# Patient Record
Sex: MALE | URBAN - METROPOLITAN AREA
[De-identification: names, ages, dates, MRNs, and addresses within clinical notes are randomized per-mention and may not be internally consistent; named-entity substitution may affect disease eponyms.]

---

## 2022-12-08 ENCOUNTER — OFFICE VISIT (OUTPATIENT)
Dept: PAIN MANAGEMENT | Age: 52
End: 2022-12-08
Payer: COMMERCIAL

## 2022-12-08 VITALS
HEIGHT: 71 IN | TEMPERATURE: 98.2 F | BODY MASS INDEX: 23.66 KG/M2 | SYSTOLIC BLOOD PRESSURE: 128 MMHG | WEIGHT: 169 LBS | DIASTOLIC BLOOD PRESSURE: 76 MMHG

## 2022-12-08 DIAGNOSIS — M50.30 DDD (DEGENERATIVE DISC DISEASE), CERVICAL: Primary | ICD-10-CM

## 2022-12-08 PROCEDURE — G8484 FLU IMMUNIZE NO ADMIN: HCPCS | Performed by: PHYSICAL MEDICINE & REHABILITATION

## 2022-12-08 PROCEDURE — G8420 CALC BMI NORM PARAMETERS: HCPCS | Performed by: PHYSICAL MEDICINE & REHABILITATION

## 2022-12-08 PROCEDURE — G8427 DOCREV CUR MEDS BY ELIG CLIN: HCPCS | Performed by: PHYSICAL MEDICINE & REHABILITATION

## 2022-12-08 PROCEDURE — 3017F COLORECTAL CA SCREEN DOC REV: CPT | Performed by: PHYSICAL MEDICINE & REHABILITATION

## 2022-12-08 PROCEDURE — 99203 OFFICE O/P NEW LOW 30 MIN: CPT | Performed by: PHYSICAL MEDICINE & REHABILITATION

## 2022-12-08 PROCEDURE — 1036F TOBACCO NON-USER: CPT | Performed by: PHYSICAL MEDICINE & REHABILITATION

## 2022-12-08 RX ORDER — DOXYCYCLINE HYCLATE 100 MG
TABLET ORAL
COMMUNITY
Start: 2019-12-18

## 2022-12-08 RX ORDER — MELOXICAM 10 MG/1
CAPSULE ORAL
COMMUNITY

## 2022-12-08 RX ORDER — FLUTICASONE PROPIONATE 50 MCG
SPRAY, SUSPENSION (ML) NASAL
COMMUNITY

## 2022-12-08 RX ORDER — PREGABALIN 300 MG/1
CAPSULE ORAL 2 TIMES DAILY
COMMUNITY

## 2022-12-08 RX ORDER — PSEUDOEPHEDRINE HCL 30 MG
TABLET ORAL
COMMUNITY

## 2022-12-08 RX ORDER — ACETAMINOPHEN 325 MG/1
TABLET ORAL
COMMUNITY

## 2022-12-08 RX ORDER — DOXYCYCLINE 100 MG/1
CAPSULE ORAL
COMMUNITY

## 2022-12-08 RX ORDER — EZETIMIBE 10 MG/1
TABLET ORAL
COMMUNITY

## 2022-12-08 RX ORDER — OMEPRAZOLE 20 MG/1
TABLET, DELAYED RELEASE ORAL
COMMUNITY

## 2022-12-08 RX ORDER — DIPHENHYDRAMINE HCL 25 MG
CAPSULE ORAL
COMMUNITY

## 2022-12-08 RX ORDER — PSEUDOEPHED/ACETAMINOPH/DIPHEN 30MG-500MG
TABLET ORAL
COMMUNITY
Start: 2022-11-13

## 2022-12-08 RX ORDER — MULTIVIT WITH MINERALS/LUTEIN
TABLET ORAL
COMMUNITY

## 2022-12-08 RX ORDER — DIVALPROEX SODIUM 500 MG/1
TABLET, DELAYED RELEASE ORAL
COMMUNITY

## 2022-12-08 RX ORDER — ACETAMINOPHEN 500 MG
TABLET ORAL
COMMUNITY

## 2022-12-08 ASSESSMENT — ENCOUNTER SYMPTOMS
SHORTNESS OF BREATH: 0
NAUSEA: 0
CONSTIPATION: 0
BACK PAIN: 0
DIARRHEA: 0
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
## 2023-05-18 ENCOUNTER — HOSPITAL ENCOUNTER (OUTPATIENT)
Dept: DATA CONVERSION | Facility: HOSPITAL | Age: 53
End: 2023-05-18
Attending: RADIOLOGY
Payer: COMMERCIAL

## 2023-05-18 DIAGNOSIS — M25.559 PAIN IN UNSPECIFIED HIP: ICD-10-CM

## 2023-05-18 DIAGNOSIS — M25.552 PAIN IN LEFT HIP: ICD-10-CM

## 2023-05-24 ENCOUNTER — HOSPITAL ENCOUNTER (OUTPATIENT)
Dept: DATA CONVERSION | Facility: HOSPITAL | Age: 53
End: 2023-05-24
Attending: ORTHOPAEDIC SURGERY | Admitting: ORTHOPAEDIC SURGERY
Payer: COMMERCIAL

## 2023-05-24 DIAGNOSIS — E78.5 HYPERLIPIDEMIA, UNSPECIFIED: ICD-10-CM

## 2023-05-24 DIAGNOSIS — G56.22 LESION OF ULNAR NERVE, LEFT UPPER LIMB: ICD-10-CM

## 2023-05-24 DIAGNOSIS — K21.9 GASTRO-ESOPHAGEAL REFLUX DISEASE WITHOUT ESOPHAGITIS: ICD-10-CM

## 2023-05-24 DIAGNOSIS — I73.9 PERIPHERAL VASCULAR DISEASE, UNSPECIFIED (CMS-HCC): ICD-10-CM

## 2023-05-24 DIAGNOSIS — G90.522 COMPLEX REGIONAL PAIN SYNDROME I OF LEFT LOWER LIMB: ICD-10-CM

## 2023-06-27 ENCOUNTER — HOSPITAL ENCOUNTER (OUTPATIENT)
Dept: DATA CONVERSION | Facility: HOSPITAL | Age: 53
End: 2023-06-27
Attending: PAIN MEDICINE | Admitting: PAIN MEDICINE
Payer: COMMERCIAL

## 2023-06-27 DIAGNOSIS — M47.812 SPONDYLOSIS WITHOUT MYELOPATHY OR RADICULOPATHY, CERVICAL REGION: ICD-10-CM

## 2023-08-01 ENCOUNTER — HOSPITAL ENCOUNTER (OUTPATIENT)
Dept: DATA CONVERSION | Facility: HOSPITAL | Age: 53
End: 2023-08-01
Attending: PAIN MEDICINE | Admitting: PAIN MEDICINE
Payer: COMMERCIAL

## 2023-08-01 DIAGNOSIS — M47.812 SPONDYLOSIS WITHOUT MYELOPATHY OR RADICULOPATHY, CERVICAL REGION: ICD-10-CM

## 2023-09-07 VITALS — HEIGHT: 71 IN | BODY MASS INDEX: 24.89 KG/M2

## 2023-09-19 ENCOUNTER — HOSPITAL ENCOUNTER (OUTPATIENT)
Dept: DATA CONVERSION | Facility: HOSPITAL | Age: 53
End: 2023-09-19
Attending: PAIN MEDICINE | Admitting: PAIN MEDICINE
Payer: COMMERCIAL

## 2023-09-19 DIAGNOSIS — M47.812 SPONDYLOSIS WITHOUT MYELOPATHY OR RADICULOPATHY, CERVICAL REGION: ICD-10-CM

## 2023-09-21 PROBLEM — M62.559 ATROPHY OF QUADRICEPS FEMORIS MUSCLE: Status: ACTIVE | Noted: 2023-09-21

## 2023-09-21 PROBLEM — G90.50 REFLEX SYMPATHETIC DYSTROPHY: Status: ACTIVE | Noted: 2023-09-21

## 2023-09-21 PROBLEM — H52.4 BILATERAL PRESBYOPIA: Status: ACTIVE | Noted: 2023-09-21

## 2023-09-21 PROBLEM — M25.579 FOOT JOINT PAIN: Status: ACTIVE | Noted: 2023-09-21

## 2023-09-21 PROBLEM — M79.18 MYOFASCIAL PAIN: Status: ACTIVE | Noted: 2023-09-21

## 2023-09-21 PROBLEM — G25.89 SCAPULAR DYSKINESIS: Status: ACTIVE | Noted: 2023-09-21

## 2023-09-21 PROBLEM — G56.22 CUBITAL TUNNEL SYNDROME ON LEFT: Status: ACTIVE | Noted: 2023-09-21

## 2023-09-21 PROBLEM — G56.21 CUBITAL TUNNEL SYNDROME ON RIGHT: Status: ACTIVE | Noted: 2023-09-21

## 2023-09-21 PROBLEM — F41.9 ANXIETY: Status: ACTIVE | Noted: 2023-09-21

## 2023-09-21 PROBLEM — M77.42 METATARSALGIA OF LEFT FOOT: Status: ACTIVE | Noted: 2023-09-21

## 2023-09-21 PROBLEM — G56.20 GUYON SYNDROME: Status: ACTIVE | Noted: 2023-09-21

## 2023-09-21 PROBLEM — G89.29 CHRONIC LOW BACK PAIN WITHOUT SCIATICA: Status: ACTIVE | Noted: 2023-09-21

## 2023-09-21 PROBLEM — N52.9 ERECTILE DYSFUNCTION: Status: ACTIVE | Noted: 2023-09-21

## 2023-09-21 PROBLEM — F43.9 TRAUMA AND STRESSOR-RELATED DISORDER: Status: ACTIVE | Noted: 2023-09-21

## 2023-09-21 PROBLEM — M95.8 OSTEOCHONDRAL DEFECT OF PATELLA: Status: ACTIVE | Noted: 2023-09-21

## 2023-09-21 PROBLEM — R29.818 SUSPECTED SLEEP APNEA: Status: ACTIVE | Noted: 2023-09-21

## 2023-09-21 PROBLEM — M16.12 PRIMARY OSTEOARTHRITIS OF LEFT HIP: Status: ACTIVE | Noted: 2023-09-21

## 2023-09-21 PROBLEM — M54.12 CERVICAL RADICULAR PAIN: Status: ACTIVE | Noted: 2023-09-21

## 2023-09-21 PROBLEM — R40.0 DAYTIME SLEEPINESS: Status: ACTIVE | Noted: 2023-09-21

## 2023-09-21 PROBLEM — N28.1 KIDNEY CYSTS: Status: ACTIVE | Noted: 2023-09-21

## 2023-09-21 PROBLEM — F11.90 NARCOTIC DRUG USE: Status: ACTIVE | Noted: 2023-09-21

## 2023-09-21 PROBLEM — M47.816 LUMBAR SPONDYLOSIS: Status: ACTIVE | Noted: 2023-09-21

## 2023-09-21 PROBLEM — M25.819 SHOULDER IMPINGEMENT: Status: ACTIVE | Noted: 2023-09-21

## 2023-09-21 PROBLEM — R91.1 LUNG NODULE: Status: ACTIVE | Noted: 2023-09-21

## 2023-09-21 PROBLEM — M62.838 MUSCLE SPASM: Status: ACTIVE | Noted: 2023-09-21

## 2023-09-21 PROBLEM — M17.10 PATELLOFEMORAL ARTHRITIS: Status: ACTIVE | Noted: 2023-09-21

## 2023-09-21 PROBLEM — M25.562 CHRONIC PATELLOFEMORAL PAIN OF LEFT KNEE: Status: ACTIVE | Noted: 2023-09-21

## 2023-09-21 PROBLEM — H52.203 ASTIGMATISM, BILATERAL: Status: ACTIVE | Noted: 2023-09-21

## 2023-09-21 PROBLEM — M47.892 OTHER SPONDYLOSIS, CERVICAL REGION: Status: ACTIVE | Noted: 2023-09-21

## 2023-09-21 PROBLEM — Z91.49 HISTORY OF PSYCHOLOGICAL TRAUMA: Status: ACTIVE | Noted: 2023-09-21

## 2023-09-21 PROBLEM — M25.50 JOINT PAIN: Status: ACTIVE | Noted: 2023-09-21

## 2023-09-21 PROBLEM — G89.29 CHRONIC PATELLOFEMORAL PAIN OF LEFT KNEE: Status: ACTIVE | Noted: 2023-09-21

## 2023-09-21 PROBLEM — M54.6 THORACIC BACK PAIN: Status: ACTIVE | Noted: 2023-09-21

## 2023-09-21 PROBLEM — H52.13 BILATERAL MYOPIA: Status: ACTIVE | Noted: 2023-09-21

## 2023-09-21 PROBLEM — M19.90 ARTHRITIS: Status: ACTIVE | Noted: 2023-09-21

## 2023-09-21 PROBLEM — G90.522 COMPLEX REGIONAL PAIN SYNDROME TYPE 1 OF LEFT LOWER EXTREMITY: Status: ACTIVE | Noted: 2023-09-21

## 2023-09-21 PROBLEM — M54.50 CHRONIC LOW BACK PAIN WITHOUT SCIATICA: Status: ACTIVE | Noted: 2023-09-21

## 2023-09-21 RX ORDER — IBUPROFEN 800 MG/1
1 TABLET ORAL
COMMUNITY
End: 2023-10-03

## 2023-09-21 RX ORDER — KETOCONAZOLE 20 MG/ML
SHAMPOO, SUSPENSION TOPICAL
COMMUNITY
Start: 2023-04-07 | End: 2023-10-04 | Stop reason: ENTERED-IN-ERROR

## 2023-09-21 RX ORDER — PREDNISONE 20 MG/1
2 TABLET ORAL DAILY
COMMUNITY
Start: 2023-01-21 | End: 2023-10-03

## 2023-09-21 RX ORDER — TIZANIDINE 4 MG/1
1 TABLET ORAL NIGHTLY
COMMUNITY
End: 2023-10-03

## 2023-09-21 RX ORDER — FLUTICASONE PROPIONATE 50 MCG
1 SPRAY, SUSPENSION (ML) NASAL DAILY PRN
COMMUNITY

## 2023-09-21 RX ORDER — HYOSCYAMINE SULFATE 0.12 MG/1
1-2 TABLET, ORALLY DISINTEGRATING ORAL
COMMUNITY
Start: 2023-03-21 | End: 2023-10-03

## 2023-09-21 RX ORDER — DULOXETIN HYDROCHLORIDE 60 MG/1
60 CAPSULE, DELAYED RELEASE ORAL DAILY
COMMUNITY
Start: 2023-08-02

## 2023-09-21 RX ORDER — LIDOCAINE HCL 100 %
POWDER (GRAM) MISCELLANEOUS
COMMUNITY
Start: 2023-09-12 | End: 2023-10-04 | Stop reason: ENTERED-IN-ERROR

## 2023-09-21 RX ORDER — PREGABALIN 300 MG/1
1 CAPSULE ORAL 2 TIMES DAILY
Status: ON HOLD | COMMUNITY
End: 2023-10-06 | Stop reason: SDUPTHER

## 2023-09-21 RX ORDER — NALOXONE HYDROCHLORIDE 4 MG/.1ML
SPRAY NASAL
COMMUNITY
End: 2023-10-04 | Stop reason: ENTERED-IN-ERROR

## 2023-09-21 RX ORDER — DULOXETIN HYDROCHLORIDE 30 MG/1
30 CAPSULE, DELAYED RELEASE ORAL DAILY
COMMUNITY
Start: 2023-04-12 | End: 2023-10-04 | Stop reason: ENTERED-IN-ERROR

## 2023-09-21 RX ORDER — HYDROCODONE BITARTRATE AND ACETAMINOPHEN 7.5; 325 MG/1; MG/1
1 TABLET ORAL EVERY 6 HOURS PRN
COMMUNITY
Start: 2023-03-13 | End: 2023-10-03

## 2023-09-21 RX ORDER — DOXYCYCLINE 100 MG/1
1 CAPSULE ORAL 2 TIMES DAILY
COMMUNITY
Start: 2023-04-04 | End: 2023-10-04 | Stop reason: ENTERED-IN-ERROR

## 2023-09-21 RX ORDER — ONDANSETRON 4 MG/1
1 TABLET, ORALLY DISINTEGRATING ORAL EVERY 8 HOURS PRN
COMMUNITY
Start: 2023-01-17 | End: 2023-10-04 | Stop reason: ENTERED-IN-ERROR

## 2023-09-21 RX ORDER — OXYCODONE HYDROCHLORIDE 5 MG/1
1 TABLET ORAL EVERY 8 HOURS PRN
COMMUNITY
Start: 2023-09-04 | End: 2023-10-03

## 2023-09-21 RX ORDER — ATORVASTATIN CALCIUM 80 MG/1
1 TABLET, FILM COATED ORAL NIGHTLY
COMMUNITY

## 2023-09-21 RX ORDER — HYDROCODONE BITARTRATE AND ACETAMINOPHEN 5; 325 MG/1; MG/1
1 TABLET ORAL EVERY 6 HOURS PRN
COMMUNITY
End: 2023-10-03

## 2023-09-21 RX ORDER — DICLOFENAC POTASSIUM 25 MG/1
1 CAPSULE, LIQUID FILLED ORAL 3 TIMES DAILY
COMMUNITY
End: 2023-10-03

## 2023-09-21 RX ORDER — ORPHENADRINE CITRATE 100 MG/1
1 TABLET, EXTENDED RELEASE ORAL 2 TIMES DAILY
COMMUNITY
End: 2023-10-03

## 2023-09-21 RX ORDER — AZELASTINE 1 MG/ML
2 SPRAY, METERED NASAL 2 TIMES DAILY
COMMUNITY
Start: 2023-08-02 | End: 2023-10-04 | Stop reason: ENTERED-IN-ERROR

## 2023-09-21 RX ORDER — KETAMINE HCL IN 0.9 % NACL 200 MG/200
1 PLASTIC BAG, INJECTION (ML) INTRAVENOUS
COMMUNITY
End: 2023-10-04 | Stop reason: ENTERED-IN-ERROR

## 2023-09-21 RX ORDER — LIDOCAINE 50 MG/G
2 PATCH TOPICAL DAILY PRN
COMMUNITY
Start: 2023-09-10 | End: 2023-10-04 | Stop reason: ENTERED-IN-ERROR

## 2023-09-21 RX ORDER — ACETAMINOPHEN 160 MG/5ML
1 SUSPENSION, ORAL (FINAL DOSE FORM) ORAL 3 TIMES DAILY
COMMUNITY
Start: 2022-06-21 | End: 2023-10-03

## 2023-09-21 RX ORDER — OXYCODONE AND ACETAMINOPHEN 5; 325 MG/1; MG/1
1 TABLET ORAL EVERY 8 HOURS PRN
COMMUNITY
End: 2023-10-03

## 2023-09-21 RX ORDER — TIZANIDINE 2 MG/1
1-2 TABLET ORAL 3 TIMES DAILY PRN
COMMUNITY
Start: 2023-07-11 | End: 2023-10-03

## 2023-09-21 RX ORDER — PERPHENAZINE 16 MG
1 TABLET ORAL
COMMUNITY
Start: 2022-06-21 | End: 2023-10-03

## 2023-09-21 RX ORDER — ALBUTEROL SULFATE 90 UG/1
AEROSOL, METERED RESPIRATORY (INHALATION) EVERY 4 HOURS PRN
COMMUNITY
Start: 2023-05-16 | End: 2023-10-06 | Stop reason: HOSPADM

## 2023-09-21 RX ORDER — B-COMPLEX WITH VITAMIN C
1 CAPSULE ORAL
COMMUNITY
Start: 2022-06-21 | End: 2023-10-03

## 2023-09-21 RX ORDER — METOCLOPRAMIDE HYDROCHLORIDE 5 MG/5ML
10 SOLUTION ORAL 3 TIMES DAILY
COMMUNITY
Start: 2023-09-15 | End: 2024-04-05 | Stop reason: ALTCHOICE

## 2023-09-21 RX ORDER — CLINDAMYCIN AND BENZOYL PEROXIDE 10; 50 MG/G; MG/G
GEL TOPICAL
COMMUNITY
Start: 2023-04-04 | End: 2023-10-03 | Stop reason: HOSPADM

## 2023-09-21 RX ORDER — TADALAFIL 5 MG/1
1 TABLET ORAL DAILY
COMMUNITY
Start: 2023-07-29 | End: 2023-10-04 | Stop reason: ENTERED-IN-ERROR

## 2023-09-21 RX ORDER — FLUTICASONE FUROATE 27.5 MCG
SPRAY, SUSPENSION (ML) NASAL
COMMUNITY
End: 2023-10-04 | Stop reason: ENTERED-IN-ERROR

## 2023-09-21 RX ORDER — RIMEGEPANT SULFATE 75 MG/75MG
1 TABLET, ORALLY DISINTEGRATING ORAL
COMMUNITY
Start: 2023-07-13 | End: 2023-10-04 | Stop reason: ENTERED-IN-ERROR

## 2023-09-21 RX ORDER — PREGABALIN 150 MG/1
2 CAPSULE ORAL 2 TIMES DAILY
COMMUNITY
End: 2023-10-03

## 2023-09-21 RX ORDER — DICYCLOMINE HYDROCHLORIDE 20 MG/1
1 TABLET ORAL
COMMUNITY
Start: 2023-08-02 | End: 2023-10-03

## 2023-09-21 RX ORDER — SUCRALFATE 1 G/1
1 TABLET ORAL
COMMUNITY
Start: 2023-07-15 | End: 2023-10-04 | Stop reason: ENTERED-IN-ERROR

## 2023-09-21 RX ORDER — DIAZEPAM 5 MG/1
1 TABLET ORAL 2 TIMES DAILY
COMMUNITY
End: 2023-10-06 | Stop reason: HOSPADM

## 2023-09-21 RX ORDER — OMEPRAZOLE 40 MG/1
1 CAPSULE, DELAYED RELEASE ORAL 2 TIMES DAILY
COMMUNITY

## 2023-09-21 RX ORDER — DIPHENHYDRAMINE HCL 25 MG
1 TABLET ORAL EVERY 6 HOURS PRN
COMMUNITY
End: 2023-10-04 | Stop reason: ENTERED-IN-ERROR

## 2023-09-21 RX ORDER — SILDENAFIL 100 MG/1
1 TABLET, FILM COATED ORAL DAILY PRN
COMMUNITY
Start: 2023-09-09 | End: 2023-10-04 | Stop reason: ENTERED-IN-ERROR

## 2023-09-21 RX ORDER — ACETAMINOPHEN 500 MG
1 TABLET ORAL 3 TIMES DAILY PRN
COMMUNITY
End: 2023-10-06 | Stop reason: HOSPADM

## 2023-09-21 RX ORDER — DICLOFENAC SODIUM 50 MG/1
1 TABLET, DELAYED RELEASE ORAL
COMMUNITY
Start: 2023-07-11 | End: 2023-10-03

## 2023-09-21 RX ORDER — BUPRENORPHINE AND NALOXONE 4; 1 MG/1; MG/1
1 FILM, SOLUBLE BUCCAL; SUBLINGUAL 2 TIMES DAILY
COMMUNITY
Start: 2022-06-21 | End: 2023-10-03 | Stop reason: HOSPADM

## 2023-09-22 PROBLEM — M70.62 TROCHANTERIC BURSITIS OF BOTH HIPS: Status: ACTIVE | Noted: 2023-09-22

## 2023-09-22 PROBLEM — M70.61 TROCHANTERIC BURSITIS OF BOTH HIPS: Status: ACTIVE | Noted: 2023-09-22

## 2023-09-22 LAB
ACTIVATED PARTIAL THROMBOPLASTIN TIME IN PPP BY COAGULATION ASSAY: 30 SEC (ref 27–38)
INR IN PPP BY COAGULATION ASSAY: 1 (ref 0.9–1.1)
PROTHROMBIN TIME (PT) IN PPP BY COAGULATION ASSAY: 11.2 SEC (ref 9.8–12.8)

## 2023-09-25 RX ORDER — DIPHENHYDRAMINE HYDROCHLORIDE 50 MG/ML
50 INJECTION INTRAMUSCULAR; INTRAVENOUS AS NEEDED
Status: CANCELLED | OUTPATIENT
Start: 2023-10-03

## 2023-09-25 RX ORDER — EPINEPHRINE 0.3 MG/.3ML
0.3 INJECTION SUBCUTANEOUS EVERY 5 MIN PRN
Status: CANCELLED | OUTPATIENT
Start: 2023-10-03

## 2023-09-25 RX ORDER — ONDANSETRON HYDROCHLORIDE 2 MG/ML
4 INJECTION, SOLUTION INTRAVENOUS ONCE
Status: CANCELLED | OUTPATIENT
Start: 2023-10-03 | End: 2023-10-03

## 2023-09-25 RX ORDER — ALBUTEROL SULFATE 0.83 MG/ML
3 SOLUTION RESPIRATORY (INHALATION) AS NEEDED
Status: CANCELLED | OUTPATIENT
Start: 2023-10-03

## 2023-09-25 RX ORDER — NITROGLYCERIN 0.4 MG/1
0.4 TABLET SUBLINGUAL ONCE
Status: CANCELLED | OUTPATIENT
Start: 2023-10-03 | End: 2023-10-03

## 2023-09-25 RX ORDER — FAMOTIDINE 10 MG/ML
20 INJECTION INTRAVENOUS ONCE AS NEEDED
Status: CANCELLED | OUTPATIENT
Start: 2023-10-03

## 2023-09-25 RX ORDER — METOPROLOL TARTRATE 25 MG/1
25 TABLET, FILM COATED ORAL ONCE
Status: CANCELLED | OUTPATIENT
Start: 2023-10-03 | End: 2023-10-03

## 2023-09-29 VITALS — HEIGHT: 71 IN | WEIGHT: 176.37 LBS | BODY MASS INDEX: 24.69 KG/M2

## 2023-09-30 NOTE — H&P
History & Physical Reviewed:   I have reviewed the History and Physical dated:  04-May-2023   History and Physical reviewed and relevant findings noted. Patient examined to review pertinent physical  findings.: No significant changes   Home Medications Reviewed: no changes noted   Allergies Reviewed: no changes noted     Consent:   COVID-19 Consent:  ·  COVID-19 Risk Consent Surgeon has reviewed key risks related to the risk of yamileth COVID-19 and if they contract COVID-19 what the risks are.       Electronic Signatures:  Ronni Luna ()  (Signed 24-May-2023 08:47)   Authored: History & Physical Reviewed, Consent, Note  Completion      Last Updated: 24-May-2023 08:47 by Ronni Luna ()

## 2023-10-01 NOTE — OP NOTE
PROCEDURE DETAILS    Preoperative Diagnosis:  Spondylosis, cervical, M47.812    Postoperative Diagnosis:  Spondylosis, cervical, M47.812    Surgeon: Carlyle Abel  Resident/Fellow/Other Assistant: Carolina Balderrama    Procedure:  Radiofrequency ablation left medial nerve branch   C2-C3,C3-C4    Anesthesia: 4 mg of Versed   Estimated Blood Loss: 0  Findings: None         Operative Report:   Surgical Indications  The patient is here today to receive a radiofrequency ablation of the above-mentioned nerve to assist with the pain condition.    Operative Report  The patient was taken to the procedure room, was placed into a prone positioning. The skin was prepped and draped sterilely in the normal fashion.  Under fluoroscopic guidance the medial nerve root branches were identified and the patient was throughout the procedure monitored by the nursing staff. The skin and subcutaneous tissues were anesthetized with 1% lidocaine. Then 20-gauge RF needles were  introduced into the approximation of the medial nerve branches at the above mentioned level and confirmed in AP and oblique view, with negative aspiration of heme and CSF, with positive sensory stimulation and negative motor stimulation. Then the patient  received 1 mL of 0.5% bupivacaine per site and radiofrequency ablation at temperature of 80°C for 90 seconds was achieved. Patient tolerated the procedure well, was taken to the recovery room, allowed to recover for a sufficient amount of time and  then was discharged home in stable condition. The patient was advised to followup with the [facility] to reassess the improvement on as-needed basis. Take you for allowing me to participate in the care of this patient.                        Attestation:   Note Completion:  Attending Attestation I performed the procedure without a resident         Electronic Signatures:  Carlyle Abel)  (Signed 19-Sep-2023 08:46)   Authored: Post-Operative Note,  Chart Review, Note Completion      Last Updated: 19-Sep-2023 08:46 by Carlyle Abel)

## 2023-10-01 NOTE — OP NOTE
PROCEDURE DETAILS    Preoperative Diagnosis:  Cervical spondylosis, M47.812    Postoperative Diagnosis:  Cervical spondylosis, M47.812    Surgeon: Carlyle Abel  Resident/Fellow/Other Assistant: None of these were associated with this case    Procedure:  1. Left Medial nerve branch block C2-3 C3-4    Anesthesia: No anesthesiologist associated with this case  Estimated Blood Loss: 0  Findings: None         Operative Report:   Clinical Note  The patient is here today to receive diagnostic medial nerve branch block to assist with pain.    Procedure Note  The patient was taken to the procedure room, was placed into a prone position. The area was prepped and draped sterilely in the normal fashion. The patient was throughout the procedure monitored by the nursing staff. The skin and subcutaneous tissue was  anesthetized with 1% lidocaine. Then 22-gauge spinal needles were introduced into the approximation of the medial nerve branches at the above mentioned level, confirmed in AP and oblique view with negative aspiration of heme and CSF. The patient received  0.5 mL of 0.5% Marcaine per site. The patient tolerated the procedure well, was taken to the recovery room, allowed to recover for sufficient amount of time and then was discharged home in stable condition. The patient was advised to followup with the  [facility] to reassess the improvement and determine the need for the radiofrequency ablation.    Thank you for allowing me to participate in the care of this patient.                        Attestation:   Note Completion:  Attending Attestation I performed the procedure without a resident         Electronic Signatures:  Carlyle Abel)  (Signed 01-Aug-2023 09:55)   Authored: Post-Operative Note, Chart Review, Note Completion      Last Updated: 01-Aug-2023 09:55 by Carlyle Abel)

## 2023-10-02 NOTE — OP NOTE
Post Operative Note:     Post-Procedure Diagnosis: Ulnar nerve compression  left wrist   Procedure: 1.   2.   3.   4.   5.   Surgeon: Ronni Luna D.O.   Resident/Fellow/Other Assistant: None   Estimated Blood Loss (mL): Less than 10 cc   Specimen: no   Findings: Ulnar nerve compression left wrist     Operative Report Dictated:  Dictation: not applicable - note contains Operative  Report   Operative Report:    Preoperative diagnosis:Ulnar nerve compression left wrist    Postoperative diagnosis:Same    Procedure planned:Decompression ulnar nerve left wrist including deep motor branch in the palm    Procedure performed:Same    Surgeon:Ronni Luna D.O.    Assistant:None    Anesthesia:General    Estimated blood loss:Less than 10 cc    Drains:None    Tourniquet:Less than 30 minutes at 250 mmHg to the well-padded left upper arm    Specimens:None    Implants:None    Indications for procedure:The patient presented to the office with subjective symptoms and physical examination consistent with ulnar nerve compression to the left wrist.  The patient experienced failure of nonoperative treatment strategies.  After  full discussion regarding risks benefits and alternatives the patient elected to proceed forth with surgery by way of ulnar nerve decompression to left wrist with concomitant decompression of deep motor branch.  Informed consent was signed and placed  in the chart.    Complications: None noted at the time of surgery    Description of procedure:The patient was taken to the operative suite and placed in the supine position on the operating table.  A timeout was performed and the left wrist confirmed to be the operative site.  The patient was carefully positioned on  the table in such a fashion as to pad all bony prominences and peripheral nerves.  The patient was administered appropriate IV antibiotics and general anesthesia.  He was prepped and draped in the normal sterile fashion.  The planned  surgical incision  was marked out starting in the ulnar volar distal third forearm proceeding longitudinally traversing the wrist flexion crease in a zigzag fashion proceeding up the ulnar aspect of the palm overlying hypothenar eminence.  The tourniquet was elevated.   The 15 blade was used to incise skin.  Dissection was carried down to the ulnar neurovascular bundle in the distal third forearm.  The antebrachial fascia was divided and the ulnar nerve and artery exposed.  Dissection was then carried distally taking  care to avoid injury to sensory branches feeding the cutaneous tissues of the palm.  Dissection was carried distally to a point where the sensory portion of the nerve was completely decompressed.  Dissection was then carried deep down to the level of  the deep motor branch.  The fascia was released off of the deep motor branch to the point where the flexor tendons were evident exiting the distal ulnar aspect of the carpal tunnel indicating complete decompression.  The ulnar motor branch showed obvious  evidence for compression in this zone.  Irrigation was performed.  The tourniquet was relieved and hemostasis confirmed.  Interrupted nylon stitches were used to close the skin.  A bulky soft dressing was placed.  The patient was allowed to arise from  anesthesia and taken recovery in stable condition.  Overall the patient tolerated the procedure well.    Disposition: Stable to PACU      Electronic Signatures:  Ronni Luna ()  (Signed 24-May-2023 12:33)   Authored: Post Operative Note, Note Completion      Last Updated: 24-May-2023 12:33 by Ronni Luna ()

## 2023-10-02 NOTE — OP NOTE
PROCEDURE DETAILS    Preoperative Diagnosis:  Cervical spondylosis, M47.812    Postoperative Diagnosis:  Cervical spondylosis, M47.812    Surgeon: Carlyle Abel  Resident/Fellow/Other Assistant: Carolina Balderrama    Procedure:  Medial nerve branch block left C2-C3,  C3-C4    Anesthesia: No anesthesiologist associated with this case  Estimated Blood Loss: 0  Findings: None         Operative Report:   Clinical Note  The patient is here today to receive diagnostic medial nerve branch block to assist with pain.    Procedure Note  The patient was taken to the procedure room, was placed into a prone position. The area was prepped and draped sterilely in the normal fashion. The patient was throughout the procedure monitored by the nursing staff. The skin and subcutaneous tissue was  anesthetized with 1% lidocaine. Then 22-gauge spinal needles were introduced into the approximation of the medial nerve branches at the above mentioned level, confirmed in AP and oblique view with negative aspiration of heme and CSF. The patient received  0.5 mL of 2% lidocaine per site. The patient tolerated the procedure well, was taken to the recovery room, allowed to recover for sufficient amount of time and then was discharged home in stable condition. The patient was advised to followup with the  [facility] to reassess the improvement and determine the need for the radiofrequency ablation.    Thank you for allowing me to participate in the care of this patient.                        Attestation:   Note Completion:  Attending Attestation I performed the procedure without a resident         Electronic Signatures:  Carlyle Abel)  (Signed 27-Jun-2023 09:35)   Authored: Post-Operative Note, Chart Review, Note Completion      Last Updated: 27-Jun-2023 09:35 by Carlyle Abel)

## 2023-10-03 ENCOUNTER — INFUSION (OUTPATIENT)
Dept: INFUSION THERAPY | Facility: CLINIC | Age: 53
End: 2023-10-03
Payer: COMMERCIAL

## 2023-10-03 VITALS
HEART RATE: 75 BPM | DIASTOLIC BLOOD PRESSURE: 81 MMHG | RESPIRATION RATE: 18 BRPM | TEMPERATURE: 99 F | OXYGEN SATURATION: 97 % | SYSTOLIC BLOOD PRESSURE: 128 MMHG

## 2023-10-03 DIAGNOSIS — G90.522 COMPLEX REGIONAL PAIN SYNDROME TYPE 1 OF LEFT LOWER EXTREMITY: ICD-10-CM

## 2023-10-03 PROCEDURE — 96368 THER/DIAG CONCURRENT INF: CPT

## 2023-10-03 PROCEDURE — 2500000005 HC RX 250 GENERAL PHARMACY W/O HCPCS: Performed by: NURSE PRACTITIONER

## 2023-10-03 PROCEDURE — 96365 THER/PROPH/DIAG IV INF INIT: CPT

## 2023-10-03 PROCEDURE — 2500000004 HC RX 250 GENERAL PHARMACY W/ HCPCS (ALT 636 FOR OP/ED): Performed by: NURSE PRACTITIONER

## 2023-10-03 RX ORDER — ALBUTEROL SULFATE 0.83 MG/ML
3 SOLUTION RESPIRATORY (INHALATION) AS NEEDED
Status: CANCELLED | OUTPATIENT
Start: 2023-10-17

## 2023-10-03 RX ORDER — EPINEPHRINE 0.3 MG/.3ML
0.3 INJECTION SUBCUTANEOUS EVERY 5 MIN PRN
Status: CANCELLED | OUTPATIENT
Start: 2023-10-17

## 2023-10-03 RX ORDER — FAMOTIDINE 10 MG/ML
20 INJECTION INTRAVENOUS ONCE AS NEEDED
Status: CANCELLED | OUTPATIENT
Start: 2023-10-17

## 2023-10-03 RX ORDER — DIPHENHYDRAMINE HYDROCHLORIDE 50 MG/ML
50 INJECTION INTRAMUSCULAR; INTRAVENOUS AS NEEDED
Status: CANCELLED | OUTPATIENT
Start: 2023-10-17

## 2023-10-03 RX ORDER — ONDANSETRON HYDROCHLORIDE 2 MG/ML
4 INJECTION, SOLUTION INTRAVENOUS ONCE
Status: CANCELLED | OUTPATIENT
Start: 2023-10-17 | End: 2023-10-17

## 2023-10-03 RX ORDER — NITROGLYCERIN 0.4 MG/1
0.4 TABLET SUBLINGUAL ONCE
Status: CANCELLED | OUTPATIENT
Start: 2023-10-17 | End: 2023-10-17

## 2023-10-03 RX ORDER — METOPROLOL TARTRATE 25 MG/1
25 TABLET, FILM COATED ORAL ONCE
Status: CANCELLED | OUTPATIENT
Start: 2023-10-17 | End: 2023-10-17

## 2023-10-03 RX ADMIN — Medication: at 08:45

## 2023-10-03 RX ADMIN — PROPOFOL 100 MG: 10 INJECTION, EMULSION INTRAVENOUS at 08:15

## 2023-10-03 ASSESSMENT — PAIN SCALES - GENERAL
PAINLEVEL_OUTOF10: 4
PAINLEVEL_OUTOF10: 0 - NO PAIN

## 2023-10-03 NOTE — PROGRESS NOTES
S: Patient here for 13 opioid sparing pain infusion. Patient reports 0% reduction in pain after last infusion that lasted 0.    Purpose of pain infusion meds explained along with potential side effects.  Patient verbalized understanding.    B: Pain Issues    A: Patient currently has pain described on flow sheet documentation. Designated  is family. Patient last ate solid food 10 hours ago, and had liquid 1 hours ago.    R: Plan; Obtain IV access, do patient risk assessment, and start opioid sparing infusion as ordered. Monitoring for S/S of adverse reactions.

## 2023-10-04 ENCOUNTER — TELEPHONE (OUTPATIENT)
Dept: ORTHOPEDIC SURGERY | Facility: CLINIC | Age: 53
End: 2023-10-04
Payer: COMMERCIAL

## 2023-10-04 DIAGNOSIS — G90.50 COMPLEX REGIONAL PAIN SYNDROME TYPE 1, AFFECTING UNSPECIFIED SITE: Primary | ICD-10-CM

## 2023-10-04 DIAGNOSIS — G90.50 RSD (REFLEX SYMPATHETIC DYSTROPHY): Primary | ICD-10-CM

## 2023-10-04 PROBLEM — Z96.642 STATUS POST LEFT HIP REPLACEMENT: Status: ACTIVE | Noted: 2023-10-04

## 2023-10-04 RX ORDER — PREGABALIN 150 MG/1
300 CAPSULE ORAL 2 TIMES DAILY
Status: DISCONTINUED | OUTPATIENT
Start: 2023-10-04 | End: 2023-10-24

## 2023-10-05 ENCOUNTER — APPOINTMENT (OUTPATIENT)
Dept: RADIOLOGY | Facility: HOSPITAL | Age: 53
End: 2023-10-05
Payer: COMMERCIAL

## 2023-10-05 ENCOUNTER — ANESTHESIA (OUTPATIENT)
Dept: OPERATING ROOM | Facility: HOSPITAL | Age: 53
End: 2023-10-05
Payer: COMMERCIAL

## 2023-10-05 ENCOUNTER — ANESTHESIA EVENT (OUTPATIENT)
Dept: OPERATING ROOM | Facility: HOSPITAL | Age: 53
End: 2023-10-05
Payer: COMMERCIAL

## 2023-10-05 ENCOUNTER — HOSPITAL ENCOUNTER (OUTPATIENT)
Facility: HOSPITAL | Age: 53
Discharge: HOME HEALTH | End: 2023-10-06
Attending: ORTHOPAEDIC SURGERY | Admitting: ORTHOPAEDIC SURGERY
Payer: COMMERCIAL

## 2023-10-05 DIAGNOSIS — G90.50 COMPLEX REGIONAL PAIN SYNDROME TYPE 1, AFFECTING UNSPECIFIED SITE: ICD-10-CM

## 2023-10-05 DIAGNOSIS — Z96.642 STATUS POST LEFT HIP REPLACEMENT: Primary | ICD-10-CM

## 2023-10-05 PROCEDURE — 2500000004 HC RX 250 GENERAL PHARMACY W/ HCPCS (ALT 636 FOR OP/ED): Performed by: PHYSICIAN ASSISTANT

## 2023-10-05 PROCEDURE — 3700000001 HC GENERAL ANESTHESIA TIME - INITIAL BASE CHARGE: Performed by: ORTHOPAEDIC SURGERY

## 2023-10-05 PROCEDURE — 2580000001 HC RX 258 IV SOLUTIONS: Performed by: STUDENT IN AN ORGANIZED HEALTH CARE EDUCATION/TRAINING PROGRAM

## 2023-10-05 PROCEDURE — C1776 JOINT DEVICE (IMPLANTABLE): HCPCS | Performed by: ORTHOPAEDIC SURGERY

## 2023-10-05 PROCEDURE — 7100000001 HC RECOVERY ROOM TIME - INITIAL BASE CHARGE: Performed by: ORTHOPAEDIC SURGERY

## 2023-10-05 PROCEDURE — 2780000003 HC OR 278 NO HCPCS: Performed by: ORTHOPAEDIC SURGERY

## 2023-10-05 PROCEDURE — 3600000017 HC OR TIME - EACH INCREMENTAL 1 MINUTE - PROCEDURE LEVEL SIX: Performed by: ORTHOPAEDIC SURGERY

## 2023-10-05 PROCEDURE — 3700000002 HC GENERAL ANESTHESIA TIME - EACH INCREMENTAL 1 MINUTE: Performed by: ORTHOPAEDIC SURGERY

## 2023-10-05 PROCEDURE — 51701 INSERT BLADDER CATHETER: CPT

## 2023-10-05 PROCEDURE — 2500000001 HC RX 250 WO HCPCS SELF ADMINISTERED DRUGS (ALT 637 FOR MEDICARE OP): Performed by: ORTHOPAEDIC SURGERY

## 2023-10-05 PROCEDURE — 2720000007 HC OR 272 NO HCPCS: Performed by: ORTHOPAEDIC SURGERY

## 2023-10-05 PROCEDURE — 2500000004 HC RX 250 GENERAL PHARMACY W/ HCPCS (ALT 636 FOR OP/ED): Performed by: ANESTHESIOLOGIST ASSISTANT

## 2023-10-05 PROCEDURE — 97116 GAIT TRAINING THERAPY: CPT | Mod: GP

## 2023-10-05 PROCEDURE — 2580000001 HC RX 258 IV SOLUTIONS: Performed by: ORTHOPAEDIC SURGERY

## 2023-10-05 PROCEDURE — A27130 PR TOTAL HIP ARTHROPLASTY: Performed by: STUDENT IN AN ORGANIZED HEALTH CARE EDUCATION/TRAINING PROGRAM

## 2023-10-05 PROCEDURE — 2500000004 HC RX 250 GENERAL PHARMACY W/ HCPCS (ALT 636 FOR OP/ED): Performed by: ORTHOPAEDIC SURGERY

## 2023-10-05 PROCEDURE — 97161 PT EVAL LOW COMPLEX 20 MIN: CPT | Mod: GP

## 2023-10-05 PROCEDURE — A27130 PR TOTAL HIP ARTHROPLASTY: Performed by: ANESTHESIOLOGIST ASSISTANT

## 2023-10-05 PROCEDURE — 2500000005 HC RX 250 GENERAL PHARMACY W/O HCPCS: Performed by: ANESTHESIOLOGIST ASSISTANT

## 2023-10-05 PROCEDURE — 73501 X-RAY EXAM HIP UNI 1 VIEW: CPT | Mod: LT,FY

## 2023-10-05 PROCEDURE — 2500000004 HC RX 250 GENERAL PHARMACY W/ HCPCS (ALT 636 FOR OP/ED): Performed by: STUDENT IN AN ORGANIZED HEALTH CARE EDUCATION/TRAINING PROGRAM

## 2023-10-05 PROCEDURE — 3600000018 HC OR TIME - INITIAL BASE CHARGE - PROCEDURE LEVEL SIX: Performed by: ORTHOPAEDIC SURGERY

## 2023-10-05 PROCEDURE — 27130 TOTAL HIP ARTHROPLASTY: CPT | Performed by: ORTHOPAEDIC SURGERY

## 2023-10-05 PROCEDURE — 2580000001 HC RX 258 IV SOLUTIONS: Performed by: ANESTHESIOLOGIST ASSISTANT

## 2023-10-05 PROCEDURE — 73501 X-RAY EXAM HIP UNI 1 VIEW: CPT | Mod: LEFT SIDE | Performed by: RADIOLOGY

## 2023-10-05 PROCEDURE — 7100000011 HC EXTENDED STAY RECOVERY HOURLY - NURSING UNIT

## 2023-10-05 PROCEDURE — 20985 CPTR-ASST DIR MS PX: CPT | Performed by: ORTHOPAEDIC SURGERY

## 2023-10-05 PROCEDURE — 27130 TOTAL HIP ARTHROPLASTY: CPT | Performed by: PHYSICIAN ASSISTANT

## 2023-10-05 PROCEDURE — 7100000002 HC RECOVERY ROOM TIME - EACH INCREMENTAL 1 MINUTE: Performed by: ORTHOPAEDIC SURGERY

## 2023-10-05 PROCEDURE — C1713 ANCHOR/SCREW BN/BN,TIS/BN: HCPCS | Performed by: ORTHOPAEDIC SURGERY

## 2023-10-05 PROCEDURE — 2500000005 HC RX 250 GENERAL PHARMACY W/O HCPCS: Performed by: ORTHOPAEDIC SURGERY

## 2023-10-05 PROCEDURE — 2500000002 HC RX 250 W HCPCS SELF ADMINISTERED DRUGS (ALT 637 FOR MEDICARE OP, ALT 636 FOR OP/ED): Performed by: PHYSICIAN ASSISTANT

## 2023-10-05 PROCEDURE — 2500000001 HC RX 250 WO HCPCS SELF ADMINISTERED DRUGS (ALT 637 FOR MEDICARE OP): Performed by: PHYSICIAN ASSISTANT

## 2023-10-05 DEVICE — IMPLANTABLE DEVICE: Type: IMPLANTABLE DEVICE | Site: HIP | Status: FUNCTIONAL

## 2023-10-05 DEVICE — HEAD, FEMUR V40 36MM -2.5MM BIOLOX DELTA: Type: IMPLANTABLE DEVICE | Site: HIP | Status: FUNCTIONAL

## 2023-10-05 DEVICE — INSERT, TRIDENT X3 POLYETHYLENE, 10 DEG, 36MM E: Type: IMPLANTABLE DEVICE | Site: HIP | Status: FUNCTIONAL

## 2023-10-05 DEVICE — STEM, FEMUR 127D SZ 5: Type: IMPLANTABLE DEVICE | Site: HIP | Status: FUNCTIONAL

## 2023-10-05 RX ORDER — ACETAMINOPHEN 325 MG/1
650 TABLET ORAL EVERY 6 HOURS SCHEDULED
Status: DISCONTINUED | OUTPATIENT
Start: 2023-10-05 | End: 2023-10-06 | Stop reason: HOSPADM

## 2023-10-05 RX ORDER — PREGABALIN 150 MG/1
300 CAPSULE ORAL 2 TIMES DAILY
Status: DISCONTINUED | OUTPATIENT
Start: 2023-10-05 | End: 2023-10-06 | Stop reason: HOSPADM

## 2023-10-05 RX ORDER — FENTANYL CITRATE 50 UG/ML
50 INJECTION, SOLUTION INTRAMUSCULAR; INTRAVENOUS EVERY 5 MIN PRN
Status: DISCONTINUED | OUTPATIENT
Start: 2023-10-05 | End: 2023-10-05 | Stop reason: HOSPADM

## 2023-10-05 RX ORDER — HYDROMORPHONE HYDROCHLORIDE 1 MG/ML
INJECTION, SOLUTION INTRAMUSCULAR; INTRAVENOUS; SUBCUTANEOUS AS NEEDED
Status: DISCONTINUED | OUTPATIENT
Start: 2023-10-05 | End: 2023-10-05

## 2023-10-05 RX ORDER — PROCHLORPERAZINE MALEATE 10 MG
10 TABLET ORAL EVERY 6 HOURS PRN
Status: DISCONTINUED | OUTPATIENT
Start: 2023-10-05 | End: 2023-10-05

## 2023-10-05 RX ORDER — HYDROMORPHONE HYDROCHLORIDE 1 MG/ML
0.5 INJECTION, SOLUTION INTRAMUSCULAR; INTRAVENOUS; SUBCUTANEOUS EVERY 5 MIN PRN
Status: DISCONTINUED | OUTPATIENT
Start: 2023-10-05 | End: 2023-10-05 | Stop reason: HOSPADM

## 2023-10-05 RX ORDER — ONDANSETRON HYDROCHLORIDE 2 MG/ML
INJECTION, SOLUTION INTRAVENOUS AS NEEDED
Status: DISCONTINUED | OUTPATIENT
Start: 2023-10-05 | End: 2023-10-05

## 2023-10-05 RX ORDER — NALOXONE HYDROCHLORIDE 0.4 MG/ML
0.2 INJECTION, SOLUTION INTRAMUSCULAR; INTRAVENOUS; SUBCUTANEOUS EVERY 5 MIN PRN
Status: DISCONTINUED | OUTPATIENT
Start: 2023-10-05 | End: 2023-10-06 | Stop reason: HOSPADM

## 2023-10-05 RX ORDER — PROCHLORPERAZINE EDISYLATE 5 MG/ML
10 INJECTION INTRAMUSCULAR; INTRAVENOUS EVERY 6 HOURS PRN
Status: DISCONTINUED | OUTPATIENT
Start: 2023-10-05 | End: 2023-10-05

## 2023-10-05 RX ORDER — ALBUTEROL SULFATE 0.83 MG/ML
2.5 SOLUTION RESPIRATORY (INHALATION) EVERY 4 HOURS PRN
Status: DISCONTINUED | OUTPATIENT
Start: 2023-10-05 | End: 2023-10-06 | Stop reason: HOSPADM

## 2023-10-05 RX ORDER — CEFAZOLIN SODIUM 2 G/100ML
2 INJECTION, SOLUTION INTRAVENOUS ONCE
Status: COMPLETED | OUTPATIENT
Start: 2023-10-05 | End: 2023-10-05

## 2023-10-05 RX ORDER — SODIUM CHLORIDE, SODIUM LACTATE, POTASSIUM CHLORIDE, CALCIUM CHLORIDE 600; 310; 30; 20 MG/100ML; MG/100ML; MG/100ML; MG/100ML
100 INJECTION, SOLUTION INTRAVENOUS CONTINUOUS
Status: DISCONTINUED | OUTPATIENT
Start: 2023-10-05 | End: 2023-10-05 | Stop reason: HOSPADM

## 2023-10-05 RX ORDER — NEOSTIGMINE METHYLSULFATE 1 MG/ML
INJECTION, SOLUTION INTRAVENOUS AS NEEDED
Status: DISCONTINUED | OUTPATIENT
Start: 2023-10-05 | End: 2023-10-05

## 2023-10-05 RX ORDER — PROPOFOL 10 MG/ML
INJECTION, EMULSION INTRAVENOUS CONTINUOUS PRN
Status: DISCONTINUED | OUTPATIENT
Start: 2023-10-05 | End: 2023-10-05

## 2023-10-05 RX ORDER — ONDANSETRON 4 MG/1
4 TABLET, ORALLY DISINTEGRATING ORAL EVERY 8 HOURS PRN
Status: DISCONTINUED | OUTPATIENT
Start: 2023-10-05 | End: 2023-10-05

## 2023-10-05 RX ORDER — PROCHLORPERAZINE 25 MG/1
25 SUPPOSITORY RECTAL EVERY 12 HOURS PRN
Status: DISCONTINUED | OUTPATIENT
Start: 2023-10-05 | End: 2023-10-05

## 2023-10-05 RX ORDER — CELECOXIB 200 MG/1
200 CAPSULE ORAL ONCE
Status: COMPLETED | OUTPATIENT
Start: 2023-10-05 | End: 2023-10-05

## 2023-10-05 RX ORDER — LIDOCAINE HYDROCHLORIDE 20 MG/ML
INJECTION, SOLUTION INFILTRATION; PERINEURAL AS NEEDED
Status: DISCONTINUED | OUTPATIENT
Start: 2023-10-05 | End: 2023-10-05

## 2023-10-05 RX ORDER — ROPIVACAINE/EPI/CLONIDINE/KET 2.46-0.005
SYRINGE (ML) INJECTION AS NEEDED
Status: DISCONTINUED | OUTPATIENT
Start: 2023-10-05 | End: 2023-10-05 | Stop reason: HOSPADM

## 2023-10-05 RX ORDER — TRANEXAMIC ACID 650 MG/1
1950 TABLET ORAL ONCE
Status: COMPLETED | OUTPATIENT
Start: 2023-10-05 | End: 2023-10-05

## 2023-10-05 RX ORDER — FLUTICASONE PROPIONATE 50 MCG
1 SPRAY, SUSPENSION (ML) NASAL DAILY PRN
Status: DISCONTINUED | OUTPATIENT
Start: 2023-10-05 | End: 2023-10-06 | Stop reason: HOSPADM

## 2023-10-05 RX ORDER — ACETAMINOPHEN 325 MG/1
975 TABLET ORAL ONCE
Status: COMPLETED | OUTPATIENT
Start: 2023-10-05 | End: 2023-10-05

## 2023-10-05 RX ORDER — BISACODYL 5 MG
10 TABLET, DELAYED RELEASE (ENTERIC COATED) ORAL DAILY PRN
Status: DISCONTINUED | OUTPATIENT
Start: 2023-10-05 | End: 2023-10-06 | Stop reason: HOSPADM

## 2023-10-05 RX ORDER — KETAMINE HCL IN NACL, ISO-OSM 100MG/10ML
SYRINGE (ML) INJECTION AS NEEDED
Status: DISCONTINUED | OUTPATIENT
Start: 2023-10-05 | End: 2023-10-05

## 2023-10-05 RX ORDER — DULOXETIN HYDROCHLORIDE 60 MG/1
60 CAPSULE, DELAYED RELEASE ORAL DAILY
Status: DISCONTINUED | OUTPATIENT
Start: 2023-10-05 | End: 2023-10-06 | Stop reason: HOSPADM

## 2023-10-05 RX ORDER — HYDRALAZINE HYDROCHLORIDE 20 MG/ML
5 INJECTION INTRAMUSCULAR; INTRAVENOUS EVERY 30 MIN PRN
Status: DISCONTINUED | OUTPATIENT
Start: 2023-10-05 | End: 2023-10-05 | Stop reason: HOSPADM

## 2023-10-05 RX ORDER — ONDANSETRON HYDROCHLORIDE 2 MG/ML
4 INJECTION, SOLUTION INTRAVENOUS EVERY 8 HOURS PRN
Status: DISCONTINUED | OUTPATIENT
Start: 2023-10-05 | End: 2023-10-05

## 2023-10-05 RX ORDER — ATORVASTATIN CALCIUM 80 MG/1
80 TABLET, FILM COATED ORAL DAILY
Status: DISCONTINUED | OUTPATIENT
Start: 2023-10-05 | End: 2023-10-06 | Stop reason: HOSPADM

## 2023-10-05 RX ORDER — OXYCODONE HYDROCHLORIDE 5 MG/1
5 TABLET ORAL EVERY 4 HOURS PRN
Status: DISCONTINUED | OUTPATIENT
Start: 2023-10-05 | End: 2023-10-05 | Stop reason: HOSPADM

## 2023-10-05 RX ORDER — PANTOPRAZOLE SODIUM 40 MG/1
40 TABLET, DELAYED RELEASE ORAL
Status: DISCONTINUED | OUTPATIENT
Start: 2023-10-06 | End: 2023-10-06 | Stop reason: HOSPADM

## 2023-10-05 RX ORDER — METOCLOPRAMIDE HYDROCHLORIDE 5 MG/5ML
10 SOLUTION ORAL 3 TIMES DAILY
Status: DISCONTINUED | OUTPATIENT
Start: 2023-10-05 | End: 2023-10-06 | Stop reason: HOSPADM

## 2023-10-05 RX ORDER — OXYCODONE HYDROCHLORIDE 5 MG/1
5 TABLET ORAL EVERY 4 HOURS PRN
Status: DISCONTINUED | OUTPATIENT
Start: 2023-10-05 | End: 2023-10-06

## 2023-10-05 RX ORDER — DOCUSATE SODIUM 100 MG/1
100 CAPSULE, LIQUID FILLED ORAL 2 TIMES DAILY
Status: DISCONTINUED | OUTPATIENT
Start: 2023-10-05 | End: 2023-10-06 | Stop reason: HOSPADM

## 2023-10-05 RX ORDER — DIAZEPAM 5 MG/1
5 TABLET ORAL 2 TIMES DAILY PRN
Status: DISCONTINUED | OUTPATIENT
Start: 2023-10-05 | End: 2023-10-06 | Stop reason: HOSPADM

## 2023-10-05 RX ORDER — LIDOCAINE 50 MG/G
1 PATCH TOPICAL DAILY
COMMUNITY

## 2023-10-05 RX ORDER — LIDOCAINE HYDROCHLORIDE 10 MG/ML
0.1 INJECTION, SOLUTION EPIDURAL; INFILTRATION; INTRACAUDAL; PERINEURAL ONCE
Status: DISCONTINUED | OUTPATIENT
Start: 2023-10-05 | End: 2023-10-05 | Stop reason: HOSPADM

## 2023-10-05 RX ORDER — GLYCOPYRROLATE 0.2 MG/ML
INJECTION INTRAMUSCULAR; INTRAVENOUS AS NEEDED
Status: DISCONTINUED | OUTPATIENT
Start: 2023-10-05 | End: 2023-10-05

## 2023-10-05 RX ORDER — CEFAZOLIN SODIUM 2 G/100ML
2 INJECTION, SOLUTION INTRAVENOUS EVERY 8 HOURS
Status: COMPLETED | OUTPATIENT
Start: 2023-10-05 | End: 2023-10-06

## 2023-10-05 RX ORDER — ALBUTEROL SULFATE 0.83 MG/ML
2.5 SOLUTION RESPIRATORY (INHALATION) ONCE AS NEEDED
Status: DISCONTINUED | OUTPATIENT
Start: 2023-10-05 | End: 2023-10-05 | Stop reason: HOSPADM

## 2023-10-05 RX ORDER — DEXAMETHASONE SODIUM PHOSPHATE 10 MG/ML
INJECTION INTRAMUSCULAR; INTRAVENOUS AS NEEDED
Status: DISCONTINUED | OUTPATIENT
Start: 2023-10-05 | End: 2023-10-05

## 2023-10-05 RX ORDER — ASPIRIN 81 MG/1
81 TABLET ORAL 2 TIMES DAILY
Status: DISCONTINUED | OUTPATIENT
Start: 2023-10-05 | End: 2023-10-06 | Stop reason: HOSPADM

## 2023-10-05 RX ORDER — DIPHENHYDRAMINE HCL 25 MG
12.5 TABLET ORAL EVERY 6 HOURS PRN
Status: DISCONTINUED | OUTPATIENT
Start: 2023-10-05 | End: 2023-10-06 | Stop reason: HOSPADM

## 2023-10-05 RX ORDER — ONDANSETRON HYDROCHLORIDE 2 MG/ML
4 INJECTION, SOLUTION INTRAVENOUS ONCE AS NEEDED
Status: DISCONTINUED | OUTPATIENT
Start: 2023-10-05 | End: 2023-10-05 | Stop reason: HOSPADM

## 2023-10-05 RX ORDER — SODIUM CHLORIDE, SODIUM LACTATE, POTASSIUM CHLORIDE, CALCIUM CHLORIDE 600; 310; 30; 20 MG/100ML; MG/100ML; MG/100ML; MG/100ML
50 INJECTION, SOLUTION INTRAVENOUS CONTINUOUS
Status: ACTIVE | OUTPATIENT
Start: 2023-10-05 | End: 2023-10-06

## 2023-10-05 RX ORDER — PROPOFOL 10 MG/ML
INJECTION, EMULSION INTRAVENOUS AS NEEDED
Status: DISCONTINUED | OUTPATIENT
Start: 2023-10-05 | End: 2023-10-05

## 2023-10-05 RX ORDER — MIDAZOLAM HYDROCHLORIDE 1 MG/ML
INJECTION, SOLUTION INTRAMUSCULAR; INTRAVENOUS AS NEEDED
Status: DISCONTINUED | OUTPATIENT
Start: 2023-10-05 | End: 2023-10-05

## 2023-10-05 RX ORDER — OXYCODONE HYDROCHLORIDE 10 MG/1
10 TABLET ORAL EVERY 6 HOURS PRN
Status: DISCONTINUED | OUTPATIENT
Start: 2023-10-05 | End: 2023-10-06

## 2023-10-05 RX ORDER — ROCURONIUM BROMIDE 50 MG/5 ML
SYRINGE (ML) INTRAVENOUS AS NEEDED
Status: DISCONTINUED | OUTPATIENT
Start: 2023-10-05 | End: 2023-10-05

## 2023-10-05 RX ORDER — SODIUM CHLORIDE 9 MG/ML
50 INJECTION, SOLUTION INTRAVENOUS CONTINUOUS
Status: DISCONTINUED | OUTPATIENT
Start: 2023-10-05 | End: 2023-10-05

## 2023-10-05 RX ORDER — ALBUTEROL SULFATE 90 UG/1
2 AEROSOL, METERED RESPIRATORY (INHALATION) EVERY 4 HOURS PRN
Status: DISCONTINUED | OUTPATIENT
Start: 2023-10-05 | End: 2023-10-05 | Stop reason: CLARIF

## 2023-10-05 RX ORDER — CYCLOBENZAPRINE HCL 10 MG
10 TABLET ORAL 3 TIMES DAILY PRN
Status: DISCONTINUED | OUTPATIENT
Start: 2023-10-05 | End: 2023-10-06 | Stop reason: HOSPADM

## 2023-10-05 RX ORDER — LABETALOL HYDROCHLORIDE 5 MG/ML
5 INJECTION, SOLUTION INTRAVENOUS ONCE AS NEEDED
Status: DISCONTINUED | OUTPATIENT
Start: 2023-10-05 | End: 2023-10-05 | Stop reason: HOSPADM

## 2023-10-05 RX ORDER — FENTANYL CITRATE 50 UG/ML
INJECTION, SOLUTION INTRAMUSCULAR; INTRAVENOUS AS NEEDED
Status: DISCONTINUED | OUTPATIENT
Start: 2023-10-05 | End: 2023-10-05

## 2023-10-05 RX ADMIN — SODIUM CHLORIDE, SODIUM LACTATE, POTASSIUM CHLORIDE, AND CALCIUM CHLORIDE: 600; 310; 30; 20 INJECTION, SOLUTION INTRAVENOUS at 08:33

## 2023-10-05 RX ADMIN — OXYCODONE HYDROCHLORIDE 10 MG: 10 TABLET ORAL at 14:33

## 2023-10-05 RX ADMIN — PHENYLEPHRINE HYDROCHLORIDE 30 MCG/MIN: 10 INJECTION INTRAVENOUS at 09:23

## 2023-10-05 RX ADMIN — LIDOCAINE HYDROCHLORIDE 100 MG: 20 INJECTION, SOLUTION INFILTRATION; PERINEURAL at 09:09

## 2023-10-05 RX ADMIN — HYDROMORPHONE HYDROCHLORIDE 0.5 MG: 1 INJECTION, SOLUTION INTRAMUSCULAR; INTRAVENOUS; SUBCUTANEOUS at 10:55

## 2023-10-05 RX ADMIN — OXYCODONE HYDROCHLORIDE 10 MG: 10 TABLET ORAL at 21:40

## 2023-10-05 RX ADMIN — ACETAMINOPHEN 325MG 650 MG: 325 TABLET ORAL at 14:33

## 2023-10-05 RX ADMIN — Medication 50 MG: at 09:09

## 2023-10-05 RX ADMIN — ONDANSETRON 4 MG: 2 INJECTION INTRAMUSCULAR; INTRAVENOUS at 10:21

## 2023-10-05 RX ADMIN — TRANEXAMIC ACID 1950 MG: 650 TABLET ORAL at 07:30

## 2023-10-05 RX ADMIN — GLYCOPYRROLATE 0.6 MG: 0.2 INJECTION, SOLUTION INTRAMUSCULAR; INTRAVENOUS at 10:38

## 2023-10-05 RX ADMIN — SODIUM CHLORIDE, POTASSIUM CHLORIDE, SODIUM LACTATE AND CALCIUM CHLORIDE 100 ML/HR: 600; 310; 30; 20 INJECTION, SOLUTION INTRAVENOUS at 11:01

## 2023-10-05 RX ADMIN — Medication 20 MG: at 09:53

## 2023-10-05 RX ADMIN — HYDROMORPHONE HYDROCHLORIDE 0.5 MG: 1 INJECTION, SOLUTION INTRAMUSCULAR; INTRAVENOUS; SUBCUTANEOUS at 10:03

## 2023-10-05 RX ADMIN — ASPIRIN 81 MG: 81 TABLET, COATED ORAL at 21:40

## 2023-10-05 RX ADMIN — NALOXEGOL OXALATE 25 MG: 25 TABLET, FILM COATED ORAL at 17:20

## 2023-10-05 RX ADMIN — DEXAMETHASONE SODIUM PHOSPHATE 10 MG: 10 INJECTION INTRAMUSCULAR; INTRAVENOUS at 09:27

## 2023-10-05 RX ADMIN — HYDROMORPHONE HYDROCHLORIDE 0.5 MG: 1 INJECTION, SOLUTION INTRAMUSCULAR; INTRAVENOUS; SUBCUTANEOUS at 11:14

## 2023-10-05 RX ADMIN — FENTANYL CITRATE 50 MCG: 50 INJECTION, SOLUTION INTRAMUSCULAR; INTRAVENOUS at 09:09

## 2023-10-05 RX ADMIN — METOCLOPRAMIDE HYDROCHLORIDE 10 MG: 5 SOLUTION ORAL at 21:39

## 2023-10-05 RX ADMIN — Medication 20 MG: at 09:09

## 2023-10-05 RX ADMIN — HYDROMORPHONE HYDROCHLORIDE 0.5 MG: 1 INJECTION, SOLUTION INTRAMUSCULAR; INTRAVENOUS; SUBCUTANEOUS at 11:30

## 2023-10-05 RX ADMIN — CELECOXIB 200 MG: 200 CAPSULE ORAL at 07:45

## 2023-10-05 RX ADMIN — ACETAMINOPHEN 975 MG: 325 TABLET, FILM COATED ORAL at 07:30

## 2023-10-05 RX ADMIN — DOCUSATE SODIUM 100 MG: 100 CAPSULE, LIQUID FILLED ORAL at 14:34

## 2023-10-05 RX ADMIN — METOCLOPRAMIDE HYDROCHLORIDE 10 MG: 5 SOLUTION ORAL at 17:20

## 2023-10-05 RX ADMIN — FENTANYL CITRATE 50 MCG: 50 INJECTION, SOLUTION INTRAMUSCULAR; INTRAVENOUS at 09:51

## 2023-10-05 RX ADMIN — MIDAZOLAM 2 MG: 1 INJECTION INTRAMUSCULAR; INTRAVENOUS at 09:02

## 2023-10-05 RX ADMIN — CYCLOBENZAPRINE 10 MG: 10 TABLET, FILM COATED ORAL at 14:34

## 2023-10-05 RX ADMIN — NEOSTIGMINE METHYLSULFATE 3 MG: 1 INJECTION INTRAVENOUS at 10:38

## 2023-10-05 RX ADMIN — PROPOFOL 30 MCG/KG/MIN: 10 INJECTION, EMULSION INTRAVENOUS at 09:26

## 2023-10-05 RX ADMIN — DIAZEPAM 5 MG: 5 TABLET ORAL at 17:13

## 2023-10-05 RX ADMIN — SODIUM CHLORIDE, POTASSIUM CHLORIDE, SODIUM LACTATE AND CALCIUM CHLORIDE 50 ML/HR: 600; 310; 30; 20 INJECTION, SOLUTION INTRAVENOUS at 12:45

## 2023-10-05 RX ADMIN — PREGABALIN 300 MG: 150 CAPSULE ORAL at 21:40

## 2023-10-05 RX ADMIN — ASPIRIN 81 MG: 81 TABLET, COATED ORAL at 14:33

## 2023-10-05 RX ADMIN — ATORVASTATIN CALCIUM 80 MG: 80 TABLET, FILM COATED ORAL at 17:13

## 2023-10-05 RX ADMIN — CYCLOBENZAPRINE 10 MG: 10 TABLET, FILM COATED ORAL at 21:40

## 2023-10-05 RX ADMIN — PROPOFOL 200 MG: 10 INJECTION, EMULSION INTRAVENOUS at 09:09

## 2023-10-05 RX ADMIN — CEFAZOLIN SODIUM 2 G: 2 INJECTION, SOLUTION INTRAVENOUS at 17:13

## 2023-10-05 RX ADMIN — Medication 30 MG: at 09:10

## 2023-10-05 RX ADMIN — DOCUSATE SODIUM 100 MG: 100 CAPSULE, LIQUID FILLED ORAL at 21:40

## 2023-10-05 RX ADMIN — HYDROMORPHONE HYDROCHLORIDE 0.5 MG: 1 INJECTION, SOLUTION INTRAMUSCULAR; INTRAVENOUS; SUBCUTANEOUS at 11:42

## 2023-10-05 RX ADMIN — CEFAZOLIN SODIUM 2 G: 2 INJECTION, SOLUTION INTRAVENOUS at 09:22

## 2023-10-05 SDOH — SOCIAL STABILITY: SOCIAL INSECURITY: ARE YOU OR HAVE YOU BEEN THREATENED OR ABUSED PHYSICALLY, EMOTIONALLY, OR SEXUALLY BY ANYONE?: NO

## 2023-10-05 SDOH — SOCIAL STABILITY: SOCIAL INSECURITY: ABUSE: ADULT

## 2023-10-05 SDOH — SOCIAL STABILITY: SOCIAL INSECURITY: DO YOU FEEL ANYONE HAS EXPLOITED OR TAKEN ADVANTAGE OF YOU FINANCIALLY OR OF YOUR PERSONAL PROPERTY?: NO

## 2023-10-05 SDOH — SOCIAL STABILITY: SOCIAL INSECURITY: DO YOU FEEL UNSAFE GOING BACK TO THE PLACE WHERE YOU ARE LIVING?: NO

## 2023-10-05 SDOH — ECONOMIC STABILITY: INCOME INSECURITY: HOW HARD IS IT FOR YOU TO PAY FOR THE VERY BASICS LIKE FOOD, HOUSING, MEDICAL CARE, AND HEATING?: NOT VERY HARD

## 2023-10-05 SDOH — ECONOMIC STABILITY: HOUSING INSECURITY
IN THE LAST 12 MONTHS, WAS THERE A TIME WHEN YOU DID NOT HAVE A STEADY PLACE TO SLEEP OR SLEPT IN A SHELTER (INCLUDING NOW)?: PATIENT REFUSED

## 2023-10-05 SDOH — ECONOMIC STABILITY: TRANSPORTATION INSECURITY
IN THE PAST 12 MONTHS, HAS THE LACK OF TRANSPORTATION KEPT YOU FROM MEDICAL APPOINTMENTS OR FROM GETTING MEDICATIONS?: NO

## 2023-10-05 SDOH — SOCIAL STABILITY: SOCIAL INSECURITY: DOES ANYONE TRY TO KEEP YOU FROM HAVING/CONTACTING OTHER FRIENDS OR DOING THINGS OUTSIDE YOUR HOME?: NO

## 2023-10-05 SDOH — SOCIAL STABILITY: SOCIAL INSECURITY: HAVE YOU HAD THOUGHTS OF HARMING ANYONE ELSE?: NO

## 2023-10-05 SDOH — HEALTH STABILITY: MENTAL HEALTH: CURRENT SMOKER: 0

## 2023-10-05 SDOH — ECONOMIC STABILITY: TRANSPORTATION INSECURITY
IN THE PAST 12 MONTHS, HAS LACK OF TRANSPORTATION KEPT YOU FROM MEETINGS, WORK, OR FROM GETTING THINGS NEEDED FOR DAILY LIVING?: NO

## 2023-10-05 SDOH — SOCIAL STABILITY: SOCIAL INSECURITY: HAS ANYONE EVER THREATENED TO HURT YOUR FAMILY OR YOUR PETS?: NO

## 2023-10-05 SDOH — ECONOMIC STABILITY: INCOME INSECURITY: IN THE LAST 12 MONTHS, WAS THERE A TIME WHEN YOU WERE NOT ABLE TO PAY THE MORTGAGE OR RENT ON TIME?: PATIENT REFUSED

## 2023-10-05 SDOH — SOCIAL STABILITY: SOCIAL INSECURITY: ARE THERE ANY APPARENT SIGNS OF INJURIES/BEHAVIORS THAT COULD BE RELATED TO ABUSE/NEGLECT?: NO

## 2023-10-05 ASSESSMENT — COGNITIVE AND FUNCTIONAL STATUS - GENERAL
DRESSING REGULAR LOWER BODY CLOTHING: A LITTLE
DAILY ACTIVITIY SCORE: 20
CLIMB 3 TO 5 STEPS WITH RAILING: A LITTLE
CLIMB 3 TO 5 STEPS WITH RAILING: A LITTLE
WALKING IN HOSPITAL ROOM: A LITTLE
MOVING TO AND FROM BED TO CHAIR: A LITTLE
MOVING FROM LYING ON BACK TO SITTING ON SIDE OF FLAT BED WITH BEDRAILS: A LITTLE
WALKING IN HOSPITAL ROOM: A LITTLE
HELP NEEDED FOR BATHING: A LITTLE
STANDING UP FROM CHAIR USING ARMS: A LITTLE
TOILETING: A LITTLE
MOBILITY SCORE: 21
TURNING FROM BACK TO SIDE WHILE IN FLAT BAD: A LITTLE
MOBILITY SCORE: 18
PATIENT BASELINE BEDBOUND: NO
PERSONAL GROOMING: A LITTLE
MOVING TO AND FROM BED TO CHAIR: A LITTLE

## 2023-10-05 ASSESSMENT — PAIN - FUNCTIONAL ASSESSMENT
PAIN_FUNCTIONAL_ASSESSMENT: 0-10

## 2023-10-05 ASSESSMENT — ACTIVITIES OF DAILY LIVING (ADL)
DRESSING YOURSELF: NEEDS ASSISTANCE
FEEDING YOURSELF: INDEPENDENT
ADEQUATE_TO_COMPLETE_ADL: YES
PATIENT'S MEMORY ADEQUATE TO SAFELY COMPLETE DAILY ACTIVITIES?: YES
TOILETING: NEEDS ASSISTANCE
BATHING: NEEDS ASSISTANCE
HEARING - RIGHT EAR: FUNCTIONAL
ASSISTIVE_DEVICE: WALKER;EYEGLASSES
JUDGMENT_ADEQUATE_SAFELY_COMPLETE_DAILY_ACTIVITIES: YES
WALKS IN HOME: NEEDS ASSISTANCE
HEARING - LEFT EAR: FUNCTIONAL
GROOMING: INDEPENDENT

## 2023-10-05 ASSESSMENT — LIFESTYLE VARIABLES
HOW OFTEN DO YOU HAVE A DRINK CONTAINING ALCOHOL: NEVER
HOW MANY STANDARD DRINKS CONTAINING ALCOHOL DO YOU HAVE ON A TYPICAL DAY: PATIENT DOES NOT DRINK
PRESCIPTION_ABUSE_PAST_12_MONTHS: NO
SKIP TO QUESTIONS 9-10: 1
HOW OFTEN DO YOU HAVE 6 OR MORE DRINKS ON ONE OCCASION: NEVER
AUDIT-C TOTAL SCORE: 0
SUBSTANCE_ABUSE_PAST_12_MONTHS: NO
AUDIT-C TOTAL SCORE: 0

## 2023-10-05 ASSESSMENT — PAIN SCALES - GENERAL
PAINLEVEL_OUTOF10: 9
PAINLEVEL_OUTOF10: 8
PAINLEVEL_OUTOF10: 10 - WORST POSSIBLE PAIN
PAINLEVEL_OUTOF10: 8
PAINLEVEL_OUTOF10: 7
PAINLEVEL_OUTOF10: 3
PAINLEVEL_OUTOF10: 1
PAINLEVEL_OUTOF10: 8
PAINLEVEL_OUTOF10: 9
PAINLEVEL_OUTOF10: 8
PAINLEVEL_OUTOF10: 7
PAINLEVEL_OUTOF10: 8
PAINLEVEL_OUTOF10: 8

## 2023-10-05 ASSESSMENT — COLUMBIA-SUICIDE SEVERITY RATING SCALE - C-SSRS
6. HAVE YOU EVER DONE ANYTHING, STARTED TO DO ANYTHING, OR PREPARED TO DO ANYTHING TO END YOUR LIFE?: NO
2. HAVE YOU ACTUALLY HAD ANY THOUGHTS OF KILLING YOURSELF?: NO
1. IN THE PAST MONTH, HAVE YOU WISHED YOU WERE DEAD OR WISHED YOU COULD GO TO SLEEP AND NOT WAKE UP?: NO

## 2023-10-05 ASSESSMENT — PATIENT HEALTH QUESTIONNAIRE - PHQ9
1. LITTLE INTEREST OR PLEASURE IN DOING THINGS: NOT AT ALL
2. FEELING DOWN, DEPRESSED OR HOPELESS: NOT AT ALL
SUM OF ALL RESPONSES TO PHQ9 QUESTIONS 1 & 2: 0

## 2023-10-05 NOTE — H&P
History and physical is reviewed, patient re evaluated, no changes.  Procedure, risks, benefits, and postoperative course were discussed with the patient and consent is reviewed.    Gagandeep Matamoros MD

## 2023-10-05 NOTE — ANESTHESIA PREPROCEDURE EVALUATION
Patient: Akin Lynn    Procedure Information       Date/Time: 10/05/23 0840    Procedure: LEFT HIP REPLACEMENT W/FAN (Left: Hip)    [facility]  / [facility] OR    Surgeons: Gagandeep Matamoros MD            Relevant Problems   Cardiovascular   (+) Thoracic back pain      /Renal   (+) Kidney cysts      Neuro/Psych   (+) Anxiety   (+) Complex regional pain syndrome type 1 of left lower extremity   (+) Cubital tunnel syndrome on left   (+) Cubital tunnel syndrome on right   (+) Guyon syndrome   (+) Trauma and stressor-related disorder      Musculoskeletal   (+) Chronic low back pain without sciatica   (+) Complex regional pain syndrome type 1 of left lower extremity   (+) Lumbar spondylosis   (+) Other spondylosis, cervical region   (+) Primary osteoarthritis of left hip   (+) Reflex sympathetic dystrophy      Other   (+) Arthritis   (+) Patellofemoral arthritis       Clinical information reviewed:    Allergies  Meds   Med Hx  Surg Hx   Fam Hx          NPO Detail:  No data recorded     Physical Exam    Airway  Mallampati: II  TM distance: >3 FB  Neck ROM: full     Cardiovascular   Rhythm: regular  Rate: normal     Dental    Pulmonary   Breath sounds clear to auscultation     Abdominal   Abdomen: soft             Anesthesia Plan    ASA 2     spinal and MAC     The patient is not a current smoker.    Anesthetic plan and risks discussed with patient.    Plan discussed with CRNA.

## 2023-10-05 NOTE — ANESTHESIA PROCEDURE NOTES
Airway  Date/Time: 10/5/2023 9:12 AM  Urgency: elective      Staffing  Performed: HUGH and CAL   Authorized by: Meggan Merrill MD    Performed by: HUGH Mcguire  Patient location during procedure: OR    Indications and Patient Condition  Indications for airway management: anesthesia and airway protection  Spontaneous Ventilation: absent  Sedation level: deep  Preoxygenated: yes  Patient position: sniffing  MILS not maintained throughout  Mask difficulty assessment: 1 - vent by mask    Final Airway Details  Final airway type: endotracheal airway      Successful airway: ETT  Cuffed: yes   Successful intubation technique: direct laryngoscopy  Facilitating devices/methods: intubating stylet  Endotracheal tube insertion site: oral  Blade: River  Blade size: #4  ETT size (mm): 7.5  Cormack-Lehane Classification: grade IIb - view of arytenoids or posterior of glottis only  Placement verified by: chest auscultation, capnometry and palpation of cuff   Cuff volume (mL): 5  Measured from: lips  Number of attempts at approach: 2  Ventilation between attempts: none    Additional Comments  1st attempt by CAL; unable to visualize VC, cut on upper lip noted  2nd attempt by HUGH, successful

## 2023-10-05 NOTE — CARE PLAN
The patient's goals for the shift include pain  management    The clinical goals for the shift include pain management      Problem: Fall/Injury  Goal: Not fall by end of shift  Outcome: Progressing  Goal: Be free from injury by end of the shift  Outcome: Progressing     Problem: Pain  Goal: Participates in PT with improved pain control throughout the shift  Outcome: Progressing  Goal: Free from opioid side effects throughout the shift  Outcome: Progressing

## 2023-10-05 NOTE — ANESTHESIA POSTPROCEDURE EVALUATION
Patient: Akin Lynn    Procedure Summary       Date: 10/05/23 Room / Location: [facility] OR  / [facility] OR    Anesthesia Start: 0902 Anesthesia Stop:     Procedure: LEFT HIP REPLACEMENT W/FAN (Left: Hip) Diagnosis:       Unilateral primary osteoarthritis, left hip      (Unilateral primary osteoarthritis, left hip [M16.12])    Surgeons: Gagandeep Matamoros MD Responsible Provider: Meggan Merrill MD    Anesthesia Type: general ASA Status: 2            Anesthesia Type: general    Vitals Value Taken Time   /80 10/05/23 1053   Temp 36 °C (96.8 °F) 10/05/23 1053   Pulse 81 10/05/23 1053   Resp 18 10/05/23 1053   SpO2 98 % 10/05/23 1053       Anesthesia Post Evaluation    Patient location during evaluation: PACU  Patient participation: complete - patient participated  Level of consciousness: awake and alert  Pain management: satisfactory to patient  Multimodal analgesia pain management approach  Airway patency: patent  Cardiovascular status: acceptable and blood pressure returned to baseline  Respiratory status: acceptable  Hydration status: acceptable        No notable events documented.

## 2023-10-05 NOTE — OP NOTE
LEFT HIP REPLACEMENT W/FAN (L) Operative Note     Date: 10/5/2023  OR Location: [facility] OR    Name: Akin Lynn, : 1970, Age: 53 y.o., MRN: [id number], Sex: male    Diagnosis  Pre-op Diagnosis     * Unilateral primary osteoarthritis, left hip [M16.12] Post-op Diagnosis     * Unilateral primary osteoarthritis, left hip [M16.12]     Procedures  LEFT HIP REPLACEMENT W/FAN  75828 - OR ARTHRP ACETBLR/PROX FEM PROSTC AGRFT/ALGRFT      Surgeons      * Gagandeep Matamoros - Primary    Resident/Fellow/Other Assistant:  No surgical staff documented.    Procedure Summary  Anesthesia: General  ASA: II  Anesthesia Staff: Anesthesiologist: Meggan Merrill MD  C-AA: HUGH Mcguire  Estimated Blood Loss: 100mL  Intra-op Medications:   Medication Name Total Dose   ceFAZolin in dextrose (iso-os) (Ancef) IVPB 2 g 2 g              Anesthesia Record               Intraprocedure I/O Totals          Intake    Ketamine 0.00 mL    The total shown is the total volume documented since Anesthesia Start was filed.    Propofol Drip 0.00 mL    The total shown is the total volume documented since Anesthesia Start was filed.    Phenylephrine Drip 0.00 mL    The total shown is the total volume documented since Anesthesia Start was filed.    Total Intake 0 mL          Specimen: No specimens collected     Staff:   Circulator: Rosy Lunsford RN  Scrub Person: Leandra Rios RN; Bryan Tovar         Drains and/or Catheters: * None in log *    Tourniquet Times:         Implants:  Implants       Type Name Action Serial No.      Joint SHELL, TRIDENT II, CLUSTERHOLE, HA 52E - [id number] Implanted      Joint INSERT, TRIDENT X3 POLYETHYLENE, 10 DEG, 36MM E - [id number] Implanted      Joint HEAD, FEMUR V40 36MM -2.5MM BIOLOX DELTA - [id number] Implanted      Joint STEM, FEMUR 127D SZ 5 - [id number] Implanted               Findings: see procedure details    Indications: Akin Lynn is an 53 y.o. male who is having surgery for Unilateral primary  osteoarthritis, left hip [M16.12].     The patient was seen in the preoperative area. The risks, benefits, complications, treatment options, non-operative alternatives, expected recovery and outcomes were discussed with the patient. The possibilities of reaction to medication, pulmonary aspiration, injury to surrounding structures, bleeding, recurrent infection, the need for additional procedures, failure to diagnose a condition, and creating a complication requiring transfusion or operation were discussed with the patient. The patient concurred with the proposed plan, giving informed consent.  The site of surgery was properly noted/marked if necessary per policy. The patient has been actively warmed in preoperative area. Preoperative antibiotics have been ordered and given within 1 hours of incision. Venous thrombosis prophylaxis have been ordered.    Procedure Details:   Preoperative diagnosis DJD hip left  Postoperative diagnosis same  Procedure total hip replacement using the Sumpto computer assisted robotic-assisted total hip replacement system    Primary surgeon Gagandeep Gomez. [Vish Barboza] PA certified    Implants [place]  Cup52  mm Trident hemispherical  Liner  10° elevated lip  Stem Accolade 2 uncemented size5, 127 degree neck angle  Head Biolox delta ceramic 36 mm neck length -2.5    Anesthesia [spinal]  EBL [100]      Patient is suffering from chronic hip pain that has been refractory to conservative treatment and now presents for total hip replacement.  The risks benefits outcomes and postoperative course were fully explained to the patient.  We discussed  loosening, infection, blood clots, loss of life, loss of limb, nerve damage, numbness, failure of the procedure, progressive arthritis, and the potential need for revision surgery.  The patient understands these risks and consents to the surgical intervention.    The patient has pain in the hip that is increased with activity and  weightbearing, and walks with an antalgic gait.  The pain is interfering with activities of daily living.  Patient has limited range of motion and pain with passive range of motion.  X-rays demonstrate joint space narrowing, subchondral sclerosis and osteophyte formation.  Symptoms are not improving with medication, physical therapy or supportive device for a period of at least 3 months.    The physician assistant was present through the entire case.  Given the nature of the disease process and the procedure to be performed skilled surgical first assistant was necessary during the case.  The assistant was necessary to hold retractors and manipulate the extremity during the procedure.  A certified scrub tech was at the back table managing the instruments and supplies for the surgical case.      Patient was brought to the operating room and placed on the surgical table in the supine position where adequate anesthesia was then obtained.  A surgical timeout was then performed.  The patient was positioned in the lateral decubitus position with the affected hip up being careful to pad all pressure points.  The patient was then prepped and draped in usual sterile manner.     A pelvic array was placed along the anterior iliac crest through 3 percutaneous puncture wounds being careful to protect the lateral femoral cutaneous nerve         A standard posterolateral approach to the hip was made and electrocauterization was used for hemostasis.  The IT band was split in line with its fibers and anterior and posterior retractors were then inserted.    A checkpoint was placed in the greater trochanter and the leg length was measured between the checkpoint and the greater trochanter and the array as well as the markers were placed along the distal femur and the array    The hip was maximally internally rotated and the short external rotators and piriformis tendon were taken down in 1 layer along with the joint capsule using the  Bovie electrocautery device.  The capsule was teed in line with the piriformis tendon.  The hip was then dislocated and the femoral neck resection was made at a  distance that was pre-determined with the preoperative CT scan and 3-D reconstruction in order to reestablish femoral offset and leg length  The femoral head showed signs of severe arthritic changes with joint calcification and loss of articular cartilage.    Anterior and posterior acetabular retractors were then inserted and the acetabular labrum was sharply excised using a knife.  Next acetabulum was curetted to remove any remaining soft tissues and sequential reaming was performed.  Acetabular checkpoint was placed in the superior dome and land marking was performed and confirmed     We then proceeded to ream with the acetabular reamers and the [employer] robotic arm assisted in the appropriate abduction and anteversion we reamed to the appropriate depth which had been predetermined as well based on the preoperative CT scan. Once final reaming was complete this had excellent circumferential fit and good bleeding bone.  Porous acetabular shell was then inserted in exactly 45 degrees of abduction and 20 degrees of anteversion using the robotic arm.  The acetabular shell was probed and stable and was completely seated.  The acetabular liner was then inserted and completely seated as well.  The liner was probed and stable.    At this point femoral preparation was begun.  A femoral neck retractor was inserted and anterior and posterior femoral retractors were inserted as well.  A box osteotome was used to start the femoral canal and a Charnley awl was inserted down the canal.  Sequential broaching was then performed being careful to keep the broaches in 10-15 degrees of anteversion.  With the final broach in position and completely seated calcar planing was performed.  A trial reduction was then performed and the hip was stable to an anatomic range of motion.  Leg  lengths were confirmed using the FAN system.  Trial components were then removed the final femoral stem was inserted and completely seated.  Femoral head and liner were applied to reduced.  Range of motion was again assessed and felt to be satisfactory leg lengths were assessed using the [employer] system as well.  The joint was then irrigated with a copious amount of pulsatile irrigation.    Closure was begun using #2 Ethibond sutures through the joint capsule short external rotators and piriformis tendon that were tied through drill holes in the piriformis fossa of the femur.  The IT band was closed using a running locked #1 Vicryl suture.  3-0 Monocryl suture was used for the underlying subcutaneous tissues and 4-0 Monocryl suture was used for subcuticular stitch.  Skin glue was used for the skin and a dry sterile Aquacell dressing was used for bandage.   Pelvic array was removed and wounds closed with 4-0 nylon suture.  Aquacell bandage applied.    The patient tolerated the procedure well and was taken to the postanesthesia care unit in satisfactory condition.  Postoperative x-rays were reviewed and surgical findings were discussed with the patient's family at the conclusion of the procedure.    This note was prepared using voice recognition software.  The details of this note are correct and have been reviewed, and corrected to the best of my ability.  Some grammatical areas may persist related to the Dragon software    Gagandeep Matamoros MD    [phone]      Complications:  None; patient tolerated the procedure well.    Disposition: PACU - hemodynamically stable.  Condition: stable         Gagandeep Matamoros  Phone Number: [phone]

## 2023-10-05 NOTE — PROGRESS NOTES
Physical Therapy    Physical Therapy    Physical Therapy Evaluation    Patient Name: Akin Lynn  MRN: [id number]  Today's Date: 10/5/2023   Time Calculation  Start Time: 1436  Stop Time: 1503  Time Calculation (min): 27 min    Assessment/Plan   PT Assessment: Pt demonstrates decreased strength and impaired balance/mobility.  Based on current level of function, pt would benefit from continued skilled therapy while in the hospital to ensure safety, decrease risk of falls, and regains strength/mobility back to baseline.  Once stable enough for discharge, pt would benefit from low intensity therapy.     PT Assessment Results: Decreased strength, Decreased range of motion, Impaired balance, Decreased mobility, Decreased safety awareness  Rehab Prognosis: Good  Evaluation/Treatment Tolerance: Patient tolerated treatment well  Medical Staff Made Aware: Yes  End of Session Communication: Bedside nurse  End of Session Patient Position: Up in chair, Alarm on  IP OR SWING BED PT PLAN  Inpatient or Swing Bed: Inpatient  PT Plan  Treatment/Interventions: Bed mobility, Transfer training, Gait training, Stair training, Balance training, Neuromuscular re-education, Strengthening, Range of motion, Therapeutic exercise, Therapeutic activity  PT Plan: Skilled PT  PT Frequency: BID  PT Discharge Recommendations: Low intensity level of continued care  Equipment Recommended upon Discharge: Wheeled walker  PT Recommended Transfer Status: Contact guard    Subjective     Current Problem:  L GILBERT  1. Complex regional pain syndrome type 1, affecting unspecified site          Past Medical History:   Diagnosis Date    Anesthesia of skin     Numbness and tingling    Disease of intestine, unspecified     Bowel trouble    Low back pain, unspecified 12/07/2022    Chronic low back pain without sciatica, unspecified back pain laterality    Low back pain, unspecified 08/09/2022    Lumbago    Myopia, bilateral 02/19/2019    Myopia of both eyes with  astigmatism and presbyopia    Other forms of angina pectoris     Stable angina pectoris    Personal history of other diseases of the musculoskeletal system and connective tissue     History of arthritis    Personal history of other specified conditions     History of seizure    Unspecified disorder of ear, unspecified ear     Ear, nose and throat disorder     Past Surgical History:   Procedure Laterality Date    CT ABDOMEN PELVIS ANGIOGRAM W AND/OR WO IV CONTRAST  3/18/2020    CT ABDOMEN PELVIS ANGIOGRAM W AND/OR WO IV CONTRAST 3/18/2020 [facility]    CT HEAD ANGIO W AND WO IV CONTRAST  12/16/2022    CT HEAD ANGIO W AND WO IV CONTRAST 12/16/2022 [facility]    CT NECK ANGIO W AND WO IV CONTRAST  12/16/2022    CT NECK ANGIO W AND WO IV CONTRAST 12/16/2022 [facility]    KNEE ARTHROPLASTY  06/13/2017    Knee Arthroplasty    OTHER SURGICAL HISTORY  01/06/2022    Ulnar nerve neuroplasty    OTHER SURGICAL HISTORY  12/27/2019    Knee fracture repair    OTHER SURGICAL HISTORY  07/26/2019    Foot surgery    SHOULDER SURGERY  06/13/2017    Shoulder Surgery       General Visit Information:  General: On arrival, pt supine in bed with RN in room.  Pt in no apparent distress and agreeable to therapy.  Reason for Referral: L GILBERT  Referred By: Gagandeep Matamoros  Family/Caregiver Present: No  Prior to Session Communication: Bedside nurse  Patient Position Received: Bed, 3 rail up, Alarm on    Home Living/PLOF:  Pt lives in [facility] with S.O. and brother.  0 ROBINA.  Pt sleeps on couch on main level secondary to GI issues.  Has half bath on main level.  Has 8 steps to full bathroom with WIS with built in seat.  Pt was indep without assistive device prior to surgery.  Pt owns FWW.  Pt will have assistance at home prior to surgery.      Precautions:  Precautions  LE Weight Bearing Status: Weight Bearing as Tolerated (LLE)  Medical Precautions: Fall precautions  Post-Surgical Precautions:  ([place]; no hip  precautions)    Objective     Pain:  Pain Assessment  Pain Assessment: 0-10  Pain Score: 9  Pain Type: Surgical pain  Pain Location:  (surgicla incision and groin area)    Cognition:  Cognition  Overall Cognitive Status: Within Functional Limits    General Assessments:  Activity Tolerance  Endurance: Endurance does not limit participation in activity  Static Sitting Balance  Static Sitting-Comment/Number of Minutes: Normal  Dynamic Sitting Balance  Dynamic Sitting-Comments: Good  Static Standing Balance  Static Standing-Comment/Number of Minutes: Good  Dynamic Standing Balance  Dynamic Standing-Comments: Good    Functional Assessments:  Bed Mobility   Supine to sit: SBA using RLE to assist LLE  Transfers  Sit to stand: SBA; pt impulsive with movement and stood up on own without FWW in place but without any LOB  Stand to sit: SBA with Vcs for hand placement  Toilet tx: pt able to maintain stand balance with SBA  Ambulation/Gait Training  Pt ambulated 250ft with FWW and CGA using step through gait pattern without any LOB; min VCs to stay inside FWW with good follow through     Extremity/Trunk Assessments:  RLE  ROM: WFL  Strength: WFL  LLE  ROM: not formally assessed secondary to recent surgery  Strength  L DF: 4+/5    Outcome Measures:  [facility] Basic Mobility  Turning from your back to your side while in a flat bed without using bedrails: None  Moving from lying on your back to sitting on the side of a flat bed without using bedrails: None  Moving to and from bed to chair (including a wheelchair): A little  Standing up from a chair using your arms (e.g. wheelchair or bedside chair): None  To walk in hospital room: A little  Climbing 3-5 steps with railing: A little  Basic Mobility - Total Score: 21    Encounter Problems       Encounter Problems (Active)       PT Problem       Pt will be able to perform all bed mobility tasks with Mod I.  (Progressing)       Start:  10/05/23    Expected End:  10/19/23            Pt will  perform all transfers with Mod I and FWW with proper safety mechanics.   (Progressing)       Start:  10/05/23    Expected End:  10/19/23            Pt will ambulate 300ft with Mod I using FWW for improved functional independence.  (Progressing)       Start:  10/05/23    Expected End:  10/19/23            Pt will be able to negotiate 4+4 steps with 1 HR with SBA.  (Progressing)       Start:  10/05/23    Expected End:  10/19/23            Pt will demonstrate at least 4+/5 LLE strength for ease with functional mobility.   (Progressing)       Start:  10/05/23    Expected End:  10/19/23                         Education Documentation  Mobility Training, taught by Alexa Cordero PT at 10/5/2023  4:26 PM.  Learner: Patient  Readiness: Acceptance  Method: Explanation  Response: Verbalizes Understanding  Comment: Pt was edcuated on importance of mobility and safety awareness    Education Comments  No comments found.

## 2023-10-06 VITALS
HEART RATE: 104 BPM | RESPIRATION RATE: 18 BRPM | OXYGEN SATURATION: 94 % | BODY MASS INDEX: 25.06 KG/M2 | SYSTOLIC BLOOD PRESSURE: 133 MMHG | HEIGHT: 71 IN | TEMPERATURE: 96.8 F | WEIGHT: 179.01 LBS | DIASTOLIC BLOOD PRESSURE: 78 MMHG

## 2023-10-06 LAB
ANION GAP SERPL CALC-SCNC: 14 MMOL/L (ref 10–20)
BUN SERPL-MCNC: 26 MG/DL (ref 6–23)
CALCIUM SERPL-MCNC: 9.3 MG/DL (ref 8.6–10.3)
CHLORIDE SERPL-SCNC: 101 MMOL/L (ref 98–107)
CO2 SERPL-SCNC: 23 MMOL/L (ref 21–32)
CREAT SERPL-MCNC: 0.71 MG/DL (ref 0.5–1.3)
ERYTHROCYTE [DISTWIDTH] IN BLOOD BY AUTOMATED COUNT: 14.6 % (ref 11.5–14.5)
GFR SERPL CREATININE-BSD FRML MDRD: >90 ML/MIN/1.73M*2
GLUCOSE SERPL-MCNC: 107 MG/DL (ref 74–99)
HCT VFR BLD AUTO: 42.3 % (ref 41–52)
HGB BLD-MCNC: 13.7 G/DL (ref 13.5–17.5)
MCH RBC QN AUTO: 27.8 PG (ref 26–34)
MCHC RBC AUTO-ENTMCNC: 32.4 G/DL (ref 32–36)
MCV RBC AUTO: 86 FL (ref 80–100)
NRBC BLD-RTO: 0 /100 WBCS (ref 0–0)
PLATELET # BLD AUTO: 258 X10*3/UL (ref 150–450)
PMV BLD AUTO: 9.2 FL (ref 7.5–11.5)
POTASSIUM SERPL-SCNC: 4 MMOL/L (ref 3.5–5.3)
RBC # BLD AUTO: 4.93 X10*6/UL (ref 4.5–5.9)
SODIUM SERPL-SCNC: 134 MMOL/L (ref 136–145)
WBC # BLD AUTO: 11.4 X10*3/UL (ref 4.4–11.3)

## 2023-10-06 PROCEDURE — 99232 SBSQ HOSP IP/OBS MODERATE 35: CPT | Performed by: PHYSICIAN ASSISTANT

## 2023-10-06 PROCEDURE — 97110 THERAPEUTIC EXERCISES: CPT | Mod: GP

## 2023-10-06 PROCEDURE — 97116 GAIT TRAINING THERAPY: CPT | Mod: GP

## 2023-10-06 PROCEDURE — 97165 OT EVAL LOW COMPLEX 30 MIN: CPT | Mod: GO

## 2023-10-06 PROCEDURE — 36415 COLL VENOUS BLD VENIPUNCTURE: CPT | Performed by: ORTHOPAEDIC SURGERY

## 2023-10-06 PROCEDURE — 2500000001 HC RX 250 WO HCPCS SELF ADMINISTERED DRUGS (ALT 637 FOR MEDICARE OP): Performed by: PHYSICIAN ASSISTANT

## 2023-10-06 PROCEDURE — 2500000002 HC RX 250 W HCPCS SELF ADMINISTERED DRUGS (ALT 637 FOR MEDICARE OP, ALT 636 FOR OP/ED): Performed by: PHYSICIAN ASSISTANT

## 2023-10-06 PROCEDURE — 2500000004 HC RX 250 GENERAL PHARMACY W/ HCPCS (ALT 636 FOR OP/ED): Performed by: ORTHOPAEDIC SURGERY

## 2023-10-06 PROCEDURE — 51701 INSERT BLADDER CATHETER: CPT

## 2023-10-06 PROCEDURE — 80048 BASIC METABOLIC PNL TOTAL CA: CPT | Performed by: ORTHOPAEDIC SURGERY

## 2023-10-06 PROCEDURE — 85027 COMPLETE CBC AUTOMATED: CPT | Performed by: ORTHOPAEDIC SURGERY

## 2023-10-06 PROCEDURE — 2500000001 HC RX 250 WO HCPCS SELF ADMINISTERED DRUGS (ALT 637 FOR MEDICARE OP): Performed by: ORTHOPAEDIC SURGERY

## 2023-10-06 PROCEDURE — 7100000011 HC EXTENDED STAY RECOVERY HOURLY - NURSING UNIT

## 2023-10-06 PROCEDURE — 2500000004 HC RX 250 GENERAL PHARMACY W/ HCPCS (ALT 636 FOR OP/ED): Performed by: PHYSICIAN ASSISTANT

## 2023-10-06 PROCEDURE — 2500000004 HC RX 250 GENERAL PHARMACY W/ HCPCS (ALT 636 FOR OP/ED): Performed by: NURSE PRACTITIONER

## 2023-10-06 RX ORDER — ASPIRIN 81 MG/1
81 TABLET ORAL 2 TIMES DAILY
Qty: 60 TABLET | Refills: 0 | Status: SHIPPED | OUTPATIENT
Start: 2023-10-06 | End: 2023-11-05

## 2023-10-06 RX ORDER — TAMSULOSIN HYDROCHLORIDE 0.4 MG/1
0.4 CAPSULE ORAL
Status: DISCONTINUED | OUTPATIENT
Start: 2023-10-06 | End: 2023-10-06 | Stop reason: HOSPADM

## 2023-10-06 RX ORDER — OXYCODONE AND ACETAMINOPHEN 5; 325 MG/1; MG/1
1 TABLET ORAL EVERY 6 HOURS PRN
Qty: 28 TABLET | Refills: 0 | Status: SHIPPED | OUTPATIENT
Start: 2023-10-06 | End: 2023-10-13 | Stop reason: SDUPTHER

## 2023-10-06 RX ORDER — TAMSULOSIN HYDROCHLORIDE 0.4 MG/1
0.4 CAPSULE ORAL
Qty: 14 CAPSULE | Refills: 0 | Status: SHIPPED | OUTPATIENT
Start: 2023-10-07 | End: 2023-10-09 | Stop reason: ALTCHOICE

## 2023-10-06 RX ORDER — OXYCODONE HCL 5 MG/5 ML
5 SOLUTION, ORAL ORAL EVERY 4 HOURS PRN
Status: DISCONTINUED | OUTPATIENT
Start: 2023-10-06 | End: 2023-10-06 | Stop reason: HOSPADM

## 2023-10-06 RX ORDER — BACITRACIN 500 [USP'U]/G
OINTMENT TOPICAL 3 TIMES DAILY
Status: DISCONTINUED | OUTPATIENT
Start: 2023-10-06 | End: 2023-10-06 | Stop reason: HOSPADM

## 2023-10-06 RX ORDER — BACITRACIN 500 [USP'U]/G
OINTMENT TOPICAL 3 TIMES DAILY
Qty: 1 G | Refills: 0 | Status: SHIPPED | OUTPATIENT
Start: 2023-10-06 | End: 2023-10-16

## 2023-10-06 RX ORDER — OXYCODONE HCL 5 MG/5 ML
10 SOLUTION, ORAL ORAL EVERY 4 HOURS PRN
Status: DISCONTINUED | OUTPATIENT
Start: 2023-10-06 | End: 2023-10-06 | Stop reason: HOSPADM

## 2023-10-06 RX ORDER — PREGABALIN 300 MG/1
300 CAPSULE ORAL 2 TIMES DAILY
Qty: 60 CAPSULE | Refills: 0 | Status: SHIPPED | OUTPATIENT
Start: 2023-10-06

## 2023-10-06 RX ADMIN — ASPIRIN 81 MG: 81 TABLET, COATED ORAL at 08:56

## 2023-10-06 RX ADMIN — METOCLOPRAMIDE HYDROCHLORIDE 10 MG: 5 SOLUTION ORAL at 16:01

## 2023-10-06 RX ADMIN — PREGABALIN 300 MG: 150 CAPSULE ORAL at 10:03

## 2023-10-06 RX ADMIN — PANTOPRAZOLE SODIUM 40 MG: 40 TABLET, DELAYED RELEASE ORAL at 09:04

## 2023-10-06 RX ADMIN — DOCUSATE SODIUM 100 MG: 100 CAPSULE, LIQUID FILLED ORAL at 08:56

## 2023-10-06 RX ADMIN — METOCLOPRAMIDE HYDROCHLORIDE 10 MG: 5 SOLUTION ORAL at 08:56

## 2023-10-06 RX ADMIN — DIAZEPAM 5 MG: 5 TABLET ORAL at 16:39

## 2023-10-06 RX ADMIN — ATORVASTATIN CALCIUM 80 MG: 80 TABLET, FILM COATED ORAL at 08:56

## 2023-10-06 RX ADMIN — TAMSULOSIN HYDROCHLORIDE 0.4 MG: 0.4 CAPSULE ORAL at 09:04

## 2023-10-06 RX ADMIN — CEFAZOLIN SODIUM 2 G: 2 INJECTION, SOLUTION INTRAVENOUS at 00:46

## 2023-10-06 RX ADMIN — OXYCODONE HYDROCHLORIDE 10 MG: 5 SOLUTION ORAL at 09:22

## 2023-10-06 RX ADMIN — OXYCODONE HYDROCHLORIDE 10 MG: 10 TABLET ORAL at 04:32

## 2023-10-06 RX ADMIN — DIAZEPAM 5 MG: 5 TABLET ORAL at 05:24

## 2023-10-06 RX ADMIN — HYDROMORPHONE HYDROCHLORIDE 0.5 MG: 1 INJECTION, SOLUTION INTRAMUSCULAR; INTRAVENOUS; SUBCUTANEOUS at 05:23

## 2023-10-06 RX ADMIN — CYCLOBENZAPRINE 10 MG: 10 TABLET, FILM COATED ORAL at 08:56

## 2023-10-06 RX ADMIN — HYDROMORPHONE HYDROCHLORIDE 0.5 MG: 1 INJECTION, SOLUTION INTRAMUSCULAR; INTRAVENOUS; SUBCUTANEOUS at 16:39

## 2023-10-06 ASSESSMENT — PAIN SCALES - GENERAL
PAINLEVEL_OUTOF10: 10 - WORST POSSIBLE PAIN
PAINLEVEL_OUTOF10: 8
PAINLEVEL_OUTOF10: 10 - WORST POSSIBLE PAIN
PAINLEVEL_OUTOF10: 6
PAINLEVEL_OUTOF10: 2
PAINLEVEL_OUTOF10: 2
PAINLEVEL_OUTOF10: 10 - WORST POSSIBLE PAIN
PAINLEVEL_OUTOF10: 3

## 2023-10-06 ASSESSMENT — COGNITIVE AND FUNCTIONAL STATUS - GENERAL
DRESSING REGULAR LOWER BODY CLOTHING: A LITTLE
CLIMB 3 TO 5 STEPS WITH RAILING: A LITTLE
CLIMB 3 TO 5 STEPS WITH RAILING: A LITTLE
MOBILITY SCORE: 23
MOVING FROM LYING ON BACK TO SITTING ON SIDE OF FLAT BED WITH BEDRAILS: A LITTLE
PERSONAL GROOMING: A LITTLE
MOVING TO AND FROM BED TO CHAIR: A LITTLE
TOILETING: A LITTLE
STANDING UP FROM CHAIR USING ARMS: A LITTLE
TURNING FROM BACK TO SIDE WHILE IN FLAT BAD: A LITTLE
DAILY ACTIVITIY SCORE: 21
WALKING IN HOSPITAL ROOM: A LITTLE
MOBILITY SCORE: 18

## 2023-10-06 ASSESSMENT — ACTIVITIES OF DAILY LIVING (ADL)
BATHING_ASSISTANCE: MINIMAL
ADL_ASSISTANCE: INDEPENDENT

## 2023-10-06 ASSESSMENT — PAIN - FUNCTIONAL ASSESSMENT
PAIN_FUNCTIONAL_ASSESSMENT: 0-10

## 2023-10-06 NOTE — CARE PLAN
The patient's goals for the shift include pain  management.  The clinical goals for the shift include ambulation utilizing call light appropriately and verbalizing risk of infection. Strategies to prevent infection such as hand sanitation. Patient also educated on signs and symptoms of infection including redness, pain, and swelling along with fever.

## 2023-10-06 NOTE — PROGRESS NOTES
Physical Therapy    Physical Therapy Treatment    Patient Name: Akin Lynn  MRN: [id number]  Today's Date: 10/6/2023  Time Calculation  Start Time: 0748  Stop Time: 0946  Time Calculation (min): 118 min       Assessment/Plan   PT Assessment  PT Assessment Results: Decreased strength, Decreased range of motion, Impaired balance, Decreased mobility, Decreased safety awareness  Rehab Prognosis: Good  Evaluation/Treatment Tolerance: Patient tolerated treatment well  Medical Staff Made Aware: Yes  End of Session Communication: Bedside nurse  End of Session Patient Position: Up in chair, Alarm on     PT Plan  Treatment/Interventions: Bed mobility, Transfer training, Gait training, Stair training, Balance training, Neuromuscular re-education, Strengthening, Range of motion, Therapeutic exercise, Therapeutic activity  PT Plan: Skilled PT  PT Frequency: BID  PT Discharge Recommendations: Low intensity level of continued care  Equipment Recommended upon Discharge: Wheeled walker  PT Recommended Transfer Status: Contact guard      General Visit Information:   PT  Visit  PT Received On: 10/06/23  General  Reason for Referral: L THR    Subjective   Precautions:  Precautions  LE Weight Bearing Status: Weight Bearing as Tolerated (LLE)  Medical Precautions: Fall precautions  Post-Surgical Precautions:  ([place]; no hip precautions)  Vital Signs:       Objective   Pain:  Pain Assessment  Pain Assessment: 0-10  Pain Score: 10 - Worst possible pain  Pain Type: Surgical pain  Pain Location: Hip  Pain Orientation: Right  Cognition:     Postural Control:     Extremity/Trunk Assessments:    Activity Tolerance:     Treatments:                 Bed Mobility  Bed Mobility: Yes  Bed Mobility 1  Level of Assistance 1: Distant supervision    Ambulation/Gait Training  Ambulation/Gait Training Performed: Yes  Ambulation/Gait Training 1  Surface 1: Level tile  Device 1: Rolling walker  Gait Support Devices: Gait belt  Assistance 1: Distant  supervision  Quality of Gait 1:  (antalgic in mnature  reciprocating steps)  Comments/Distance (ft) 1: 300 x 2  Transfers  Transfer: Yes  Transfer 1  Transfer From 1: Sit to  Transfer to 1: Stand  Transfer Level of Assistance 1: Distant supervision    Stairs  Stairs: Yes  Stairs  Rails 1: Left  Comment/Number of Steps 1: 12    Outcome Measures:  [facility] Basic Mobility  Turning from your back to your side while in a flat bed without using bedrails: None  Moving from lying on your back to sitting on the side of a flat bed without using bedrails: None  Moving to and from bed to chair (including a wheelchair): None  Standing up from a chair using your arms (e.g. wheelchair or bedside chair): None  To walk in hospital room: None  Climbing 3-5 steps with railing: A little  Basic Mobility - Total Score: 23    Education Documentation  Mobility Training, taught by Tiffany Hargrove PTA at 10/6/2023 10:01 AM.  Learner: Patient  Readiness: Eager  Method: Explanation, Demonstration  Response: Verbalizes Understanding, Demonstrated Understanding    Education Comments  No comments found.        OP EDUCATION:       Encounter Problems       Encounter Problems (Active)       PT Problem       Pt will be able to perform all bed mobility tasks with Mod I.  (Progressing)       Start:  10/05/23    Expected End:  10/19/23            Pt will perform all transfers with Mod I and FWW with proper safety mechanics.   (Progressing)       Start:  10/05/23    Expected End:  10/19/23            Pt will ambulate 300ft with Mod I using FWW for improved functional independence.  (Progressing)       Start:  10/05/23    Expected End:  10/19/23            Pt will be able to negotiate 4+4 steps with 1 HR with SBA.  (Progressing)       Start:  10/05/23    Expected End:  10/19/23            Pt will demonstrate at least 4+/5 LLE strength for ease with functional mobility.   (Progressing)       Start:  10/05/23    Expected End:  10/19/23                      Type Of Destruction Used: Silver Nitrate

## 2023-10-06 NOTE — CARE PLAN
The patient's goals for the shift include pain  management    The clinical goals for the shift include pain management      Problem: Fall/Injury  Goal: Not fall by end of shift  Outcome: Progressing  Goal: Be free from injury by end of the shift  Outcome: Progressing  Goal: Verbalize understanding of personal risk factors for fall in the hospital  Outcome: Progressing  Goal: Verbalize understanding of risk factor reduction measures to prevent injury from fall in the home  Outcome: Progressing  Goal: Use assistive devices by end of the shift  Outcome: Progressing  Goal: Pace activities to prevent fatigue by end of the shift  Outcome: Progressing     Problem: Pain  Goal: Takes deep breaths with improved pain control throughout the shift  Outcome: Progressing  Goal: Turns in bed with improved pain control throughout the shift  Outcome: Progressing  Goal: Walks with improved pain control throughout the shift  Outcome: Progressing  Goal: Performs ADL's with improved pain control throughout shift  Outcome: Progressing  Goal: Participates in PT with improved pain control throughout the shift  Outcome: Progressing  Goal: Free from opioid side effects throughout the shift  Outcome: Progressing  Goal: Free from acute confusion related to pain meds throughout the shift  Outcome: Progressing     Problem: Hip Replacement Intial Post Op  Goal: Activity/Mobility Safety  Outcome: Progressing  Goal: Treatment Immediate Post Op  Outcome: Progressing  Goal: Treatment  Outcome: Progressing     Problem: Hip Replacement Post Op Day 1  Goal: Activity/Mobility Safety  Outcome: Progressing  Goal: Treatment  Outcome: Progressing     Problem: Hip Replacement Post Op Day 2  Goal: Activity/Mobility Safety  Outcome: Progressing  Goal: Treatment  Outcome: Progressing     Problem: Hip Replacement Post Op Day 3  Goal: Activity/Mobility Safety  Outcome: Progressing  Goal: Treatment  Outcome: Progressing     Problem: Skin  Goal: Decreased wound  size/increased tissue granulation at next dressing change  Outcome: Progressing  Goal: Participates in plan/prevention/treatment measures  Outcome: Progressing  Goal: Prevent/manage excess moisture  Outcome: Progressing  Goal: Prevent/minimize sheer/friction injuries  Outcome: Progressing  Goal: Promote/optimize nutrition  Outcome: Progressing  Goal: Promote skin healing  Outcome: Progressing     Problem: Pain  Goal: My pain/discomfort is manageable  Outcome: Progressing     Problem: Safety  Goal: Patient will be injury free during hospitalization  Outcome: Progressing  Goal: I will remain free of falls  Outcome: Progressing     Problem: Daily Care  Goal: Daily care needs are met  Outcome: Progressing     Problem: Psychosocial Needs  Goal: Demonstrates ability to cope with hospitalization/illness  Outcome: Progressing  Goal: Collaborate with me, my family, and caregiver to identify my specific goals  Outcome: Progressing     Problem: Discharge Barriers  Goal: My discharge needs are met  Outcome: Progressing

## 2023-10-06 NOTE — PROGRESS NOTES
"Akin Lynn is a 53 y.o. male on day 0 of admission presenting with Status post left hip replacement.    Subjective   Mr. Lynn is sitting up in his chair, describing moderate to severe left hip discomfort.  He has been having trouble swallowing the oxycodone tablets, tells me that they get caught in his throat and cause irritation.  Overnight he was straight cathed for greater than 500 mL of urine.  Again today, he could not void, was straight cathed for 650 mL of urine just now.        Objective     Physical Exam  Constitutional:       Appearance: He is normal weight.   HENT:      Head: Normocephalic and atraumatic.      Nose: Nose normal.      Mouth/Throat:      Mouth: Mucous membranes are moist.      Pharynx: Oropharynx is clear.   Eyes:      Extraocular Movements: Extraocular movements intact.   Pulmonary:      Effort: Pulmonary effort is normal.   Abdominal:      Palpations: Abdomen is soft.   Musculoskeletal:      Comments: Left hip dressing is dry and intact.  light touch sensation is intact, ankle dorsiflexion/plantar flexion is intact. DP 2+/2 palpable     Neurological:      Mental Status: He is alert.         Last Recorded Vitals  Blood pressure 133/78, pulse 104, temperature 36 °C (96.8 °F), temperature source Temporal, resp. rate 18, height 1.803 m (5' 11\"), weight 81.2 kg (179 lb 0.2 oz), SpO2 94 %.  Intake/Output last 3 Shifts:  I/O last 3 completed shifts:  In: 1835 (22.6 mL/kg) [P.O.:250; I.V.:585 (7.2 mL/kg); IV Piggyback:1000]  Out: 825 (10.2 mL/kg) [Urine:725 (0.2 mL/kg/hr); Blood:100]  Weight: 81.2 kg     Relevant Results      Scheduled medications  acetaminophen, 650 mg, oral, q6h GEENA  aspirin, 81 mg, oral, BID  atorvastatin, 80 mg, oral, Daily  bacitracin, , Topical, TID  docusate sodium, 100 mg, oral, BID  DULoxetine, 60 mg, oral, Daily  metoclopramide, 10 mg, oral, TID  pantoprazole, 40 mg, oral, Daily before breakfast  pregabalin, 300 mg, oral, BID  tamsulosin, 0.4 mg, oral, Daily " before breakfast      Continuous medications  oxygen, 2 L/min, Last Rate: Stopped (10/05/23 1245)      PRN medications  PRN medications: albuterol, benzocaine-menthol, bisacodyl, cyclobenzaprine, diazePAM, diphenhydrAMINE, fluticasone, HYDROmorphone, naloxone, oxyCODONE, oxyCODONE, sodium phosphates  Results for orders placed or performed during the hospital encounter of 10/05/23 (from the past 24 hour(s))   CBC   Result Value Ref Range    WBC 11.4 (H) 4.4 - 11.3 x10*3/uL    nRBC 0.0 0.0 - 0.0 /100 WBCs    RBC 4.93 4.50 - 5.90 x10*6/uL    Hemoglobin 13.7 13.5 - 17.5 g/dL    Hematocrit 42.3 41.0 - 52.0 %    MCV 86 80 - 100 fL    MCH 27.8 26.0 - 34.0 pg    MCHC 32.4 32.0 - 36.0 g/dL    RDW 14.6 (H) 11.5 - 14.5 %    Platelets 258 150 - 450 x10*3/uL    MPV 9.2 7.5 - 11.5 fL   Basic metabolic panel   Result Value Ref Range    Glucose 107 (H) 74 - 99 mg/dL    Sodium 134 (L) 136 - 145 mmol/L    Potassium 4.0 3.5 - 5.3 mmol/L    Chloride 101 98 - 107 mmol/L    Bicarbonate 23 21 - 32 mmol/L    Anion Gap 14 10 - 20 mmol/L    Urea Nitrogen 26 (H) 6 - 23 mg/dL    Creatinine 0.71 0.50 - 1.30 mg/dL    eGFR >90 >60 mL/min/1.73m*2    Calcium 9.3 8.6 - 10.3 mg/dL                            Assessment/Plan   Principal Problem:    Status post left hip replacement  1 Left hip osteoarthritis  POD #1 s/p 1 GILBERT  Continue PT/OT, WBAT left LE  Using incentive spirometer   Reviewed am labs  Multimodal pain regimen  Surgical hip dressing to be removed on post op day #7  When appropriate, will discharge home with [facility]  Follow up with Dr. Matamoros in 2 weeks    2.  Urinary retention  Straight catheter overnight for greater than 500 mL, and again this morning for 650 mL  He is on Flomax  Message sent to Dr. Matamoros in regards to urology consult       I spent 30 minutes in the professional and overall care of this patient.      Herminia Gray PA-C

## 2023-10-06 NOTE — PROGRESS NOTES
Occupational Therapy    Occupational Therapy    Evaluation/Treatment    Patient Name: Akin Lynn  MRN: [id number]  : 1970  Today's Date: 10/06/23  Time Calculation  Start Time: 1017  Stop Time: 1028  Time Calculation (min): 11 min       Assessment:  OT Assessment:  (Pt. benefits from low intensity therapy to increase safety and independence in ADLs and mobility to return to PLOF)  Prognosis: Good  Barriers to Discharge: Decreased caregiver support  Evaluation/Treatment Tolerance: Patient limited by pain  OT Assessment Results: Decreased safe judgment during ADL, Decreased functional mobility  Prognosis: Good  Barriers to Discharge: Decreased caregiver support  Evaluation/Treatment Tolerance: Patient limited by pain    Plan:  Treatment Interventions: ADL retraining, Functional transfer training  OT Frequency: 1 time per day until discharge  OT Discharge Recommendations: Low intensity level of continued care  Treatment Interventions: ADL retraining, Functional transfer training  Subjective     Current Problem:  1. Status post left hip replacement  pregabalin (Lyrica) 300 mg capsule      2. Complex regional pain syndrome type 1, affecting unspecified site          Past Medical History:   Diagnosis Date    Anesthesia of skin     Numbness and tingling    Disease of intestine, unspecified     Bowel trouble    Low back pain, unspecified 2022    Chronic low back pain without sciatica, unspecified back pain laterality    Low back pain, unspecified 2022    Lumbago    Myopia, bilateral 2019    Myopia of both eyes with astigmatism and presbyopia    Other forms of angina pectoris     Stable angina pectoris    Personal history of other diseases of the musculoskeletal system and connective tissue     History of arthritis    Personal history of other specified conditions     History of seizure    Unspecified disorder of ear, unspecified ear     Ear, nose and throat disorder     Past Surgical History:    Procedure Laterality Date    CT ABDOMEN PELVIS ANGIOGRAM W AND/OR WO IV CONTRAST  3/18/2020    CT ABDOMEN PELVIS ANGIOGRAM W AND/OR WO IV CONTRAST 3/18/2020 [facility]    CT HEAD ANGIO W AND WO IV CONTRAST  12/16/2022    CT HEAD ANGIO W AND WO IV CONTRAST 12/16/2022 [facility]    CT NECK ANGIO W AND WO IV CONTRAST  12/16/2022    CT NECK ANGIO W AND WO IV CONTRAST 12/16/2022 [facility]    KNEE ARTHROPLASTY  06/13/2017    Knee Arthroplasty    OTHER SURGICAL HISTORY  01/06/2022    Ulnar nerve neuroplasty    OTHER SURGICAL HISTORY  12/27/2019    Knee fracture repair    OTHER SURGICAL HISTORY  07/26/2019    Foot surgery    SHOULDER SURGERY  06/13/2017    Shoulder Surgery       General:   OT Received On: 10/06/23  General  Reason for Referral: ADLs, discharge planning  Referred By: Dr. Matamoros  Prior to Session Communication: Bedside nurse  Patient Position Received: Up in chair, Alarm on    Precautions:       Vital Signs:       Pain:  Pain Assessment  Pain Assessment: 0-10  Pain Score: 8  Pain Type: Surgical pain  Pain Location: Hip  Pain Orientation: Left  Objective     Cognition:  Overall Cognitive Status: Within Functional Limits  Attention: Within Functional Limits  Memory: Within Funtional Limits             Home Living:  Home Living Comments:  (Pt. lives with SO and brother [place], sleeps on couch with half bath on first floor; has walk in shower on second floor with built in seat; PLOF independent; owns ww)    Prior Function:  Level of [place]: Independent with ADLs and functional transfers  ADL Assistance: Independent  Homemaking Assistance: Independent  Ambulatory Assistance: Independent    IADL History:       ADL History:  Grooming Assistance: Modified independent (Device)  Bathing Assistance: Minimal  LE Dressing Assistance: Stand by    Activities of Daily Living:            Activity Tolerance:  Endurance: Endurance does not limit participation in activity    Functional  Standing Tolerance:       Bed Mobility/Transfers:    Transfers  Transfer:  (Sit<-> stand SUP; functional mobility in room SBA with ww)      Sensation:  Sensation Comment:  (numbness to left LE and right heel)    Strength:  Strength Comments: wfl      Hand Function:  Hand Function  Gross Grasp: Functional  Coordination: Functional    Extremities: RUE   RUE : Within Functional Limits and LUE   LUE: Within Functional Limits    Outcome Measures:    Education Documentation  No documentation found.  Education Comments  No comments found.        EDUCATION:  Education  Individual(s) Educated: Patient  Education Provided: Fall precautons, Risk and benefits of OT discussed with patient or other, POC discussed and agreed upon    Goals:  Problem: Dressings Lower Extremities  Goal: STG - Patient to complete lower body dressing SUP  Outcome: Progressing     Problem: Grooming  Goal: STG - Patient completes grooming MOD I  Outcome: Progressing  Goal: STG - Patient will tolerate standing for 4-8 min   Outcome: Progressing     Problem: Transfers  Goal: STG - Patient will perform toilet transfer MOD I  Outcome: Progressing

## 2023-10-06 NOTE — DISCHARGE SUMMARY
Discharge summary  This patient Akin Lynn was admitted to the hospital on 10/5/2023  after undergoing left total hip replacement without complications that morning.    During the postoperative period,while in hospital, patient was medically managed by the hospitalist. Please see medial notes and H&P for patients additional diagnoses.  Ortho agrees with all medical diagnoses and treatments while patient in hospital.  No significant or unexpected findings or abnormal ortho imaging were noted during the hospital stay    Hospital course      Patient tolerated surgical procedure well and there was no complications. Patient progressed adequately through their recovery during hospital stay including PT and rehabilitation.    Patient was then D/C to home with home health care on 10/6/2023   in stable condition.  Patient was instructed on the use of pain medications, the signs and symptoms of infection, and was given our number to call should they have any questions or concerns following discharge.    Based on my clinical judgment, the patient was provided with a 7-day prescription for opioid medication at 30 MED, indicated for treatment of acute pain in the setting of recent left total hip replacement. OARRS report was run and has demonstrated an appropriate time course.  The patient has been provided with counseling pertaining to safe use of opioid medication.      Patient may bear weight as tolerated to operative extremity with use of walker for assistance with ambulation   Aquacel dressing to be removed pod7 and incision left open to air  Aspirin 81 mg twice daily x30 days for DVT prophylaxis started on 10/6/2023   Follow up with Dr. Matamoros in 2 weeks    Radiology images There is good position of the acetabular prosthesis in the native  acetabulum. There is good position of the femoral head prosthesis in  the acetabular prosthesis. There is central position of the femoral  stem in the proximal medullary cavity of  the  left femur.

## 2023-10-06 NOTE — CARE PLAN
Problem: Dressings Lower Extremities  Goal: STG - Patient to complete lower body dressing SUP  Outcome: Progressing     Problem: Grooming  Goal: STG - Patient completes grooming MOD I  Outcome: Progressing  Goal: STG - Patient will tolerate standing for 4-8 min   Outcome: Progressing     Problem: Transfers  Goal: STG - Patient will perform toilet transfer MOD I  Outcome: Progressing

## 2023-10-06 NOTE — CONSULTS
Inpatient consult to Medicine  Consult performed by: CLARIBEL Haque MD  Consult ordered by: Gagandeep Matamoros MD          Reason For Consult  Medical management of the patient    History Of Present Illness  Akin Lynn is a 53 y.o. male presenting with admitted to the hospital for left total hip replacement patient did okay postoperatively except some urine retention and he was straight cathed few times denies fever chills nausea vomiting chest pain abdominal pain.     Past Medical History  He has a past medical history of Anesthesia of skin, Disease of intestine, unspecified, Low back pain, unspecified (12/07/2022), Low back pain, unspecified (08/09/2022), Myopia, bilateral (02/19/2019), Other forms of angina pectoris, Personal history of other diseases of the musculoskeletal system and connective tissue, Personal history of other specified conditions, and Unspecified disorder of ear, unspecified ear.    Surgical History  He has a past surgical history that includes Shoulder surgery (06/13/2017); Knee Arthroplasty (06/13/2017); Other surgical history (01/06/2022); Other surgical history (12/27/2019); Other surgical history (07/26/2019); CT angio abdomen pelvis w and or wo IV IV contrast (3/18/2020); CT angio head w and wo IV contrast (12/16/2022); and CT angio neck w and wo IV contrast (12/16/2022).     Social History  He reports that he has never smoked. He has never used smokeless tobacco. He reports that he does not currently use alcohol. He reports that he does not currently use drugs.    Family History  Family History   Problem Relation Name Age of Onset    Lung cancer Mother      Glaucoma Father      Skin cancer Father      Hypertension Other mul fam mem     Heart disease Other mul fam mem         Allergies  Morphine, Ultram [tramadol], Alprazolam, Fish containing products, Ketorolac, Nsaids (non-steroidal anti-inflammatory drug), and Tetracyclines    Review of Systems  Patient denies any chest pain  shortness of breath still having left hip pain able to stand     Last Recorded Vitals  /85 (Patient Position: Lying)   Pulse 89   Temp 35.3 °C (95.5 °F) (Temporal)   Resp 16   Wt 81.2 kg (179 lb 0.2 oz)   SpO2 95%     Relevant Results  Blood work was reviewed showed elevation of his liver function testing and calcium level nasal culture grew MSSA  General Appearance:   Well Developed. Alert and Oriented X3. Cooperative.  HEENT:   Head Atraumatic/Normocephalic. PERRL. Hearing Grossly Normal. Moist Mucosa. Patent Airway  Neck:   Supple. No JVD or Thyromegaly.  CVS:   Regular Rate and Rhythm, S1 S2, No Murmurs Gallops or Rubs.  Pulmonary:  CTA. Symmetric Lung Expansion.  NEURO:   Oriented X 3. Speech Clear. Moves All Extremities.  Abdomen:   Soft, Non-Distended, Non Tender upon Palpation.  Bowel Sounds Present X 4 Quadrants.  Musculoskeletal:   Limited range of motion over the left hip  Vascular/Lymphatic:   2+ Pulses. No Edema.  Skin:   Warm and Dry. Normal Skin Turgor. No Rash.  Assessment/Plan     Status post left total hip replacement patient is doing okay physical therapy was ordered on the patient  Elevation of liver function testing patient denies drinking alcohol patient mention he has that from before advised to follow-up as an outpatient for work-up for his elevation of liver function testing  MSSA in his nose patient will be started on bacitracin  Hypercalcemia repeat labs as an outpatient  Patient is medically stable to be discharged when cleared by Ortho    CLARIBEL Haque MD

## 2023-10-06 NOTE — CARE PLAN
The patient's goals for the shift include pain  management    The clinical goals for the shift include pain management      Problem: Fall/Injury  Goal: Not fall by end of shift  10/6/2023 1653 by Kristen Barrett RN  Outcome: Adequate for Discharge  10/6/2023 1343 by Kristen Barrett RN  Outcome: Progressing  Goal: Be free from injury by end of the shift  10/6/2023 1653 by Kristen Barrett RN  Outcome: Adequate for Discharge  10/6/2023 1343 by Kristen Barrett RN  Outcome: Progressing  Goal: Verbalize understanding of personal risk factors for fall in the hospital  10/6/2023 1653 by Kristen Barrett RN  Outcome: Adequate for Discharge  10/6/2023 1343 by Kristen Barrett RN  Outcome: Progressing  Goal: Verbalize understanding of risk factor reduction measures to prevent injury from fall in the home  10/6/2023 1653 by Kristen Barrett RN  Outcome: Adequate for Discharge  10/6/2023 1343 by Kristen Barrett RN  Outcome: Progressing  Goal: Use assistive devices by end of the shift  10/6/2023 1653 by Kristen Barrett RN  Outcome: Adequate for Discharge  10/6/2023 1343 by Kristen Barrett RN  Outcome: Progressing  Goal: Pace activities to prevent fatigue by end of the shift  10/6/2023 1653 by Kristen Barrett RN  Outcome: Adequate for Discharge  10/6/2023 1343 by Kristen Barrett RN  Outcome: Progressing     Problem: Pain  Goal: Takes deep breaths with improved pain control throughout the shift  10/6/2023 1653 by Kristen Barrett RN  Outcome: Adequate for Discharge  10/6/2023 1343 by Kristen Barrett RN  Outcome: Progressing  Goal: Turns in bed with improved pain control throughout the shift  10/6/2023 1653 by Kristen Barrett RN  Outcome: Adequate for Discharge  10/6/2023 1343 by Kristen Barrett RN  Outcome: Progressing  Goal: Walks with improved pain control throughout the shift  10/6/2023 1653 by Kristen Barrett RN  Outcome: Adequate for Discharge  10/6/2023 1343 by Kristen Barrett, ANUEL  Outcome:  Progressing  Goal: Performs ADL's with improved pain control throughout shift  10/6/2023 1653 by Kristen Barrett RN  Outcome: Adequate for Discharge  10/6/2023 1343 by Kristen Barrett RN  Outcome: Progressing  Goal: Participates in PT with improved pain control throughout the shift  10/6/2023 1653 by Kristen Barrett RN  Outcome: Adequate for Discharge  10/6/2023 1343 by Kristen Barrett RN  Outcome: Progressing  Goal: Free from opioid side effects throughout the shift  10/6/2023 1653 by Krisetn Barrett RN  Outcome: Adequate for Discharge  10/6/2023 1343 by Kristen Barrett RN  Outcome: Progressing  Goal: Free from acute confusion related to pain meds throughout the shift  10/6/2023 1653 by Kristen Barrett RN  Outcome: Adequate for Discharge  10/6/2023 1343 by Kristen Barrett RN  Outcome: Progressing     Problem: Hip Replacement Intial Post Op  Goal: Activity/Mobility Safety  10/6/2023 1653 by Kristen Barrett RN  Outcome: Adequate for Discharge  10/6/2023 1343 by Kristen Barrett RN  Outcome: Progressing  Goal: Treatment Immediate Post Op  10/6/2023 1653 by Kristen Barrett RN  Outcome: Adequate for Discharge  10/6/2023 1343 by Kristen Barrett RN  Outcome: Progressing  Goal: Treatment  10/6/2023 1653 by Kristen Barrett RN  Outcome: Adequate for Discharge  10/6/2023 1343 by Kristen Barrett RN  Outcome: Progressing     Problem: Hip Replacement Post Op Day 1  Goal: Activity/Mobility Safety  10/6/2023 1653 by Kristen Barrett RN  Outcome: Adequate for Discharge  10/6/2023 1343 by Kristen Barrett RN  Outcome: Progressing  Goal: Treatment  10/6/2023 1653 by Kristen Barrett RN  Outcome: Adequate for Discharge  10/6/2023 1343 by Kristen Barrett RN  Outcome: Progressing     Problem: Hip Replacement Post Op Day 2  Goal: Activity/Mobility Safety  10/6/2023 1653 by Kristen Barrett RN  Outcome: Adequate for Discharge  10/6/2023 1343 by Kristen Barrett, RN  Outcome: Progressing  Goal:  Treatment  10/6/2023 1653 by Kristen Barrett RN  Outcome: Adequate for Discharge  10/6/2023 1343 by Kristen Barrett RN  Outcome: Progressing     Problem: Hip Replacement Post Op Day 3  Goal: Activity/Mobility Safety  10/6/2023 1653 by Kristen Barrett RN  Outcome: Adequate for Discharge  10/6/2023 1343 by Kristen Barrett RN  Outcome: Progressing  Goal: Treatment  10/6/2023 1653 by Kristen Barrett RN  Outcome: Adequate for Discharge  10/6/2023 1343 by Kristen Barrett RN  Outcome: Progressing     Problem: Skin  Goal: Decreased wound size/increased tissue granulation at next dressing change  10/6/2023 1653 by Kristen Barrett RN  Outcome: Adequate for Discharge  10/6/2023 1343 by Kristen Barrett RN  Outcome: Progressing  Goal: Participates in plan/prevention/treatment measures  10/6/2023 1653 by Kristen Barrett RN  Outcome: Adequate for Discharge  10/6/2023 1343 by Kristen Barrett RN  Outcome: Progressing  Goal: Prevent/manage excess moisture  10/6/2023 1653 by Kristen Barrett RN  Outcome: Adequate for Discharge  10/6/2023 1343 by Kristen Barrett RN  Outcome: Progressing  Goal: Prevent/minimize sheer/friction injuries  10/6/2023 1653 by Kristen Barrett RN  Outcome: Adequate for Discharge  10/6/2023 1343 by Kristen Barrett RN  Outcome: Progressing  Goal: Promote/optimize nutrition  10/6/2023 1653 by Kristen Barrett RN  Outcome: Adequate for Discharge  10/6/2023 1343 by Kristen Barrett RN  Outcome: Progressing  Goal: Promote skin healing  10/6/2023 1653 by Kristen Barrett RN  Outcome: Adequate for Discharge  10/6/2023 1343 by Kristen Barrett RN  Outcome: Progressing     Problem: Pain  Goal: My pain/discomfort is manageable  10/6/2023 1653 by Kristen Barrett RN  Outcome: Adequate for Discharge  10/6/2023 1343 by Kristen J Nena, RN  Outcome: Progressing     Problem: Safety  Goal: Patient will be injury free during hospitalization  10/6/2023 1653 by Kristen Barrett RN  Outcome:  Adequate for Discharge  10/6/2023 1343 by Kristen Barrett RN  Outcome: Progressing  Goal: I will remain free of falls  10/6/2023 1653 by Kristen Barrett RN  Outcome: Adequate for Discharge  10/6/2023 1343 by Kristen Barrett RN  Outcome: Progressing     Problem: Daily Care  Goal: Daily care needs are met  10/6/2023 1653 by Kristen Barrett RN  Outcome: Adequate for Discharge  10/6/2023 1343 by Kristen Barrett RN  Outcome: Progressing     Problem: Psychosocial Needs  Goal: Demonstrates ability to cope with hospitalization/illness  10/6/2023 1653 by Kristen Barrett RN  Outcome: Adequate for Discharge  10/6/2023 1343 by Kristen Barrett RN  Outcome: Progressing  Goal: Collaborate with me, my family, and caregiver to identify my specific goals  10/6/2023 1653 by Kristen Barrett RN  Outcome: Adequate for Discharge  10/6/2023 1343 by Kristen Barrett RN  Outcome: Progressing     Problem: Discharge Barriers  Goal: My discharge needs are met  10/6/2023 1653 by Kristen Barrett RN  Outcome: Adequate for Discharge  10/6/2023 1343 by Kristen Barrett RN  Outcome: Progressing     Problem: Dressings Lower Extremities  Goal: STG - Patient to complete lower body dressing SUP  Outcome: Adequate for Discharge     Problem: Grooming  Goal: STG - Patient completes grooming MOD I  Outcome: Adequate for Discharge  Goal: STG - Patient will tolerate standing for 4-8 min   Outcome: Adequate for Discharge

## 2023-10-06 NOTE — PROGRESS NOTES
Physical Therapy    Physical Therapy    Physical Therapy Treatment    Patient Name: Akin Lynn  MRN: [id number]  Today's Date: 10/6/2023  Time Calculation  Start Time: 0748  Stop Time: 0946  Time Calculation (min): 118 min       Assessment/Plan   PT Assessment  PT Assessment Results: Decreased strength, Decreased range of motion, Impaired balance, Decreased mobility, Decreased safety awareness  Rehab Prognosis: Good  Evaluation/Treatment Tolerance: Patient tolerated treatment well  Medical Staff Made Aware: Yes  End of Session Communication: Bedside nurse  End of Session Patient Position: Up in chair, Alarm on     PT Plan  Treatment/Interventions: Bed mobility, Transfer training, Gait training, Stair training, Balance training, Neuromuscular re-education, Strengthening, Range of motion, Therapeutic exercise, Therapeutic activity  PT Plan: Skilled PT  PT Frequency: BID  PT Discharge Recommendations: Low intensity level of continued care  Equipment Recommended upon Discharge: Wheeled walker  PT Recommended Transfer Status: Contact guard    Current Problem:  1. Status post left hip replacement  pregabalin (Lyrica) 300 mg capsule      2. Complex regional pain syndrome type 1, affecting unspecified site            General Visit Information:   PT  Visit  PT Received On: 10/06/23  General  Reason for Referral: L THR  Subjective     Precautions:  Precautions  LE Weight Bearing Status: Weight Bearing as Tolerated (LLE)  Medical Precautions: Fall precautions  Post-Surgical Precautions:  ([place]; no hip precautions)    Vital Signs:     Objective     Pain:  Pain Assessment  Pain Assessment: 0-10  Pain Score: 10 - Worst possible pain  Pain Type: Surgical pain  Pain Location: Hip  Pain Orientation: Right    Cognition:       Postural Control:       Extremity/Trunk Assessments:                Activity Tolerance:       Treatments:           Bed Mobility  Bed Mobility: Yes  Bed Mobility 1  Level of Assistance 1: Distant  supervision  Ambulation/Gait Training  Ambulation/Gait Training Performed: Yes  Ambulation/Gait Training 1  Surface 1: Level tile  Device 1: Rolling walker  Gait Support Devices: Gait belt  Assistance 1: Distant supervision  Quality of Gait 1:  (antalgic in mnature  reciprocating steps)  Comments/Distance (ft) 1: 300 x 2  Transfers  Transfer: Yes  Transfer 1  Transfer From 1: Sit to  Transfer to 1: Stand  Transfer Level of Assistance 1: Distant supervision  Stairs  Stairs: Yes  Stairs  Rails 1: Left  Comment/Number of Steps 1: 12       Outcome Measures:  [facility] Basic Mobility  Turning from your back to your side while in a flat bed without using bedrails: None  Moving from lying on your back to sitting on the side of a flat bed without using bedrails: None  Moving to and from bed to chair (including a wheelchair): None  Standing up from a chair using your arms (e.g. wheelchair or bedside chair): None  To walk in hospital room: None  Climbing 3-5 steps with railing: A little  Basic Mobility - Total Score: 23  Education Documentation  Mobility Training, taught by Tiffany Hargrove PTA at 10/6/2023 10:01 AM.  Learner: Patient  Readiness: Eager  Method: Explanation, Demonstration  Response: Verbalizes Understanding, Demonstrated Understanding    Education Comments  No comments found.        EDUCATION:     Encounter Problems       Encounter Problems (Active)       PT Problem       Pt will be able to perform all bed mobility tasks with Mod I.  (Progressing)       Start:  10/05/23    Expected End:  10/19/23            Pt will perform all transfers with Mod I and FWW with proper safety mechanics.   (Progressing)       Start:  10/05/23    Expected End:  10/19/23            Pt will ambulate 300ft with Mod I using FWW for improved functional independence.  (Progressing)       Start:  10/05/23    Expected End:  10/19/23            Pt will be able to negotiate 4+4 steps with 1 HR with SBA.  (Progressing)       Start:  10/05/23     Expected End:  10/19/23            Pt will demonstrate at least 4+/5 LLE strength for ease with functional mobility.   (Progressing)       Start:  10/05/23    Expected End:  10/19/23

## 2023-10-07 ENCOUNTER — HOME HEALTH ADMISSION (OUTPATIENT)
Dept: HOME HEALTH SERVICES | Facility: HOME HEALTH | Age: 53
End: 2023-10-07
Payer: COMMERCIAL

## 2023-10-08 ENCOUNTER — HOME CARE VISIT (OUTPATIENT)
Dept: HOME HEALTH SERVICES | Facility: HOME HEALTH | Age: 53
End: 2023-10-08
Payer: COMMERCIAL

## 2023-10-08 ENCOUNTER — HOSPITAL ENCOUNTER (EMERGENCY)
Facility: HOSPITAL | Age: 53
Discharge: HOME | End: 2023-10-08
Attending: STUDENT IN AN ORGANIZED HEALTH CARE EDUCATION/TRAINING PROGRAM
Payer: COMMERCIAL

## 2023-10-08 ENCOUNTER — APPOINTMENT (OUTPATIENT)
Dept: CARDIOLOGY | Facility: HOSPITAL | Age: 53
End: 2023-10-08
Payer: COMMERCIAL

## 2023-10-08 VITALS
HEIGHT: 68 IN | RESPIRATION RATE: 18 BRPM | BODY MASS INDEX: 27.28 KG/M2 | OXYGEN SATURATION: 96 % | DIASTOLIC BLOOD PRESSURE: 79 MMHG | SYSTOLIC BLOOD PRESSURE: 114 MMHG | HEART RATE: 94 BPM | TEMPERATURE: 97.7 F | WEIGHT: 180 LBS

## 2023-10-08 VITALS — TEMPERATURE: 98.1 F

## 2023-10-08 DIAGNOSIS — M79.662 PAIN OF LEFT CALF: Primary | ICD-10-CM

## 2023-10-08 DIAGNOSIS — M79.662 PAIN IN LEFT LOWER LEG: ICD-10-CM

## 2023-10-08 LAB
ALBUMIN SERPL BCP-MCNC: 3.7 G/DL (ref 3.4–5)
ALP SERPL-CCNC: 88 U/L (ref 33–120)
ALT SERPL W P-5'-P-CCNC: 132 U/L (ref 10–52)
ANION GAP SERPL CALC-SCNC: 11 MMOL/L (ref 10–20)
AST SERPL W P-5'-P-CCNC: 71 U/L (ref 9–39)
BASOPHILS # BLD AUTO: 0.05 X10*3/UL (ref 0–0.1)
BASOPHILS NFR BLD AUTO: 0.6 %
BILIRUB SERPL-MCNC: 0.6 MG/DL (ref 0–1.2)
BUN SERPL-MCNC: 29 MG/DL (ref 6–23)
CALCIUM SERPL-MCNC: 9.1 MG/DL (ref 8.6–10.3)
CHLORIDE SERPL-SCNC: 102 MMOL/L (ref 98–107)
CO2 SERPL-SCNC: 28 MMOL/L (ref 21–32)
CREAT SERPL-MCNC: 0.64 MG/DL (ref 0.5–1.3)
EOSINOPHIL # BLD AUTO: 0.21 X10*3/UL (ref 0–0.7)
EOSINOPHIL NFR BLD AUTO: 2.5 %
ERYTHROCYTE [DISTWIDTH] IN BLOOD BY AUTOMATED COUNT: 15 % (ref 11.5–14.5)
GFR SERPL CREATININE-BSD FRML MDRD: >90 ML/MIN/1.73M*2
GLUCOSE SERPL-MCNC: 82 MG/DL (ref 74–99)
HCT VFR BLD AUTO: 38.5 % (ref 41–52)
HGB BLD-MCNC: 12.3 G/DL (ref 13.5–17.5)
IMM GRANULOCYTES # BLD AUTO: 0.06 X10*3/UL (ref 0–0.7)
IMM GRANULOCYTES NFR BLD AUTO: 0.7 % (ref 0–0.9)
LACTATE SERPL-SCNC: 0.8 MMOL/L (ref 0.4–2)
LYMPHOCYTES # BLD AUTO: 2.07 X10*3/UL (ref 1.2–4.8)
LYMPHOCYTES NFR BLD AUTO: 24.9 %
MAGNESIUM SERPL-MCNC: 1.83 MG/DL (ref 1.6–2.4)
MCH RBC QN AUTO: 27.6 PG (ref 26–34)
MCHC RBC AUTO-ENTMCNC: 31.9 G/DL (ref 32–36)
MCV RBC AUTO: 87 FL (ref 80–100)
MONOCYTES # BLD AUTO: 0.72 X10*3/UL (ref 0.1–1)
MONOCYTES NFR BLD AUTO: 8.7 %
NEUTROPHILS # BLD AUTO: 5.21 X10*3/UL (ref 1.2–7.7)
NEUTROPHILS NFR BLD AUTO: 62.6 %
NRBC BLD-RTO: 0 /100 WBCS (ref 0–0)
PLATELET # BLD AUTO: 215 X10*3/UL (ref 150–450)
PMV BLD AUTO: 9.1 FL (ref 7.5–11.5)
POTASSIUM SERPL-SCNC: 3.9 MMOL/L (ref 3.5–5.3)
PROT SERPL-MCNC: 6.9 G/DL (ref 6.4–8.2)
RBC # BLD AUTO: 4.45 X10*6/UL (ref 4.5–5.9)
SODIUM SERPL-SCNC: 137 MMOL/L (ref 136–145)
WBC # BLD AUTO: 8.3 X10*3/UL (ref 4.4–11.3)

## 2023-10-08 PROCEDURE — 80053 COMPREHEN METABOLIC PANEL: CPT

## 2023-10-08 PROCEDURE — 2500000004 HC RX 250 GENERAL PHARMACY W/ HCPCS (ALT 636 FOR OP/ED)

## 2023-10-08 PROCEDURE — 96374 THER/PROPH/DIAG INJ IV PUSH: CPT

## 2023-10-08 PROCEDURE — 99284 EMERGENCY DEPT VISIT MOD MDM: CPT | Mod: 25 | Performed by: STUDENT IN AN ORGANIZED HEALTH CARE EDUCATION/TRAINING PROGRAM

## 2023-10-08 PROCEDURE — 93970 EXTREMITY STUDY: CPT | Performed by: INTERNAL MEDICINE

## 2023-10-08 PROCEDURE — 85025 COMPLETE CBC W/AUTO DIFF WBC: CPT

## 2023-10-08 PROCEDURE — 36415 COLL VENOUS BLD VENIPUNCTURE: CPT

## 2023-10-08 PROCEDURE — 84075 ASSAY ALKALINE PHOSPHATASE: CPT

## 2023-10-08 PROCEDURE — 93970 EXTREMITY STUDY: CPT

## 2023-10-08 PROCEDURE — 83735 ASSAY OF MAGNESIUM: CPT

## 2023-10-08 PROCEDURE — 0023 HH SOC

## 2023-10-08 PROCEDURE — G0151 HHCP-SERV OF PT,EA 15 MIN: HCPCS

## 2023-10-08 PROCEDURE — 99285 EMERGENCY DEPT VISIT HI MDM: CPT | Performed by: STUDENT IN AN ORGANIZED HEALTH CARE EDUCATION/TRAINING PROGRAM

## 2023-10-08 PROCEDURE — 83605 ASSAY OF LACTIC ACID: CPT

## 2023-10-08 RX ADMIN — HYDROMORPHONE HYDROCHLORIDE 0.5 MG: 1 INJECTION, SOLUTION INTRAMUSCULAR; INTRAVENOUS; SUBCUTANEOUS at 12:28

## 2023-10-08 RX ADMIN — HYDROMORPHONE HYDROCHLORIDE 0.5 MG: 1 INJECTION, SOLUTION INTRAMUSCULAR; INTRAVENOUS; SUBCUTANEOUS at 14:01

## 2023-10-08 ASSESSMENT — PAIN SCALES - PAIN ASSESSMENT IN ADVANCED DEMENTIA (PAINAD)
NEGVOCALIZATION: 0
BODYLANGUAGE: 1
TOTALSCORE: 2
BODYLANGUAGE: 1 - TENSE. DISTRESSED PACING. FIDGETING.
FACIALEXPRESSION: 1
CONSOLABILITY: 0
BREATHING: 0
FACIALEXPRESSION: 1 - SAD. FRIGHTENED. FROWN.
CONSOLABILITY: 0 - NO NEED TO CONSOLE.
NEGVOCALIZATION: 0 - NONE.

## 2023-10-08 ASSESSMENT — BALANCE ASSESSMENTS
STANDING UNSUPPORTED: 1 - NEEDS SEVERAL TRIES TO STAND 30 SECONDS UNSUPPORTED
STANDING UNSUPPORTED WITH EYES CLOSED: 2
STANDING UNSUPPORTED WITH FEET TOGETHER: 1 - NEEDS HELP TO ATTAIN POSITION BUT ABLE TO STAND 15 SECONDS FEET TOGETHER
TURN 360 DEGREES: 2 - ABLE TO TURN 360 DEGREES SAFELY BUT SLOWLY
PICK UP OBJECT FROM THE FLOOR FROM A STANDING POSITION: 2 - UNABLE TO PICK UP BUT REACHES 2-5 CM (1-2 INCHES) FROM SLIPPER AND KEEPS BALANCE INDEPENDENTLY
STANDING ON ONE LEG: 0
LONG VERSION TOTAL SCORE (MAX 56): 22
TRANSFERS: 2 - ABLE TO TRANSFER WITH VERBAL CUING AND/OR SUPERVISION
REACHING FORWARD WITH OUTSTRETCHED ARM WHILE STANDING: 2 - CAN REACH FORWARD 5 CM (2 INCHES)
STANDING UNSUPPORTED: 1
STANDING UNSUPPORTED WITH FEET TOGETHER: 1
TURN 360 DEGREES: 2
STANDING ON ONE LEG: 0 - UNABLE TO TRY OR NEEDS ASSIST TO PREVENT FALL
SITTING TO STANDING: 2
STANDING TO SITTING: 2
STANDING UNSUPPORTED ONE FOOT IN FRONT: 0
STANDING UNSUPPORTED WITH EYES CLOSED: 2 - ABLE TO STAND 3 SECONDS
TRANSFERS: 2
SITTING TO STANDING: 2 - ABLE TO STAND USING HANDS AFTER SEVERAL TRIES
STANDING TO SITTING: 2 - USES BACK OF LEGS AGAINST CHAIR TO CONTROL DESCENT
STANDING UNSUPPORTED ONE FOOT IN FRONT: 0 - LOSES BALANCE WHILE STEPPING OR STANDING

## 2023-10-08 ASSESSMENT — COLUMBIA-SUICIDE SEVERITY RATING SCALE - C-SSRS
2. HAVE YOU ACTUALLY HAD ANY THOUGHTS OF KILLING YOURSELF?: NO
1. IN THE PAST MONTH, HAVE YOU WISHED YOU WERE DEAD OR WISHED YOU COULD GO TO SLEEP AND NOT WAKE UP?: NO
6. HAVE YOU EVER DONE ANYTHING, STARTED TO DO ANYTHING, OR PREPARED TO DO ANYTHING TO END YOUR LIFE?: NO

## 2023-10-08 ASSESSMENT — ENCOUNTER SYMPTOMS
LOWEST PAIN SEVERITY IN PAST 24 HOURS: 7/10
PAIN SEVERITY GOAL: 4/10
LIMITED RANGE OF MOTION: 1
PAIN: 1
PAIN LOCATION - PAIN FREQUENCY: CONSTANT
MUSCLE WEAKNESS: 1
DEPRESSION: 1
PAIN LOCATION - PAIN QUALITY: ACHING, BURNING
OCCASIONAL FEELINGS OF UNSTEADINESS: 1
LOSS OF SENSATION IN FEET: 0
PERSON REPORTING PAIN: PATIENT
PAIN LOCATION: LEFT HIP
PAIN LOCATION - PAIN SEVERITY: 10/10
SUBJECTIVE PAIN PROGRESSION: UNCHANGED
HIGHEST PAIN SEVERITY IN PAST 24 HOURS: 10/10

## 2023-10-08 ASSESSMENT — PAIN DESCRIPTION - LOCATION: LOCATION: HIP

## 2023-10-08 ASSESSMENT — ACTIVITIES OF DAILY LIVING (ADL)
AMBULATION ASSISTANCE: 1
OASIS_M1830: 05
ENTERING_EXITING_HOME: CONTACT GUARD ASSIST
AMBULATION ASSISTANCE: ONE PERSON
AMBULATION ASSISTANCE ON FLAT SURFACES: 1

## 2023-10-08 ASSESSMENT — PAIN DESCRIPTION - FREQUENCY: FREQUENCY: CONSTANT/CONTINUOUS

## 2023-10-08 ASSESSMENT — PAIN SCALES - GENERAL
PAINLEVEL_OUTOF10: 10 - WORST POSSIBLE PAIN
PAINLEVEL_OUTOF10: 10 - WORST POSSIBLE PAIN
PAINLEVEL_OUTOF10: 4

## 2023-10-08 ASSESSMENT — LIFESTYLE VARIABLES
HAVE PEOPLE ANNOYED YOU BY CRITICIZING YOUR DRINKING: NO
EVER FELT BAD OR GUILTY ABOUT YOUR DRINKING: NO
HAVE YOU EVER FELT YOU SHOULD CUT DOWN ON YOUR DRINKING: NO
EVER HAD A DRINK FIRST THING IN THE MORNING TO STEADY YOUR NERVES TO GET RID OF A HANGOVER: NO

## 2023-10-08 ASSESSMENT — PAIN - FUNCTIONAL ASSESSMENT: PAIN_FUNCTIONAL_ASSESSMENT: 0-10

## 2023-10-08 ASSESSMENT — PAIN DESCRIPTION - PAIN TYPE: TYPE: ACUTE PAIN

## 2023-10-08 ASSESSMENT — PAIN DESCRIPTION - ORIENTATION: ORIENTATION: LEFT

## 2023-10-08 NOTE — ED PROVIDER NOTES
HPI   Chief Complaint   Patient presents with    Hip Pain     Left. Post replacement Thursday with Dr. Castro       Patient is a 53 y.o. male who presents to [facility] ED by EMS for Hip Pain (Left. Post replacement Thursday with Dr. Matamoros).  Patient reports around 6:30 AM this morning, he began having significant calf pain of the left leg.  Home PT recommended going to ED for further treatment due to concerning nature of pain and recent surgery history.  Patient denies shortness of breath, chest pain, acute changes in vision, headache, fever, chills, abdominal pain, vomiting, diarrhea, nausea.                                      Una Coma Scale Score: 15                  Patient History   Past Medical History:   Diagnosis Date    Anesthesia of skin     Numbness and tingling    Disease of intestine, unspecified     Bowel trouble    Low back pain, unspecified 12/07/2022    Chronic low back pain without sciatica, unspecified back pain laterality    Low back pain, unspecified 08/09/2022    Lumbago    Myopia, bilateral 02/19/2019    Myopia of both eyes with astigmatism and presbyopia    Other forms of angina pectoris     Stable angina pectoris    Personal history of other diseases of the musculoskeletal system and connective tissue     History of arthritis    Personal history of other specified conditions     History of seizure    Unspecified disorder of ear, unspecified ear     Ear, nose and throat disorder     Past Surgical History:   Procedure Laterality Date    CT ABDOMEN PELVIS ANGIOGRAM W AND/OR WO IV CONTRAST  3/18/2020    CT ABDOMEN PELVIS ANGIOGRAM W AND/OR WO IV CONTRAST 3/18/2020 [facility] EMERGENCY LEGACY    CT HEAD ANGIO W AND WO IV CONTRAST  12/16/2022    CT HEAD ANGIO W AND WO IV CONTRAST 12/16/2022 DOCTOR OFFICE LEGACY    CT NECK ANGIO W AND WO IV CONTRAST  12/16/2022    CT NECK ANGIO W AND WO IV CONTRAST 12/16/2022 DOCTOR OFFICE LEGACY    KNEE ARTHROPLASTY  06/13/2017    Knee Arthroplasty    OTHER SURGICAL  HISTORY  01/06/2022    Ulnar nerve neuroplasty    OTHER SURGICAL HISTORY  12/27/2019    Knee fracture repair    OTHER SURGICAL HISTORY  07/26/2019    Foot surgery    SHOULDER SURGERY  06/13/2017    Shoulder Surgery     Family History   Problem Relation Name Age of Onset    Lung cancer Mother      Glaucoma Father      Skin cancer Father      Hypertension Other mul fam mem     Heart disease Other [facility] mem      Social History     Tobacco Use    Smoking status: Never    Smokeless tobacco: Never   Vaping Use    Vaping Use: Never used   Substance Use Topics    Alcohol use: Not Currently    Drug use: Not Currently       Physical Exam   ED Triage Vitals [10/08/23 1038]   Temp Heart Rate Resp BP   36.5 °C (97.7 °F) 98 16 153/82      SpO2 Temp Source Heart Rate Source Patient Position   98 % Temporal Monitor Sitting      BP Location FiO2 (%)     Right arm --       Physical Exam  Constitutional:       Appearance: Normal appearance. He is normal weight.   HENT:      Head: Normocephalic and atraumatic.      Nose: Nose normal.      Mouth/Throat:      Mouth: Mucous membranes are moist.      Pharynx: Oropharynx is clear.   Eyes:      Extraocular Movements: Extraocular movements intact.      Conjunctiva/sclera: Conjunctivae normal.      Pupils: Pupils are equal, round, and reactive to light.   Cardiovascular:      Rate and Rhythm: Normal rate and regular rhythm.      Pulses: Normal pulses.      Heart sounds: Normal heart sounds.      Comments: 2+ bilateral lower extremity pulses intact.  Pulmonary:      Effort: Pulmonary effort is normal.      Breath sounds: Normal breath sounds.   Abdominal:      General: Abdomen is flat. Bowel sounds are normal.      Palpations: Abdomen is soft.   Musculoskeletal:         General: Normal range of motion.      Cervical back: Normal range of motion and neck supple.      Comments: Tenderness over left calf. Soft compartments. 2+ pedal pulses b/l. Nml sensation with dull touch. Left hip surgical  site appears clean.  Nonerythematous.  No discharge.  No crepitus.     Skin:     General: Skin is warm and dry.      Capillary Refill: Capillary refill takes less than 2 seconds.      Comments: surgical wounds covered with surgical dressing on L hip, nonerythematous, no discharge, no crepitus, nontender to palpation   Neurological:      General: No focal deficit present.      Mental Status: He is alert and oriented to person, place, and time. Mental status is at baseline.   Psychiatric:         Mood and Affect: Mood normal.         Behavior: Behavior normal.         ED Course & MDM   ED Course as of 10/13/23 0419   Sun Oct 08, 2023   1354 EKG independently interpreted by attending physician  1127 hrs.: Normal sinus rhythm ventricular rate of 93 bpm.  QTc 460.  .  No acute injury pattern seen.    53-year-old male presents with complaint of left calf pain.  Had recent hip surgery of the left hip.  Came into the ER for further evaluation to rule out blood clot.  Compartments are soft.  Nonerythematous.  Patient otherwise appears well.  2+ pedal pulses bilaterally.  Ultrasound venous duplex was done which was negative for DVT.  Patient will be discharged home. [AI]      ED Course User Index  [AI] Jennifer Acevedo, DO         Diagnoses as of 10/13/23 0419   Pain of left calf       Medical Decision Making  53-year-old male who presents with complaint of left calf pain.    Nontoxic-appearing male, no acute distress. Per HPI, concern to rule out DVT/PE, compartment syndrome in the context of recent left hip surgery last Thursday.  We will obtain some lab work.  Additionally, will obtain duplex ultrasound to evaluate for DVT.     Calf is only slightly tender to palpation with no significant tension, does not appear consistent with compartment syndrome.  Pulses intact in bilateral lower extremities.  Administered 0.5 mg of Dilaudid for pain.  All lab work-up unremarkable.  Bilateral lower extremity venous duplex  ultrasound negative for DVT.  Patient given second dose of 0.5 mg Dilaudid for pain.  Patient has pain management follow-up in [facility] at [facility].  Area of patient to follow-up with PCP and pain management as possible.  Anticipatory guidance provided.  Patient otherwise stable for discharge.      Procedure  Procedures     Dimitris Menendez MD  Resident  10/08/23 4509       Dimitris Menendez MD  Resident  10/08/23 9183    The patient was seen by the resident/fellow.  I have personally performed a substantive portion of the encounter.  I have seen and examined the patient; agree with the workup, evaluation, MDM, management and diagnosis.  The care plan has been discussed with the resident; I have reviewed the resident’s note and agree with the documented findings.           Jennifer Acevedo DO  10/13/23 0414

## 2023-10-08 NOTE — DISCHARGE INSTRUCTIONS
Please follow up with your primary care physician within 1-2 days.  If you have worsening pain, difficulty walking, or other concerning symptoms, please seek immediate medical attention and connect to the ER.    If you have a return or worsening of symptoms, please seek immediate medical attention.

## 2023-10-09 ENCOUNTER — HOME CARE VISIT (OUTPATIENT)
Dept: HOME HEALTH SERVICES | Facility: HOME HEALTH | Age: 53
End: 2023-10-09
Payer: COMMERCIAL

## 2023-10-09 ENCOUNTER — TELEMEDICINE (OUTPATIENT)
Dept: BEHAVIORAL HEALTH | Facility: CLINIC | Age: 53
End: 2023-10-09
Payer: COMMERCIAL

## 2023-10-09 VITALS — DIASTOLIC BLOOD PRESSURE: 78 MMHG | HEART RATE: 79 BPM | SYSTOLIC BLOOD PRESSURE: 132 MMHG

## 2023-10-09 DIAGNOSIS — Z91.49 HISTORY OF PSYCHOLOGICAL TRAUMA: ICD-10-CM

## 2023-10-09 DIAGNOSIS — F41.9 ANXIETY: ICD-10-CM

## 2023-10-09 PROCEDURE — G0151 HHCP-SERV OF PT,EA 15 MIN: HCPCS

## 2023-10-09 PROCEDURE — 90832 PSYTX W PT 30 MINUTES: CPT

## 2023-10-09 PROCEDURE — G0152 HHCP-SERV OF OT,EA 15 MIN: HCPCS

## 2023-10-09 ASSESSMENT — ENCOUNTER SYMPTOMS
PAIN LOCATION - PAIN FREQUENCY: INTERMITTENT
PERSON REPORTING PAIN: PATIENT
PAIN SEVERITY GOAL: 0/10
PAIN LOCATION - PAIN SEVERITY: 9/10
PAIN LOCATION - PAIN QUALITY: CRAMPING
LOWEST PAIN SEVERITY IN PAST 24 HOURS: 2/10
PAIN: 1
PAIN LOCATION: LEFT HIP
HIGHEST PAIN SEVERITY IN PAST 24 HOURS: 8/10
SUBJECTIVE PAIN PROGRESSION: UNCHANGED
PAIN LOCATION - PAIN SEVERITY: 7/10
HIGHEST PAIN SEVERITY IN PAST 24 HOURS: 10/10
PAIN LOCATION - RELIEVING FACTORS: MEDS
LOWEST PAIN SEVERITY IN PAST 24 HOURS: 7/10
PAIN LOCATION: LEFT HIP
PERSON REPORTING PAIN: PATIENT
PAIN LOCATION - EXACERBATING FACTORS: MOVEMENT
PAIN LOCATION - PAIN QUALITY: ACHING
SUBJECTIVE PAIN PROGRESSION: GRADUALLY IMPROVING
PAIN: 1
PAIN SEVERITY GOAL: 0/10

## 2023-10-09 ASSESSMENT — ACTIVITIES OF DAILY LIVING (ADL)
PREPARING MEALS: INDEPENDENT
BATHING_CURRENT_FUNCTION: SUPERVISION
DRESSING_LB_CURRENT_FUNCTION: INDEPENDENT
DRESSING_UB_CURRENT_FUNCTION: INDEPENDENT
ENTERING_EXITING_HOME: SUPERVISION
BATHING ASSESSED: 1
TOILETING: 1
TOILETING: INDEPENDENT

## 2023-10-09 NOTE — PROGRESS NOTES
Start time: 4:05  End time: 4:22    An interactive audio and video telecommunication system which permits real time communications between the patient (at the originating site) and provider (at the distant site) was utilized to provide this telehealth service. Verbal consent has been obtained from this patient for a telehealth visit.    The patient was informed of the current need to conduct treatment via virtual platform in light of COVID-19 pandemic. I have confirmed the patient’s identity via the following (minimum of three) acceptable identifiers as per  Policy PH-9: , address, phone number, and email address.    Patient current location: [address] [place] [address]    SUBJECTIVE:  Patient reports a higher pain level day following surgery 10/5; is recovering and completing physical and occupational therapy. He notes that his anxiety is well managed is well due to focus on health at this time. Discussed impact of recovery on patient and current coping. Patient opted to end earlier today to rest and regroup when more alert next week.  More specific psychotherapy content not included to protect confidentiality.    Progress: Good    Prognosis: Good    Duration of problem: years with recent improvement    Severity: moderate    OBJECTIVE:  Orientation & Cognition: Oriented x3. Thought processes normal and appropriate to situation.  Mood, Affect: Anxious  Appearance: Optimal by patient standards.  Harm to self or others: Not reported.   Substance abuse: Not reported  Psychiatric medication use: Not reported    IMPRESSION:    F41.9 Unspecified anxiety disorder  Z.91.49 History of psychological trauma    Goals for Therapy: Sharing difficult feelings in a therapeutic environment, expanding upon current social supports in his life, and addressing anxiety. Patient is also interested in medication management for his feelings of traumatization which he is worried will return after he ends ketamine treatment.      PLAN:    Patient opted to end earlier today to rest and regroup when more alert next week.    --------------------------------------------  Other(s) present in the room: None.  --------------------------------------------  Time spent face-to-face with patient: 17 minutes

## 2023-10-10 VITALS — TEMPERATURE: 97 F

## 2023-10-10 DIAGNOSIS — Z96.642 STATUS POST TOTAL HIP REPLACEMENT, LEFT: Primary | ICD-10-CM

## 2023-10-10 RX ORDER — CYCLOBENZAPRINE HCL 10 MG
10 TABLET ORAL 3 TIMES DAILY PRN
Qty: 21 TABLET | Refills: 0 | Status: SHIPPED | OUTPATIENT
Start: 2023-10-10 | End: 2024-01-30 | Stop reason: WASHOUT

## 2023-10-10 SDOH — HEALTH STABILITY: PHYSICAL HEALTH: EXERCISE TYPE: SUPINE, SEATED

## 2023-10-10 SDOH — HEALTH STABILITY: PHYSICAL HEALTH: EXERCISE COMMENTS: REVIEWED SEATED AND SUPINE HEP. INITIATED AND ISSUED STANDING HEP

## 2023-10-10 SDOH — HEALTH STABILITY: PHYSICAL HEALTH: PHYSICAL EXERCISE: STANDING

## 2023-10-10 SDOH — HEALTH STABILITY: PHYSICAL HEALTH: EXERCISE ACTIVITY: HEELRAISES, MARCH, L HIP ABD, L HIP EXT, L HAMCURL

## 2023-10-10 SDOH — HEALTH STABILITY: PHYSICAL HEALTH: PHYSICAL EXERCISE: 10

## 2023-10-10 SDOH — HEALTH STABILITY: PHYSICAL HEALTH: EXERCISE ACTIVITIES SETS: 1

## 2023-10-10 ASSESSMENT — ACTIVITIES OF DAILY LIVING (ADL)
AMBULATION ASSISTANCE: STAND BY ASSIST
CURRENT_FUNCTION: STAND BY ASSIST
AMBULATION ASSISTANCE ON FLAT SURFACES: 1
ENTERING_EXITING_HOME: STAND BY ASSIST

## 2023-10-10 ASSESSMENT — ENCOUNTER SYMPTOMS
MUSCLE WEAKNESS: 1
LIMITED RANGE OF MOTION: 1

## 2023-10-10 NOTE — TELEPHONE ENCOUNTER
We can send flexeril to pharmacy today for patient.  Pain medication will not be able to be sent until Friday.

## 2023-10-11 ENCOUNTER — HOME CARE VISIT (OUTPATIENT)
Dept: HOME HEALTH SERVICES | Facility: HOME HEALTH | Age: 53
End: 2023-10-11
Payer: COMMERCIAL

## 2023-10-11 ENCOUNTER — HOSPITAL ENCOUNTER (OUTPATIENT)
Dept: CARDIOLOGY | Facility: HOSPITAL | Age: 53
Discharge: HOME | End: 2023-10-11
Payer: COMMERCIAL

## 2023-10-11 LAB
ATRIAL RATE: 93 BPM
P AXIS: 49 DEGREES
P OFFSET: 205 MS
P ONSET: 151 MS
PR INTERVAL: 136 MS
Q ONSET: 219 MS
QRS COUNT: 15 BEATS
QRS DURATION: 102 MS
QT INTERVAL: 370 MS
QTC CALCULATION(BAZETT): 460 MS
QTC FREDERICIA: 428 MS
R AXIS: 22 DEGREES
T AXIS: 23 DEGREES
T OFFSET: 404 MS
VENTRICULAR RATE: 93 BPM

## 2023-10-11 PROCEDURE — 93005 ELECTROCARDIOGRAM TRACING: CPT

## 2023-10-12 ENCOUNTER — HOME CARE VISIT (OUTPATIENT)
Dept: HOME HEALTH SERVICES | Facility: HOME HEALTH | Age: 53
End: 2023-10-12
Payer: COMMERCIAL

## 2023-10-12 VITALS — TEMPERATURE: 98 F

## 2023-10-12 PROCEDURE — G0151 HHCP-SERV OF PT,EA 15 MIN: HCPCS

## 2023-10-12 ASSESSMENT — ENCOUNTER SYMPTOMS
SUBJECTIVE PAIN PROGRESSION: UNCHANGED
PAIN SEVERITY GOAL: 0/10
PAIN LOCATION: LEFT HIP
PAIN: 1
PAIN LOCATION: RIGHT FOOT
HIGHEST PAIN SEVERITY IN PAST 24 HOURS: 10/10
LOWEST PAIN SEVERITY IN PAST 24 HOURS: 7/10
PERSON REPORTING PAIN: PATIENT

## 2023-10-13 DIAGNOSIS — Z96.642 STATUS POST LEFT HIP REPLACEMENT: ICD-10-CM

## 2023-10-14 RX ORDER — OXYCODONE AND ACETAMINOPHEN 5; 325 MG/1; MG/1
1 TABLET ORAL EVERY 6 HOURS PRN
Qty: 28 TABLET | Refills: 0 | Status: SHIPPED | OUTPATIENT
Start: 2023-10-14 | End: 2023-10-24 | Stop reason: SDUPTHER

## 2023-10-16 ENCOUNTER — HOME CARE VISIT (OUTPATIENT)
Dept: HOME HEALTH SERVICES | Facility: HOME HEALTH | Age: 53
End: 2023-10-16
Payer: COMMERCIAL

## 2023-10-16 ENCOUNTER — TELEMEDICINE (OUTPATIENT)
Dept: BEHAVIORAL HEALTH | Facility: CLINIC | Age: 53
End: 2023-10-16
Payer: COMMERCIAL

## 2023-10-16 VITALS — TEMPERATURE: 98 F

## 2023-10-16 DIAGNOSIS — F41.9 ANXIETY: ICD-10-CM

## 2023-10-16 DIAGNOSIS — Z91.49 HISTORY OF PSYCHOLOGICAL TRAUMA: ICD-10-CM

## 2023-10-16 PROCEDURE — G0151 HHCP-SERV OF PT,EA 15 MIN: HCPCS

## 2023-10-16 PROCEDURE — 90837 PSYTX W PT 60 MINUTES: CPT

## 2023-10-16 SDOH — HEALTH STABILITY: PHYSICAL HEALTH
EXERCISE COMMENTS: REVIEWED CURRENT SEATED, STANDING, SUPINE HEP WITH PATIENT AND INSTRUCTED TO CONT WITH BID. PATIENT TOO AGITATED TO PERFORM

## 2023-10-16 SDOH — HEALTH STABILITY: PHYSICAL HEALTH: EXERCISE TYPE: SEATED, SUPINE, STANDING

## 2023-10-16 ASSESSMENT — ACTIVITIES OF DAILY LIVING (ADL): AMBULATION_DISTANCE/DURATION_TOLERATED: 50 FEET

## 2023-10-16 ASSESSMENT — ENCOUNTER SYMPTOMS
PERSON REPORTING PAIN: PATIENT
PAIN: 1

## 2023-10-16 NOTE — PROGRESS NOTES
Start time: 2:05  End time:  2:58     An interactive audio and video telecommunication system which permits real time communications between the patient (at the originating site) and provider (at the distant site) was utilized to provide this telehealth service. Verbal consent has been obtained from this patient for a telehealth visit.     The patient was informed of the current need to conduct treatment via virtual platform in light of COVID-19 pandemic. I have confirmed the patient’s identity via the following (minimum of three) acceptable identifiers as per  Policy PH-9: , address, phone number, and email address.     Patient current location: [address] [place] [address]     SUBJECTIVE:  Patient described frustration with recent medical events. Processed this frustration and explored current coping. Discussed what is currently in his control including his response to various stressors.  In line with CBT for chronic pain we also discussed pacing as a means of control - for homework patient will take note of when his pain starts to flare significantly  (generally after some time sitting/reclining) and then practice getting movement in before this time.     Progress: Good  Prognosis: Good     Duration of problem: years with recent improvement     Severity: moderate     OBJECTIVE:  Orientation & Cognition: Oriented x3. Thought processes normal and appropriate to situation.  Mood, Affect: Anxious  Appearance: Optimal by patient standards.  Harm to self or others: Not reported.   Substance abuse: Not reported  Psychiatric medication use: Not reported     IMPRESSION:     F41.9 Unspecified anxiety disorder  Z.91.49 History of psychological trauma     Goals for Therapy: Sharing difficult feelings in a therapeutic environment, expanding upon current social supports in his life, and addressing anxiety. Patient is also interested in medication management for his feelings of traumatization which he is worried  will return after he ends ketamine treatment.      PLAN:     Patient will practice pacing.     --------------------------------------------  Other(s) present in the room: None.  --------------------------------------------  Time spent face-to-face with patient: 53 minutes

## 2023-10-17 ENCOUNTER — TELEPHONE (OUTPATIENT)
Dept: ORTHOPEDIC SURGERY | Facility: CLINIC | Age: 53
End: 2023-10-17
Payer: COMMERCIAL

## 2023-10-17 NOTE — PSYCHOTHERAPY
Patient reports a higher pain level day in his hip following surgery 10/5; is recovering and completing physical and occupational therapy. When asked about his blunted affect, he explained that he is tired rather than depressed. He notes that his anxiety is well managed is well due to focus on health at this time. Discussed impact of recovery on patient - he has been having to  take on less chores around the house to pace himself and would like to contribute more, but at the same time accepts his need to recover. Took a muscle relaxer earlier and is feeling fatigued - patient opted to end earlier today to rest and regroup when more alert next week.

## 2023-10-18 ENCOUNTER — HOME CARE VISIT (OUTPATIENT)
Dept: HOME HEALTH SERVICES | Facility: HOME HEALTH | Age: 53
End: 2023-10-18
Payer: COMMERCIAL

## 2023-10-18 VITALS
TEMPERATURE: 97.3 F | SYSTOLIC BLOOD PRESSURE: 155 MMHG | HEART RATE: 99 BPM | RESPIRATION RATE: 14 BRPM | DIASTOLIC BLOOD PRESSURE: 99 MMHG

## 2023-10-18 PROCEDURE — G0151 HHCP-SERV OF PT,EA 15 MIN: HCPCS

## 2023-10-18 ASSESSMENT — ACTIVITIES OF DAILY LIVING (ADL)
AMBULATION ASSISTANCE ON FLAT SURFACES: 1
AMBULATION_DISTANCE/DURATION_TOLERATED: 50 FEET
OASIS_M1830: 01
HOME_HEALTH_OASIS: 00

## 2023-10-18 ASSESSMENT — ENCOUNTER SYMPTOMS
PAIN LOCATION - PAIN SEVERITY: 8/10
HIGHEST PAIN SEVERITY IN PAST 24 HOURS: 8/10
PAIN LOCATION: LEFT LEG

## 2023-10-20 ENCOUNTER — APPOINTMENT (OUTPATIENT)
Dept: ORTHOPEDIC SURGERY | Facility: CLINIC | Age: 53
End: 2023-10-20
Payer: COMMERCIAL

## 2023-10-20 NOTE — PATIENT INSTRUCTIONS

## 2023-10-23 ENCOUNTER — APPOINTMENT (OUTPATIENT)
Dept: RADIOLOGY | Facility: HOSPITAL | Age: 53
End: 2023-10-23
Payer: COMMERCIAL

## 2023-10-24 ENCOUNTER — ANCILLARY PROCEDURE (OUTPATIENT)
Dept: RADIOLOGY | Facility: CLINIC | Age: 53
End: 2023-10-24
Payer: COMMERCIAL

## 2023-10-24 ENCOUNTER — OFFICE VISIT (OUTPATIENT)
Dept: ORTHOPEDIC SURGERY | Facility: CLINIC | Age: 53
End: 2023-10-24
Payer: COMMERCIAL

## 2023-10-24 DIAGNOSIS — Z96.642 S/P TOTAL LEFT HIP ARTHROPLASTY: Primary | ICD-10-CM

## 2023-10-24 DIAGNOSIS — Z96.642 S/P TOTAL LEFT HIP ARTHROPLASTY: ICD-10-CM

## 2023-10-24 PROCEDURE — 73502 X-RAY EXAM HIP UNI 2-3 VIEWS: CPT | Mod: LT,FY

## 2023-10-24 PROCEDURE — 1036F TOBACCO NON-USER: CPT | Performed by: ORTHOPAEDIC SURGERY

## 2023-10-24 PROCEDURE — 99024 POSTOP FOLLOW-UP VISIT: CPT | Performed by: ORTHOPAEDIC SURGERY

## 2023-10-24 PROCEDURE — 73502 X-RAY EXAM HIP UNI 2-3 VIEWS: CPT | Mod: LEFT SIDE | Performed by: ORTHOPAEDIC SURGERY

## 2023-10-24 RX ORDER — OXYCODONE AND ACETAMINOPHEN 5; 325 MG/1; MG/1
1 TABLET ORAL EVERY 6 HOURS PRN
Qty: 28 TABLET | Refills: 0 | Status: SHIPPED | OUTPATIENT
Start: 2023-10-24 | End: 2023-10-30 | Stop reason: SDUPTHER

## 2023-10-24 RX ORDER — OXYCODONE AND ACETAMINOPHEN 5; 325 MG/1; MG/1
1 TABLET ORAL EVERY 6 HOURS PRN
Qty: 28 TABLET | Refills: 0 | Status: CANCELLED | OUTPATIENT
Start: 2023-10-24 | End: 2023-10-31

## 2023-10-24 NOTE — PROGRESS NOTES
History of present illness    53-year-old male here for his first postop visit status post left total hip replacement states he had some difficulties after the surgery most of which are not really related to his hip as far as his hip goes its stiff and sore but he is making good progress no fevers or chills no numbness or tingling no redness or drainage.      Past medical , Surgical, Family and social history reviewed.      Physical exam  General: No acute distress and breathing comfortably.  Patient is pleasant and cooperative with the examination.    Extremity  Incisions well approximate without sign infection brisk cap refill compartments soft calf is nontender no pain with internal/external rotation    Diagnostics      XR hip left 2 or 3 views    Result Date: 10/24/2023  Interpreted By:  Gagandeep Matamoros, STUDY: XR HIP LEFT 2 OR 3 VIEWS;  ;  10/24/2023 8:41 am   INDICATION: Signs/Symptoms:pain.   ACCESSION NUMBER(S): [id number]   ORDERING CLINICIAN: GAGANDEEP MATAMOROS   FINDINGS: Two views show patient status post total hip replacement in good alignment.  No signs of fracture dislocation or other bony abnormality       Signed by: Gagandeep Matamoros 10/24/2023 8:43 AM Dictation workstation:   [id number]    ECG 12 lead    Result Date: 10/11/2023  Normal sinus rhythm Normal ECG When compared with ECG of 22-SEP-2023 07:58, No significant change was found Confirmed by Ruddy Henriquez ([id number]) on 10/11/2023 9:20:32 AM    Vascular US lower extremity venous duplex bilateral    Result Date: 10/8/2023            [facility] [address] [place] [address]     Tel [phone] Fax [phone]  Vascular Lab Report  Lower Venous Duplex Ultrasound Patient Name:      JACLYN Newman Physician: [id number] Alma Sanders MD Study Date:        10/8/2023           Ordering Provider: [id number] MANDO BROCK MRN/PID:            [id number]            Fellow: Accession#:        [id number]        Technologist:      Hayley Simental Date of Birth/Age: 1970 / 53      Technologist 2:                    years Gender:            M                   Encounter#:        [id number] Admission Status:  Emergency           Location           [facility]                                        Performed:  Diagnosis/ICD: Pain in left lower leg-M79.662 Indication:    Limb pain.  CONCLUSIONS: Right Lower Venous: No evidence of acute deep vein thrombus visualized in the right lower extremity. Left Lower Venous: No evidence of acute deep vein thrombus visualized in the left lower extremity.  Imaging & Doppler Findings:  Right                 Compressible Thrombus        Flow Distal External Iliac                None CFV                       Yes        None   Spontaneous/Phasic PFV                       Yes        None FV Proximal               Yes        None   Spontaneous/Phasic FV Mid                    Yes        None FV Distal                 Yes        None Popliteal                 Yes        None   Spontaneous/Phasic Peroneal                  Yes        None PTV                       Yes        None  Left                  Compress Thrombus        Flow Distal External Iliac            None CFV                     Yes      None   Spontaneous/Phasic PFV                     Yes      None FV Proximal             Yes      None   Spontaneous/Phasic FV Mid                  Yes      None FV Distal               Yes      None Popliteal               Yes      None   Spontaneous/Phasic Peroneal                Yes      None PTV                     Yes      None  [id number] Alma Sanders MD Electronically signed by [id number] Alma Sanders MD on 10/8/2023 at 1:48:54 PM  ** Final **     XR hip left 1 view    Result Date: 10/5/2023  Interpreted By:  Cee Tim, STUDY: XR HIP LEFT 1 VIEW;  10/5/2023 11:19 am   INDICATION: Signs/Symptoms:Post op hip.   COMPARISON:  09/26/2023   ACCESSION NUMBER(S): [id number]   ORDERING CLINICIAN: BLUE LUCAS   FINDINGS: Single portable view. Status post  left total hip arthroplasty.   There is good position of the acetabular prosthesis in the native acetabulum. There is good position of the femoral head prosthesis in the acetabular prosthesis. There is central position of the femoral stem in the proximal medullary cavity of the  left femur.       Status post left total hip arthroplasty.   MACRO: None   Signed by: Cee Tim 10/5/2023 11:45 AM Dictation workstation:   [id number]    XR hip w pelvis    Addendum Date: 10/3/2023    Interpreted By:  BLUE LUCAS MD Addendum Begins MRN: [id number] Patient Name: JACLYN RAMOS  ADDENDUM: Left hip shows moderate to severe osteoarthritis with osteophyte formation joint space narrowing and subchondral sclerosis Addendum Ends    Result Date: 10/3/2023  MRN: [id number] Patient Name: JACLYN RAMOS  STUDY: HIP, UNILATERAL W/PELVIS WHEN PERFORMED 2-3 VIEWS;  Left;  9/26/2023 9:17 am  INDICATION: pain  M25.559: Pain in hip joint.  ACCESSION NUMBER(S): [id number]  ORDERING CLINICIAN: BLUE LUCAS  FINDINGS: AP pelvis and lateral view of the left hip. Moderate bilateral hip arthritis with joint space narrowing and marginal osteophyte formation no signs of fracture dislocation or other bony abnormality         Procedure  Sutures are removed Steri-Strips applied patient instructed in wound care    Assessment  Status post left total hip replacement    Treatment plan  1.  The natural history of the condition and its associated treatment alternatives including surgical and nonsurgical options were discussed with the patient at length.  2.  Continue with his physical therapy continue to transition from the walker to the cane as tolerated follow-up with me in 3 weeks patient requesting refill on pain medication which is sent to pharmacy.  I have personally reviewed the OARRS report on this  patient.  The report is scanned into the electronic medical record.  I have considered the risks of abuse, dependence, addiction and diversion.  Patient's pain level is 8 out of 10 or greater.  3. [   ]  4.  All of the patient's questions were answered.    This note was prepared using voice recognition software.  The details of this note are correct and have been reviewed, and corrected to the best of my ability.  Some grammatical areas may persist related to the Dragon software    Gagandeep Matamoros MD  Senior Attending Physician  [facility] [place]    [phone]

## 2023-10-26 ENCOUNTER — APPOINTMENT (OUTPATIENT)
Dept: PRIMARY CARE | Facility: CLINIC | Age: 53
End: 2023-10-26
Payer: COMMERCIAL

## 2023-10-27 ENCOUNTER — APPOINTMENT (OUTPATIENT)
Dept: BEHAVIORAL HEALTH | Facility: CLINIC | Age: 53
End: 2023-10-27
Payer: COMMERCIAL

## 2023-10-28 ENCOUNTER — HOSPITAL ENCOUNTER (EMERGENCY)
Facility: HOSPITAL | Age: 53
Discharge: HOME | End: 2023-10-28
Attending: EMERGENCY MEDICINE
Payer: COMMERCIAL

## 2023-10-28 ENCOUNTER — APPOINTMENT (OUTPATIENT)
Dept: RADIOLOGY | Facility: HOSPITAL | Age: 53
End: 2023-10-28
Payer: COMMERCIAL

## 2023-10-28 VITALS
HEIGHT: 71 IN | RESPIRATION RATE: 18 BRPM | HEART RATE: 78 BPM | TEMPERATURE: 96.8 F | DIASTOLIC BLOOD PRESSURE: 78 MMHG | SYSTOLIC BLOOD PRESSURE: 127 MMHG | BODY MASS INDEX: 24.92 KG/M2 | WEIGHT: 178 LBS | OXYGEN SATURATION: 98 %

## 2023-10-28 DIAGNOSIS — M25.552 PAIN OF LEFT HIP: Primary | ICD-10-CM

## 2023-10-28 PROCEDURE — 72170 X-RAY EXAM OF PELVIS: CPT

## 2023-10-28 PROCEDURE — 94760 N-INVAS EAR/PLS OXIMETRY 1: CPT | Mod: 59

## 2023-10-28 PROCEDURE — 96374 THER/PROPH/DIAG INJ IV PUSH: CPT

## 2023-10-28 PROCEDURE — 2500000004 HC RX 250 GENERAL PHARMACY W/ HCPCS (ALT 636 FOR OP/ED)

## 2023-10-28 PROCEDURE — 73564 X-RAY EXAM KNEE 4 OR MORE: CPT | Mod: LT

## 2023-10-28 PROCEDURE — 73564 X-RAY EXAM KNEE 4 OR MORE: CPT | Mod: LEFT SIDE

## 2023-10-28 PROCEDURE — 73552 X-RAY EXAM OF FEMUR 2/>: CPT | Mod: LEFT SIDE

## 2023-10-28 PROCEDURE — 73552 X-RAY EXAM OF FEMUR 2/>: CPT | Mod: LT

## 2023-10-28 PROCEDURE — 99284 EMERGENCY DEPT VISIT MOD MDM: CPT | Performed by: EMERGENCY MEDICINE

## 2023-10-28 PROCEDURE — 72170 X-RAY EXAM OF PELVIS: CPT | Mod: LEFT SIDE

## 2023-10-28 PROCEDURE — 99284 EMERGENCY DEPT VISIT MOD MDM: CPT | Mod: 25 | Performed by: EMERGENCY MEDICINE

## 2023-10-28 RX ORDER — LIDOCAINE 50 MG/G
1 PATCH TOPICAL
Qty: 14 PATCH | Refills: 0 | Status: SHIPPED | OUTPATIENT
Start: 2023-10-28 | End: 2023-11-11

## 2023-10-28 RX ORDER — FENTANYL CITRATE 50 UG/ML
INJECTION, SOLUTION INTRAMUSCULAR; INTRAVENOUS
Status: COMPLETED
Start: 2023-10-28 | End: 2023-10-28

## 2023-10-28 RX ORDER — FENTANYL CITRATE 50 UG/ML
50 INJECTION, SOLUTION INTRAMUSCULAR; INTRAVENOUS ONCE
Status: COMPLETED | OUTPATIENT
Start: 2023-10-28 | End: 2023-10-28

## 2023-10-28 RX ADMIN — FENTANYL CITRATE 50 MCG: 50 INJECTION, SOLUTION INTRAMUSCULAR; INTRAVENOUS at 12:16

## 2023-10-28 ASSESSMENT — COLUMBIA-SUICIDE SEVERITY RATING SCALE - C-SSRS
2. HAVE YOU ACTUALLY HAD ANY THOUGHTS OF KILLING YOURSELF?: NO
6. HAVE YOU EVER DONE ANYTHING, STARTED TO DO ANYTHING, OR PREPARED TO DO ANYTHING TO END YOUR LIFE?: NO
1. IN THE PAST MONTH, HAVE YOU WISHED YOU WERE DEAD OR WISHED YOU COULD GO TO SLEEP AND NOT WAKE UP?: NO

## 2023-10-28 ASSESSMENT — PAIN - FUNCTIONAL ASSESSMENT: PAIN_FUNCTIONAL_ASSESSMENT: 0-10

## 2023-10-28 ASSESSMENT — PAIN SCALES - GENERAL
PAINLEVEL_OUTOF10: 10 - WORST POSSIBLE PAIN
PAINLEVEL_OUTOF10: 8

## 2023-10-28 ASSESSMENT — LIFESTYLE VARIABLES
EVER FELT BAD OR GUILTY ABOUT YOUR DRINKING: NO
EVER HAD A DRINK FIRST THING IN THE MORNING TO STEADY YOUR NERVES TO GET RID OF A HANGOVER: NO
HAVE PEOPLE ANNOYED YOU BY CRITICIZING YOUR DRINKING: NO
HAVE YOU EVER FELT YOU SHOULD CUT DOWN ON YOUR DRINKING: NO
REASON UNABLE TO ASSESS: NO

## 2023-10-28 ASSESSMENT — PAIN DESCRIPTION - LOCATION: LOCATION: HIP

## 2023-10-28 ASSESSMENT — PAIN DESCRIPTION - PROGRESSION: CLINICAL_PROGRESSION: NOT CHANGED

## 2023-10-28 NOTE — ED PROVIDER NOTES
EMERGENCY DEPARTMENT ENCOUNTER      Pt Name: Akin Lynn  MRN: [id number]  Birthdate 1970  Date of evaluation: 10/28/2023  Provider: Hay Sauceda DO    CHIEF COMPLAINT       Chief Complaint   Patient presents with    Fall    Hip Pain         HISTORY OF PRESENT ILLNESS    53-year-old male comes to the emergency room after he rolled out of bed and landed on his replaced left hip.  Dr. Matamoros is an orthopedic doctor.  This just happened.  He can hit his head or lose consciousness.  He was given 50 mics of fentanyl by EMS.  Patient has severe pain in his left hip and knee but no other symptoms.      History provided by:  Patient      Nursing Notes were reviewed.    PAST MEDICAL HISTORY     Past Medical History:   Diagnosis Date    Anesthesia of skin     Numbness and tingling    Disease of intestine, unspecified     Bowel trouble    Low back pain, unspecified 12/07/2022    Chronic low back pain without sciatica, unspecified back pain laterality    Low back pain, unspecified 08/09/2022    Lumbago    Myopia, bilateral 02/19/2019    Myopia of both eyes with astigmatism and presbyopia    Other forms of angina pectoris     Stable angina pectoris    Personal history of other diseases of the musculoskeletal system and connective tissue     History of arthritis    Personal history of other specified conditions     History of seizure    Unspecified disorder of ear, unspecified ear     Ear, nose and throat disorder         SURGICAL HISTORY       Past Surgical History:   Procedure Laterality Date    CT ABDOMEN PELVIS ANGIOGRAM W AND/OR WO IV CONTRAST  3/18/2020    CT ABDOMEN PELVIS ANGIOGRAM W AND/OR WO IV CONTRAST 3/18/2020 [facility] EMERGENCY LEGAC[facility]    CT HEAD ANGIO W AND WO IV CONTRAST  12/16/2022    CT HEAD ANGIO W AND WO IV CONTRAST 12/16/2022 DOCTOR OFFICE LEGACY    CT NECK ANGIO W AND WO IV CONTRAST  12/16/2022    CT NECK ANGIO W AND WO IV CONTRAST 12/16/2022 DOCTOR OFFICE LEGACY    KNEE ARTHROPLASTY  06/13/2017    Knee  Arthroplasty    OTHER SURGICAL HISTORY  01/06/2022    Ulnar nerve neuroplasty    OTHER SURGICAL HISTORY  12/27/2019    Knee fracture repair    OTHER SURGICAL HISTORY  07/26/2019    Foot surgery    SHOULDER SURGERY  06/13/2017    Shoulder Surgery         CURRENT MEDICATIONS       Previous Medications    ASPIRIN 81 MG EC TABLET    Take 1 tablet (81 mg) by mouth 2 times a day. To help reduce your risk for blood clots    ATORVASTATIN (LIPITOR) 80 MG TABLET    Take 1 tablet (80 mg) by mouth once daily.    CYCLOBENZAPRINE (FLEXERIL) 10 MG TABLET    Take 1 tablet (10 mg) by mouth 3 times a day as needed for muscle spasms for up to 7 days.    DULOXETINE (CYMBALTA) 60 MG DR CAPSULE    Take 1 capsule (60 mg) by mouth once daily.    FLUTICASONE (FLONASE) 50 MCG/ACTUATION NASAL SPRAY    Administer 1 spray into each nostril once daily as needed.    LIDOCAINE (LIDODERM) 5 % PATCH    Place 1 patch on the skin once daily. Remove & discard patch within 12 hours or as directed by MD.    METOCLOPRAMIDE (REGLAN) 5 MG/5 ML ORAL SOLUTION    Take 10 mL (10 mg) by mouth 3 times a day.    NALOXEGOL OXALATE (MOVANTIK) 25 MG    Take 1 tablet (25 mg) by mouth 1 time. Taken 1 hour before meals or 2 hrs after a meal once daily    OMEPRAZOLE (PRILOSEC) 40 MG DR CAPSULE    Take 1 capsule (40 mg) by mouth 2 times a day. For 90 days    OXYCODONE-ACETAMINOPHEN (PERCOCET) 5-325 MG TABLET    Take 1 tablet by mouth every 6 hours if needed for severe pain (7 - 10) for up to 7 days.    PREGABALIN (LYRICA) 300 MG CAPSULE    Take 1 capsule (300 mg) by mouth 2 times a day.       ALLERGIES     Morphine, Ultram [tramadol], Alprazolam, Fish containing products, Ketorolac, Nsaids (non-steroidal anti-inflammatory drug), and Tetracyclines    FAMILY HISTORY       Family History   Problem Relation Name Age of Onset    Lung cancer Mother      Glaucoma Father      Skin cancer Father      Hypertension Other mul fam mem     Heart disease Other mul fam mem            SOCIAL HISTORY       Social History     Socioeconomic History    Marital status: Single     Spouse name: Not on file    Number of children: Not on file    Years of education: Not on file    Highest education level: Not on file   Occupational History    Not on file   Tobacco Use    Smoking status: Never    Smokeless tobacco: Never   Vaping Use    Vaping Use: Never used   Substance and Sexual Activity    Alcohol use: Not Currently    Drug use: Not Currently    Sexual activity: Not on file   Other Topics Concern    Not on file   Social History Narrative    Not on file     Social Determinants of Health     Financial Resource Strain: Low Risk  (10/5/2023)    Overall Financial Resource Strain (CARDIA)     Difficulty of Paying Living Expenses: Not very hard   Food Insecurity: Not on file   Transportation Needs: No Transportation Needs (10/18/2023)    OASIS : Transportation     Lack of Transportation (Medical): No     Lack of Transportation (Non-Medical): No     Patient Unable or Declines to Respond: No   Physical Activity: Not on file   Stress: Not on file   Social Connections: Feeling Socially Integrated (10/18/2023)    OASIS : Social Isolation     Frequency of experiencing loneliness or isolation: Never   Intimate Partner Violence: Not on file   Housing Stability: Unknown (10/5/2023)    Housing Stability Vital Sign     Unable to Pay for Housing in the Last Year: Patient refused     Number of Places Lived in the Last Year: Not on file     Unstable Housing in the Last Year: Patient refused       SCREENINGS                        PHYSICAL EXAM    (up to 7 for level 4, 8 or more for level 5)     ED Triage Vitals   Temp Heart Rate Resp BP   10/28/23 1218 10/28/23 1218 10/28/23 1218 10/28/23 1218   36.5 °C (97.7 °F) 86 18 135/86      SpO2 Temp Source Heart Rate Source Patient Position   10/28/23 1221 10/28/23 1218 -- 10/28/23 1218   97 % Temporal  Sitting      BP Location FiO2 (%)     10/28/23 1218 --     Right  arm        Physical Exam  Vitals and nursing note reviewed.   Constitutional:       General: He is not in acute distress.  HENT:      Head: Normocephalic and atraumatic.   Eyes:      General: No scleral icterus.        Right eye: No discharge.         Left eye: No discharge.      Conjunctiva/sclera: Conjunctivae normal.   Cardiovascular:      Rate and Rhythm: Normal rate and regular rhythm.      Pulses: Normal pulses.   Pulmonary:      Effort: Pulmonary effort is normal.   Abdominal:      General: Abdomen is flat.      Palpations: Abdomen is soft.      Tenderness: There is no abdominal tenderness. There is no guarding or rebound.   Musculoskeletal:         General: No deformity.      Right lower leg: No edema.      Left lower leg: No edema.      Comments: Patient has pain on internal and external rotation of his left hip.  Tender over his left greater trochanter.  Well-healing surgical incision over his left greater trochanter.  No obvious deformities.  Neurovascularly intact distally.   Skin:     General: Skin is warm and dry.   Neurological:      Mental Status: He is alert and oriented to person, place, and time. Mental status is at baseline.   Psychiatric:         Mood and Affect: Mood normal.         Behavior: Behavior normal.          DIAGNOSTIC RESULTS     LABS:  Labs Reviewed - No data to display    All other labs were within normal range or not returned as of this dictation.    Imaging  XR femur left 2+ views   Final Result   Endoprosthesis left hip and left knee        No acute osseous abnormality             MACRO:   None        Signed by: Betty Bryan 10/28/2023 1:15 PM   Dictation workstation:   [id number]      XR knee left 4+ views   Final Result   Endoprosthesis left hip and left knee        No acute osseous abnormality             MACRO:   None        Signed by: Betty Bryan 10/28/2023 1:15 PM   Dictation workstation:   [id number]      XR pelvis 1-2 views   Final Result   Endoprosthesis left  hip and left knee        No acute osseous abnormality             MACRO:   None        Signed by: Betty Bryan 10/28/2023 1:15 PM   Dictation workstation:   [id number]           Procedures  Procedures     EMERGENCY DEPARTMENT COURSE/MDM:     Diagnoses as of 10/28/23 1339   Pain of left hip        Medical Decision Making  53-year-old male comes to the emergency room after he rolled out of bed and landed on his left hip.  X-ray was ordered of his left hip, pelvis and left knee.  He was given 50 mg of fentanyl for pain.  Patient is well-known to the emergency department.  I did review his OARRS report, he was not given any narcotics upon discharge.  He does see Dr. Matamoros which she will follow-up with.  X-ray imaging was unremarkable for any fracture or pathology in the left hip, knee or pelvis.  He was discharged home and will return for any new, concerning or worsening symptoms but he will use his walker at home ambulate, wear lidocaine patch, ice 20 minutes on 20 minutes off 3 times a day.    Amount and/or Complexity of Data Reviewed  Radiology: ordered. Decision-making details documented in ED Course.        Patient and or family in agreement and understanding of treatment plan.  All questions answered.      I reviewed the case with the attending ED physician. The attending ED physician agrees with the plan. Patient and/or patient´s representative was counseled regarding labs, imaging, likely diagnosis, and plan. All questions were answered.    ED Medications administered this visit:    Medications   fentaNYL PF (Sublimaze) injection 50 mcg (50 mcg intravenous Given 10/28/23 1216)       New Prescriptions from this visit:    New Prescriptions    LIDOCAINE (LIDODERM) 5 % PATCH    Place 1 patch over 12 hours on the skin once daily (M-F) for 14 days. Please use 1 patch for 12 hours and it must be removed for 12 hours as well.       Follow-up:  Bryan Chen MD  [id number]   [facility]  [place], [address] [place] [id number]  [phone]    In 3 days  Please follow-up with the orthopedic surgeon this week for further evaluation.  There were no signs of acute fracture, or hardware loosening on the x-ray.  Please use the medications provided as directed.        Final Impression:   1. Pain of left hip          (Please note that portions of this note were completed with a voice recognition program.  Efforts were made to edit the dictations but occasionally words are mis-transcribed.)     Hay Sauceda DO  Resident  10/28/23 5369    The patient was seen by the resident/fellow.  I have personally performed a substantive portion of the encounter.  I have seen and examined the patient; agree with the workup, evaluation, MDM, management and diagnosis.  The care plan has been discussed with the resident/fellow; I have reviewed the resident/fellow’s note and agree with the documented findings with the exception/addition of the following:    The x-ray reveals no sign of acute hardware displacement, or dislocation.  No sign of fracture.  Patient be discharged home to utilize his walker, and pain medications that Dr. Chen has already prescribed him from last week.  He is to return the emergency room for any worsening concerning symptoms otherwise follow-up with Dr. Chen in the office in the next week.     Noam Escudero DO  10/28/23 1912

## 2023-10-30 ENCOUNTER — TELEPHONE (OUTPATIENT)
Dept: ORTHOPEDIC SURGERY | Facility: CLINIC | Age: 53
End: 2023-10-30
Payer: COMMERCIAL

## 2023-10-30 DIAGNOSIS — Z96.642 S/P TOTAL LEFT HIP ARTHROPLASTY: Primary | ICD-10-CM

## 2023-10-30 RX ORDER — OXYCODONE AND ACETAMINOPHEN 5; 325 MG/1; MG/1
1 TABLET ORAL EVERY 6 HOURS PRN
Qty: 28 TABLET | Refills: 0 | Status: SHIPPED | OUTPATIENT
Start: 2023-10-30 | End: 2023-11-06 | Stop reason: SDUPTHER

## 2023-11-02 PROCEDURE — G0180 MD CERTIFICATION HHA PATIENT: HCPCS

## 2023-11-03 ENCOUNTER — APPOINTMENT (OUTPATIENT)
Dept: ORTHOPEDIC SURGERY | Facility: CLINIC | Age: 53
End: 2023-11-03
Payer: COMMERCIAL

## 2023-11-06 ENCOUNTER — TELEPHONE (OUTPATIENT)
Dept: ORTHOPEDIC SURGERY | Facility: CLINIC | Age: 53
End: 2023-11-06
Payer: COMMERCIAL

## 2023-11-06 ENCOUNTER — APPOINTMENT (OUTPATIENT)
Dept: ORTHOPEDIC SURGERY | Facility: CLINIC | Age: 53
End: 2023-11-06
Payer: COMMERCIAL

## 2023-11-06 DIAGNOSIS — Z96.642 S/P TOTAL LEFT HIP ARTHROPLASTY: Primary | ICD-10-CM

## 2023-11-06 RX ORDER — OXYCODONE AND ACETAMINOPHEN 5; 325 MG/1; MG/1
1 TABLET ORAL EVERY 6 HOURS PRN
Qty: 28 TABLET | Refills: 0 | Status: SHIPPED | OUTPATIENT
Start: 2023-11-06 | End: 2023-11-13 | Stop reason: SDUPTHER

## 2023-11-06 NOTE — TELEPHONE ENCOUNTER
Patient lvm asking for refill of Percocet to go to [facility] in [place].    Had Lt FAN THR on 10/5

## 2023-11-13 ENCOUNTER — APPOINTMENT (OUTPATIENT)
Dept: ORTHOPEDIC SURGERY | Facility: CLINIC | Age: 53
End: 2023-11-13
Payer: COMMERCIAL

## 2023-11-13 DIAGNOSIS — Z96.642 S/P TOTAL LEFT HIP ARTHROPLASTY: ICD-10-CM

## 2023-11-13 RX ORDER — OXYCODONE AND ACETAMINOPHEN 5; 325 MG/1; MG/1
1 TABLET ORAL EVERY 6 HOURS PRN
Qty: 28 TABLET | Refills: 0 | Status: SHIPPED | OUTPATIENT
Start: 2023-11-13 | End: 2023-11-28 | Stop reason: SDUPTHER

## 2023-11-15 ENCOUNTER — APPOINTMENT (OUTPATIENT)
Dept: ORTHOPEDIC SURGERY | Facility: CLINIC | Age: 53
End: 2023-11-15
Payer: COMMERCIAL

## 2023-11-17 ENCOUNTER — INFUSION (OUTPATIENT)
Dept: INFUSION THERAPY | Facility: CLINIC | Age: 53
End: 2023-11-17
Payer: COMMERCIAL

## 2023-11-17 VITALS
HEART RATE: 74 BPM | DIASTOLIC BLOOD PRESSURE: 87 MMHG | SYSTOLIC BLOOD PRESSURE: 134 MMHG | OXYGEN SATURATION: 97 % | TEMPERATURE: 97.7 F | RESPIRATION RATE: 13 BRPM

## 2023-11-17 DIAGNOSIS — G90.522 COMPLEX REGIONAL PAIN SYNDROME TYPE 1 OF LEFT LOWER EXTREMITY: ICD-10-CM

## 2023-11-17 PROCEDURE — 96365 THER/PROPH/DIAG IV INF INIT: CPT | Mod: INF

## 2023-11-17 PROCEDURE — 2500000005 HC RX 250 GENERAL PHARMACY W/O HCPCS: Performed by: NURSE PRACTITIONER

## 2023-11-17 PROCEDURE — 96368 THER/DIAG CONCURRENT INF: CPT | Mod: INF

## 2023-11-17 PROCEDURE — 2500000004 HC RX 250 GENERAL PHARMACY W/ HCPCS (ALT 636 FOR OP/ED): Performed by: NURSE PRACTITIONER

## 2023-11-17 RX ORDER — PANTOPRAZOLE SODIUM 40 MG/1
40 TABLET, DELAYED RELEASE ORAL 2 TIMES DAILY
COMMUNITY
End: 2024-01-30 | Stop reason: WASHOUT

## 2023-11-17 RX ORDER — DIPHENHYDRAMINE HYDROCHLORIDE 50 MG/ML
50 INJECTION INTRAMUSCULAR; INTRAVENOUS AS NEEDED
Status: CANCELLED | OUTPATIENT
Start: 2023-12-01

## 2023-11-17 RX ORDER — EPINEPHRINE 0.3 MG/.3ML
0.3 INJECTION SUBCUTANEOUS EVERY 5 MIN PRN
Status: DISCONTINUED | OUTPATIENT
Start: 2023-11-17 | End: 2023-11-17 | Stop reason: HOSPADM

## 2023-11-17 RX ORDER — EPINEPHRINE 0.3 MG/.3ML
0.3 INJECTION SUBCUTANEOUS EVERY 5 MIN PRN
Status: CANCELLED | OUTPATIENT
Start: 2023-12-01

## 2023-11-17 RX ORDER — FAMOTIDINE 10 MG/ML
20 INJECTION INTRAVENOUS ONCE AS NEEDED
Status: CANCELLED | OUTPATIENT
Start: 2023-12-01

## 2023-11-17 RX ORDER — ONDANSETRON HYDROCHLORIDE 2 MG/ML
4 INJECTION, SOLUTION INTRAVENOUS ONCE
Status: DISCONTINUED | OUTPATIENT
Start: 2023-11-17 | End: 2023-11-17 | Stop reason: HOSPADM

## 2023-11-17 RX ORDER — ALBUTEROL SULFATE 0.83 MG/ML
3 SOLUTION RESPIRATORY (INHALATION) AS NEEDED
Status: DISCONTINUED | OUTPATIENT
Start: 2023-11-17 | End: 2023-11-17 | Stop reason: HOSPADM

## 2023-11-17 RX ORDER — SUCRALFATE 1 G/1
1 TABLET ORAL 2 TIMES WEEKLY
COMMUNITY
End: 2024-04-05 | Stop reason: ALTCHOICE

## 2023-11-17 RX ORDER — METOPROLOL TARTRATE 25 MG/1
25 TABLET, FILM COATED ORAL ONCE
Status: CANCELLED | OUTPATIENT
Start: 2023-12-01 | End: 2023-12-01

## 2023-11-17 RX ORDER — NITROGLYCERIN 0.4 MG/1
0.4 TABLET SUBLINGUAL ONCE
Status: DISCONTINUED | OUTPATIENT
Start: 2023-11-17 | End: 2023-11-17 | Stop reason: HOSPADM

## 2023-11-17 RX ORDER — ALBUTEROL SULFATE 0.83 MG/ML
3 SOLUTION RESPIRATORY (INHALATION) AS NEEDED
Status: CANCELLED | OUTPATIENT
Start: 2023-12-01

## 2023-11-17 RX ORDER — FAMOTIDINE 10 MG/ML
20 INJECTION INTRAVENOUS ONCE AS NEEDED
Status: DISCONTINUED | OUTPATIENT
Start: 2023-11-17 | End: 2023-11-17 | Stop reason: HOSPADM

## 2023-11-17 RX ORDER — NITROGLYCERIN 0.4 MG/1
0.4 TABLET SUBLINGUAL ONCE
Status: CANCELLED | OUTPATIENT
Start: 2023-12-01 | End: 2023-12-01

## 2023-11-17 RX ORDER — ONDANSETRON HYDROCHLORIDE 2 MG/ML
4 INJECTION, SOLUTION INTRAVENOUS ONCE
Status: CANCELLED | OUTPATIENT
Start: 2023-12-01 | End: 2023-12-01

## 2023-11-17 RX ORDER — DIPHENHYDRAMINE HYDROCHLORIDE 50 MG/ML
50 INJECTION INTRAMUSCULAR; INTRAVENOUS AS NEEDED
Status: DISCONTINUED | OUTPATIENT
Start: 2023-11-17 | End: 2023-11-17 | Stop reason: HOSPADM

## 2023-11-17 RX ORDER — METOPROLOL TARTRATE 25 MG/1
25 TABLET, FILM COATED ORAL ONCE
Status: DISCONTINUED | OUTPATIENT
Start: 2023-11-17 | End: 2023-11-17 | Stop reason: HOSPADM

## 2023-11-17 RX ADMIN — Medication: at 09:30

## 2023-11-17 RX ADMIN — PROPOFOL 100 MG: 10 INJECTION, EMULSION INTRAVENOUS at 09:30

## 2023-11-17 ASSESSMENT — ENCOUNTER SYMPTOMS
OCCASIONAL FEELINGS OF UNSTEADINESS: 1
LOSS OF SENSATION IN FEET: 1
DEPRESSION: 0

## 2023-11-17 ASSESSMENT — PAIN DESCRIPTION - ORIENTATION: ORIENTATION: LEFT

## 2023-11-17 ASSESSMENT — PATIENT HEALTH QUESTIONNAIRE - PHQ9
1. LITTLE INTEREST OR PLEASURE IN DOING THINGS: SEVERAL DAYS
10. IF YOU CHECKED OFF ANY PROBLEMS, HOW DIFFICULT HAVE THESE PROBLEMS MADE IT FOR YOU TO DO YOUR WORK, TAKE CARE OF THINGS AT HOME, OR GET ALONG WITH OTHER PEOPLE: SOMEWHAT DIFFICULT
SUM OF ALL RESPONSES TO PHQ9 QUESTIONS 1 AND 2: 1
2. FEELING DOWN, DEPRESSED OR HOPELESS: NOT AT ALL

## 2023-11-17 ASSESSMENT — COLUMBIA-SUICIDE SEVERITY RATING SCALE - C-SSRS
1. IN THE PAST MONTH, HAVE YOU WISHED YOU WERE DEAD OR WISHED YOU COULD GO TO SLEEP AND NOT WAKE UP?: NO
6. HAVE YOU EVER DONE ANYTHING, STARTED TO DO ANYTHING, OR PREPARED TO DO ANYTHING TO END YOUR LIFE?: NO
2. HAVE YOU ACTUALLY HAD ANY THOUGHTS OF KILLING YOURSELF?: NO
6. HAVE YOU EVER DONE ANYTHING, STARTED TO DO ANYTHING, OR PREPARED TO DO ANYTHING TO END YOUR LIFE?: YES

## 2023-11-17 ASSESSMENT — PAIN SCALES - GENERAL
PAINLEVEL_OUTOF10: 3
PAINLEVEL_OUTOF10: 5 - MODERATE PAIN

## 2023-11-17 ASSESSMENT — PAIN - FUNCTIONAL ASSESSMENT
PAIN_FUNCTIONAL_ASSESSMENT: 0-10
PAIN_FUNCTIONAL_ASSESSMENT: 0-10

## 2023-11-17 ASSESSMENT — PAIN DESCRIPTION - LOCATION: LOCATION: OTHER (COMMENT)

## 2023-11-17 NOTE — PROGRESS NOTES
S: Patient here for 14th opioid sparing pain infusion. Patient reports 60% reduction in pain after last infusion that lasted 2 weeks.    Purpose of pain infusion meds explained along with potential side effects.  Patient verbalized understanding.    B: Pain Issues - neck and left leg    A: Patient currently has pain described on flow sheet documentation. Designated  is his partner.     R: Plan; Obtain IV access, do patient risk assessment, and start opioid sparing infusion as ordered. Monitoring for S/S of adverse reactions.

## 2023-11-27 ENCOUNTER — TELEPHONE (OUTPATIENT)
Dept: ORTHOPEDIC SURGERY | Facility: CLINIC | Age: 53
End: 2023-11-27
Payer: COMMERCIAL

## 2023-11-27 ENCOUNTER — TELEPHONE (OUTPATIENT)
Dept: PRIMARY CARE | Facility: CLINIC | Age: 53
End: 2023-11-27
Payer: COMMERCIAL

## 2023-11-27 DIAGNOSIS — Z96.642 S/P TOTAL LEFT HIP ARTHROPLASTY: ICD-10-CM

## 2023-11-27 NOTE — TELEPHONE ENCOUNTER
Patient called and asked if he could get a one time RX for pain medication.  He had to postpone his rehab for his GILBERT on 10-05-23 due to gastic surgery on 11-15-23 and was on a liquid diet, low glycemic issues.  He had a major set back and is scheduled for PT tomorrow.  He is having a lot of pain in the hip. He was doing his HEP, however he knows he is behind in his rehab.  He was also scheduled to see Dr. Matamoros this Wednesday and they just called and moved the appointment to 12-19-23.  He can be reached at [phone].  Pharm was verified.

## 2023-11-28 ENCOUNTER — TELEPHONE (OUTPATIENT)
Dept: ORTHOPEDIC SURGERY | Facility: CLINIC | Age: 53
End: 2023-11-28
Payer: COMMERCIAL

## 2023-11-28 RX ORDER — OXYCODONE AND ACETAMINOPHEN 5; 325 MG/1; MG/1
1 TABLET ORAL EVERY 12 HOURS PRN
Qty: 14 TABLET | Refills: 0 | Status: SHIPPED | OUTPATIENT
Start: 2023-11-28 | End: 2023-12-05

## 2023-11-29 ENCOUNTER — APPOINTMENT (OUTPATIENT)
Dept: ORTHOPEDIC SURGERY | Facility: CLINIC | Age: 53
End: 2023-11-29
Payer: COMMERCIAL

## 2023-11-30 ENCOUNTER — OFFICE VISIT (OUTPATIENT)
Dept: ORTHOPEDIC SURGERY | Facility: CLINIC | Age: 53
End: 2023-11-30
Payer: COMMERCIAL

## 2023-11-30 ENCOUNTER — ANCILLARY PROCEDURE (OUTPATIENT)
Dept: RADIOLOGY | Facility: CLINIC | Age: 53
End: 2023-11-30
Payer: COMMERCIAL

## 2023-11-30 DIAGNOSIS — M79.641 PAIN OF RIGHT HAND: ICD-10-CM

## 2023-11-30 DIAGNOSIS — G56.21 CUBITAL TUNNEL SYNDROME ON RIGHT: Primary | ICD-10-CM

## 2023-11-30 PROCEDURE — 1036F TOBACCO NON-USER: CPT | Performed by: ORTHOPAEDIC SURGERY

## 2023-11-30 PROCEDURE — 73130 X-RAY EXAM OF HAND: CPT | Mod: RIGHT SIDE | Performed by: ORTHOPAEDIC SURGERY

## 2023-11-30 PROCEDURE — 99214 OFFICE O/P EST MOD 30 MIN: CPT | Performed by: ORTHOPAEDIC SURGERY

## 2023-11-30 PROCEDURE — 73130 X-RAY EXAM OF HAND: CPT | Mod: RT

## 2023-11-30 PROCEDURE — L3908 WHO COCK-UP NONMOLDE PRE OTS: HCPCS | Performed by: ORTHOPAEDIC SURGERY

## 2023-11-30 NOTE — PROGRESS NOTES
History present illness: Patient presents with a new problem.  Describes numbness and tingling to the right hand.  He describes intrinsic muscle spasm.  The patient describes numbness and tingling involving the entirety of the hand about the palmar aspect.      Past medical history: The patient's past medical history, family history, social history, and review of systems were documented on the patient medical intake.  The updated data was reviewed in the electronic medical record.  History is negative except otherwise stated in history of present illness.        Physical examination:  General: Alert and oriented to person, place, and time.  No acute distress and breathing comfortably: Pleasant and cooperative with examination.  HEENT: Head is normocephalic and atraumatic.  Neck: Supple, no visible swelling.  Cardiovascular: No palpable tachycardia  Lungs: No audible wheezing or labored breathing  Abdomen: Nondistended.  Extremities: Evaluation of the right upper extremity finds the patient had palpable radial artery at the wrist with brisk capillary refill to all digits.  Patient has intact sensation to axillary radial median and ulnar nerves.  There are no open wounds.  There are no signs of infection.  There is no evidence of lymphedema or lymphatic streaking.  The patient has supple compartments to right arm forearm and hand.  Positive Tinel's over course of median nerve to right wrist and ulnar nerve to right elbow.  Negative Tinel's over course of ulnar nerve to right wrist.      Radiology:      Assessment: Numbness tingling and intrinsic muscle spasm to right hand concerning for peripheral nerve compression      Plan: Treatment options were discussed.  Recommendations were made for EMG nerve conduction study test to evaluate for Guyon syndrome carpal tunnel syndrome and cubital tunnel syndrome.  Further recommendations made for diagnostic/therapeutic injection to the right carpal tunnel.  Follow-up with me in  the office after EMG nerve conduction study test.  Patient was given a splint as well.  He is agreeable with this strategy.        Procedure:  Right Carpal Tunnel Injection: It was explained to the patient that the risks of a steroid injection include but are not limited to infection, local skin irritation, skin atrophy, calcification, continued pain and discomfort, elevated blood sugar, burning, failure to relieve pain, and possible late infection. The patient verbalized good insight and verbalized consent for the injection. It was further explained that the post-injection discomfort can be alleviated with additional medications, ice, elevation, and rest over the first 24 hours, and that these modalities are recommended.  After informed consent was provided, patient identification was confirmed, and allergies were verified, the patient was appropriately positioned. The site was marked and time-out performed.    Using aseptic technique, a solution containing 1 mL of 10 mg of Kenalog and 1 mL of 1% lidocaine without epinephrine was injected into the patient's right carpal tunnel. A 25-gauge needle was inserted just ulnar to the anatomic course of the palmaris longus tendon at the level of the distal wrist flexion crease. It was slowly advanced deep to the distal antebrachial fascia. The solution was then injected slowly, taking care to ensure that the patient experienced no paresthesias during the injection of the solution. After injection of the solution, the patient was asked to perform active flexion and extension of the digits and wrist to help disperse the solution. A band-aid was then placed at the injection site. The patient tolerated this right carpal tunnel injection well.

## 2023-12-06 ENCOUNTER — TELEMEDICINE (OUTPATIENT)
Dept: BEHAVIORAL HEALTH | Facility: CLINIC | Age: 53
End: 2023-12-06
Payer: COMMERCIAL

## 2023-12-06 DIAGNOSIS — F41.9 ANXIETY: ICD-10-CM

## 2023-12-06 DIAGNOSIS — Z91.49 HISTORY OF PSYCHOLOGICAL TRAUMA: ICD-10-CM

## 2023-12-06 PROCEDURE — 90832 PSYTX W PT 30 MINUTES: CPT

## 2023-12-06 RX ORDER — PREGABALIN 300 MG/1
300 CAPSULE ORAL 2 TIMES DAILY
Qty: 60 CAPSULE | Refills: 0 | OUTPATIENT
Start: 2023-12-06

## 2023-12-06 NOTE — PROGRESS NOTES
Start time: 11:32  End time:  12:07     An interactive audio and video telecommunication system which permits real time communications between the patient (at the originating site) and provider (at the distant site) was utilized to provide this telehealth service. Verbal consent has been obtained from this patient for a telehealth visit.     The patient was informed of the current need to conduct treatment via virtual platform in light of COVID-19 pandemic. I have confirmed the patient’s identity via the following (minimum of three) acceptable identifiers as per  Policy PH-9: , address, phone number, and email address.     Patient current location: [address] [place] [address]. [place] [id number]     SUBJECTIVE:  Patient reported that hip surgery went well. Had an opportunity to get stomach procedure (making wider to address gastroparesis) mid November  and had to go on liquid diet which was significantly difficult for him. He had to stop going to physical therapy for hip due to how bad he was feeling physically (drained, no energy) and mentally (depressed, anxious, no SI) at the time. Feeling better now and going back to PT tomorrow. Doing ketamine in January. Initiated appointment today because partner Sudheer is wanting to leave [place] but he is not comfortable with this. He feels he has some social connections here, likes [place] and, all his providers are here. Wanted to discuss with therapist as he feels conflicted. Sudheer is also stating he does not contribute enough to the relationship - affection, finances etc. and she is taking a vacation in [place] in January; worried about her being alone for this. Today discussed acceptance of his uncertainty/discomfort with move proposed by partner (no firm decisions/convictions at this point) and recognizing of his need to take the time he needs to collect more information - for example he is unsure of how place to which she would like to move ([place]) would affect him  physically in terms of temperature, considering eventually visiting to assess this.     Progress: Good  Prognosis: Good     Duration of problem: years with recent improvement     Severity: moderate     OBJECTIVE:  Orientation & Cognition: Oriented x3. Thought processes normal and appropriate to situation.  Mood, Affect: Anxious  Appearance: Optimal by patient standards.  Harm to self or others: Not reported.   Substance abuse: Not reported  Psychiatric medication use: Not reported     IMPRESSION:     F41.9 Unspecified anxiety disorder  Z.91.49 History of psychological trauma     Goals for Therapy: Sharing difficult feelings in a therapeutic environment, expanding upon current social supports in his life, and addressing anxiety. Patient is also interested in medication management for his feelings of traumatization which he is worried will return after he ends ketamine treatment.      PLAN:     Today discussed acceptance of his uncertainty/discomfort with move proposed by partner (no firm decisions/convictions at this point) and recognizing of his need to take the time he needs to collect more information     --------------------------------------------  Other(s) present in the room: None.  --------------------------------------------  Time spent face-to-face with patient: 35 minutes

## 2023-12-08 ENCOUNTER — APPOINTMENT (OUTPATIENT)
Dept: ORTHOPEDIC SURGERY | Facility: CLINIC | Age: 53
End: 2023-12-08
Payer: COMMERCIAL

## 2023-12-11 ENCOUNTER — APPOINTMENT (OUTPATIENT)
Dept: ORTHOPEDIC SURGERY | Facility: CLINIC | Age: 53
End: 2023-12-11
Payer: COMMERCIAL

## 2023-12-15 ENCOUNTER — APPOINTMENT (OUTPATIENT)
Dept: ORTHOPEDIC SURGERY | Facility: CLINIC | Age: 53
End: 2023-12-15
Payer: COMMERCIAL

## 2023-12-15 DIAGNOSIS — M54.12 CERVICAL RADICULAR PAIN: Primary | ICD-10-CM

## 2023-12-18 ENCOUNTER — APPOINTMENT (OUTPATIENT)
Dept: ORTHOPEDIC SURGERY | Facility: CLINIC | Age: 53
End: 2023-12-18
Payer: COMMERCIAL

## 2023-12-19 ENCOUNTER — APPOINTMENT (OUTPATIENT)
Dept: ORTHOPEDIC SURGERY | Facility: CLINIC | Age: 53
End: 2023-12-19
Payer: COMMERCIAL

## 2023-12-20 ENCOUNTER — TELEMEDICINE (OUTPATIENT)
Dept: BEHAVIORAL HEALTH | Facility: CLINIC | Age: 53
End: 2023-12-20
Payer: COMMERCIAL

## 2023-12-20 ENCOUNTER — APPOINTMENT (OUTPATIENT)
Dept: BEHAVIORAL HEALTH | Facility: CLINIC | Age: 53
End: 2023-12-20
Payer: COMMERCIAL

## 2023-12-20 DIAGNOSIS — Z91.49 HISTORY OF PSYCHOLOGICAL TRAUMA: ICD-10-CM

## 2023-12-20 DIAGNOSIS — F41.9 ANXIETY: ICD-10-CM

## 2023-12-20 PROCEDURE — 90832 PSYTX W PT 30 MINUTES: CPT

## 2023-12-20 NOTE — PROGRESS NOTES
Start time:  3:11  End time:  3:41     An interactive audio and video telecommunication system which permits real time communications between the patient (at the originating site) and provider (at the distant site) was utilized to provide this telehealth service. Verbal consent has been obtained from this patient for a telehealth visit.     The patient was informed of the current need to conduct treatment via virtual platform in light of COVID-19 pandemic. I have confirmed the patient’s identity via the following (minimum of three) acceptable identifiers as per  Policy PH-9: , address, phone number, and email address.     Patient current location: [address] [place] [address]. [place] [id number]     SUBJECTIVE:  Patient is having continued foot/ankle burning pain. Partner wants to move to [place] in  and he is not sure how this would work; processed uncertainty about this. He also notes that he and his partner continue to disagree on financial matters. He notes some anxiety as well about leaving the house and being around other people, which is also difficult due to his pain. Notes he recently got injection for his foot and  it will still take some time to determine the effectiveness of this. We discussed different options in the meantime to help with his high pain level and he is open to doing hypnosis with provider to address pain intensity and difficult emotions associated with pain. We discussed evidence base for hypnosis and realistic expectations of potential benefits.      Progress: Good  Prognosis: Good     Duration of problem: years with recent improvement     Severity: moderate     OBJECTIVE:  Orientation & Cognition: Oriented x3. Thought processes normal and appropriate to situation.  Mood, Affect: Anxious  Appearance: Optimal by patient standards.  Harm to self or others: Not reported.   Substance abuse: Not reported  Psychiatric medication use: Not reported     IMPRESSION:     F41.9 Unspecified anxiety  disorder  Z.91.49 History of psychological trauma     Goals for Therapy: Sharing difficult feelings in a therapeutic environment, expanding upon current social supports in his life, and addressing anxiety. Patient is also interested in medication management for his feelings of traumatization which he is worried will return after he ends ketamine treatment.      PLAN:    We discussed different options in the meantime to help with his high pain level and he is open to doing hypnosis with provider to address pain intensity and difficult emotions associated with pain    Next apt: 1/5    --------------------------------------------  Other(s) present in the room: None.  --------------------------------------------  Time spent face-to-face with patient:  30 minutes

## 2023-12-21 RX ORDER — ACETAMINOPHEN 500 MG
TABLET ORAL
Qty: 180 TABLET | Refills: 11 | Status: SHIPPED | OUTPATIENT
Start: 2023-12-21 | End: 2024-04-23 | Stop reason: HOSPADM

## 2023-12-22 ENCOUNTER — APPOINTMENT (OUTPATIENT)
Dept: ORTHOPEDIC SURGERY | Facility: CLINIC | Age: 53
End: 2023-12-22
Payer: COMMERCIAL

## 2023-12-28 RX ORDER — METOCLOPRAMIDE HYDROCHLORIDE 5 MG/ML
10 INJECTION INTRAMUSCULAR; INTRAVENOUS ONCE
Status: CANCELLED | OUTPATIENT
Start: 2023-12-29 | End: 2023-12-29

## 2023-12-28 RX ORDER — ALBUTEROL SULFATE 0.83 MG/ML
3 SOLUTION RESPIRATORY (INHALATION) AS NEEDED
Status: CANCELLED | OUTPATIENT
Start: 2023-12-29

## 2023-12-28 RX ORDER — NITROGLYCERIN 0.4 MG/1
0.4 TABLET SUBLINGUAL ONCE
Status: CANCELLED | OUTPATIENT
Start: 2023-12-29 | End: 2023-12-29

## 2023-12-28 RX ORDER — DIPHENHYDRAMINE HYDROCHLORIDE 50 MG/ML
25 INJECTION INTRAMUSCULAR; INTRAVENOUS ONCE
Status: CANCELLED | OUTPATIENT
Start: 2023-12-29 | End: 2023-12-29

## 2023-12-28 RX ORDER — METOPROLOL TARTRATE 25 MG/1
25 TABLET, FILM COATED ORAL ONCE
Status: CANCELLED | OUTPATIENT
Start: 2023-12-29 | End: 2023-12-29

## 2023-12-28 RX ORDER — DIPHENHYDRAMINE HYDROCHLORIDE 50 MG/ML
50 INJECTION INTRAMUSCULAR; INTRAVENOUS AS NEEDED
Status: CANCELLED | OUTPATIENT
Start: 2023-12-29

## 2023-12-28 RX ORDER — ONDANSETRON HYDROCHLORIDE 2 MG/ML
4 INJECTION, SOLUTION INTRAVENOUS ONCE
Status: CANCELLED | OUTPATIENT
Start: 2023-12-29 | End: 2023-12-29

## 2023-12-28 RX ORDER — PROMETHAZINE HYDROCHLORIDE 25 MG/ML
12.5 INJECTION, SOLUTION INTRAMUSCULAR; INTRAVENOUS ONCE
Status: CANCELLED | OUTPATIENT
Start: 2023-12-29 | End: 2023-12-29

## 2023-12-28 RX ORDER — FAMOTIDINE 10 MG/ML
20 INJECTION INTRAVENOUS ONCE AS NEEDED
Status: CANCELLED | OUTPATIENT
Start: 2023-12-29

## 2023-12-28 RX ORDER — EPINEPHRINE 0.3 MG/.3ML
0.3 INJECTION SUBCUTANEOUS EVERY 5 MIN PRN
Status: CANCELLED | OUTPATIENT
Start: 2023-12-29

## 2023-12-29 ENCOUNTER — INFUSION (OUTPATIENT)
Dept: INFUSION THERAPY | Facility: CLINIC | Age: 53
End: 2023-12-29
Payer: COMMERCIAL

## 2023-12-29 VITALS
HEART RATE: 67 BPM | DIASTOLIC BLOOD PRESSURE: 92 MMHG | OXYGEN SATURATION: 97 % | TEMPERATURE: 97.2 F | SYSTOLIC BLOOD PRESSURE: 139 MMHG | RESPIRATION RATE: 15 BRPM

## 2023-12-29 DIAGNOSIS — G90.522 COMPLEX REGIONAL PAIN SYNDROME TYPE 1 OF LEFT LOWER EXTREMITY: Primary | ICD-10-CM

## 2023-12-29 PROCEDURE — 2500000005 HC RX 250 GENERAL PHARMACY W/O HCPCS: Performed by: NURSE PRACTITIONER

## 2023-12-29 PROCEDURE — 96368 THER/DIAG CONCURRENT INF: CPT | Mod: INF

## 2023-12-29 PROCEDURE — 96365 THER/PROPH/DIAG IV INF INIT: CPT | Mod: INF

## 2023-12-29 PROCEDURE — 2500000004 HC RX 250 GENERAL PHARMACY W/ HCPCS (ALT 636 FOR OP/ED): Performed by: NURSE PRACTITIONER

## 2023-12-29 RX ORDER — ONDANSETRON HYDROCHLORIDE 2 MG/ML
4 INJECTION, SOLUTION INTRAVENOUS ONCE
Status: CANCELLED | OUTPATIENT
Start: 2024-01-28 | End: 2024-01-28

## 2023-12-29 RX ORDER — PROMETHAZINE HYDROCHLORIDE 25 MG/ML
12.5 INJECTION, SOLUTION INTRAMUSCULAR; INTRAVENOUS ONCE
Status: CANCELLED | OUTPATIENT
Start: 2024-01-28 | End: 2024-01-28

## 2023-12-29 RX ORDER — ALBUTEROL SULFATE 0.83 MG/ML
3 SOLUTION RESPIRATORY (INHALATION) AS NEEDED
Status: CANCELLED | OUTPATIENT
Start: 2024-01-28

## 2023-12-29 RX ORDER — EPINEPHRINE 0.3 MG/.3ML
0.3 INJECTION SUBCUTANEOUS EVERY 5 MIN PRN
Status: CANCELLED | OUTPATIENT
Start: 2024-01-28

## 2023-12-29 RX ORDER — METOPROLOL TARTRATE 25 MG/1
25 TABLET, FILM COATED ORAL ONCE
Status: CANCELLED | OUTPATIENT
Start: 2024-01-28 | End: 2024-01-28

## 2023-12-29 RX ORDER — NITROGLYCERIN 0.4 MG/1
0.4 TABLET SUBLINGUAL ONCE
Status: CANCELLED | OUTPATIENT
Start: 2024-01-28 | End: 2024-01-28

## 2023-12-29 RX ORDER — FAMOTIDINE 10 MG/ML
20 INJECTION INTRAVENOUS ONCE AS NEEDED
Status: CANCELLED | OUTPATIENT
Start: 2024-01-28

## 2023-12-29 RX ORDER — DIPHENHYDRAMINE HYDROCHLORIDE 50 MG/ML
50 INJECTION INTRAMUSCULAR; INTRAVENOUS AS NEEDED
Status: CANCELLED | OUTPATIENT
Start: 2024-01-28

## 2023-12-29 RX ORDER — METOCLOPRAMIDE HYDROCHLORIDE 5 MG/ML
10 INJECTION INTRAMUSCULAR; INTRAVENOUS ONCE
Status: CANCELLED | OUTPATIENT
Start: 2024-01-28 | End: 2024-01-28

## 2023-12-29 RX ORDER — DIPHENHYDRAMINE HYDROCHLORIDE 50 MG/ML
25 INJECTION INTRAMUSCULAR; INTRAVENOUS ONCE
Status: CANCELLED | OUTPATIENT
Start: 2024-01-28 | End: 2024-01-28

## 2023-12-29 RX ADMIN — PROPOFOL 100 MG: 10 INJECTION, EMULSION INTRAVENOUS at 11:10

## 2023-12-29 RX ADMIN — Medication: at 11:10

## 2023-12-29 ASSESSMENT — PAIN SCALES - GENERAL
PAINLEVEL_OUTOF10: 8
PAINLEVEL_OUTOF10: 4
PAINLEVEL: 5
PAINLEVEL_OUTOF10: 5 - MODERATE PAIN

## 2023-12-29 ASSESSMENT — ENCOUNTER SYMPTOMS
LOSS OF SENSATION IN FEET: 1
DEPRESSION: 0
OCCASIONAL FEELINGS OF UNSTEADINESS: 1

## 2023-12-29 ASSESSMENT — PAIN - FUNCTIONAL ASSESSMENT: PAIN_FUNCTIONAL_ASSESSMENT: 0-10

## 2023-12-29 NOTE — PROGRESS NOTES
Pt is here for his 15th pain infusion.  Pt currently has 5/10 pain in his neck, mike ankles.  Pt reports 80% relief after previous infusion that lasted 2 weeks.  Pt's  is his partner Jacquie.  Pt had water this am.

## 2024-01-05 ENCOUNTER — APPOINTMENT (OUTPATIENT)
Dept: BEHAVIORAL HEALTH | Facility: CLINIC | Age: 54
End: 2024-01-05
Payer: COMMERCIAL

## 2024-01-09 ENCOUNTER — APPOINTMENT (OUTPATIENT)
Dept: BEHAVIORAL HEALTH | Facility: HOSPITAL | Age: 54
End: 2024-01-09
Payer: COMMERCIAL

## 2024-01-15 ENCOUNTER — APPOINTMENT (OUTPATIENT)
Dept: ORTHOPEDIC SURGERY | Facility: CLINIC | Age: 54
End: 2024-01-15
Payer: COMMERCIAL

## 2024-01-15 NOTE — TELEPHONE ENCOUNTER
-- DO NOT REPLY / DO NOT REPLY ALL --  -- Message is from [facility] [employer] ([facility]) --    ONLY TO BE USED WITHIN A REFILL MEDICATION ENCOUNTER    Med Refill  Is the patient currently having any symptoms?: No/Non-Emergent symptoms    Name of medication requested: See pended med    Is this the first request for the medication in the last 48 hours?: Yes      Patient is requesting a medication refill - medication is on active list    Full name of the provider who ordered the medication: outside provider     Clinic site name / Account # for provider: n/a     Preferred Pharmacy: Pharmacy  [id number] [address]    Patient confirmed the above pharmacy as correct?  Yes    Caller Information         Type Contact Phone/Fax    01/15/2024 10:14 AM CST Phone (Incoming) Saurabh Salinas (Self) [phone] (M)            Alternative phone number: no     Can a detailed message be left?: Yes         Refill will be sent ot pharmacy

## 2024-01-16 ENCOUNTER — APPOINTMENT (OUTPATIENT)
Dept: ORTHOPEDIC SURGERY | Facility: CLINIC | Age: 54
End: 2024-01-16
Payer: COMMERCIAL

## 2024-01-19 ENCOUNTER — APPOINTMENT (OUTPATIENT)
Dept: ORTHOPEDIC SURGERY | Facility: CLINIC | Age: 54
End: 2024-01-19
Payer: COMMERCIAL

## 2024-01-22 ENCOUNTER — APPOINTMENT (OUTPATIENT)
Dept: NEUROLOGY | Facility: HOSPITAL | Age: 54
End: 2024-01-22
Payer: COMMERCIAL

## 2024-01-23 ENCOUNTER — TELEMEDICINE (OUTPATIENT)
Dept: BEHAVIORAL HEALTH | Facility: HOSPITAL | Age: 54
End: 2024-01-23
Payer: COMMERCIAL

## 2024-01-23 DIAGNOSIS — Z91.49 HISTORY OF PSYCHOLOGICAL TRAUMA: ICD-10-CM

## 2024-01-23 DIAGNOSIS — F41.9 ANXIETY: ICD-10-CM

## 2024-01-23 PROCEDURE — 90834 PSYTX W PT 45 MINUTES: CPT | Mod: GT

## 2024-01-23 NOTE — PROGRESS NOTES
Start time:  10:06  End time:  10:54     An interactive audio and video telecommunication system which permits real time communications between the patient (at the originating site) and provider (at the distant site) was utilized to provide this telehealth service. Verbal consent has been obtained from this patient for a telehealth visit.     The patient was informed of the current need to conduct treatment via virtual platform in light of COVID-19 pandemic. I have confirmed the patient’s identity via the following (minimum of three) acceptable identifiers as per  Policy PH-9: , address, phone number, and email address.     Patient current location: [address] [place] [address]. [place] [address]     SUBJECTIVE:  Patient had muscle spasms in his stomach over the weekend after pushing himself with house tasks. Went to ER over the weekend, CT scan looks normal. Feeling like he wants to move to [place] recently - warming up to the idea initially proposed by partner. Opted to proceed with hypnosis today. Patient noted that though he fell asleep prior to suggestions for pain reduction that he found the induction significantly relaxing for his body and mind - notes effect is similar to ketamine sessions. We will proceed with additional sessions of hypnosis for pain reduction with modification of body position to prevent somnolence.      Progress: Good  Prognosis: Good     Duration of problem: years with recent improvement     Severity: moderate     OBJECTIVE:  Orientation & Cognition: Oriented x3. Thought processes normal and appropriate to situation.  Mood, Affect: Anxious  Appearance: Optimal by patient standards.  Harm to self or others: Not reported.   Substance abuse: Not reported  Psychiatric medication use: Not reported     IMPRESSION:     F41.9 Unspecified anxiety disorder  Z.91.49 History of psychological trauma     Goals for Therapy: Sharing difficult feelings in a therapeutic environment, expanding upon current  social supports in his life, and addressing anxiety. Patient is also interested in medication management for his feelings of traumatization which he is worried will return after he ends ketamine treatment.      PLAN:     We discussed different options in the meantime to help with his high pain level and he is open to doing hypnosis with provider to address pain intensity and difficult emotions associated with pain     Next apt: 2/9     --------------------------------------------  Other(s) present in the room: None.  --------------------------------------------  Time spent face-to-face with patient:  48 minutes

## 2024-01-24 ENCOUNTER — TELEPHONE (OUTPATIENT)
Dept: PRIMARY CARE | Facility: CLINIC | Age: 54
End: 2024-01-24
Payer: COMMERCIAL

## 2024-01-24 NOTE — TELEPHONE ENCOUNTER
After multiple attempts at contact with no returned communication to the lung nodule clinic a discharge is placed and sent via certified letter.  A discharge will discontinue communication efforts, but the patient can continue care when ready.

## 2024-01-29 ENCOUNTER — APPOINTMENT (OUTPATIENT)
Dept: ORTHOPEDIC SURGERY | Facility: CLINIC | Age: 54
End: 2024-01-29
Payer: COMMERCIAL

## 2024-01-30 ENCOUNTER — APPOINTMENT (OUTPATIENT)
Dept: PAIN MEDICINE | Facility: CLINIC | Age: 54
End: 2024-01-30
Payer: COMMERCIAL

## 2024-01-30 NOTE — PROGRESS NOTES
Still have significant abdominal pain after POP Procedure or ED visits in addition he had his left GILBERT on 10/5/2023 and still on small amount of Percocet in addition to Valium from [facility] doctors.  Patient still gets IV infusion therapy last 1 is 1/15/2024    SUBJECTIVE:  This is 53 y.o.  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 Abel with medial branch block and radiofrequency.  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 Abel with medial branch block and radiofrequency Who is here for follow-up to discuss having IV infusion therapy prior to his hip replacement since she had such a good result with the IV infusion therapy and his pain. I gave an order today to give him IV infusion a day or 2 before his hip replacement. Also I ordered some ketamine/lidocaine nasal spray so he can use it in the perioperative.. The patient stated that he is going to change his pain management physician to Dr. Jorgensen since he is happy with the treatment at [facility] but so far the 3 injections that he received from [facility] did not do much for his pain in his CRPS.             Last procedure:   IV infusion therapy patient has had ***% improvement in pain and function  10/17/2022 bilateral  C6-7 interlaminar DEVONTE the patient has had a 0% improvement in pain and function   IV infusion therapy the patient has had a 40-80% improvement in pain for his neck but not for his left leg CRPS symptoms     Portions of record reviewed for pertinent issues: active problem list, medication list, allergies, family history, social history, notes from last encounter, encounters, lab results, imaging and other system records.        I have personally reviewed the OARRS report for this patient. This report is scanned into the electronic medical record. I have considered the risks of abuse, dependence, addiction and diversion.  Pregabalin 300 mg twice daily from Isaiah Macias, diazepam 5 mg twice daily Jewel Mendoza and oxycodone 5 mg Fritz Bacon small amount ketamine/lidocaine nasal spray from    OPIOID RISK ASSESSMENT SCORE 0/26           Employment/pending law suits: [facility] [employer] case after fall at work in 2008, used to be a paramedic in [place]  Social History: Lives with his fiancée for many years with 12 cats denies smoking drinking or use of illicit drugs              Diagnostic studies:  March 7, 2023 Left foot x-ray   Stable surgical changes in the foot as described. Interval surgery  involving the distal end of the 5th metatarsal as well, and interval  surgery of the posterior calcaneus.     No acute fracture or dislocation.     6/18/2022 cervical, thoracic and lumbar spine MRI showed minimal degenerative changes no canal or foraminal stenosis no major findings no bone marrow edema or endplate changes:  TECHNIQUE:  Sagittal T1, T2, axial T1 and axial gradient echo T2 weighted images  were acquired through the cervical spine. Sagittal and transaxial T1  and T2 weighted images of the thoracic spine were acquired along with  sagittal STIR imaging. Sagittal and axial T1 and T2 weighted images  were also acquired through the lumbar spine.     FINDINGS:  Cervical spine:     ALIGNMENT: The vertebral alignment is within  normal limits. There is  mild straightening of cervical lordosis.     VERTEBRAE/INTERVERTEBRAL DISCS: The vertebral bodies demonstrate  expected height.The marrow signal is within normal limits. The  intervertebral discs demonstrate expected signal and morphology.     CORD: Normal in caliber and signal.     C1-2: The cervicomedullary junction appears unremarkable. There is no  central canal stenosis.     C2-3: Moderate right foraminal narrowing. There is no significant  central canal or left neural foraminal stenosis.     C3-4: Moderate right and mild left foraminal narrowing. There is no  significant central canal stenosis.     C4-5: Moderate to severe right and mild left foraminal narrowing.  There is no significant central canal stenosis.     C5-6: Mild left foraminal narrowing. There is no significant central  canal or right neural foraminal stenosis.     C6-7: Mild to moderate right foraminal stenosis. There is no  significant central canal or left neural foraminal stenosis.     C7-T1: There is no posterior disc contour abnormality. There is no  significant central canal or neural foraminal stenosis.     The upper thoracic vertebrae and spinal canal are unremarkable.     The prevertebral and posterior paraspinous soft tissues are within  normal limits.     Thoracic spine:     ALIGNMENT: The vertebral alignment is within normal limits.     VERTEBRAE/INTERVERTEBRAL DISCS: The vertebral bodies demonstrate  expected height.The marrow signal is within normal limits. The  intervertebral discs demonstrate expected signal and morphology.     CORD: Normal in caliber and signal.     CENTRAL CANAL: There is no evidence of significant central canal  stenosis.     The prevertebral and posterior paraspinous soft tissues are within  normal limits.     Lumbar spine:     ALIGNMENT: Mild straightening.     VERTEBRAE/INTERVERTEBRAL DISCS: The vertebral bodies demonstrate  expected height.There are patchy discogenic marrow changes  involving  L5 S1 vertebral bodies with patchy marrow edema. There is diminished  height and T2 signal within L5-S1 intervertebral disc with mild  endplate irregularity to suggest degenerative changes.     CORD: The lower thoracic cord appears unremarkable. The conus  terminates appropriately at L1-L2.     At T12-L1, there is no central canal stenosis or neural foraminal  narrowing.     At L1-L2, there is no central canal stenosis or neural foraminal  narrowing.     At L2-L3, there is no central canal stenosis or neural foraminal  narrowing.     At L3-L4, there is no central canal stenosis or neural foraminal  narrowing.     At L4-L5, there is a small disc bulge. No central canal stenosis or  neural foramina narrowing.     At L5-S1, there is a small disc bulge. No central canal stenosis or  neural foramina narrowing.     The prevertebral and posterior paraspinous soft tissues are within  normal limits.     3.6 cm right exophytic renal cyst.     IMPRESSION:  Multilevel degenerative changes. No significant canal stenosis of the  cervical, thoracic or lumbar spine.     Cervical foraminal narrowing as detailed.     Lower lumbar small disc bulges.     11/17/2022 thoracic spine MRI  Cervical spine:     ALIGNMENT: The vertebral alignment is within normal limits. There is  mild straightening of cervical lordosis.     VERTEBRAE/INTERVERTEBRAL DISCS: The vertebral bodies demonstrate  expected height.The marrow signal is within normal limits. The  intervertebral discs demonstrate expected signal and morphology.     CORD: Normal in caliber and signal.     C1-2: The cervicomedullary junction appears unremarkable. There is no  central canal stenosis.     C2-3: Moderate right foraminal narrowing. There is no significant  central canal or left neural foraminal stenosis.     C3-4: Moderate right and mild left foraminal narrowing. There is no  significant central canal stenosis.     C4-5: Moderate to severe right and mild left  foraminal narrowing.  There is no significant central canal stenosis.     C5-6: Mild left foraminal narrowing. There is no significant central  canal or right neural foraminal stenosis.     C6-7: Mild to moderate right foraminal stenosis. There is no  significant central canal or left neural foraminal stenosis.     C7-T1: There is no posterior disc contour abnormality. There is no  significant central canal or neural foraminal stenosis.     The upper thoracic vertebrae and spinal canal are unremarkable.     The prevertebral and posterior paraspinous soft tissues are within  normal limits.     Thoracic spine:     ALIGNMENT: The vertebral alignment is within normal limits.     VERTEBRAE/INTERVERTEBRAL DISCS: The vertebral bodies demonstrate  expected height.The marrow signal is within normal limits. The  intervertebral discs demonstrate expected signal and morphology.     CORD: Normal in caliber and signal.     CENTRAL CANAL: There is no evidence of significant central canal  stenosis.     The prevertebral and posterior paraspinous soft tissues are within  normal limits.     Lumbar spine:     ALIGNMENT: Mild straightening.     VERTEBRAE/INTERVERTEBRAL DISCS: The vertebral bodies demonstrate  expected height.There are patchy discogenic marrow changes involving  L5 S1 vertebral bodies with patchy marrow edema. There is diminished  height and T2 signal within L5-S1 intervertebral disc with mild  endplate irregularity to suggest degenerative changes.     CORD: The lower thoracic cord appears unremarkable. The conus  terminates appropriately at L1-L2.     At T12-L1, there is no central canal stenosis or neural foraminal  narrowing.     At L1-L2, there is no central canal stenosis or neural foraminal  narrowing.     At L2-L3, there is no central canal stenosis or neural foraminal  narrowing.     At L3-L4, there is no central canal stenosis or neural foraminal  narrowing.     At L4-L5, there is a small disc bulge. No central  canal stenosis or  neural foramina narrowing.     At L5-S1, there is a small disc bulge. No central canal stenosis or  neural foramina narrowing.     The prevertebral and posterior paraspinous soft tissues are within  normal limits.     3.6 cm right exophytic renal cyst.     IMPRESSION:  Multilevel degenerative changes. No significant canal stenosis of the  cervical, thoracic or lumbar spine.     Cervical foraminal narrowing as detailed.     Lower lumbar small disc bulges.        2/26/2020 EMG of the left lower extremity WNL           Procedures:  *** the patient has had a ***% improvement in pain and function      Portions of record reviewed for pertinent issues: active problem list, medication list, allergies, family history, social history, notes from last encounter, encounters, lab results, imaging and other available records.      I have personally reviewed the OARRS report for this patient. This report is scanned into the electronic medical record. I have considered the risks of abuse, dependence, addiction and diversion. It showed: ***  OPIOID RISK ASSESSMENT SCORE ***/26  Opioid agreement: ***  Activities of daily living: ***  Adverse effects: ***  Analgesia: W/O: ***/10, W ***/10    Toxicology screen: ***  Aberrant behavior: ***        Review of Systems     Physical Exam                 Plan  At least 50% of the visit was involved in the discussion of the options for treatment. We discussed exercises, medication, interventional therapies and surgery. Healthy life style is essential with patient hard work to achieve the wellness. In addition; discussion with the patient and/or family about any of the diagnostic results, impressions and/or recommended diagnostic studies, prognosis, risks and benefits of treatment options, instructions for treatment and/or follow-up, importance of compliance with chosen treatment options, risk-factor reduction, and patient/family education.         Pool therapy, walking in the  pool, at least 3x per week for 30 minutes  Continue self-directed physical therapy  *** Smoking cessation  Healthy lifestyle and anti-inflammatory diet in addition to weight control discussed with the patient  Alternative chronic pain therapies was discussed, encouraged and information was handed  Return to Clinic ***     *Please note this report has been produced using speech recognition software and may contain errors related to that system including grammar, punctuation and spelling as well as words and phrases that may be inappropriate. If there are questions or concerns, please feel free to contact me to clarify.    Olivia Alonzo MD

## 2024-02-01 ENCOUNTER — OFFICE VISIT (OUTPATIENT)
Dept: PAIN MEDICINE | Facility: CLINIC | Age: 54
End: 2024-02-01
Payer: COMMERCIAL

## 2024-02-01 VITALS
HEIGHT: 71 IN | HEART RATE: 71 BPM | WEIGHT: 178 LBS | BODY MASS INDEX: 24.92 KG/M2 | RESPIRATION RATE: 16 BRPM | SYSTOLIC BLOOD PRESSURE: 127 MMHG | TEMPERATURE: 97.5 F | DIASTOLIC BLOOD PRESSURE: 81 MMHG

## 2024-02-01 DIAGNOSIS — G90.522 COMPLEX REGIONAL PAIN SYNDROME TYPE 1 OF LEFT LOWER EXTREMITY: Primary | ICD-10-CM

## 2024-02-01 PROCEDURE — 1036F TOBACCO NON-USER: CPT | Performed by: ANESTHESIOLOGY

## 2024-02-01 PROCEDURE — 99214 OFFICE O/P EST MOD 30 MIN: CPT | Performed by: ANESTHESIOLOGY

## 2024-02-01 RX ORDER — ONDANSETRON HYDROCHLORIDE 2 MG/ML
4 INJECTION, SOLUTION INTRAVENOUS ONCE
Status: CANCELLED | OUTPATIENT
Start: 2024-02-01 | End: 2024-02-01

## 2024-02-01 RX ORDER — BACLOFEN 20 MG/1
20 TABLET ORAL 3 TIMES DAILY
Qty: 90 TABLET | Refills: 11 | Status: SHIPPED | OUTPATIENT
Start: 2024-02-01 | End: 2025-01-26

## 2024-02-01 RX ORDER — DIPHENHYDRAMINE HYDROCHLORIDE 50 MG/ML
25 INJECTION INTRAMUSCULAR; INTRAVENOUS ONCE
Status: CANCELLED | OUTPATIENT
Start: 2024-02-01 | End: 2024-02-01

## 2024-02-01 RX ORDER — METOPROLOL TARTRATE 25 MG/1
25 TABLET, FILM COATED ORAL ONCE
Status: CANCELLED | OUTPATIENT
Start: 2024-02-01 | End: 2024-02-01

## 2024-02-01 RX ORDER — EPINEPHRINE 0.3 MG/.3ML
0.3 INJECTION SUBCUTANEOUS EVERY 5 MIN PRN
Status: CANCELLED | OUTPATIENT
Start: 2024-02-01

## 2024-02-01 RX ORDER — KETOROLAC TROMETHAMINE 30 MG/ML
30 INJECTION, SOLUTION INTRAMUSCULAR; INTRAVENOUS ONCE
Status: CANCELLED | OUTPATIENT
Start: 2024-02-01 | End: 2024-02-01

## 2024-02-01 RX ORDER — PROMETHAZINE HYDROCHLORIDE 25 MG/ML
12.5 INJECTION, SOLUTION INTRAMUSCULAR; INTRAVENOUS ONCE
Status: CANCELLED | OUTPATIENT
Start: 2024-02-01 | End: 2024-02-01

## 2024-02-01 RX ORDER — NITROGLYCERIN 0.4 MG/1
0.4 TABLET SUBLINGUAL ONCE
Status: CANCELLED | OUTPATIENT
Start: 2024-02-01 | End: 2024-02-01

## 2024-02-01 RX ORDER — DIPHENHYDRAMINE HYDROCHLORIDE 50 MG/ML
50 INJECTION INTRAMUSCULAR; INTRAVENOUS AS NEEDED
Status: CANCELLED | OUTPATIENT
Start: 2024-02-01

## 2024-02-01 RX ORDER — FAMOTIDINE 10 MG/ML
20 INJECTION INTRAVENOUS ONCE AS NEEDED
Status: CANCELLED | OUTPATIENT
Start: 2024-02-01

## 2024-02-01 RX ORDER — ALBUTEROL SULFATE 0.83 MG/ML
3 SOLUTION RESPIRATORY (INHALATION) AS NEEDED
Status: CANCELLED | OUTPATIENT
Start: 2024-02-01

## 2024-02-01 RX ORDER — METOCLOPRAMIDE HYDROCHLORIDE 5 MG/ML
10 INJECTION INTRAMUSCULAR; INTRAVENOUS ONCE
Status: CANCELLED | OUTPATIENT
Start: 2024-02-01 | End: 2024-02-01

## 2024-02-01 RX ORDER — KETAMINE HCL 100 %
POWDER (GRAM) MISCELLANEOUS
Qty: 1 G | Refills: 5 | Status: SHIPPED | OUTPATIENT
Start: 2024-02-01 | End: 2024-06-07 | Stop reason: SDUPTHER

## 2024-02-01 SDOH — ECONOMIC STABILITY: FOOD INSECURITY: WITHIN THE PAST 12 MONTHS, YOU WORRIED THAT YOUR FOOD WOULD RUN OUT BEFORE YOU GOT MONEY TO BUY MORE.: NEVER TRUE

## 2024-02-01 SDOH — ECONOMIC STABILITY: FOOD INSECURITY: WITHIN THE PAST 12 MONTHS, THE FOOD YOU BOUGHT JUST DIDN'T LAST AND YOU DIDN'T HAVE MONEY TO GET MORE.: NEVER TRUE

## 2024-02-01 ASSESSMENT — LIFESTYLE VARIABLES
SKIP TO QUESTIONS 9-10: 1
AUDIT TOTAL SCORE: 0
HOW MANY STANDARD DRINKS CONTAINING ALCOHOL DO YOU HAVE ON A TYPICAL DAY: PATIENT DOES NOT DRINK
HOW OFTEN DURING THE LAST YEAR HAVE YOU HAD A FEELING OF GUILT OR REMORSE AFTER DRINKING: NEVER
HOW OFTEN DO YOU HAVE A DRINK CONTAINING ALCOHOL: NEVER
HAS A RELATIVE, FRIEND, DOCTOR, OR ANOTHER HEALTH PROFESSIONAL EXPRESSED CONCERN ABOUT YOUR DRINKING OR SUGGESTED YOU CUT DOWN: NO
HOW OFTEN DO YOU HAVE SIX OR MORE DRINKS ON ONE OCCASION: NEVER
TOTAL SCORE: 4
HOW OFTEN DURING THE LAST YEAR HAVE YOU NEEDED AN ALCOHOLIC DRINK FIRST THING IN THE MORNING TO GET YOURSELF GOING AFTER A NIGHT OF HEAVY DRINKING: NEVER
HOW OFTEN DURING THE LAST YEAR HAVE YOU BEEN UNABLE TO REMEMBER WHAT HAPPENED THE NIGHT BEFORE BECAUSE YOU HAD BEEN DRINKING: NEVER
AUDIT-C TOTAL SCORE: 0
HOW OFTEN DURING THE LAST YEAR HAVE YOU FOUND THAT YOU WERE NOT ABLE TO STOP DRINKING ONCE YOU HAD STARTED: NEVER
HOW OFTEN DURING THE LAST YEAR HAVE YOU FAILED TO DO WHAT WAS NORMALLY EXPECTED FROM YOU BECAUSE OF DRINKING: NEVER
HAVE YOU OR SOMEONE ELSE BEEN INJURED AS A RESULT OF YOUR DRINKING: NO

## 2024-02-01 ASSESSMENT — PATIENT HEALTH QUESTIONNAIRE - PHQ9
SUM OF ALL RESPONSES TO PHQ9 QUESTIONS 1 AND 2: 0
1. LITTLE INTEREST OR PLEASURE IN DOING THINGS: NOT AT ALL
2. FEELING DOWN, DEPRESSED OR HOPELESS: NOT AT ALL

## 2024-02-01 ASSESSMENT — ENCOUNTER SYMPTOMS
DEPRESSION: 0
OCCASIONAL FEELINGS OF UNSTEADINESS: 0
LOSS OF SENSATION IN FEET: 0

## 2024-02-01 ASSESSMENT — PAIN DESCRIPTION - DESCRIPTORS: DESCRIPTORS: ACHING;SHARP;STABBING;SHOOTING

## 2024-02-01 ASSESSMENT — PAIN SCALES - GENERAL
PAINLEVEL: 8
PAINLEVEL_OUTOF10: 8

## 2024-02-01 ASSESSMENT — PAIN - FUNCTIONAL ASSESSMENT: PAIN_FUNCTIONAL_ASSESSMENT: 0-10

## 2024-02-01 NOTE — PROGRESS NOTES
This is 53 y.o.  male with who has been treated for Lower back pain and Cervical pain . Pain is unchanged, The pain is described as achiness, sharp, and stabbing and is relieved by Medications nasal ketamine   with who is here for follow-up   Chief Complaint   Patient presents with    Follow-up    Med Management    Med Refill       Pain Therapies:  iv infusions gives him 80 % for two weeks .     Opioid Risk Assessment Score 4/26 anxiety

## 2024-02-01 NOTE — PROGRESS NOTES
SUBJECTIVE:  This is 53 y.o.  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  Who is here for follow-up requesting to continue IV infusion therapy with us or every 2 weeks since he tried the [facility] 5-day ketamine program which helped him for 4 weeks but the pain is back again.  I do not see any problem to restart him on the IV infusion therapy since it has helped him significantly in the past.      Prior office visit:  8/17/2023: This is 53 year year old male recall past medical history of CRPS type one of the left lower extremity, left hip arthritis which is going for surgery soon, neck pain with spondylosis that has been treated with Dr. Abel with medial branch block and radiofrequency Who is here for follow-up to discuss having IV infusion therapy prior to his hip replacement since she had such a good result with the IV infusion therapy and his pain. I gave an order today to give him IV infusion a day or 2 before his hip replacement. Also I ordered some ketamine/lidocaine nasal spray so he can use it in the perioperative.. The patient stated that he is going to change his pain management physician to Dr. Jorgensen since he is happy with the treatment at [facility] but so far the 3 injections that he received from [facility] did not do much for his pain in his CRPS.        Last procedure:   10/17/2022 bilateral C6-7 interlaminar DEVONTE the patient has had a 0% improvement in pain and function   IV infusion therapy the patient has had a 40-80% improvement in pain for his neck but not for his left leg CRPS symptoms     Portions of record reviewed for pertinent issues: active problem list, medication list, allergies, family history, social history, notes from last encounter, encounters, lab results, imaging and other system records.        I have personally  reviewed the [facility] report for this patient. This report is scanned into the electronic medical record. I have considered the risks of abuse, dependence, addiction and diversion. Pregabalin 300 mg twice daily from office, in addition to Valium 5 mg twice daily and few Percocet 5 mg from different providers  OPIOID RISK ASSESSMENT SCORE 0/26           Pending law suits: [facility] case after fall at work in 2008, used to be a paramedic in [place]  Social History: Lives with his Asael for many years with 12 cats denies smoking drinking or use of illicit drugs              Diagnostic studies:  Left foot x-ray March 7, 2023  Stable surgical changes in the foot as described. Interval surgery  involving the distal end of the 5th metatarsal as well, and interval  surgery of the posterior calcaneus.     No acute fracture or dislocation.     Erosive changes involving the distal end of the 5th metatarsal head.  These could be related to the recent surgery, or these could be from  erosive arthritis such as infectious arthritis or rheumatoid variant.  Please correlate with surgical history.     X-ray right foot February 17, 2023  No acute fracture or dislocation. No significant soft tissue  swelling. Screw in the 4th metatarsal head.There are 2 retrograde  screws in the calcaneus with healed osteotomy     IMPRESSION:  No acute osseous abnormality.     Right ankle x-ray February 17, 2023  IMPRESSION:  No acute fracture or dislocation in the right ankle.     2 partially threaded screws seen in the patient's known calcaneal  fracture. Alignment of the fracture fragments unchanged.     A screw noted in the 4th metatarsal head and neck.     Mild diffuse osteopenia.     Calcaneal plantar spur.        11/17/2022 cervical spine MRI  TECHNIQUE:  Sagittal T1, T2, axial T1 and axial gradient echo T2 weighted images  were acquired through the cervical spine. Sagittal and transaxial T1  and T2 weighted images of the thoracic spine were acquired  along with  sagittal STIR imaging. Sagittal and axial T1 and T2 weighted images  were also acquired through the lumbar spine.     FINDINGS:  Cervical spine:     ALIGNMENT: The vertebral alignment is within normal limits. There is  mild straightening of cervical lordosis.     VERTEBRAE/INTERVERTEBRAL DISCS: The vertebral bodies demonstrate  expected height.The marrow signal is within normal limits. The  intervertebral discs demonstrate expected signal and morphology.     CORD: Normal in caliber and signal.     C1-2: The cervicomedullary junction appears unremarkable. There is no  central canal stenosis.     C2-3: Moderate right foraminal narrowing. There is no significant  central canal or left neural foraminal stenosis.     C3-4: Moderate right and mild left foraminal narrowing. There is no  significant central canal stenosis.     C4-5: Moderate to severe right and mild left foraminal narrowing.  There is no significant central canal stenosis.     C5-6: Mild left foraminal narrowing. There is no significant central  canal or right neural foraminal stenosis.     C6-7: Mild to moderate right foraminal stenosis. There is no  significant central canal or left neural foraminal stenosis.     C7-T1: There is no posterior disc contour abnormality. There is no  significant central canal or neural foraminal stenosis.     The upper thoracic vertebrae and spinal canal are unremarkable.     The prevertebral and posterior paraspinous soft tissues are within  normal limits.     Thoracic spine:     ALIGNMENT: The vertebral alignment is within normal limits.     VERTEBRAE/INTERVERTEBRAL DISCS: The vertebral bodies demonstrate  expected height.The marrow signal is within normal limits. The  intervertebral discs demonstrate expected signal and morphology.     CORD: Normal in caliber and signal.     CENTRAL CANAL: There is no evidence of significant central canal  stenosis.     The prevertebral and posterior paraspinous soft tissues  are within  normal limits.     Lumbar spine:     ALIGNMENT: Mild straightening.     VERTEBRAE/INTERVERTEBRAL DISCS: The vertebral bodies demonstrate  expected height.There are patchy discogenic marrow changes involving  L5 S1 vertebral bodies with patchy marrow edema. There is diminished  height and T2 signal within L5-S1 intervertebral disc with mild  endplate irregularity to suggest degenerative changes.     CORD: The lower thoracic cord appears unremarkable. The conus  terminates appropriately at L1-L2.     At T12-L1, there is no central canal stenosis or neural foraminal  narrowing.     At L1-L2, there is no central canal stenosis or neural foraminal  narrowing.     At L2-L3, there is no central canal stenosis or neural foraminal  narrowing.     At L3-L4, there is no central canal stenosis or neural foraminal  narrowing.     At L4-L5, there is a small disc bulge. No central canal stenosis or  neural foramina narrowing.     At L5-S1, there is a small disc bulge. No central canal stenosis or  neural foramina narrowing.     The prevertebral and posterior paraspinous soft tissues are within  normal limits.     3.6 cm right exophytic renal cyst.     IMPRESSION:  Multilevel degenerative changes. No significant canal stenosis of the  cervical, thoracic or lumbar spine.     Cervical foraminal narrowing as detailed.     Lower lumbar small disc bulges.     11/17/2022 thoracic spine MRI  Cervical spine:     ALIGNMENT: The vertebral alignment is within normal limits. There is  mild straightening of cervical lordosis.     VERTEBRAE/INTERVERTEBRAL DISCS: The vertebral bodies demonstrate  expected height.The marrow signal is within normal limits. The  intervertebral discs demonstrate expected signal and morphology.     CORD: Normal in caliber and signal.     C1-2: The cervicomedullary junction appears unremarkable. There is no  central canal stenosis.     C2-3: Moderate right foraminal narrowing. There is no  significant  central canal or left neural foraminal stenosis.     C3-4: Moderate right and mild left foraminal narrowing. There is no  significant central canal stenosis.     C4-5: Moderate to severe right and mild left foraminal narrowing.  There is no significant central canal stenosis.     C5-6: Mild left foraminal narrowing. There is no significant central  canal or right neural foraminal stenosis.     C6-7: Mild to moderate right foraminal stenosis. There is no  significant central canal or left neural foraminal stenosis.     C7-T1: There is no posterior disc contour abnormality. There is no  significant central canal or neural foraminal stenosis.     The upper thoracic vertebrae and spinal canal are unremarkable.     The prevertebral and posterior paraspinous soft tissues are within  normal limits.     Thoracic spine:     ALIGNMENT: The vertebral alignment is within normal limits.     VERTEBRAE/INTERVERTEBRAL DISCS: The vertebral bodies demonstrate  expected height.The marrow signal is within normal limits. The  intervertebral discs demonstrate expected signal and morphology.     CORD: Normal in caliber and signal.     CENTRAL CANAL: There is no evidence of significant central canal  stenosis.     The prevertebral and posterior paraspinous soft tissues are within  normal limits.     Lumbar spine:     ALIGNMENT: Mild straightening.     VERTEBRAE/INTERVERTEBRAL DISCS: The vertebral bodies demonstrate  expected height.There are patchy discogenic marrow changes involving  L5 S1 vertebral bodies with patchy marrow edema. There is diminished  height and T2 signal within L5-S1 intervertebral disc with mild  endplate irregularity to suggest degenerative changes.     CORD: The lower thoracic cord appears unremarkable. The conus  terminates appropriately at L1-L2.     At T12-L1, there is no central canal stenosis or neural foraminal  narrowing.     At L1-L2, there is no central canal stenosis or neural  foraminal  narrowing.     At L2-L3, there is no central canal stenosis or neural foraminal  narrowing.     At L3-L4, there is no central canal stenosis or neural foraminal  narrowing.     At L4-L5, there is a small disc bulge. No central canal stenosis or  neural foramina narrowing.     At L5-S1, there is a small disc bulge. No central canal stenosis or  neural foramina narrowing.     The prevertebral and posterior paraspinous soft tissues are within  normal limits.     3.6 cm right exophytic renal cyst.     IMPRESSION:  Multilevel degenerative changes. No significant canal stenosis of the  cervical, thoracic or lumbar spine.     Cervical foraminal narrowing as detailed.     Lower lumbar small disc bulges.        2/26/2020 EMG of the left lower extremity did not show any polyneuropathy radiculopathy or mononeuropathy        9/10/2021 MRI of the cervical spine showed some degenerative changes and some facet joint hypertrophy:  FINDINGS:  Height alignment and signal of the cervical vertebral bodies are  preserved.     C2-C3: There is no posterior disc contour abnormality. There is no  significant central canal or neural foraminal stenosis.     C3-C4: There is no posterior disc contour abnormality. There is no  significant central canal or neural foraminal stenosis.     C4-C5: Minimal disc bulge without significant central canal or  neuroforaminal stenosis. Minimal to moderate hypertrophic facet  change bilaterally.     C5-C6: Disc bulge without significant central canal neuroforaminal  stenosis.     C6-C7: There is no posterior disc contour abnormality. There is no  significant central canal or neural foraminal stenosis.     C7-T1: There is no posterior disc contour abnormality. There is no  significant central canal or neural foraminal stenosis.     The upper thoracic vertebrae and spinal canal are unremarkable.     IMPRESSION:  No significant central canal or neuroforaminal stenosis and no  significant change was  detected compared to the prior examination of  08/04/2017.        Review of Systems   HENT: Negative.     Eyes: Negative.    Respiratory: Negative.     Cardiovascular: Negative.    Gastrointestinal: Negative.    Endocrine: Negative.    Genitourinary: Negative.    Musculoskeletal:  Positive for back pain, myalgias and neck pain.   Skin: Negative.    Neurological: Negative.    Hematological: Negative.    Psychiatric/Behavioral: Negative.          Physical Exam  Vitals and nursing note reviewed.   Constitutional:       Appearance: Normal appearance.   HENT:      Head: Normocephalic and atraumatic.      Nose: Nose normal.   Eyes:      Extraocular Movements: Extraocular movements intact.      Conjunctiva/sclera: Conjunctivae normal.      Pupils: Pupils are equal, round, and reactive to light.   Cardiovascular:      Rate and Rhythm: Normal rate and regular rhythm.      Pulses: Normal pulses.      Heart sounds: Normal heart sounds.   Pulmonary:      Effort: Pulmonary effort is normal.      Breath sounds: Normal breath sounds.   Abdominal:      General: Abdomen is flat. Bowel sounds are normal.      Palpations: Abdomen is soft.   Musculoskeletal:         General: Tenderness present.   Skin:     General: Skin is warm.   Neurological:      General: No focal deficit present.      Mental Status: He is alert and oriented to person, place, and time.   Psychiatric:         Mood and Affect: Mood normal.         Behavior: Behavior normal.                      Plan  At least 50% of the visit was involved in the discussion of the options for treatment. We discussed exercises, medication, interventional therapies and surgery. Healthy life style is essential with patient hard work to achieve the wellness. In addition; discussion with the patient and/or family about any of the diagnostic results, impressions and/or recommended diagnostic studies, prognosis, risks and benefits of treatment options, instructions for treatment and/or  follow-up, importance of compliance with chosen treatment options, risk-factor reduction, and patient/family education.         Pool therapy, walking in the pool, at least 3x per week for 30 minutes  Continue self-directed physical therapy  Continue IV infusion therapy biweekly once  Healthy lifestyle and anti-inflammatory diet in addition to weight control discussed with the patient  Alternative chronic pain therapies was discussed, encouraged and information was handed  Return to Clinic 3 months     *Please note this report has been produced using speech recognition software and may contain errors related to that system including grammar, punctuation and spelling as well as words and phrases that may be inappropriate. If there are questions or concerns, please feel free to contact me to clarify.    Olivia Alonzo MD

## 2024-02-02 ENCOUNTER — APPOINTMENT (OUTPATIENT)
Dept: ORTHOPEDIC SURGERY | Facility: CLINIC | Age: 54
End: 2024-02-02
Payer: COMMERCIAL

## 2024-02-02 ASSESSMENT — ENCOUNTER SYMPTOMS
NEUROLOGICAL NEGATIVE: 1
CARDIOVASCULAR NEGATIVE: 1
RESPIRATORY NEGATIVE: 1
EYES NEGATIVE: 1
GASTROINTESTINAL NEGATIVE: 1
MYALGIAS: 1
HEMATOLOGIC/LYMPHATIC NEGATIVE: 1
NECK PAIN: 1
PSYCHIATRIC NEGATIVE: 1
ENDOCRINE NEGATIVE: 1
BACK PAIN: 1

## 2024-02-06 ENCOUNTER — APPOINTMENT (OUTPATIENT)
Dept: ORTHOPEDIC SURGERY | Facility: CLINIC | Age: 54
End: 2024-02-06
Payer: COMMERCIAL

## 2024-02-09 ENCOUNTER — TELEMEDICINE (OUTPATIENT)
Dept: BEHAVIORAL HEALTH | Facility: CLINIC | Age: 54
End: 2024-02-09
Payer: COMMERCIAL

## 2024-02-09 ENCOUNTER — OFFICE VISIT (OUTPATIENT)
Dept: ORTHOPEDIC SURGERY | Facility: CLINIC | Age: 54
End: 2024-02-09
Payer: COMMERCIAL

## 2024-02-09 DIAGNOSIS — Z96.652 STATUS POST TOTAL LEFT KNEE REPLACEMENT: Primary | ICD-10-CM

## 2024-02-09 DIAGNOSIS — F41.9 ANXIETY: ICD-10-CM

## 2024-02-09 DIAGNOSIS — Z91.49 HISTORY OF PSYCHOLOGICAL TRAUMA: ICD-10-CM

## 2024-02-09 PROCEDURE — 1036F TOBACCO NON-USER: CPT

## 2024-02-09 PROCEDURE — 90834 PSYTX W PT 45 MINUTES: CPT

## 2024-02-09 PROCEDURE — 99213 OFFICE O/P EST LOW 20 MIN: CPT | Performed by: ORTHOPAEDIC SURGERY

## 2024-02-09 NOTE — PROGRESS NOTES
History of Present Illness   Chief Complaint   Patient presents with    Left Knee - Follow-up     [facility]  1. Left knee patellofemoral arthroplasty.    DOS- 9/15/21 (2 years, 5 months)       History of Present Illness   Patient relates his left knee is doing okay.  He still has some occasional mechanical symptoms and does not catch or rub on occasion.  Does have a mild amount of crepitus.  Also recently underwent hip replacement states this done well.    Imaging  Left patellofemoral arthroplasty     Assessment:   Left patellofemoral arthroplasty doing well    Plan:  We reviewed the role of imaging, physical therapy, injections and the time frame to healing and correlation with outcome.  1.  Will call us if he was to go to formal physical therapy  2.  NSAID: Ibuprofen.  GI side effects and medical risks discussed  3.  Ice: 30 minutes on and off  4.  Exercise home program: Medically directed knee therapy / Handout given  See us back in 6 months     Physical Exam  Well-nourished, well-developed. No acute distress. Alert and oriented x3. Responds appropriately to questioning. Good mood. Normal affect.  Physical Exam  Left knee:  Skin healthy and intact  No gross swelling or ecchymosis  Trace Effusion:   ROM: 110  Crepitance with range of motion  No pain with internal rotation of the hip  Tenderness to palpation over medial and lateral joint line and with patellar compression   Moderate crepitus patellofemoral exam  Mild laxity to valgus stress/varus stress  Negative Lachman´s test  Positive Segundo´s test/Apley Grind  Neurovascular exam normal distally  Palpable pedal pulse and good cap refill     Review of Systems   GENERAL: Negative for malaise, significant weight loss, fever  MUSCULOSKELETAL: See HPI  NEURO:  Negative     Past Medical History:   Diagnosis Date    Anesthesia of skin     Numbness and tingling    Arthritis Unk    Disease of intestine, unspecified     Bowel trouble    Low back pain, unspecified 12/07/2022     Chronic low back pain without sciatica, unspecified back pain laterality    Low back pain, unspecified 08/09/2022    Lumbago    Myopia, bilateral 02/19/2019    Myopia of both eyes with astigmatism and presbyopia    Other forms of angina pectoris     Stable angina pectoris    Personal history of other diseases of the musculoskeletal system and connective tissue     History of arthritis    Personal history of other specified conditions     History of seizure    Unspecified disorder of ear, unspecified ear     Ear, nose and throat disorder       Medication Documentation Review Audit       Reviewed by Maya Villasenor MA (Medical Assistant) on 02/09/24 at 0817      Medication Order Taking? Sig Documenting Provider Last Dose Status   acetaminophen (Tylenol) 500 mg tablet [id number]  TAKE TWO TABLETS BY MOUTH THREE TIMES A DAY AS NEEDED Olivia Alonzo MD  Active   atorvastatin (Lipitor) 80 mg tablet [id number] No Take 1 tablet (80 mg) by mouth once daily. Bre Gray MD 10/4/2023 Active   baclofen (Lioresal) 20 mg tablet [id number]  Take 1 tablet (20 mg) by mouth 3 times a day. Olivia Alonzo MD  Active   DULoxetine (Cymbalta) 60 mg DR capsule [id number] No Take 1 capsule (60 mg) by mouth once daily. Bre Gray MD 10/4/2023 Active   fluticasone (Flonase) 50 mcg/actuation nasal spray [id number] No Administer 1 spray into each nostril once daily as needed. Bre Gray MD Past Week Active   ketamine HCl (ketamine, bulk,) 100 % powder [id number]  Ketamine 100 mg/mL + lidocaine 40 mg/mL 1 puff 4 times daily as needed, DSP#20 mL x 5 refills Olivia Alonzo MD  Active   lidocaine (Lidoderm) 5 % patch [id number] No Place 1 patch on the skin once daily. Remove & discard patch within 12 hours or as directed by MD. Bre Gray MD Past Week Active   metoclopramide (Reglan) 5 mg/5 mL oral solution [id number] No Take 10 mL (10 mg) by mouth 3 times a day. Bre Gray MD 10/5/2023 Active  "  omeprazole (PriLOSEC) 40 mg DR capsule [id number] No Take 1 capsule (40 mg) by mouth 2 times a day. For 90 days Historical Provider, MD Not Taking Active   pregabalin (Lyrica) 300 mg capsule [id number]  Take 1 capsule (300 mg) by mouth 2 times a day. Vish Barboza PA-C  Active   sucralfate (Carafate) 1 gram tablet [id number]  Take 1 tablet (1 g) by mouth 2 times a week. Historical Provider, MD  Active                    Allergies   Allergen Reactions    Morphine Hives, Rash and Shortness of breath     Tolerates hydromorphone    Ultram [Tramadol] Seizure    Alprazolam Psychosis     \"GOES CRAZY\", psychosis    Fish Containing Products Hives and Itching     IT WAS A PROCESSED FISH MEAL, BUT STATES EATS FRESH AND FROZEN FISH ALL THE TIME.    Ketorolac Seizure       Social History     Socioeconomic History    Marital status: Single     Spouse name: Not on file    Number of children: Not on file    Years of education: Not on file    Highest education level: Not on file   Occupational History    Not on file   Tobacco Use    Smoking status: Never    Smokeless tobacco: Never    Tobacco comments:     It killed my mother smoking and she left me with a gift a nodule on my right lung   Vaping Use    Vaping Use: Never used   Substance and Sexual Activity    Alcohol use: Not Currently    Drug use: Not Currently    Sexual activity: Not Currently     Partners: Female     Birth control/protection: None   Other Topics Concern    Not on file   Social History Narrative    Not on file     Social Determinants of Health     Financial Resource Strain: Low Risk  (10/5/2023)    Overall Financial Resource Strain (CARDIA)     Difficulty of Paying Living Expenses: Not very hard   Food Insecurity: No Food Insecurity (2/1/2024)    Hunger Vital Sign     Worried About Running Out of Food in the Last Year: Never true     Ran Out of Food in the Last Year: Never true   Transportation Needs: No Transportation Needs (10/18/2023)    OASIS : " Transportation     Lack of Transportation (Medical): No     Lack of Transportation (Non-Medical): No     Patient Unable or Declines to Respond: No   Physical Activity: Not on file   Stress: Not on file   Social Connections: Feeling Socially Integrated (10/18/2023)    OASIS : Social Isolation     Frequency of experiencing loneliness or isolation: Never   Intimate Partner Violence: Not on file   Housing Stability: Unknown (10/5/2023)    Housing Stability Vital Sign     Unable to Pay for Housing in the Last Year: Patient refused     Number of Places Lived in the Last Year: Not on file     Unstable Housing in the Last Year: Patient refused       Past Surgical History:   Procedure Laterality Date    ACHILLES TENDON SURGERY  1978 2016    CT ABDOMEN PELVIS ANGIOGRAM W AND/OR WO IV CONTRAST  03/18/2020    CT ABDOMEN PELVIS ANGIOGRAM W AND/OR WO IV CONTRAST 3/18/2020 [facility]    CT ANGIO NECK  12/16/2022    CT NECK ANGIO W AND WO IV CONTRAST 12/16/2022 [facility]    CT HEAD ANGIO W AND WO IV CONTRAST  12/16/2022    CT HEAD ANGIO W AND WO IV CONTRAST 12/16/2022 [facility]    KNEE ARTHROPLASTY  06/13/2017    Knee Arthroplasty    OTHER SURGICAL HISTORY  01/06/2022    Ulnar nerve neuroplasty    OTHER SURGICAL HISTORY  12/27/2019    Knee fracture repair    OTHER SURGICAL HISTORY  07/26/2019    Foot surgery    PLANTAR FASCIA RELEASE      SHOULDER SURGERY  06/13/2017    Shoulder Surgery       CT ABDOMEN PELVIS W IV CONTRAST    Result Date: 1/22/2024  * * *Final Report* * * DATE OF EXAM: Jan 22 2024  8:50AM   [facility]   0530  -  CT ABD/PEL W IVCON  / ACCESSION #  [id number] PROCEDURE REASON: Abdominal pain, acute, nonlocalized      * * * * Physician Interpretation * * * *  EXAMINATION:  CT ABDOMEN AND PELVIS WITH IV CONTRAST CLINICAL HISTORY: Acute abdominal pain TECHNIQUE: CT of the abdomen and pelvis was performed using standard technique, scanning from just above the dome of the diaphragm to the  symphysis pubis. MQ:  CTAP_3 Contrast: IV:  100 ml of Omnipaque 350 CT Radiation dose: Integrated Dose-length product (DLP) for this visit =   269 mGy*cm. CT Dose Reduction Employed: Automated exposure control(AEC) and iterative recon COMPARISON: 12/26/2023 RESULT: Liver: No mass. Biliary: No bile duct dilation.  The gallbladder is within normal limits for the modality. Spleen: No mass. No splenomegaly. Pancreas: No mass or duct dilation. Adrenals: No mass. Kidneys: There are symmetric bilateral nephrograms.  In the medial right upper renal pole there is a 4.3 cm exophytic hypodensity consistent with a cyst. GI tract: No dilation or wall thickening. There is a small sliding hiatal hernia.  There is colonic residue with a moderate to large stool burden.   There is no acute appendicitis or diverticulitis. Lymph nodes: No abdominal or pelvic lymphadenopathy. Mesentery/Peritoneum: No ascites or mass. Retroperitoneum: No mass. Vasculature:  - Abdominal aorta and iliac arteries: Atherosclerotic calcifications without aneurysm.  - Celiac and SMA: Questionable mild narrowing at the origin of the celiac axis.  - Portal venous system (SMV, splenic vein, portal vein and branches): Patent.  - Hepatic veins: Patent. Pelvis: No mass, ascites or fluid collection. There is artifact from a left hip arthroplasty. Bones/Soft Tissues: There are no aggressive osseous lesions identified. Lower thorax: Bibasilar scarring.  (topogram) images: No additional findings.    IMPRESSION: Stable appearance.  There is no acute intra-abdominal process identified. : [facility]   Transcribe Date/Time: Jan 22 2024  8:50A Dictated by : INES ROLON MD This examination was interpreted and the report reviewed and electronically signed by: INES ROLON MD on Jan 22 2024  9:05AM  EST    US GALLBLADDER    Result Date: 1/22/2024  * * *Final Report* * * DATE OF EXAM: Jan 22 2024  6:48AM   U   1032  -  US ABD RIGHT UPPER QUADRANT   / ACCESSION #  [id number] PROCEDURE REASON: RUQ pain, no fever, no elev WBC      * * * * Physician Interpretation * * * *  EXAMINATION:   RIGHT UPPER QUADRANT ULTRASOUND CLINICAL HISTORY: RIGHT upper quadrant pain TECHNIQUE:  Sonography of the right upper quadrant  was performed.   Images were obtained and stored in a permanent archive. MQ:  URUQ_2 COMPARISON: CT abdomen pelvis 12/26/2023 RESULT: Pancreas: Not well visualized Liver:      Echotexture:  Homogeneous      Echogenicity:  Increased      Surface contour:  Smooth      Lesions:  None. Biliary: No intrahepatic biliary duct dilation.      CBD: 0.3 cm at the hilum.      Gallbladder: Normal caliber           -Contents:  No cholelithiasis           -Wall:  Normal           -Other:  No pericholecystic fluid.  Negative sonographic Bautista sign. Right Kidney: No hydronephrosis.  Anechoic upper pole cyst with lobular borders measures 3.1 x 2.6 x 3.8 cm.  This appears unchanged from the prior CT. Ascites: None.    IMPRESSION: Hepatic steatosis.  Otherwise, no acute sonographic abnormality in the RIGHT upper quadrant. : [facility]   Transcribe Date/Time: Jan 22 2024  6:55A Dictated by : DEE MCKEON MD This examination was interpreted and the report reviewed and electronically signed by: DEE MCKEON MD on Jan 22 2024  6:59AM  EST    XR CHEST 1 VIEW    Result Date: 1/22/2024  * * *Final Report* * * DATE OF EXAM: Jan 22 2024  6:22AM   VHX   5376  -  XR CHEST 1V FRONTAL PORT  / ACCESSION #  [id number] PROCEDURE REASON: Tachycardia      * * * * Physician Interpretation * * * *  EXAMINATION:  CHEST RADIOGRAPH (SINGLE VIEW AP OR PA) CLINICAL INFORMATION (AS PROVIDED BY ORDERING CLINICIAN) :  Tachycardia Comparison:  9/3/2023 RESULT: Lines, tubes, and devices:  N/A Lungs and pleura:  No confluent infiltrate, large pleural effusion or pneumothorax.   Cardiomediastinal silhouette:  Within normal limits. Other:  No acute bony abnormality identified.    IMPRESSION: No  significant acute radiographic abnormality. : [facility]   Transcribe Date/Time: Jan 22 2024  6:28A Dictated by : MIRNA CHAO MD This examination was interpreted and the report reviewed and electronically signed by: MIRNA CHAO MD on Jan 22 2024  6:30AM  EST
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
## 2024-02-14 ENCOUNTER — APPOINTMENT (OUTPATIENT)
Dept: ORTHOPEDIC SURGERY | Facility: CLINIC | Age: 54
End: 2024-02-14
Payer: COMMERCIAL

## 2024-02-16 ENCOUNTER — APPOINTMENT (OUTPATIENT)
Dept: ORTHOPEDIC SURGERY | Facility: CLINIC | Age: 54
End: 2024-02-16
Payer: COMMERCIAL

## 2024-02-19 ENCOUNTER — INFUSION (OUTPATIENT)
Dept: INFUSION THERAPY | Facility: CLINIC | Age: 54
End: 2024-02-19
Payer: COMMERCIAL

## 2024-02-19 VITALS
OXYGEN SATURATION: 97 % | TEMPERATURE: 97 F | HEART RATE: 73 BPM | DIASTOLIC BLOOD PRESSURE: 73 MMHG | SYSTOLIC BLOOD PRESSURE: 114 MMHG | RESPIRATION RATE: 20 BRPM

## 2024-02-19 DIAGNOSIS — G90.522 COMPLEX REGIONAL PAIN SYNDROME TYPE 1 OF LEFT LOWER EXTREMITY: Primary | ICD-10-CM

## 2024-02-19 PROCEDURE — 2500000004 HC RX 250 GENERAL PHARMACY W/ HCPCS (ALT 636 FOR OP/ED): Performed by: NURSE PRACTITIONER

## 2024-02-19 PROCEDURE — 2500000005 HC RX 250 GENERAL PHARMACY W/O HCPCS: Performed by: NURSE PRACTITIONER

## 2024-02-19 PROCEDURE — 96365 THER/PROPH/DIAG IV INF INIT: CPT | Mod: INF

## 2024-02-19 PROCEDURE — 96368 THER/DIAG CONCURRENT INF: CPT | Mod: INF

## 2024-02-19 RX ORDER — KETOROLAC TROMETHAMINE 30 MG/ML
30 INJECTION, SOLUTION INTRAMUSCULAR; INTRAVENOUS ONCE
Status: CANCELLED | OUTPATIENT
Start: 2024-02-29 | End: 2024-02-29

## 2024-02-19 RX ORDER — PROMETHAZINE HYDROCHLORIDE 25 MG/ML
12.5 INJECTION, SOLUTION INTRAMUSCULAR; INTRAVENOUS ONCE
Status: CANCELLED | OUTPATIENT
Start: 2024-02-29 | End: 2024-02-29

## 2024-02-19 RX ORDER — KETOROLAC TROMETHAMINE 30 MG/ML
30 INJECTION, SOLUTION INTRAMUSCULAR; INTRAVENOUS ONCE
Status: DISCONTINUED | OUTPATIENT
Start: 2024-02-19 | End: 2024-02-19 | Stop reason: HOSPADM

## 2024-02-19 RX ORDER — ALBUTEROL SULFATE 0.83 MG/ML
3 SOLUTION RESPIRATORY (INHALATION) AS NEEDED
Status: CANCELLED | OUTPATIENT
Start: 2024-02-29

## 2024-02-19 RX ORDER — ONDANSETRON HYDROCHLORIDE 2 MG/ML
4 INJECTION, SOLUTION INTRAVENOUS ONCE
Status: CANCELLED | OUTPATIENT
Start: 2024-02-29 | End: 2024-02-29

## 2024-02-19 RX ORDER — DIPHENHYDRAMINE HYDROCHLORIDE 50 MG/ML
50 INJECTION INTRAMUSCULAR; INTRAVENOUS AS NEEDED
Status: CANCELLED | OUTPATIENT
Start: 2024-02-29

## 2024-02-19 RX ORDER — EPINEPHRINE 0.3 MG/.3ML
0.3 INJECTION SUBCUTANEOUS EVERY 5 MIN PRN
Status: CANCELLED | OUTPATIENT
Start: 2024-02-29

## 2024-02-19 RX ORDER — METOCLOPRAMIDE HYDROCHLORIDE 5 MG/ML
10 INJECTION INTRAMUSCULAR; INTRAVENOUS ONCE
Status: CANCELLED | OUTPATIENT
Start: 2024-02-29 | End: 2024-02-29

## 2024-02-19 RX ORDER — METOPROLOL TARTRATE 25 MG/1
25 TABLET, FILM COATED ORAL ONCE
Status: CANCELLED | OUTPATIENT
Start: 2024-02-29 | End: 2024-02-29

## 2024-02-19 RX ORDER — DIPHENHYDRAMINE HYDROCHLORIDE 50 MG/ML
25 INJECTION INTRAMUSCULAR; INTRAVENOUS ONCE
Status: CANCELLED | OUTPATIENT
Start: 2024-02-29 | End: 2024-02-29

## 2024-02-19 RX ORDER — FAMOTIDINE 10 MG/ML
20 INJECTION INTRAVENOUS ONCE AS NEEDED
Status: CANCELLED | OUTPATIENT
Start: 2024-02-29

## 2024-02-19 RX ORDER — NITROGLYCERIN 0.4 MG/1
0.4 TABLET SUBLINGUAL ONCE
Status: CANCELLED | OUTPATIENT
Start: 2024-02-29 | End: 2024-02-29

## 2024-02-19 RX ADMIN — Medication: at 07:54

## 2024-02-19 RX ADMIN — PROPOFOL 100 MG: 10 INJECTION, EMULSION INTRAVENOUS at 07:54

## 2024-02-19 SDOH — ECONOMIC STABILITY: FOOD INSECURITY: WITHIN THE PAST 12 MONTHS, THE FOOD YOU BOUGHT JUST DIDN'T LAST AND YOU DIDN'T HAVE MONEY TO GET MORE.: NEVER TRUE

## 2024-02-19 SDOH — ECONOMIC STABILITY: FOOD INSECURITY: WITHIN THE PAST 12 MONTHS, YOU WORRIED THAT YOUR FOOD WOULD RUN OUT BEFORE YOU GOT MONEY TO BUY MORE.: NEVER TRUE

## 2024-02-19 ASSESSMENT — LIFESTYLE VARIABLES
SKIP TO QUESTIONS 9-10: 1
HOW OFTEN DO YOU HAVE SIX OR MORE DRINKS ON ONE OCCASION: NEVER
HOW OFTEN DO YOU HAVE A DRINK CONTAINING ALCOHOL: NEVER
AUDIT-C TOTAL SCORE: 0
HOW MANY STANDARD DRINKS CONTAINING ALCOHOL DO YOU HAVE ON A TYPICAL DAY: PATIENT DOES NOT DRINK

## 2024-02-19 ASSESSMENT — ENCOUNTER SYMPTOMS
OCCASIONAL FEELINGS OF UNSTEADINESS: 1
DEPRESSION: 0
LOSS OF SENSATION IN FEET: 1

## 2024-02-19 ASSESSMENT — PAIN SCALES - GENERAL
PAINLEVEL_OUTOF10: 4
PAINLEVEL: 4

## 2024-02-19 NOTE — PROGRESS NOTES
S: Patient here for #16 opioid sparing pain infusion. Patient reports 70% reduction in pain after last infusion that lasted 2 weeks.    Purpose of pain infusion meds explained along with potential side effects.  Patient verbalized understanding.    B: Pain Issues 4/10 back, neck    A: Patient currently has pain described on flow sheet documentation. Designated  is [facility]. Patient last ate solid food 12 hours ago, and had liquid 1 hours ago.    R: Plan; Obtain IV access, do patient risk assessment, and start opioid sparing infusion as ordered. Monitoring for S/S of adverse reactions.    Patient resting up in chair, no distress noted.   [facility] called for .     Patient tolerating.  Post infusion teaching provided via handout and verbal instruction. Patient verbalized understanding. VSS, Patient states pain is 0. Will assist patient to waiting car via wheelchair.

## 2024-02-19 NOTE — PATIENT INSTRUCTIONS
Today :We administered (ketamine 30 mg, lidocaine (Xylocaine) 20 mg/mL (2 %) 300 mg in sodium chloride 0.9% 500 mL IV) and propofol (Diprivan) 100 mg in dextrose 5 % in water (D5W) 25 mL.     For:   1. Complex regional pain syndrome type 1 of left lower extremity         Your next appointment is due in:  3/5/24  [facility]      Pain Infusion Aftercare Instructions      1. It is normal to feel sedated, tired and low in energy after a pain infusion. DO NOT DRIVE, OPERATE ANY MACHINERY, OR MAKE ANY IMPORTANT DECISIONS FOR AT LEAST 24 HOURS AFTER THE INFUSION.     2. Call the pain center at [phone] with any problems, questions, or concerns.     3. Eat light after the infusion. If you feel queasy or sick to your stomach, laying down with your eyes closed may help. When you resume eating start with something mild like clear liquids, yogurt, applesauce, crackers, etc… Gradually advance to a regular diet.     4. Do not leave your house alone the evening of your pain infusion.     5. No alcohol or sedative medications, such as sleeping pills, for 24 hours after your pain infusion.     6. Resume all other prescribed medications unless directed otherwise by you physician.     7. If you have any medical emergencies, call 911 or go directly to the closest emergency room.        Please read the  Medication Guide that was given to you and reviewed during todays visit.     (Tell all doctors including dentists that you are taking this medication)     Go to the emergency room or call 911 if:  -You have signs of allergic reaction:   -Rash, hives, itching.   -Swollen, blistered, peeling skin.   -Swelling of face, lips, mouth, tongue or throat.   -Tightness of chest, trouble breathing, swallowing or talking     Call your doctor:  - If IV / injection site gets red, warm, swollen, itchy or leaks fluid or pus.     (Leave dressing on your IV site for at least 2 hours and keep area clean and dry  - If you get  sick or have symptoms of infection or are not feeling well for any reason.    (Wash your hands often, stay away from people who are sick)  - If you have side effects from your medication that do not go away or are bothersome.     (Refer to the teaching your nurse gave you for side effects to call your doctor about)    - Common side effects may include:  stuffy nose, headache, feeling tired, muscle aches, upset stomach  - Before receiving any vaccines     - Call the [facility] at   If:  - You get sick, are on antibiotics, have had a recent vaccine, have surgery or dental work and your doctor wants your visit rescheduled.  - You need to cancel and reschedule your visit for any reason. Call at least 2 days before your visit if you need to cancel.   - Your insurance changes before your next visit.    (We will need to get approval from your new insurance. This can take up to two weeks.)     The [facility] is opened Monday thru Friday. We are closed on weekends and holidays.   Voice mail will take your call if the center is closed. If you leave a message please allow 24 hours for a call back during weekdays. If you leave a message on a weekend/holiday, we will call you back the next business day.

## 2024-02-23 ENCOUNTER — TELEMEDICINE (OUTPATIENT)
Dept: BEHAVIORAL HEALTH | Facility: CLINIC | Age: 54
End: 2024-02-23
Payer: COMMERCIAL

## 2024-02-23 DIAGNOSIS — Z91.49 HISTORY OF PSYCHOLOGICAL TRAUMA: ICD-10-CM

## 2024-02-23 DIAGNOSIS — F41.9 ANXIETY: ICD-10-CM

## 2024-02-23 DIAGNOSIS — F43.21 ADJUSTMENT DISORDER WITH DEPRESSED MOOD: ICD-10-CM

## 2024-02-23 PROCEDURE — 90837 PSYTX W PT 60 MINUTES: CPT

## 2024-02-23 PROCEDURE — 1036F TOBACCO NON-USER: CPT
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
## 2024-03-05 ENCOUNTER — APPOINTMENT (OUTPATIENT)
Dept: INFUSION THERAPY | Facility: CLINIC | Age: 54
End: 2024-03-05
Payer: COMMERCIAL

## 2024-03-08 ENCOUNTER — OFFICE VISIT (OUTPATIENT)
Dept: BEHAVIORAL HEALTH | Facility: CLINIC | Age: 54
End: 2024-03-08
Payer: COMMERCIAL

## 2024-03-08 DIAGNOSIS — F41.8 MIXED ANXIETY AND DEPRESSIVE DISORDER: ICD-10-CM

## 2024-03-08 DIAGNOSIS — F43.23 ADJUSTMENT DISORDER WITH MIXED ANXIETY AND DEPRESSED MOOD: ICD-10-CM

## 2024-03-08 PROCEDURE — 1036F TOBACCO NON-USER: CPT | Performed by: PSYCHIATRY & NEUROLOGY

## 2024-03-08 PROCEDURE — 99205 OFFICE O/P NEW HI 60 MIN: CPT | Performed by: PSYCHIATRY & NEUROLOGY

## 2024-03-08 SDOH — HEALTH STABILITY: PHYSICAL HEALTH: ON AVERAGE, HOW MANY DAYS PER WEEK DO YOU ENGAGE IN MODERATE TO STRENUOUS EXERCISE (LIKE A BRISK WALK)?: 7 DAYS

## 2024-03-08 SDOH — ECONOMIC STABILITY: HOUSING INSECURITY
IN THE LAST 12 MONTHS, WAS THERE A TIME WHEN YOU DID NOT HAVE A STEADY PLACE TO SLEEP OR SLEPT IN A SHELTER (INCLUDING NOW)?: NO

## 2024-03-08 SDOH — ECONOMIC STABILITY: GENERAL
WHICH OF THE FOLLOWING DO YOU KNOW HOW TO USE AND HAVE ACCESS TO EVERY DAY? (CHOOSE ALL THAT APPLY): DESKTOP COMPUTER, LAPTOP COMPUTER, OR TABLET WITH BROADBAND INTERNET CONNECTION;SMARTPHONE WITH CELLULAR DATA PLAN

## 2024-03-08 SDOH — HEALTH STABILITY: PHYSICAL HEALTH: ON AVERAGE, HOW MANY MINUTES DO YOU ENGAGE IN EXERCISE AT THIS LEVEL?: 10 MIN

## 2024-03-08 SDOH — ECONOMIC STABILITY: HOUSING INSECURITY: IN THE LAST 12 MONTHS, HOW MANY PLACES HAVE YOU LIVED?: 1

## 2024-03-08 SDOH — ECONOMIC STABILITY: INCOME INSECURITY: IN THE LAST 12 MONTHS, WAS THERE A TIME WHEN YOU WERE NOT ABLE TO PAY THE MORTGAGE OR RENT ON TIME?: NO

## 2024-03-08 SDOH — ECONOMIC STABILITY: GENERAL
WHICH OF THE FOLLOWING WOULD YOU LIKE TO GET CONNECTED TO IN ORDER TO RECEIVE A DISCOUNT OR FOR FREE? (CHOOSE ALL THAT APPLY): NONE OF THESE

## 2024-03-08 ASSESSMENT — SOCIAL DETERMINANTS OF HEALTH (SDOH)
WITHIN THE LAST YEAR, HAVE TO BEEN RAPED OR FORCED TO HAVE ANY KIND OF SEXUAL ACTIVITY BY YOUR PARTNER OR EX-PARTNER?: NO
WITHIN THE LAST YEAR, HAVE YOU BEEN KICKED, HIT, SLAPPED, OR OTHERWISE PHYSICALLY HURT BY YOUR PARTNER OR EX-PARTNER?: NO
WITHIN THE LAST YEAR, HAVE YOU BEEN AFRAID OF YOUR PARTNER OR EX-PARTNER?: NO
IN THE PAST 12 MONTHS, HAS THE ELECTRIC, GAS, OIL, OR WATER COMPANY THREATENED TO SHUT OFF SERVICE IN YOUR HOME?: NO
WITHIN THE LAST YEAR, HAVE YOU BEEN HUMILIATED OR EMOTIONALLY ABUSED IN OTHER WAYS BY YOUR PARTNER OR EX-PARTNER?: NO

## 2024-03-08 ASSESSMENT — ENCOUNTER SYMPTOMS
NERVOUS/ANXIOUS: 1
DYSPHORIC MOOD: 1
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
## 2024-03-11 ENCOUNTER — APPOINTMENT (OUTPATIENT)
Dept: BEHAVIORAL HEALTH | Facility: CLINIC | Age: 54
End: 2024-03-11
Payer: COMMERCIAL

## 2024-03-19 ENCOUNTER — HOSPITAL ENCOUNTER (OUTPATIENT)
Dept: RADIOLOGY | Facility: CLINIC | Age: 54
Discharge: HOME | End: 2024-03-19
Payer: COMMERCIAL

## 2024-03-19 ENCOUNTER — TELEPHONE (OUTPATIENT)
Dept: PAIN MEDICINE | Facility: CLINIC | Age: 54
End: 2024-03-19

## 2024-03-19 ENCOUNTER — OFFICE VISIT (OUTPATIENT)
Dept: ORTHOPEDIC SURGERY | Facility: CLINIC | Age: 54
End: 2024-03-19
Payer: COMMERCIAL

## 2024-03-19 DIAGNOSIS — M16.12 PRIMARY OSTEOARTHRITIS OF LEFT HIP: ICD-10-CM

## 2024-03-19 DIAGNOSIS — M16.11 PRIMARY OSTEOARTHRITIS OF RIGHT HIP: ICD-10-CM

## 2024-03-19 DIAGNOSIS — M16.11 PRIMARY OSTEOARTHRITIS OF RIGHT HIP: Primary | ICD-10-CM

## 2024-03-19 PROCEDURE — 73522 X-RAY EXAM HIPS BI 3-4 VIEWS: CPT | Mod: BILATERAL PROCEDURE | Performed by: ORTHOPAEDIC SURGERY

## 2024-03-19 PROCEDURE — 73522 X-RAY EXAM HIPS BI 3-4 VIEWS: CPT

## 2024-03-19 PROCEDURE — 99214 OFFICE O/P EST MOD 30 MIN: CPT | Performed by: ORTHOPAEDIC SURGERY

## 2024-03-19 PROCEDURE — 1036F TOBACCO NON-USER: CPT | Performed by: ORTHOPAEDIC SURGERY

## 2024-03-19 NOTE — PROGRESS NOTES
History of present illness    54-year-old gentleman with had a left total hip replacement done back in October states left hip is doing great and very happy states is the best thing every day and left hips not giving any trouble whatsoever but the right hip unfortunately is giving him more more pain he knows he has hip arthritis he has pain in the groin difficulty bearing weight difficulty standing for prolonged period time.  He had a work-related injury to his left knee and he was limping around because of his left knee and putting extra strain on his l right hip and he thinks that may be what aggravated his right hip.      Past medical , Surgical, Family and social history reviewed.      Physical exam  General: No acute distress and breathing comfortably.  Patient is pleasant and cooperative with the examination.    Extremity  The affected hip was examined and inspected and was tender to touch along the groin and lateral bursal area.  The hip joint occasionally displayed catching, locking, and mechanical symptoms.  The skin was intact without breakdown or open wounds.  There was some mild crepitus seen with hip internal and external range of motion without evidence of instability.  Inspection of the low back showed normal curvature and integrity of the skin.  Strength and stability of the low back muscles and ligaments were within normal limits.  There was a negative straight leg raise test with no foot drop, numbness, or tingling in both lower extremities.  Sensation, reflexes, and pulses in the foot and ankle are preserved.  There was no obvious effusion.  Range of motion showed flexion and internal and external rotation were all limited with pain.  The patient had the ability to bear weight, but with discomfort.  The patient's gait was antalgic secondary to the discomfort.    Diagnostics      XR hips bilateral 3 or 4 VW w pelvis when performed    Result Date: 3/19/2024  Interpreted By:   Blue Matamoros, STUDY: XR HIPS BILATERAL 3 OR 4 VW WITH PELVIS WHEN PERFORMED; 3/19/2024 8:26 am   INDICATION: Signs/Symptoms:pain.   ACCESSION NUMBER(S): [id number]   ORDERING CLINICIAN: BLUE MATAMOROS   FINDINGS: AP pelvis and lateral view both hips. Patient is status post left total hip replacement in good alignment without bony abnormality. Significant right hip arthritis with joint space narrowing marginal osteophyte formation no evidence of fracture dislocation or other bony abnormality     Signed by: Blue Matamoros 3/19/2024 8:28 AM Dictation workstation:   [id number]       Procedure  [ none]    Assessment  Status post left total hip replacement, right hip arthritis    Treatment plan  1.  Patient doing very well regarding his left hip he will continue with his exercise program and continue to weight-bear as tolerated.  He is having significant impairment about a function related to the right hip we discussed surgical and nonsurgical options he would like to proceed with right total hip replacement.  2.  Patient has failed all forms of conservative treatment.  The joint pain is significantly affecting quality of life and activities of daily living.    The patient has pain in the joint that is increased with activity and weightbearing, and walks with an antalgic gait.  The pain is interfering with activities of daily living.  Patient has limited range of motion and pain with passive range of motion.  X-rays demonstrate joint space narrowing, subchondral sclerosis and osteophyte formation.  Symptoms are not improving with medication, physical therapy or supportive device for a period of at least 3 months.  Weight loss and smoking cessation have been discussed with the patient.  Patient advised on when to cease smoking 6-8 weeks before the procedure.      Patient would like to go and schedule joint replacement surgery.  The procedure its risks, benefits, and treatment alternatives were discussed at  length and patient would like to proceed.  Patient is going to schedule the procedure at their earliest convenience and see me back for a preop visit just prior.  Preadmission testing, medical checkup, and insurance authorization will be performed in the meantime.  Patient understands a joint replacement surgery is a last resort, although a very successful operation results are not guaranteed.  3.  Patient did have a work-related injury to his left knee and because of this he has been limping around and has been putting extra strain on his right hip it is my medical opinion that his right hip arthritis likely was exacerbated by his work-related injury to his left knee.  4.  All of the patient's questions were answered.    Orders Placed This Encounter    XR hips bilateral 3 or 4 VW w pelvis when performed       This note was prepared using voice recognition software.  The details of this note are correct and have been reviewed, and corrected to the best of my ability.  Some grammatical areas may persist related to the Dragon software    Gagandeep Matamoros MD  Senior Attending Physician  [facility]  [facility] [place]    [phone]

## 2024-03-20 ENCOUNTER — APPOINTMENT (OUTPATIENT)
Dept: RADIOLOGY | Facility: HOSPITAL | Age: 54
End: 2024-03-20
Payer: COMMERCIAL

## 2024-03-20 ENCOUNTER — HOSPITAL ENCOUNTER (EMERGENCY)
Facility: HOSPITAL | Age: 54
Discharge: HOME | End: 2024-03-20
Attending: STUDENT IN AN ORGANIZED HEALTH CARE EDUCATION/TRAINING PROGRAM
Payer: COMMERCIAL

## 2024-03-20 VITALS
HEART RATE: 70 BPM | BODY MASS INDEX: 23.8 KG/M2 | RESPIRATION RATE: 16 BRPM | WEIGHT: 170 LBS | TEMPERATURE: 97.9 F | DIASTOLIC BLOOD PRESSURE: 79 MMHG | OXYGEN SATURATION: 95 % | SYSTOLIC BLOOD PRESSURE: 116 MMHG | HEIGHT: 71 IN

## 2024-03-20 DIAGNOSIS — M79.652 PAIN OF LEFT THIGH: ICD-10-CM
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
## 2024-03-20 PROCEDURE — 73030 X-RAY EXAM OF SHOULDER: CPT | Mod: LEFT SIDE | Performed by: RADIOLOGY

## 2024-03-20 PROCEDURE — 73030 X-RAY EXAM OF SHOULDER: CPT | Mod: LT

## 2024-03-20 PROCEDURE — 99284 EMERGENCY DEPT VISIT MOD MDM: CPT

## 2024-03-20 PROCEDURE — 96372 THER/PROPH/DIAG INJ SC/IM: CPT | Mod: GC | Performed by: STUDENT IN AN ORGANIZED HEALTH CARE EDUCATION/TRAINING PROGRAM

## 2024-03-20 PROCEDURE — 96372 THER/PROPH/DIAG INJ SC/IM: CPT

## 2024-03-20 PROCEDURE — 73502 X-RAY EXAM HIP UNI 2-3 VIEWS: CPT | Mod: LEFT SIDE | Performed by: RADIOLOGY

## 2024-03-20 PROCEDURE — 73502 X-RAY EXAM HIP UNI 2-3 VIEWS: CPT | Mod: LT

## 2024-03-20 PROCEDURE — 2500000004 HC RX 250 GENERAL PHARMACY W/ HCPCS (ALT 636 FOR OP/ED): Performed by: STUDENT IN AN ORGANIZED HEALTH CARE EDUCATION/TRAINING PROGRAM

## 2024-03-20 PROCEDURE — 99284 EMERGENCY DEPT VISIT MOD MDM: CPT | Performed by: STUDENT IN AN ORGANIZED HEALTH CARE EDUCATION/TRAINING PROGRAM

## 2024-03-20 RX ORDER — OXYCODONE AND ACETAMINOPHEN 5; 325 MG/1; MG/1
1 TABLET ORAL ONCE
Status: DISCONTINUED | OUTPATIENT
Start: 2024-03-20 | End: 2024-03-20

## 2024-03-20 RX ORDER — HYDROMORPHONE HYDROCHLORIDE 1 MG/ML
1 INJECTION, SOLUTION INTRAMUSCULAR; INTRAVENOUS; SUBCUTANEOUS ONCE
Status: COMPLETED | OUTPATIENT
Start: 2024-03-20 | End: 2024-03-20

## 2024-03-20 RX ADMIN — HYDROMORPHONE HYDROCHLORIDE 1 MG: 1 INJECTION, SOLUTION INTRAMUSCULAR; INTRAVENOUS; SUBCUTANEOUS at 16:03

## 2024-03-20 ASSESSMENT — LIFESTYLE VARIABLES
EVER FELT BAD OR GUILTY ABOUT YOUR DRINKING: NO
HAVE YOU EVER FELT YOU SHOULD CUT DOWN ON YOUR DRINKING: NO
EVER HAD A DRINK FIRST THING IN THE MORNING TO STEADY YOUR NERVES TO GET RID OF A HANGOVER: NO
HAVE PEOPLE ANNOYED YOU BY CRITICIZING YOUR DRINKING: NO

## 2024-03-20 ASSESSMENT — PAIN DESCRIPTION - LOCATION: LOCATION: HIP

## 2024-03-20 ASSESSMENT — PAIN - FUNCTIONAL ASSESSMENT
PAIN_FUNCTIONAL_ASSESSMENT: 0-10
PAIN_FUNCTIONAL_ASSESSMENT: 0-10

## 2024-03-20 ASSESSMENT — PAIN SCALES - GENERAL
PAINLEVEL_OUTOF10: 10 - WORST POSSIBLE PAIN
PAINLEVEL_OUTOF10: 0 - NO PAIN
PAINLEVEL_OUTOF10: 10 - WORST POSSIBLE PAIN

## 2024-03-20 NOTE — ED PROVIDER NOTES
EMERGENCY DEPARTMENT ENCOUNTER      Pt Name: Akin Lynn  MRN: [id number]  Birthdate 1970  Date of evaluation: 3/20/2024  Provider: Olga Goldstein DO    CHIEF COMPLAINT       Chief Complaint   Patient presents with    Fall     Mechanical fall, tripped over cat, c/o left shoulder pain and left hip pain         HISTORY OF PRESENT ILLNESS    54-year-old male with no significant past medical history who presents to the emergency department after mechanical fall.  Just had a fasciectomy, and tripped over his space boot.  Fall on outstretched hand, complaining of left shoulder pain and left hip pain.  Did not hit his head, did not lose consciousness, no nausea or vomiting, no chest pain or lightheadedness.  Is not on a blood thinner.          Nursing Notes were reviewed.    PAST MEDICAL HISTORY     Past Medical History:   Diagnosis Date    Anesthesia of skin     Numbness and tingling    Arthritis Unk    Disease of intestine, unspecified     Bowel trouble    Low back pain, unspecified 12/07/2022    Chronic low back pain without sciatica, unspecified back pain laterality    Low back pain, unspecified 08/09/2022    Lumbago    Myopia, bilateral 02/19/2019    Myopia of both eyes with astigmatism and presbyopia    Other forms of angina pectoris     Stable angina pectoris    Personal history of other diseases of the musculoskeletal system and connective tissue     History of arthritis    Personal history of other specified conditions     History of seizure    Unspecified disorder of ear, unspecified ear     Ear, nose and throat disorder         SURGICAL HISTORY       Past Surgical History:   Procedure Laterality Date    ACHILLES TENDON SURGERY  1978 2016    CT ABDOMEN PELVIS ANGIOGRAM W AND/OR WO IV CONTRAST  03/18/2020    CT ABDOMEN PELVIS ANGIOGRAM W AND/OR WO IV CONTRAST 3/18/2020 [facility]    CT ANGIO NECK  12/16/2022    CT NECK ANGIO W AND WO IV CONTRAST 12/16/2022 [facility]    CT HEAD ANGIO W  AND WO IV CONTRAST  12/16/2022    CT HEAD ANGIO W AND WO IV CONTRAST 12/16/2022 [facility]    KNEE ARTHROPLASTY  06/13/2017    Knee Arthroplasty    OTHER SURGICAL HISTORY  01/06/2022    Ulnar nerve neuroplasty    OTHER SURGICAL HISTORY  12/27/2019    Knee fracture repair    OTHER SURGICAL HISTORY  07/26/2019    Foot surgery    PLANTAR FASCIA RELEASE      SHOULDER SURGERY  06/13/2017    Shoulder Surgery         CURRENT MEDICATIONS       Discharge Medication List as of 3/20/2024  5:08 PM        CONTINUE these medications which have NOT CHANGED    Details   acetaminophen (Tylenol) 500 mg tablet TAKE TWO TABLETS BY MOUTH THREE TIMES A DAY AS NEEDED, Normal      atorvastatin (Lipitor) 80 mg tablet Take 1 tablet (80 mg) by mouth once daily., Historical Med      baclofen (Lioresal) 20 mg tablet Take 1 tablet (20 mg) by mouth 3 times a day., Starting Thu 2/1/2024, Until Sun 1/26/2025, Normal      DULoxetine (Cymbalta) 60 mg DR capsule Take 1 capsule (60 mg) by mouth once daily., Starting Wed 8/2/2023, Historical Med      fluticasone (Flonase) 50 mcg/actuation nasal spray Administer 1 spray into each nostril once daily as needed., Historical Med      ketamine HCl (ketamine, bulk,) 100 % powder Ketamine 100 mg/mL + lidocaine 40 mg/mL 1 puff 4 times daily as needed, DSP#20 mL x 5 refills, Normal      lidocaine (Lidoderm) 5 % patch Place 1 patch on the skin once daily. Remove & discard patch within 12 hours or as directed by MD., Historical Med      metoclopramide (Reglan) 5 mg/5 mL oral solution Take 10 mL (10 mg) by mouth 3 times a day., Starting Fri 9/15/2023, Historical Med      omeprazole (PriLOSEC) 40 mg DR capsule Take 1 capsule (40 mg) by mouth 2 times a day. For 90 days, Historical Med      pregabalin (Lyrica) 300 mg capsule Take 1 capsule (300 mg) by mouth 2 times a day., Starting Fri 10/6/2023, Normal      sucralfate (Carafate) 1 gram tablet Take 1 tablet (1 g) by mouth 2 times a week., Historical Med              ALLERGIES     Morphine, Ultram [tramadol], Alprazolam, Fish containing products, and Ketorolac    FAMILY HISTORY       Family History   Problem Relation Name Age of Onset    Lung cancer Mother      Glaucoma Father      Skin cancer Father      Hypertension Other mul fam mem     Heart disease Other mul fam mem           SOCIAL HISTORY       Social History     Socioeconomic History    Marital status: Single     Spouse name: Not on file    Number of children: Not on file    Years of education: Not on file    Highest education level: Not on file   Occupational History    Not on file   Tobacco Use    Smoking status: Never    Smokeless tobacco: Never    Tobacco comments:     It killed my mother smoking and she left me with a gift a nodule on my right lung   Vaping Use    Vaping Use: Never used   Substance and Sexual Activity    Alcohol use: Not Currently    Drug use: Not Currently     Types: Marijuana, Ketamine, Cocaine     Comment: Had used cocaine, marijuana, ecstasy among others in 2006. and before.    Sexual activity: Not Currently     Partners: Female     Birth control/protection: None   Other Topics Concern    Not on file   Social History Narrative    Not on file     Social Determinants of Health     Financial Resource Strain: Low Risk  (10/5/2023)    Overall Financial Resource Strain (CARDIA)     Difficulty of Paying Living Expenses: Not very hard   Food Insecurity: No Food Insecurity (2/19/2024)    Hunger Vital Sign     Worried About Running Out of Food in the Last Year: Never true     Ran Out of Food in the Last Year: Never true   Transportation Needs: No Transportation Needs (10/18/2023)    OASIS : Transportation     Lack of Transportation (Medical): No     Lack of Transportation (Non-Medical): No     Patient Unable or Declines to Respond: No   Physical Activity: Insufficiently Active (3/8/2024)    Exercise Vital Sign     Days of Exercise per Week: 7 days     Minutes of Exercise per Session: 10 min    Stress: Stress Concern Present (3/8/2024)    [employer] - Occupational Stress Questionnaire     Feeling of Stress : Rather much   Social Connections: Feeling Socially Integrated (10/18/2023)    OASIS : Social Isolation     Frequency of experiencing loneliness or isolation: Never   Intimate Partner Violence: Not At Risk (3/8/2024)    Humiliation, Afraid, Rape, and Kick questionnaire     Fear of Current or Ex-Partner: No     Emotionally Abused: No     Physically Abused: No     Sexually Abused: No   Housing Stability: Low Risk  (3/8/2024)    Housing Stability Vital Sign     Unable to Pay for Housing in the Last Year: No     Number of Places Lived in the Last Year: 1     Unstable Housing in the Last Year: No       SCREENINGS                        PHYSICAL EXAM    (up to 7 for level 4, 8 or more for level 5)     ED Triage Vitals [03/20/24 1434]   Temperature Heart Rate Respirations BP   36.6 °C (97.9 °F) 80 20 (!) 170/101      Pulse Ox Temp src Heart Rate Source Patient Position   99 % -- -- --      BP Location FiO2 (%)     -- --       Physical Exam  Vitals and nursing note reviewed.   Constitutional:       General: He is not in acute distress.     Appearance: He is well-developed.   HENT:      Head: Normocephalic and atraumatic.   Eyes:      Conjunctiva/sclera: Conjunctivae normal.   Cardiovascular:      Rate and Rhythm: Normal rate and regular rhythm.      Heart sounds: No murmur heard.  Pulmonary:      Effort: Pulmonary effort is normal. No respiratory distress.      Breath sounds: Normal breath sounds.   Abdominal:      Palpations: Abdomen is soft.      Tenderness: There is no abdominal tenderness.   Musculoskeletal:         General: Tenderness (Left shoulder, and left hip) present. No swelling.      Cervical back: Neck supple.   Skin:     General: Skin is warm and dry.      Capillary Refill: Capillary refill takes less than 2 seconds.   Neurological:      Mental Status: He is  alert.   Psychiatric:         Mood and Affect: Mood normal.          DIAGNOSTIC RESULTS     LABS:  Labs Reviewed - No data to display    All other labs were within normal range or not returned as of this dictation.    Imaging  XR shoulder left 2+ views   Final Result   No fracture or dislocation of the left shoulder.             MACRO:   None        Signed by: Bassam Adam 3/20/2024 3:58 PM   Dictation workstation:   [id number]      XR hip left with pelvis when performed 2 or 3 views   Final Result   No acute fracture or dislocation of left hip.             MACRO:   None        Signed by: Bassam Adam 3/20/2024 4:01 PM   Dictation workstation:   [id number]           Procedures  Procedures     EMERGENCY DEPARTMENT COURSE/MDM:   Akin Lynn is a 54 y.o. male presenting to the ED for evaluation of had concerns including Fall (Mechanical fall, tripped over cat, c/o left shoulder pain and left hip pain)..   Medical Decision Making  54-year-old male with chronic renal regional pain syndrome and past medical history significant for prior orthopedic repair of the left hip who presents to the emergency department after mechanical fall where he tripped over his cat.  Given Dilaudid for his pain, radiographs obtained and are negative for fractures or prosthetic disruption.  Discharged with instructions to follow-up outpatient with his orthopedic surgeon.        Diagnoses as of 03/20/24 2002   Fall, initial encounter   Pain of left thigh          External records reviewed: I reviewed external records including outpatient, PCP records, and prior discharge summaries    I have reviewed this case with the ED attending physician, and the attending agrees with the plan. Patient or family was counselled regarding labs, imaging, likely diagnosis, and plan. All questions were answered.     Olga Goldstein DO  PGY-4, emergency medicine    The above documentation was completed with the use of speech recognition software. It may  contain dictation errors secondary to limitations of the software.      ED Medications administered this visit:    Medications   HYDROmorphone (Dilaudid) injection 1 mg (1 mg intramuscular Given 3/20/24 1603)       New Prescriptions from this visit:    Discharge Medication List as of 3/20/2024  5:08 PM          Final Impression:   1. Fall, initial encounter    2. Pain of left thigh          (Please note that portions of this note were completed with a voice recognition program.  Efforts were made to edit the dictations but occasionally words are mis-transcribed.)     Olga Goldstein DO  Resident  03/20/24 2002

## 2024-04-02 ENCOUNTER — TELEMEDICINE (OUTPATIENT)
Dept: BEHAVIORAL HEALTH | Facility: HOSPITAL | Age: 54
End: 2024-04-02
Payer: COMMERCIAL

## 2024-04-02 DIAGNOSIS — F43.21 ADJUSTMENT DISORDER WITH DEPRESSED MOOD: ICD-10-CM

## 2024-04-02 DIAGNOSIS — F41.9 ANXIETY: ICD-10-CM

## 2024-04-02 DIAGNOSIS — G90.522 COMPLEX REGIONAL PAIN SYNDROME TYPE 1 OF LEFT LOWER EXTREMITY: Primary | ICD-10-CM

## 2024-04-02 DIAGNOSIS — Z91.49 HISTORY OF PSYCHOLOGICAL TRAUMA: ICD-10-CM

## 2024-04-02 PROCEDURE — 90837 PSYTX W PT 60 MINUTES: CPT

## 2024-04-02 RX ORDER — EPINEPHRINE 0.3 MG/.3ML
0.3 INJECTION SUBCUTANEOUS EVERY 5 MIN PRN
Status: CANCELLED | OUTPATIENT
Start: 2024-04-05

## 2024-04-02 RX ORDER — FAMOTIDINE 10 MG/ML
20 INJECTION INTRAVENOUS ONCE AS NEEDED
Status: CANCELLED | OUTPATIENT
Start: 2024-04-05

## 2024-04-02 RX ORDER — DIPHENHYDRAMINE HYDROCHLORIDE 50 MG/ML
50 INJECTION INTRAMUSCULAR; INTRAVENOUS AS NEEDED
Status: CANCELLED | OUTPATIENT
Start: 2024-04-05

## 2024-04-02 RX ORDER — METOPROLOL TARTRATE 25 MG/1
25 TABLET, FILM COATED ORAL ONCE
Status: CANCELLED | OUTPATIENT
Start: 2024-04-05 | End: 2024-04-05

## 2024-04-02 RX ORDER — DIPHENHYDRAMINE HYDROCHLORIDE 50 MG/ML
25 INJECTION INTRAMUSCULAR; INTRAVENOUS ONCE
Status: CANCELLED | OUTPATIENT
Start: 2024-04-05 | End: 2024-04-05

## 2024-04-02 RX ORDER — NITROGLYCERIN 0.4 MG/1
0.4 TABLET SUBLINGUAL ONCE
Status: CANCELLED | OUTPATIENT
Start: 2024-04-05 | End: 2024-04-05

## 2024-04-02 RX ORDER — ONDANSETRON HYDROCHLORIDE 2 MG/ML
4 INJECTION, SOLUTION INTRAVENOUS ONCE
Status: CANCELLED | OUTPATIENT
Start: 2024-04-05 | End: 2024-04-05

## 2024-04-02 RX ORDER — ALBUTEROL SULFATE 0.83 MG/ML
3 SOLUTION RESPIRATORY (INHALATION) AS NEEDED
Status: CANCELLED | OUTPATIENT
Start: 2024-04-05

## 2024-04-02 RX ORDER — METOCLOPRAMIDE HYDROCHLORIDE 5 MG/ML
10 INJECTION INTRAMUSCULAR; INTRAVENOUS ONCE
Status: CANCELLED | OUTPATIENT
Start: 2024-04-05 | End: 2024-04-05

## 2024-04-02 NOTE — PROGRESS NOTES
Start time:  11:03  End time:  11:56     An interactive audio and video telecommunication system which permits real time communications between the patient (at the originating site) and provider (at the distant site) was utilized to provide this telehealth service. Verbal consent has been obtained from this patient for a telehealth visit.     The patient was informed of the current need to conduct treatment via virtual platform in light of COVID-19 pandemic. I have confirmed the patient’s identity via the following (minimum of three) acceptable identifiers as per  Policy PH-9: , address, phone number, and email address.     Patient current location: [address] [place] [address]     SUBJECTIVE:  Patient reported that since his surgery he is continuing to have foot pain. He is still waiting to get physical therapy approved. We discussed goals patient has for himself outside of medical matters. For now he wants to reduce negative impact of his foot pain/weakness on ability to contribute to housework. Discussed activity pacing and patient also noted idea of having partner assist/monitor as needed.     Progress: Good  Prognosis: Good     Duration of problem: years with recent improvement     Severity: moderate     OBJECTIVE:  Orientation & Cognition: Oriented x3. Thought processes normal and appropriate to situation.  Mood, Affect: Anxious  Appearance: Optimal by patient standards.  Harm to self or others: Denied SI  Substance abuse: Not reported  Psychiatric medication use: Not reported     IMPRESSION:     F41.9 Unspecified anxiety disorder  F43.21 Adjustment disorder with depressed mood  Z.91.49 History of psychological trauma     Goals for Therapy: Sharing difficult feelings in a therapeutic environment, expanding upon current social supports in his life, and addressing anxiety. Patient is also interested in medication management for his feelings of traumatization which he is worried will return after  he ends ketamine treatment.      PLAN:     Discussed activity pacing and patient also noted idea of having partner assist/monitor tasks as needed.     Next apt: 4/23     --------------------------------------------  Other(s) present in the room: None.  --------------------------------------------  Time spent face-to-face with patient:  53 minutes

## 2024-04-05 ENCOUNTER — INFUSION (OUTPATIENT)
Dept: INFUSION THERAPY | Facility: CLINIC | Age: 54
End: 2024-04-05
Payer: COMMERCIAL

## 2024-04-05 VITALS
TEMPERATURE: 99.1 F | RESPIRATION RATE: 18 BRPM | SYSTOLIC BLOOD PRESSURE: 150 MMHG | HEART RATE: 85 BPM | OXYGEN SATURATION: 97 % | DIASTOLIC BLOOD PRESSURE: 90 MMHG

## 2024-04-05 DIAGNOSIS — G90.522 COMPLEX REGIONAL PAIN SYNDROME TYPE 1 OF LEFT LOWER EXTREMITY: ICD-10-CM

## 2024-04-05 PROCEDURE — 2500000004 HC RX 250 GENERAL PHARMACY W/ HCPCS (ALT 636 FOR OP/ED): Performed by: NURSE PRACTITIONER

## 2024-04-05 PROCEDURE — 96365 THER/PROPH/DIAG IV INF INIT: CPT | Mod: INF

## 2024-04-05 PROCEDURE — 96368 THER/DIAG CONCURRENT INF: CPT | Mod: INF

## 2024-04-05 PROCEDURE — 2500000005 HC RX 250 GENERAL PHARMACY W/O HCPCS: Performed by: NURSE PRACTITIONER

## 2024-04-05 RX ORDER — FAMOTIDINE 10 MG/ML
20 INJECTION INTRAVENOUS ONCE AS NEEDED
OUTPATIENT
Start: 2024-04-19

## 2024-04-05 RX ORDER — DIPHENHYDRAMINE HYDROCHLORIDE 50 MG/ML
25 INJECTION INTRAMUSCULAR; INTRAVENOUS ONCE
OUTPATIENT
Start: 2024-04-19 | End: 2024-04-19

## 2024-04-05 RX ORDER — DIPHENHYDRAMINE HYDROCHLORIDE 50 MG/ML
50 INJECTION INTRAMUSCULAR; INTRAVENOUS AS NEEDED
OUTPATIENT
Start: 2024-04-19

## 2024-04-05 RX ORDER — METOCLOPRAMIDE HYDROCHLORIDE 5 MG/ML
10 INJECTION INTRAMUSCULAR; INTRAVENOUS ONCE
OUTPATIENT
Start: 2024-04-19 | End: 2024-04-19

## 2024-04-05 RX ORDER — ALBUTEROL SULFATE 0.83 MG/ML
3 SOLUTION RESPIRATORY (INHALATION) AS NEEDED
OUTPATIENT
Start: 2024-04-19

## 2024-04-05 RX ORDER — METOPROLOL TARTRATE 25 MG/1
25 TABLET, FILM COATED ORAL ONCE
OUTPATIENT
Start: 2024-04-19 | End: 2024-04-19

## 2024-04-05 RX ORDER — EPINEPHRINE 0.3 MG/.3ML
0.3 INJECTION SUBCUTANEOUS EVERY 5 MIN PRN
OUTPATIENT
Start: 2024-04-19

## 2024-04-05 RX ORDER — ONDANSETRON HYDROCHLORIDE 2 MG/ML
4 INJECTION, SOLUTION INTRAVENOUS ONCE
OUTPATIENT
Start: 2024-04-19 | End: 2024-04-19

## 2024-04-05 RX ORDER — IBUPROFEN 800 MG/1
800 TABLET ORAL 3 TIMES DAILY PRN
COMMUNITY
Start: 2024-03-11 | End: 2024-04-23 | Stop reason: HOSPADM

## 2024-04-05 RX ORDER — NITROGLYCERIN 0.4 MG/1
0.4 TABLET SUBLINGUAL ONCE
OUTPATIENT
Start: 2024-04-19 | End: 2024-04-19

## 2024-04-05 RX ORDER — DIAZEPAM 5 MG/1
5 TABLET ORAL 2 TIMES DAILY
COMMUNITY
Start: 2024-03-14

## 2024-04-05 RX ADMIN — PROPOFOL 100 MG: 10 INJECTION, EMULSION INTRAVENOUS at 12:35

## 2024-04-05 RX ADMIN — Medication: at 12:35

## 2024-04-05 ASSESSMENT — PAIN SCALES - GENERAL
PAINLEVEL_OUTOF10: 7
PAINLEVEL_OUTOF10: 5 - MODERATE PAIN

## 2024-04-05 ASSESSMENT — PAIN - FUNCTIONAL ASSESSMENT
PAIN_FUNCTIONAL_ASSESSMENT: 0-10
PAIN_FUNCTIONAL_ASSESSMENT: 0-10

## 2024-04-05 ASSESSMENT — ENCOUNTER SYMPTOMS
OCCASIONAL FEELINGS OF UNSTEADINESS: 1
LOSS OF SENSATION IN FEET: 1
DEPRESSION: 0

## 2024-04-05 ASSESSMENT — PAIN DESCRIPTION - DESCRIPTORS: DESCRIPTORS: ACHING;BURNING

## 2024-04-05 NOTE — PROGRESS NOTES
Post infusion teaching provided. Patient verbalized understanding. VSS, Patient states pain is at a tolerable level.  Patient tolerated infusions well without difficulty

## 2024-04-05 NOTE — PROGRESS NOTES
S: Patient here for #17 opioid sparing pain infusion. Patient can't remember how it worked    Purpose of pain infusion meds explained along with potential side effects.  Patient verbalized understanding.    B: Pain Issues    A: Patient currently has pain described on flow sheet documentation. Designated  is Charmayne . Patient last ate solid food >12 hours ago, and had liquid >12 hours ago.    R: Plan; Obtain IV access, do patient risk assessment, and start opioid sparing infusion as ordered. Monitoring for S/S of adverse reactions.

## 2024-04-05 NOTE — PATIENT INSTRUCTIONS
[facility]      Pain Infusion Aftercare Instructions      1. It is normal to feel sedated, tired and low in energy after a pain infusion. DO NOT DRIVE, OPERATE ANY MACHINERY, OR MAKE ANY IMPORTANT DECISIONS FOR AT LEAST 24 HOURS AFTER THE INFUSION.     2. Call the pain center at [phone] with any problems, questions, or concerns.     3. Eat light after the infusion. If you feel queasy or sick to your stomach, laying down with your eyes closed may help. When you resume eating start with something mild like clear liquids, yogurt, applesauce, crackers, etc… Gradually advance to a regular diet.     4. Do not leave your house alone the evening of your pain infusion.     5. No alcohol or sedative medications, such as sleeping pills, for 24 hours after your pain infusion.     6. Resume all other prescribed medications unless directed otherwise by you physician.     7. If you have any medical emergencies, call 911 or go directly to the closest emergency room.Today :We administered (ketamine 30 mg, lidocaine (Xylocaine) 20 mg/mL (2 %) 300 mg in sodium chloride 0.9% 500 mL IV) and propofol (Diprivan) 100 mg in dextrose 5 % in water (D5W) 25 mL.     For:   1. Complex regional pain syndrome type 1 of left lower extremity         Your next appointment is due in:  pain infusion 4/19/24        Please read the  Medication Guide that was given to you and reviewed during todays visit.     (Tell all doctors including dentists that you are taking this medication)     Go to the emergency room or call 911 if:  -You have signs of allergic reaction:   -Rash, hives, itching.   -Swollen, blistered, peeling skin.   -Swelling of face, lips, mouth, tongue or throat.   -Tightness of chest, trouble breathing, swallowing or talking     Call your doctor:  - If IV / injection site gets red, warm, swollen, itchy or leaks fluid or pus.     (Leave dressing on your IV site for at least 2 hours and keep area clean and  dry  - If you get sick or have symptoms of infection or are not feeling well for any reason.    (Wash your hands often, stay away from people who are sick)  - If you have side effects from your medication that do not go away or are bothersome.     (Refer to the teaching your nurse gave you for side effects to call your doctor about)    - Common side effects may include:  stuffy nose, headache, feeling tired, muscle aches, upset stomach  - Before receiving any vaccines     - Call the [facility] at   If:  - You get sick, are on antibiotics, have had a recent vaccine, have surgery or dental work and your doctor wants your visit rescheduled.  - You need to cancel and reschedule your visit for any reason. Call at least 2 days before your visit if you need to cancel.   - Your insurance changes before your next visit.    (We will need to get approval from your new insurance. This can take up to two weeks.)     The [facility] is opened Monday thru Friday. We are closed on weekends and holidays.   Voice mail will take your call if the center is closed. If you leave a message please allow 24 hours for a call back during weekdays. If you leave a message on a weekend/holiday, we will call you back the next business day.

## 2024-04-12 ENCOUNTER — TELEPHONE (OUTPATIENT)
Dept: PAIN MEDICINE | Facility: CLINIC | Age: 54
End: 2024-04-12
Payer: COMMERCIAL

## 2024-04-19 ENCOUNTER — APPOINTMENT (OUTPATIENT)
Dept: INFUSION THERAPY | Facility: CLINIC | Age: 54
End: 2024-04-19
Payer: COMMERCIAL

## 2024-04-22 ENCOUNTER — APPOINTMENT (OUTPATIENT)
Dept: CARDIOLOGY | Facility: HOSPITAL | Age: 54
End: 2024-04-22
Payer: COMMERCIAL

## 2024-04-22 ENCOUNTER — APPOINTMENT (OUTPATIENT)
Dept: RADIOLOGY | Facility: HOSPITAL | Age: 54
End: 2024-04-22
Payer: COMMERCIAL

## 2024-04-22 ENCOUNTER — HOSPITAL ENCOUNTER (OUTPATIENT)
Facility: HOSPITAL | Age: 54
Setting detail: OBSERVATION
Discharge: HOME | End: 2024-04-23
Attending: STUDENT IN AN ORGANIZED HEALTH CARE EDUCATION/TRAINING PROGRAM | Admitting: INTERNAL MEDICINE
Payer: COMMERCIAL

## 2024-04-22 DIAGNOSIS — R07.89 OTHER CHEST PAIN: ICD-10-CM

## 2024-04-22 DIAGNOSIS — M54.2 NECK PAIN: ICD-10-CM

## 2024-04-22 DIAGNOSIS — R07.9 CHEST PAIN, UNSPECIFIED TYPE: Primary | ICD-10-CM

## 2024-04-22 DIAGNOSIS — Z96.642 STATUS POST LEFT HIP REPLACEMENT: ICD-10-CM

## 2024-04-22 DIAGNOSIS — R79.89 ELEVATED TROPONIN I LEVEL: ICD-10-CM

## 2024-04-22 LAB
ALBUMIN SERPL BCP-MCNC: 5.1 G/DL (ref 3.4–5)
ALP SERPL-CCNC: 69 U/L (ref 33–120)
ALT SERPL W P-5'-P-CCNC: 34 U/L (ref 10–52)
ANION GAP SERPL CALC-SCNC: 12 MMOL/L (ref 10–20)
AST SERPL W P-5'-P-CCNC: 34 U/L (ref 9–39)
BASOPHILS # BLD AUTO: 0.08 X10*3/UL (ref 0–0.1)
BASOPHILS NFR BLD AUTO: 1 %
BILIRUB SERPL-MCNC: 0.7 MG/DL (ref 0–1.2)
BUN SERPL-MCNC: 22 MG/DL (ref 6–23)
CALCIUM SERPL-MCNC: 9.8 MG/DL (ref 8.6–10.3)
CARDIAC TROPONIN I PNL SERPL HS: 39 NG/L (ref 0–20)
CARDIAC TROPONIN I PNL SERPL HS: 39 NG/L (ref 0–20)
CARDIAC TROPONIN I PNL SERPL HS: 40 NG/L (ref 0–20)
CHLORIDE SERPL-SCNC: 102 MMOL/L (ref 98–107)
CO2 SERPL-SCNC: 28 MMOL/L (ref 21–32)
CREAT SERPL-MCNC: 0.78 MG/DL (ref 0.5–1.3)
EGFRCR SERPLBLD CKD-EPI 2021: >90 ML/MIN/1.73M*2
EOSINOPHIL # BLD AUTO: 0.33 X10*3/UL (ref 0–0.7)
EOSINOPHIL NFR BLD AUTO: 4.1 %
ERYTHROCYTE [DISTWIDTH] IN BLOOD BY AUTOMATED COUNT: 14.7 % (ref 11.5–14.5)
GLUCOSE SERPL-MCNC: 78 MG/DL (ref 74–99)
HCT VFR BLD AUTO: 49 % (ref 41–52)
HGB BLD-MCNC: 16 G/DL (ref 13.5–17.5)
IMM GRANULOCYTES # BLD AUTO: 0.02 X10*3/UL (ref 0–0.7)
IMM GRANULOCYTES NFR BLD AUTO: 0.2 % (ref 0–0.9)
LYMPHOCYTES # BLD AUTO: 2.45 X10*3/UL (ref 1.2–4.8)
LYMPHOCYTES NFR BLD AUTO: 30.1 %
MAGNESIUM SERPL-MCNC: 1.93 MG/DL (ref 1.6–2.4)
MCH RBC QN AUTO: 27.2 PG (ref 26–34)
MCHC RBC AUTO-ENTMCNC: 32.7 G/DL (ref 32–36)
MCV RBC AUTO: 83 FL (ref 80–100)
MONOCYTES # BLD AUTO: 0.57 X10*3/UL (ref 0.1–1)
MONOCYTES NFR BLD AUTO: 7 %
NEUTROPHILS # BLD AUTO: 4.69 X10*3/UL (ref 1.2–7.7)
NEUTROPHILS NFR BLD AUTO: 57.6 %
NRBC BLD-RTO: 0 /100 WBCS (ref 0–0)
PLATELET # BLD AUTO: 275 X10*3/UL (ref 150–450)
POTASSIUM SERPL-SCNC: 3.6 MMOL/L (ref 3.5–5.3)
PROT SERPL-MCNC: 8.1 G/DL (ref 6.4–8.2)
RBC # BLD AUTO: 5.89 X10*6/UL (ref 4.5–5.9)
SODIUM SERPL-SCNC: 138 MMOL/L (ref 136–145)
WBC # BLD AUTO: 8.1 X10*3/UL (ref 4.4–11.3)

## 2024-04-22 PROCEDURE — G0378 HOSPITAL OBSERVATION PER HR: HCPCS

## 2024-04-22 PROCEDURE — 2500000004 HC RX 250 GENERAL PHARMACY W/ HCPCS (ALT 636 FOR OP/ED): Performed by: NURSE PRACTITIONER

## 2024-04-22 PROCEDURE — 96375 TX/PRO/DX INJ NEW DRUG ADDON: CPT

## 2024-04-22 PROCEDURE — 99285 EMERGENCY DEPT VISIT HI MDM: CPT | Mod: 25

## 2024-04-22 PROCEDURE — 85025 COMPLETE CBC W/AUTO DIFF WBC: CPT | Performed by: STUDENT IN AN ORGANIZED HEALTH CARE EDUCATION/TRAINING PROGRAM

## 2024-04-22 PROCEDURE — 96374 THER/PROPH/DIAG INJ IV PUSH: CPT

## 2024-04-22 PROCEDURE — 84484 ASSAY OF TROPONIN QUANT: CPT | Performed by: NURSE PRACTITIONER

## 2024-04-22 PROCEDURE — 36415 COLL VENOUS BLD VENIPUNCTURE: CPT | Performed by: STUDENT IN AN ORGANIZED HEALTH CARE EDUCATION/TRAINING PROGRAM

## 2024-04-22 PROCEDURE — 93005 ELECTROCARDIOGRAM TRACING: CPT

## 2024-04-22 PROCEDURE — 71045 X-RAY EXAM CHEST 1 VIEW: CPT

## 2024-04-22 PROCEDURE — 72125 CT NECK SPINE W/O DYE: CPT

## 2024-04-22 PROCEDURE — 72125 CT NECK SPINE W/O DYE: CPT | Performed by: RADIOLOGY

## 2024-04-22 PROCEDURE — 84484 ASSAY OF TROPONIN QUANT: CPT | Performed by: STUDENT IN AN ORGANIZED HEALTH CARE EDUCATION/TRAINING PROGRAM

## 2024-04-22 PROCEDURE — 2500000004 HC RX 250 GENERAL PHARMACY W/ HCPCS (ALT 636 FOR OP/ED)

## 2024-04-22 PROCEDURE — 70450 CT HEAD/BRAIN W/O DYE: CPT

## 2024-04-22 PROCEDURE — 71045 X-RAY EXAM CHEST 1 VIEW: CPT | Performed by: RADIOLOGY

## 2024-04-22 PROCEDURE — 96376 TX/PRO/DX INJ SAME DRUG ADON: CPT

## 2024-04-22 PROCEDURE — 2500000001 HC RX 250 WO HCPCS SELF ADMINISTERED DRUGS (ALT 637 FOR MEDICARE OP): Performed by: NURSE PRACTITIONER

## 2024-04-22 PROCEDURE — 99222 1ST HOSP IP/OBS MODERATE 55: CPT | Performed by: NURSE PRACTITIONER

## 2024-04-22 PROCEDURE — 83735 ASSAY OF MAGNESIUM: CPT

## 2024-04-22 PROCEDURE — 70450 CT HEAD/BRAIN W/O DYE: CPT | Performed by: RADIOLOGY

## 2024-04-22 PROCEDURE — 2500000001 HC RX 250 WO HCPCS SELF ADMINISTERED DRUGS (ALT 637 FOR MEDICARE OP)

## 2024-04-22 PROCEDURE — C9113 INJ PANTOPRAZOLE SODIUM, VIA: HCPCS

## 2024-04-22 PROCEDURE — 2500000005 HC RX 250 GENERAL PHARMACY W/O HCPCS: Performed by: NURSE PRACTITIONER

## 2024-04-22 PROCEDURE — 2500000006 HC RX 250 W HCPCS SELF ADMINISTERED DRUGS (ALT 637 FOR ALL PAYERS): Performed by: NURSE PRACTITIONER

## 2024-04-22 PROCEDURE — 84075 ASSAY ALKALINE PHOSPHATASE: CPT | Performed by: STUDENT IN AN ORGANIZED HEALTH CARE EDUCATION/TRAINING PROGRAM

## 2024-04-22 RX ORDER — LIDOCAINE 560 MG/1
1 PATCH PERCUTANEOUS; TOPICAL; TRANSDERMAL DAILY
Status: DISCONTINUED | OUTPATIENT
Start: 2024-04-22 | End: 2024-04-24 | Stop reason: HOSPADM

## 2024-04-22 RX ORDER — PANTOPRAZOLE SODIUM 40 MG/10ML
40 INJECTION, POWDER, LYOPHILIZED, FOR SOLUTION INTRAVENOUS ONCE
Status: COMPLETED | OUTPATIENT
Start: 2024-04-22 | End: 2024-04-22

## 2024-04-22 RX ORDER — NAPROXEN SODIUM 220 MG/1
324 TABLET, FILM COATED ORAL ONCE
Status: COMPLETED | OUTPATIENT
Start: 2024-04-22 | End: 2024-04-22

## 2024-04-22 RX ORDER — DIAZEPAM 5 MG/1
5 TABLET ORAL 2 TIMES DAILY
Status: DISCONTINUED | OUTPATIENT
Start: 2024-04-22 | End: 2024-04-24 | Stop reason: HOSPADM

## 2024-04-22 RX ORDER — UREA 40 %
1 CREAM (GRAM) TOPICAL DAILY
COMMUNITY

## 2024-04-22 RX ORDER — ATORVASTATIN CALCIUM 80 MG/1
80 TABLET, FILM COATED ORAL NIGHTLY
Status: DISCONTINUED | OUTPATIENT
Start: 2024-04-22 | End: 2024-04-24 | Stop reason: HOSPADM

## 2024-04-22 RX ORDER — AMMONIUM LACTATE 12 G/100G
CREAM TOPICAL DAILY
Status: DISCONTINUED | OUTPATIENT
Start: 2024-04-22 | End: 2024-04-24 | Stop reason: HOSPADM

## 2024-04-22 RX ORDER — IBUPROFEN 400 MG/1
800 TABLET ORAL EVERY 8 HOURS PRN
Status: DISCONTINUED | OUTPATIENT
Start: 2024-04-22 | End: 2024-04-24 | Stop reason: HOSPADM

## 2024-04-22 RX ORDER — NITROGLYCERIN 0.4 MG/1
0.4 TABLET SUBLINGUAL EVERY 5 MIN PRN
Status: DISCONTINUED | OUTPATIENT
Start: 2024-04-22 | End: 2024-04-24 | Stop reason: HOSPADM

## 2024-04-22 RX ORDER — UREA 40 %
1 CREAM (GRAM) TOPICAL DAILY
Status: DISCONTINUED | OUTPATIENT
Start: 2024-04-22 | End: 2024-04-22 | Stop reason: ALTCHOICE

## 2024-04-22 RX ORDER — FLUTICASONE PROPIONATE 50 MCG
1 SPRAY, SUSPENSION (ML) NASAL DAILY PRN
Status: DISCONTINUED | OUTPATIENT
Start: 2024-04-22 | End: 2024-04-24 | Stop reason: HOSPADM

## 2024-04-22 RX ORDER — ACETAMINOPHEN 160 MG/5ML
650 SOLUTION ORAL EVERY 4 HOURS PRN
Status: DISCONTINUED | OUTPATIENT
Start: 2024-04-22 | End: 2024-04-24 | Stop reason: HOSPADM

## 2024-04-22 RX ORDER — DULOXETIN HYDROCHLORIDE 60 MG/1
60 CAPSULE, DELAYED RELEASE ORAL DAILY
Status: DISCONTINUED | OUTPATIENT
Start: 2024-04-22 | End: 2024-04-24 | Stop reason: HOSPADM

## 2024-04-22 RX ORDER — PREGABALIN 150 MG/1
300 CAPSULE ORAL 2 TIMES DAILY
Status: DISCONTINUED | OUTPATIENT
Start: 2024-04-22 | End: 2024-04-23

## 2024-04-22 RX ORDER — ACETAMINOPHEN 650 MG/1
650 SUPPOSITORY RECTAL EVERY 4 HOURS PRN
Status: DISCONTINUED | OUTPATIENT
Start: 2024-04-22 | End: 2024-04-24 | Stop reason: HOSPADM

## 2024-04-22 RX ORDER — ACETAMINOPHEN 325 MG/1
650 TABLET ORAL EVERY 4 HOURS PRN
Status: DISCONTINUED | OUTPATIENT
Start: 2024-04-22 | End: 2024-04-24 | Stop reason: HOSPADM

## 2024-04-22 RX ORDER — NAPROXEN SODIUM 220 MG/1
81 TABLET, FILM COATED ORAL DAILY
Status: DISCONTINUED | OUTPATIENT
Start: 2024-04-23 | End: 2024-04-24 | Stop reason: HOSPADM

## 2024-04-22 RX ORDER — PANTOPRAZOLE SODIUM 40 MG/1
40 TABLET, DELAYED RELEASE ORAL
Status: DISCONTINUED | OUTPATIENT
Start: 2024-04-23 | End: 2024-04-24 | Stop reason: HOSPADM

## 2024-04-22 RX ORDER — POTASSIUM CHLORIDE 1.5 G/1.58G
40 POWDER, FOR SOLUTION ORAL ONCE
Status: COMPLETED | OUTPATIENT
Start: 2024-04-22 | End: 2024-04-22

## 2024-04-22 RX ORDER — BACLOFEN 10 MG/1
20 TABLET ORAL 3 TIMES DAILY
Status: DISCONTINUED | OUTPATIENT
Start: 2024-04-22 | End: 2024-04-24 | Stop reason: HOSPADM

## 2024-04-22 RX ORDER — LANOLIN ALCOHOL/MO/W.PET/CERES
400 CREAM (GRAM) TOPICAL DAILY
Status: DISCONTINUED | OUTPATIENT
Start: 2024-04-22 | End: 2024-04-24 | Stop reason: HOSPADM

## 2024-04-22 RX ADMIN — PREGABALIN 300 MG: 150 CAPSULE ORAL at 21:29

## 2024-04-22 RX ADMIN — Medication 400 MG: at 13:54

## 2024-04-22 RX ADMIN — LIDOCAINE 4% 1 PATCH: 40 PATCH TOPICAL at 17:51

## 2024-04-22 RX ADMIN — ATORVASTATIN CALCIUM 80 MG: 80 TABLET, FILM COATED ORAL at 20:40

## 2024-04-22 RX ADMIN — HYDROMORPHONE HYDROCHLORIDE 0.5 MG: 1 INJECTION, SOLUTION INTRAMUSCULAR; INTRAVENOUS; SUBCUTANEOUS at 21:35

## 2024-04-22 RX ADMIN — DULOXETINE HYDROCHLORIDE 60 MG: 60 CAPSULE, DELAYED RELEASE ORAL at 17:47

## 2024-04-22 RX ADMIN — PANTOPRAZOLE SODIUM 40 MG: 40 INJECTION, POWDER, FOR SOLUTION INTRAVENOUS at 13:28

## 2024-04-22 RX ADMIN — HYDROMORPHONE HYDROCHLORIDE 0.5 MG: 1 INJECTION, SOLUTION INTRAMUSCULAR; INTRAVENOUS; SUBCUTANEOUS at 16:41

## 2024-04-22 RX ADMIN — HYDROMORPHONE HYDROCHLORIDE 0.5 MG: 1 INJECTION, SOLUTION INTRAMUSCULAR; INTRAVENOUS; SUBCUTANEOUS at 12:30

## 2024-04-22 RX ADMIN — ASPIRIN 81 MG 324 MG: 81 TABLET ORAL at 16:41

## 2024-04-22 RX ADMIN — DIAZEPAM 5 MG: 5 TABLET ORAL at 20:40

## 2024-04-22 RX ADMIN — POTASSIUM CHLORIDE 40 MEQ: 1.5 POWDER, FOR SOLUTION ORAL at 13:54

## 2024-04-22 RX ADMIN — BACLOFEN 20 MG: 10 TABLET ORAL at 17:51

## 2024-04-22 RX ADMIN — HYDROMORPHONE HYDROCHLORIDE 0.5 MG: 1 INJECTION, SOLUTION INTRAMUSCULAR; INTRAVENOUS; SUBCUTANEOUS at 13:54

## 2024-04-22 SDOH — SOCIAL STABILITY: SOCIAL INSECURITY: DOES ANYONE TRY TO KEEP YOU FROM HAVING/CONTACTING OTHER FRIENDS OR DOING THINGS OUTSIDE YOUR HOME?: NO

## 2024-04-22 SDOH — SOCIAL STABILITY: SOCIAL INSECURITY: ARE YOU OR HAVE YOU BEEN THREATENED OR ABUSED PHYSICALLY, EMOTIONALLY, OR SEXUALLY BY ANYONE?: NO

## 2024-04-22 SDOH — SOCIAL STABILITY: SOCIAL INSECURITY: ARE THERE ANY APPARENT SIGNS OF INJURIES/BEHAVIORS THAT COULD BE RELATED TO ABUSE/NEGLECT?: NO

## 2024-04-22 SDOH — SOCIAL STABILITY: SOCIAL INSECURITY: ABUSE: ADULT

## 2024-04-22 SDOH — SOCIAL STABILITY: SOCIAL INSECURITY: DO YOU FEEL ANYONE HAS EXPLOITED OR TAKEN ADVANTAGE OF YOU FINANCIALLY OR OF YOUR PERSONAL PROPERTY?: NO

## 2024-04-22 SDOH — SOCIAL STABILITY: SOCIAL INSECURITY: HAS ANYONE EVER THREATENED TO HURT YOUR FAMILY OR YOUR PETS?: NO

## 2024-04-22 SDOH — SOCIAL STABILITY: SOCIAL INSECURITY: WERE YOU ABLE TO COMPLETE ALL THE BEHAVIORAL HEALTH SCREENINGS?: YES

## 2024-04-22 SDOH — SOCIAL STABILITY: SOCIAL INSECURITY: DO YOU FEEL UNSAFE GOING BACK TO THE PLACE WHERE YOU ARE LIVING?: NO

## 2024-04-22 SDOH — SOCIAL STABILITY: SOCIAL INSECURITY: HAVE YOU HAD THOUGHTS OF HARMING ANYONE ELSE?: NO

## 2024-04-22 SDOH — SOCIAL STABILITY: SOCIAL INSECURITY: HAVE YOU HAD ANY THOUGHTS OF HARMING ANYONE ELSE?: NO

## 2024-04-22 ASSESSMENT — ENCOUNTER SYMPTOMS
DIAPHORESIS: 0
SORE THROAT: 0
ABDOMINAL DISTENTION: 0
CHILLS: 0
NECK PAIN: 1
HALLUCINATIONS: 0
JOINT SWELLING: 0
FACIAL ASYMMETRY: 0
NAUSEA: 0
TREMORS: 0
NUMBNESS: 1
PALPITATIONS: 1
AGITATION: 0
LIGHT-HEADEDNESS: 0
DYSURIA: 0
HEADACHES: 0
SPEECH DIFFICULTY: 0
COUGH: 0
SEIZURES: 0
UNEXPECTED WEIGHT CHANGE: 0
DIZZINESS: 0
APNEA: 0
CONFUSION: 0
EYE ITCHING: 0
EYE PAIN: 0
FLANK PAIN: 0
FEVER: 0
FATIGUE: 0
NECK STIFFNESS: 1
DIARRHEA: 0
TROUBLE SWALLOWING: 0
APPETITE CHANGE: 0
WEAKNESS: 1
FREQUENCY: 0
SHORTNESS OF BREATH: 1
ABDOMINAL PAIN: 0
HEMATURIA: 0
STRIDOR: 0
CONSTIPATION: 0
CHOKING: 0
DIFFICULTY URINATING: 0
BLOOD IN STOOL: 0
VOMITING: 0
WHEEZING: 0

## 2024-04-22 ASSESSMENT — COGNITIVE AND FUNCTIONAL STATUS - GENERAL
PATIENT BASELINE BEDBOUND: NO
MOBILITY SCORE: 24
DAILY ACTIVITIY SCORE: 24

## 2024-04-22 ASSESSMENT — PAIN SCALES - GENERAL
PAINLEVEL_OUTOF10: 10 - WORST POSSIBLE PAIN
PAINLEVEL_OUTOF10: 4
PAINLEVEL_OUTOF10: 10 - WORST POSSIBLE PAIN
PAINLEVEL_OUTOF10: 8
PAINLEVEL_OUTOF10: 6

## 2024-04-22 ASSESSMENT — PAIN - FUNCTIONAL ASSESSMENT
PAIN_FUNCTIONAL_ASSESSMENT: 0-10
PAIN_FUNCTIONAL_ASSESSMENT: 0-10

## 2024-04-22 ASSESSMENT — LIFESTYLE VARIABLES
HAVE YOU EVER FELT YOU SHOULD CUT DOWN ON YOUR DRINKING: NO
AUDIT-C TOTAL SCORE: 0
HOW MANY STANDARD DRINKS CONTAINING ALCOHOL DO YOU HAVE ON A TYPICAL DAY: PATIENT DOES NOT DRINK
SKIP TO QUESTIONS 9-10: 1
HOW OFTEN DO YOU HAVE 6 OR MORE DRINKS ON ONE OCCASION: NEVER
AUDIT-C TOTAL SCORE: 0
EVER HAD A DRINK FIRST THING IN THE MORNING TO STEADY YOUR NERVES TO GET RID OF A HANGOVER: NO
HOW OFTEN DO YOU HAVE A DRINK CONTAINING ALCOHOL: NEVER
TOTAL SCORE: 0
HAVE PEOPLE ANNOYED YOU BY CRITICIZING YOUR DRINKING: NO
EVER FELT BAD OR GUILTY ABOUT YOUR DRINKING: NO

## 2024-04-22 ASSESSMENT — ACTIVITIES OF DAILY LIVING (ADL)
LACK_OF_TRANSPORTATION: NO
JUDGMENT_ADEQUATE_SAFELY_COMPLETE_DAILY_ACTIVITIES: YES
TOILETING: INDEPENDENT
DRESSING YOURSELF: INDEPENDENT
ADEQUATE_TO_COMPLETE_ADL: YES
WALKS IN HOME: INDEPENDENT
FEEDING YOURSELF: INDEPENDENT
HEARING - LEFT EAR: FUNCTIONAL
GROOMING: INDEPENDENT
PATIENT'S MEMORY ADEQUATE TO SAFELY COMPLETE DAILY ACTIVITIES?: YES
BATHING: INDEPENDENT
HEARING - RIGHT EAR: FUNCTIONAL

## 2024-04-22 ASSESSMENT — HEART SCORE
AGE: 45-64
HISTORY: SLIGHTLY SUSPICIOUS
RISK FACTORS: 1-2 RISK FACTORS
TROPONIN: 1-3 TIMES NORMAL LIMIT
ECG: NORMAL
HEART SCORE: 3

## 2024-04-22 ASSESSMENT — COLUMBIA-SUICIDE SEVERITY RATING SCALE - C-SSRS
1. IN THE PAST MONTH, HAVE YOU WISHED YOU WERE DEAD OR WISHED YOU COULD GO TO SLEEP AND NOT WAKE UP?: NO
2. HAVE YOU ACTUALLY HAD ANY THOUGHTS OF KILLING YOURSELF?: NO
6. HAVE YOU EVER DONE ANYTHING, STARTED TO DO ANYTHING, OR PREPARED TO DO ANYTHING TO END YOUR LIFE?: NO

## 2024-04-22 ASSESSMENT — PATIENT HEALTH QUESTIONNAIRE - PHQ9
SUM OF ALL RESPONSES TO PHQ9 QUESTIONS 1 & 2: 0
1. LITTLE INTEREST OR PLEASURE IN DOING THINGS: NOT AT ALL
2. FEELING DOWN, DEPRESSED OR HOPELESS: NOT AT ALL

## 2024-04-22 ASSESSMENT — PAIN DESCRIPTION - ORIENTATION: ORIENTATION: LEFT

## 2024-04-22 ASSESSMENT — PAIN DESCRIPTION - LOCATION
LOCATION: NECK
LOCATION: NECK

## 2024-04-22 ASSESSMENT — PAIN DESCRIPTION - DESCRIPTORS: DESCRIPTORS: OTHER (COMMENT)

## 2024-04-22 ASSESSMENT — PAIN DESCRIPTION - DIRECTION: RADIATING_TOWARDS: L ARM

## 2024-04-22 NOTE — CARE PLAN
The patient's goals for the shift include      The clinical goals for the shift include pain control, monitor vitals      Problem: Pain - Adult  Goal: Verbalizes/displays adequate comfort level or baseline comfort level  Outcome: Progressing     Problem: Safety - Adult  Goal: Free from fall injury  Outcome: Progressing     Problem: Discharge Planning  Goal: Discharge to home or other facility with appropriate resources  Outcome: Progressing     Problem: Chronic Conditions and Co-morbidities  Goal: Patient's chronic conditions and co-morbidity symptoms are monitored and maintained or improved  Outcome: Progressing     Problem: Chronic Conditions and Co-morbidities  Goal: Patient's chronic conditions and co-morbidity symptoms are monitored and maintained or improved  Outcome: Progressing     Problem: Skin  Goal: Decreased wound size/increased tissue granulation at next dressing change  Outcome: Progressing  Goal: Participates in plan/prevention/treatment measures  Outcome: Progressing  Goal: Prevent/manage excess moisture  Outcome: Progressing  Flowsheets (Taken 4/22/2024 1800)  Prevent/manage excess moisture:   Moisturize dry skin   Monitor for/manage infection if present   Cleanse incontinence/protect with barrier cream  Goal: Prevent/minimize sheer/friction injuries  Outcome: Progressing  Goal: Promote/optimize nutrition  Outcome: Progressing  Goal: Promote skin healing  Outcome: Progressing     Problem: Skin  Goal: Participates in plan/prevention/treatment measures  Outcome: Progressing     Problem: Pain  Goal: Takes deep breaths with improved pain control throughout the shift  Outcome: Progressing  Goal: Turns in bed with improved pain control throughout the shift  Outcome: Progressing  Goal: Walks with improved pain control throughout the shift  Outcome: Progressing  Goal: Performs ADL's with improved pain control throughout shift  Outcome: Progressing  Goal: Participates in PT with improved pain control  throughout the shift  Outcome: Progressing  Goal: Free from opioid side effects throughout the shift  Outcome: Progressing  Goal: Free from acute confusion related to pain meds throughout the shift  Outcome: Progressing

## 2024-04-22 NOTE — ED PROVIDER NOTES
EMERGENCY DEPARTMENT ENCOUNTER      Pt Name: Akin Lnyn  MRN: [id number]  Birthdate 1970  Date of evaluation: 4/22/2024  Provider: Constantine Ballesteros MD    CHIEF COMPLAINT     Neck pain and left-sided shoulder and arm pain and numbness    HISTORY OF PRESENT ILLNESS    HPI A 54-year-old individual with a significant past medical history of complex regional pain syndrome of the left lower extremity, cervical spondylolysis, hyperlipidemia , GERD, and gastroparesis presented to the ED for worsening neck pain, left-sided numbness, and chest pain over the last couple of hours.     The patient reported chronic neck pain for which they receive IV infusion therapy every two weeks, but today the pain became unbearable, prompting the visit to the ED. The patient described left-sided shoulder discomfort with numbness in the entire arm, with no specific aggravating or relieving factors noted. He also reported experiencing heartburn but denied shortness of breath, palpitations, or lower limb swelling.     There was no history of muscle weakness, headache, numbness, or tingling sensations. Additionally, the patient denied fever, chills, cough, abdominal pain, nausea/vomiting, numbness, tingling sensations, or changes in bowel or urinary habits.    Nursing Notes were reviewed.    PAST MEDICAL HISTORY     Past Medical History:   Diagnosis Date    Anesthesia of skin     Numbness and tingling    Arthritis Unk    Disease of intestine, unspecified     Bowel trouble    Low back pain, unspecified 12/07/2022    Chronic low back pain without sciatica, unspecified back pain laterality    Low back pain, unspecified 08/09/2022    Lumbago    Myopia, bilateral 02/19/2019    Myopia of both eyes with astigmatism and presbyopia    Other forms of angina pectoris (CMS-[facility])     Stable angina pectoris    Personal history of other diseases of the musculoskeletal system and connective tissue     History of arthritis    Personal history of other  specified conditions     History of seizure    Unspecified disorder of ear, unspecified ear     Ear, nose and throat disorder         SURGICAL HISTORY       Past Surgical History:   Procedure Laterality Date    ACHILLES TENDON SURGERY  1978 2016    CT ABDOMEN PELVIS ANGIOGRAM W AND/OR WO IV CONTRAST  03/18/2020    CT ABDOMEN PELVIS ANGIOGRAM W AND/OR WO IV CONTRAST 3/18/2020 [facility]    CT ANGIO NECK  12/16/2022    CT NECK ANGIO W AND WO IV CONTRAST 12/16/2022 [facility]    CT HEAD ANGIO W AND WO IV CONTRAST  12/16/2022    CT HEAD ANGIO W AND WO IV CONTRAST 12/16/2022 [facility]    KNEE ARTHROPLASTY  06/13/2017    Knee Arthroplasty    OTHER SURGICAL HISTORY  01/06/2022    Ulnar nerve neuroplasty    OTHER SURGICAL HISTORY  12/27/2019    Knee fracture repair    OTHER SURGICAL HISTORY  07/26/2019    Foot surgery    PLANTAR FASCIA RELEASE      SHOULDER SURGERY  06/13/2017    Shoulder Surgery         CURRENT MEDICATIONS       Previous Medications    ACETAMINOPHEN (TYLENOL) 500 MG TABLET    TAKE TWO TABLETS BY MOUTH THREE TIMES A DAY AS NEEDED    ATORVASTATIN (LIPITOR) 80 MG TABLET    Take 1 tablet (80 mg) by mouth once daily.    BACLOFEN (LIORESAL) 20 MG TABLET    Take 1 tablet (20 mg) by mouth 3 times a day.    DIAZEPAM (VALIUM) 5 MG TABLET    Take 1 tablet (5 mg) by mouth 2 times a day.    DULOXETINE (CYMBALTA) 60 MG DR CAPSULE    Take 1 capsule (60 mg) by mouth once daily.    FLUTICASONE (FLONASE) 50 MCG/ACTUATION NASAL SPRAY    Administer 1 spray into each nostril once daily as needed.     MG TABLET    Take 1 tablet (800 mg) by mouth 3 times a day as needed for mild pain (1 - 3) or moderate pain (4 - 6).    KETAMINE HCL (KETAMINE, BULK,) 100 % POWDER    Ketamine 100 mg/mL + lidocaine 40 mg/mL 1 puff 4 times daily as needed, DSP#20 mL x 5 refills    LIDOCAINE (LIDODERM) 5 % PATCH    Place 1 patch on the skin once daily. Remove & discard patch within 12 hours or as directed by MD.     OMEPRAZOLE (PRILOSEC) 40 MG DR CAPSULE    Take 1 capsule (40 mg) by mouth 2 times a day. For 90 days    PREGABALIN (LYRICA) 300 MG CAPSULE    Take 1 capsule (300 mg) by mouth 2 times a day.       ALLERGIES     Morphine, Ultram [tramadol], Alprazolam, Ketorolac, and Fish containing products    FAMILY HISTORY       Family History   Problem Relation Name Age of Onset    Lung cancer Mother      Glaucoma Father      Skin cancer Father      Hypertension Other mul fam mem     Heart disease Other mul fam mem           SOCIAL HISTORY       Social History     Socioeconomic History    Marital status: Single     Spouse name: Not on file    Number of children: Not on file    Years of education: Not on file    Highest education level: Not on file   Occupational History    Not on file   Tobacco Use    Smoking status: Never    Smokeless tobacco: Never    Tobacco comments:     It killed my mother smoking and she left me with a gift a nodule on my right lung   Vaping Use    Vaping status: Never Used   Substance and Sexual Activity    Alcohol use: Not Currently    Drug use: Not Currently     Types: Marijuana, Ketamine, Cocaine     Comment: Had used cocaine, marijuana, ecstasy among others in 2006. and before.    Sexual activity: Not Currently     Partners: Female     Birth control/protection: None   Other Topics Concern    Not on file   Social History Narrative    Not on file     Social Determinants of Health     Financial Resource Strain: Low Risk  (10/5/2023)    Overall Financial Resource Strain (CARDIA)     Difficulty of Paying Living Expenses: Not very hard   Food Insecurity: No Food Insecurity (2/19/2024)    Hunger Vital Sign     Worried About Running Out of Food in the Last Year: Never true     Ran Out of Food in the Last Year: Never true   Transportation Needs: No Transportation Needs (10/18/2023)    OASIS : Transportation     Lack of Transportation (Medical): No     Lack of Transportation (Non-Medical): No     Patient  Unable or Declines to Respond: No   Physical Activity: Insufficiently Active (3/8/2024)    Exercise Vital Sign     Days of Exercise per Week: 7 days     Minutes of Exercise per Session: 10 min   Stress: Stress Concern Present (3/8/2024)    [employer] - Occupational Stress Questionnaire     Feeling of Stress : Rather much   Social Connections: Feeling Socially Integrated (10/18/2023)    OASIS : Social Isolation     Frequency of experiencing loneliness or isolation: Never   Intimate Partner Violence: Not At Risk (3/8/2024)    Humiliation, Afraid, Rape, and Kick questionnaire     Fear of Current or Ex-Partner: No     Emotionally Abused: No     Physically Abused: No     Sexually Abused: No   Housing Stability: Low Risk  (3/8/2024)    Housing Stability Vital Sign     Unable to Pay for Housing in the Last Year: No     Number of Places Lived in the Last Year: 1     Unstable Housing in the Last Year: No         PHYSICAL EXAM    (up to 7 for level 4, 8 or more for level 5)     ED Triage Vitals [04/22/24 1144]   Temperature Heart Rate Respirations BP   36.1 °C (97 °F) 76 18 (!) 145/105      Pulse Ox Temp Source Heart Rate Source Patient Position   99 % Temporal Monitor --      BP Location FiO2 (%)     -- --       Physical Exam  Vitals and nursing note reviewed.   Constitutional:       General: He is not in acute distress.     Appearance: He is well-developed.   HENT:      Head: Normocephalic and atraumatic.   Eyes:      Conjunctiva/sclera: Conjunctivae normal.   Cardiovascular:      Rate and Rhythm: Normal rate and regular rhythm.      Heart sounds: No murmur heard.  Pulmonary:      Effort: Pulmonary effort is normal. No respiratory distress.      Breath sounds: Normal breath sounds.   Abdominal:      Palpations: Abdomen is soft.      Tenderness: There is no abdominal tenderness.   Musculoskeletal:         General: No swelling.      Cervical back: Normal range of motion and neck supple.    Skin:     General: Skin is warm and dry.      Capillary Refill: Capillary refill takes less than 2 seconds.   Neurological:      General: No focal deficit present.      Mental Status: He is alert and oriented to person, place, and time.      Cranial Nerves: No cranial nerve deficit.      Motor: No weakness.   Psychiatric:         Mood and Affect: Mood normal.          DIAGNOSTIC RESULTS     LABS:  Labs Reviewed   COMPREHENSIVE METABOLIC PANEL - Abnormal       Result Value    Glucose 78      Sodium 138      Potassium 3.6      Chloride 102      Bicarbonate 28      Anion Gap 12      Urea Nitrogen 22      Creatinine 0.78      eGFR >90      Calcium 9.8      Albumin 5.1 (*)     Alkaline Phosphatase 69      Total Protein 8.1      AST 34      Bilirubin, Total 0.7      ALT 34     CBC WITH AUTO DIFFERENTIAL - Abnormal    WBC 8.1      nRBC 0.0      RBC 5.89      Hemoglobin 16.0      Hematocrit 49.0      MCV 83      MCH 27.2      MCHC 32.7      RDW 14.7 (*)     Platelets 275      Neutrophils % 57.6      Immature Granulocytes %, Automated 0.2      Lymphocytes % 30.1      Monocytes % 7.0      Eosinophils % 4.1      Basophils % 1.0      Neutrophils Absolute 4.69      Immature Granulocytes Absolute, Automated 0.02      Lymphocytes Absolute 2.45      Monocytes Absolute 0.57      Eosinophils Absolute 0.33      Basophils Absolute 0.08     SERIAL TROPONIN-INITIAL - Abnormal    Troponin I, High Sensitivity 39 (*)     Narrative:     Less than 99th percentile of normal range cutoff-  Female and children under 18 years old <14 ng/L; Male <21 ng/L: Negative  Repeat testing should be performed if clinically indicated.     Female and children under 18 years old 14-50 ng/L; Male 21-50 ng/L:  Consistent with possible cardiac damage and possible increased clinical   risk. Serial measurements may help to assess extent of myocardial damage.     >50 ng/L: Consistent with cardiac damage, increased clinical risk and  myocardial infarction. Serial  measurements may help assess extent of   myocardial damage.      NOTE: Children less than 1 year old may have higher baseline troponin   levels and results should be interpreted in conjunction with the overall   clinical context.     NOTE: Troponin I testing is performed using a different   testing methodology at [facility] than at other   [facility]. Direct result comparisons should only   be made within the same method.   SERIAL TROPONIN, 1 HOUR - Abnormal    Troponin I, High Sensitivity 40 (*)     Narrative:     Less than 99th percentile of normal range cutoff-  Female and children under 18 years old <14 ng/L; Male <21 ng/L: Negative  Repeat testing should be performed if clinically indicated.     Female and children under 18 years old 14-50 ng/L; Male 21-50 ng/L:  Consistent with possible cardiac damage and possible increased clinical   risk. Serial measurements may help to assess extent of myocardial damage.     >50 ng/L: Consistent with cardiac damage, increased clinical risk and  myocardial infarction. Serial measurements may help assess extent of   myocardial damage.      NOTE: Children less than 1 year old may have higher baseline troponin   levels and results should be interpreted in conjunction with the overall   clinical context.     NOTE: Troponin I testing is performed using a different   testing methodology at [facility] than at other   [facility]. Direct result comparisons should only   be made within the same method.   MAGNESIUM - Normal    Magnesium 1.93     TROPONIN SERIES- (INITIAL, 1 HR)    Narrative:     The following orders were created for panel order Troponin Series, (0, 1 HR).  Procedure                               Abnormality         Status                     ---------                               -----------         ------                     Troponin I, High Sensiti...[[id number]]  Abnormal            Final result               Troponin, High  "Sensitivi...[[id number]]  Abnormal            Final result                 Please view results for these tests on the individual orders.       All other labs were within normal range or not returned as of this dictation.    Imaging  XR chest 1 view   Final Result   No active disease in the chest identified.        MACRO:   None        Signed by: Arsenio Navas 4/22/2024 12:42 PM   Dictation workstation:   [id number]           Procedures  Procedures     EMERGENCY DEPARTMENT COURSE/MDM:   Akin Lynn is a 54 y.o. male presenting to the ED for evaluation of had concerns including Chest Pain (Chest \"discomfort\" on and off for \"several weeks\" Pt reports chronic neck pain and chest discomfort recently with neck pain)..   Medical Decision Making  Initial vitals were stable.  EKG showed sinus rhythm with multiple PVCs.  QTc of 450 with no ischemic changes.  Workup revealed CBC and CMP grossly unremarkable troponin initially 39 up trended to 40.  Chest x-ray was unremarkable.  Intervention: Patient received 2 doses of Dilaudid, oral magnesium and potassium.  For the most of troponinemia patient was admitted for observation for complete cardiac workup.    External records reviewed: I reviewed external records including outpatient, PCP records, and prior discharge summaries    I have reviewed this case with the ED attending physician, and the attending agrees with the plan. Patient or family was counselled regarding labs, imaging, likely diagnosis, and plan. All questions were answered.     The above documentation was completed with the use of speech recognition software. It may contain dictation errors secondary to limitations of the software.      ED Medications administered this visit:    Medications   magnesium oxide (Mag-Ox) tablet 400 mg (400 mg oral Given 4/22/24 1354)   HYDROmorphone (Dilaudid) injection 0.5 mg (0.5 mg intravenous Given 4/22/24 1230)   pantoprazole (ProtoNix) injection 40 mg (40 mg intravenous Given " 4/22/24 1328)   potassium chloride (Klor-Con) packet 40 mEq (40 mEq oral Given 4/22/24 1354)   HYDROmorphone (Dilaudid) injection 0.5 mg (0.5 mg intravenous Given 4/22/24 1354)       New Prescriptions from this visit:    New Prescriptions    No medications on file       Final Impression:   1. Chest pain, unspecified type          (Please note that portions of this note were completed with a voice recognition program.  Efforts were made to edit the dictations but occasionally words are mis-transcribed.)     Constantine Ballesteros MD  Resident  04/22/24 0879       Constantine Ballesteros MD  Resident  04/22/24 1600

## 2024-04-22 NOTE — H&P
"History Of Present Illness  Akin Lynn is a 54 y.o. male with medical history of anxiety, arthritis, multiple orthopedic surgeries, gastroparesis, complex regional pain syndrome (on Ketamine infusions) lung nodule, kidney cyst, hyperlipidemia, GERD, lumbar spondylosis, and cervical spondylosis presented to [facility] on 4/22/2024 with complaint of neck pain, chest pain, and left hand numbness.  Patient reports that he has chronic neck pain that has been increasing in intensity.  Patient reports that for the past 2 weeks when the neck pain is very intense he will begin to have shortness of breath and palpitations.  Patient reports that this is not necessarily a chest pain, but the feeling of his heart racing.  Today patient also noticed left hand weakness as well as numbness and tingling to the left hand.     To note patient does follow with pain Management clinic for chronic pain secondary to cervical spondylosis without myelopathy . Patient describes pain as sharp burning , aching throbbing and throbbing in his neck.  He is due to get injections soon.    Patient reports he had a cardiac cath 6 years ago and did not require stent placement at that time.  Patient does not currently have a cardiologist that he follows with.  Patient reports he had an echo \"many years ago\".    During my assessment patient is rating chest pain 0 out of 10.  Patient reports he does not feel that it was chest pain but rather his heart racing.  This has been occurring for the last 2 weeks on and off.  Patient reports he wakes up in the morning with no neck pain feeling okay and as soon as he has done any activity he begins to have shortness of breath as well as palpitations and increased neck pain.  Patient reports his neck pain is currently coming back because he is due for pain meds again.    Patient denies recent fever/chills, cough, cold symptoms, recent illness, weight loss, double vision, blurry vision, sore throat, " lightheadedness/dizziness, shortness of breath, abdominal pain, N/V/D, urinary symptoms or leg swelling.  Patient does report weakness in his left hand as well as numbness and tingling in his left arm.  Patient is also reporting neck pain.    In the ER lab records were remarkable for troponin level of 39, 40.  All other lab work was unremarkable for acute process.  EKG was done and reads sinus rhythm with occasional PVC, incomplete right bundle branch block, heart rate of 91, QTc of 0.45, no ST elevation noted.  Chest x-ray was done showing no active disease in the chest.  In the ER patient was given Dilaudid, magnesium, Protonix, and potassium.  Patient is being admitted to the third floor for further evaluation and treatment.    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    History of psychological trauma 09/21/2023    Joint pain 09/21/2023    Kidney cysts 09/21/2023    Low back pain, unspecified 12/07/2022    Chronic low back pain without sciatica, unspecified back pain laterality    Low back pain, unspecified 08/09/2022    Lumbago    Lumbar spondylosis 09/21/2023    Lung nodule 09/21/2023    Metatarsalgia of left foot 09/21/2023    Myopia, bilateral 02/19/2019    Myopia of both eyes with astigmatism and presbyopia    Osteochondral defect of patella 09/21/2023    Other forms of angina pectoris (CMS-[facility])     Stable angina pectoris    Other spondylosis, cervical region  09/21/2023    Patellofemoral arthritis 09/21/2023    Personal history of other diseases of the musculoskeletal system and connective tissue     History of arthritis    Personal history of other specified conditions     History of seizure    Reflex sympathetic dystrophy 09/21/2023    Scapular dyskinesis 09/21/2023    Shoulder impingement 09/21/2023    Status post left hip replacement 10/04/2023    Suspected sleep apnea 09/21/2023    Trauma and stressor-related disorder 09/21/2023    Trochanteric bursitis of both hips 09/22/2023    Unspecified disorder of ear, unspecified ear     Ear, nose and throat disorder       Surgical History  Past Surgical History:   Procedure Laterality Date    ACHILLES TENDON SURGERY  1978 2016    CT ABDOMEN PELVIS ANGIOGRAM W AND/OR WO IV CONTRAST  03/18/2020    CT ABDOMEN PELVIS ANGIOGRAM W AND/OR WO IV CONTRAST 3/18/2020 [facility]Y [facility]GAC[facility]    CT ANGIO NECK  12/16/2022    CT NECK ANGIO W AND WO IV CONTRAST 12/16/2022 DOCTOR OFFICE LEGACY    CT HEAD ANGIO W AND WO IV CONTRAST  12/16/2022    CT HEAD ANGIO W AND WO IV CONTRAST 12/16/2022 DOCTOR OFFICE LEGACY    HIP SURGERY      KNEE ARTHROPLASTY  06/13/2017    Knee Arthroplasty    OTHER SURGICAL HISTORY  01/06/2022    Ulnar nerve neuroplasty    OTHER SURGICAL HISTORY  12/27/2019    Knee fracture repair    OTHER SURGICAL HISTORY  07/26/2019    Foot surgery    PLANTAR FASCIA RELEASE      SHOULDER SURGERY  06/13/2017    Shoulder Surgery        Social History  Social History     Tobacco Use    Smoking status: Never    Smokeless tobacco: Never    Tobacco comments:     It killed my mother smoking and she left me with a gift a nodule on my right lung   Vaping Use    Vaping status: Never Used   Substance Use Topics    Alcohol use: Not Currently    Drug use: Not Currently     Types: Marijuana, Ketamine, Cocaine     Comment: Had used cocaine, marijuana, ecstasy among others in 2006. and before.        Family History  Family History   Problem Relation  Name Age of Onset    Lung cancer Mother      Glaucoma Father      Skin cancer Father      Hypertension Other mul fam mem     Heart disease Other mul fam mem         Allergies  Morphine, Ultram [tramadol], Alprazolam, Ketorolac, and Fish containing products    Review of Systems   Constitutional:  Negative for appetite change, chills, diaphoresis, fatigue, fever and unexpected weight change.   HENT:  Positive for postnasal drip. Negative for mouth sores, sneezing, sore throat and trouble swallowing.    Eyes:  Negative for pain, itching and visual disturbance.   Respiratory:  Positive for shortness of breath. Negative for apnea, cough, choking, wheezing and stridor.         Shortness of breath with activity    Cardiovascular:  Positive for palpitations. Negative for chest pain and leg swelling.        Intermittent palpitations when his neck pain is intense    Gastrointestinal:  Negative for abdominal distention, abdominal pain, blood in stool, constipation, diarrhea, nausea and vomiting.   Endocrine: Negative for polyuria.   Genitourinary:  Negative for difficulty urinating, dysuria, flank pain, frequency and hematuria.   Musculoskeletal:  Positive for neck pain and neck stiffness. Negative for joint swelling.   Skin:  Negative for pallor and rash.   Allergic/Immunologic: Negative for immunocompromised state.   Neurological:  Positive for weakness and numbness. Negative for dizziness, tremors, seizures, syncope, facial asymmetry, speech difficulty, light-headedness and headaches.        Reports left hand weakness with numbness and tingling to the entire left arm    Psychiatric/Behavioral:  Negative for agitation, confusion and hallucinations.        Physical Exam  Constitutional:       General: He is not in acute distress.     Appearance: Normal appearance. He is normal weight. He is not ill-appearing, toxic-appearing or diaphoretic.   HENT:      Head: Normocephalic and atraumatic.      Nose: Nose normal.       "Mouth/Throat:      Mouth: Mucous membranes are moist.      Pharynx: Oropharynx is clear.   Eyes:      Extraocular Movements: Extraocular movements intact.      Conjunctiva/sclera: Conjunctivae normal.      Pupils: Pupils are equal, round, and reactive to light.   Cardiovascular:      Rate and Rhythm: Normal rate and regular rhythm.      Pulses: Normal pulses.      Heart sounds: Normal heart sounds. No murmur heard.  Pulmonary:      Effort: Pulmonary effort is normal. No respiratory distress.      Breath sounds: Normal breath sounds. No stridor. No wheezing, rhonchi or rales.   Chest:      Chest wall: No tenderness.   Abdominal:      General: Abdomen is flat. Bowel sounds are normal. There is no distension.      Palpations: Abdomen is soft.      Tenderness: There is no abdominal tenderness. There is no guarding or rebound.   Musculoskeletal:         General: No swelling or tenderness.      Cervical back: Neck supple.      Right lower leg: No edema.      Left lower leg: No edema.   Skin:     General: Skin is warm and dry.      Capillary Refill: Capillary refill takes 2 to 3 seconds.   Neurological:      Mental Status: He is alert and oriented to person, place, and time.      Motor: Weakness present.      Comments: Left hand squeeze weaker than right, patient reports numbness and tingling in left hand    Psychiatric:         Mood and Affect: Mood normal.         Behavior: Behavior normal.         Thought Content: Thought content normal.         Judgment: Judgment normal.         Last Recorded Vitals  Visit Vitals  BP (!) 140/91 (BP Location: Right arm, Patient Position: Sitting)   Pulse 88   Temp 36.1 °C (97 °F) (Temporal)   Resp 16   Ht 1.778 m (5' 10\")   Wt 80.7 kg (178 lb)   SpO2 98%   BMI 25.54 kg/m²   Smoking Status Never   BSA 2 m²        Relevant Results  Results for orders placed or performed during the hospital encounter of 04/22/24 (from the past 24 hour(s))   ECG 12 lead   Result Value Ref Range    " Ventricular Rate 91 BPM    Atrial Rate 91 BPM    NY Interval 150 ms    QRS Duration 100 ms    QT Interval 366 ms    QTC Calculation(Bazett) 450 ms    P Axis 59 degrees    R Axis 26 degrees    T Axis 40 degrees    QRS Count 15 beats    Q Onset 219 ms    P Onset 144 ms    P Offset 196 ms    T Offset 402 ms    QTC Fredericia 420 ms   Comprehensive metabolic panel   Result Value Ref Range    Glucose 78 74 - 99 mg/dL    Sodium 138 136 - 145 mmol/L    Potassium 3.6 3.5 - 5.3 mmol/L    Chloride 102 98 - 107 mmol/L    Bicarbonate 28 21 - 32 mmol/L    Anion Gap 12 10 - 20 mmol/L    Urea Nitrogen 22 6 - 23 mg/dL    Creatinine 0.78 0.50 - 1.30 mg/dL    eGFR >90 >60 mL/min/1.73m*2    Calcium 9.8 8.6 - 10.3 mg/dL    Albumin 5.1 (H) 3.4 - 5.0 g/dL    Alkaline Phosphatase 69 33 - 120 U/L    Total Protein 8.1 6.4 - 8.2 g/dL    AST 34 9 - 39 U/L    Bilirubin, Total 0.7 0.0 - 1.2 mg/dL    ALT 34 10 - 52 U/L   CBC and Auto Differential   Result Value Ref Range    WBC 8.1 4.4 - 11.3 x10*3/uL    nRBC 0.0 0.0 - 0.0 /100 WBCs    RBC 5.89 4.50 - 5.90 x10*6/uL    Hemoglobin 16.0 13.5 - 17.5 g/dL    Hematocrit 49.0 41.0 - 52.0 %    MCV 83 80 - 100 fL    MCH 27.2 26.0 - 34.0 pg    MCHC 32.7 32.0 - 36.0 g/dL    RDW 14.7 (H) 11.5 - 14.5 %    Platelets 275 150 - 450 x10*3/uL    Neutrophils % 57.6 40.0 - 80.0 %    Immature Granulocytes %, Automated 0.2 0.0 - 0.9 %    Lymphocytes % 30.1 13.0 - 44.0 %    Monocytes % 7.0 2.0 - 10.0 %    Eosinophils % 4.1 0.0 - 6.0 %    Basophils % 1.0 0.0 - 2.0 %    Neutrophils Absolute 4.69 1.20 - 7.70 x10*3/uL    Immature Granulocytes Absolute, Automated 0.02 0.00 - 0.70 x10*3/uL    Lymphocytes Absolute 2.45 1.20 - 4.80 x10*3/uL    Monocytes Absolute 0.57 0.10 - 1.00 x10*3/uL    Eosinophils Absolute 0.33 0.00 - 0.70 x10*3/uL    Basophils Absolute 0.08 0.00 - 0.10 x10*3/uL   Troponin I, High Sensitivity, Initial   Result Value Ref Range    Troponin I, High Sensitivity 39 (H) 0 - 20 ng/L   Magnesium   Result Value  Ref Range    Magnesium 1.93 1.60 - 2.40 mg/dL   Troponin, High Sensitivity, 1 Hour   Result Value Ref Range    Troponin I, High Sensitivity 40 (H) 0 - 20 ng/L      Imaging:  XR chest 1 view   Final Result   No active disease in the chest identified.        MACRO:   None        Signed by: Arsenio Navas 4/22/2024 12:42 PM   Dictation workstation:   [id number]      CT head wo IV contrast    (Results Pending)   CT cervical spine wo IV contrast    (Results Pending)     EKG:  Encounter Date: 04/22/24   ECG 12 lead   Result Value    Ventricular Rate 91    Atrial Rate 91    TX Interval 150    QRS Duration 100    QT Interval 366    QTC Calculation(Bazett) 450    P Axis 59    R Axis 26    T Axis 40    QRS Count 15    Q Onset 219    P Onset 144    P Offset 196    T Offset 402    QTC Fredericia 420    Narrative    Sinus rhythm with occasional Premature ventricular complexes  Possible Left atrial enlargement  Incomplete right bundle branch block  Borderline ECG  When compared with ECG of 08-OCT-2023 11:27,  Premature ventricular complexes are now Present  Incomplete right bundle branch block is now Present     Echo:  No results found for this or any previous visit.    Home Medications  Prior to Admission medications    Medication Sig Start Date End Date Taking? Authorizing Provider   acetaminophen (Tylenol) 500 mg tablet TAKE TWO TABLETS BY MOUTH THREE TIMES A DAY AS NEEDED  Patient taking differently: Take 2 tablets (1,000 mg) by mouth every 8 hours if needed for mild pain (1 - 3). 12/21/23  Yes Olivia Alonzo MD   atorvastatin (Lipitor) 80 mg tablet Take 1 tablet (80 mg) by mouth once daily at bedtime.   Yes Historical Provider, MD   baclofen (Lioresal) 20 mg tablet Take 1 tablet (20 mg) by mouth 3 times a day. 2/1/24 1/26/25 Yes Olivia Alonzo MD   diazePAM (Valium) 5 mg tablet Take 1 tablet (5 mg) by mouth 2 times a day. 3/14/24  Yes Historical Provider, MD   DULoxetine (Cymbalta) 60 mg DR capsule Take 1 capsule (60 mg) by  mouth once daily. 8/2/23  Yes Historical Provider, MD   fluticasone (Flonase) 50 mcg/actuation nasal spray Administer 1 spray into each nostril once daily as needed.   Yes Historical Provider, MD    mg tablet Take 1 tablet (800 mg) by mouth 3 times a day as needed for mild pain (1 - 3) or moderate pain (4 - 6). 3/11/24  Yes Historical Provider, MD   ketamine HCl (ketamine, bulk,) 100 % powder Ketamine 100 mg/mL + lidocaine 40 mg/mL 1 puff 4 times daily as needed, DSP#20 mL x 5 refills  Patient taking differently: 1 puff by intranasal route 4 times a day. Ketamine 100 mg/mL + lidocaine 40 mg/mL 1 puff 4 times daily as needed, DSP#20 mL x 5 refills 2/1/24  Yes Olviia Alonzo MD   lidocaine (Lidoderm) 5 % patch Place 1 patch on the skin once daily. Remove & discard patch within 12 hours or as directed by MD.   Yes Historical Provider, MD   omeprazole (PriLOSEC) 40 mg DR capsule Take 1 capsule (40 mg) by mouth 2 times a day. For 90 days   Yes Historical Provider, MD   pregabalin (Lyrica) 300 mg capsule Take 1 capsule (300 mg) by mouth 2 times a day. 10/6/23  Yes Vish Barboza PA-C   urea (Carmol) 40 % cream Apply 1 Application topically once daily. feet   Yes Historical Provider, MD       Medications  Scheduled medications  [START ON 4/23/2024] aspirin, 81 mg, oral, Daily  atorvastatin, 80 mg, oral, Nightly  baclofen, 20 mg, oral, TID  diazePAM, 5 mg, oral, BID  DULoxetine, 60 mg, oral, Daily  lidocaine, 1 patch, transdermal, Daily  magnesium oxide, 400 mg, oral, Daily  [START ON 4/23/2024] pantoprazole, 40 mg, oral, Daily before breakfast  pregabalin, 300 mg, oral, BID  urea, 1 Application, Topical, Daily      Continuous medications     PRN medications  acetaminophen, 650 mg, q4h PRN   Or  acetaminophen, 650 mg, q4h PRN   Or  acetaminophen, 650 mg, q4h PRN  fluticasone, 1 spray, Daily PRN  ibuprofen, 800 mg, q8h PRN  nitroglycerin, 0.4 mg, q5 min PRN        Assessment/Plan   Principal Problem:    Chest pain,  unspecified type  Active Problems:    Elevated troponin I level    Neck pain        Plan:  -neck pain is chronic and patient is under the care of 2 pain management doctors. He has 2 appointments next week for pain management  -CT of head and C-spine ordered due to left arm numbness and tingling as well as left hand weakness  -324 mg aspirin x 1 now  -Aspirin 81 mg daily to start tomorrow  -Statin ordered  -Continue home medications as ordered  -One-time dose of Dilaudid ordered for neck pain  -Cardiology on consult  -Nitro as needed for chest pain  -Tylenol as needed for pain  -Cardiac diet with n.p.o. at midnight in case of intervention needed in the a.m. by cardiology  -Trending troponin #1 = 39, #2 = 40, #3 = pending  -BMP, CBC in a.m. tomorrow  -Patient is currently denying chest pain  -Cardiac monitoring  -Vitals every 4 hours    VTE prophylaxis:   DVT prophylaxis: SCDs    Medication reconciliation to be completed when home medications are verified by pharmacy.   See additional orders for further plan of care.   Further evaluation and management per attending and consulting physicians.      Code Status  Full code    I spent 60 minutes in the professional and overall care of this patient.      MIKAELA Haley-[facility]  Pager [phone]

## 2024-04-22 NOTE — CONSULTS
CARDIOLOGY SERVICE - Initial CONSULT    [place]. Nawaf Treadwell  [facility] [place] NAME: Akin Lynn  PATIENT MRN: [id number]  SERVICE DATE:  4/22/2024  SERVICE TIME: 5:01 PM    CONSULTANT: Nawaf Treadwell MD  PRIMARY CARE PHYSICIAN: Otoniel Lane MD  ATTENDING PHYSICIAN: Abdon Iglesias, DO;Ba*      IMPRESSIONS:  admitted with neck pain chest pain and left hand numbness.  Has chronic spine problems including cervical spine  Sense of palpitation, infrequent PVCs  Incomplete RBBB  History of heart cath about 6 years ago no PCI  Hyperlipidemia  Arthritis, complex regional pain syndrome    RECOMMENDATIONS:  LexiscanNM   Echocardiogram  TSH  Telemetry    Thank you for this consultation.      SIGNATURE: Nawaf Treadwell MD   OFFICE: [phone]    ================================================================    REASON FOR CONSULTATION: Chest pain neck pain    HISTORY: Akin Lynn is a 54 y.o. male who presented after a chest pain and sense of heart racing    PAST MEDICAL HISTORY:    Past Medical History:   Diagnosis Date    Anesthesia of skin     Numbness and tingling    Anxiety 09/21/2023    Arthritis Unk    Bilateral myopia 09/21/2023    Cervical radicular pain 09/21/2023    Chronic patellofemoral pain of left knee 09/21/2023    Complex regional pain syndrome type 1 of left lower extremity 09/21/2023    Cubital tunnel syndrome on left 09/21/2023    Cubital tunnel syndrome on right 09/21/2023    Disease of intestine, unspecified     Bowel trouble    Erectile dysfunction 09/21/2023    Foot joint pain 09/21/2023    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    History of psychological trauma 09/21/2023    Joint pain 09/21/2023    Kidney cysts 09/21/2023    Low back pain, unspecified 12/07/2022    Chronic low back pain without sciatica, unspecified back pain laterality    Low back pain, unspecified 08/09/2022    Lumbago    Lumbar spondylosis 09/21/2023    Lung nodule 09/21/2023    Metatarsalgia of left foot 09/21/2023     Myopia, bilateral 02/19/2019    Myopia of both eyes with astigmatism and presbyopia    Osteochondral defect of patella 09/21/2023    Other forms of angina pectoris (CMS-[facility])     Stable angina pectoris    Other spondylosis, cervical region 09/21/2023    Patellofemoral arthritis 09/21/2023    Personal history of other diseases of the musculoskeletal system and connective tissue     History of arthritis    Personal history of other specified conditions     History of seizure    Reflex sympathetic dystrophy 09/21/2023    Scapular dyskinesis 09/21/2023    Shoulder impingement 09/21/2023    Status post left hip replacement 10/04/2023    Suspected sleep apnea 09/21/2023    Trauma and stressor-related disorder 09/21/2023    Trochanteric bursitis of both hips 09/22/2023    Unspecified disorder of ear, unspecified ear     Ear, nose and throat disorder       PAST SURGICAL HISTORY:  Past Surgical History:   Procedure Laterality Date    ACHILLES TENDON SURGERY  1978 2016    CT ABDOMEN PELVIS ANGIOGRAM W AND/OR WO IV CONTRAST  03/18/2020    CT ABDOMEN PELVIS ANGIOGRAM W AND/OR WO IV CONTRAST 3/18/2020 [facility]    CT ANGIO NECK  12/16/2022    CT NECK ANGIO W AND WO IV CONTRAST 12/16/2022 [facility]    CT HEAD ANGIO W AND WO IV CONTRAST  12/16/2022    CT HEAD ANGIO W AND WO IV CONTRAST 12/16/2022 [facility]    HIP SURGERY      KNEE ARTHROPLASTY  06/13/2017    Knee Arthroplasty    OTHER SURGICAL HISTORY  01/06/2022    Ulnar nerve neuroplasty    OTHER SURGICAL HISTORY  12/27/2019    Knee fracture repair    OTHER SURGICAL HISTORY  07/26/2019    Foot surgery    PLANTAR FASCIA RELEASE      SHOULDER SURGERY  06/13/2017    Shoulder Surgery       MEDICATIONS:  No current facility-administered medications on file prior to encounter.     Current Outpatient Medications on File Prior to Encounter   Medication Sig Dispense Refill    acetaminophen (Tylenol) 500 mg tablet TAKE TWO TABLETS BY MOUTH THREE TIMES A DAY  "AS NEEDED (Patient taking differently: Take 2 tablets (1,000 mg) by mouth every 8 hours if needed for mild pain (1 - 3).) 180 tablet 11    atorvastatin (Lipitor) 80 mg tablet Take 1 tablet (80 mg) by mouth once daily at bedtime.      baclofen (Lioresal) 20 mg tablet Take 1 tablet (20 mg) by mouth 3 times a day. 90 tablet 11    diazePAM (Valium) 5 mg tablet Take 1 tablet (5 mg) by mouth 2 times a day.      DULoxetine (Cymbalta) 60 mg DR capsule Take 1 capsule (60 mg) by mouth once daily.      fluticasone (Flonase) 50 mcg/actuation nasal spray Administer 1 spray into each nostril once daily as needed.       mg tablet Take 1 tablet (800 mg) by mouth 3 times a day as needed for mild pain (1 - 3) or moderate pain (4 - 6).      ketamine HCl (ketamine, bulk,) 100 % powder Ketamine 100 mg/mL + lidocaine 40 mg/mL 1 puff 4 times daily as needed, DSP#20 mL x 5 refills (Patient taking differently: 1 puff by intranasal route 4 times a day. Ketamine 100 mg/mL + lidocaine 40 mg/mL 1 puff 4 times daily as needed, DSP#20 mL x 5 refills) 1 g 5    lidocaine (Lidoderm) 5 % patch Place 1 patch on the skin once daily. Remove & discard patch within 12 hours or as directed by MD.      omeprazole (PriLOSEC) 40 mg DR capsule Take 1 capsule (40 mg) by mouth 2 times a day. For 90 days      pregabalin (Lyrica) 300 mg capsule Take 1 capsule (300 mg) by mouth 2 times a day. 60 capsule 0    urea (Carmol) 40 % cream Apply 1 Application topically once daily. feet         ALLERGIES AND DRUG INTOLERANCES:   Allergies   Allergen Reactions    Morphine Hives, Rash and Shortness of breath     Tolerates hydromorphone    Ultram [Tramadol] Seizure    Alprazolam Psychosis     \"GOES CRAZY\", psychosis    Ketorolac Seizure    Fish Containing Products Hives and Itching     IT WAS A PROCESSED FISH MEAL, BUT STATES EATS FRESH AND FROZEN FISH ALL THE TIME.       FAMILY HISTORY:    Family History   Problem Relation Name Age of Onset    Lung cancer Mother      " Glaucoma Father      Skin cancer Father      Hypertension Other formerly [facility] mem     Heart disease Other formerly [facility] mem        SOCIAL HISTORY:    Social History     Socioeconomic History    Marital status: Single     Spouse name: Not on file    Number of children: Not on file    Years of education: Not on file    Highest education level: Not on file   Occupational History    Not on file   Tobacco Use    Smoking status: Never    Smokeless tobacco: Never    Tobacco comments:     It killed my mother smoking and she left me with a gift a nodule on my right lung   Vaping Use    Vaping status: Never Used   Substance and Sexual Activity    Alcohol use: Not Currently    Drug use: Not Currently     Types: Marijuana, Ketamine, Cocaine     Comment: Had used cocaine, marijuana, ecstasy among others in 2006. and before.    Sexual activity: Not Currently     Partners: Female     Birth control/protection: None   Other Topics Concern    Not on file   Social History Narrative    Not on file     Social Determinants of Health     Financial Resource Strain: Low Risk  (10/5/2023)    Overall Financial Resource Strain (CARDIA)     Difficulty of Paying Living Expenses: Not very hard   Food Insecurity: No Food Insecurity (2/19/2024)    Hunger Vital Sign     Worried About Running Out of Food in the Last Year: Never true     Ran Out of Food in the Last Year: Never true   Transportation Needs: No Transportation Needs (10/18/2023)    OASIS : Transportation     Lack of Transportation (Medical): No     Lack of Transportation (Non-Medical): No     Patient Unable or Declines to Respond: No   Physical Activity: Insufficiently Active (3/8/2024)    Exercise Vital Sign     Days of Exercise per Week: 7 days     Minutes of Exercise per Session: 10 min   Stress: Stress Concern Present (3/8/2024)    [employer] - Occupational Stress Questionnaire     Feeling of Stress : Rather much   Social Connections: Feeling Socially Integrated  "(10/18/2023)    OASIS : Social Isolation     Frequency of experiencing loneliness or isolation: Never   Intimate Partner Violence: Not At Risk (3/8/2024)    Humiliation, Afraid, Rape, and Kick questionnaire     Fear of Current or Ex-Partner: No     Emotionally Abused: No     Physically Abused: No     Sexually Abused: No   Housing Stability: Low Risk  (3/8/2024)    Housing Stability Vital Sign     Unable to Pay for Housing in the Last Year: No     Number of Places Lived in the Last Year: 1     Unstable Housing in the Last Year: No       COMPLETE REVIEW OF SYSTEMS:  12 systems were reviewed with the patient heart and a chart       PHYSICAL EXAM:  BP (!) 140/91 (BP Location: Right arm, Patient Position: Sitting)   Pulse 88   Temp 36.1 °C (97 °F) (Temporal)   Resp 16   Ht 1.778 m (5' 10\")   Wt 80.7 kg (178 lb)   SpO2 98%   BMI 25.54 kg/m²   Not in distress  Awake alert  Heart RRR  Lungs decreased BSs  Extremities no edema      LABS:  Lab Results   Component Value Date    GLUCOSE 78 04/22/2024    CALCIUM 9.8 04/22/2024     04/22/2024    K 3.6 04/22/2024    CO2 28 04/22/2024     04/22/2024    BUN 22 04/22/2024    CREATININE 0.78 04/22/2024      Lab Results   Component Value Date    WBC 8.1 04/22/2024    HGB 16.0 04/22/2024    HCT 49.0 04/22/2024    MCV 83 04/22/2024     04/22/2024       "

## 2024-04-23 ENCOUNTER — APPOINTMENT (OUTPATIENT)
Dept: RADIOLOGY | Facility: HOSPITAL | Age: 54
End: 2024-04-23
Payer: COMMERCIAL

## 2024-04-23 ENCOUNTER — APPOINTMENT (OUTPATIENT)
Dept: CARDIOLOGY | Facility: HOSPITAL | Age: 54
End: 2024-04-23
Payer: COMMERCIAL

## 2024-04-23 ENCOUNTER — TELEMEDICINE (OUTPATIENT)
Dept: BEHAVIORAL HEALTH | Facility: HOSPITAL | Age: 54
End: 2024-04-23
Payer: COMMERCIAL

## 2024-04-23 VITALS
HEIGHT: 71 IN | DIASTOLIC BLOOD PRESSURE: 93 MMHG | RESPIRATION RATE: 18 BRPM | HEART RATE: 89 BPM | TEMPERATURE: 97 F | SYSTOLIC BLOOD PRESSURE: 146 MMHG | WEIGHT: 178 LBS | BODY MASS INDEX: 24.92 KG/M2 | OXYGEN SATURATION: 93 %

## 2024-04-23 DIAGNOSIS — F41.9 ANXIETY: ICD-10-CM

## 2024-04-23 DIAGNOSIS — Z91.49 HISTORY OF PSYCHOLOGICAL TRAUMA: ICD-10-CM

## 2024-04-23 LAB
ANION GAP SERPL CALC-SCNC: 11 MMOL/L (ref 10–20)
AORTIC VALVE PEAK VELOCITY: 1.32 M/S
AV PEAK GRADIENT: 7 MMHG
AVA (PEAK VEL): 3.6 CM2
BUN SERPL-MCNC: 18 MG/DL (ref 6–23)
CALCIUM SERPL-MCNC: 9.7 MG/DL (ref 8.6–10.3)
CHLORIDE SERPL-SCNC: 101 MMOL/L (ref 98–107)
CO2 SERPL-SCNC: 27 MMOL/L (ref 21–32)
CREAT SERPL-MCNC: 0.73 MG/DL (ref 0.5–1.3)
EGFRCR SERPLBLD CKD-EPI 2021: >90 ML/MIN/1.73M*2
EJECTION FRACTION APICAL 4 CHAMBER: 65.3
ERYTHROCYTE [DISTWIDTH] IN BLOOD BY AUTOMATED COUNT: 14.8 % (ref 11.5–14.5)
GLUCOSE SERPL-MCNC: 95 MG/DL (ref 74–99)
HCT VFR BLD AUTO: 51.4 % (ref 41–52)
HGB BLD-MCNC: 16.2 G/DL (ref 13.5–17.5)
LEFT VENTRICLE INTERNAL DIMENSION DIASTOLE: 4.6 CM (ref 3.5–6)
LEFT VENTRICULAR OUTFLOW TRACT DIAMETER: 2.2 CM
LV EJECTION FRACTION BIPLANE: 65 %
MCH RBC QN AUTO: 26.9 PG (ref 26–34)
MCHC RBC AUTO-ENTMCNC: 31.5 G/DL (ref 32–36)
MCV RBC AUTO: 85 FL (ref 80–100)
MITRAL VALVE E/A RATIO: 0.99
MITRAL VALVE E/E' RATIO: 10.18
NRBC BLD-RTO: 0 /100 WBCS (ref 0–0)
PLATELET # BLD AUTO: 296 X10*3/UL (ref 150–450)
POTASSIUM SERPL-SCNC: 4.1 MMOL/L (ref 3.5–5.3)
RBC # BLD AUTO: 6.02 X10*6/UL (ref 4.5–5.9)
RIGHT VENTRICLE FREE WALL PEAK S': 13.5 CM/S
SODIUM SERPL-SCNC: 135 MMOL/L (ref 136–145)
TRICUSPID ANNULAR PLANE SYSTOLIC EXCURSION: 1.8 CM
WBC # BLD AUTO: 8.3 X10*3/UL (ref 4.4–11.3)

## 2024-04-23 PROCEDURE — 96376 TX/PRO/DX INJ SAME DRUG ADON: CPT

## 2024-04-23 PROCEDURE — 36415 COLL VENOUS BLD VENIPUNCTURE: CPT | Performed by: NURSE PRACTITIONER

## 2024-04-23 PROCEDURE — 93306 TTE W/DOPPLER COMPLETE: CPT

## 2024-04-23 PROCEDURE — 2500000005 HC RX 250 GENERAL PHARMACY W/O HCPCS: Performed by: NURSE PRACTITIONER

## 2024-04-23 PROCEDURE — 80048 BASIC METABOLIC PNL TOTAL CA: CPT | Performed by: NURSE PRACTITIONER

## 2024-04-23 PROCEDURE — 85027 COMPLETE CBC AUTOMATED: CPT | Performed by: NURSE PRACTITIONER

## 2024-04-23 PROCEDURE — 72141 MRI NECK SPINE W/O DYE: CPT

## 2024-04-23 PROCEDURE — 90834 PSYTX W PT 45 MINUTES: CPT

## 2024-04-23 PROCEDURE — 2500000006 HC RX 250 W HCPCS SELF ADMINISTERED DRUGS (ALT 637 FOR ALL PAYERS): Performed by: NURSE PRACTITIONER

## 2024-04-23 PROCEDURE — 2500000004 HC RX 250 GENERAL PHARMACY W/ HCPCS (ALT 636 FOR OP/ED): Performed by: INTERNAL MEDICINE

## 2024-04-23 PROCEDURE — 72141 MRI NECK SPINE W/O DYE: CPT | Performed by: RADIOLOGY

## 2024-04-23 PROCEDURE — G0378 HOSPITAL OBSERVATION PER HR: HCPCS

## 2024-04-23 PROCEDURE — 2500000001 HC RX 250 WO HCPCS SELF ADMINISTERED DRUGS (ALT 637 FOR MEDICARE OP): Performed by: NURSE PRACTITIONER

## 2024-04-23 PROCEDURE — 2500000004 HC RX 250 GENERAL PHARMACY W/ HCPCS (ALT 636 FOR OP/ED)

## 2024-04-23 RX ORDER — NAPROXEN SODIUM 220 MG/1
81 TABLET, FILM COATED ORAL DAILY
Qty: 30 TABLET | Refills: 0 | Status: SHIPPED | OUTPATIENT
Start: 2024-04-24 | End: 2024-05-24

## 2024-04-23 RX ORDER — IBUPROFEN 800 MG/1
800 TABLET ORAL EVERY 8 HOURS PRN
Start: 2024-04-23

## 2024-04-23 RX ORDER — PREGABALIN 100 MG/1
300 CAPSULE ORAL 2 TIMES DAILY
Status: DISCONTINUED | OUTPATIENT
Start: 2024-04-23 | End: 2024-04-24 | Stop reason: HOSPADM

## 2024-04-23 RX ADMIN — PREGABALIN 300 MG: 100 CAPSULE ORAL at 20:57

## 2024-04-23 RX ADMIN — PERFLUTREN 2 ML OF DILUTION: 6.52 INJECTION, SUSPENSION INTRAVENOUS at 14:17

## 2024-04-23 RX ADMIN — HYDROMORPHONE HYDROCHLORIDE 0.5 MG: 1 INJECTION, SOLUTION INTRAMUSCULAR; INTRAVENOUS; SUBCUTANEOUS at 16:57

## 2024-04-23 RX ADMIN — PANTOPRAZOLE SODIUM 40 MG: 40 TABLET, DELAYED RELEASE ORAL at 06:15

## 2024-04-23 RX ADMIN — HYDROMORPHONE HYDROCHLORIDE 0.5 MG: 1 INJECTION, SOLUTION INTRAMUSCULAR; INTRAVENOUS; SUBCUTANEOUS at 20:58

## 2024-04-23 RX ADMIN — BACLOFEN 20 MG: 10 TABLET ORAL at 14:22

## 2024-04-23 RX ADMIN — ACETAMINOPHEN 650 MG: 325 TABLET ORAL at 14:22

## 2024-04-23 RX ADMIN — DULOXETINE HYDROCHLORIDE 60 MG: 60 CAPSULE, DELAYED RELEASE ORAL at 08:58

## 2024-04-23 RX ADMIN — ACETAMINOPHEN 650 MG: 325 TABLET ORAL at 06:16

## 2024-04-23 RX ADMIN — PREGABALIN 300 MG: 100 CAPSULE ORAL at 08:57

## 2024-04-23 RX ADMIN — ATORVASTATIN CALCIUM 80 MG: 80 TABLET, FILM COATED ORAL at 20:57

## 2024-04-23 RX ADMIN — BACLOFEN 20 MG: 10 TABLET ORAL at 08:57

## 2024-04-23 RX ADMIN — Medication 400 MG: at 08:57

## 2024-04-23 RX ADMIN — LIDOCAINE 4% 1 PATCH: 40 PATCH TOPICAL at 08:58

## 2024-04-23 RX ADMIN — HYDROMORPHONE HYDROCHLORIDE 0.5 MG: 1 INJECTION, SOLUTION INTRAMUSCULAR; INTRAVENOUS; SUBCUTANEOUS at 13:10

## 2024-04-23 RX ADMIN — DIAZEPAM 5 MG: 5 TABLET ORAL at 08:58

## 2024-04-23 RX ADMIN — DIAZEPAM 5 MG: 5 TABLET ORAL at 20:57

## 2024-04-23 RX ADMIN — IBUPROFEN 800 MG: 400 TABLET, FILM COATED ORAL at 14:22

## 2024-04-23 RX ADMIN — ASPIRIN 81 MG 81 MG: 81 TABLET ORAL at 08:58

## 2024-04-23 RX ADMIN — IBUPROFEN 800 MG: 400 TABLET, FILM COATED ORAL at 06:19

## 2024-04-23 RX ADMIN — BACLOFEN 20 MG: 10 TABLET ORAL at 20:58

## 2024-04-23 RX ADMIN — HYDROMORPHONE HYDROCHLORIDE 0.5 MG: 1 INJECTION, SOLUTION INTRAMUSCULAR; INTRAVENOUS; SUBCUTANEOUS at 08:57

## 2024-04-23 ASSESSMENT — PAIN SCALES - GENERAL
PAINLEVEL_OUTOF10: 3
PAINLEVEL_OUTOF10: 5 - MODERATE PAIN
PAINLEVEL_OUTOF10: 8
PAINLEVEL_OUTOF10: 5 - MODERATE PAIN
PAINLEVEL_OUTOF10: 8
PAINLEVEL_OUTOF10: 7
PAINLEVEL_OUTOF10: 10 - WORST POSSIBLE PAIN
PAINLEVEL_OUTOF10: 10 - WORST POSSIBLE PAIN
PAINLEVEL_OUTOF10: 5 - MODERATE PAIN
PAINLEVEL_OUTOF10: 5 - MODERATE PAIN
PAINLEVEL_OUTOF10: 8
PAINLEVEL_OUTOF10: 5 - MODERATE PAIN
PAINLEVEL_OUTOF10: 7

## 2024-04-23 ASSESSMENT — COGNITIVE AND FUNCTIONAL STATUS - GENERAL
DAILY ACTIVITIY SCORE: 24
MOBILITY SCORE: 24

## 2024-04-23 ASSESSMENT — PAIN DESCRIPTION - LOCATION
LOCATION: NECK

## 2024-04-23 ASSESSMENT — PAIN DESCRIPTION - ORIENTATION: ORIENTATION: LEFT

## 2024-04-23 ASSESSMENT — PAIN - FUNCTIONAL ASSESSMENT
PAIN_FUNCTIONAL_ASSESSMENT: 0-10

## 2024-04-23 ASSESSMENT — ACTIVITIES OF DAILY LIVING (ADL): LACK_OF_TRANSPORTATION: NO

## 2024-04-23 NOTE — NURSING NOTE
[address] [facility] called for pain med. Pt refused motrin,tylenol. Wanted something stronger. Initially refused lyrica stated didn't work for him.pt getting ketamine injections at infusion center for cervical spondylosis pain. Ordered and give x1 dose of dilaudid 0.5 mg .

## 2024-04-23 NOTE — PROGRESS NOTES
Start time:  11:05  End time:  11:50     An interactive audio and video telecommunication system which permits real time communications between the patient (at the originating site) and provider (at the distant site) was utilized to provide this telehealth service. Verbal consent has been obtained from this patient for a telehealth visit.     The patient was informed of the current need to conduct treatment via virtual platform in light of COVID-19 pandemic. I have confirmed the patient’s identity via the following (minimum of three) acceptable identifiers as per  Policy PH-9: , address, phone number, and email address.     Patient current location: [facility] [place]     SUBJECTIVE:  Patient reported that over the past 2 weeks his neck pain has been increased and he has been having heart palpitations. Having an upcoming stress test in hospital and imaging and is awaiting this. He is feeling OK currently. Having  ablation done for neck Friday and consulting with orthopedic surgeon about if there are any structural issues to addressed. Processed frustration and stress related to disagreements with partner about finances and moving; patient is seeing logistical issues with moving when partner is focused on moving ASAP with whatever available funds. She is applying for SSI and patient suspects she is hoping for more money than is likely. Discussed current coping; notes being in ambulance and talking with medical staff has been therapeutic as it reminds him of himself outside of a patient; explored other areas where he feels appreciated/well-like (spending time with grocery store friends,etc.). Patient feels that waiting to see what develops in terms of their finances and controlling what he can in the here in now is most feasible; processing his frustrations is also therapeutic as he feels it is not workable to discuss with partner.     Progress: Good  Prognosis: Good     Duration of problem: years with recent  improvement     Severity: moderate     OBJECTIVE:  Orientation & Cognition: Oriented x3. Thought processes normal and appropriate to situation.  Mood, Affect: Euthymic, frustrated at joe  Appearance: Optimal by patient standards.  Harm to self or others: Not reported  Substance abuse: Not reported  Psychiatric medication use: Not reported     IMPRESSION:     F41.9 Unspecified anxiety disorder  [id number] History of psychological trauma     Goals for Therapy: Sharing difficult feelings in a therapeutic environment, expanding upon current social supports in his life, and addressing anxiety. Patient is also interested in medication management for his feelings of traumatization which he is worried will return after he ends ketamine treatment.      PLAN:     Patient feels that waiting to see what develops in terms of their finances and controlling what he can in the here in now is most feasible; processing his frustrations is also therapeutic as he feels it is not workable to discuss with partner.    Next apt: 5/14     --------------------------------------------  Other(s) present in the room: None.  --------------------------------------------  Time spent face-to-face with patient:  45 minutes

## 2024-04-23 NOTE — CARE PLAN
The patient's goals for the shift include    Problem: Pain - Adult  Goal: Verbalizes/displays adequate comfort level or baseline comfort level  Outcome: Progressing     Problem: Safety - Adult  Goal: Free from fall injury  Outcome: Progressing     Problem: Discharge Planning  Goal: Discharge to home or other facility with appropriate resources  Outcome: Progressing     Problem: Chronic Conditions and Co-morbidities  Goal: Patient's chronic conditions and co-morbidity symptoms are monitored and maintained or improved  Outcome: Progressing     Problem: Pain  Goal: Takes deep breaths with improved pain control throughout the shift  Outcome: Progressing  Goal: Turns in bed with improved pain control throughout the shift  Outcome: Progressing  Goal: Walks with improved pain control throughout the shift  Outcome: Progressing  Goal: Performs ADL's with improved pain control throughout shift  Outcome: Progressing  Goal: Participates in PT with improved pain control throughout the shift  Outcome: Progressing  Goal: Free from opioid side effects throughout the shift  Outcome: Progressing  Goal: Free from acute confusion related to pain meds throughout the shift  Outcome: Progressing       The clinical goals for the shift include pt will be free of cp this shift

## 2024-04-23 NOTE — CARE PLAN
The patient's goals for the shift include      The clinical goals for the shift include pain control    Neck pain has been controlled thru the end of shift. Continues with numbness/tingling of left arm and weakness left hand.  Problem: Pain - Adult  Goal: Verbalizes/displays adequate comfort level or baseline comfort level  Outcome: Progressing

## 2024-04-23 NOTE — PROGRESS NOTES
Spiritual Care Visit    Clinical Encounter Type  Visited With: Patient  Routine Visit: Introduction  Continue Visiting: No    Congregational Encounters  Congregational Needs: Prayer                                       Taxonomy  Intended Effects: Establish rapport and connectedness, Build relationship of care and support, Sudha affirmation, Demonstrate caring and concern    The patient told me he is an atheist but that his sister in [place] needs prayers.  Saying he couldn't offer one, he asked me to, and I obliged.

## 2024-04-23 NOTE — PROGRESS NOTES
CARDIOLOGY SERVICE   CONSULT Progress note   CVM Dr. Nawaf Treadwell  [facility] [place]    PATIENT NAME: Akin Lynn  PATIENT MRN: [id number]  SERVICE DATE:  4/23/2024  SERVICE TIME: 11:50 AM    CONSULTANT: Nawaf Treadwell MD  PRIMARY CARE PHYSICIAN: Otoniel Lane MD  ATTENDING PHYSICIAN: Denis Martinez MD      IMPRESSIONS:  admitted with neck pain chest pain and left hand numbness.  Has chronic spine problems including cervical spine  Sense of palpitation, infrequent PVCs  Incomplete RBBB  History of heart cath about 6 years ago no PCI  Hyperlipidemia  Arthritis, complex regional pain syndrome    RECOMMENDATIONS:  NM scan today if unable to do it is okay to leave and get the stress test as outpatient  Echocardiogram aortic sclerosis normal EF  Outpatient cardiology follow-up as needed as stress test was not done inpatient  Discussed with primary team nurse practitioner    SIGNATURE: Nawaf Treadwell MD   OFFICE: [phone]    ================================================================    SUBJECTIVE: Rare discomfort not exertional sometimes positional.    CURRENT CARDIOVASCULAR MEDICATIONS:  No current facility-administered medications on file prior to encounter.     Current Outpatient Medications on File Prior to Encounter   Medication Sig Dispense Refill    acetaminophen (Tylenol) 500 mg tablet TAKE TWO TABLETS BY MOUTH THREE TIMES A DAY AS NEEDED (Patient taking differently: Take 2 tablets (1,000 mg) by mouth every 8 hours if needed for mild pain (1 - 3).) 180 tablet 11    atorvastatin (Lipitor) 80 mg tablet Take 1 tablet (80 mg) by mouth once daily at bedtime.      baclofen (Lioresal) 20 mg tablet Take 1 tablet (20 mg) by mouth 3 times a day. 90 tablet 11    diazePAM (Valium) 5 mg tablet Take 1 tablet (5 mg) by mouth 2 times a day.      DULoxetine (Cymbalta) 60 mg DR capsule Take 1 capsule (60 mg) by mouth once daily.      fluticasone (Flonase) 50 mcg/actuation nasal spray Administer 1 spray into each nostril once  "daily as needed.       mg tablet Take 1 tablet (800 mg) by mouth 3 times a day as needed for mild pain (1 - 3) or moderate pain (4 - 6).      ketamine HCl (ketamine, bulk,) 100 % powder Ketamine 100 mg/mL + lidocaine 40 mg/mL 1 puff 4 times daily as needed, DSP#20 mL x 5 refills (Patient taking differently: 1 puff by intranasal route 4 times a day. Ketamine 100 mg/mL + lidocaine 40 mg/mL 1 puff 4 times daily as needed, DSP#20 mL x 5 refills) 1 g 5    lidocaine (Lidoderm) 5 % patch Place 1 patch on the skin once daily. Remove & discard patch within 12 hours or as directed by MD.      omeprazole (PriLOSEC) 40 mg DR capsule Take 1 capsule (40 mg) by mouth 2 times a day. For 90 days      pregabalin (Lyrica) 300 mg capsule Take 1 capsule (300 mg) by mouth 2 times a day. 60 capsule 0    urea (Carmol) 40 % cream Apply 1 Application topically once daily. feet          PHYSICAL EXAM:  BP (!) 150/98   Pulse 71   Temp 35.8 °C (96.4 °F)   Resp 16   Ht 1.803 m (5' 11\")   Wt 80.7 kg (178 lb)   SpO2 97%   BMI 24.83 kg/m²   Awake Alert   Neck: No JVD  Cardiac:  S1 S2  Resp: Clear  Ext: No peripheral edema    EKG:    MONITOR:     LABS:  Lab Results   Component Value Date    WBC 8.3 04/23/2024    HGB 16.2 04/23/2024    HCT 51.4 04/23/2024    MCV 85 04/23/2024     04/23/2024      Lab Results   Component Value Date    GLUCOSE 95 04/23/2024    CALCIUM 9.7 04/23/2024     (L) 04/23/2024    K 4.1 04/23/2024    CO2 27 04/23/2024     04/23/2024    BUN 18 04/23/2024    CREATININE 0.73 04/23/2024          "

## 2024-04-23 NOTE — H&P
Internal Medicine History and Physical    PATIENT NAME:  Akin Lynn    MRN: [id number]  DATE of SERVICE: April 23, 2024    Primary Care Physician: Otoniel Lane MD          Chief Complaint: Neck pain, chest pain and left hand numbness    HPI:  This is a 54 y.o. male who presents with worsening of her chronic neck pain, chest pain, shortness of breath, patient was complaining of palpitations, and numbness, tingling in left hand.    The emergency room troponin was checked and it was elevated at 39 and 40, EKG did not show ischemic changes, chest x-ray did not show any acute process.    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    History of psychological trauma 09/21/2023    Joint pain 09/21/2023    Kidney cysts 09/21/2023    Low back pain, unspecified 12/07/2022    Chronic low back pain without sciatica, unspecified back pain laterality    Low back pain, unspecified 08/09/2022    Lumbago    Lumbar spondylosis 09/21/2023    Lung nodule 09/21/2023    Metatarsalgia of left foot 09/21/2023    Myopia, bilateral 02/19/2019    Myopia of both eyes with astigmatism and presbyopia    Osteochondral defect of patella 09/21/2023    Other forms of angina pectoris (CMS-[facility])     Stable angina pectoris    Other spondylosis, cervical region 09/21/2023    Patellofemoral arthritis 09/21/2023    Personal history of other diseases of the musculoskeletal system and connective tissue     History  of arthritis    Personal history of other specified conditions     History of seizure    Reflex sympathetic dystrophy 09/21/2023    Scapular dyskinesis 09/21/2023    Shoulder impingement 09/21/2023    Status post left hip replacement 10/04/2023    Suspected sleep apnea 09/21/2023    Trauma and stressor-related disorder 09/21/2023    Trochanteric bursitis of both hips 09/22/2023    Unspecified disorder of ear, unspecified ear     Ear, nose and throat disorder       Past Surgical History:  Past Surgical History:   Procedure Laterality Date    ACHILLES TENDON SURGERY  1978 2016    CT ABDOMEN PELVIS ANGIOGRAM W AND/OR WO IV CONTRAST  03/18/2020    CT ABDOMEN PELVIS ANGIOGRAM W AND/OR WO IV CONTRAST 3/18/2020 [facility]    CT ANGIO NECK  12/16/2022    CT NECK ANGIO W AND WO IV CONTRAST 12/16/2022 [facility]    CT HEAD ANGIO W AND WO IV CONTRAST  12/16/2022    CT HEAD ANGIO W AND WO IV CONTRAST 12/16/2022 [facility]    HIP SURGERY      KNEE ARTHROPLASTY  06/13/2017    Knee Arthroplasty    OTHER SURGICAL HISTORY  01/06/2022    Ulnar nerve neuroplasty    OTHER SURGICAL HISTORY  12/27/2019    Knee fracture repair    OTHER SURGICAL HISTORY  07/26/2019    Foot surgery    PLANTAR FASCIA RELEASE      SHOULDER SURGERY  06/13/2017    Shoulder Surgery       Family History:  Family History   Problem Relation Name Age of Onset    Lung cancer Mother      Glaucoma Father      Skin cancer Father      Hypertension Other mul fam mem     Heart disease Other mul fam mem        Social History:    Social History     Socioeconomic History    Marital status: Single     Spouse name: Not on file    Number of children: Not on file    Years of education: Not on file    Highest education level: Not on file   Occupational History    Not on file   Tobacco Use    Smoking status: Never    Smokeless tobacco: Never    Tobacco comments:     It killed my mother smoking and she left me with a gift a nodule on my right lung    Vaping Use    Vaping status: Never Used   Substance and Sexual Activity    Alcohol use: Not Currently    Drug use: Not Currently     Types: Marijuana, Ketamine, Cocaine     Comment: Had used cocaine, marijuana, ecstasy among others in 2006. and before.    Sexual activity: Not Currently     Partners: Female     Birth control/protection: None   Other Topics Concern    Not on file   Social History Narrative    Not on file     Social Determinants of Health     Financial Resource Strain: Low Risk  (4/22/2024)    Overall Financial Resource Strain (CARDIA)     Difficulty of Paying Living Expenses: Not hard at all   Food Insecurity: No Food Insecurity (2/19/2024)    Hunger Vital Sign     Worried About Running Out of Food in the Last Year: Never true     Ran Out of Food in the Last Year: Never true   Transportation Needs: No Transportation Needs (4/22/2024)    PRAPARE - Transportation     Lack of Transportation (Medical): No     Lack of Transportation (Non-Medical): No   Physical Activity: Insufficiently Active (3/8/2024)    Exercise Vital Sign     Days of Exercise per Week: 7 days     Minutes of Exercise per Session: 10 min   Stress: Stress Concern Present (3/8/2024)    [place] [employer] - Occupational Stress Questionnaire     Feeling of Stress : Rather much   Social Connections: Feeling Socially Integrated (10/18/2023)    OASIS : Social Isolation     Frequency of experiencing loneliness or isolation: Never   Intimate Partner Violence: Not At Risk (3/8/2024)    Humiliation, Afraid, Rape, and Kick questionnaire     Fear of Current or Ex-Partner: No     Emotionally Abused: No     Physically Abused: No     Sexually Abused: No   Housing Stability: Low Risk  (4/22/2024)    Housing Stability Vital Sign     Unable to Pay for Housing in the Last Year: No     Number of Places Lived in the Last Year: 1     Unstable Housing in the Last Year: No         Prior to Admission Medications:  Medications Prior to  Admission   Medication Sig Dispense Refill Last Dose    acetaminophen (Tylenol) 500 mg tablet TAKE TWO TABLETS BY MOUTH THREE TIMES A DAY AS NEEDED (Patient taking differently: Take 2 tablets (1,000 mg) by mouth every 8 hours if needed for mild pain (1 - 3).) 180 tablet 11 4/22/2024 at am    atorvastatin (Lipitor) 80 mg tablet Take 1 tablet (80 mg) by mouth once daily at bedtime.   4/21/2024    baclofen (Lioresal) 20 mg tablet Take 1 tablet (20 mg) by mouth 3 times a day. 90 tablet 11 4/22/2024 at am    diazePAM (Valium) 5 mg tablet Take 1 tablet (5 mg) by mouth 2 times a day.   4/21/2024    DULoxetine (Cymbalta) 60 mg DR capsule Take 1 capsule (60 mg) by mouth once daily.   4/22/2024 at am    fluticasone (Flonase) 50 mcg/actuation nasal spray Administer 1 spray into each nostril once daily as needed.   Past Week     mg tablet Take 1 tablet (800 mg) by mouth 3 times a day as needed for mild pain (1 - 3) or moderate pain (4 - 6).   4/22/2024 at am    ketamine HCl (ketamine, bulk,) 100 % powder Ketamine 100 mg/mL + lidocaine 40 mg/mL 1 puff 4 times daily as needed, DSP#20 mL x 5 refills (Patient taking differently: 1 puff by intranasal route 4 times a day. Ketamine 100 mg/mL + lidocaine 40 mg/mL 1 puff 4 times daily as needed, DSP#20 mL x 5 refills) 1 g 5 4/22/2024 at am    lidocaine (Lidoderm) 5 % patch Place 1 patch on the skin once daily. Remove & discard patch within 12 hours or as directed by MD.   4/22/2024 at am    omeprazole (PriLOSEC) 40 mg DR capsule Take 1 capsule (40 mg) by mouth 2 times a day. For 90 days   4/22/2024 at am    pregabalin (Lyrica) 300 mg capsule Take 1 capsule (300 mg) by mouth 2 times a day. 60 capsule 0 4/22/2024 at am    urea (Carmol) 40 % cream Apply 1 Application topically once daily. feet   4/22/2024 at am       Active orders:  Active Orders   Lab    Basic metabolic panel     Frequency: AM draw     Number of Occurrences: Until Specified    CBC     Frequency: AM draw     Number  of Occurrences: Until Specified   Diet    NPO Diet; Effective midnight     Frequency: Effective midnight     Number of Occurrences: Until Specified   Nursing    Activity (specify) Ambulate     Frequency: Until discontinued     Number of Occurrences: Until Specified    Notify provider     Frequency: Until discontinued     Number of Occurrences: Until Specified    Sequential compression device     Frequency: Until discontinued     Number of Occurrences: Until Specified    Vital Signs     Frequency: q4h     Number of Occurrences: Until Specified   Code Status    Full code     Frequency: Continuous     Number of Occurrences: Until Specified   Consult    Inpatient consult to Cardiology     Frequency: Once     Number of Occurrences: 1 Occurrences   ECG    ECG 12 Lead     Frequency: PRN     Number of Occurrences: Until Specified     Order Comments: Notify provider if performed.     Medications    acetaminophen (Tylenol) oral liquid 650 mg     Linked Order: Or     Frequency: q4h PRN     Dose: 650 mg     Route: nasogastric tube    acetaminophen (Tylenol) suppository 650 mg     Linked Order: Or     Frequency: q4h PRN     Dose: 650 mg     Route: rectal    acetaminophen (Tylenol) tablet 650 mg     Linked Order: Or     Frequency: q4h PRN     Dose: 650 mg     Route: oral    ammonium lactate (Amlactin) 12 % cream     Frequency: Daily     Route: Topical    aspirin chewable tablet 81 mg     Frequency: Daily     Dose: 81 mg     Route: oral    atorvastatin (Lipitor) tablet 80 mg     Frequency: Nightly     Dose: 80 mg     Route: oral    baclofen (Lioresal) tablet 20 mg     Frequency: TID     Dose: 20 mg     Route: oral    diazePAM (Valium) tablet 5 mg     Frequency: BID     Dose: 5 mg     Route: oral    DULoxetine (Cymbalta) DR capsule 60 mg     Frequency: Daily     Dose: 60 mg     Route: oral    fluticasone (Flonase) nasal spray 1 spray     Frequency: Daily PRN     Dose: 1 spray     Route: Each Nostril    ibuprofen tablet 800 mg      "Frequency: q8h PRN     Dose: 800 mg     Route: oral    lidocaine 4 % patch 1 patch     Frequency: Daily     Dose: 1 patch     Route: transdermal    magnesium oxide (Mag-Ox) tablet 400 mg     Frequency: Daily     Dose: 400 mg     Route: oral    nitroglycerin (Nitrostat) SL tablet 0.4 mg     Frequency: q5 min PRN     Dose: 0.4 mg     Route: sublingual    pantoprazole (ProtoNix) EC tablet 40 mg     Frequency: Daily before breakfast     Dose: 40 mg     Route: oral    pregabalin (Lyrica) capsule 300 mg     Frequency: BID     Dose: 300 mg     Route: oral           Allergies:   Allergies   Allergen Reactions    Morphine Hives, Rash and Shortness of breath     Tolerates hydromorphone    Ultram [Tramadol] Seizure    Alprazolam Psychosis     \"GOES CRAZY\", psychosis    Ketorolac Seizure    Fish Containing Products Hives and Itching     IT WAS A PROCESSED FISH MEAL, BUT STATES EATS FRESH AND FROZEN FISH ALL THE TIME.       Review of Systems:  A 10 systems review of systems is negative except for HPI.      Vitals:  Patient Vitals for the past 24 hrs:   BP Temp Temp src Pulse Resp SpO2 Height Weight   04/23/24 0400 130/76 35.6 °C (96.1 °F) Temporal 74 18 96 % -- --   04/22/24 2000 144/70 36 °C (96.8 °F) Temporal 96 18 96 % -- --   04/22/24 1751 138/90 35.9 °C (96.6 °F) -- 72 18 97 % 1.803 m (5' 11\") 80.7 kg (178 lb)   04/22/24 1504 (!) 140/91 -- -- 88 16 98 % -- --   04/22/24 1430 -- -- -- 86 18 99 % -- --   04/22/24 1400 139/88 -- -- 92 16 98 % -- --   04/22/24 1330 138/90 -- -- 86 18 99 % -- --   04/22/24 1227 (!) 151/95 -- -- 93 18 100 % -- --   04/22/24 1157 (!) 149/102 -- -- 97 16 99 % -- --   04/22/24 1144 (!) 145/105 36.1 °C (97 °F) Temporal 76 18 99 % 1.778 m (5' 10\") 80.7 kg (178 lb)     Body mass index is 24.83 kg/m².         Physical Exam:  General appearance: Well-appearing alert, in no acute distress and well-hydrated, well nourished  Skin: Skin color, texture, turgor normal, no suspicious rashes or lesions  Head: " normal  Eyes: Anicteric sclera.  Extraocular movements are intact.   Ears: external ears normal  Nose/Sinuses: Negative  Oropharynx: Lips, mucosa, and tongue normal  Neck: Supple, no adenopathy; thyroid symmetric, normal size, no bruits  Lungs: Clear to auscultation, no wheezing or rhonchi  Heart: RRR without murmur, gallop, or rubs.  No ectopy  Abdomen: Soft, non-tender. Bowel sounds normal. No masses, organomegaly  Extremities: No deformity, no edema  Neuro: Alert oriented x3, no focal deficit.      Labs:  Results for orders placed or performed during the hospital encounter of 04/22/24 (from the past 24 hour(s))   ECG 12 lead   Result Value Ref Range    Ventricular Rate 91 BPM    Atrial Rate 91 BPM    NH Interval 150 ms    QRS Duration 100 ms    QT Interval 366 ms    QTC Calculation(Bazett) 450 ms    P Axis 59 degrees    R Axis 26 degrees    T Axis 40 degrees    QRS Count 15 beats    Q Onset 219 ms    P Onset 144 ms    P Offset 196 ms    T Offset 402 ms    QTC Fredericia 420 ms   Comprehensive metabolic panel   Result Value Ref Range    Glucose 78 74 - 99 mg/dL    Sodium 138 136 - 145 mmol/L    Potassium 3.6 3.5 - 5.3 mmol/L    Chloride 102 98 - 107 mmol/L    Bicarbonate 28 21 - 32 mmol/L    Anion Gap 12 10 - 20 mmol/L    Urea Nitrogen 22 6 - 23 mg/dL    Creatinine 0.78 0.50 - 1.30 mg/dL    eGFR >90 >60 mL/min/1.73m*2    Calcium 9.8 8.6 - 10.3 mg/dL    Albumin 5.1 (H) 3.4 - 5.0 g/dL    Alkaline Phosphatase 69 33 - 120 U/L    Total Protein 8.1 6.4 - 8.2 g/dL    AST 34 9 - 39 U/L    Bilirubin, Total 0.7 0.0 - 1.2 mg/dL    ALT 34 10 - 52 U/L   CBC and Auto Differential   Result Value Ref Range    WBC 8.1 4.4 - 11.3 x10*3/uL    nRBC 0.0 0.0 - 0.0 /100 WBCs    RBC 5.89 4.50 - 5.90 x10*6/uL    Hemoglobin 16.0 13.5 - 17.5 g/dL    Hematocrit 49.0 41.0 - 52.0 %    MCV 83 80 - 100 fL    MCH 27.2 26.0 - 34.0 pg    MCHC 32.7 32.0 - 36.0 g/dL    RDW 14.7 (H) 11.5 - 14.5 %    Platelets 275 150 - 450 x10*3/uL    Neutrophils %  "57.6 40.0 - 80.0 %    Immature Granulocytes %, Automated 0.2 0.0 - 0.9 %    Lymphocytes % 30.1 13.0 - 44.0 %    Monocytes % 7.0 2.0 - 10.0 %    Eosinophils % 4.1 0.0 - 6.0 %    Basophils % 1.0 0.0 - 2.0 %    Neutrophils Absolute 4.69 1.20 - 7.70 x10*3/uL    Immature Granulocytes Absolute, Automated 0.02 0.00 - 0.70 x10*3/uL    Lymphocytes Absolute 2.45 1.20 - 4.80 x10*3/uL    Monocytes Absolute 0.57 0.10 - 1.00 x10*3/uL    Eosinophils Absolute 0.33 0.00 - 0.70 x10*3/uL    Basophils Absolute 0.08 0.00 - 0.10 x10*3/uL   Troponin I, High Sensitivity, Initial   Result Value Ref Range    Troponin I, High Sensitivity 39 (H) 0 - 20 ng/L   Magnesium   Result Value Ref Range    Magnesium 1.93 1.60 - 2.40 mg/dL   Troponin, High Sensitivity, 1 Hour   Result Value Ref Range    Troponin I, High Sensitivity 40 (H) 0 - 20 ng/L   Troponin I, High Sensitivity   Result Value Ref Range    Troponin I, High Sensitivity 39 (H) 0 - 20 ng/L         Imaging:   Reviewed          Assessment/Plan:    Chest pain, unspecified type    Elevated troponin I level  Admit patient to the medical floor  Continue home medications  Consult cardiology      Neck pain  Pain control  Patient follows up with pain management  C-spine MRI      DVT Prophylaxis- Addressed    Discussed with patient, RN    SIGNATURE: Denis Martinez MD  DATE: April 23, 2024  TIME: 6:35 AM      This note was partially created using voice recognition software and is inherently subject to errors including those of syntax and \"sound-alike\" substitutions which may escape proofreading. In such instances, original meaning may be extrapolated by contextual derivation    "

## 2024-04-23 NOTE — PROGRESS NOTES
04/23/24 0955   Discharge Planning   Living Arrangements Spouse/significant other   Support Systems Spouse/significant other   Assistance Needed denies   Type of Residence Private residence   Do you have animals or pets at home? Yes   Type of Animals or Pets cat   Home or Post Acute Services None   Patient expects to be discharged to: home   Does the patient need discharge transport arranged? No   Financial Resource Strain   How hard is it for you to pay for the very basics like food, housing, medical care, and heating? Not hard   Housing Stability   In the last 12 months, was there a time when you were not able to pay the mortgage or rent on time? N   In the last 12 months, how many places have you lived? 1   In the last 12 months, was there a time when you did not have a steady place to sleep or slept in a shelter (including now)? N   Transportation Needs   In the past 12 months, has lack of transportation kept you from medical appointments or from getting medications? no   In the past 12 months, has lack of transportation kept you from meetings, work, or from getting things needed for daily living? No     Introduced myself to the patient, explained my role in the discharge process when he is medically ready. He is to purchase his medications , received education, and takes as ordered. He denies any needs at discharge.

## 2024-04-24 NOTE — NURSING NOTE
2234 Discharge instructions given to patient with verbalized comprehension. Iv heplock removed. Awaiting .

## 2024-04-26 LAB
ATRIAL RATE: 91 BPM
P AXIS: 59 DEGREES
P OFFSET: 196 MS
P ONSET: 144 MS
PR INTERVAL: 150 MS
Q ONSET: 219 MS
QRS COUNT: 15 BEATS
QRS DURATION: 100 MS
QT INTERVAL: 366 MS
QTC CALCULATION(BAZETT): 450 MS
QTC FREDERICIA: 420 MS
R AXIS: 26 DEGREES
T AXIS: 40 DEGREES
T OFFSET: 402 MS
VENTRICULAR RATE: 91 BPM

## 2024-04-27 NOTE — DISCHARGE SUMMARY
Discharge Diagnosis  Chest pain, unspecified type  Neck pain  Left hand numbness      Discharge Meds     Your medication list        START taking these medications        Instructions Last Dose Given Next Dose Due   aspirin 81 mg chewable tablet  Start taking on: April 24, 2024      Chew 1 tablet (81 mg) once daily.              CHANGE how you take these medications        Instructions Last Dose Given Next Dose Due   ibuprofen 800 mg tablet  What changed:   when to take this  reasons to take this      Take 1 tablet (800 mg) by mouth every 8 hours if needed for moderate pain (4 - 6).       ketamine (bulk) 100 % powder  What changed:   how much to take  how to take this  when to take this      Ketamine 100 mg/mL + lidocaine 40 mg/mL 1 puff 4 times daily as needed, DSP#20 mL x 5 refills              CONTINUE taking these medications        Instructions Last Dose Given Next Dose Due   atorvastatin 80 mg tablet  Commonly known as: Lipitor           baclofen 20 mg tablet  Commonly known as: Lioresal      Take 1 tablet (20 mg) by mouth 3 times a day.       diazePAM 5 mg tablet  Commonly known as: Valium           DULoxetine 60 mg DR capsule  Commonly known as: Cymbalta           fluticasone 50 mcg/actuation nasal spray  Commonly known as: Flonase           lidocaine 5 % patch  Commonly known as: Lidoderm           omeprazole 40 mg DR capsule  Commonly known as: PriLOSEC           pregabalin 300 mg capsule  Commonly known as: Lyrica      Take 1 capsule (300 mg) by mouth 2 times a day.       urea 40 % cream  Commonly known as: Carmol                  STOP taking these medications      acetaminophen 500 mg tablet  Commonly known as: Tylenol                  Where to Get Your Medications        These medications were sent to [id number] [place], [place] - [address] [address] [address]  [address] [facility] [address]      Phone: [phone]   aspirin 81 mg chewable tablet       Information about where to get these medications  is not yet available    Ask your nurse or doctor about these medications  ibuprofen 800 mg tablet         Test Results Pending At Discharge  Pending Labs       No current pending labs.            Hospital Course  Patient is a 54-year-old male with history of neck pain, admitted to the hospital with chest pain, neck pain, and left neurovascular patient had cardiac workup was evaluated by cardiology, patient had MRI of the C-spine, patient was cleared to be discharged and follow-up as an outpatient.      Outpatient Follow-Up  Future Appointments   Date Time Provider Department Center   5/14/2024  9:00 AM Carolina Haque PsyD [id number] [facility]   6/11/2024  9:15 AM Yobani Torres OD [id number] [facility]   7/12/2024 12:30 PM INF 02 PARM OPCTR PAROPCINF [facility]   8/15/2024  1:00 PM Royce Louis MD [id number] [facility]         Denis Martinez MD

## 2024-05-13 ENCOUNTER — APPOINTMENT (OUTPATIENT)
Dept: CARDIOLOGY | Facility: HOSPITAL | Age: 54
End: 2024-05-13
Payer: COMMERCIAL

## 2024-05-13 ENCOUNTER — HOSPITAL ENCOUNTER (EMERGENCY)
Facility: HOSPITAL | Age: 54
Discharge: HOME | End: 2024-05-13
Attending: STUDENT IN AN ORGANIZED HEALTH CARE EDUCATION/TRAINING PROGRAM
Payer: COMMERCIAL

## 2024-05-13 ENCOUNTER — APPOINTMENT (OUTPATIENT)
Dept: RADIOLOGY | Facility: HOSPITAL | Age: 54
End: 2024-05-13
Payer: COMMERCIAL

## 2024-05-13 VITALS
WEIGHT: 180 LBS | DIASTOLIC BLOOD PRESSURE: 94 MMHG | SYSTOLIC BLOOD PRESSURE: 135 MMHG | OXYGEN SATURATION: 98 % | HEIGHT: 71 IN | TEMPERATURE: 97.9 F | RESPIRATION RATE: 13 BRPM | HEART RATE: 96 BPM | BODY MASS INDEX: 25.2 KG/M2

## 2024-05-13 DIAGNOSIS — R00.2 HEART PALPITATIONS: ICD-10-CM

## 2024-05-13 DIAGNOSIS — M54.2 NECK PAIN, CHRONIC: Primary | ICD-10-CM

## 2024-05-13 DIAGNOSIS — G89.29 NECK PAIN, CHRONIC: Primary | ICD-10-CM

## 2024-05-13 LAB
ALBUMIN SERPL BCP-MCNC: 4.5 G/DL (ref 3.4–5)
ALP SERPL-CCNC: 55 U/L (ref 33–120)
ALT SERPL W P-5'-P-CCNC: 33 U/L (ref 10–52)
ANION GAP SERPL CALC-SCNC: 13 MMOL/L (ref 10–20)
AST SERPL W P-5'-P-CCNC: 24 U/L (ref 9–39)
BASOPHILS # BLD AUTO: 0.11 X10*3/UL (ref 0–0.1)
BASOPHILS NFR BLD AUTO: 1 %
BILIRUB SERPL-MCNC: 0.4 MG/DL (ref 0–1.2)
BUN SERPL-MCNC: 19 MG/DL (ref 6–23)
CALCIUM SERPL-MCNC: 9.2 MG/DL (ref 8.6–10.3)
CARDIAC TROPONIN I PNL SERPL HS: 38 NG/L (ref 0–20)
CARDIAC TROPONIN I PNL SERPL HS: 41 NG/L (ref 0–20)
CHLORIDE SERPL-SCNC: 106 MMOL/L (ref 98–107)
CO2 SERPL-SCNC: 23 MMOL/L (ref 21–32)
CREAT SERPL-MCNC: 0.67 MG/DL (ref 0.5–1.3)
EGFRCR SERPLBLD CKD-EPI 2021: >90 ML/MIN/1.73M*2
EOSINOPHIL # BLD AUTO: 0.42 X10*3/UL (ref 0–0.7)
EOSINOPHIL NFR BLD AUTO: 4 %
ERYTHROCYTE [DISTWIDTH] IN BLOOD BY AUTOMATED COUNT: 14.9 % (ref 11.5–14.5)
GLUCOSE SERPL-MCNC: 101 MG/DL (ref 74–99)
HCT VFR BLD AUTO: 46 % (ref 41–52)
HGB BLD-MCNC: 14.8 G/DL (ref 13.5–17.5)
IMM GRANULOCYTES # BLD AUTO: 0.05 X10*3/UL (ref 0–0.7)
IMM GRANULOCYTES NFR BLD AUTO: 0.5 % (ref 0–0.9)
LYMPHOCYTES # BLD AUTO: 1.91 X10*3/UL (ref 1.2–4.8)
LYMPHOCYTES NFR BLD AUTO: 18.2 %
MAGNESIUM SERPL-MCNC: 1.99 MG/DL (ref 1.6–2.4)
MCH RBC QN AUTO: 27.4 PG (ref 26–34)
MCHC RBC AUTO-ENTMCNC: 32.2 G/DL (ref 32–36)
MCV RBC AUTO: 85 FL (ref 80–100)
MONOCYTES # BLD AUTO: 0.82 X10*3/UL (ref 0.1–1)
MONOCYTES NFR BLD AUTO: 7.8 %
NEUTROPHILS # BLD AUTO: 7.18 X10*3/UL (ref 1.2–7.7)
NEUTROPHILS NFR BLD AUTO: 68.5 %
NRBC BLD-RTO: 0 /100 WBCS (ref 0–0)
PLATELET # BLD AUTO: 271 X10*3/UL (ref 150–450)
POTASSIUM SERPL-SCNC: 4.1 MMOL/L (ref 3.5–5.3)
PROT SERPL-MCNC: 7.6 G/DL (ref 6.4–8.2)
RBC # BLD AUTO: 5.41 X10*6/UL (ref 4.5–5.9)
SODIUM SERPL-SCNC: 138 MMOL/L (ref 136–145)
TSH SERPL-ACNC: 0.86 MIU/L (ref 0.44–3.98)
WBC # BLD AUTO: 10.5 X10*3/UL (ref 4.4–11.3)

## 2024-05-13 PROCEDURE — 93005 ELECTROCARDIOGRAM TRACING: CPT | Mod: 59

## 2024-05-13 PROCEDURE — 71046 X-RAY EXAM CHEST 2 VIEWS: CPT | Performed by: RADIOLOGY

## 2024-05-13 PROCEDURE — 84443 ASSAY THYROID STIM HORMONE: CPT | Performed by: PHYSICIAN ASSISTANT

## 2024-05-13 PROCEDURE — 99284 EMERGENCY DEPT VISIT MOD MDM: CPT | Mod: 25

## 2024-05-13 PROCEDURE — 36415 COLL VENOUS BLD VENIPUNCTURE: CPT | Performed by: PHYSICIAN ASSISTANT

## 2024-05-13 PROCEDURE — 96375 TX/PRO/DX INJ NEW DRUG ADDON: CPT

## 2024-05-13 PROCEDURE — 96374 THER/PROPH/DIAG INJ IV PUSH: CPT

## 2024-05-13 PROCEDURE — 84484 ASSAY OF TROPONIN QUANT: CPT | Performed by: PHYSICIAN ASSISTANT

## 2024-05-13 PROCEDURE — 96376 TX/PRO/DX INJ SAME DRUG ADON: CPT

## 2024-05-13 PROCEDURE — 93005 ELECTROCARDIOGRAM TRACING: CPT

## 2024-05-13 PROCEDURE — 2500000004 HC RX 250 GENERAL PHARMACY W/ HCPCS (ALT 636 FOR OP/ED): Performed by: PHYSICIAN ASSISTANT

## 2024-05-13 PROCEDURE — 99285 EMERGENCY DEPT VISIT HI MDM: CPT | Performed by: PHYSICIAN ASSISTANT

## 2024-05-13 PROCEDURE — 71046 X-RAY EXAM CHEST 2 VIEWS: CPT

## 2024-05-13 PROCEDURE — 85025 COMPLETE CBC W/AUTO DIFF WBC: CPT | Performed by: PHYSICIAN ASSISTANT

## 2024-05-13 PROCEDURE — 2500000005 HC RX 250 GENERAL PHARMACY W/O HCPCS: Performed by: PHYSICIAN ASSISTANT

## 2024-05-13 PROCEDURE — 84075 ASSAY ALKALINE PHOSPHATASE: CPT | Performed by: PHYSICIAN ASSISTANT

## 2024-05-13 PROCEDURE — 83735 ASSAY OF MAGNESIUM: CPT | Performed by: PHYSICIAN ASSISTANT

## 2024-05-13 RX ORDER — LIDOCAINE 560 MG/1
1 PATCH PERCUTANEOUS; TOPICAL; TRANSDERMAL ONCE
Status: DISCONTINUED | OUTPATIENT
Start: 2024-05-13 | End: 2024-05-13 | Stop reason: HOSPADM

## 2024-05-13 RX ORDER — METHOCARBAMOL 100 MG/ML
1000 INJECTION, SOLUTION INTRAMUSCULAR; INTRAVENOUS ONCE
Status: COMPLETED | OUTPATIENT
Start: 2024-05-13 | End: 2024-05-13

## 2024-05-13 RX ORDER — ACETAMINOPHEN 325 MG/1
975 TABLET ORAL ONCE
Status: COMPLETED | OUTPATIENT
Start: 2024-05-13 | End: 2024-05-13

## 2024-05-13 RX ADMIN — DEXAMETHASONE SODIUM PHOSPHATE 10 MG: 4 INJECTION, SOLUTION INTRAMUSCULAR; INTRAVENOUS at 11:14

## 2024-05-13 RX ADMIN — HYDROMORPHONE HYDROCHLORIDE 0.5 MG: 1 INJECTION, SOLUTION INTRAMUSCULAR; INTRAVENOUS; SUBCUTANEOUS at 11:13

## 2024-05-13 RX ADMIN — HYDROMORPHONE HYDROCHLORIDE 0.5 MG: 1 INJECTION, SOLUTION INTRAMUSCULAR; INTRAVENOUS; SUBCUTANEOUS at 13:02

## 2024-05-13 RX ADMIN — ACETAMINOPHEN 975 MG: 325 TABLET ORAL at 13:02

## 2024-05-13 RX ADMIN — LIDOCAINE 4% 1 PATCH: 40 PATCH TOPICAL at 13:03

## 2024-05-13 RX ADMIN — METHOCARBAMOL 1000 MG: 1000 INJECTION, SOLUTION INTRAMUSCULAR; INTRAVENOUS at 11:09

## 2024-05-13 ASSESSMENT — LIFESTYLE VARIABLES
EVER FELT BAD OR GUILTY ABOUT YOUR DRINKING: NO
HAVE YOU EVER FELT YOU SHOULD CUT DOWN ON YOUR DRINKING: NO
TOTAL SCORE: 0
HAVE PEOPLE ANNOYED YOU BY CRITICIZING YOUR DRINKING: NO
EVER HAD A DRINK FIRST THING IN THE MORNING TO STEADY YOUR NERVES TO GET RID OF A HANGOVER: NO

## 2024-05-13 ASSESSMENT — PAIN SCALES - GENERAL
PAINLEVEL_OUTOF10: 10 - WORST POSSIBLE PAIN
PAINLEVEL_OUTOF10: 5 - MODERATE PAIN
PAINLEVEL_OUTOF10: 8
PAINLEVEL_OUTOF10: 6
PAINLEVEL_OUTOF10: 4
PAINLEVEL_OUTOF10: 10 - WORST POSSIBLE PAIN
PAINLEVEL_OUTOF10: 8
PAINLEVEL_OUTOF10: 7

## 2024-05-13 ASSESSMENT — PAIN DESCRIPTION - LOCATION: LOCATION: NECK

## 2024-05-13 ASSESSMENT — PAIN - FUNCTIONAL ASSESSMENT
PAIN_FUNCTIONAL_ASSESSMENT: 0-10
PAIN_FUNCTIONAL_ASSESSMENT: 0-10

## 2024-05-13 NOTE — ED PROVIDER NOTES
HPI   Chief Complaint   Patient presents with    Neck Pain       Patient is a 54-year-old male with history of  chronic neck pain with known disc herniations at C4-C5 and C5-C6 there was seen on recent MRI, myofascial pain, narcotic drug use, complex regional pain syndrome, trochanteric bursitis of both hips, elevated troponin levels, heart palpitations who presents today for evaluation of neck pain.  Patient was seen back on 422, at that time was complaining of heart palpitations as well as neck pain with left-sided numbness and tingling of his left arm.  Patient states has had neck pain for a while but over the past 3 and half weeks has been worse.  Patient follows with Dr. Zeng with orthopedics, they looked at his MRI that was done during his most recent hospitalization and stated that it is nonsurgical, patient received a injection in the neck which helped for about 24 hours and then the pain came back.  Patient states he needs 1 more injection and then his neck step will be a radiofrequency ablation of the neck.  He states that he needs to wait several weeks in between injections.  He states that his neck pain waxes and wanes in severity, denies any new falls injuries or traumas but states that yesterday he slept on his left side on the couch when he woke up his head was hanging off the side of the couch.  Patient states today his neck pain is worse, at the same neck pain he is always had, it is located mostly on the left side, he has the same numbness and tingling that he has to the left arm that he always has.  Patient does have a history of dropping things but that is not anything new.  No history of IVDU, no unexplained fevers night sweats or weight loss, no incontinence or saddle paresthesias.  Denies any pain elsewhere.  Patient states when his neck pain gets severe he starts having heart palpitations, he was recently admitted here and had a cardiac workup including an echocardiogram.  He is with an  outpatient stress test.  He states he started having heart palpitations here and he was told by medics that he had PVCs on his EKG.  He states he called Dr. Treadwell, his cardiologist who stated that he would see him in the emergency department.                          [place] Coma Scale Score: 15                     Patient History   Past Medical History:   Diagnosis Date    Anesthesia of skin     Numbness and tingling    Anxiety 09/21/2023    Arthritis Unk    Bilateral myopia 09/21/2023    Cervical radicular pain 09/21/2023    Chronic patellofemoral pain of left knee 09/21/2023    Complex regional pain syndrome type 1 of left lower extremity 09/21/2023    Cubital tunnel syndrome on left 09/21/2023    Cubital tunnel syndrome on right 09/21/2023    Disease of intestine, unspecified     Bowel trouble    Erectile dysfunction 09/21/2023    Foot joint pain 09/21/2023    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    History of psychological trauma 09/21/2023    Joint pain 09/21/2023    Kidney cysts 09/21/2023    Low back pain, unspecified 12/07/2022    Chronic low back pain without sciatica, unspecified back pain laterality    Low back pain, unspecified 08/09/2022    Lumbago    Lumbar spondylosis 09/21/2023    Lung nodule 09/21/2023    Metatarsalgia of left foot 09/21/2023    Myopia, bilateral 02/19/2019    Myopia of both eyes with astigmatism and presbyopia    Osteochondral defect of patella 09/21/2023    Other forms of angina pectoris ([facility])     Stable angina pectoris    Other spondylosis, cervical region 09/21/2023    Patellofemoral arthritis 09/21/2023    Personal history of other diseases of the musculoskeletal system and connective tissue     History of arthritis    Personal history of other specified conditions     History of seizure    Reflex sympathetic dystrophy 09/21/2023    Scapular dyskinesis 09/21/2023    Shoulder impingement 09/21/2023    Status post left hip replacement 10/04/2023    Suspected sleep apnea 09/21/2023     Trauma and stressor-related disorder 09/21/2023    Trochanteric bursitis of both hips 09/22/2023    Unspecified disorder of ear, unspecified ear     Ear, nose and throat disorder     Past Surgical History:   Procedure Laterality Date    ACHILLES TENDON SURGERY  1978 2016    CT ABDOMEN PELVIS ANGIOGRAM W AND/OR WO IV CONTRAST  03/18/2020    CT ABDOMEN PELVIS ANGIOGRAM W AND/OR WO IV CONTRAST 3/18/2020 [facility]    CT ANGIO NECK  12/16/2022    CT NECK ANGIO W AND WO IV CONTRAST 12/16/2022 [facility]    CT HEAD ANGIO W AND WO IV CONTRAST  12/16/2022    CT HEAD ANGIO W AND WO IV CONTRAST 12/16/2022 [facility]    HIP SURGERY      KNEE ARTHROPLASTY  06/13/2017    Knee Arthroplasty    OTHER SURGICAL HISTORY  01/06/2022    Ulnar nerve neuroplasty    OTHER SURGICAL HISTORY  12/27/2019    Knee fracture repair    OTHER SURGICAL HISTORY  07/26/2019    Foot surgery    PLANTAR FASCIA RELEASE      SHOULDER SURGERY  06/13/2017    Shoulder Surgery     Family History   Problem Relation Name Age of Onset    Lung cancer Mother      Glaucoma Father      Skin cancer Father      Hypertension Other mul fam mem     Heart disease Other mul fam mem      Social History     Tobacco Use    Smoking status: Never    Smokeless tobacco: Never    Tobacco comments:     It killed my mother smoking and she left me with a gift a nodule on my right lung   Vaping Use    Vaping status: Never Used   Substance Use Topics    Alcohol use: Not Currently    Drug use: Not Currently     Types: Marijuana, Ketamine, Cocaine     Comment: Had used cocaine, marijuana, ecstasy among others in 2006. and before.       Physical Exam   ED Triage Vitals [05/13/24 1030]   Temperature Heart Rate Respirations BP   36.6 °C (97.9 °F) 74 18 137/79      Pulse Ox Temp Source Heart Rate Source Patient Position   99 % Temporal Monitor Sitting      BP Location FiO2 (%)     Right arm --       Physical Exam  Vitals and nursing note reviewed.    Constitutional:       General: He is not in acute distress.     Appearance: Normal appearance. He is not toxic-appearing.   HENT:      Head: Normocephalic and atraumatic.      Nose: Nose normal.   Eyes:      Extraocular Movements: Extraocular movements intact.   Cardiovascular:      Rate and Rhythm: Normal rate and regular rhythm.   Pulmonary:      Effort: Pulmonary effort is normal.      Breath sounds: Normal breath sounds. No wheezing, rhonchi or rales.   Abdominal:      Palpations: Abdomen is soft.   Musculoskeletal:         General: Normal range of motion.      Cervical back: Normal range of motion and neck supple.      Comments:     No reproducible midline cervical tenderness, no bruising swelling step-offs or deformities.  Patient with diminished strength with shoulder flexion extension, push pull and  strength to the left upper extremity, has been present in the since his most recent MRI.  States that his orthopedic doctor knows about this weakness.     Skin:     General: Skin is warm and dry.   Neurological:      General: No focal deficit present.      Mental Status: He is alert.      Sensory: Sensory deficit (LUE (pt has had an ulnar surgery so this is chronic)) present.      Motor: Weakness (LUE (ortho aware and present since last hospitalization per patient)) present.   Psychiatric:         Mood and Affect: Mood normal.         Thought Content: Thought content normal.       XR chest 2 views   Final Result   No definite acute cardiopulmonary process radiographically.        MACRO:   None.        Signed by: Keli Garay 5/13/2024 11:06 AM   Dictation workstation:   [id number]        Labs Reviewed   CBC WITH AUTO DIFFERENTIAL - Abnormal       Result Value    WBC 10.5      nRBC 0.0      RBC 5.41      Hemoglobin 14.8      Hematocrit 46.0      MCV 85      MCH 27.4      MCHC 32.2      RDW 14.9 (*)     Platelets 271      Neutrophils % 68.5      Immature Granulocytes %, Automated 0.5      Lymphocytes %  18.2      Monocytes % 7.8      Eosinophils % 4.0      Basophils % 1.0      Neutrophils Absolute 7.18      Immature Granulocytes Absolute, Automated 0.05      Lymphocytes Absolute 1.91      Monocytes Absolute 0.82      Eosinophils Absolute 0.42      Basophils Absolute 0.11 (*)    COMPREHENSIVE METABOLIC PANEL - Abnormal    Glucose 101 (*)     Sodium 138      Potassium 4.1      Chloride 106      Bicarbonate 23      Anion Gap 13      Urea Nitrogen 19      Creatinine 0.67      eGFR >90      Calcium 9.2      Albumin 4.5      Alkaline Phosphatase 55      Total Protein 7.6      AST 24      Bilirubin, Total 0.4      ALT 33     SERIAL TROPONIN-INITIAL - Abnormal    Troponin I, High Sensitivity 41 (*)     Narrative:     Less than 99th percentile of normal range cutoff-  Female and children under 18 years old <14 ng/L; Male <21 ng/L: Negative  Repeat testing should be performed if clinically indicated.     Female and children under 18 years old 14-50 ng/L; Male 21-50 ng/L:  Consistent with possible cardiac damage and possible increased clinical   risk. Serial measurements may help to assess extent of myocardial damage.     >50 ng/L: Consistent with cardiac damage, increased clinical risk and  myocardial infarction. Serial measurements may help assess extent of   myocardial damage.      NOTE: Children less than 1 year old may have higher baseline troponin   levels and results should be interpreted in conjunction with the overall   clinical context.     NOTE: Troponin I testing is performed using a different   testing methodology at [facility] than at other   [facility] hospitals. Direct result comparisons should only   be made within the same method.   SERIAL TROPONIN, 1 HOUR - Abnormal    Troponin I, High Sensitivity 38 (*)     Narrative:     Less than 99th percentile of normal range cutoff-  Female and children under 18 years old <14 ng/L; Male <21 ng/L: Negative  Repeat testing should be performed if clinically  indicated.     Female and children under 18 years old 14-50 ng/L; Male 21-50 ng/L:  Consistent with possible cardiac damage and possible increased clinical   risk. Serial measurements may help to assess extent of myocardial damage.     >50 ng/L: Consistent with cardiac damage, increased clinical risk and  myocardial infarction. Serial measurements may help assess extent of   myocardial damage.      NOTE: Children less than 1 year old may have higher baseline troponin   levels and results should be interpreted in conjunction with the overall   clinical context.     NOTE: Troponin I testing is performed using a different   testing methodology at [facility] than at other   [facility]. Direct result comparisons should only   be made within the same method.   MAGNESIUM - Normal    Magnesium 1.99     TSH WITH REFLEX TO FREE T4 IF ABNORMAL - Normal    Thyroid Stimulating Hormone 0.86      Narrative:     TSH testing is performed using different testing methodology at [facility] than at other [facility]. Direct result comparisons should only be made within the same method.     TROPONIN SERIES- (INITIAL, 1 HR)    Narrative:     The following orders were created for panel order Troponin Series, (0, 1 HR).  Procedure                               Abnormality         Status                     ---------                               -----------         ------                     Troponin I, High Sensiti...[[id number]]  Abnormal            Final result               Troponin, High Sensitivi...[[id number]]  Abnormal            Final result                 Please view results for these tests on the individual orders.         ED Course & MDM   ED Course as of 05/13/24 1954   Mon May 13, 2024   1202 Troponin I, High Sensitivity(!): 41  Baseline, will repeat delta [MK]   1203 ECG 12 lead  NSR with PVCs, rate 87, , , QTc 433, normal axis, no ischemic changes. [MK]      ED Course User  Index  [MK] Lise Aguilera PA-C         Diagnoses as of 05/13/24 1954   Neck pain, chronic   Heart palpitations       Medical Decision Making    MDM: Patient is a 54-year-old male who presents today for evaluation of neck pain, he states that so severe that it causes heart palpitations which is what he had last time he was here.  On examination he has no reproducible pain but he does have weakness in his left upper extremity which she tells me has been going on since the MRI and his orthopedic doctor knows about it.  Heart regular rate rhythm, lungs sound clear, he denies any dizziness lightheadedness diaphoresis.  States he gets a little short of breath with the neck pain.  Patient was given Dilaudid, Robaxin, dexamethasone in the ED for pain control, I also obtained a cardiac workup including EKG, chest x-ray, CBC CMP troponin TSH and magnesium. Patient's troponin was initially mildly elevated at 41, down trended to 38, this is right around his baseline, EKG nonischemic, TSH magnesium within normal limits, CBC without leukocytosis or anemia, CMP unremarkable, chest x-ray without any acute cardiopulmonary abnormality.  I did reach out to Dr. Treadwell, his cardiologist who stated that he would be unable to see patient in the ED since he does not get here until 4:00.  There is no emergent indication for cardiology evaluation given stable cardiac workup.  Patient was initially given Dilaudid, Robaxin and dexamethasone through the IV, additionally while awaiting remainder of workup was given Dilaudid Tylenol and lidocaine patch.  Patient reassessed, still having neck pain but did endorse some improvement.  I offered him a steroid burst for home which patient declined stating he does not want to be on any additional steroids.  He already has muscle relaxers, pain medication at home, declined need for any additional meds.  I recommended he follow-up with his orthopedic specialist.        Procedure  Procedures     Lise  LISA Aguilera  05/13/24 1954

## 2024-05-14 ENCOUNTER — TELEMEDICINE (OUTPATIENT)
Dept: BEHAVIORAL HEALTH | Facility: CLINIC | Age: 54
End: 2024-05-14
Payer: COMMERCIAL

## 2024-05-14 DIAGNOSIS — F41.9 ANXIETY: ICD-10-CM

## 2024-05-14 DIAGNOSIS — Z91.49 HISTORY OF PSYCHOLOGICAL TRAUMA: ICD-10-CM

## 2024-05-14 PROCEDURE — 90834 PSYTX W PT 45 MINUTES: CPT

## 2024-05-14 NOTE — PROGRESS NOTES
Start time:  9:10  End time:  9:59     An interactive audio and video telecommunication system which permits real time communications between the patient (at the originating site) and provider (at the distant site) was utilized to provide this telehealth service. Verbal consent has been obtained from this patient for a telehealth visit.     The patient was informed of the current need to conduct treatment via virtual platform in light of COVID-19 pandemic. I have confirmed the patient’s identity via the following (minimum of three) acceptable identifiers as per  Policy PH-9: , address, phone number, and email address.        SUBJECTIVE:  Patient reported having a stressful morning due to cats turning off alarm and missing his injection appointment. Went to ER last night for neck pain and has some medications available. Processed stress with this change in appointments and upcoming hip surgery in July. Will be in touch with someone about exciting opportunity today. Processed upcoming changes in living situation a year out and pros/cons. Discussed how in this period of transition patient can continue to assess for himself what is best for him. Notes that he and his partner are doing well as of late.     Progress: Good  Prognosis: Good     Duration of problem: years with recent improvement     Severity: moderate     OBJECTIVE:  Orientation & Cognition: Oriented x3. Thought processes normal and appropriate to situation.  Mood, Affect: Euthymic, frustrated at joe  Appearance: Optimal by patient standards.  Harm to self or others: Not reported  Substance abuse: Not reported  Psychiatric medication use: Not reported     IMPRESSION:     F41.9 Unspecified anxiety disorder  [id number].91.49 History of psychological trauma     Goals for Therapy: Sharing difficult feelings in a therapeutic environment, expanding upon current social supports in his life, and addressing anxiety. Patient is also interested in medication management  for his feelings of traumatization which he is worried will return after he ends ketamine treatment.      PLAN:     Discussed how in this period of transition patient can continue to assess for himself what is best for him. N     Next apt: 6/17     --------------------------------------------  Other(s) present in the room: None.  --------------------------------------------  Time spent face-to-face with patient:  51 minutes

## 2024-05-17 LAB
ATRIAL RATE: 87 BPM
ATRIAL RATE: 95 BPM
P AXIS: 59 DEGREES
P AXIS: 62 DEGREES
P OFFSET: 199 MS
P OFFSET: 200 MS
P ONSET: 145 MS
P ONSET: 146 MS
PR INTERVAL: 144 MS
PR INTERVAL: 162 MS
Q ONSET: 218 MS
Q ONSET: 226 MS
QRS COUNT: 14 BEATS
QRS COUNT: 15 BEATS
QRS DURATION: 104 MS
QRS DURATION: 84 MS
QT INTERVAL: 354 MS
QT INTERVAL: 360 MS
QTC CALCULATION(BAZETT): 433 MS
QTC CALCULATION(BAZETT): 444 MS
QTC FREDERICIA: 407 MS
QTC FREDERICIA: 412 MS
R AXIS: 43 DEGREES
R AXIS: 54 DEGREES
T AXIS: 30 DEGREES
T AXIS: 38 DEGREES
T OFFSET: 398 MS
T OFFSET: 403 MS
VENTRICULAR RATE: 87 BPM
VENTRICULAR RATE: 95 BPM

## 2024-05-22 ENCOUNTER — APPOINTMENT (OUTPATIENT)
Dept: CARDIOLOGY | Facility: HOSPITAL | Age: 54
End: 2024-05-22
Payer: COMMERCIAL

## 2024-05-22 ENCOUNTER — APPOINTMENT (OUTPATIENT)
Dept: RADIOLOGY | Facility: HOSPITAL | Age: 54
End: 2024-05-22
Payer: COMMERCIAL

## 2024-05-22 ENCOUNTER — HOSPITAL ENCOUNTER (EMERGENCY)
Facility: HOSPITAL | Age: 54
Discharge: HOME | End: 2024-05-22
Attending: EMERGENCY MEDICINE
Payer: COMMERCIAL

## 2024-05-22 VITALS
BODY MASS INDEX: 24.92 KG/M2 | TEMPERATURE: 97.7 F | RESPIRATION RATE: 16 BRPM | HEART RATE: 92 BPM | DIASTOLIC BLOOD PRESSURE: 86 MMHG | WEIGHT: 178 LBS | OXYGEN SATURATION: 96 % | SYSTOLIC BLOOD PRESSURE: 144 MMHG | HEIGHT: 71 IN

## 2024-05-22 DIAGNOSIS — R07.9 CHEST PAIN, UNSPECIFIED TYPE: Primary | ICD-10-CM

## 2024-05-22 DIAGNOSIS — M54.2 NECK PAIN: ICD-10-CM

## 2024-05-22 LAB
ALBUMIN SERPL BCP-MCNC: 4.5 G/DL (ref 3.4–5)
ALP SERPL-CCNC: 56 U/L (ref 33–120)
ALT SERPL W P-5'-P-CCNC: 33 U/L (ref 10–52)
ANION GAP SERPL CALC-SCNC: 15 MMOL/L (ref 10–20)
AST SERPL W P-5'-P-CCNC: 20 U/L (ref 9–39)
BASOPHILS # BLD AUTO: 0.09 X10*3/UL (ref 0–0.1)
BASOPHILS NFR BLD AUTO: 0.9 %
BILIRUB SERPL-MCNC: 0.4 MG/DL (ref 0–1.2)
BUN SERPL-MCNC: 23 MG/DL (ref 6–23)
CALCIUM SERPL-MCNC: 9 MG/DL (ref 8.6–10.3)
CARDIAC TROPONIN I PNL SERPL HS: 36 NG/L (ref 0–20)
CARDIAC TROPONIN I PNL SERPL HS: 37 NG/L (ref 0–20)
CHLORIDE SERPL-SCNC: 107 MMOL/L (ref 98–107)
CO2 SERPL-SCNC: 21 MMOL/L (ref 21–32)
CREAT SERPL-MCNC: 0.76 MG/DL (ref 0.5–1.3)
EGFRCR SERPLBLD CKD-EPI 2021: >90 ML/MIN/1.73M*2
EOSINOPHIL # BLD AUTO: 0.21 X10*3/UL (ref 0–0.7)
EOSINOPHIL NFR BLD AUTO: 2.1 %
ERYTHROCYTE [DISTWIDTH] IN BLOOD BY AUTOMATED COUNT: 15.1 % (ref 11.5–14.5)
GLUCOSE SERPL-MCNC: 148 MG/DL (ref 74–99)
HCT VFR BLD AUTO: 44.5 % (ref 41–52)
HGB BLD-MCNC: 14.3 G/DL (ref 13.5–17.5)
IMM GRANULOCYTES # BLD AUTO: 0.05 X10*3/UL (ref 0–0.7)
IMM GRANULOCYTES NFR BLD AUTO: 0.5 % (ref 0–0.9)
INR PPP: 1 (ref 0.9–1.1)
LIPASE SERPL-CCNC: 63 U/L (ref 9–82)
LYMPHOCYTES # BLD AUTO: 3.46 X10*3/UL (ref 1.2–4.8)
LYMPHOCYTES NFR BLD AUTO: 35.3 %
MAGNESIUM SERPL-MCNC: 2.02 MG/DL (ref 1.6–2.4)
MCH RBC QN AUTO: 27 PG (ref 26–34)
MCHC RBC AUTO-ENTMCNC: 32.1 G/DL (ref 32–36)
MCV RBC AUTO: 84 FL (ref 80–100)
MONOCYTES # BLD AUTO: 0.81 X10*3/UL (ref 0.1–1)
MONOCYTES NFR BLD AUTO: 8.3 %
NEUTROPHILS # BLD AUTO: 5.19 X10*3/UL (ref 1.2–7.7)
NEUTROPHILS NFR BLD AUTO: 52.9 %
NRBC BLD-RTO: 0 /100 WBCS (ref 0–0)
PLATELET # BLD AUTO: 283 X10*3/UL (ref 150–450)
POTASSIUM SERPL-SCNC: 3.4 MMOL/L (ref 3.5–5.3)
PROT SERPL-MCNC: 7.1 G/DL (ref 6.4–8.2)
PROTHROMBIN TIME: 11.4 SECONDS (ref 9.8–12.8)
RBC # BLD AUTO: 5.29 X10*6/UL (ref 4.5–5.9)
SODIUM SERPL-SCNC: 140 MMOL/L (ref 136–145)
WBC # BLD AUTO: 9.8 X10*3/UL (ref 4.4–11.3)

## 2024-05-22 PROCEDURE — 99284 EMERGENCY DEPT VISIT MOD MDM: CPT | Mod: 25

## 2024-05-22 PROCEDURE — 84484 ASSAY OF TROPONIN QUANT: CPT | Performed by: STUDENT IN AN ORGANIZED HEALTH CARE EDUCATION/TRAINING PROGRAM

## 2024-05-22 PROCEDURE — 93005 ELECTROCARDIOGRAM TRACING: CPT | Mod: 59

## 2024-05-22 PROCEDURE — 36415 COLL VENOUS BLD VENIPUNCTURE: CPT | Performed by: STUDENT IN AN ORGANIZED HEALTH CARE EDUCATION/TRAINING PROGRAM

## 2024-05-22 PROCEDURE — 83735 ASSAY OF MAGNESIUM: CPT | Performed by: STUDENT IN AN ORGANIZED HEALTH CARE EDUCATION/TRAINING PROGRAM

## 2024-05-22 PROCEDURE — 93005 ELECTROCARDIOGRAM TRACING: CPT

## 2024-05-22 PROCEDURE — 85025 COMPLETE CBC W/AUTO DIFF WBC: CPT | Performed by: STUDENT IN AN ORGANIZED HEALTH CARE EDUCATION/TRAINING PROGRAM

## 2024-05-22 PROCEDURE — 71045 X-RAY EXAM CHEST 1 VIEW: CPT | Mod: FOREIGN READ | Performed by: RADIOLOGY

## 2024-05-22 PROCEDURE — 71045 X-RAY EXAM CHEST 1 VIEW: CPT

## 2024-05-22 PROCEDURE — 83690 ASSAY OF LIPASE: CPT | Performed by: STUDENT IN AN ORGANIZED HEALTH CARE EDUCATION/TRAINING PROGRAM

## 2024-05-22 PROCEDURE — 85610 PROTHROMBIN TIME: CPT | Performed by: STUDENT IN AN ORGANIZED HEALTH CARE EDUCATION/TRAINING PROGRAM

## 2024-05-22 PROCEDURE — 99285 EMERGENCY DEPT VISIT HI MDM: CPT | Performed by: EMERGENCY MEDICINE

## 2024-05-22 PROCEDURE — 96375 TX/PRO/DX INJ NEW DRUG ADDON: CPT

## 2024-05-22 PROCEDURE — 2500000004 HC RX 250 GENERAL PHARMACY W/ HCPCS (ALT 636 FOR OP/ED): Performed by: STUDENT IN AN ORGANIZED HEALTH CARE EDUCATION/TRAINING PROGRAM

## 2024-05-22 PROCEDURE — 96374 THER/PROPH/DIAG INJ IV PUSH: CPT

## 2024-05-22 PROCEDURE — 80053 COMPREHEN METABOLIC PANEL: CPT | Performed by: STUDENT IN AN ORGANIZED HEALTH CARE EDUCATION/TRAINING PROGRAM

## 2024-05-22 PROCEDURE — 2500000005 HC RX 250 GENERAL PHARMACY W/O HCPCS: Performed by: STUDENT IN AN ORGANIZED HEALTH CARE EDUCATION/TRAINING PROGRAM

## 2024-05-22 PROCEDURE — 2500000001 HC RX 250 WO HCPCS SELF ADMINISTERED DRUGS (ALT 637 FOR MEDICARE OP): Performed by: STUDENT IN AN ORGANIZED HEALTH CARE EDUCATION/TRAINING PROGRAM

## 2024-05-22 RX ORDER — POTASSIUM CHLORIDE 1.5 G/1.58G
40 POWDER, FOR SOLUTION ORAL ONCE
Status: COMPLETED | OUTPATIENT
Start: 2024-05-22 | End: 2024-05-22

## 2024-05-22 RX ORDER — OXYCODONE AND ACETAMINOPHEN 5; 325 MG/1; MG/1
1 TABLET ORAL EVERY 6 HOURS PRN
Qty: 5 TABLET | Refills: 0 | Status: SHIPPED | OUTPATIENT
Start: 2024-05-22 | End: 2024-05-25

## 2024-05-22 RX ORDER — PREDNISONE 50 MG/1
50 TABLET ORAL DAILY
Qty: 5 TABLET | Refills: 0 | Status: SHIPPED | OUTPATIENT
Start: 2024-05-22 | End: 2024-05-27

## 2024-05-22 RX ORDER — METHOCARBAMOL 100 MG/ML
1000 INJECTION, SOLUTION INTRAMUSCULAR; INTRAVENOUS ONCE
Status: COMPLETED | OUTPATIENT
Start: 2024-05-22 | End: 2024-05-22

## 2024-05-22 RX ORDER — LIDOCAINE 560 MG/1
1 PATCH PERCUTANEOUS; TOPICAL; TRANSDERMAL DAILY
Status: DISCONTINUED | OUTPATIENT
Start: 2024-05-22 | End: 2024-05-22 | Stop reason: HOSPADM

## 2024-05-22 RX ADMIN — LIDOCAINE 4% 1 PATCH: 40 PATCH TOPICAL at 09:40

## 2024-05-22 RX ADMIN — HYDROMORPHONE HYDROCHLORIDE 0.5 MG: 1 INJECTION, SOLUTION INTRAMUSCULAR; INTRAVENOUS; SUBCUTANEOUS at 07:46

## 2024-05-22 RX ADMIN — DEXAMETHASONE SODIUM PHOSPHATE 10 MG: 4 INJECTION, SOLUTION INTRAMUSCULAR; INTRAVENOUS at 07:46

## 2024-05-22 RX ADMIN — METHOCARBAMOL 1000 MG: 1000 INJECTION, SOLUTION INTRAMUSCULAR; INTRAVENOUS at 09:42

## 2024-05-22 RX ADMIN — POTASSIUM CHLORIDE 40 MEQ: 1.5 POWDER, FOR SOLUTION ORAL at 09:40

## 2024-05-22 ASSESSMENT — LIFESTYLE VARIABLES
EVER HAD A DRINK FIRST THING IN THE MORNING TO STEADY YOUR NERVES TO GET RID OF A HANGOVER: NO
HAVE YOU EVER FELT YOU SHOULD CUT DOWN ON YOUR DRINKING: NO
HAVE PEOPLE ANNOYED YOU BY CRITICIZING YOUR DRINKING: NO
TOTAL SCORE: 0
EVER FELT BAD OR GUILTY ABOUT YOUR DRINKING: NO

## 2024-05-22 ASSESSMENT — PAIN DESCRIPTION - LOCATION
LOCATION: NECK
LOCATION: NECK

## 2024-05-22 ASSESSMENT — PAIN DESCRIPTION - FREQUENCY: FREQUENCY: CONSTANT/CONTINUOUS

## 2024-05-22 ASSESSMENT — PAIN SCALES - GENERAL
PAINLEVEL_OUTOF10: 7
PAINLEVEL_OUTOF10: 9

## 2024-05-22 ASSESSMENT — COLUMBIA-SUICIDE SEVERITY RATING SCALE - C-SSRS
1. IN THE PAST MONTH, HAVE YOU WISHED YOU WERE DEAD OR WISHED YOU COULD GO TO SLEEP AND NOT WAKE UP?: NO
6. HAVE YOU EVER DONE ANYTHING, STARTED TO DO ANYTHING, OR PREPARED TO DO ANYTHING TO END YOUR LIFE?: NO
2. HAVE YOU ACTUALLY HAD ANY THOUGHTS OF KILLING YOURSELF?: NO

## 2024-05-22 ASSESSMENT — PAIN DESCRIPTION - ONSET: ONSET: GRADUAL

## 2024-05-22 ASSESSMENT — PAIN DESCRIPTION - PAIN TYPE: TYPE: CHRONIC PAIN

## 2024-05-22 ASSESSMENT — PAIN DESCRIPTION - ORIENTATION: ORIENTATION: LEFT

## 2024-05-22 ASSESSMENT — PAIN DESCRIPTION - PROGRESSION: CLINICAL_PROGRESSION: GRADUALLY WORSENING

## 2024-05-22 ASSESSMENT — PAIN - FUNCTIONAL ASSESSMENT: PAIN_FUNCTIONAL_ASSESSMENT: 0-10

## 2024-05-22 NOTE — ED PROVIDER NOTES
HPI   Chief Complaint   Patient presents with    Chest Pain     Left neck pain and chest pains       This is a 54-year-old male presenting to the ED for worsening left-sided neck pain.  Has been ongoing on a chronic basis, but reports over the last 3 days, he has worsening left-sided neck and arm weakness and numbness.  He denies any bowel/bladder incontinence or saddle anesthesia, he denies any history of IV drug use or diabetes.  He was due to have a cardiac stress test today be because when he has severe neck pain, he reports that he begins to have palpitations and chest pain.  He reports that since the pain has gotten better over the last day, he has had and experience palpitations.    Of note, was recently here less than 2 weeks ago for similar complaint, who reports during that time he was given steroid shot and Robaxin shot and Dilaudid which helped his pain.                          No data recorded                     Patient History   Past Medical History:   Diagnosis Date    Anesthesia of skin     Numbness and tingling    Anxiety 09/21/2023    Arthritis Unk    Bilateral myopia 09/21/2023    Cervical radicular pain 09/21/2023    Chronic patellofemoral pain of left knee 09/21/2023    Complex regional pain syndrome type 1 of left lower extremity 09/21/2023    Cubital tunnel syndrome on left 09/21/2023    Cubital tunnel syndrome on right 09/21/2023    Disease of intestine, unspecified     Bowel trouble    Erectile dysfunction 09/21/2023    Foot joint pain 09/21/2023    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 CHF is currently controlled. Latest ECHO performed and demonstrates- Results for orders placed during the hospital encounter of 07/19/22    Echo    Interpretation Summary  · Concentric hypertrophy and severely decreased systolic function.  · Mild left atrial enlargement.  · The estimated ejection fraction is 20%.  · Left ventricular diastolic dysfunction.  · There is left ventricular global hypokinesis.  · Moderate right ventricular enlargement with mildly to moderately reduced right ventricular systolic function.  · Moderate right atrial enlargement.  · There is mild aortic valve stenosis.  · Aortic valve area is 1.90 cm2; peak velocity is 1.05 m/s; mean gradient is 2 mmHg.  · There is severe pulmonary hypertension.  · Moderate tricuspid regurgitation.  · Elevated central venous pressure (15 mmHg).  · The estimated PA systolic pressure is 103 mmHg.  . Continue ACE/ARB, Furosemide and Aldactone and monitor clinical status closely. Monitor on telemetry. Patient is on CHF pathway.  Monitor strict Is&Os and daily weights.  Place on fluid restriction of 1.5 L. Continue to stress to patient importance of self efficacy and  on diet for CHF. Last BNP reviewed- and noted below   Recent Labs   Lab 09/13/22  1449   BNP >4,900*   .  EF 20%  Monitor I/o  Daily weights  Spironolactone and Entresto, Lasix, on hold 2/2 normotensive and MELISSA    9/17 stable  9/20 Resume Home lasix at reduced dose   with astigmatism and presbyopia    Osteochondral defect of patella 09/21/2023    Other forms of angina pectoris (CMS-HCC)     Stable angina pectoris    Other spondylosis, cervical region 09/21/2023    Patellofemoral arthritis 09/21/2023    Personal history of other diseases of the musculoskeletal system and connective tissue     History of arthritis    Personal history of other specified conditions     History of seizure    Reflex sympathetic dystrophy 09/21/2023    Scapular dyskinesis 09/21/2023    Shoulder impingement 09/21/2023    Status post left hip replacement 10/04/2023    Suspected sleep apnea 09/21/2023    Trauma and stressor-related disorder 09/21/2023    Trochanteric bursitis of both hips 09/22/2023    Unspecified disorder of ear, unspecified ear     Ear, nose and throat disorder     Past Surgical History:   Procedure Laterality Date    ACHILLES TENDON SURGERY  1978 2016    CT ABDOMEN PELVIS ANGIOGRAM W AND/OR WO IV CONTRAST  03/18/2020    CT ABDOMEN PELVIS ANGIOGRAM W AND/OR WO IV CONTRAST 3/18/2020 [facility]    CT ANGIO NECK  12/16/2022    CT NECK ANGIO W AND WO IV CONTRAST 12/16/2022 [facility]    CT HEAD ANGIO W AND WO IV CONTRAST  12/16/2022    CT HEAD ANGIO W AND WO IV CONTRAST 12/16/2022 [facility]    HIP SURGERY      KNEE ARTHROPLASTY  06/13/2017    Knee Arthroplasty    OTHER SURGICAL HISTORY  01/06/2022    Ulnar nerve neuroplasty    OTHER SURGICAL HISTORY  12/27/2019    Knee fracture repair    OTHER SURGICAL HISTORY  07/26/2019    Foot surgery    PLANTAR FASCIA RELEASE      SHOULDER SURGERY  06/13/2017    Shoulder Surgery     Family History   Problem Relation Name Age of Onset    Lung cancer Mother      Glaucoma Father      Skin cancer Father      Hypertension Other mul fam mem     Heart disease Other [facility] mem      Social History     Tobacco Use    Smoking status: Never    Smokeless tobacco: Never    Tobacco comments:     It killed my mother smoking and she left  me with a gift a nodule on my right lung   Vaping Use    Vaping status: Never Used   Substance Use Topics    Alcohol use: Not Currently    Drug use: Not Currently     Types: Marijuana, Ketamine, Cocaine     Comment: Had used cocaine, marijuana, ecstasy among others in 2006. and before.       Physical Exam   ED Triage Vitals [05/22/24 0704]   Temperature Heart Rate Respirations BP   36.5 °C (97.7 °F) 98 18 137/87      Pulse Ox Temp Source Heart Rate Source Patient Position   96 % Temporal Monitor Lying      BP Location FiO2 (%)     Right arm --       Physical Exam  Constitutional:       Appearance: Normal appearance.   HENT:      Head: Normocephalic and atraumatic.      Mouth/Throat:      Mouth: Mucous membranes are moist.   Eyes:      Extraocular Movements: Extraocular movements intact.   Cardiovascular:      Rate and Rhythm: Normal rate and regular rhythm.      Heart sounds: Normal heart sounds. No murmur heard.  Pulmonary:      Effort: Pulmonary effort is normal. No respiratory distress.      Breath sounds: Normal breath sounds. No wheezing.   Abdominal:      General: There is no distension.      Palpations: Abdomen is soft.      Tenderness: There is no abdominal tenderness. There is no guarding.   Musculoskeletal:      Right lower leg: No edema.      Left lower leg: No edema.   Skin:     General: Skin is warm and dry.   Neurological:      General: No focal deficit present.      Mental Status: He is alert and oriented to person, place, and time.   Psychiatric:         Mood and Affect: Mood normal.         Behavior: Behavior normal.         ED Course & MDM       Medical Decision Making  EKG interpreted myself: Normal sinus rhythm, ventricular rate 96, normal axis, QTc measured 464 ms, no acute injury pattern noted, multiple monomorphic PVCs noted    Patient presents to the ED for palpitations and chest discomfort that he attributes to worsening neck pain.  The chronic neck pain.  There is no red flag symptoms that  would be suspicious for cauda equina such as bowel/bladder incontinence, urinary retention, stone retention or saddle anesthesia.  He does report some worsening of his chronic left-sided hand numbness and weakness.  He has not had MRIs from a month ago.  He denies any recent trauma.    Based on my review of his chart he is already established with spine surgery as an outpatient    Will obtain cardiac workup.  Per chart review it appears that he at baseline has some degree of elevated troponins.  His EKG is nonischemic appearing, CBC CMP unremarkable, initial troponin is 36 delta is 37.  Based on his previous chart review this is about normal for him.  During his stay in the ED, pain is controlled with the Dilaudid, Decadron, Robaxin.  Potassium was repleted with the p.o. repletion which she tolerated.  He had improvement of his symptoms understanding ED and has otherwise remained hemodynamically stable.  His chest pain and palpitations have improved after his pain is controlled, making his pain likely secondary to his neck pain.  He will be discharged home, at this time, the low suspicion for cardiac etiology of his chest pain is likely secondary to his chronic neck pain    Discussed with the attending  Jenaro Howard DO PGY-4  Emergency Medicine        Procedure  Procedures     Jenaro Howard DO  Resident  05/25/24 1406       Jenaro Howard DO  Resident  05/27/24 0026  The patient was seen by the resident/fellow.  I have personally performed a substantive portion of the encounter.  I have seen and examined the patient; agree with the workup, evaluation, MDM, management and diagnosis.  The care plan has been discussed with the resident; I have reviewed the resident’s note and agree with the documented findings.  The patient is bigeminy improved after pain control.  Repeat EKG reveals no sign of bigeminy.  Patient states that he feels much better his pain is under control.  He will be discharged home to follow-up  with his cardiologist and pain management doctor as directed and is to return the emergency room for any worsening concerning symptoms otherwise.     Noam Escudero, DO  06/02/24 2541

## 2024-05-22 NOTE — ED PROVIDER NOTES
HPI   Chief Complaint   Patient presents with    Neck Pain       This is a 54-year-old male patient with past medical history of chronic neck pain who presents to the ED for palpitations.  He reports he has had chronic neck pain which has recently been worsening.  He is already set up with outpatient spine surgery.  He also reports that he is supposed to get a cardiac stress test today due to his ongoing complaint of chest pain and palpitations when he has significant pain.  He currently is reporting that his pain is very mild but he has significant palpitations.  He denies any bowel/bladder incontinence or saddle anesthesia    He also reports some mildly subjective worsening muscle weakness.  Of note, he was recently seen in the ED for similar complaint about 10 days ago, when steroid therapy did improve his symptoms.  Of note he also had an unremarkable cervical spine MRI performed about a month ago after he was admitted inpatient.                              Una Coma Scale Score: 15                     Patient History   Past Medical History:   Diagnosis Date    Anesthesia of skin     Numbness and tingling    Anxiety 09/21/2023    Arthritis Unk    Bilateral myopia 09/21/2023    Cervical radicular pain 09/21/2023    Chronic patellofemoral pain of left knee 09/21/2023    Complex regional pain syndrome type 1 of left lower extremity 09/21/2023    Cubital tunnel syndrome on left 09/21/2023    Cubital tunnel syndrome on right 09/21/2023    Disease of intestine, unspecified     Bowel trouble    Erectile dysfunction 09/21/2023    Foot joint pain 09/21/2023    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    History of psychological trauma 09/21/2023    Joint pain 09/21/2023    Kidney cysts 09/21/2023    Low back pain, unspecified 12/07/2022    Chronic low back pain without sciatica, unspecified back pain laterality    Low back pain, unspecified 08/09/2022    Lumbago    Lumbar spondylosis 09/21/2023    Lung nodule 09/21/2023     Metatarsalgia of left foot 09/21/2023    Myopia, bilateral 02/19/2019    Myopia of both eyes with astigmatism and presbyopia    Osteochondral defect of patella 09/21/2023    Other forms of angina pectoris (CMS-[facility])     Stable angina pectoris    Other spondylosis, cervical region 09/21/2023    Patellofemoral arthritis 09/21/2023    Personal history of other diseases of the musculoskeletal system and connective tissue     History of arthritis    Personal history of other specified conditions     History of seizure    Reflex sympathetic dystrophy 09/21/2023    Scapular dyskinesis 09/21/2023    Shoulder impingement 09/21/2023    Status post left hip replacement 10/04/2023    Suspected sleep apnea 09/21/2023    Trauma and stressor-related disorder 09/21/2023    Trochanteric bursitis of both hips 09/22/2023    Unspecified disorder of ear, unspecified ear     Ear, nose and throat disorder     Past Surgical History:   Procedure Laterality Date    ACHILLES TENDON SURGERY  1978 2016    CT ABDOMEN PELVIS ANGIOGRAM W AND/OR WO IV CONTRAST  03/18/2020    CT ABDOMEN PELVIS ANGIOGRAM W AND/OR WO IV CONTRAST 3/18/2020 [facility]    CT ANGIO NECK  12/16/2022    CT NECK ANGIO W AND WO IV CONTRAST 12/16/2022 [facility]    CT HEAD ANGIO W AND WO IV CONTRAST  12/16/2022    CT HEAD ANGIO W AND WO IV CONTRAST 12/16/2022 [facility]    HIP SURGERY      KNEE ARTHROPLASTY  06/13/2017    Knee Arthroplasty    OTHER SURGICAL HISTORY  01/06/2022    Ulnar nerve neuroplasty    OTHER SURGICAL HISTORY  12/27/2019    Knee fracture repair    OTHER SURGICAL HISTORY  07/26/2019    Foot surgery    PLANTAR FASCIA RELEASE      SHOULDER SURGERY  06/13/2017    Shoulder Surgery     Family History   Problem Relation Name Age of Onset    Lung cancer Mother      Glaucoma Father      Skin cancer Father      Hypertension Other mul fam mem     Heart disease Other mul fam mem      Social History     Tobacco Use    Smoking status: Never     Smokeless tobacco: Never    Tobacco comments:     It killed my mother smoking and she left me with a gift a nodule on my right lung   Vaping Use    Vaping status: Never Used   Substance Use Topics    Alcohol use: Not Currently    Drug use: Not Currently     Types: Marijuana, Ketamine, Cocaine     Comment: Had used cocaine, marijuana, ecstasy among others in 2006. and before.       Physical Exam   ED Triage Vitals [05/13/24 1030]   Temperature Heart Rate Respirations BP   36.6 °C (97.9 °F) 74 18 137/79      Pulse Ox Temp Source Heart Rate Source Patient Position   99 % Temporal Monitor Sitting      BP Location FiO2 (%)     Right arm --       Physical Exam  Constitutional:       Appearance: Normal appearance.   HENT:      Head: Normocephalic and atraumatic.      Mouth/Throat:      Mouth: Mucous membranes are moist.   Eyes:      Extraocular Movements: Extraocular movements intact.   Cardiovascular:      Rate and Rhythm: Normal rate and regular rhythm.      Heart sounds: Normal heart sounds. No murmur heard.  Pulmonary:      Effort: Pulmonary effort is normal. No respiratory distress.      Breath sounds: Normal breath sounds. No wheezing.   Abdominal:      General: There is no distension.      Palpations: Abdomen is soft.      Tenderness: There is no abdominal tenderness. There is no guarding.   Musculoskeletal:      Right lower leg: No edema.      Left lower leg: No edema.      Comments: Patient is able to flex and extend his fingers bilaterally there is subjectively decreased  strength 4/5 strength noted in the left hand.   Skin:     General: Skin is warm and dry.   Neurological:      General: No focal deficit present.      Mental Status: He is alert and oriented to person, place, and time.   Psychiatric:         Mood and Affect: Mood normal.         Behavior: Behavior normal.         ED Course & MDM   ED Course as of 05/22/24 1701   Mon May 13, 2024   1202 Troponin I, High Sensitivity(!): 41  Baseline, will  repeat delta [MK]   1203 ECG 12 lead  NSR with PVCs, rate 87, , , QTc 433, normal axis, no ischemic changes. [MK]      ED Course User Index  [MK] Lise LISA Aguilera         Diagnoses as of 05/22/24 1701   Neck pain, chronic   Heart palpitations       Medical Decision Making  Hemodynamically stable on arrival to the ED, who presents to the ED for neck pain and palpitations.  His EKG does demonstrate some bigeminy.      EKG interpreted myself sinus rhythm with ventricular bigeminy, ventricular rate 95, normal axis, QTc 467 ms, no acute injury pattern    Will obtain cardiac workup to include troponins, CBC, CMP.  Pain to be controlled with Dilaudid, Robaxin, lidocaine patch, Decadron.    Reports a mild improvement with the symptoms of pain.  Repeat EKG does not demonstrate as much ventricular ectopy.    He has an elevated troponin, however it appears that on his last several visits, he has not had normal troponins, the troponin did not uptrend significantly.  He is otherwise appropriate for discharge with outpatient cardiology follow-up    Discussed with the attending  Jenaro Howard DO PGY-4  Emergency Medicine        Procedure  Procedures     Jenaro Howard DO  Resident  05/22/24 1701  The patient was seen by the resident/fellow.  I have personally performed a substantive portion of the encounter.  I have seen and examined the patient; agree with the workup, evaluation, MDM, management and diagnosis.  The care plan has been discussed with the resident; I have reviewed the resident’s note and agree with the documented findings.       Noam Escudero DO  05/25/24 [id number]

## 2024-05-26 LAB
ATRIAL RATE: 95 BPM
ATRIAL RATE: 96 BPM
P AXIS: 54 DEGREES
P AXIS: 57 DEGREES
P OFFSET: 203 MS
P OFFSET: 205 MS
P ONSET: 148 MS
P ONSET: 150 MS
PR INTERVAL: 140 MS
PR INTERVAL: 140 MS
Q ONSET: 218 MS
Q ONSET: 220 MS
QRS COUNT: 16 BEATS
QRS COUNT: 16 BEATS
QRS DURATION: 102 MS
QRS DURATION: 108 MS
QT INTERVAL: 368 MS
QT INTERVAL: 372 MS
QTC CALCULATION(BAZETT): 464 MS
QTC CALCULATION(BAZETT): 467 MS
QTC FREDERICIA: 430 MS
QTC FREDERICIA: 433 MS
R AXIS: 30 DEGREES
R AXIS: 32 DEGREES
T AXIS: 35 DEGREES
T AXIS: 42 DEGREES
T OFFSET: 404 MS
T OFFSET: 404 MS
VENTRICULAR RATE: 95 BPM
VENTRICULAR RATE: 96 BPM

## 2024-05-30 ENCOUNTER — OFFICE VISIT (OUTPATIENT)
Dept: ORTHOPEDIC SURGERY | Facility: CLINIC | Age: 54
End: 2024-05-30
Payer: COMMERCIAL

## 2024-05-30 ENCOUNTER — HOSPITAL ENCOUNTER (OUTPATIENT)
Dept: RADIOLOGY | Facility: CLINIC | Age: 54
Discharge: HOME | End: 2024-05-30
Payer: COMMERCIAL

## 2024-05-30 VITALS — HEIGHT: 71 IN | BODY MASS INDEX: 24.64 KG/M2 | WEIGHT: 176 LBS

## 2024-05-30 DIAGNOSIS — M54.16 LUMBAR RADICULOPATHY: Primary | ICD-10-CM

## 2024-05-30 DIAGNOSIS — M54.50 LUMBAR PAIN: ICD-10-CM

## 2024-05-30 PROCEDURE — 72110 X-RAY EXAM L-2 SPINE 4/>VWS: CPT | Performed by: RADIOLOGY

## 2024-05-30 PROCEDURE — 72110 X-RAY EXAM L-2 SPINE 4/>VWS: CPT

## 2024-05-30 PROCEDURE — 1036F TOBACCO NON-USER: CPT | Performed by: ORTHOPAEDIC SURGERY

## 2024-05-30 PROCEDURE — 99214 OFFICE O/P EST MOD 30 MIN: CPT | Performed by: ORTHOPAEDIC SURGERY

## 2024-05-30 NOTE — PROGRESS NOTES
Akin Lynn is a 54 y.o. male who presents for Pain and Establish Care of the Lower Back (Pt known to RB and ED for Cervical / Lumbar Pain - Pt c/o Lumbar Pain today w/ B/L Leg Pain - X-Rays Today, MRI lumbar 2022 @  - Pt is also established w/ Dr. Abel for Pain Mgmt).    HPI:  54-year-old gentleman known to Dr. Chen is here for follow-up of low back pain.  Last seen in September 2023.  He denies any fever chills nausea vomiting night sweats.  He has no bowel or bladder complaints.    Physical exam:  Well-nourished, well kept.No lymphangitis or lymphadenopathy in the examined extremities.  Gait normal.  Can stand on heels and toes with great difficulty.  He has a history of surgeries on his feet and toes that make it difficult for him to stand on his heels and toes.   Examination of the back shows tenderness in the paraspinous musculature.  There is decreased range of motion in all directions due to guarding/muscle spasms and pain at extremes.  There is good strength and no instability.  Examination of the lower extremities reveals no point tenderness, swelling, or deformity.  Range of motion of the hips, knees, and ankles are full without crepitance, instability, or exacerbation of pain, manipulation of right leg internally externally can give the patient some hip pain.  Strength is 5/5 throughout except left leg knee flexion extension is about 4+/5 compared to the right.  No redness, abrasions, or lesions on extremities  Gross sensation intact in the extremities.  Deep tendon reflexes 1+ + bilateral. Clonus negative.  Affect normal.  Alert and oriented ×3.  Coordination normal.    Imaging studies:  AP lateral flexion-extension plain films were ordered and reviewed today.    Assessment:  54-year-old gentleman known to Dr. Chen here for follow-up and evaluation of low back pain.  Last seen in September 2023.  He is describing similar symptoms today as he was about 8 months ago.  He is having  mostly low back pain and he is describing heaviness in his legs.  He says when he goes to stand up sometimes he feels like his legs weigh 100 pounds.  We had ordered an MRI at his last visit, he did not get that.  His most recent MRI is from 2022.  He has had a lot of medical issues, some GI issues and some neck issues.  He is about to have a right hip replacement with Dr. Matamoros at the end of June 2024.  His back pain is causing him significant problems.    We have ordered and reviewed test today x-rays.  We did review an MRI from 2022.  It is from Dr. Matamoros from March 19, 2024 were reviewed.  This is an acute exacerbation of a chronic problem with progression of symptoms that is affecting his bodily function.    For complete plan and/or surgical details, please refer to Dr. Chen's portion of this split dictation.    -Akin Tanner PA-C    In a face-to-face encounter, I performed a history and physical examination, discussed pertinent diagnostic studies if indicated, and discussed diagnosis and management strategies with both the patient and the midlevel provider.  I reviewed the midlevel's note and agree with the documented findings and plan of care.    Patient have seen before in the past.  He had an MRI done in 2022.  However I have not seen him anytime recently for his back.  He had a severe flareup of his chronic back pain recently where he got up and had severe pain in his back with heaviness in his legs.  He is still having those symptoms with heaviness in his legs and back pain.  He has had his left hip replaced by Dr. Matamoros and he is going to have his right hip replaced in June.  He has chronic foot issues and he has CRPS in his left leg.  However, I think we need to workup his back further with an MRI to see if any of his leg symptoms are coming from his spine.  We are going to get him into some physical therapy and get an MRI of his lumbar spine.  Will see him back after the MRI.  We  have ordered x-rays, we reviewed x-rays and we have ordered an MRI.  This is a severe exacerbation of a chronic problem.    Bryan Chen MD  Orthopedic surgery

## 2024-05-30 NOTE — PROGRESS NOTES
The patient goes to 3 pain centers he gets cervical radiofrequency ablation from Sam at [facility], lumbar RFA from Dr. Abel at [place].  In addition he gets opioid sparing infusion at [facility] in addition to ketamine lidocaine nasal spray and ketamine infusion at the [facility]  The patient on diazepam 5 mg twice daily and oxycodone 5 mg 2 times daily from PCP Dr. Sanjay Mendoza  The patient was referred to Dr. Solis by Dr. Vargas for the suitability of using opioid in his therapy!!  Which he recommended against it since he is already on diazepam as well        6/10/2024 addendum:  6/10/2024 bone density study showed lowest T-score -2.2 for the right femoral neck patient will need referral to rheumatology which was done today    SUBJECTIVE:  This is 54 y.o.  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  The patient had in 2022 MRI of his cervical to lumbar spine showed principally normal MRI minimal degenerative changes.  The patient has been to the ED multiple visit and have seen Dr. Chen as well for pain since his neck back etc. who is here for follow-up stating that he is hurting everywhere in his cervical thoracic lumbar spine.  The patient has had medial branch block by Dr. Vargas which helped him significantly but he cannot have his next block until next months.  The patient failed spinal cord stimulator and Prialt therapy trial as well.  I explained to him I really do not know what to do with him besides continuing with his IV infusion therapy.  All his prednisone prescriptions where for Medrol Dosepak which is very short lived treatment.  I ordered a DEXA scan on him in addition to my prednisone taper dose.  The patient was told in his last emergency room visit that he was having PVCs which improved after they give him hydromorphone.  The  patient does not want to go to cardiology because he thinks that his arrhythmia is related to his pain.  I explained to him that morphine and opioid is treatment for patient with heart problem and heart attack and he need to follow-up with cardiology to evaluate what is wrong with him.  I told the patient that he has been in the emergency department more than he has been at his home!!      Prior office visit:  2/1/2024: This is 53 y.o.  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  Who is here for follow-up requesting to continue IV infusion therapy with us or every 2 weeks since he tried the [facility] 5-day ketamine program which helped him for 4 weeks but the pain is back again.  I do not see any problem to restart him on the IV infusion therapy since it has helped him significantly in the past.        Prior office visit:  8/17/2023: This is 53 year year old male recall past medical history of CRPS type one of the left lower extremity, left hip arthritis which is going for surgery soon, neck pain with spondylosis that has been treated with Dr. Abel with medial branch block and radiofrequency Who is here for follow-up to discuss having IV infusion therapy prior to his hip replacement since she had such a good result with the IV infusion therapy and his pain. I gave an order today to give him IV infusion a day or 2 before his hip replacement. Also I ordered some ketamine/lidocaine nasal spray so he can use it in the perioperative.. The patient stated that he is going to change his pain management physician to Dr. Jorgensen since he is happy with the treatment at [facility] but so far the 3 injections that he received from Prialt did not do much for his pain in his CRPS.        Last procedure:   10/17/2022 bilateral C6-7 interlaminar DEVONTE the patient has had a  0% improvement in pain and function   IV infusion therapy the patient has had a 40-80% improvement in pain for his neck but not for his left leg CRPS symptoms     Portions of record reviewed for pertinent issues: active problem list, medication list, allergies, family history, social history, notes from last encounter, encounters, lab results, imaging and other system records.        I have personally reviewed the OARRS report for this patient. This report is scanned into the electronic medical record. I have considered the risks of abuse, dependence, addiction and diversion. Pregabalin 300 mg twice daily from office, in addition to Valium 5 mg twice daily and few Percocet 5 mg from different providers  OPIOID RISK ASSESSMENT SCORE 0/26           Pending law suits: [facility] case after fall at work in 2008, used to be a paramedic in [place]  Social History: Lives with his fiance for many years with 12 cats denies smoking drinking or use of illicit drugs              Diagnostic studies:  3/7/2023 left foot x-ray Stable surgical changes in the foot as described. Interval surgery  involving the distal end of the 5th metatarsal as well, and interval surgery of the posterior calcaneus.       11/17/2022 MRI of the cervical thoracic and lumbar spine showed some degenerative changes but no significant canal or foraminal stenosis, no bone marrow edema or endplate changes:  Cervical spine MRI  TECHNIQUE:  Sagittal T1, T2, axial T1 and axial gradient echo T2 weighted images  were acquired through the cervical spine. Sagittal and transaxial T1  and T2 weighted images of the thoracic spine were acquired along with  sagittal STIR imaging. Sagittal and axial T1 and T2 weighted images  were also acquired through the lumbar spine.     FINDINGS:  Cervical spine:     ALIGNMENT: The vertebral alignment is within normal limits. There is  mild straightening of cervical lordosis.     VERTEBRAE/INTERVERTEBRAL DISCS: The vertebral bodies  demonstrate  expected height.The marrow signal is within normal limits. The  intervertebral discs demonstrate expected signal and morphology.     CORD: Normal in caliber and signal.     C1-2: The cervicomedullary junction appears unremarkable. There is no  central canal stenosis.     C2-3: Moderate right foraminal narrowing. There is no significant  central canal or left neural foraminal stenosis.     C3-4: Moderate right and mild left foraminal narrowing. There is no  significant central canal stenosis.     C4-5: Moderate to severe right and mild left foraminal narrowing.  There is no significant central canal stenosis.     C5-6: Mild left foraminal narrowing. There is no significant central  canal or right neural foraminal stenosis.     C6-7: Mild to moderate right foraminal stenosis. There is no  significant central canal or left neural foraminal stenosis.     C7-T1: There is no posterior disc contour abnormality. There is no  significant central canal or neural foraminal stenosis.     The upper thoracic vertebrae and spinal canal are unremarkable.     The prevertebral and posterior paraspinous soft tissues are within  normal limits.     Thoracic spine:     ALIGNMENT: The vertebral alignment is within normal limits.     VERTEBRAE/INTERVERTEBRAL DISCS: The vertebral bodies demonstrate  expected height.The marrow signal is within normal limits. The  intervertebral discs demonstrate expected signal and morphology.     CORD: Normal in caliber and signal.     CENTRAL CANAL: There is no evidence of significant central canal  stenosis.     The prevertebral and posterior paraspinous soft tissues are within  normal limits.     Lumbar spine:     ALIGNMENT: Mild straightening.     VERTEBRAE/INTERVERTEBRAL DISCS: The vertebral bodies demonstrate  expected height.There are patchy discogenic marrow changes involving  L5 S1 vertebral bodies with patchy marrow edema. There is diminished  height and T2 signal within L5-S1  intervertebral disc with mild  endplate irregularity to suggest degenerative changes.     CORD: The lower thoracic cord appears unremarkable. The conus  terminates appropriately at L1-L2.     At T12-L1, there is no central canal stenosis or neural foraminal  narrowing.     At L1-L2, there is no central canal stenosis or neural foraminal  narrowing.     At L2-L3, there is no central canal stenosis or neural foraminal  narrowing.     At L3-L4, there is no central canal stenosis or neural foraminal  narrowing.     At L4-L5, there is a small disc bulge. No central canal stenosis or  neural foramina narrowing.     At L5-S1, there is a small disc bulge. No central canal stenosis or  neural foramina narrowing.     The prevertebral and posterior paraspinous soft tissues are within  normal limits.     3.6 cm right exophytic renal cyst.     IMPRESSION:  Multilevel degenerative changes. No significant canal stenosis of the  cervical, thoracic or lumbar spine.     Cervical foraminal narrowing as detailed.     Lower lumbar small disc bulges.     11/17/2022 thoracic spine MRI  Cervical spine:     ALIGNMENT: The vertebral alignment is within normal limits. There is  mild straightening of cervical lordosis.     VERTEBRAE/INTERVERTEBRAL DISCS: The vertebral bodies demonstrate  expected height.The marrow signal is within normal limits. The  intervertebral discs demonstrate expected signal and morphology.     CORD: Normal in caliber and signal.     C1-2: The cervicomedullary junction appears unremarkable. There is no  central canal stenosis.     C2-3: Moderate right foraminal narrowing. There is no significant  central canal or left neural foraminal stenosis.     C3-4: Moderate right and mild left foraminal narrowing. There is no  significant central canal stenosis.     C4-5: Moderate to severe right and mild left foraminal narrowing.  There is no significant central canal stenosis.     C5-6: Mild left foraminal narrowing. There is no  significant central  canal or right neural foraminal stenosis.     C6-7: Mild to moderate right foraminal stenosis. There is no  significant central canal or left neural foraminal stenosis.     C7-T1: There is no posterior disc contour abnormality. There is no  significant central canal or neural foraminal stenosis.     The upper thoracic vertebrae and spinal canal are unremarkable.     The prevertebral and posterior paraspinous soft tissues are within  normal limits.     Thoracic spine:     ALIGNMENT: The vertebral alignment is within normal limits.     VERTEBRAE/INTERVERTEBRAL DISCS: The vertebral bodies demonstrate  expected height.The marrow signal is within normal limits. The  intervertebral discs demonstrate expected signal and morphology.     CORD: Normal in caliber and signal.     CENTRAL CANAL: There is no evidence of significant central canal  stenosis.     The prevertebral and posterior paraspinous soft tissues are within  normal limits.     Lumbar spine:     ALIGNMENT: Mild straightening.     VERTEBRAE/INTERVERTEBRAL DISCS: The vertebral bodies demonstrate  expected height.There are patchy discogenic marrow changes involving  L5 S1 vertebral bodies with patchy marrow edema. There is diminished  height and T2 signal within L5-S1 intervertebral disc with mild  endplate irregularity to suggest degenerative changes.     CORD: The lower thoracic cord appears unremarkable. The conus  terminates appropriately at L1-L2.     At T12-L1, there is no central canal stenosis or neural foraminal  narrowing.     At L1-L2, there is no central canal stenosis or neural foraminal  narrowing.     At L2-L3, there is no central canal stenosis or neural foraminal  narrowing.     At L3-L4, there is no central canal stenosis or neural foraminal  narrowing.     At L4-L5, there is a small disc bulge. No central canal stenosis or  neural foramina narrowing.     At L5-S1, there is a small disc bulge. No central canal stenosis  or  neural foramina narrowing.     The prevertebral and posterior paraspinous soft tissues are within  normal limits.     3.6 cm right exophytic renal cyst.     IMPRESSION:  Multilevel degenerative changes. No significant canal stenosis of the  cervical, thoracic or lumbar spine.     Cervical foraminal narrowing as detailed.     Lower lumbar small disc bulges.        2/26/2020 EMG of the left lower extremity did not show any polyneuropathy radiculopathy or mononeuropathy        9/10/2021 MRI of the cervical spine showed some degenerative changes and some facet joint hypertrophy:  FINDINGS:  Height alignment and signal of the cervical vertebral bodies are  preserved.     C2-C3: There is no posterior disc contour abnormality. There is no  significant central canal or neural foraminal stenosis.     C3-C4: There is no posterior disc contour abnormality. There is no  significant central canal or neural foraminal stenosis.     C4-C5: Minimal disc bulge without significant central canal or  neuroforaminal stenosis. Minimal to moderate hypertrophic facet  change bilaterally.     C5-C6: Disc bulge without significant central canal neuroforaminal  stenosis.     C6-C7: There is no posterior disc contour abnormality. There is no  significant central canal or neural foraminal stenosis.     C7-T1: There is no posterior disc contour abnormality. There is no  significant central canal or neural foraminal stenosis.     The upper thoracic vertebrae and spinal canal are unremarkable.     IMPRESSION:  No significant central canal or neuroforaminal stenosis and no  significant change was detected compared to the prior examination of  08/04/2017.         Review of Systems   HENT: Negative.     Eyes: Negative.    Respiratory: Negative.     Cardiovascular: Negative.    Gastrointestinal: Negative.    Endocrine: Negative.    Genitourinary: Negative.    Musculoskeletal:  Positive for arthralgias, back pain, myalgias and neck pain.   Skin:  Negative.    Neurological: Negative.    Hematological: Negative.    Psychiatric/Behavioral: Negative.          Physical Exam  Vitals and nursing note reviewed.   Constitutional:       Appearance: Normal appearance.   HENT:      Head: Normocephalic and atraumatic.      Nose: Nose normal.   Eyes:      Extraocular Movements: Extraocular movements intact.      Conjunctiva/sclera: Conjunctivae normal.      Pupils: Pupils are equal, round, and reactive to light.   Cardiovascular:      Rate and Rhythm: Normal rate and regular rhythm.      Pulses: Normal pulses.      Heart sounds: Normal heart sounds.   Pulmonary:      Effort: Pulmonary effort is normal.      Breath sounds: Normal breath sounds.   Abdominal:      General: Abdomen is flat. Bowel sounds are normal.      Palpations: Abdomen is soft.   Musculoskeletal:         General: Tenderness present.   Skin:     General: Skin is warm.   Neurological:      General: No focal deficit present.      Mental Status: He is alert and oriented to person, place, and time.   Psychiatric:         Mood and Affect: Mood normal.         Behavior: Behavior normal.                      Plan  At least 50% of the visit was involved in the discussion of the options for treatment. We discussed exercises, medication, interventional therapies and surgery. Healthy life style is essential with patient hard work to achieve the wellness. In addition; discussion with the patient and/or family about any of the diagnostic results, impressions and/or recommended diagnostic studies, prognosis, risks and benefits of treatment options, instructions for treatment and/or follow-up, importance of compliance with chosen treatment options, risk-factor reduction, and patient/family education.         Pool therapy, walking in the pool, at least 3x per week for 30 minutes  Continue self-directed physical therapy  Prednisone 10 mg taper dose  Bone density ordered on the patient  Healthy lifestyle and  anti-inflammatory diet in addition to weight control discussed with the patient  Alternative chronic pain therapies was discussed, encouraged and information was handed  Return to Clinic 3 months     *Please note this report has been produced using speech recognition software and may contain errors related to that system including grammar, punctuation and spelling as well as words and phrases that may be inappropriate. If there are questions or concerns, please feel free to contact me to clarify.    Olivia Alonzo MD

## 2024-05-31 ENCOUNTER — OFFICE VISIT (OUTPATIENT)
Dept: PAIN MEDICINE | Facility: CLINIC | Age: 54
End: 2024-05-31
Payer: COMMERCIAL

## 2024-05-31 VITALS
WEIGHT: 176 LBS | TEMPERATURE: 97.5 F | SYSTOLIC BLOOD PRESSURE: 145 MMHG | BODY MASS INDEX: 24.64 KG/M2 | HEART RATE: 85 BPM | RESPIRATION RATE: 16 BRPM | OXYGEN SATURATION: 97 % | HEIGHT: 71 IN | DIASTOLIC BLOOD PRESSURE: 104 MMHG

## 2024-05-31 DIAGNOSIS — M54.2 NECK PAIN: Primary | ICD-10-CM

## 2024-05-31 DIAGNOSIS — M79.18 MYOFASCIAL PAIN: ICD-10-CM

## 2024-05-31 DIAGNOSIS — M54.6 THORACIC BACK PAIN, UNSPECIFIED BACK PAIN LATERALITY, UNSPECIFIED CHRONICITY: ICD-10-CM

## 2024-05-31 PROCEDURE — 1036F TOBACCO NON-USER: CPT | Performed by: ANESTHESIOLOGY

## 2024-05-31 PROCEDURE — 99214 OFFICE O/P EST MOD 30 MIN: CPT | Performed by: ANESTHESIOLOGY

## 2024-05-31 RX ORDER — DICLOFENAC EPOLAMINE 0.01 G/1
1 SYSTEM TOPICAL 2 TIMES DAILY
Qty: 30 PATCH | Refills: 11 | Status: SHIPPED | OUTPATIENT
Start: 2024-05-31

## 2024-05-31 RX ORDER — PREDNISONE 10 MG/1
TABLET ORAL
Qty: 64 TABLET | Refills: 0 | Status: SHIPPED | OUTPATIENT
Start: 2024-05-31

## 2024-05-31 ASSESSMENT — PAIN SCALES - GENERAL
PAINLEVEL: 9
PAINLEVEL_OUTOF10: 9

## 2024-05-31 ASSESSMENT — COLUMBIA-SUICIDE SEVERITY RATING SCALE - C-SSRS
6. HAVE YOU EVER DONE ANYTHING, STARTED TO DO ANYTHING, OR PREPARED TO DO ANYTHING TO END YOUR LIFE?: NO
1. IN THE PAST MONTH, HAVE YOU WISHED YOU WERE DEAD OR WISHED YOU COULD GO TO SLEEP AND NOT WAKE UP?: NO
2. HAVE YOU ACTUALLY HAD ANY THOUGHTS OF KILLING YOURSELF?: NO

## 2024-05-31 ASSESSMENT — ENCOUNTER SYMPTOMS
NECK PAIN: 1
MYALGIAS: 1
LOSS OF SENSATION IN FEET: 0
OCCASIONAL FEELINGS OF UNSTEADINESS: 0
RESPIRATORY NEGATIVE: 1
ARTHRALGIAS: 1
HEMATOLOGIC/LYMPHATIC NEGATIVE: 1
ENDOCRINE NEGATIVE: 1
PSYCHIATRIC NEGATIVE: 1
DEPRESSION: 0
NEUROLOGICAL NEGATIVE: 1
BACK PAIN: 1
GASTROINTESTINAL NEGATIVE: 1
CARDIOVASCULAR NEGATIVE: 1
EYES NEGATIVE: 1

## 2024-05-31 ASSESSMENT — PAIN DESCRIPTION - DESCRIPTORS: DESCRIPTORS: ACHING

## 2024-05-31 ASSESSMENT — PAIN - FUNCTIONAL ASSESSMENT: PAIN_FUNCTIONAL_ASSESSMENT: 0-10

## 2024-05-31 NOTE — PROGRESS NOTES
This is 54 y.o.  male with who has been treated for  Neck and back patient was seen in the emergency room on 3 occasions pain is really bad patient says that he developed PVCs because of his pain . Pain is worse, The pain is described as stabbing back pain , neck pain feels throbbing , and feels like someone is punching him  and is relieved by nothing  with who is here for follow-up   Chief Complaint   Patient presents with    Follow-up     Patient's states that he twisted his right ankle on a pothole in the parking lot at pain management    Pain Therapies: Injections IV infusion therapy      Ashok Each Box that Applies Female Male   FAMILY HISTORY OF SUBSTANCE ABUSE  Ashok the boxes that applies   Alcohol ?  1    ? 3   Illegal drugs ?  2 ? 3   Rx drugs ?  4 ? 4   PERSONAL HISTORY OF SUBSTNACE ABUSE   Alcohol ?  3 ?  3   Illegal drugs ?  4 ?  4    Rx drugs ?  5 ?  5   Age Between 16-45 years ?  1 ?  1   History of Preadolescent Sexual Abuse ?  3 ?  0   PSYCHOLOGIC DISEASE   ADD, OCD, bipolar, schizophrenia ?  2 ?  2   Depression ?  1 ?  1   Scoring Totals       Scoring (Risk)  0-3 - Low  4-7 - Moderate  8 - High

## 2024-06-03 ENCOUNTER — TRANSCRIBE ORDERS (OUTPATIENT)
Dept: ORTHOPEDIC SURGERY | Facility: CLINIC | Age: 54
End: 2024-06-03
Payer: COMMERCIAL

## 2024-06-03 DIAGNOSIS — M16.11 UNILATERAL PRIMARY OSTEOARTHRITIS, RIGHT HIP: Primary | ICD-10-CM

## 2024-06-04 PROBLEM — M16.11 UNILATERAL PRIMARY OSTEOARTHRITIS, RIGHT HIP: Status: ACTIVE | Noted: 2024-06-03

## 2024-06-06 NOTE — H&P (VIEW-ONLY)
PAIN MANAGEMENT FOLLOW-UP  Documentation:   Mode: Video  Consent: This visit was initiated by the patient. Audio and visual communication was utilized in real-time. I confirmed understanding of risks and benefits of telehealth visits and obtained consent to proceed with the telemedicine visit.  Location of Patient: Home of patient  Time-Based Billing Justifications:   Charting in Epic  Patient visit (including performing a medically appropriate exam)  Obtaining history (or reviewing separately obtained history)  Reviewing (chart, labs, and other clinical notes)  Counseling/educating the patient/family/caregiver      Today's office visit was in regards to question about treatment of low back. Patient scheduled for hip replacement June 28th. Informed patient after he receives okay from surgeon we can begin treating low back pain with injections if appropriate. Patient will call to schedule.       HPI:  We are seeing this patient in our Pain Management clinic for chronic pain secondary to Cervical spondylosis without myelopathy.    History was provided by the patient. Since the last visit for cervical pain, patient has had improved course.  Patient had Left C5, C6, and C7 Cervical Medial Branch Block #1 done on 4/26/24. He is reporting a 90 % improvement in pain, for 1 day, then pain returned.     REVIEW OF SYSTEMS:  Constitutional: Denies unexplained wt loss/gain, change in appetite, fever, and chills.  Respiratory: negative symptoms (no cough, hemoptysis, SOB, ARGUELLO, PND, wheezing)  Cardiovascular: negative symptoms (No CP/Pressure/Tightness, palpitations, orthopnea, PND, SOB, ARGUELLO, edema, HA or vision change)  Gastrointestinal: negative symptoms (no abdominal pain, anorexia, n/v, indigestion, constipation, or diarrhea)  Musculoskeletal: left sided neck pain  : Denies bowel or bladder incontinence or saddle anesthesia.   Neuro: Denies numbness or tingling in right hand, left hand, right foot, and left foot.     No  physical exam due to video visit  Patient is A&O x3, fully participating in the video visit , and appears well.    PRIOR MEDICAL HISTORY:  Past Medical History:   Diagnosis Date   • Bronchitis    • Calculus of kidney    • Closed fracture of one or more phalanges of foot    • Hypoglycemia    • Plantar fascial fibromatosis     left   • Plica syndrome    • Sprain and strain of unspecified site of knee and leg     left   • Traumatic arthropathy        PRIOR SURGICAL HISTORY:  Past Surgical History:   Procedure Laterality Date   • KNEE ARTHROSCOPY  [id number]    right  3 surgeries   • KNEE ARTHROSCOPY  [id number]    left  4 surgeries   • OPEN TREATMENT, FRACTURE GREAT TOE, PHALANX/PHALANGES W/WO INT/EXT FIXATION  2010    left   • RECONSTRUCTION, COMPLETE SHOULDER (ROTATOR) CUFF AVULSION, CHRONIC (INCLUDES ACROMIOPLASTY)  1995    left   • REPAIR, ROTATOR CUFF  1989    right       MEDICATIONS:  Current Outpatient Medications on File Prior to Visit   Medication Sig Dispense Refill   • azelastine (ASTELIN) 0.1 % nasal spray Use 2 Sprays in each nostril 2 times daily. 30 mL 3   • KETAMINE HCL INJ Inject 1 Dose intravenously. (Patient not taking: Reported on 7/11/2023)     • pregabalin (Lyrica) 300 MG capsule Take 300 mg by mouth 2 times daily.     • omeprazole (PRILOSEC) 40 MG capsule Take 40 mg by mouth 2 times daily.     • lidocaine (LIDODERM) 5 % patch Place 1 Patch on the skin every 12 hours.     • Fluticasone Propionate (FLONASE NASAL) Use in each nostril.     • DiphenhydrAMINE HCl (BENADRYL ALLERGY ORAL) Take by mouth        Current Facility-Administered Medications on File Prior to Visit   Medication Dose Route Frequency Provider Last Rate Last Admin   • sodium chloride 0.9 % (PF) 0.9 % injection  3 mL Intravenous Push PRN Tan Vargas MD       • naloxone (NARCAN) 0.4 MG/ML injection  0.4 mg Intravenous Push PRN Tan Vargas MD       • sodium chloride 0.9 % (PF) 0.9 % injection  3 mL Intravenous Push PRN Sam  MD Tan       • naloxone (NARCAN) 0.4 MG/ML injection  0.4 mg Intravenous Push PRN Tan Vargas MD           SOCIAL HISTORY:  Social History     Tobacco Use   • Smoking status: Never   • Smokeless tobacco: Never   Substance Use Topics   • Alcohol use: No     Alcohol/week: 0.0 standard drinks of alcohol   • Drug use: No       FAMILY HISTORY  No family history on file.           Portions of record reviewed for pertinent issues: active problem list, medication list, allergies, family history, social history, and notes from last encounter.         ASSESSMENT:  Patient with Cervical spondylosis without myelopath  in for follow up post procedure    PLAN:  1) OARRS was reviewed today   2) Proceed with Left C5, C6, and C7 Cervical Medial Branch Block #2 to be followed by RFA. Patient reports 90 % improvement from #1   3) Right C5-6 and C7 Lesion Mode- Cervical Medial Branch Radiofrequency Rhizotomy C5-T2  done on 3/15/24., will give until left side procedure is completed to accurately document pain relief , injections previously provided 100% relief .   4) TPI in mike trap muscles, for muscular pain  5) C-SPINE MRI completed at  4/22/24, disc bulges AT C4-5 AND C5-6  6) RTC PRN    Shasha Grewal, MIKAELA-CNP

## 2024-06-07 ENCOUNTER — APPOINTMENT (OUTPATIENT)
Dept: RADIOLOGY | Facility: HOSPITAL | Age: 54
End: 2024-06-07
Payer: COMMERCIAL

## 2024-06-07 ENCOUNTER — TELEPHONE (OUTPATIENT)
Dept: PAIN MEDICINE | Facility: CLINIC | Age: 54
End: 2024-06-07
Payer: COMMERCIAL

## 2024-06-07 DIAGNOSIS — G90.522 COMPLEX REGIONAL PAIN SYNDROME TYPE 1 OF LEFT LOWER EXTREMITY: ICD-10-CM

## 2024-06-07 RX ORDER — KETAMINE HCL 100 %
POWDER (GRAM) MISCELLANEOUS
Qty: 1 G | Refills: 5 | Status: SHIPPED | OUTPATIENT
Start: 2024-06-07

## 2024-06-10 ENCOUNTER — APPOINTMENT (OUTPATIENT)
Dept: CARDIOLOGY | Facility: HOSPITAL | Age: 54
End: 2024-06-10
Payer: COMMERCIAL

## 2024-06-10 ENCOUNTER — HOSPITAL ENCOUNTER (OUTPATIENT)
Dept: RADIOLOGY | Facility: HOSPITAL | Age: 54
Discharge: HOME | End: 2024-06-10
Payer: COMMERCIAL

## 2024-06-10 ENCOUNTER — APPOINTMENT (OUTPATIENT)
Dept: RADIOLOGY | Facility: HOSPITAL | Age: 54
End: 2024-06-10
Payer: COMMERCIAL

## 2024-06-10 DIAGNOSIS — Z01.818 PRE-OPERATIVE CLEARANCE: Primary | ICD-10-CM

## 2024-06-10 DIAGNOSIS — M54.6 THORACIC BACK PAIN, UNSPECIFIED BACK PAIN LATERALITY, UNSPECIFIED CHRONICITY: ICD-10-CM

## 2024-06-10 DIAGNOSIS — M79.18 MYOFASCIAL PAIN: ICD-10-CM

## 2024-06-10 DIAGNOSIS — M54.2 NECK PAIN: ICD-10-CM

## 2024-06-10 PROCEDURE — 77080 DXA BONE DENSITY AXIAL: CPT

## 2024-06-10 PROCEDURE — 77080 DXA BONE DENSITY AXIAL: CPT | Performed by: STUDENT IN AN ORGANIZED HEALTH CARE EDUCATION/TRAINING PROGRAM

## 2024-06-10 RX ORDER — CHLORHEXIDINE GLUCONATE ORAL RINSE 1.2 MG/ML
15 SOLUTION DENTAL AS NEEDED
Qty: 30 ML | Refills: 0 | Status: SHIPPED | OUTPATIENT
Start: 2024-06-10

## 2024-06-11 ENCOUNTER — APPOINTMENT (OUTPATIENT)
Dept: OPHTHALMOLOGY | Facility: CLINIC | Age: 54
End: 2024-06-11
Payer: COMMERCIAL

## 2024-06-11 DIAGNOSIS — H52.4 MYOPIA OF BOTH EYES WITH ASTIGMATISM AND PRESBYOPIA: Primary | ICD-10-CM

## 2024-06-11 DIAGNOSIS — H52.203 MYOPIA OF BOTH EYES WITH ASTIGMATISM AND PRESBYOPIA: Primary | ICD-10-CM

## 2024-06-11 DIAGNOSIS — H52.13 MYOPIA OF BOTH EYES WITH ASTIGMATISM AND PRESBYOPIA: Primary | ICD-10-CM

## 2024-06-11 PROCEDURE — 92014 COMPRE OPH EXAM EST PT 1/>: CPT | Performed by: OPTOMETRIST

## 2024-06-11 PROCEDURE — 92310 CONTACT LENS FITTING OU: CPT | Performed by: OPTOMETRIST

## 2024-06-11 PROCEDURE — 92015 DETERMINE REFRACTIVE STATE: CPT | Performed by: OPTOMETRIST

## 2024-06-11 ASSESSMENT — REFRACTION_CURRENTRX
OS_DIAMETER: 14.2
OS_BASECURVE: 8.5
OD_BASECURVE: 8.5
OD_DIAMETER: 14.2
OD_BRAND: ULTRA FOR PRESBYOPIA
OD_ADD: HI
OS_BRAND: ULTRA FOR PRESBYOPIA
OD_SPHERE: -2.75
OD_BRAND: ULTRA FOR PRESBYOPIA
OS_SPHERE: -2.25
OS_BASECURVE: 8.5
OS_ADD: HI
OS_SPHERE: -2.50
OD_DIAMETER: 14.2
OD_SPHERE: -3.00
OD_ADD: HI
OS_DIAMETER: 14.2
OS_ADD: HI
OS_BRAND: ULTRA FOR PRESBYOPIA
OD_BASECURVE: 8.5

## 2024-06-11 ASSESSMENT — VISUAL ACUITY
CORRECTION_TYPE: GLASSES
OD_CC: 20/20-1
OS_CC: 20/20
METHOD: SNELLEN - LINEAR

## 2024-06-11 ASSESSMENT — CONF VISUAL FIELD
OD_SUPERIOR_NASAL_RESTRICTION: 0
OS_NORMAL: 1
OD_INFERIOR_NASAL_RESTRICTION: 0
OD_NORMAL: 1
OS_SUPERIOR_NASAL_RESTRICTION: 0
OS_SUPERIOR_TEMPORAL_RESTRICTION: 0
OD_INFERIOR_TEMPORAL_RESTRICTION: 0
OS_INFERIOR_TEMPORAL_RESTRICTION: 0
OD_SUPERIOR_TEMPORAL_RESTRICTION: 0
OS_INFERIOR_NASAL_RESTRICTION: 0

## 2024-06-11 ASSESSMENT — ENCOUNTER SYMPTOMS
EYES NEGATIVE: 1
GASTROINTESTINAL NEGATIVE: 0
CARDIOVASCULAR NEGATIVE: 0
HEMATOLOGIC/LYMPHATIC NEGATIVE: 0
MUSCULOSKELETAL NEGATIVE: 0
NEUROLOGICAL NEGATIVE: 0
PSYCHIATRIC NEGATIVE: 0
ENDOCRINE NEGATIVE: 0
CONSTITUTIONAL NEGATIVE: 0
RESPIRATORY NEGATIVE: 0
ALLERGIC/IMMUNOLOGIC NEGATIVE: 0

## 2024-06-11 ASSESSMENT — EXTERNAL EXAM - LEFT EYE: OS_EXAM: NORMAL

## 2024-06-11 ASSESSMENT — EXTERNAL EXAM - RIGHT EYE: OD_EXAM: NORMAL

## 2024-06-11 ASSESSMENT — REFRACTION_MANIFEST
OS_ADD: +2.50
OS_AXIS: 180
OD_SPHERE: -3.00
OS_SPHERE: -2.00
OD_ADD: +2.50
OS_CYLINDER: -1.00

## 2024-06-11 ASSESSMENT — TONOMETRY
OD_IOP_MMHG: 17
IOP_METHOD: GOLDMANN APPLANATION
OS_IOP_MMHG: 17

## 2024-06-11 ASSESSMENT — REFRACTION_WEARINGRX
OS_CYLINDER: -1.00
OS_AXIS: 005
OS_SPHERE: -2.00
OD_SPHERE: -3.00

## 2024-06-11 ASSESSMENT — SLIT LAMP EXAM - LIDS
COMMENTS: GOOD POSITION
COMMENTS: GOOD POSITION

## 2024-06-11 ASSESSMENT — CUP TO DISC RATIO
OS_RATIO: 0.25
OD_RATIO: 0.25

## 2024-06-11 NOTE — PROGRESS NOTES
A spectacle prescription was dispensed to be used as needed. A contact lens prescription was dispensed at the patient's request. New Ultra multifocal lenses Rx and trials dispensed.     The patient was asked to return to our clinic in one year or sooner if ocular or vision changes occur

## 2024-06-12 ENCOUNTER — PRE-ADMISSION TESTING (OUTPATIENT)
Dept: PREADMISSION TESTING | Facility: HOSPITAL | Age: 54
End: 2024-06-12
Payer: COMMERCIAL

## 2024-06-12 ENCOUNTER — HOSPITAL ENCOUNTER (OUTPATIENT)
Dept: RADIOLOGY | Facility: HOSPITAL | Age: 54
Discharge: HOME | End: 2024-06-12
Payer: COMMERCIAL

## 2024-06-12 ENCOUNTER — TELEPHONE (OUTPATIENT)
Dept: ORTHOPEDIC SURGERY | Facility: CLINIC | Age: 54
End: 2024-06-12
Payer: COMMERCIAL

## 2024-06-12 VITALS
WEIGHT: 187.61 LBS | OXYGEN SATURATION: 97 % | DIASTOLIC BLOOD PRESSURE: 88 MMHG | TEMPERATURE: 97.5 F | BODY MASS INDEX: 26.17 KG/M2 | HEART RATE: 79 BPM | SYSTOLIC BLOOD PRESSURE: 131 MMHG | RESPIRATION RATE: 18 BRPM

## 2024-06-12 DIAGNOSIS — M16.11 UNILATERAL PRIMARY OSTEOARTHRITIS, RIGHT HIP: ICD-10-CM

## 2024-06-12 LAB
ALBUMIN SERPL BCP-MCNC: 4.5 G/DL (ref 3.4–5)
ALP SERPL-CCNC: 60 U/L (ref 33–120)
ALT SERPL W P-5'-P-CCNC: 51 U/L (ref 10–52)
ANION GAP SERPL CALC-SCNC: 15 MMOL/L (ref 10–20)
AST SERPL W P-5'-P-CCNC: 23 U/L (ref 9–39)
BASOPHILS # BLD AUTO: 0.07 X10*3/UL (ref 0–0.1)
BASOPHILS NFR BLD AUTO: 0.6 %
BILIRUB SERPL-MCNC: 0.5 MG/DL (ref 0–1.2)
BUN SERPL-MCNC: 21 MG/DL (ref 6–23)
CALCIUM SERPL-MCNC: 9.6 MG/DL (ref 8.6–10.3)
CHLORIDE SERPL-SCNC: 104 MMOL/L (ref 98–107)
CO2 SERPL-SCNC: 22 MMOL/L (ref 21–32)
CREAT SERPL-MCNC: 0.68 MG/DL (ref 0.5–1.3)
EGFRCR SERPLBLD CKD-EPI 2021: >90 ML/MIN/1.73M*2
EOSINOPHIL # BLD AUTO: 0.04 X10*3/UL (ref 0–0.7)
EOSINOPHIL NFR BLD AUTO: 0.4 %
ERYTHROCYTE [DISTWIDTH] IN BLOOD BY AUTOMATED COUNT: 15.1 % (ref 11.5–14.5)
EST. AVERAGE GLUCOSE BLD GHB EST-MCNC: 105 MG/DL
GLUCOSE SERPL-MCNC: 89 MG/DL (ref 74–99)
HBA1C MFR BLD: 5.3 %
HCT VFR BLD AUTO: 46 % (ref 41–52)
HGB BLD-MCNC: 14.9 G/DL (ref 13.5–17.5)
IMM GRANULOCYTES # BLD AUTO: 0.25 X10*3/UL (ref 0–0.7)
IMM GRANULOCYTES NFR BLD AUTO: 2.3 % (ref 0–0.9)
INR PPP: 1 (ref 0.9–1.1)
LYMPHOCYTES # BLD AUTO: 1.74 X10*3/UL (ref 1.2–4.8)
LYMPHOCYTES NFR BLD AUTO: 15.7 %
MCH RBC QN AUTO: 27.2 PG (ref 26–34)
MCHC RBC AUTO-ENTMCNC: 32.4 G/DL (ref 32–36)
MCV RBC AUTO: 84 FL (ref 80–100)
MONOCYTES # BLD AUTO: 0.81 X10*3/UL (ref 0.1–1)
MONOCYTES NFR BLD AUTO: 7.3 %
NEUTROPHILS # BLD AUTO: 8.18 X10*3/UL (ref 1.2–7.7)
NEUTROPHILS NFR BLD AUTO: 73.7 %
NRBC BLD-RTO: 0 /100 WBCS (ref 0–0)
PLATELET # BLD AUTO: 273 X10*3/UL (ref 150–450)
POTASSIUM SERPL-SCNC: 4.5 MMOL/L (ref 3.5–5.3)
PROT SERPL-MCNC: 7.1 G/DL (ref 6.4–8.2)
PROTHROMBIN TIME: 10.8 SECONDS (ref 9.8–12.8)
RBC # BLD AUTO: 5.47 X10*6/UL (ref 4.5–5.9)
SODIUM SERPL-SCNC: 136 MMOL/L (ref 136–145)
WBC # BLD AUTO: 11.1 X10*3/UL (ref 4.4–11.3)

## 2024-06-12 PROCEDURE — 85025 COMPLETE CBC W/AUTO DIFF WBC: CPT

## 2024-06-12 PROCEDURE — 36415 COLL VENOUS BLD VENIPUNCTURE: CPT

## 2024-06-12 PROCEDURE — 87081 CULTURE SCREEN ONLY: CPT | Mod: ELYLAB

## 2024-06-12 PROCEDURE — 83036 HEMOGLOBIN GLYCOSYLATED A1C: CPT | Mod: ELYLAB

## 2024-06-12 PROCEDURE — 80053 COMPREHEN METABOLIC PANEL: CPT

## 2024-06-12 PROCEDURE — 73700 CT LOWER EXTREMITY W/O DYE: CPT | Mod: RT

## 2024-06-12 PROCEDURE — 85610 PROTHROMBIN TIME: CPT

## 2024-06-12 RX ORDER — ALBUTEROL SULFATE 90 UG/1
2 AEROSOL, METERED RESPIRATORY (INHALATION) EVERY 4 HOURS PRN
COMMUNITY

## 2024-06-12 RX ORDER — DIPHENHYDRAMINE HCL 25 MG
25 TABLET ORAL AS NEEDED
COMMUNITY

## 2024-06-12 ASSESSMENT — PAIN SCALES - GENERAL: PAINLEVEL_OUTOF10: 8

## 2024-06-12 ASSESSMENT — PAIN - FUNCTIONAL ASSESSMENT: PAIN_FUNCTIONAL_ASSESSMENT: 0-10

## 2024-06-12 NOTE — TELEPHONE ENCOUNTER
Pt called and has a upcoming hip replacement with Dr lagunas , his pain mgmt doctor put him on a high month long dose of prednisone, he wanted to know if he needed to stop it before surgery , would like a return call to discuss

## 2024-06-12 NOTE — PREPROCEDURE INSTRUCTIONS
PAT TESTING COMPLETE - ERAS KIT/JOINT BOOK GIVEN, INSTRUCTIONS GIVEN - AWARE TO BRING WALKER ON ADMIT - AWARE TO HOLD ALL NSAID PRODUCTS                       NPO Instructions:  Do not eat any food after midnight the night before your surgery/procedure.    Additional Instructions:

## 2024-06-13 LAB — STAPHYLOCOCCUS SPEC CULT: ABNORMAL

## 2024-06-13 NOTE — TELEPHONE ENCOUNTER
Called and spoke with patient after speaking with Dr. Matamoros.  Dr. Matamoros would like patient to stop prednisone 7 days prior to his surgery.

## 2024-06-15 ENCOUNTER — APPOINTMENT (OUTPATIENT)
Dept: RADIOLOGY | Facility: HOSPITAL | Age: 54
End: 2024-06-15
Payer: COMMERCIAL

## 2024-06-17 ENCOUNTER — APPOINTMENT (OUTPATIENT)
Dept: BEHAVIORAL HEALTH | Facility: CLINIC | Age: 54
End: 2024-06-17
Payer: COMMERCIAL

## 2024-06-17 DIAGNOSIS — F41.9 ANXIETY: ICD-10-CM

## 2024-06-17 DIAGNOSIS — Z91.49 HISTORY OF PSYCHOLOGICAL TRAUMA: ICD-10-CM

## 2024-06-17 PROCEDURE — 90834 PSYTX W PT 45 MINUTES: CPT

## 2024-06-17 NOTE — PROGRESS NOTES
"Start time:  2:07  End time:  2:54     An interactive audio and video telecommunication system which permits real time communications between the patient (at the originating site) and provider (at the distant site) was utilized to provide this telehealth service. Verbal consent has been obtained from this patient for a telehealth visit.     The patient was informed of the current need to conduct treatment via virtual platform in light of COVID-19 pandemic. I have confirmed the patient’s identity via the following (minimum of three) acceptable identifiers as per  Policy PH-9: , address, phone number, and email address.        SUBJECTIVE:  Started taking prednisone for inflammation which has helped; has to go off of everything for hip surgery and apprehensive about this. Rather than learning additional behavioral means of pain management patient is planning to use time this week to break chores down into small parts. Assisted patient with challenging thoughts related to \"hampering\" partner with health and related challenges. Discussed this week planning one pleasant activity specifically for himself outside the house in which he feels stuck at times.     Progress: Good  Prognosis: Good     Duration of problem: years with recent improvement     Severity: moderate     OBJECTIVE:  Orientation & Cognition: Oriented x3. Thought processes normal and appropriate to situation.  Mood, Affect: Euthymic, frustrated at joe  Appearance: Optimal by patient standards.  Harm to self or others: Not reported  Substance abuse: Not reported  Psychiatric medication use: Not reported     IMPRESSION:     F41.9 Unspecified anxiety disorder  Z.91.49 History of psychological trauma     Goals for Therapy: Sharing difficult feelings in a therapeutic environment, expanding upon current social supports in his life, and addressing anxiety. Patient is also interested in medication management for his feelings of traumatization which he is " worried will return after he ends ketamine treatment.      PLAN:     Discussed this week planning one pleasant activity specifically for himself outside the house in which he feels stuck at times.     Next apt: 7/15     --------------------------------------------  Other(s) present in the room: None.  --------------------------------------------  Time spent face-to-face with patient: 47 minutes

## 2024-06-18 ENCOUNTER — APPOINTMENT (OUTPATIENT)
Dept: ORTHOPEDIC SURGERY | Facility: CLINIC | Age: 54
End: 2024-06-18
Payer: COMMERCIAL

## 2024-06-19 ENCOUNTER — OFFICE VISIT (OUTPATIENT)
Dept: ORTHOPEDIC SURGERY | Facility: CLINIC | Age: 54
End: 2024-06-19
Payer: COMMERCIAL

## 2024-06-19 DIAGNOSIS — M16.12 PRIMARY OSTEOARTHRITIS OF LEFT HIP: Primary | ICD-10-CM

## 2024-06-19 NOTE — PROGRESS NOTES
Subjective    Patient ID: Akin    Chief Complaint:   Chief Complaint   Patient presents with    Right Hip - Pre-op Visit     Pre op RT GILBERT Stephen 06/28/2024     This patient has pain in the hip that is increased with activity and weightbearing, the patient walks with an antalgic gait at this time.  The pain is interfering with activities of daily living.  The patient has limited range of motion of the hip and pain with passive range of motion.  This x-ray demonstrates joint space narrowing, subchondral sclerosis, and osteophyte formation.  The patient has failed to respond to conservative treatment with medication therapy and supportive devices for period of at least 3 months.     This is the preop visit to discuss the risks and benefits of the total hip replacement surgery.  These risks were fully explained to the patient.  With this, and as any surgery, infection is a risk.  The risk for infection is usually between 1 and 2%.  This risk is even higher in diabetics, persons with rheumatoid arthritis, patients with previous surgery, patients on oral steroid medication, and obesity.  All of these issues were properly discussed.  We always assure all sterile techniques will be followed during the procedure.  Patient is also need to be on antibiotics for the next 2 years for any minor surgical procedure, even dental work.  For high risk patients this will be for lifetime.  Severe infections may require removal of the prosthesis.     It was also explained to the patient that there will be some blood loss during the procedure.  Transfusions although rare will only be given when absolutely medically necessary.     Pulmonary embolism and blood clots were also discussed with the patient.  We discussed that these can be fatal complications of the procedure.  Is explained to the patient that the use of thromboembolic stockings, foot pumps, incentive spirometer, early mobility, and the use of blood thinners for a period of  time is the standard of care.     Dislocations are discussed with the patient.  It is explained that the highest risk for dislocating the hip happens during the first 3 months after surgery.  These risks tend to decrease with time.  This risk is higher and revisions.  It is explained to the patient that hip precautions are very important and that these will be carried out throughout the lifetime with her prosthesis.  Intraoperatively, we will adjust the length of the leg to tighten the joint to help prevent dislocation.  This leg lengthening to stabilize the joint is usually no more than 1/4 inch but may be more or less to improve stability.     Loosening and wear of the prosthesis is also discussed.  The prosthesis normally lasts between 12 and 15 years on the average.  Revisions may be more complicated.     Fractures, though rare, they also occur intraoperatively.  These fractures may occur in the pelvis or the femur.  There may be nerve or arterial injuries as well and these are discussed in detail.  Lastly the benefits of spinal anesthesia were explained to the patient.     All of the patient's questions and concerns were answered in detail.     This note was prepared using voice recognition software.  The details of this note are correct and have been reviewed, and corrected to the best of my ability.  Some grammatical areas may persist related to the Dragon software     Vish Barboza PA-C     [phone]

## 2024-06-20 ENCOUNTER — APPOINTMENT (OUTPATIENT)
Dept: CARDIOLOGY | Facility: HOSPITAL | Age: 54
End: 2024-06-20
Payer: COMMERCIAL

## 2024-06-20 ENCOUNTER — HOSPITAL ENCOUNTER (EMERGENCY)
Facility: HOSPITAL | Age: 54
Discharge: HOME | End: 2024-06-20
Attending: EMERGENCY MEDICINE
Payer: COMMERCIAL

## 2024-06-20 ENCOUNTER — APPOINTMENT (OUTPATIENT)
Dept: RADIOLOGY | Facility: HOSPITAL | Age: 54
End: 2024-06-20
Payer: COMMERCIAL

## 2024-06-20 ENCOUNTER — HOSPITAL ENCOUNTER (OUTPATIENT)
Dept: CARDIOLOGY | Facility: HOSPITAL | Age: 54
Discharge: HOME | End: 2024-06-20
Payer: COMMERCIAL

## 2024-06-20 VITALS
RESPIRATION RATE: 20 BRPM | SYSTOLIC BLOOD PRESSURE: 136 MMHG | HEART RATE: 84 BPM | TEMPERATURE: 97.9 F | WEIGHT: 185 LBS | BODY MASS INDEX: 25.9 KG/M2 | DIASTOLIC BLOOD PRESSURE: 84 MMHG | HEIGHT: 71 IN | OXYGEN SATURATION: 96 %

## 2024-06-20 DIAGNOSIS — R00.2 PALPITATIONS: ICD-10-CM

## 2024-06-20 DIAGNOSIS — M54.2 NECK PAIN: Primary | ICD-10-CM

## 2024-06-20 LAB
ANION GAP SERPL CALC-SCNC: 13 MMOL/L (ref 10–20)
ATRIAL RATE: 87 BPM
BUN SERPL-MCNC: 17 MG/DL (ref 6–23)
CALCIUM SERPL-MCNC: 9.9 MG/DL (ref 8.6–10.3)
CARDIAC TROPONIN I PNL SERPL HS: 38 NG/L (ref 0–20)
CHLORIDE SERPL-SCNC: 101 MMOL/L (ref 98–107)
CO2 SERPL-SCNC: 25 MMOL/L (ref 21–32)
CREAT SERPL-MCNC: 0.74 MG/DL (ref 0.5–1.3)
EGFRCR SERPLBLD CKD-EPI 2021: >90 ML/MIN/1.73M*2
GLUCOSE SERPL-MCNC: 89 MG/DL (ref 74–99)
HOLD SPECIMEN: NORMAL
P AXIS: 32 DEGREES
P OFFSET: 206 MS
P ONSET: 153 MS
POTASSIUM SERPL-SCNC: 4.3 MMOL/L (ref 3.5–5.3)
PR INTERVAL: 138 MS
Q ONSET: 222 MS
QRS COUNT: 14 BEATS
QRS DURATION: 98 MS
QT INTERVAL: 364 MS
QTC CALCULATION(BAZETT): 438 MS
QTC FREDERICIA: 411 MS
R AXIS: 22 DEGREES
SODIUM SERPL-SCNC: 135 MMOL/L (ref 136–145)
T AXIS: 43 DEGREES
T OFFSET: 404 MS
VENTRICULAR RATE: 87 BPM

## 2024-06-20 PROCEDURE — 99285 EMERGENCY DEPT VISIT HI MDM: CPT | Performed by: EMERGENCY MEDICINE

## 2024-06-20 PROCEDURE — 36415 COLL VENOUS BLD VENIPUNCTURE: CPT | Performed by: EMERGENCY MEDICINE

## 2024-06-20 PROCEDURE — 71045 X-RAY EXAM CHEST 1 VIEW: CPT

## 2024-06-20 PROCEDURE — 82374 ASSAY BLOOD CARBON DIOXIDE: CPT | Performed by: EMERGENCY MEDICINE

## 2024-06-20 PROCEDURE — 2500000004 HC RX 250 GENERAL PHARMACY W/ HCPCS (ALT 636 FOR OP/ED): Performed by: EMERGENCY MEDICINE

## 2024-06-20 PROCEDURE — 80048 BASIC METABOLIC PNL TOTAL CA: CPT | Performed by: EMERGENCY MEDICINE

## 2024-06-20 PROCEDURE — 93005 ELECTROCARDIOGRAM TRACING: CPT

## 2024-06-20 PROCEDURE — 99284 EMERGENCY DEPT VISIT MOD MDM: CPT | Mod: 25

## 2024-06-20 PROCEDURE — 2500000005 HC RX 250 GENERAL PHARMACY W/O HCPCS: Performed by: EMERGENCY MEDICINE

## 2024-06-20 PROCEDURE — 71045 X-RAY EXAM CHEST 1 VIEW: CPT | Mod: FOREIGN READ | Performed by: RADIOLOGY

## 2024-06-20 PROCEDURE — 96374 THER/PROPH/DIAG INJ IV PUSH: CPT

## 2024-06-20 PROCEDURE — 84484 ASSAY OF TROPONIN QUANT: CPT | Performed by: EMERGENCY MEDICINE

## 2024-06-20 PROCEDURE — 96375 TX/PRO/DX INJ NEW DRUG ADDON: CPT

## 2024-06-20 RX ORDER — METHOCARBAMOL 100 MG/ML
1000 INJECTION, SOLUTION INTRAMUSCULAR; INTRAVENOUS ONCE
Status: COMPLETED | OUTPATIENT
Start: 2024-06-20 | End: 2024-06-20

## 2024-06-20 RX ADMIN — HYDROMORPHONE HYDROCHLORIDE 0.5 MG: 1 INJECTION, SOLUTION INTRAMUSCULAR; INTRAVENOUS; SUBCUTANEOUS at 13:21

## 2024-06-20 RX ADMIN — METHOCARBAMOL 1000 MG: 1000 INJECTION, SOLUTION INTRAMUSCULAR; INTRAVENOUS at 11:45

## 2024-06-20 RX ADMIN — Medication 25 MG: at 11:13

## 2024-06-20 ASSESSMENT — PAIN SCALES - GENERAL
PAINLEVEL_OUTOF10: 9
PAINLEVEL_OUTOF10: 10 - WORST POSSIBLE PAIN

## 2024-06-20 ASSESSMENT — LIFESTYLE VARIABLES
TOTAL SCORE: 0
EVER FELT BAD OR GUILTY ABOUT YOUR DRINKING: NO
HAVE PEOPLE ANNOYED YOU BY CRITICIZING YOUR DRINKING: NO
HAVE YOU EVER FELT YOU SHOULD CUT DOWN ON YOUR DRINKING: NO
EVER HAD A DRINK FIRST THING IN THE MORNING TO STEADY YOUR NERVES TO GET RID OF A HANGOVER: NO

## 2024-06-20 ASSESSMENT — PAIN DESCRIPTION - PAIN TYPE: TYPE: ACUTE PAIN

## 2024-06-20 ASSESSMENT — PAIN - FUNCTIONAL ASSESSMENT: PAIN_FUNCTIONAL_ASSESSMENT: 0-10

## 2024-06-20 ASSESSMENT — PAIN DESCRIPTION - LOCATION
LOCATION: NECK
LOCATION: NECK

## 2024-06-20 NOTE — ED PROVIDER NOTES
HPI   Chief Complaint   Patient presents with    Neck Pain    Palpitations       54-year-old male with history of chronic neck pain who is presenting for worsening of his chronic neck pain and palpitations that he feels like are exacerbated by the pain.  States that sometimes he feels chest pressure which is caused by the neck pain.  There are no new features to the neck pain.  He states he has had to hold his steroids of his medications because he may be getting hip surgery in the next 2 weeks.  No new neurological symptoms.  On chart review, has had a negative stress and a nonobstructive heart catheterization in the last decade.  Troponins are generally in the 30-40 range.                          Canvas Coma Scale Score: 15                     Patient History   Past Medical History:   Diagnosis Date    Anesthesia of skin     Numbness and tingling    Anxiety 09/21/2023    Arthritis Unk    Bilateral myopia 09/21/2023    Cervical radicular pain 09/21/2023    Chronic patellofemoral pain of left knee 09/21/2023    Complex regional pain syndrome type 1 of left lower extremity 09/21/2023    COVID-19     VACCINATED    Cubital tunnel syndrome on left 09/21/2023    Cubital tunnel syndrome on right 09/21/2023    Disease of intestine, unspecified     Bowel trouble    Erectile dysfunction 09/21/2023    Foot joint pain 09/21/2023    Fractures     Gastroparesis     GERD (gastroesophageal reflux disease)     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)     Stable angina pectoris    Other spondylosis, cervical region 09/21/2023    Patellofemoral arthritis 09/21/2023    Personal history of other diseases of the musculoskeletal system and connective tissue     History of arthritis    Personal history of other specified conditions     History of seizure    Reflex sympathetic dystrophy 09/21/2023    Scapular dyskinesis 09/21/2023    Seizure disorder (Multi)     D/T TRAMADOL REACTION - 25 YEARS AGO    Shoulder impingement 09/21/2023    Suspected sleep apnea 09/21/2023    Trauma and stressor-related disorder 09/21/2023    Trochanteric bursitis of both hips 09/22/2023    Unspecified disorder of ear, unspecified ear     Ear, nose and throat disorder    Wears glasses      Past Surgical History:   Procedure Laterality Date    ACHILLES TENDON SURGERY  1978 2016    CT ABDOMEN PELVIS ANGIOGRAM W AND/OR WO IV CONTRAST  03/18/2020    CT ABDOMEN PELVIS ANGIOGRAM W AND/OR WO IV CONTRAST 3/18/2020 [facility]    CT ANGIO NECK  12/16/2022    CT NECK ANGIO W AND WO IV CONTRAST 12/16/2022 [facility]    CT HEAD ANGIO W AND WO IV CONTRAST  12/16/2022    CT HEAD ANGIO W AND WO IV CONTRAST 12/16/2022 [facility]    HIP SURGERY Left     REPLACEMENT    JOINT REPLACEMENT      LEFT KNEE    KNEE ARTHROPLASTY Bilateral 06/13/2017    Knee Arthroplasty    OTHER SURGICAL HISTORY  01/06/2022    Ulnar nerve neuroplasty    OTHER SURGICAL HISTORY Bilateral 07/26/2019    Foot surgery  -- HARDWARE    OTHER SURGICAL HISTORY Bilateral     BILATERAL ANKLE SCOPE    OTHER SURGICAL HISTORY      POP ABDOMINAL PROCEDURE    PLANTAR FASCIA RELEASE      SHOULDER SURGERY Bilateral 06/13/2017    Shoulder Surgery     Family History   Problem Relation Name Age of Onset    Lung cancer Mother      Glaucoma Father      Skin cancer Father      Hypertension Other mul fam mem     Heart disease Other mul fam  mem      Social History     Tobacco Use    Smoking status: Never    Smokeless tobacco: Never    Tobacco comments:     It killed my mother smoking and she left me with a gift a nodule on my right lung   Vaping Use    Vaping status: Never Used   Substance Use Topics    Alcohol use: Not Currently    Drug use: Not Currently     Types: Marijuana, Ketamine, Cocaine     Comment: Had used cocaine, marijuana, ecstasy among others in 2006. and before.       Physical Exam   ED Triage Vitals [06/20/24 0901]   Temperature Heart Rate Respirations BP   36.6 °C (97.9 °F) 83 18 (!) 168/96      Pulse Ox Temp Source Heart Rate Source Patient Position   99 % Temporal Monitor Sitting      BP Location FiO2 (%)     Right arm --       Physical Exam  Vitals and nursing note reviewed.   Constitutional:       Appearance: Normal appearance.   HENT:      Head: Normocephalic.   Cardiovascular:      Rate and Rhythm: Normal rate and regular rhythm.   Pulmonary:      Effort: Pulmonary effort is normal.      Breath sounds: Normal breath sounds.   Abdominal:      General: Abdomen is flat.   Musculoskeletal:         General: No swelling. Normal range of motion.      Cervical back: Normal range of motion. No rigidity.   Skin:     General: Skin is warm.      Capillary Refill: Capillary refill takes less than 2 seconds.   Neurological:      General: No focal deficit present.      Mental Status: He is alert. Mental status is at baseline.   Psychiatric:         Mood and Affect: Mood normal.         ED Course & MDM   ED Course as of 06/21/24 1303   Thu Jun 20, 2024   1410 Pain improved, discharged. [AM]      ED Course User Index  [AM] Mustapha Mueller MD         Diagnoses as of 06/21/24 1303   Neck pain   Palpitations       Medical Decision Making  54-year-old male presenting with chronic neck pain and palpitations.  He remains in sinus rhythm on the monitor.  His EKG is normal sinus rhythm with incomplete right bundle branch block without ST segment  elevations or depressions, no T wave inversions, consistent with prior from May 2024.  His troponin is consistent with prior, I have low suspicion for acute coronary syndrome or pulmonary embolism.  The neck pain is chronic, I have low suspicion for a new injury or compression causing neurological compromise.  I did give him ketamine IV and Robaxin, which there is some improvement but is requesting more pain medication, specifically Dilaudid.  I gave him 1 dose of Dilaudid and then told him any further opiates he would need to discuss with his pain doctor.  His symptoms resolved quickly after the Dilaudid and stated he was ready for discharge.  Given good return precautions.        Procedure  Procedures     Mustapha Mueller MD  06/21/24 6012

## 2024-06-20 NOTE — DISCHARGE INSTRUCTIONS
Please follow-up with your pain doctors and return to the ER if you have any new or concerning symptoms.

## 2024-06-21 LAB
ATRIAL RATE: 87 BPM
P AXIS: 32 DEGREES
P OFFSET: 206 MS
P ONSET: 153 MS
PR INTERVAL: 138 MS
Q ONSET: 222 MS
QRS COUNT: 14 BEATS
QRS DURATION: 98 MS
QT INTERVAL: 364 MS
QTC CALCULATION(BAZETT): 438 MS
QTC FREDERICIA: 411 MS
R AXIS: 22 DEGREES
T AXIS: 43 DEGREES
T OFFSET: 404 MS
VENTRICULAR RATE: 87 BPM

## 2024-06-25 ENCOUNTER — TELEPHONE (OUTPATIENT)
Dept: ORTHOPEDIC SURGERY | Facility: CLINIC | Age: 54
End: 2024-06-25
Payer: COMMERCIAL

## 2024-06-25 PROCEDURE — RXMED WILLOW AMBULATORY MEDICATION CHARGE

## 2024-06-26 ENCOUNTER — TELEPHONE (OUTPATIENT)
Dept: ORTHOPEDIC SURGERY | Facility: CLINIC | Age: 54
End: 2024-06-26
Payer: COMMERCIAL

## 2024-06-27 ENCOUNTER — ANESTHESIA EVENT (OUTPATIENT)
Dept: OPERATING ROOM | Facility: HOSPITAL | Age: 54
End: 2024-06-27
Payer: COMMERCIAL

## 2024-06-28 ENCOUNTER — HOSPITAL ENCOUNTER (OUTPATIENT)
Facility: HOSPITAL | Age: 54
Discharge: HOME HEALTH | End: 2024-06-29
Attending: ORTHOPAEDIC SURGERY | Admitting: ORTHOPAEDIC SURGERY
Payer: COMMERCIAL

## 2024-06-28 ENCOUNTER — HOME HEALTH ADMISSION (OUTPATIENT)
Dept: HOME HEALTH SERVICES | Facility: HOME HEALTH | Age: 54
End: 2024-06-28
Payer: COMMERCIAL

## 2024-06-28 ENCOUNTER — ANESTHESIA (OUTPATIENT)
Dept: OPERATING ROOM | Facility: HOSPITAL | Age: 54
End: 2024-06-28
Payer: COMMERCIAL

## 2024-06-28 ENCOUNTER — APPOINTMENT (OUTPATIENT)
Dept: RADIOLOGY | Facility: HOSPITAL | Age: 54
End: 2024-06-28
Payer: COMMERCIAL

## 2024-06-28 DIAGNOSIS — M16.11 UNILATERAL PRIMARY OSTEOARTHRITIS, RIGHT HIP: Primary | ICD-10-CM

## 2024-06-28 DIAGNOSIS — Z96.641 S/P TOTAL RIGHT HIP ARTHROPLASTY: ICD-10-CM

## 2024-06-28 PROCEDURE — 2780000003 HC OR 278 NO HCPCS: Performed by: ORTHOPAEDIC SURGERY

## 2024-06-28 PROCEDURE — 2500000005 HC RX 250 GENERAL PHARMACY W/O HCPCS

## 2024-06-28 PROCEDURE — C1776 JOINT DEVICE (IMPLANTABLE): HCPCS | Performed by: ORTHOPAEDIC SURGERY

## 2024-06-28 PROCEDURE — 2500000004 HC RX 250 GENERAL PHARMACY W/ HCPCS (ALT 636 FOR OP/ED): Performed by: ORTHOPAEDIC SURGERY

## 2024-06-28 PROCEDURE — 7100000011 HC EXTENDED STAY RECOVERY HOURLY - NURSING UNIT

## 2024-06-28 PROCEDURE — 97110 THERAPEUTIC EXERCISES: CPT | Mod: GP | Performed by: PHYSICAL THERAPIST

## 2024-06-28 PROCEDURE — A6213 FOAM DRG >16<=48 SQ IN W/BDR: HCPCS | Performed by: ORTHOPAEDIC SURGERY

## 2024-06-28 PROCEDURE — 2500000004 HC RX 250 GENERAL PHARMACY W/ HCPCS (ALT 636 FOR OP/ED): Performed by: ANESTHESIOLOGY

## 2024-06-28 PROCEDURE — 2500000004 HC RX 250 GENERAL PHARMACY W/ HCPCS (ALT 636 FOR OP/ED): Mod: JZ | Performed by: ORTHOPAEDIC SURGERY

## 2024-06-28 PROCEDURE — 2720000007 HC OR 272 NO HCPCS: Performed by: ORTHOPAEDIC SURGERY

## 2024-06-28 PROCEDURE — 2500000002 HC RX 250 W HCPCS SELF ADMINISTERED DRUGS (ALT 637 FOR MEDICARE OP, ALT 636 FOR OP/ED)

## 2024-06-28 PROCEDURE — 97161 PT EVAL LOW COMPLEX 20 MIN: CPT | Mod: GP | Performed by: PHYSICAL THERAPIST

## 2024-06-28 PROCEDURE — 3600000018 HC OR TIME - INITIAL BASE CHARGE - PROCEDURE LEVEL SIX: Performed by: ORTHOPAEDIC SURGERY

## 2024-06-28 PROCEDURE — 2500000005 HC RX 250 GENERAL PHARMACY W/O HCPCS: Performed by: ANESTHESIOLOGY

## 2024-06-28 PROCEDURE — 73501 X-RAY EXAM HIP UNI 1 VIEW: CPT | Mod: RT

## 2024-06-28 PROCEDURE — 27130 TOTAL HIP ARTHROPLASTY: CPT | Performed by: PHYSICIAN ASSISTANT

## 2024-06-28 PROCEDURE — C1713 ANCHOR/SCREW BN/BN,TIS/BN: HCPCS | Performed by: ORTHOPAEDIC SURGERY

## 2024-06-28 PROCEDURE — 2500000001 HC RX 250 WO HCPCS SELF ADMINISTERED DRUGS (ALT 637 FOR MEDICARE OP): Performed by: ORTHOPAEDIC SURGERY

## 2024-06-28 PROCEDURE — 3700000002 HC GENERAL ANESTHESIA TIME - EACH INCREMENTAL 1 MINUTE: Performed by: ORTHOPAEDIC SURGERY

## 2024-06-28 PROCEDURE — 3700000001 HC GENERAL ANESTHESIA TIME - INITIAL BASE CHARGE: Performed by: ORTHOPAEDIC SURGERY

## 2024-06-28 PROCEDURE — 73501 X-RAY EXAM HIP UNI 1 VIEW: CPT | Mod: RIGHT SIDE | Performed by: RADIOLOGY

## 2024-06-28 PROCEDURE — 2500000005 HC RX 250 GENERAL PHARMACY W/O HCPCS: Performed by: INTERNAL MEDICINE

## 2024-06-28 PROCEDURE — 27130 TOTAL HIP ARTHROPLASTY: CPT | Performed by: ORTHOPAEDIC SURGERY

## 2024-06-28 PROCEDURE — 7100000001 HC RECOVERY ROOM TIME - INITIAL BASE CHARGE: Performed by: ORTHOPAEDIC SURGERY

## 2024-06-28 PROCEDURE — 7100000002 HC RECOVERY ROOM TIME - EACH INCREMENTAL 1 MINUTE: Performed by: ORTHOPAEDIC SURGERY

## 2024-06-28 PROCEDURE — RXMED WILLOW AMBULATORY MEDICATION CHARGE

## 2024-06-28 PROCEDURE — 3600000017 HC OR TIME - EACH INCREMENTAL 1 MINUTE - PROCEDURE LEVEL SIX: Performed by: ORTHOPAEDIC SURGERY

## 2024-06-28 PROCEDURE — 2500000001 HC RX 250 WO HCPCS SELF ADMINISTERED DRUGS (ALT 637 FOR MEDICARE OP)

## 2024-06-28 PROCEDURE — 2500000002 HC RX 250 W HCPCS SELF ADMINISTERED DRUGS (ALT 637 FOR MEDICARE OP, ALT 636 FOR OP/ED): Performed by: ORTHOPAEDIC SURGERY

## 2024-06-28 PROCEDURE — 2500000005 HC RX 250 GENERAL PHARMACY W/O HCPCS: Performed by: ORTHOPAEDIC SURGERY

## 2024-06-28 DEVICE — 6.5MM LOW PROFILE HEX SCREW 25MM
Type: IMPLANTABLE DEVICE | Site: HIP | Status: FUNCTIONAL
Brand: TRIDENT II

## 2024-06-28 DEVICE — INSERT, TRIDENT X3 POLYETHYLENE, 10 DEG, 36MM E: Type: IMPLANTABLE DEVICE | Site: HIP | Status: FUNCTIONAL

## 2024-06-28 DEVICE — 132 DEGREE NECK ANGLE HIP STEM
Type: IMPLANTABLE DEVICE | Site: HIP | Status: FUNCTIONAL
Brand: ACCOLADE

## 2024-06-28 DEVICE — CERAMIC V40 FEMORAL HEAD
Type: IMPLANTABLE DEVICE | Site: HIP | Status: FUNCTIONAL
Brand: BIOLOX

## 2024-06-28 DEVICE — TRIDENT II CLUSTERHOLE HA ACETABULAR SHELL 52E
Type: IMPLANTABLE DEVICE | Site: HIP | Status: FUNCTIONAL
Brand: TRIDENT II

## 2024-06-28 RX ORDER — ROPIVACAINE/EPI/CLONIDINE/KET 2.46-0.005
SYRINGE (ML) INJECTION AS NEEDED
Status: DISCONTINUED | OUTPATIENT
Start: 2024-06-28 | End: 2024-06-28 | Stop reason: HOSPADM

## 2024-06-28 RX ORDER — LIDOCAINE HYDROCHLORIDE 20 MG/ML
INJECTION, SOLUTION INFILTRATION; PERINEURAL AS NEEDED
Status: DISCONTINUED | OUTPATIENT
Start: 2024-06-28 | End: 2024-06-28

## 2024-06-28 RX ORDER — HYDROMORPHONE HYDROCHLORIDE 1 MG/ML
1 INJECTION, SOLUTION INTRAMUSCULAR; INTRAVENOUS; SUBCUTANEOUS EVERY 5 MIN PRN
Status: DISCONTINUED | OUTPATIENT
Start: 2024-06-28 | End: 2024-06-28 | Stop reason: HOSPADM

## 2024-06-28 RX ORDER — TRANEXAMIC ACID 650 MG/1
1950 TABLET ORAL ONCE
Status: COMPLETED | OUTPATIENT
Start: 2024-06-28 | End: 2024-06-28

## 2024-06-28 RX ORDER — ATORVASTATIN CALCIUM 80 MG/1
80 TABLET, FILM COATED ORAL NIGHTLY
Status: DISCONTINUED | OUTPATIENT
Start: 2024-06-28 | End: 2024-06-29 | Stop reason: HOSPADM

## 2024-06-28 RX ORDER — PROCHLORPERAZINE EDISYLATE 5 MG/ML
10 INJECTION INTRAMUSCULAR; INTRAVENOUS EVERY 6 HOURS PRN
Status: DISCONTINUED | OUTPATIENT
Start: 2024-06-28 | End: 2024-06-29 | Stop reason: HOSPADM

## 2024-06-28 RX ORDER — CEFAZOLIN SODIUM 2 G/100ML
2 INJECTION, SOLUTION INTRAVENOUS EVERY 8 HOURS
Status: COMPLETED | OUTPATIENT
Start: 2024-06-28 | End: 2024-06-29

## 2024-06-28 RX ORDER — PANTOPRAZOLE SODIUM 20 MG/1
20 TABLET, DELAYED RELEASE ORAL
Status: DISCONTINUED | OUTPATIENT
Start: 2024-06-29 | End: 2024-06-28

## 2024-06-28 RX ORDER — ASPIRIN 81 MG/1
81 TABLET ORAL 2 TIMES DAILY
Status: DISCONTINUED | OUTPATIENT
Start: 2024-06-28 | End: 2024-06-29 | Stop reason: HOSPADM

## 2024-06-28 RX ORDER — SODIUM CHLORIDE 0.9 G/100ML
IRRIGANT IRRIGATION AS NEEDED
Status: DISCONTINUED | OUTPATIENT
Start: 2024-06-28 | End: 2024-06-28 | Stop reason: HOSPADM

## 2024-06-28 RX ORDER — KETOROLAC TROMETHAMINE 30 MG/ML
15 INJECTION, SOLUTION INTRAMUSCULAR; INTRAVENOUS EVERY 6 HOURS
Status: DISCONTINUED | OUTPATIENT
Start: 2024-06-28 | End: 2024-06-29 | Stop reason: HOSPADM

## 2024-06-28 RX ORDER — PROCHLORPERAZINE 25 MG/1
25 SUPPOSITORY RECTAL EVERY 12 HOURS PRN
Status: DISCONTINUED | OUTPATIENT
Start: 2024-06-28 | End: 2024-06-29 | Stop reason: HOSPADM

## 2024-06-28 RX ORDER — OXYCODONE HYDROCHLORIDE 5 MG/1
5 TABLET ORAL EVERY 6 HOURS PRN
Qty: 28 TABLET | Refills: 0 | Status: SHIPPED | OUTPATIENT
Start: 2024-06-28 | End: 2024-07-06

## 2024-06-28 RX ORDER — TRANEXAMIC ACID 650 MG/1
1950 TABLET ORAL ONCE
Status: COMPLETED | OUTPATIENT
Start: 2024-06-29 | End: 2024-06-29

## 2024-06-28 RX ORDER — PREGABALIN 50 MG/1
300 CAPSULE ORAL 2 TIMES DAILY
Status: DISCONTINUED | OUTPATIENT
Start: 2024-06-28 | End: 2024-06-29 | Stop reason: HOSPADM

## 2024-06-28 RX ORDER — ONDANSETRON 4 MG/1
4 TABLET, ORALLY DISINTEGRATING ORAL EVERY 8 HOURS PRN
Status: DISCONTINUED | OUTPATIENT
Start: 2024-06-28 | End: 2024-06-29 | Stop reason: HOSPADM

## 2024-06-28 RX ORDER — ACETAMINOPHEN 325 MG/1
975 TABLET ORAL ONCE
Status: DISCONTINUED | OUTPATIENT
Start: 2024-06-28 | End: 2024-06-28 | Stop reason: HOSPADM

## 2024-06-28 RX ORDER — ONDANSETRON HYDROCHLORIDE 2 MG/ML
INJECTION, SOLUTION INTRAVENOUS AS NEEDED
Status: DISCONTINUED | OUTPATIENT
Start: 2024-06-28 | End: 2024-06-28

## 2024-06-28 RX ORDER — HYDROMORPHONE HYDROCHLORIDE 1 MG/ML
INJECTION, SOLUTION INTRAMUSCULAR; INTRAVENOUS; SUBCUTANEOUS AS NEEDED
Status: DISCONTINUED | OUTPATIENT
Start: 2024-06-28 | End: 2024-06-28

## 2024-06-28 RX ORDER — LIDOCAINE 560 MG/1
1 PATCH PERCUTANEOUS; TOPICAL; TRANSDERMAL DAILY
Status: DISCONTINUED | OUTPATIENT
Start: 2024-06-28 | End: 2024-06-29 | Stop reason: HOSPADM

## 2024-06-28 RX ORDER — MIDAZOLAM HYDROCHLORIDE 1 MG/ML
INJECTION, SOLUTION INTRAMUSCULAR; INTRAVENOUS AS NEEDED
Status: DISCONTINUED | OUTPATIENT
Start: 2024-06-28 | End: 2024-06-28

## 2024-06-28 RX ORDER — SODIUM CHLORIDE, SODIUM LACTATE, POTASSIUM CHLORIDE, CALCIUM CHLORIDE 600; 310; 30; 20 MG/100ML; MG/100ML; MG/100ML; MG/100ML
50 INJECTION, SOLUTION INTRAVENOUS CONTINUOUS
Status: DISCONTINUED | OUTPATIENT
Start: 2024-06-28 | End: 2024-06-29 | Stop reason: HOSPADM

## 2024-06-28 RX ORDER — CEFAZOLIN SODIUM 2 G/100ML
2 INJECTION, SOLUTION INTRAVENOUS ONCE
Status: COMPLETED | OUTPATIENT
Start: 2024-06-28 | End: 2024-06-28

## 2024-06-28 RX ORDER — PROPOFOL 10 MG/ML
INJECTION, EMULSION INTRAVENOUS AS NEEDED
Status: DISCONTINUED | OUTPATIENT
Start: 2024-06-28 | End: 2024-06-28

## 2024-06-28 RX ORDER — ONDANSETRON HYDROCHLORIDE 2 MG/ML
4 INJECTION, SOLUTION INTRAVENOUS EVERY 8 HOURS PRN
Status: DISCONTINUED | OUTPATIENT
Start: 2024-06-28 | End: 2024-06-29 | Stop reason: HOSPADM

## 2024-06-28 RX ORDER — ALBUTEROL SULFATE 90 UG/1
2 AEROSOL, METERED RESPIRATORY (INHALATION) EVERY 4 HOURS PRN
Status: DISCONTINUED | OUTPATIENT
Start: 2024-06-28 | End: 2024-06-29 | Stop reason: HOSPADM

## 2024-06-28 RX ORDER — DIPHENHYDRAMINE HYDROCHLORIDE 50 MG/ML
INJECTION INTRAMUSCULAR; INTRAVENOUS AS NEEDED
Status: DISCONTINUED | OUTPATIENT
Start: 2024-06-28 | End: 2024-06-28

## 2024-06-28 RX ORDER — LABETALOL HYDROCHLORIDE 5 MG/ML
INJECTION, SOLUTION INTRAVENOUS AS NEEDED
Status: DISCONTINUED | OUTPATIENT
Start: 2024-06-28 | End: 2024-06-28

## 2024-06-28 RX ORDER — DIPHENHYDRAMINE HCL 25 MG
25 TABLET ORAL EVERY 6 HOURS PRN
Status: DISCONTINUED | OUTPATIENT
Start: 2024-06-28 | End: 2024-06-29 | Stop reason: HOSPADM

## 2024-06-28 RX ORDER — ROCURONIUM BROMIDE 10 MG/ML
INJECTION, SOLUTION INTRAVENOUS AS NEEDED
Status: DISCONTINUED | OUTPATIENT
Start: 2024-06-28 | End: 2024-06-28

## 2024-06-28 RX ORDER — ACETAMINOPHEN 500 MG
1000 TABLET ORAL EVERY 8 HOURS PRN
COMMUNITY

## 2024-06-28 RX ORDER — PROCHLORPERAZINE MALEATE 5 MG
10 TABLET ORAL EVERY 6 HOURS PRN
Status: DISCONTINUED | OUTPATIENT
Start: 2024-06-28 | End: 2024-06-29 | Stop reason: HOSPADM

## 2024-06-28 RX ORDER — FENTANYL CITRATE 50 UG/ML
INJECTION, SOLUTION INTRAMUSCULAR; INTRAVENOUS AS NEEDED
Status: DISCONTINUED | OUTPATIENT
Start: 2024-06-28 | End: 2024-06-28

## 2024-06-28 RX ORDER — ACETAMINOPHEN 325 MG/1
650 TABLET ORAL EVERY 6 HOURS SCHEDULED
Status: DISCONTINUED | OUTPATIENT
Start: 2024-06-28 | End: 2024-06-29 | Stop reason: HOSPADM

## 2024-06-28 RX ORDER — DULOXETIN HYDROCHLORIDE 30 MG/1
60 CAPSULE, DELAYED RELEASE ORAL DAILY
Status: DISCONTINUED | OUTPATIENT
Start: 2024-06-28 | End: 2024-06-29 | Stop reason: HOSPADM

## 2024-06-28 RX ORDER — BACLOFEN 10 MG/1
20 TABLET ORAL 3 TIMES DAILY
Status: DISCONTINUED | OUTPATIENT
Start: 2024-06-28 | End: 2024-06-29

## 2024-06-28 RX ORDER — CYCLOBENZAPRINE HCL 10 MG
10 TABLET ORAL 3 TIMES DAILY PRN
Status: DISCONTINUED | OUTPATIENT
Start: 2024-06-28 | End: 2024-06-29 | Stop reason: SDUPTHER

## 2024-06-28 RX ORDER — MEPERIDINE HYDROCHLORIDE 25 MG/ML
12.5 INJECTION INTRAMUSCULAR; INTRAVENOUS; SUBCUTANEOUS EVERY 10 MIN PRN
Status: DISCONTINUED | OUTPATIENT
Start: 2024-06-28 | End: 2024-06-28 | Stop reason: HOSPADM

## 2024-06-28 RX ORDER — OXYCODONE HYDROCHLORIDE 5 MG/1
10 TABLET ORAL EVERY 4 HOURS PRN
Status: DISCONTINUED | OUTPATIENT
Start: 2024-06-28 | End: 2024-06-29 | Stop reason: HOSPADM

## 2024-06-28 RX ORDER — DOCUSATE SODIUM 100 MG/1
100 CAPSULE, LIQUID FILLED ORAL 2 TIMES DAILY
Qty: 20 CAPSULE | Refills: 0 | Status: SHIPPED | OUTPATIENT
Start: 2024-06-28 | End: 2024-07-09

## 2024-06-28 RX ORDER — DOCUSATE SODIUM 100 MG/1
100 CAPSULE, LIQUID FILLED ORAL 2 TIMES DAILY
Status: DISCONTINUED | OUTPATIENT
Start: 2024-06-28 | End: 2024-06-29 | Stop reason: HOSPADM

## 2024-06-28 RX ORDER — GABAPENTIN 300 MG/1
600 CAPSULE ORAL ONCE
Status: DISCONTINUED | OUTPATIENT
Start: 2024-06-28 | End: 2024-06-28 | Stop reason: HOSPADM

## 2024-06-28 RX ORDER — OXYCODONE HYDROCHLORIDE 5 MG/1
5 TABLET ORAL EVERY 4 HOURS PRN
Status: DISCONTINUED | OUTPATIENT
Start: 2024-06-28 | End: 2024-06-29 | Stop reason: HOSPADM

## 2024-06-28 RX ORDER — ASPIRIN 81 MG/1
81 TABLET ORAL 2 TIMES DAILY
Qty: 60 TABLET | Refills: 0 | Status: SHIPPED | OUTPATIENT
Start: 2024-06-28 | End: 2024-07-29

## 2024-06-28 RX ORDER — CELECOXIB 200 MG/1
200 CAPSULE ORAL ONCE
Status: COMPLETED | OUTPATIENT
Start: 2024-06-28 | End: 2024-06-28

## 2024-06-28 RX ORDER — PANTOPRAZOLE SODIUM 20 MG/1
20 TABLET, DELAYED RELEASE ORAL
Status: DISCONTINUED | OUTPATIENT
Start: 2024-06-28 | End: 2024-06-29

## 2024-06-28 RX ORDER — BISACODYL 5 MG
10 TABLET, DELAYED RELEASE (ENTERIC COATED) ORAL DAILY PRN
Status: DISCONTINUED | OUTPATIENT
Start: 2024-06-28 | End: 2024-06-29 | Stop reason: HOSPADM

## 2024-06-28 RX ORDER — SODIUM CHLORIDE, SODIUM LACTATE, POTASSIUM CHLORIDE, CALCIUM CHLORIDE 600; 310; 30; 20 MG/100ML; MG/100ML; MG/100ML; MG/100ML
100 INJECTION, SOLUTION INTRAVENOUS CONTINUOUS
Status: DISCONTINUED | OUTPATIENT
Start: 2024-06-28 | End: 2024-06-28 | Stop reason: HOSPADM

## 2024-06-28 RX ADMIN — CEFAZOLIN SODIUM 2 G: 2 INJECTION, SOLUTION INTRAVENOUS at 09:39

## 2024-06-28 RX ADMIN — LABETALOL HYDROCHLORIDE 5 MG: 5 INJECTION, SOLUTION INTRAVENOUS at 10:22

## 2024-06-28 RX ADMIN — PROPOFOL 200 MG: 10 INJECTION, EMULSION INTRAVENOUS at 09:31

## 2024-06-28 RX ADMIN — LIDOCAINE HYDROCHLORIDE 60 MG: 20 INJECTION, SOLUTION INFILTRATION; PERINEURAL at 09:31

## 2024-06-28 RX ADMIN — FENTANYL CITRATE 100 MCG: 50 INJECTION, SOLUTION INTRAMUSCULAR; INTRAVENOUS at 09:31

## 2024-06-28 RX ADMIN — DEXAMETHASONE SODIUM PHOSPHATE 4 MG: 4 INJECTION, SOLUTION INTRAMUSCULAR; INTRAVENOUS at 09:43

## 2024-06-28 RX ADMIN — SUGAMMADEX 200 MG: 100 INJECTION, SOLUTION INTRAVENOUS at 10:53

## 2024-06-28 RX ADMIN — ROCURONIUM BROMIDE 50 MG: 10 SOLUTION INTRAVENOUS at 09:32

## 2024-06-28 RX ADMIN — DIPHENHYDRAMINE HYDROCHLORIDE 12.5 MG: 50 INJECTION INTRAMUSCULAR; INTRAVENOUS at 09:43

## 2024-06-28 RX ADMIN — ROCURONIUM BROMIDE 20 MG: 10 SOLUTION INTRAVENOUS at 10:12

## 2024-06-28 RX ADMIN — ONDANSETRON 4 MG: 2 INJECTION, SOLUTION INTRAMUSCULAR; INTRAVENOUS at 10:33

## 2024-06-28 RX ADMIN — HYDROMORPHONE HYDROCHLORIDE 1 MG: 1 INJECTION, SOLUTION INTRAMUSCULAR; INTRAVENOUS; SUBCUTANEOUS at 10:33

## 2024-06-28 RX ADMIN — Medication 20 MG: at 09:45

## 2024-06-28 RX ADMIN — MIDAZOLAM HYDROCHLORIDE 5 MG: 1 INJECTION, SOLUTION INTRAMUSCULAR; INTRAVENOUS at 09:23

## 2024-06-28 RX ADMIN — KETAMINE HYDROCHLORIDE 16.6 MCG/KG/MIN: 100 INJECTION, SOLUTION, CONCENTRATE INTRAMUSCULAR; INTRAVENOUS at 09:47

## 2024-06-28 SDOH — SOCIAL STABILITY: SOCIAL NETWORK
DO YOU BELONG TO ANY CLUBS OR ORGANIZATIONS SUCH AS CHURCH GROUPS UNIONS, FRATERNAL OR ATHLETIC GROUPS, OR SCHOOL GROUPS?: PATIENT DECLINED

## 2024-06-28 SDOH — HEALTH STABILITY: PHYSICAL HEALTH: ON AVERAGE, HOW MANY MINUTES DO YOU ENGAGE IN EXERCISE AT THIS LEVEL?: 10 MIN

## 2024-06-28 SDOH — ECONOMIC STABILITY: FOOD INSECURITY: WITHIN THE PAST 12 MONTHS, YOU WORRIED THAT YOUR FOOD WOULD RUN OUT BEFORE YOU GOT MONEY TO BUY MORE.: NEVER TRUE

## 2024-06-28 SDOH — SOCIAL STABILITY: SOCIAL INSECURITY: WITHIN THE LAST YEAR, HAVE YOU BEEN HUMILIATED OR EMOTIONALLY ABUSED IN OTHER WAYS BY YOUR PARTNER OR EX-PARTNER?: NO

## 2024-06-28 SDOH — SOCIAL STABILITY: SOCIAL NETWORK: HOW OFTEN DO YOU GET TOGETHER WITH FRIENDS OR RELATIVES?: MORE THAN THREE TIMES A WEEK

## 2024-06-28 SDOH — HEALTH STABILITY: MENTAL HEALTH
HOW OFTEN DO YOU NEED TO HAVE SOMEONE HELP YOU WHEN YOU READ INSTRUCTIONS, PAMPHLETS, OR OTHER WRITTEN MATERIAL FROM YOUR DOCTOR OR PHARMACY?: NEVER

## 2024-06-28 SDOH — SOCIAL STABILITY: SOCIAL INSECURITY: ABUSE: ADULT

## 2024-06-28 SDOH — SOCIAL STABILITY: SOCIAL INSECURITY: WERE YOU ABLE TO COMPLETE ALL THE BEHAVIORAL HEALTH SCREENINGS?: YES

## 2024-06-28 SDOH — ECONOMIC STABILITY: FOOD INSECURITY: WITHIN THE PAST 12 MONTHS, THE FOOD YOU BOUGHT JUST DIDN'T LAST AND YOU DIDN'T HAVE MONEY TO GET MORE.: NEVER TRUE

## 2024-06-28 SDOH — SOCIAL STABILITY: SOCIAL INSECURITY
WITHIN THE LAST YEAR, HAVE TO BEEN RAPED OR FORCED TO HAVE ANY KIND OF SEXUAL ACTIVITY BY YOUR PARTNER OR EX-PARTNER?: NO

## 2024-06-28 SDOH — SOCIAL STABILITY: SOCIAL INSECURITY: WITHIN THE LAST YEAR, HAVE YOU BEEN AFRAID OF YOUR PARTNER OR EX-PARTNER?: NO

## 2024-06-28 SDOH — SOCIAL STABILITY: SOCIAL INSECURITY: DO YOU FEEL UNSAFE GOING BACK TO THE PLACE WHERE YOU ARE LIVING?: NO

## 2024-06-28 SDOH — ECONOMIC STABILITY: INCOME INSECURITY: HOW HARD IS IT FOR YOU TO PAY FOR THE VERY BASICS LIKE FOOD, HOUSING, MEDICAL CARE, AND HEATING?: NOT HARD AT ALL

## 2024-06-28 SDOH — SOCIAL STABILITY: SOCIAL INSECURITY: ARE YOU OR HAVE YOU BEEN THREATENED OR ABUSED PHYSICALLY, EMOTIONALLY, OR SEXUALLY BY ANYONE?: NO

## 2024-06-28 SDOH — SOCIAL STABILITY: SOCIAL NETWORK: ARE YOU MARRIED, WIDOWED, DIVORCED, SEPARATED, NEVER MARRIED, OR LIVING WITH A PARTNER?: LIVING WITH PARTNER

## 2024-06-28 SDOH — HEALTH STABILITY: MENTAL HEALTH
STRESS IS WHEN SOMEONE FEELS TENSE, NERVOUS, ANXIOUS, OR CAN'T SLEEP AT NIGHT BECAUSE THEIR MIND IS TROUBLED. HOW STRESSED ARE YOU?: RATHER MUCH

## 2024-06-28 SDOH — SOCIAL STABILITY: SOCIAL INSECURITY: HAS ANYONE EVER THREATENED TO HURT YOUR FAMILY OR YOUR PETS?: NO

## 2024-06-28 SDOH — ECONOMIC STABILITY: HOUSING INSECURITY: IN THE LAST 12 MONTHS, HOW MANY PLACES HAVE YOU LIVED?: 1

## 2024-06-28 SDOH — ECONOMIC STABILITY: INCOME INSECURITY: IN THE LAST 12 MONTHS, WAS THERE A TIME WHEN YOU WERE NOT ABLE TO PAY THE MORTGAGE OR RENT ON TIME?: NO

## 2024-06-28 SDOH — SOCIAL STABILITY: SOCIAL INSECURITY: DOES ANYONE TRY TO KEEP YOU FROM HAVING/CONTACTING OTHER FRIENDS OR DOING THINGS OUTSIDE YOUR HOME?: NO

## 2024-06-28 SDOH — SOCIAL STABILITY: SOCIAL INSECURITY: DO YOU FEEL ANYONE HAS EXPLOITED OR TAKEN ADVANTAGE OF YOU FINANCIALLY OR OF YOUR PERSONAL PROPERTY?: NO

## 2024-06-28 SDOH — ECONOMIC STABILITY: INCOME INSECURITY: IN THE PAST 12 MONTHS, HAS THE ELECTRIC, GAS, OIL, OR WATER COMPANY THREATENED TO SHUT OFF SERVICE IN YOUR HOME?: NO

## 2024-06-28 SDOH — SOCIAL STABILITY: SOCIAL INSECURITY: HAVE YOU HAD THOUGHTS OF HARMING ANYONE ELSE?: NO

## 2024-06-28 SDOH — HEALTH STABILITY: PHYSICAL HEALTH: ON AVERAGE, HOW MANY DAYS PER WEEK DO YOU ENGAGE IN MODERATE TO STRENUOUS EXERCISE (LIKE A BRISK WALK)?: 7 DAYS

## 2024-06-28 SDOH — SOCIAL STABILITY: SOCIAL INSECURITY
WITHIN THE LAST YEAR, HAVE YOU BEEN KICKED, HIT, SLAPPED, OR OTHERWISE PHYSICALLY HURT BY YOUR PARTNER OR EX-PARTNER?: NO

## 2024-06-28 SDOH — SOCIAL STABILITY: SOCIAL NETWORK
IN A TYPICAL WEEK, HOW MANY TIMES DO YOU TALK ON THE PHONE WITH FAMILY, FRIENDS, OR NEIGHBORS?: MORE THAN THREE TIMES A WEEK

## 2024-06-28 SDOH — SOCIAL STABILITY: SOCIAL NETWORK: HOW OFTEN DO YOU ATTEND CHURCH OR RELIGIOUS SERVICES?: PATIENT DECLINED

## 2024-06-28 SDOH — SOCIAL STABILITY: SOCIAL NETWORK: HOW OFTEN DO YOU ATTENT MEETINGS OF THE CLUB OR ORGANIZATION YOU BELONG TO?: PATIENT DECLINED

## 2024-06-28 SDOH — SOCIAL STABILITY: SOCIAL INSECURITY: ARE THERE ANY APPARENT SIGNS OF INJURIES/BEHAVIORS THAT COULD BE RELATED TO ABUSE/NEGLECT?: NO

## 2024-06-28 SDOH — SOCIAL STABILITY: SOCIAL INSECURITY: HAVE YOU HAD ANY THOUGHTS OF HARMING ANYONE ELSE?: NO

## 2024-06-28 ASSESSMENT — PAIN - FUNCTIONAL ASSESSMENT
PAIN_FUNCTIONAL_ASSESSMENT: 0-10
PAIN_FUNCTIONAL_ASSESSMENT: VAS (VISUAL ANALOG SCALE)
PAIN_FUNCTIONAL_ASSESSMENT: 0-10
PAIN_FUNCTIONAL_ASSESSMENT: 0-10
PAIN_FUNCTIONAL_ASSESSMENT: VAS (VISUAL ANALOG SCALE)
PAIN_FUNCTIONAL_ASSESSMENT: 0-10

## 2024-06-28 ASSESSMENT — PAIN DESCRIPTION - LOCATION
LOCATION: HIP

## 2024-06-28 ASSESSMENT — PAIN DESCRIPTION - ORIENTATION
ORIENTATION: RIGHT

## 2024-06-28 ASSESSMENT — PAIN SCALES - GENERAL
PAINLEVEL_OUTOF10: 9
PAINLEVEL_OUTOF10: 5 - MODERATE PAIN
PAINLEVEL_OUTOF10: 8
PAINLEVEL_OUTOF10: 0 - NO PAIN
PAINLEVEL_OUTOF10: 5 - MODERATE PAIN
PAINLEVEL_OUTOF10: 8
PAINLEVEL_OUTOF10: 7
PAINLEVEL_OUTOF10: 9
PAINLEVEL_OUTOF10: 7
PAINLEVEL_OUTOF10: 9
PAINLEVEL_OUTOF10: 7
PAINLEVEL_OUTOF10: 0 - NO PAIN
PAINLEVEL_OUTOF10: 5 - MODERATE PAIN
PAINLEVEL_OUTOF10: 0 - NO PAIN

## 2024-06-28 ASSESSMENT — ACTIVITIES OF DAILY LIVING (ADL)
HEARING - LEFT EAR: FUNCTIONAL
WALKS IN HOME: INDEPENDENT
PATIENT'S MEMORY ADEQUATE TO SAFELY COMPLETE DAILY ACTIVITIES?: YES
FEEDING YOURSELF: INDEPENDENT
GROOMING: INDEPENDENT
TOILETING: INDEPENDENT
HEARING - RIGHT EAR: FUNCTIONAL
BATHING: INDEPENDENT
DRESSING YOURSELF: INDEPENDENT
ADEQUATE_TO_COMPLETE_ADL: YES
LACK_OF_TRANSPORTATION: NO
ASSISTIVE_DEVICE: WALKER;EYEGLASSES
JUDGMENT_ADEQUATE_SAFELY_COMPLETE_DAILY_ACTIVITIES: YES

## 2024-06-28 ASSESSMENT — LIFESTYLE VARIABLES
AUDIT-C TOTAL SCORE: 0
SUBSTANCE_ABUSE_PAST_12_MONTHS: NO
AUDIT-C TOTAL SCORE: 0
HOW OFTEN DO YOU HAVE 6 OR MORE DRINKS ON ONE OCCASION: NEVER
HOW OFTEN DO YOU HAVE A DRINK CONTAINING ALCOHOL: NEVER
HOW MANY STANDARD DRINKS CONTAINING ALCOHOL DO YOU HAVE ON A TYPICAL DAY: PATIENT DOES NOT DRINK
PRESCIPTION_ABUSE_PAST_12_MONTHS: NO
SKIP TO QUESTIONS 9-10: 1

## 2024-06-28 ASSESSMENT — COGNITIVE AND FUNCTIONAL STATUS - GENERAL
CLIMB 3 TO 5 STEPS WITH RAILING: TOTAL
MOVING TO AND FROM BED TO CHAIR: A LOT
MOBILITY SCORE: 20
TOILETING: A LITTLE
DRESSING REGULAR LOWER BODY CLOTHING: A LOT
MOBILITY SCORE: 20
WALKING IN HOSPITAL ROOM: A LOT
MOVING FROM LYING ON BACK TO SITTING ON SIDE OF FLAT BED WITH BEDRAILS: A LITTLE
STANDING UP FROM CHAIR USING ARMS: A LITTLE
WALKING IN HOSPITAL ROOM: A LITTLE
DAILY ACTIVITIY SCORE: 19
MOVING TO AND FROM BED TO CHAIR: A LITTLE
STANDING UP FROM CHAIR USING ARMS: A LOT
DRESSING REGULAR UPPER BODY CLOTHING: A LITTLE
DRESSING REGULAR LOWER BODY CLOTHING: A LITTLE
STANDING UP FROM CHAIR USING ARMS: A LITTLE
WALKING IN HOSPITAL ROOM: A LITTLE
PATIENT BASELINE BEDBOUND: NO
HELP NEEDED FOR BATHING: A LITTLE
MOVING TO AND FROM BED TO CHAIR: A LITTLE
DAILY ACTIVITIY SCORE: 20
TURNING FROM BACK TO SIDE WHILE IN FLAT BAD: A LITTLE
CLIMB 3 TO 5 STEPS WITH RAILING: A LITTLE
MOBILITY SCORE: 13
HELP NEEDED FOR BATHING: A LITTLE
TOILETING: A LITTLE
CLIMB 3 TO 5 STEPS WITH RAILING: A LITTLE
DRESSING REGULAR UPPER BODY CLOTHING: A LITTLE

## 2024-06-28 ASSESSMENT — PAIN SCALES - WONG BAKER: WONGBAKER_NUMERICALRESPONSE: HURTS WHOLE LOT

## 2024-06-28 NOTE — OP NOTE
Arthroplasty Total Hip Robot-Assisted (R) Operative Note     Date: 2024  OR Location: [facility]    Name: Akin Lynn, : 1970, Age: 54 y.o., MRN: [id number], Sex: male    Diagnosis  Pre-op Diagnosis     * Unilateral primary osteoarthritis, right hip [M16.11] Post-op Diagnosis     * Unilateral primary osteoarthritis, right hip [M16.11]     Procedures  Arthroplasty Total Hip Robot-Assisted  01138 - [place] ARTHRP ACETBLR/PROX FEM PROSTC AGRFT/ALGRFT      Surgeons      * Gagandeep Matamoros - Primary    Resident/Fellow/Other Assistant:  Surgeons and Role:     * Vish Barboza PA-C - Assisting    Procedure Summary  Anesthesia: Spinal  ASA: III  Anesthesia Staff: Anesthesiologist: Isidro Begum MD  Estimated Blood Loss: 100mL  Intra-op Medications:   Administrations occurring from 0930 to 1030 on 24:   Medication Name Total Dose   sodium chloride 0.9 % irrigation solution 1,000 mL   ropivacaine-epinephrine-clonidine-ketorolac 2.46-0.005- 0.0008-0.3mg/mL periarticular syringe 100 mL   ceFAZolin in dextrose (iso-os) (Ancef) IVPB 2 g 2 g              Anesthesia Record               Intraprocedure I/O Totals          Intake    Ketamine 0.00 mL    The total shown is the total volume documented since Anesthesia Start was filed.    ceFAZolin in dextrose (iso-os) (Ancef) IVPB 2 g 100.00 mL    Total Intake 100 mL          Specimen: No specimens collected     Staff:   Circulator: Marian  Scrub Person: Maru         Drains and/or Catheters: * None in log *    Tourniquet Times:         Implants:  Implants       Type Name Action Serial No.      Joint SHELL, TRIDENT II, CLUSTERHOLE, HA 52E - [id number] Implanted      Joint INSERT, TRIDENT X3 POLYETHYLENE, 10 DEG, 36MM E - [id number] Implanted      Joint HEAD, FEMUR V40 36MM -5MM BIOLOX DELTA - [id number] Implanted      Joint STEM, FEMUR 132D SZ 5 ACCOLADE II - [id number] Implanted      Screw SCREW, LOW PROFILE HEX, 6.5 X 25 MM - [id number] Implanted                Findings: see procedure details    Indications: Akin Lynn is an 54 y.o. male who is having surgery for Unilateral primary osteoarthritis, right hip [M16.11].     The patient was seen in the preoperative area. The risks, benefits, complications, treatment options, non-operative alternatives, expected recovery and outcomes were discussed with the patient. The possibilities of reaction to medication, pulmonary aspiration, injury to surrounding structures, bleeding, recurrent infection, the need for additional procedures, failure to diagnose a condition, and creating a complication requiring transfusion or operation were discussed with the patient. The patient concurred with the proposed plan, giving informed consent.  The site of surgery was properly noted/marked if necessary per policy. The patient has been actively warmed in preoperative area. Preoperative antibiotics have been ordered and given within 1 hours of incision. Venous thrombosis prophylaxis have been ordered.    Procedure Details:   Preoperative diagnosis DJD hip right  Postoperative diagnosis same  Procedure total hip replacement using the [employer] computer assisted robotic-assisted total hip replacement system    Primary surgeon Gagandeep Matamoros M.D.  Asst. [Vish Barboza] PA certified    Implants Minerva  Cup52  mm Trident hemispherical  Liner  10° elevated lip  Stem Accolade 2 uncemented size5, 132 degree neck angle  Head Biolox delta ceramic 36 mm neck length -5    Anesthesia gen  EBL [100]      Patient is suffering from chronic hip pain that has been refractory to conservative treatment and now presents for total hip replacement.  The risks benefits outcomes and postoperative course were fully explained to the patient.  We discussed  loosening, infection, blood clots, loss of life, loss of limb, nerve damage, numbness, failure of the procedure, progressive arthritis, and the potential need for revision surgery.  The patient understands these  risks and consents to the surgical intervention.    The patient has pain in the hip that is increased with activity and weightbearing, and walks with an antalgic gait.  The pain is interfering with activities of daily living.  Patient has limited range of motion and pain with passive range of motion.  X-rays demonstrate joint space narrowing, subchondral sclerosis and osteophyte formation.  Symptoms are not improving with medication, physical therapy or supportive device for a period of at least 3 months.    The physician assistant was present through the entire case.  Given the nature of the disease process and the procedure to be performed skilled surgical first assistant was necessary during the case.  The assistant was necessary to hold retractors and manipulate the extremity during the procedure.  A certified scrub tech was at the back table managing the instruments and supplies for the surgical case.      Patient was brought to the operating room and placed on the surgical table in the supine position where adequate anesthesia was then obtained.  A surgical timeout was then performed.  The patient was positioned in the lateral decubitus position with the affected hip up being careful to pad all pressure points.  The patient was then prepped and draped in usual sterile manner.     A pelvic array was placed along the anterior iliac crest through 3 percutaneous puncture wounds being careful to protect the lateral femoral cutaneous nerve         A standard posterolateral approach to the hip was made and electrocauterization was used for hemostasis.  The IT band was split in line with its fibers and anterior and posterior retractors were then inserted.    A checkpoint was placed in the greater trochanter and the leg length was measured between the checkpoint and the greater trochanter and the array as well as the markers were placed along the distal femur and the array    The hip was maximally internally rotated  and the short external rotators and piriformis tendon were taken down in 1 layer along with the joint capsule using the Bovie electrocautery device.  The capsule was teed in line with the piriformis tendon.  The hip was then dislocated and the femoral neck resection was made at a  distance that was pre-determined with the preoperative CT scan and 3-D reconstruction in order to reestablish femoral offset and leg length  The femoral head showed signs of severe arthritic changes with joint calcification and loss of articular cartilage.    Anterior and posterior acetabular retractors were then inserted and the acetabular labrum was sharply excised using a knife.  Next acetabulum was curetted to remove any remaining soft tissues and sequential reaming was performed.      We then proceeded to ream with the acetabular reamers and the [employer] robotic arm assisted in the appropriate abduction and anteversion we reamed to the appropriate depth which had been predetermined as well based on the preoperative CT scan. Once final reaming was complete this had excellent circumferential fit and good bleeding bone.  Porous acetabular shell was then inserted in exactly 45 degrees of abduction and 20 degrees of anteversion using the robotic arm.  The acetabular shell was probed and stable and was completely seated.  The acetabular liner was then inserted and completely seated as well.  The liner was probed and stable.    At this point femoral preparation was begun.  A femoral neck retractor was inserted and anterior and posterior femoral retractors were inserted as well.  A box osteotome was used to start the femoral canal and a Charnley awl was inserted down the canal.  Sequential broaching was then performed being careful to keep the broaches in 10-15 degrees of anteversion.  With the final broach in position and completely seated calcar planing was performed.  A trial reduction was then performed and the hip was stable to an anatomic  range of motion.  Leg lengths were confirmed using the FAN system.  Trial components were then removed the final femoral stem was inserted and completely seated.  Femoral head and liner were applied to reduced.  Range of motion was again assessed and felt to be satisfactory leg lengths were assessed using the FAN system as well.  The joint was then irrigated with a copious amount of pulsatile irrigation.    Closure was begun using #2 Ethibond sutures through the joint capsule short external rotators and piriformis tendon that were tied through drill holes in the piriformis fossa of the femur.  The IT band was closed using a running locked #1 Vicryl suture.  3-0 Monocryl suture was used for the underlying subcutaneous tissues and 4-0 Monocryl suture was used for subcuticular stitch.  Skin glue was used for the skin and a dry sterile Mepilex dressing was used for bandage.   Pelvic array was removed and wounds closed with 4-0 nylon suture.  Mepilex bandage applied.    The patient tolerated the procedure well and was taken to the postanesthesia care unit in satisfactory condition.  Postoperative x-rays were reviewed and surgical findings were discussed with the patient's family at the conclusion of the procedure.    This note was prepared using voice recognition software.  The details of this note are correct and have been reviewed, and corrected to the best of my ability.  Some grammatical areas may persist related to the Dragon software    Gagandeep Matamoros MD    [phone]      Complications:  None; patient tolerated the procedure well.    Disposition: PACU - hemodynamically stable.  Condition: stable         Gagandeep Matamoros  Phone Number: [phone]

## 2024-06-28 NOTE — ANESTHESIA PREPROCEDURE EVALUATION
"Akin Lynn is a 54 y.o. male here for:      Arthroplasty Total Hip Robot-Assisted  With Gagandeep Matamoros MD  Pre-Op Diagnosis Codes:     * Unilateral primary osteoarthritis, right hip [M16.11]    Lab Results   Component Value Date    HGB 14.9 06/12/2024    HCT 46.0 06/12/2024    WBC 11.1 06/12/2024     06/12/2024     (L) 06/20/2024    K 4.3 06/20/2024     06/20/2024    CREATININE 0.74 06/20/2024    BUN 17 06/20/2024       Social History     Substance and Sexual Activity   Drug Use Not Currently    Types: Marijuana, Ketamine, Cocaine    Comment: Had used cocaine, marijuana, ecstasy among others in 2006. and before.      Tobacco Use: Low Risk  (6/28/2024)    Patient History     Smoking Tobacco Use: Never     Smokeless Tobacco Use: Never     Passive Exposure: Not on file      Social History     Substance and Sexual Activity   Alcohol Use Not Currently        Allergies   Allergen Reactions    Morphine Hives, Rash and Shortness of breath     Tolerates hydromorphone    Ultram [Tramadol] Seizure    Alprazolam Psychosis     \"GOES CRAZY\", psychosis    Ketorolac Seizure    Fish Containing Products Hives and Itching     IT WAS A PROCESSED FISH MEAL, BUT STATES EATS FRESH AND FROZEN FISH ALL THE TIME.       Current Outpatient Medications   Medication Instructions    acetaminophen (TYLENOL) 1,000 mg, oral, Every 8 hours PRN    albuterol (Ventolin HFA) 90 mcg/actuation inhaler 2 puffs, inhalation, Every 4 hours PRN    atorvastatin (Lipitor) 80 mg tablet 1 tablet, oral, Nightly    baclofen (LIORESAL) 20 mg, oral, 3 times daily    chlorhexidine (Peridex) 0.12 % solution 15 mL, Mouth/Throat, As needed, 15 mls swish and spit the night before surgery and 15mls swish and spit the morning of surgery do not swallow    diazePAM (VALIUM) 5 mg, oral, 2 times daily    diclofenac epolamine 1.3 % patch 1 patch, transdermal, 2 times daily    diphenhydrAMINE (SOMINEX) 25 mg, oral, As needed    DULoxetine (CYMBALTA) 60 " mg, oral, Daily    fluticasone (Flonase) 50 mcg/actuation nasal spray 1 spray, Each Nostril, Daily PRN    ibuprofen 800 mg, oral, Every 8 hours PRN    ketamine HCl (ketamine, bulk,) 100 % powder Ketamine 100 mg/mL + lidocaine 40 mg/mL 1 puff 4 times daily as needed, DSP#20 mL x 5 refills    lidocaine (Lidoderm) 5 % patch 1 patch, transdermal, Daily, Remove & discard patch within 12 hours or as directed by MD.    naloxone (Narcan) 4 mg/0.1 mL nasal spray Instil 1 spray intranasally for opioid overdose; repeat in 5 inutes if no response    omeprazole (PriLOSEC) 40 mg DR capsule 1 capsule, oral, 2 times daily, For 90 days    predniSONE (Deltasone) 10 mg tablet Please take after breakfast: 6 TABS X 2 DAYS, 5 TABS X 2 DAYS, 4 TABS X 2 DAYS, 3 TABS X  2 DAYS, 2 TABS X 7 DAYS, 1 TAB  X  2 WEEKS    pregabalin (LYRICA) 300 mg, oral, 2 times daily    urea (Carmol) 40 % cream 1 Application, Topical, Daily, feet       Past Medical History:   Diagnosis Date    Anesthesia of skin     Numbness and tingling    Anxiety 09/21/2023    Arthritis Unk    Bilateral myopia 09/21/2023    Cervical radicular pain 09/21/2023    Chronic patellofemoral pain of left knee 09/21/2023    Complex regional pain syndrome type 1 of left lower extremity 09/21/2023    COVID-19     VACCINATED    Cubital tunnel syndrome on left 09/21/2023    Cubital tunnel syndrome on right 09/21/2023    Disease of intestine, unspecified     Bowel trouble    Erectile dysfunction 09/21/2023    Foot joint pain 09/21/2023    Fractures     Gastroparesis     GERD (gastroesophageal reflux disease)     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    History of psychological trauma 09/21/2023    Hyperlipidemia     Joint pain 09/21/2023    Kidney cysts 09/21/2023    Low back pain, unspecified 12/07/2022    Chronic low back pain without sciatica, unspecified back pain laterality    Low back pain, unspecified 08/09/2022    Lumbago    Lumbar spondylosis 09/21/2023    Lung nodule 09/21/2023    RIGHT     Metatarsalgia of left foot 09/21/2023    Myopia, bilateral 02/19/2019    Myopia of both eyes with astigmatism and presbyopia    Osteochondral defect of patella 09/21/2023    Osteoporosis     Other forms of angina pectoris ([facility])     Stable angina pectoris    Other spondylosis, cervical region 09/21/2023    Patellofemoral arthritis 09/21/2023    Personal history of other diseases of the musculoskeletal system and connective tissue     History of arthritis    Personal history of other specified conditions     History of seizure    Reflex sympathetic dystrophy 09/21/2023    Scapular dyskinesis 09/21/2023    Seizure disorder (Multi)     D/T TRAMADOL REACTION - 25 YEARS AGO    Shoulder impingement 09/21/2023    Suspected sleep apnea 09/21/2023    Trauma and stressor-related disorder 09/21/2023    Trochanteric bursitis of both hips 09/22/2023    Unspecified disorder of ear, unspecified ear     Ear, nose and throat disorder    Wears glasses        Past Surgical History:   Procedure Laterality Date    ACHILLES TENDON SURGERY  1978 2016    CT ABDOMEN PELVIS ANGIOGRAM W AND/OR WO IV CONTRAST  03/18/2020    CT ABDOMEN PELVIS ANGIOGRAM W AND/OR WO IV CONTRAST 3/18/2020 [facility]GENCY [facility]GACY    CT ANGIO NECK  12/16/2022    CT NECK ANGIO W AND WO IV CONTRAST 12/16/2022 DOCTOR OFFICE LEGACY    CT HEAD ANGIO W AND WO IV CONTRAST  12/16/2022    CT HEAD ANGIO W AND WO IV CONTRAST 12/16/2022 DOCTOR OFFICE LEGACY    HIP SURGERY Left     REPLACEMENT    JOINT REPLACEMENT      LEFT KNEE    KNEE ARTHROPLASTY Bilateral 06/13/2017    Knee Arthroplasty    OTHER SURGICAL HISTORY  01/06/2022    Ulnar nerve neuroplasty    OTHER SURGICAL HISTORY Bilateral 07/26/2019    Foot surgery  -- HARDWARE    OTHER SURGICAL HISTORY Bilateral     BILATERAL ANKLE SCOPE    OTHER SURGICAL HISTORY      POP ABDOMINAL PROCEDURE    PLANTAR FASCIA RELEASE      SHOULDER SURGERY Bilateral 06/13/2017    Shoulder Surgery       Family History   Problem Relation Name Age  "of Onset    Lung cancer Mother      Glaucoma Father      Skin cancer Father      Hypertension Other mul fam mem     Heart disease Other mul fam mem        Relevant Problems   Cardiac   (+) Chest pain, unspecified type   (+) Thoracic back pain      Neuro   (+) Anxiety   (+) Complex regional pain syndrome type 1 of left lower extremity   (+) Cubital tunnel syndrome on left   (+) Cubital tunnel syndrome on right   (+) Guyon syndrome   (+) Trauma and stressor-related disorder      Musculoskeletal   (+) Chronic low back pain without sciatica   (+) Complex regional pain syndrome type 1 of left lower extremity   (+) Lumbar spondylosis   (+) Other spondylosis, cervical region   (+) Primary osteoarthritis of left hip   (+) Reflex sympathetic dystrophy   (+) Unilateral primary osteoarthritis, right hip       Visit Vitals  /82   Pulse 84   Temp 36 °C (96.8 °F) (Temporal)   Resp 16   Ht 1.727 m (5' 8\")   Wt 83 kg (182 lb 15.7 oz)   SpO2 96%   BMI 27.82 kg/m²   Smoking Status Never   BSA 2 m²       NPO Details:  NPO/Void Status  Date of Last Liquid: 06/28/24  Time of Last Liquid: 0600  Date of Last Solid: 06/27/24  Time of Last Solid: 2030  Last Intake Type: Clear fluids  Time of Last Void: 0600        Physical Exam    Airway  Mallampati: III     Cardiovascular - normal exam     Dental - normal exam     Pulmonary - normal exam     Abdominal - normal exam  Abdomen: soft             Anesthesia Plan    History of general anesthesia?: yes  History of complications of general anesthesia?: no    ASA 3     general     intravenous induction   Anesthetic plan and risks discussed with patient.      " car seat

## 2024-06-28 NOTE — PROGRESS NOTES
Physical Therapy    Physical Therapy Evaluation & Treatment    Patient Name: Akin Lynn  MRN: [id number]  Today's Date: 6/28/2024   Time Calculation  Start Time: 1512  Stop Time: 1539  Time Calculation (min): 27 min  607/607-A    Assessment/Plan   PT Assessment  PT Assessment Results: Decreased strength, Impaired balance, Decreased mobility, Impaired sensation, Pain  Rehab Prognosis: Good  Barriers to Discharge: bed and full bath on 2nd floor  End of Session Communication: Bedside nurse  Assessment Comment: Pt. is s/p R GILBERT POD#0 and is progressing as expected. Pt. would benefit from continued PT to increase mobility and indep.  End of Session Patient Position: Up in chair  IP OR SWING BED PT PLAN  Inpatient or Swing Bed: Inpatient  PT Plan  Treatment/Interventions: Bed mobility, Transfer training, Gait training, Stair training, Balance training, Strengthening, Therapeutic exercise, Therapeutic activity, Home exercise program  PT Plan: Ongoing PT  PT Frequency: BID  PT Discharge Recommendations: Low intensity level of continued care  Equipment Recommended upon Discharge: Wheeled walker, Straight cane  PT Recommended Transfer Status: Assist x1, Assistive device  PT - OK to Discharge: Yes (pending progress towards PT goals POD#1 and medical clearance)      General Visit Information:  General  Reason for Referral: URIEL CHAVIRA  Referred By: Saturnino  Past Medical History Relevant to Rehab: L THR 10/2023, R foot sx 2/2024, chronic back and cervical pain with radiculopathy, chronic regional pain syndrome, anxiety, OA, trochanteric bursitis, lumbar spondylosis, L knee patellofemoral pain  Family/Caregiver Present: No  Patient Position Received: Bed, 3 rail up  General Comment: Admit for elective R GILBERT Matamoros 6/28/24    Home Living:  Home Living  Home Living Comments: Lives with GF (works 4 days/wk) in 2 story home with 1 ROBINA. Bed/full bath on 2nd floor with HR available. 1/2 bath on 1st floor. Owns 2ww and  cane.    Prior Level of Function:  Prior Function Per Pt/Caregiver Report  Prior Function Comments: Indep. amb. without AD; denies any recent falls. +drives; does not work- on disability/worker's comp    Precautions:  Precautions  LE Weight Bearing Status: Weight Bearing as Tolerated  Medical Precautions: Fall precautions  Precautions Comment: Pt. prefers to have shoes on when ambulating in hallway due to recent R foot Sx (2/2024)        Pain:  Pain Assessment  Pain Assessment: 0-10  0-10 (Numeric) Pain Score: 9  Pain Type: Surgical pain  Pain Location: Hip  Pain Orientation: Right  Pain Interventions: Repositioned, Cold pack    Cognition:  Cognition  Overall Cognitive Status: Within Functional Limits  Orientation Level: Oriented X4    General Assessments:     Sensation  Sensation Comment: continued R thigh numbness from local block most likely  Strength  Strength Comments: LLE WNL 5/5, R hip 2 to 3-/5, R quad 3/5, R ankle DF/PF 3+/5                   Functional Assessments:     Bed Mobility  Bed Mobility:  (supine to sit with minAx1 for RLE assistance)  Transfers  Transfer:  (sit to/from stand with ww and modAx1; cues for safe hand placement, blocking of R knee due to buckling with wt bearing)  Ambulation/Gait Training  Ambulation/Gait Training Performed:  (Bed to chair ~ 3 ft. with ww and modAx1; slow, step-to gait, blocking of R knee due to quad instability from continued numbness and weakness)          Extremity/Trunk Assessments:  RUE   RUE : Within Functional Limits  LUE   LUE: Within Functional Limits  RLE   RLE :  (R knee and ankle ROM WFL)  LLE   LLE : Within Functional Limits    Treatments:  HEP handout issued and ther-ex initiated: Rebecca R QS, GS, R heel slides with VICK JIMENEZ; pt. able to demonstrate correct technique and is familiar with HEP due to recent h/o L THR      Outcome Measures:  [facility] Basic Mobility  Turning from your back to your side while in a flat bed without using bedrails: A  little  Moving from lying on your back to sitting on the side of a flat bed without using bedrails: A little  Moving to and from bed to chair (including a wheelchair): A lot  Standing up from a chair using your arms (e.g. wheelchair or bedside chair): A lot  To walk in hospital room: A lot  Climbing 3-5 steps with railing: Total  Basic Mobility - Total Score: 13                            Goals:  Encounter Problems       Encounter Problems (Active)       Impaired mobility        Perform all bed mobility with indep.       Start:  06/28/24    Expected End:  07/12/24            Perform all transfers with ww and mod. indep.       Start:  06/28/24    Expected End:  07/12/24            Patient will ambulate >/= 100 ft. with ww and mod. indep.       Start:  06/28/24    Expected End:  07/12/24            Patient will perform LE HEP with indep.       Start:  06/28/24    Expected End:  07/12/24            Patient will ascend/descend at least 4 steps with HR and SPC, if needed, and mod. indep.       Start:  06/28/24    Expected End:  07/12/24            Patient will ascend/descend curb step with ww and mod. indep.        Start:  06/28/24    Expected End:  07/12/24                     Education Documentation  Home Exercise Program, taught by Tiki Uriarte PT at 6/28/2024  4:32 PM.  Learner: Patient  Readiness: Acceptance  Method: Explanation, Demonstration, Handout  Response: Verbalizes Understanding, Demonstrated Understanding, Needs Reinforcement  Comment: see note    Education Comments  No comments found.

## 2024-06-28 NOTE — PROGRESS NOTES
Patient seen and evaluated upon arrival to floor s/p right total hip arthroplasty. Doing well post operatively. Reports he is starting to have some pain. Strong equal dorsi/plantar flexion bilaterally. Sensation intact. Dressing remains clean, dry, and intact.     PT/OT: weight-bearing as tolerated with no hip precautions to operative extremity    Aspirin 81 mg BID for 30 days for DVT prophylaxis    Patient plans to return home with  home care upon discharge. TCC and SW following for discharge planning.

## 2024-06-28 NOTE — DISCHARGE INSTRUCTIONS
Total Hip Replacement   Discharge Instructions    To prevent Clot formation, you have been placed on the following medication:  ASA 81 mg twice a day for 30 days started on 6/29/24    Surgical Site Care:  Change dressing once a day and PRN (as needed). Apply 4 x 4 sponge and light tape. If glue present, leave open to air.  You may leave wound open to air after initial dressing removal, if wound is clean, dry and intact  If Aquacel Ag dressing is present, do not remove dressing for 7 days, unless heavily saturated. If heavily saturated, remove dressing and start using instructions above  Staples will be removed on post-operative day 14 and steri-strips applied  Showering is permitted starting POD1 if waterproof Aquacel dressing is present or when the incision is covered with 4 x 4 and Tegaderm waterproof dressing  Until all areas of incision are healed.    Physical Therapy:  Weight Bearing Status:  Weight-bearing as tolerated  No Hip precautions, per therapy handout     Pain Medications  You were given  oxycodone  Wean off pain medications as you deem appropriate as long as pain is under control  Do not exceed 4,000 mg of acetaminophen from all sources in a 24 hour period    Cold packs/Ice packs/Machine  May be used 5 times daily for 15-30 minutes as necessary  Be sure to have a barrier (cloth, clothing, towel) between the site and the ice pack to prevent frostbite    Contact [facility] office if  Increased redness, swelling, drainage of any kind, and/or pain to surgery site.  As well as new onset fevers and or chills.  These could signify an infection.  Calf or thigh tenderness to touch as well as increased swelling or redness.  This could signify a clot formation.  Numbness or tingling to an area around the incision site or below the incision site (toes).  Any rash appears, increased  or new onset nausea/vomiting occur.  This may indicate a reaction to a medication.  Phone # [phone].  Follow up  with Surgeon in 2 weeks  I acknowledge that I have received carlos hose and understand the instructions on how and when to wear them (on during the day, off at night)   Discharging RN who has gone over instructions and acknowledges carlos hose have been received     Ice 5 times a day for 20 minutes each session to operative extremity for two weeks.   CARLOS hose to be worn for three weeks. Can be removed for skin care and hygiene.   Incentive spirometer 10 times every hour while awake for two weeks.

## 2024-06-28 NOTE — PROGRESS NOTES
06/28/24 1547   Discharge Planning   Living Arrangements Spouse/significant other  (lives with s/o)   Support Systems Spouse/significant other   Assistance Needed none, pt states PTA ambulatory no AD, Ind ADLS and IADLS, unable to do heavy lifiting, denies falls,on disability, drives. Pt owns walker, cane, standard height toilet, walkin shower with seat   Type of Residence Private residence  (2 level home, Bedroom/full bath with walk-in shower with seat upstairs, 1/2 bath on 1st level, pt states usually sleeps on couch on 1st level)   Number of Stairs to Enter Residence 0   Number of Stairs Within Residence 20  (10 + landing + 10)   Do you have animals or pets at home? Yes   Type of Animals or Pets 11 Cats   Home or Post Acute Services In home services   Type of Home Care Services Home PT;Home OT   Patient expects to be discharged to: Home with [facility]   Does the patient need discharge transport arranged? No   Financial Resource Strain   How hard is it for you to pay for the very basics like food, housing, medical care, and heating? Not hard   Housing Stability   In the last 12 months, was there a time when you were not able to pay the mortgage or rent on time? N   In the last 12 months, how many places have you lived? 1   In the last 12 months, was there a time when you did not have a steady place to sleep or slept in a shelter (including now)? N   Transportation Needs   In the past 12 months, has lack of transportation kept you from medical appointments or from getting medications? no   In the past 12 months, has lack of transportation kept you from meetings, work, or from getting things needed for daily living? No   Patient Choice   Provider Choice list and CMS website (https://medicare.gov/care-compare#search) for post-acute Quality and Resource Measure Data were provided and reviewed with: Patient   Patient / Family choosing to utilize agency / facility established prior to hospitalization No     Pt is s/p  Elective Right total hip arthroplasty 6/27/24 with Dr. Gagandeep Matamoros. [facility]. Pt discharge preference is home with [facility] and agency of choice is [facility]. Demographics verified. [facility]  notified of C referral/HCO in Epic, awaiting confirmation of SOC.

## 2024-06-28 NOTE — CARE PLAN
Problem: Skin  Goal: Promote skin healing  Outcome: Progressing     Problem: Pain - Adult  Goal: Verbalizes/displays adequate comfort level or baseline comfort level  Outcome: Progressing     Problem: Safety - Adult  Goal: Free from fall injury  Outcome: Progressing     Problem: Pain  Goal: Takes deep breaths with improved pain control throughout the shift  Outcome: Progressing  Goal: Turns in bed with improved pain control throughout the shift  Outcome: Progressing  Goal: Walks with improved pain control throughout the shift  Outcome: Progressing  Goal: Performs ADL's with improved pain control throughout shift  Outcome: Progressing  Goal: Participates in PT with improved pain control throughout the shift  Outcome: Progressing  Goal: Free from opioid side effects throughout the shift  Outcome: Progressing  Goal: Free from acute confusion related to pain meds throughout the shift  Outcome: Progressing

## 2024-06-28 NOTE — ANESTHESIA PROCEDURE NOTES
Airway  Date/Time: 6/28/2024 9:33 AM  Urgency: elective    Airway not difficult    Staffing  Performed: attending   Authorized by: Isidro Begum MD    Performed by: Isidro Begum MD  Patient location during procedure: OR    Indications and Patient Condition  Indications for airway management: anesthesia  Spontaneous ventilation: present  Sedation level: moderate (conscious sedation)  Preoxygenated: yes  Patient position: sniffing  MILS not maintained throughout  Mask difficulty assessment: 2 - vent by mask + OA or adjuvant +/- NMBA  Planned trial extubation    Final Airway Details  Final airway type: endotracheal airway      Successful airway: ETT  Cuffed: yes   Successful intubation technique: video laryngoscopy  Facilitating devices/methods: intubating stylet  Endotracheal tube insertion site: oral  Blade type: cortez 4.  Blade size: #4  ETT size (mm): 7.5  Cormack-Lehane Classification: grade I - full view of glottis  Placement verified by: capnometry   Measured from: lips  ETT to lips (cm): 23  Number of attempts at approach: 1  Ventilation between attempts: none  Number of other approaches attempted: 0

## 2024-06-29 ENCOUNTER — APPOINTMENT (OUTPATIENT)
Dept: RADIOLOGY | Facility: HOSPITAL | Age: 54
End: 2024-06-29
Payer: COMMERCIAL

## 2024-06-29 ENCOUNTER — DOCUMENTATION (OUTPATIENT)
Dept: HOME HEALTH SERVICES | Facility: HOME HEALTH | Age: 54
End: 2024-06-29
Payer: COMMERCIAL

## 2024-06-29 ENCOUNTER — PHARMACY VISIT (OUTPATIENT)
Dept: PHARMACY | Facility: CLINIC | Age: 54
End: 2024-06-29
Payer: MEDICAID

## 2024-06-29 VITALS
WEIGHT: 182.98 LBS | SYSTOLIC BLOOD PRESSURE: 127 MMHG | HEART RATE: 84 BPM | BODY MASS INDEX: 27.73 KG/M2 | TEMPERATURE: 98.1 F | HEIGHT: 68 IN | RESPIRATION RATE: 18 BRPM | OXYGEN SATURATION: 97 % | DIASTOLIC BLOOD PRESSURE: 76 MMHG

## 2024-06-29 LAB
ANION GAP SERPL CALC-SCNC: 10 MMOL/L (ref 10–20)
BUN SERPL-MCNC: 23 MG/DL (ref 6–23)
CALCIUM SERPL-MCNC: 9.4 MG/DL (ref 8.6–10.3)
CHLORIDE SERPL-SCNC: 103 MMOL/L (ref 98–107)
CO2 SERPL-SCNC: 28 MMOL/L (ref 21–32)
CREAT SERPL-MCNC: 0.8 MG/DL (ref 0.5–1.3)
EGFRCR SERPLBLD CKD-EPI 2021: >90 ML/MIN/1.73M*2
ERYTHROCYTE [DISTWIDTH] IN BLOOD BY AUTOMATED COUNT: 15 % (ref 11.5–14.5)
GLUCOSE SERPL-MCNC: 122 MG/DL (ref 74–99)
HCT VFR BLD AUTO: 39.7 % (ref 41–52)
HGB BLD-MCNC: 12.6 G/DL (ref 13.5–17.5)
MCH RBC QN AUTO: 27.5 PG (ref 26–34)
MCHC RBC AUTO-ENTMCNC: 31.7 G/DL (ref 32–36)
MCV RBC AUTO: 87 FL (ref 80–100)
NRBC BLD-RTO: 0 /100 WBCS (ref 0–0)
PLATELET # BLD AUTO: 219 X10*3/UL (ref 150–450)
POTASSIUM SERPL-SCNC: 4.4 MMOL/L (ref 3.5–5.3)
RBC # BLD AUTO: 4.59 X10*6/UL (ref 4.5–5.9)
SODIUM SERPL-SCNC: 137 MMOL/L (ref 136–145)
WBC # BLD AUTO: 8.8 X10*3/UL (ref 4.4–11.3)

## 2024-06-29 PROCEDURE — 2500000002 HC RX 250 W HCPCS SELF ADMINISTERED DRUGS (ALT 637 FOR MEDICARE OP, ALT 636 FOR OP/ED): Performed by: INTERNAL MEDICINE

## 2024-06-29 PROCEDURE — 97530 THERAPEUTIC ACTIVITIES: CPT | Mod: GP,CQ

## 2024-06-29 PROCEDURE — 7100000011 HC EXTENDED STAY RECOVERY HOURLY - NURSING UNIT

## 2024-06-29 PROCEDURE — 2500000002 HC RX 250 W HCPCS SELF ADMINISTERED DRUGS (ALT 637 FOR MEDICARE OP, ALT 636 FOR OP/ED)

## 2024-06-29 PROCEDURE — 85027 COMPLETE CBC AUTOMATED: CPT | Performed by: ORTHOPAEDIC SURGERY

## 2024-06-29 PROCEDURE — 73502 X-RAY EXAM HIP UNI 2-3 VIEWS: CPT | Mod: RT

## 2024-06-29 PROCEDURE — 2500000005 HC RX 250 GENERAL PHARMACY W/O HCPCS

## 2024-06-29 PROCEDURE — 2500000001 HC RX 250 WO HCPCS SELF ADMINISTERED DRUGS (ALT 637 FOR MEDICARE OP)

## 2024-06-29 PROCEDURE — 97165 OT EVAL LOW COMPLEX 30 MIN: CPT | Mod: GO

## 2024-06-29 PROCEDURE — 73502 X-RAY EXAM HIP UNI 2-3 VIEWS: CPT | Mod: RIGHT SIDE | Performed by: STUDENT IN AN ORGANIZED HEALTH CARE EDUCATION/TRAINING PROGRAM

## 2024-06-29 PROCEDURE — 80048 BASIC METABOLIC PNL TOTAL CA: CPT | Performed by: ORTHOPAEDIC SURGERY

## 2024-06-29 PROCEDURE — 97116 GAIT TRAINING THERAPY: CPT | Mod: GP,CQ

## 2024-06-29 PROCEDURE — 97110 THERAPEUTIC EXERCISES: CPT | Mod: GP,CQ

## 2024-06-29 PROCEDURE — 2500000005 HC RX 250 GENERAL PHARMACY W/O HCPCS: Performed by: INTERNAL MEDICINE

## 2024-06-29 PROCEDURE — 2500000004 HC RX 250 GENERAL PHARMACY W/ HCPCS (ALT 636 FOR OP/ED): Performed by: ORTHOPAEDIC SURGERY

## 2024-06-29 PROCEDURE — 36415 COLL VENOUS BLD VENIPUNCTURE: CPT | Performed by: ORTHOPAEDIC SURGERY

## 2024-06-29 PROCEDURE — 2500000001 HC RX 250 WO HCPCS SELF ADMINISTERED DRUGS (ALT 637 FOR MEDICARE OP): Performed by: INTERNAL MEDICINE

## 2024-06-29 PROCEDURE — 2500000002 HC RX 250 W HCPCS SELF ADMINISTERED DRUGS (ALT 637 FOR MEDICARE OP, ALT 636 FOR OP/ED): Performed by: ORTHOPAEDIC SURGERY

## 2024-06-29 PROCEDURE — 2500000001 HC RX 250 WO HCPCS SELF ADMINISTERED DRUGS (ALT 637 FOR MEDICARE OP): Performed by: ORTHOPAEDIC SURGERY

## 2024-06-29 RX ORDER — TIZANIDINE 4 MG/1
6 TABLET ORAL EVERY 6 HOURS PRN
Status: DISCONTINUED | OUTPATIENT
Start: 2024-06-29 | End: 2024-06-29 | Stop reason: HOSPADM

## 2024-06-29 RX ORDER — CALCIUM CARBONATE 200(500)MG
500 TABLET,CHEWABLE ORAL 4 TIMES DAILY PRN
Status: DISCONTINUED | OUTPATIENT
Start: 2024-06-29 | End: 2024-06-29 | Stop reason: HOSPADM

## 2024-06-29 RX ORDER — PANTOPRAZOLE SODIUM 20 MG/1
20 TABLET, DELAYED RELEASE ORAL
Status: DISCONTINUED | OUTPATIENT
Start: 2024-06-29 | End: 2024-06-29 | Stop reason: HOSPADM

## 2024-06-29 ASSESSMENT — COGNITIVE AND FUNCTIONAL STATUS - GENERAL
MOBILITY SCORE: 18
CLIMB 3 TO 5 STEPS WITH RAILING: A LITTLE
DRESSING REGULAR LOWER BODY CLOTHING: A LITTLE
TURNING FROM BACK TO SIDE WHILE IN FLAT BAD: A LITTLE
MOVING TO AND FROM BED TO CHAIR: A LITTLE
STANDING UP FROM CHAIR USING ARMS: A LITTLE
DAILY ACTIVITIY SCORE: 21
MOVING FROM LYING ON BACK TO SITTING ON SIDE OF FLAT BED WITH BEDRAILS: A LITTLE
TOILETING: A LITTLE
HELP NEEDED FOR BATHING: A LITTLE
WALKING IN HOSPITAL ROOM: A LITTLE

## 2024-06-29 ASSESSMENT — PAIN SCALES - GENERAL
PAINLEVEL_OUTOF10: 6
PAINLEVEL_OUTOF10: 8
PAINLEVEL_OUTOF10: 6
PAINLEVEL_OUTOF10: 8
PAINLEVEL_OUTOF10: 5 - MODERATE PAIN
PAINLEVEL_OUTOF10: 9
PAINLEVEL_OUTOF10: 5 - MODERATE PAIN

## 2024-06-29 ASSESSMENT — PAIN DESCRIPTION - LOCATION: LOCATION: HIP

## 2024-06-29 ASSESSMENT — ACTIVITIES OF DAILY LIVING (ADL): BATHING_ASSISTANCE: MODERATE

## 2024-06-29 ASSESSMENT — PAIN - FUNCTIONAL ASSESSMENT
PAIN_FUNCTIONAL_ASSESSMENT: 0-10

## 2024-06-29 ASSESSMENT — PAIN DESCRIPTION - ORIENTATION
ORIENTATION: RIGHT
ORIENTATION: RIGHT

## 2024-06-29 NOTE — DISCHARGE SUMMARY
Discharge Diagnosis  Unilateral primary osteoarthritis, right hip    Issues Requiring Follow-Up  Follow-up with Dr. Matamoros in 2 weeks    Test Results Pending At Discharge  Pending Labs       No current pending labs.            Hospital Course   54-year-old male presents to the clinic with right hip pain.  After risks versus benefits were discussed it was decided to proceed with a right total hip replacement.  Patient's procedure and hospitalization were without complication.  His pain was well-controlled.  He did well with therapy.  He will go home in stable condition with home health care.    Pertinent Physical Exam At Time of Discharge  Physical Exam  Constitutional:       General: He is not in acute distress.  Cardiovascular:      Rate and Rhythm: Normal rate.      Pulses: Normal pulses.   Pulmonary:      Effort: Pulmonary effort is normal.   Abdominal:      General: Abdomen is flat.   Musculoskeletal:         General: Normal range of motion.      Right hip: Tenderness present.      Comments: Sensations intact and pedal pulses palpable  Dressing clean, dry, and intact.   Skin:     General: Skin is warm and dry.      Capillary Refill: Capillary refill takes less than 2 seconds.   Neurological:      Mental Status: He is alert and oriented to person, place, and time.         Home Medications     Medication List      START taking these medications     aspirin 81 mg EC tablet; Take 1 tablet (81 mg) by mouth 2 times a day.   docusate sodium 100 mg capsule; Commonly known as: Colace; Take 1   capsule (100 mg) by mouth 2 times a day for 10 days.   oxyCODONE 5 mg immediate release tablet; Commonly known as: Roxicodone;   Take 1 tablet (5 mg) by mouth every 6 hours if needed for moderate pain (4   - 6) or severe pain (7 - 10) for up to 7 days.     CONTINUE taking these medications     acetaminophen 500 mg tablet; Commonly known as: Tylenol   atorvastatin 80 mg tablet; Commonly known as: Lipitor   baclofen 20 mg tablet;  Commonly known as: Lioresal; Take 1 tablet (20   mg) by mouth 3 times a day.   chlorhexidine 0.12 % solution; Commonly known as: Peridex; Use 15 mL in   the mouth or throat if needed for wound care for up to 2 doses. 15 mls   swish and spit the night before surgery and 15mls swish and spit the   morning of surgery do not swallow   diazePAM 5 mg tablet; Commonly known as: Valium   diphenhydrAMINE 25 mg tablet; Commonly known as: Sominex   DULoxetine 60 mg DR capsule; Commonly known as: Cymbalta   fluticasone 50 mcg/actuation nasal spray; Commonly known as: Flonase   ibuprofen 800 mg tablet; Take 1 tablet (800 mg) by mouth every 8 hours   if needed for moderate pain (4 - 6).   ketamine (bulk) 100 % powder; Ketamine 100 mg/mL + lidocaine 40 mg/mL 1   puff 4 times daily as needed, DSP#20 mL x 5 refills   lidocaine 5 % patch; Commonly known as: Lidoderm   naloxone 4 mg/0.1 mL nasal spray; Commonly known as: Narcan; Instil 1   spray intranasally for opioid overdose; repeat in 5 inutes if no response   omeprazole 40 mg DR capsule; Commonly known as: PriLOSEC   predniSONE 10 mg tablet; Commonly known as: Deltasone; Please take after   breakfast: 6 TABS X 2 DAYS, 5 TABS X 2 DAYS, 4 TABS X 2 DAYS, 3 TABS X  2   DAYS, 2 TABS X 7 DAYS, 1 TAB  X  2 WEEKS   pregabalin 300 mg capsule; Commonly known as: Lyrica; Take 1 capsule   (300 mg) by mouth 2 times a day.   urea 40 % cream; Commonly known as: Carmol   Ventolin HFA 90 mcg/actuation inhaler; Generic drug: albuterol     ASK your doctor about these medications     diclofenac epolamine 1.3 % patch; Place 1 patch over 12 hours on the   skin 2 times a day.       Outpatient Follow-Up  Future Appointments   Date Time Provider Department [place]   7/12/2024 12:30 PM INF 02 PARM OPCTR PAROPCINF [place]   7/15/2024  3:00 PM Carolina Haque PsyD [id number] [facility]   7/16/2024  8:00 AM Gagandeep Matamoros MD [id number] [place]   7/18/2024  1:00 PM Chelle Bonilla, APRN-CNP [id number] [place]    8/15/2024  1:00 PM Royce Louis MD [id number] [place]   8/27/2024  1:00 PM Miladys Mcallister DO [id number] [place]   6/13/2025  1:00 PM Yobani Torres OD [id number] [place]       Pauline Sanchez, APRN-CNP

## 2024-06-29 NOTE — HH CARE COORDINATION
Home Care received a Referral for Physical Therapy and Occupational Therapy. We have processed the referral for a Start of Care on 6/30 - 7/1/24 PER TEAM.     If you have any questions or concerns, please feel free to contact us at [phone]. Follow the prompts, enter your five digit zip code, and you will be directed to your care team on WEST 2.

## 2024-06-29 NOTE — NURSING NOTE
Pt tried getting off the toilet with using his call light, he bumped his surgical hip on the toilet tissue rack. Pt didn't fall. The pt crabbed on the toilet rail. This nurse and the pca helped the pt back to bed. Pt was able to use his walker and walk back to the bed. Pt did c/o of pain on that hip and groin pain. Pt states that he feels like he may have damage something and also he might have pulled something from his groin area. Message sent to the MD on call at this time. Order put in for a stat x-ray of hip and pelvis. Pt is able to move his toes, no normness or tingling reported at this time. Dilaudid given for pain. Zanaflex ordered and given to help with muscle pain. X-ray done, results pending. Pt is resting at this time with call light within reach.   Pt verbalized understanding of discharge instructions and followup care. Pt educated on medications and all questions answered. Pt ambulated out of ED with a steady gait.

## 2024-06-29 NOTE — PROGRESS NOTES
Met w/ pt. Notified [facility] of pt discharge. Orders in . Notified ZORAIDA burciaga [facility] of discharge. Soc confirmed Monday.

## 2024-06-29 NOTE — PROGRESS NOTES
Physical Therapy    Physical Therapy Treatment    Patient Name: Akin Lynn  MRN: [id number]  Today's Date: 6/29/2024  Time Calculation  Start Time: 0919  Stop Time: 1013  Time Calculation (min): 54 min     607/607-A    Assessment/Plan   End of Session Communication: Bedside nurse  Assessment Comment: Patient demonstrates good effort throughout todays session. Receptive to cues, increasede gait distances and standing tolerance this date. Call light and all needs in reach.  End of Session Patient Position: Up in chair, Alarm on        General Visit Information:   PT  Visit  PT Received On: 06/29/24  General  Prior to Session Communication: Bedside nurse  Patient Position Received: Up in chair, Alarm on  General Comment: Pt agreeable to therapy this date. States he wants to leave after AM session. Ride available only until 230P.  Subjective     Precautions:  Precautions  LE Weight Bearing Status: Weight Bearing as Tolerated  Medical Precautions: Fall precautions  Post-Surgical Precautions:  (no hip precautions)  Precautions Comment: Pt. prefers to have shoes on when ambulating in hallway due to recent R foot Sx (2/2024)       Objective     Pain:  Pain Assessment  Pain Assessment: 0-10  0-10 (Numeric) Pain Score: 6  Pain Type: Surgical pain  Pain Location: Hip  Pain Orientation: Right  Pain Interventions: Cold applied    Cognition:  Cognition  Overall Cognitive Status: Within Functional Limits  Orientation Level: Oriented X4      Treatments:  Therapeutic Exercise  Therapeutic Exercise Performed: Yes  Therapeutic Exercise Activity 1: seated, BLE ankle pumps, LAQ, marches, hip ABD/ADD, GS, QS 15x AROM RLE only due to increasing cramps in L calf limiting participaton on L side. Limited ROM for hip flexion and straightening of LE during LAQ on RLE due to increasing groin pain        Bed Mobility  Bed Mobility: Yes  Bed Mobility 1  Bed Mobility 1: Supine to sitting  Level of Assistance 1: Minimum assistance  Bed Mobility  Comments 1: Pt performed longsitting in bed<>sitting EOB Zaid for bringing RLE out of bed due to foot catching on sheet. Pt refused to perform full supine position due to not simulating home environment. Pt states he sleeps in an upright position on the couch due to difficulty with supine position secondary to back issues.  Bed Mobility 2  Bed Mobility  2: Sitting to supine  Level of Assistance 2: Contact guard  Bed Mobility Comments 2: Pt performed EOB<>long sitting CGA, able to lift LEs onto bed and UEs to center self.  Bed Mobility 3  Bed Mobility 3: Scooting  Level of Assistance 3: Close supervision  Bed Mobility Comments 3: Pt performed lateral scooting SUP with proper sequencing. No cues required.  Ambulation/Gait Training  Ambulation/Gait Training Performed: Yes  Ambulation/Gait Training 1  Surface 1: Level tile  Device 1: Rolling walker (FWW)  Gait Support Devices: Gait belt  Assistance 1: Contact guard  Quality of Gait 1: Antalgic  Comments/Distance (ft) 1: Pt ambulated 75' x2 with FWW, CGA for safety purposes. Pt demonstrates slow, step through gait pattern, antalgic with short stride length. Significant UE support through walker due to increased discomfort with ambulation. Difficulty offloading LLE initially which resolved with repetition. VC to correct.  Good AD management with directional changes. Cues for improved postural corrections and relaxing shoulders.  Transfers  Transfer: Yes  Transfer 1  Transfer From 1: Bed to  Transfer to 1: Chair with arms  Technique 1: Stand pivot  Transfer Device 1: Walker (FWW)  Transfer Level of Assistance 1: Contact guard  Trials/Comments 1: Pt performed stand pivot from EOB<>chair CGA. Pt requires no VC for sequencing, good eccentric control to sitting position. Good AD management throughout.  Transfers 2  Transfer From 2: Bed to, Chair with arms to  Transfer to 2: Stand  Technique 2: Stand to sit, Sit to stand  Transfer Device 2: Walker (FWW)  Transfer Level of  Assistance 2: Contact guard  Trials/Comments 2: Pt performed STS from EOB/recliner<>FWW CGA. No VC required for sequencing.  Stairs  Stairs: Yes  Stairs  Rails 1: Bilateral  Curb Step 1: No  Device 1: Railing  Support Devices 1: Gait belt  Assistance 1: Contact guard  Comment/Number of Steps 1: Pt ascended/descended 3 stairs 4'' and 2 stairs 6'' with bilat HR for UE support, CGA for safety purposes. Pt performed in a step through pattern with frequent VC for sequencing as pt at times would attempt step through pattern. Education on safety awareness and step to for improved steadiness temporarily. Pt demonstrates heavy UE support due to discomfort in RLE throughout. No LOB/instability occured.  Stairs 2  Rails 2: Left, Right  Curb Step 2: Yes  Device 2: Wheeled walker (FWW)  Support Devices 2: Gait belt  Assistance 2: Contact guard  Comment/Number of Steps 2: Pt ascended/descended one curb step with FWW, CGA for safety purposes. VC/demo for sequencing. Pt demonstrates good carry over of instruction and performed without incident.       Outcome Measures:  [facility] Basic Mobility  Turning from your back to your side while in a flat bed without using bedrails: A little  Moving from lying on your back to sitting on the side of a flat bed without using bedrails: A little  Moving to and from bed to chair (including a wheelchair): A little  Standing up from a chair using your arms (e.g. wheelchair or bedside chair): A little  To walk in hospital room: A little  Climbing 3-5 steps with railing: A little  Basic Mobility - Total Score: 18  Education Documentation  No documentation found.  Education Comments  No comments found.        EDUCATION:  Outpatient Education  Education Comment: Education provided on safety awareness with stair negotiatio.  Encounter Problems       Encounter Problems (Resolved)       Impaired mobility        Perform all bed mobility with indep. (Adequate for Discharge)       Start:  06/28/24    Expected  End:  07/12/24    Resolved:  06/29/24         Perform all transfers with ww and mod. indep. (Adequate for Discharge)       Start:  06/28/24    Expected End:  07/12/24    Resolved:  06/29/24         Patient will ambulate >/= 100 ft. with ww and mod. indep. (Adequate for Discharge)       Start:  06/28/24    Expected End:  07/12/24    Resolved:  06/29/24         Patient will perform LE HEP with indep. (Adequate for Discharge)       Start:  06/28/24    Expected End:  07/12/24    Resolved:  06/29/24         Patient will ascend/descend at least 4 steps with HR and SPC, if needed, and mod. indep. (Adequate for Discharge)       Start:  06/28/24    Expected End:  07/12/24    Resolved:  06/29/24         Patient will ascend/descend curb step with ww and mod. indep.  (Adequate for Discharge)       Start:  06/28/24    Expected End:  07/12/24    Resolved:  06/29/24

## 2024-06-29 NOTE — PROGRESS NOTES
"Occupational Therapy    Evaluation/Treatment    Patient Name: Akin Lynn  MRN: [id number]  : 1970  Today's Date: 24  Time Calculation  Start Time: 823  Stop Time: 840  Time Calculation (min): 17 min       Assessment:  End of Session Patient Position: Up in chair, Alarm on       Plan:  No Skilled OT: No acute OT goals identified  OT Frequency: OT eval only  OT Discharge Recommendations: No further acute OT, No OT needed after discharge  Equipment Recommended upon Discharge:  (recommended 26\" reacher to pt to increase indep with ADLS. Pt states he would use it to play with his 11 cats.)  OT Recommended Transfer Status:  (CGA)  OT - OK to Discharge: Yes     Subjective         General:   OT Received On: 24  General  Reason for Referral: R GILEBRT CHAVIRA  Referred By: Saturnino  Past Medical History Relevant to Rehab: L THR 10/2023, R foot sx 2024, chronic back and cervical pain with radiculopathy, chronic regional pain syndrome, anxiety, OA, trochanteric bursitis, lumbar spondylosis, L knee patellofemoral pain  Family/Caregiver Present: No  Patient Position Received: Up in chair, Alarm on  General Comment: Admit for elective R GILBERT FAN Matamoros 24.    Precautions:  LE Weight Bearing Status:  (R LE WBAT)  Medical Precautions: Fall precautions  Post-Surgical Precautions:  (NO hip precautions)    Vital Signs:  SpO2:  (room air)    Pain:  Pain Assessment  Pain Assessment: 0-10  0-10 (Numeric) Pain Score: 8  Pain Type: Acute pain, Surgical pain  Pain Location: Groin  Pain Orientation: Right  Objective     Cognition:  Overall Cognitive Status: Within Functional Limits (anxious)  Orientation Level: Oriented X4             Home Living:  Home Living Comments: Lives with GF (works 4 days/wk) in 2 story home with 1 ROBINA. Bed/full bath on 2nd floor with HR available.Walk in shower with seat on 2nd floor. 1/2 bath on 1st floor. Owns 2ww and cane. Has medical alert button    Prior Function:  Prior " Function Comments: Indep. amb. without AD; denies any recent falls. +drives; does not work- on disability/worker's comp             Activities of Daily Living:   Eating Assistance: Independent  Eating Deficit: Setup  Grooming Assistance: Stand by  Bathing Assistance: Moderate  UE Dressing Assistance: Independent  LE Dressing Assistance: Minimal  Toileting Assistance with Device: Stand by  Functional Assistance:  (pt ambulated with CGA with ww)                         Activity Tolerance:  Endurance: Decreased tolerance for upright activites (limited by pain)    Functional Standing Tolerance:       Bed Mobility/Transfers: Bed Mobility  Bed Mobility: No  Transfers  Transfer:  (sit to stand from recliner with CGA, toilet transfer CGA.)                Balance:  Static Sitting: good  Dynamic Sitting: good  Static Standing: fair +  Dynamic Standing: fair         Vision:Vision - Basic Assessment  Current Vision: No visual deficits        Sensation:  Light Touch: No apparent deficits    Strength:  Strength Comments: R UE 4/5, L UE 4-/5, pt reports cervical disc herniation which he goes to pain mgmt for            Extremities: RUE   RUE : Within Functional Limits and LUE   LUE: Within Functional Limits    Outcome Measures: [facility] Daily Activity  Putting on and taking off regular lower body clothing: A little  Bathing (including washing, rinsing, drying): A little  Putting on and taking off regular upper body clothing: None  Toileting, which includes using toilet, bedpan or urinal: A little  Taking care of personal grooming such as brushing teeth: None  Eating Meals: None  Daily Activity - Total Score: 21    Education Documentation  ADL Training, taught by Matilde Gallo OT at 6/29/2024  9:03 AM.  Learner: Patient  Readiness: Acceptance  Method: Explanation  Response: Verbalizes Understanding, Demonstrated Understanding        EDUCATION:  Education  Education Comment: OT educated pt on LB dressing strategies and safe toilet  transfers. Pt receptive to education, demo good safety and understanding. Pt con't to verbalize anxiety regarding R groin pain after LOB getting up from toilet earlier this date. Ortho NP and MD aware, xrays of R hip taken after LOB were negative for fx or dislocation

## 2024-06-29 NOTE — CARE PLAN
Problem: Skin  Goal: Promote skin healing  Outcome: Progressing     Problem: Pain - Adult  Goal: Verbalizes/displays adequate comfort level or baseline comfort level  Outcome: Progressing     Problem: Safety - Adult  Goal: Free from fall injury  Outcome: Progressing     Problem: Pain  Goal: Takes deep breaths with improved pain control throughout the shift  Outcome: Progressing  Goal: Turns in bed with improved pain control throughout the shift  Outcome: Progressing  Goal: Walks with improved pain control throughout the shift  Outcome: Progressing  Goal: Performs ADL's with improved pain control throughout shift  Outcome: Progressing  Goal: Participates in PT with improved pain control throughout the shift  Outcome: Progressing  Goal: Free from opioid side effects throughout the shift  Outcome: Progressing  Goal: Free from acute confusion related to pain meds throughout the shift  Outcome: Progressing   The patient's goals for the shift include      The clinical goals for the shift include Pain management

## 2024-06-29 NOTE — CARE PLAN
Problem: Skin  Goal: Promote skin healing  Outcome: Met     Problem: Pain - Adult  Goal: Verbalizes/displays adequate comfort level or baseline comfort level  Outcome: Met     Problem: Safety - Adult  Goal: Free from fall injury  Outcome: Met     Problem: Pain  Goal: Takes deep breaths with improved pain control throughout the shift  Outcome: Met  Goal: Turns in bed with improved pain control throughout the shift  Outcome: Met  Goal: Walks with improved pain control throughout the shift  Outcome: Met  Goal: Performs ADL's with improved pain control throughout shift  Outcome: Met  Goal: Participates in PT with improved pain control throughout the shift  Outcome: Met  Goal: Free from opioid side effects throughout the shift  Outcome: Met  Goal: Free from acute confusion related to pain meds throughout the shift  Outcome: Met

## 2024-07-01 ENCOUNTER — HOME CARE VISIT (OUTPATIENT)
Dept: HOME HEALTH SERVICES | Facility: HOME HEALTH | Age: 54
End: 2024-07-01
Payer: COMMERCIAL

## 2024-07-01 ENCOUNTER — TELEPHONE (OUTPATIENT)
Dept: PHYSICAL THERAPY | Facility: CLINIC | Age: 54
End: 2024-07-01
Payer: COMMERCIAL

## 2024-07-01 VITALS
HEART RATE: 85 BPM | OXYGEN SATURATION: 97 % | DIASTOLIC BLOOD PRESSURE: 79 MMHG | TEMPERATURE: 97.5 F | SYSTOLIC BLOOD PRESSURE: 118 MMHG

## 2024-07-01 PROCEDURE — G0151 HHCP-SERV OF PT,EA 15 MIN: HCPCS

## 2024-07-01 PROCEDURE — G0152 HHCP-SERV OF OT,EA 15 MIN: HCPCS | Performed by: OCCUPATIONAL THERAPIST

## 2024-07-01 ASSESSMENT — ACTIVITIES OF DAILY LIVING (ADL)
BATHING_CURRENT_FUNCTION: SUPERVISION
BATHING ASSESSED: 1
PHYSICAL TRANSFERS ASSESSED: 1
AMBULATION ASSISTANCE: SUPERVISION
AMBULATION ASSISTANCE: 1
OASIS_M1830: 03
ENTERING_EXITING_HOME: ONE PERSON
DRESSING_LB_CURRENT_FUNCTION: CONTACT GUARD ASSIST
AMBULATION ASSISTANCE ON FLAT SURFACES: 1
DRESSING_UB_CURRENT_FUNCTION: SUPERVISION
TOILETING: 1
TOILETING EQUIPMENT USED: RAISED TOILET SEAT
TOILETING: MAXIMUM ASSIST
CURRENT_FUNCTION: SUPERVISION

## 2024-07-01 ASSESSMENT — ENCOUNTER SYMPTOMS
PAIN: 1
SUBJECTIVE PAIN PROGRESSION: RESOLVED
LOWEST PAIN SEVERITY IN PAST 24 HOURS: 3/10
PAIN LOCATION: RIGHT HIP
PAIN: 1
PAIN LOCATION - PAIN SEVERITY: 5/10
PAIN LOCATION: RIGHT LEG
HIGHEST PAIN SEVERITY IN PAST 24 HOURS: 6/10
PAIN SEVERITY GOAL: 1/10
PERSON REPORTING PAIN: PATIENT
PERSON REPORTING PAIN: PATIENT

## 2024-07-01 ASSESSMENT — PAIN SCALES - GENERAL: PAIN_LEVEL: 3

## 2024-07-01 NOTE — PROGRESS NOTES
Called patient to discuss outpatient PT. Pt states that he will be going to [facility] in [place] upon completion of [facility] services

## 2024-07-01 NOTE — ANESTHESIA POSTPROCEDURE EVALUATION
Patient: Akin Lynn    Procedure Summary       Date: 06/28/24 Room / Location: [facility] 11 / Virtual [facility]    Anesthesia Start: 0926 Anesthesia Stop: 1108    Procedure: Arthroplasty Total Hip Robot-Assisted (Right: Hip) Diagnosis:       Unilateral primary osteoarthritis, right hip      (Unilateral primary osteoarthritis, right hip [M16.11])    Surgeons: Gagandeep Matamoros MD Responsible Provider: Isidro Begum MD    Anesthesia Type: spinal ASA Status: 3            Anesthesia Type: spinal    Vitals Value Taken Time   /98 06/28/24 1220   Temp 36.4 °C (97.5 °F) 06/28/24 1150   Pulse 76 06/28/24 1225   Resp 22 06/28/24 1225   SpO2 99 % 06/28/24 1226   Vitals shown include unfiled device data.    Anesthesia Post Evaluation    Patient location during evaluation: PACU  Patient participation: complete - patient cannot participate  Level of consciousness: awake  Pain score: 3  Pain management: adequate  Multimodal analgesia pain management approach  Airway patency: patent  Cardiovascular status: acceptable and hemodynamically stable  Respiratory status: acceptable, nonlabored ventilation and room air  Hydration status: acceptable  Postoperative Nausea and Vomiting: none        No notable events documented.

## 2024-07-02 DIAGNOSIS — G90.522 COMPLEX REGIONAL PAIN SYNDROME TYPE 1 OF LEFT LOWER EXTREMITY: Primary | ICD-10-CM

## 2024-07-03 ENCOUNTER — TELEPHONE (OUTPATIENT)
Dept: ORTHOPEDIC SURGERY | Facility: CLINIC | Age: 54
End: 2024-07-03
Payer: COMMERCIAL

## 2024-07-03 ENCOUNTER — HOME CARE VISIT (OUTPATIENT)
Dept: HOME HEALTH SERVICES | Facility: HOME HEALTH | Age: 54
End: 2024-07-03
Payer: COMMERCIAL

## 2024-07-03 VITALS — OXYGEN SATURATION: 98 %

## 2024-07-03 DIAGNOSIS — Z96.641 S/P TOTAL RIGHT HIP ARTHROPLASTY: ICD-10-CM

## 2024-07-03 PROCEDURE — G0157 HHC PT ASSISTANT EA 15: HCPCS

## 2024-07-03 RX ORDER — OXYCODONE HYDROCHLORIDE 5 MG/1
5 TABLET ORAL EVERY 6 HOURS PRN
Qty: 28 TABLET | Refills: 0 | Status: SHIPPED | OUTPATIENT
Start: 2024-07-03 | End: 2024-07-10

## 2024-07-03 ASSESSMENT — ENCOUNTER SYMPTOMS
PAIN: 1
PAIN LOCATION: RIGHT HIP
HIGHEST PAIN SEVERITY IN PAST 24 HOURS: 6/10
PERSON REPORTING PAIN: PATIENT

## 2024-07-08 ENCOUNTER — HOME CARE VISIT (OUTPATIENT)
Dept: HOME HEALTH SERVICES | Facility: HOME HEALTH | Age: 54
End: 2024-07-08
Payer: COMMERCIAL

## 2024-07-08 ENCOUNTER — APPOINTMENT (OUTPATIENT)
Dept: RADIOLOGY | Facility: HOSPITAL | Age: 54
End: 2024-07-08
Payer: COMMERCIAL

## 2024-07-08 ENCOUNTER — HOSPITAL ENCOUNTER (EMERGENCY)
Facility: HOSPITAL | Age: 54
Discharge: HOME | End: 2024-07-09
Attending: EMERGENCY MEDICINE
Payer: COMMERCIAL

## 2024-07-08 VITALS — OXYGEN SATURATION: 98 %
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
## 2024-07-08 PROCEDURE — 73030 X-RAY EXAM OF SHOULDER: CPT | Mod: RIGHT SIDE | Performed by: RADIOLOGY

## 2024-07-08 PROCEDURE — 72170 X-RAY EXAM OF PELVIS: CPT | Performed by: RADIOLOGY

## 2024-07-08 PROCEDURE — 76870 US EXAM SCROTUM: CPT | Performed by: RADIOLOGY

## 2024-07-08 PROCEDURE — 72125 CT NECK SPINE W/O DYE: CPT | Performed by: RADIOLOGY

## 2024-07-08 PROCEDURE — 96376 TX/PRO/DX INJ SAME DRUG ADON: CPT

## 2024-07-08 PROCEDURE — 73552 X-RAY EXAM OF FEMUR 2/>: CPT | Mod: RT

## 2024-07-08 PROCEDURE — G0157 HHC PT ASSISTANT EA 15: HCPCS

## 2024-07-08 PROCEDURE — 70450 CT HEAD/BRAIN W/O DYE: CPT | Performed by: RADIOLOGY

## 2024-07-08 PROCEDURE — 96374 THER/PROPH/DIAG INJ IV PUSH: CPT

## 2024-07-08 PROCEDURE — 72125 CT NECK SPINE W/O DYE: CPT

## 2024-07-08 PROCEDURE — 73552 X-RAY EXAM OF FEMUR 2/>: CPT | Performed by: RADIOLOGY

## 2024-07-08 PROCEDURE — 70450 CT HEAD/BRAIN W/O DYE: CPT

## 2024-07-08 PROCEDURE — 72170 X-RAY EXAM OF PELVIS: CPT

## 2024-07-08 PROCEDURE — 76870 US EXAM SCROTUM: CPT

## 2024-07-08 PROCEDURE — 99285 EMERGENCY DEPT VISIT HI MDM: CPT | Mod: 25

## 2024-07-08 PROCEDURE — 73030 X-RAY EXAM OF SHOULDER: CPT | Mod: RT

## 2024-07-08 PROCEDURE — 2500000004 HC RX 250 GENERAL PHARMACY W/ HCPCS (ALT 636 FOR OP/ED)

## 2024-07-08 PROCEDURE — 73700 CT LOWER EXTREMITY W/O DYE: CPT | Mod: RT

## 2024-07-08 PROCEDURE — 93975 VASCULAR STUDY: CPT

## 2024-07-08 RX ORDER — ONDANSETRON HYDROCHLORIDE 2 MG/ML
4 INJECTION, SOLUTION INTRAVENOUS ONCE
Status: COMPLETED | OUTPATIENT
Start: 2024-07-08 | End: 2024-07-09

## 2024-07-08 ASSESSMENT — PAIN DESCRIPTION - LOCATION
LOCATION: HIP

## 2024-07-08 ASSESSMENT — PAIN DESCRIPTION - ORIENTATION
ORIENTATION: RIGHT

## 2024-07-08 ASSESSMENT — PAIN SCALES - GENERAL
PAINLEVEL_OUTOF10: 10 - WORST POSSIBLE PAIN
PAINLEVEL_OUTOF10: 6

## 2024-07-08 ASSESSMENT — PAIN - FUNCTIONAL ASSESSMENT: PAIN_FUNCTIONAL_ASSESSMENT: 0-10

## 2024-07-08 ASSESSMENT — ENCOUNTER SYMPTOMS
PAIN LOCATION: RIGHT HIP
PERSON REPORTING PAIN: PATIENT
PAIN: 1
HIGHEST PAIN SEVERITY IN PAST 24 HOURS: 5/10

## 2024-07-08 ASSESSMENT — PAIN DESCRIPTION - PAIN TYPE: TYPE: ACUTE PAIN

## 2024-07-08 NOTE — ED PROVIDER NOTES
EMERGENCY DEPARTMENT ENCOUNTER      Pt Name: Akin Lynn  MRN: [id number]  Birthdate 1970  Date of evaluation: 7/8/2024  Provider: Doug Barbour MD    CHIEF COMPLAINT       Chief Complaint   Patient presents with   • Fall     HISTORY OF PRESENT ILLNESS    HPI  54-year-old male presents emergency department via paramedics after he sustained a fall at home.  Patient recently had a hip replacement by Dr. Orta in [place] last Friday.  Patient had a total hip replacement today he indicates he was walking with his walker and tripped over her shoelaces landing on his right side.  He endorses pain in his hip and testicular pain.  He denies hitting his head or being on blood thinners.  Nursing Notes were reviewed.    PAST MEDICAL HISTORY     Past Medical History:   Diagnosis Date   • Anesthesia of skin     Numbness and tingling   • Anxiety 09/21/2023   • Arthritis Unk   • Bilateral myopia 09/21/2023   • Cervical radicular pain 09/21/2023   • Chronic patellofemoral pain of left knee 09/21/2023   • Complex regional pain syndrome type 1 of left lower extremity 09/21/2023   • COVID-19     VACCINATED   • Cubital tunnel syndrome on left 09/21/2023   • Cubital tunnel syndrome on right 09/21/2023   • Disease of intestine, unspecified     Bowel trouble   • Erectile dysfunction 09/21/2023   • Foot joint pain 09/21/2023   • Fractures    • Gastroparesis    • GERD (gastroesophageal reflux disease)    • 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   • History of psychological trauma 09/21/2023   • Hyperlipidemia    • Joint pain 09/21/2023   • Kidney cysts 09/21/2023   • Low back pain, unspecified 12/07/2022    Chronic low back pain without sciatica, unspecified back pain laterality   • Low back pain, unspecified 08/09/2022    Lumbago   • Lumbar spondylosis 09/21/2023   • Lung nodule 09/21/2023    RIGHT   • Metatarsalgia of left foot 09/21/2023   • Myopia, bilateral 02/19/2019    Myopia of both eyes with astigmatism and presbyopia   •  Osteochondral defect of patella 09/21/2023   • Osteoporosis    • Other forms of angina pectoris ([facility])     Stable angina pectoris   • Other spondylosis, cervical region 09/21/2023   • Patellofemoral arthritis 09/21/2023   • Personal history of other diseases of the musculoskeletal system and connective tissue     History of arthritis   • Personal history of other specified conditions     History of seizure   • Reflex sympathetic dystrophy 09/21/2023   • Scapular dyskinesis 09/21/2023   • Seizure disorder (Multi)     D/T TRAMADOL REACTION - 25 YEARS AGO   • Shoulder impingement 09/21/2023   • Suspected sleep apnea 09/21/2023   • Trauma and stressor-related disorder 09/21/2023   • Trochanteric bursitis of both hips 09/22/2023   • Unspecified disorder of ear, unspecified ear     Ear, nose and throat disorder   • Wears glasses          SURGICAL HISTORY       Past Surgical History:   Procedure Laterality Date   • ACHILLES TENDON SURGERY  1978 2016   • CT ABDOMEN PELVIS ANGIOGRAM W AND/OR WO IV CONTRAST  03/18/2020    CT ABDOMEN PELVIS ANGIOGRAM W AND/OR WO IV CONTRAST 3/18/2020 [facility]ERGENCY [facility]GAC[facility]   • CT ANGIO NECK  12/16/2022    CT NECK ANGIO W AND WO IV CONTRAST 12/16/2022 DOCTOR OFFICE LEGACY   • CT HEAD ANGIO W AND WO IV CONTRAST  12/16/2022    CT HEAD ANGIO W AND WO IV CONTRAST 12/16/2022 DOCTOR OFFICE LEGACY   • HIP SURGERY Left     REPLACEMENT   • JOINT REPLACEMENT      LEFT KNEE   • KNEE ARTHROPLASTY Bilateral 06/13/2017    Knee Arthroplasty   • OTHER SURGICAL HISTORY  01/06/2022    Ulnar nerve neuroplasty   • OTHER SURGICAL HISTORY Bilateral 07/26/2019    Foot surgery  -- HARDWARE   • OTHER SURGICAL HISTORY Bilateral     BILATERAL ANKLE SCOPE   • OTHER SURGICAL HISTORY      POP ABDOMINAL PROCEDURE   • PLANTAR FASCIA RELEASE     • SHOULDER SURGERY Bilateral 06/13/2017    Shoulder Surgery         CURRENT MEDICATIONS       Previous Medications    ACETAMINOPHEN (TYLENOL) 500 MG TABLET    Take 2 tablets (1,000  mg) by mouth every 8 hours if needed for mild pain (1 - 3).    ALBUTEROL (VENTOLIN HFA) 90 MCG/ACTUATION INHALER    Inhale 2 puffs every 4 hours if needed for wheezing.    ASPIRIN 81 MG EC TABLET    Take 1 tablet (81 mg) by mouth 2 times a day.    ATORVASTATIN (LIPITOR) 80 MG TABLET    Take 1 tablet (80 mg) by mouth once daily at bedtime.    BACLOFEN (LIORESAL) 20 MG TABLET    Take 1 tablet (20 mg) by mouth 3 times a day.    CHLORHEXIDINE (PERIDEX) 0.12 % SOLUTION    Use 15 mL in the mouth or throat if needed for wound care for up to 2 doses. 15 mls swish and spit the night before surgery and 15mls swish and spit the morning of surgery do not swallow    DIAZEPAM (VALIUM) 5 MG TABLET    Take 1 tablet by mouth every 12 hours if needed for anxiety.    DICLOFENAC EPOLAMINE 1.3 % PATCH    Place 1 patch over 12 hours on the skin 2 times a day.    DIPHENHYDRAMINE (SOMINEX) 25 MG TABLET    Take 1 tablet (25 mg) by mouth if needed for sleep.    DOCUSATE SODIUM (COLACE) 100 MG CAPSULE    Take 1 capsule (100 mg) by mouth 2 times a day for 10 days.    DULOXETINE (CYMBALTA) 60 MG DR CAPSULE    Take 1 capsule (60 mg) by mouth once daily.    FLUTICASONE (FLONASE) 50 MCG/ACTUATION NASAL SPRAY    Administer 1 spray into each nostril once daily as needed.    IBUPROFEN 800 MG TABLET    Take 1 tablet (800 mg) by mouth every 8 hours if needed for moderate pain (4 - 6).    KETAMINE HCL (KETAMINE, BULK,) 100 % POWDER    Ketamine 100 mg/mL + lidocaine 40 mg/mL 1 puff 4 times daily as needed, DSP#20 mL x 5 refills    LIDOCAINE (LIDODERM) 5 % PATCH    Place 1 patch on the skin once daily. Remove & discard patch within 12 hours or as directed by MD.    NALOXONE (NARCAN) 4 MG/0.1 ML NASAL SPRAY    Instil 1 spray intranasally for opioid overdose; repeat in 5 inutes if no response    OMEPRAZOLE (PRILOSEC) 40 MG DR CAPSULE    Take 1 capsule (40 mg) by mouth 2 times a day. For 90 days    OXYCODONE (ROXICODONE) 5 MG IMMEDIATE RELEASE TABLET    Take  1 tablet (5 mg) by mouth every 6 hours if needed for moderate pain (4 - 6) or severe pain (7 - 10) for up to 7 days.    PREDNISONE (DELTASONE) 10 MG TABLET    Please take after breakfast: 6 TABS X 2 DAYS, 5 TABS X 2 DAYS, 4 TABS X 2 DAYS, 3 TABS X  2 DAYS, 2 TABS X 7 DAYS, 1 TAB  X  2 WEEKS    PREGABALIN (LYRICA) 300 MG CAPSULE    Take 1 capsule (300 mg) by mouth 2 times a day.    UREA (CARMOL) 40 % CREAM    Apply 1 Application topically once daily. feet       ALLERGIES     Morphine, Ultram [tramadol], Alprazolam, Ketorolac, and Fish containing products    FAMILY HISTORY       Family History   Problem Relation Name Age of Onset   • Lung cancer Mother     • Glaucoma Father     • Skin cancer Father     • Hypertension Other mul fam mem    • Heart disease Other mul fam mem           SOCIAL HISTORY       Social History     Socioeconomic History   • Marital status: Single     Spouse name: Not on file   • Number of children: Not on file   • Years of education: Not on file   • Highest education level: Not on file   Occupational History   • Not on file   Tobacco Use   • Smoking status: Never   • Smokeless tobacco: Never   • Tobacco comments:     It killed my mother smoking and she left me with a gift a nodule on my right lung   Vaping Use   • Vaping status: Never Used   Substance and Sexual Activity   • Alcohol use: Not Currently   • Drug use: Not Currently     Types: Marijuana, Ketamine, Cocaine     Comment: Had used cocaine, marijuana, ecstasy among others in 2006. and before.   • Sexual activity: Not Currently     Partners: Female     Birth control/protection: None   Other Topics Concern   • Not on file   Social History Narrative   • Not on file     Social Determinants of Health     Financial Resource Strain: Low Risk  (6/28/2024)    Overall Financial Resource Strain (CARDIA)    • Difficulty of Paying Living Expenses: Not hard at all   Food Insecurity: No Food Insecurity (6/28/2024)    Hunger Vital Sign    • Worried About  Running Out of Food in the Last Year: Never true    • Ran Out of Food in the Last Year: Never true   Transportation Needs: No Transportation Needs (7/1/2024)    OASIS : Transportation    • Lack of Transportation (Medical): No    • Lack of Transportation (Non-Medical): No    • Patient Unable or Declines to Respond: No   Physical Activity: Insufficiently Active (6/28/2024)    Exercise Vital Sign    • Days of Exercise per Week: 7 days    • Minutes of Exercise per Session: 10 min   Stress: Stress Concern Present (6/28/2024)    Citizen of [place] [employer] - Occupational Stress Questionnaire    • Feeling of Stress : Rather much   Social Connections: Feeling Socially Integrated (7/1/2024)    OASIS : Social Isolation    • Frequency of experiencing loneliness or isolation: Never   Recent Concern: Social Connections - Socially Isolated (5/16/2024)    Received from [employer]    Social Connection and Isolation Panel [NHANES]    • Frequency of Communication with Friends and Family: Once a week    • Frequency of Social Gatherings with Friends and Family: Never    • Attends Gnosticism Services: Never    • Active Member of Clubs or Organizations: No    • Attends Club or Organization Meetings: Never    • Marital Status: Living with partner   Intimate Partner Violence: Not At Risk (6/28/2024)    Humiliation, Afraid, Rape, and Kick questionnaire    • Fear of Current or Ex-Partner: No    • Emotionally Abused: No    • Physically Abused: No    • Sexually Abused: No   Housing Stability: Low Risk  (6/28/2024)    Housing Stability Vital Sign    • Unable to Pay for Housing in the Last Year: No    • Number of Places Lived in the Last Year: 1    • Unstable Housing in the Last Year: No   Recent Concern: Housing Stability - High Risk (5/16/2024)    Received from [employer]    Housing Stability Vital Sign    • Unable to Pay for Housing in the Last Year: Yes    • Number of Places Lived in the Last Year: 1    • Unstable  Housing in the Last Year: No       SCREENINGS                        PHYSICAL EXAM    (up to 7 for level 4, 8 or more for level 5)     ED Triage Vitals [07/08/24 1604]   Temperature Heart Rate Respirations BP   36.2 °C (97.2 °F) 99 18 134/83      Pulse Ox Temp Source Heart Rate Source Patient Position   94 % Oral Monitor Sitting      BP Location FiO2 (%)     Left arm --       Physical Exam  Vitals and nursing note reviewed.   Constitutional:       General: He is not in acute distress.     Appearance: He is well-developed.   HENT:      Head: Normocephalic and atraumatic.   Eyes:      Conjunctiva/sclera: Conjunctivae normal.   Cardiovascular:      Rate and Rhythm: Normal rate and regular rhythm.      Heart sounds: No murmur heard.  Pulmonary:      Effort: Pulmonary effort is normal. No respiratory distress.      Breath sounds: Normal breath sounds.   Abdominal:      Palpations: Abdomen is soft.      Tenderness: There is no abdominal tenderness.   Musculoskeletal:      Cervical back: Neck supple.      Right hip: Tenderness present. Decreased range of motion. Decreased strength.      Left hip: Normal.   Skin:     General: Skin is warm and dry.      Capillary Refill: Capillary refill takes less than 2 seconds.   Neurological:      Mental Status: He is alert.   Psychiatric:         Mood and Affect: Mood normal.          DIAGNOSTIC RESULTS     LABS:  Labs Reviewed - No data to display    All other labs were within normal range or not returned as of this dictation.    Imaging  CT head wo IV contrast    (Results Pending)   CT cervical spine wo IV contrast    (Results Pending)   CT cervical spine wo IV contrast    (Results Pending)   XR hip right with pelvis when performed 2 or 3 views    (Results Pending)   XR femur right 2+ views    (Results Pending)        Procedures  Procedures     EMERGENCY DEPARTMENT COURSE/MDM:   Medical Decision Making  54-year-old male presents emergency department with chief complaint of fall.   Medical management treatment emergency department will consist of pain management with Dilaudid.  He also be conducting imaging of the cervical spine as well as the head.  Will also conduct imaging of the right hip and femur as well as an x-ray of the right shoulder.    Patient indicated that he had landed on his right testicle.  Ultrasound of the scrotum to rule out torsion was negative.  With no concerning findings.  CT of the head and C-spine showed no evidence of acute intracranial pathology.  X-rays show postoperative changes status post bilateral total hip arthroplasty no evidence of acute fracture dislocation degenerative changes as described in the radiology report.  Patient was informed indicates he cannot ambulate we attempted to road test the patient and have him walk however he was not able to ambulate.  Will send the patient for CT of the right hip without IV contrast to further evaluate his lower extremity.  The patient is also receiving 0.5 Dilaudid and Zofran for nausea.  The patient will be signed out to the night resident.    Diagnoses as of 07/09/24 1403   Fall, initial encounter        Patient and or family in agreement and understanding of treatment plan.  All questions answered.      I reviewed the case with the attending ED physician. The attending ED physician agrees with the plan. Patient and/or patient´s representative was counseled regarding labs, imaging, likely diagnosis, and plan. All questions were answered.    ED Medications administered this visit:    Medications   HYDROmorphone (Dilaudid) injection 0.5 mg (has no administration in time range)       New Prescriptions from this visit:    New Prescriptions    No medications on file       Follow-up:  No follow-up provider specified.      Final Impression: No diagnosis found.      (Please note that portions of this note were completed with a voice recognition program.  Efforts were made to edit the dictations but occasionally words are  mis-transcribed.)     Dogu Barbour MD  Resident  07/09/24 [id number]

## 2024-07-08 NOTE — ED TRIAGE NOTES
Pt arrives via medics from home. Pt had a mechanical fall today. No LOC. No thinners. Recent R hip surgery. Pt endorses R hip pain and neck pain.

## 2024-07-09 VITALS
TEMPERATURE: 97.2 F | BODY MASS INDEX: 27.28 KG/M2 | OXYGEN SATURATION: 95 % | SYSTOLIC BLOOD PRESSURE: 122 MMHG | WEIGHT: 180 LBS | DIASTOLIC BLOOD PRESSURE: 63 MMHG | RESPIRATION RATE: 16 BRPM | HEIGHT: 68 IN | HEART RATE: 83 BPM

## 2024-07-09 PROCEDURE — 96375 TX/PRO/DX INJ NEW DRUG ADDON: CPT

## 2024-07-09 PROCEDURE — 2500000004 HC RX 250 GENERAL PHARMACY W/ HCPCS (ALT 636 FOR OP/ED)

## 2024-07-09 PROCEDURE — 73700 CT LOWER EXTREMITY W/O DYE: CPT | Mod: RIGHT SIDE | Performed by: RADIOLOGY

## 2024-07-09 PROCEDURE — 96376 TX/PRO/DX INJ SAME DRUG ADON: CPT

## 2024-07-09 ASSESSMENT — PAIN SCALES - GENERAL: PAINLEVEL_OUTOF10: 9

## 2024-07-09 NOTE — DISCHARGE INSTRUCTIONS
Please return to closest ED if you develop any worsening symptoms including inability to walk, new numbness/tingling, or any new weakness.

## 2024-07-11 ENCOUNTER — HOME CARE VISIT (OUTPATIENT)
Dept: HOME HEALTH SERVICES | Facility: HOME HEALTH | Age: 54
End: 2024-07-11
Payer: COMMERCIAL

## 2024-07-11 VITALS — OXYGEN SATURATION: 98 %

## 2024-07-11 DIAGNOSIS — Z96.641 S/P TOTAL RIGHT HIP ARTHROPLASTY: ICD-10-CM

## 2024-07-11 PROCEDURE — G0157 HHC PT ASSISTANT EA 15: HCPCS

## 2024-07-11 RX ORDER — OXYCODONE HYDROCHLORIDE 5 MG/1
5 TABLET ORAL EVERY 6 HOURS PRN
Qty: 28 TABLET | Refills: 0 | Status: SHIPPED | OUTPATIENT
Start: 2024-07-11 | End: 2024-07-18 | Stop reason: SDUPTHER

## 2024-07-11 ASSESSMENT — ENCOUNTER SYMPTOMS
PERSON REPORTING PAIN: PATIENT
DENIES PAIN: 1

## 2024-07-12 ENCOUNTER — INFUSION (OUTPATIENT)
Dept: INFUSION THERAPY | Facility: CLINIC | Age: 54
End: 2024-07-12
Payer: COMMERCIAL

## 2024-07-12 VITALS
SYSTOLIC BLOOD PRESSURE: 124 MMHG | RESPIRATION RATE: 16 BRPM | OXYGEN SATURATION: 97 % | TEMPERATURE: 97.9 F | HEART RATE: 74 BPM | DIASTOLIC BLOOD PRESSURE: 82 MMHG

## 2024-07-12 DIAGNOSIS — G90.522 COMPLEX REGIONAL PAIN SYNDROME TYPE 1 OF LEFT LOWER EXTREMITY: ICD-10-CM

## 2024-07-12 PROCEDURE — 2500000004 HC RX 250 GENERAL PHARMACY W/ HCPCS (ALT 636 FOR OP/ED): Performed by: NURSE PRACTITIONER

## 2024-07-12 PROCEDURE — 2500000005 HC RX 250 GENERAL PHARMACY W/O HCPCS: Performed by: NURSE PRACTITIONER

## 2024-07-12 PROCEDURE — 96365 THER/PROPH/DIAG IV INF INIT: CPT | Mod: INF

## 2024-07-12 PROCEDURE — 96368 THER/DIAG CONCURRENT INF: CPT | Mod: INF

## 2024-07-12 RX ORDER — NITROGLYCERIN 0.4 MG/1
0.4 TABLET SUBLINGUAL ONCE
OUTPATIENT
Start: 2024-07-26 | End: 2024-07-26

## 2024-07-12 RX ORDER — METOCLOPRAMIDE HYDROCHLORIDE 5 MG/ML
10 INJECTION INTRAMUSCULAR; INTRAVENOUS ONCE
OUTPATIENT
Start: 2024-07-26 | End: 2024-07-26

## 2024-07-12 RX ORDER — DIPHENHYDRAMINE HYDROCHLORIDE 50 MG/ML
25 INJECTION INTRAMUSCULAR; INTRAVENOUS ONCE
OUTPATIENT
Start: 2024-07-26 | End: 2024-07-26

## 2024-07-12 RX ORDER — FAMOTIDINE 10 MG/ML
20 INJECTION INTRAVENOUS ONCE AS NEEDED
OUTPATIENT
Start: 2024-07-26

## 2024-07-12 RX ORDER — DIPHENHYDRAMINE HYDROCHLORIDE 50 MG/ML
50 INJECTION INTRAMUSCULAR; INTRAVENOUS AS NEEDED
OUTPATIENT
Start: 2024-07-26

## 2024-07-12 RX ORDER — ONDANSETRON HYDROCHLORIDE 2 MG/ML
4 INJECTION, SOLUTION INTRAVENOUS ONCE
OUTPATIENT
Start: 2024-07-26 | End: 2024-07-26

## 2024-07-12 RX ORDER — EPINEPHRINE 0.3 MG/.3ML
0.3 INJECTION SUBCUTANEOUS EVERY 5 MIN PRN
OUTPATIENT
Start: 2024-07-26

## 2024-07-12 RX ORDER — ALBUTEROL SULFATE 0.83 MG/ML
3 SOLUTION RESPIRATORY (INHALATION) AS NEEDED
OUTPATIENT
Start: 2024-07-26

## 2024-07-12 RX ORDER — KETAMINE HCL IN NACL, ISO-OSM 100MG/10ML
SYRINGE (ML) INJECTION ONCE
Status: COMPLETED | OUTPATIENT
Start: 2024-07-12 | End: 2024-07-12

## 2024-07-12 RX ORDER — METOPROLOL TARTRATE 25 MG/1
25 TABLET, FILM COATED ORAL ONCE
OUTPATIENT
Start: 2024-07-26 | End: 2024-07-26

## 2024-07-12 ASSESSMENT — PAIN SCALES - GENERAL: PAINLEVEL: 6

## 2024-07-12 ASSESSMENT — ENCOUNTER SYMPTOMS
OCCASIONAL FEELINGS OF UNSTEADINESS: 1
DEPRESSION: 0
LOSS OF SENSATION IN FEET: 1

## 2024-07-12 NOTE — PATIENT INSTRUCTIONS
Today :Akin PAUL Lynn had no medications administered during this visit.     For:   1. Complex regional pain syndrome type 1 of left lower extremity         (Tell all doctors including dentists that you are taking this medication)     Go to the emergency room or call 911 if:  -You have signs of allergic reaction:   -Rash, hives, itching.   -Swollen, blistered, peeling skin.   -Swelling of face, lips, mouth, tongue or throat.   -Tightness of chest, trouble breathing, swallowing or talking     Call your doctor:  - If IV / injection site gets red, warm, swollen, itchy or leaks fluid or pus.     (Leave dressing on your IV site for at least 2 hours and keep area clean and dry  - If you get sick or have symptoms of infection or are not feeling well for any reason.    (Wash your hands often, stay away from people who are sick)  - If you have side effects from your medication that do not go away or are bothersome.     (Refer to the teaching your nurse gave you for side effects to call your doctor about)    - Common side effects may include:  stuffy nose, headache, feeling tired, muscle aches, upset stomach  - Before receiving any vaccines     - Call [facility] at   If:  - You get sick, are on antibiotics, have had a recent vaccine, have surgery or dental work and your doctor wants your visit rescheduled.  - You need to cancel and reschedule your visit for any reason. Call at least 2 days before your visit if you need to cancel.   - Your insurance changes before your next visit.    (We will need to get approval from your new insurance. This can take up to two weeks.)     The [facility] is opened Monday thru Friday. We are closed on weekends and holidays.   Voice mail will take your call if the center is closed. If you leave a message please allow 24 hours for a call back during weekdays. If you leave a message on a weekend/holiday, we will call you back the next business day.              [facility]      Pain Infusion Aftercare Instructions      1. It is normal to feel sedated, tired and low in energy after a pain infusion. DO NOT DRIVE, OPERATE ANY MACHINERY, OR MAKE ANY IMPORTANT DECISIONS FOR AT LEAST 24 HOURS AFTER THE INFUSION.     2. Call the pain center at [phone] with any problems, questions, or concerns.     3. Eat light after the infusion. If you feel queasy or sick to your stomach, laying down with your eyes closed may help. When you resume eating start with something mild like clear liquids, yogurt, applesauce, crackers, etc… Gradually advance to a regular diet.     4. Do not leave your house alone the evening of your pain infusion.     5. No alcohol or sedative medications, such as sleeping pills, for 24 hours after your pain infusion.     6. Resume all other prescribed medications unless directed otherwise by you physician.     7. If you have any medical emergencies, call 911 or go directly to the closest emergency room.

## 2024-07-12 NOTE — PROGRESS NOTES
S: Patient here for 18th opioid sparing pain infusion. Patient reports 70% reduction in pain after last infusion that lasted few days.    Purpose of pain infusion meds explained along with potential side effects.  Patient verbalized understanding.    B: Pain Issues right hip, lower  back    A: Patient currently has pain described on flow sheet documentation. Designated  is [place]. Patient last ate solid food >12 hours ago, and had liquid 1 hours ago.    R: Plan; Obtain IV access, do patient risk assessment, and start opioid sparing infusion as ordered. Monitoring for S/S of adverse reactions.    Post infusion teaching provided. Patient verbalized understanding. VSS, Patient states pain is 8/10. Will assist patient to waiting car via wheelchair.

## 2024-07-15 ENCOUNTER — HOME CARE VISIT (OUTPATIENT)
Dept: HOME HEALTH SERVICES | Facility: HOME HEALTH | Age: 54
End: 2024-07-15
Payer: COMMERCIAL

## 2024-07-15 ENCOUNTER — APPOINTMENT (OUTPATIENT)
Dept: BEHAVIORAL HEALTH | Facility: CLINIC | Age: 54
End: 2024-07-15
Payer: COMMERCIAL

## 2024-07-15 DIAGNOSIS — F33.1 MAJOR DEPRESSIVE DISORDER, RECURRENT EPISODE, MODERATE (MULTI): ICD-10-CM

## 2024-07-15 DIAGNOSIS — Z91.49 HISTORY OF PSYCHOLOGICAL TRAUMA: ICD-10-CM

## 2024-07-15 DIAGNOSIS — F41.9 ANXIETY: ICD-10-CM

## 2024-07-15 PROCEDURE — 90837 PSYTX W PT 60 MINUTES: CPT

## 2024-07-15 NOTE — PROGRESS NOTES
Start time:  3:05  End time:  4:02     An interactive audio and video telecommunication system which permits real time communications between the patient (at the originating site) and provider (at the distant site) was utilized to provide this telehealth service. Verbal consent has been obtained from this patient for a telehealth visit.     The patient was informed of the current need to conduct treatment via virtual platform in light of COVID-19 pandemic. I have confirmed the patient’s identity via the following (minimum of three) acceptable identifiers as per  Policy PH-9: , address, phone number, and email address.        SUBJECTIVE:  Patient reported that hip surgery went well, however he has since developed feelings of depression secondary to increased pain and contempt from partner since being able to do less things in the house. Attempting to do previously enjoyed activities (e.g., going to movies) but experiencing anhedonia. Feels very low in self-worth and though not experiencing active suicidal ideation, plans, or intent endorses passive SI due to his current pain level. Discussed calling 911 or [phone] if he ever develops active SI due to his limited support system at this time. Listened supportively and assisted patient with challenging negative thoughts about himself. Patient notes that he will often be getting outside the house for ketamine next week and is looking forward to this. Discussing moving sessions to a more frequent basis and reassessing interest in hypnosis next session.  Progress: Good  Prognosis: Good     Duration of problem: years with recent improvement     Severity: moderate     OBJECTIVE:  Orientation & Cognition: Oriented x3. Thought processes normal and appropriate to situation.  Mood, Affect: Euthymic, frustrated at joe  Appearance: Optimal by patient standards.  Harm to self or others: Not reported  Substance abuse: Not reported  Psychiatric medication use: Not reported      IMPRESSION:     Major depressive disorder, recurrent episode, moderate    F41.9 Unspecified anxiety disorder  [id number].91.49 History of psychological trauma     Goals for Therapy: Sharing difficult feelings in a therapeutic environment, expanding upon current social supports in his life, and addressing anxiety. Patient is also interested in medication management for his feelings of traumatization which he is worried will return after he ends ketamine treatment.      PLAN:     Discussing moving sessions to a more frequent basis and reassessing interest in hypnosis next session.     Next apt: 7/26     --------------------------------------------  Other(s) present in the room: None.  --------------------------------------------  Time spent face-to-face with patient: 57 minutes

## 2024-07-16 ENCOUNTER — OFFICE VISIT (OUTPATIENT)
Dept: ORTHOPEDIC SURGERY | Facility: CLINIC | Age: 54
End: 2024-07-16
Payer: COMMERCIAL

## 2024-07-16 ENCOUNTER — TELEPHONE (OUTPATIENT)
Dept: ORTHOPEDIC SURGERY | Facility: CLINIC | Age: 54
End: 2024-07-16

## 2024-07-16 DIAGNOSIS — Z96.641 S/P TOTAL RIGHT HIP ARTHROPLASTY: ICD-10-CM

## 2024-07-16 DIAGNOSIS — M17.11 PRIMARY OSTEOARTHRITIS OF RIGHT KNEE: Primary | ICD-10-CM

## 2024-07-16 DIAGNOSIS — Z47.1 AFTERCARE FOLLOWING RIGHT HIP JOINT REPLACEMENT SURGERY: ICD-10-CM

## 2024-07-16 DIAGNOSIS — M54.50 LUMBAR PAIN: ICD-10-CM

## 2024-07-16 DIAGNOSIS — Z96.641 AFTERCARE FOLLOWING RIGHT HIP JOINT REPLACEMENT SURGERY: ICD-10-CM

## 2024-07-16 PROCEDURE — 99024 POSTOP FOLLOW-UP VISIT: CPT | Performed by: ORTHOPAEDIC SURGERY

## 2024-07-16 PROCEDURE — 99214 OFFICE O/P EST MOD 30 MIN: CPT | Performed by: ORTHOPAEDIC SURGERY

## 2024-07-16 PROCEDURE — 20610 DRAIN/INJ JOINT/BURSA W/O US: CPT | Mod: RT | Performed by: ORTHOPAEDIC SURGERY

## 2024-07-16 PROCEDURE — 1036F TOBACCO NON-USER: CPT | Performed by: ORTHOPAEDIC SURGERY

## 2024-07-16 PROCEDURE — 99213 OFFICE O/P EST LOW 20 MIN: CPT | Mod: 24 | Performed by: ORTHOPAEDIC SURGERY

## 2024-07-16 NOTE — PROGRESS NOTES
Subjective    Patient ID: Akin    Chief Complaint:   Chief Complaint   Patient presents with    Right Hip - Post-op     Rt STEPHEN THR 6/28/24     History of present illness    54-year-old gentleman presented to clinic today for his first postop follow-up visit status post right total hip Stephen performed 6/28/2024.  Overall right hip is doing okay.  He was walking with his walker and fell approximately a week and a half ago.  He had gone to emergency department CT scans and x-rays were negative for fracture.  Hardware was in good position.  He is doing okay now.  At the time of the fall he did land onto the right knee and ended up flaring up his arthritis of the right knee.  He is having increased right knee pain.  Difficulty going up and down stairs.  Ambulating with the assistance of a walker.  He starts his ketamine on Monday and outpatient physical therapy on Monday at [facility], [place], operated by [facility].  Mild radiating pain down the right thigh.      Past medical , Surgical, Family and social history reviewed.      Physical exam  General: No acute distress and breathing comfortably.  Patient is pleasant and cooperative with the examination.    Extremity  Right hip is neurovascular intact.  Incision is well-healed at this time.  No sign of infection.  Pelvic array site within normal limits.  Wound is clean dry and intact.  Compartments soft.  No calf swelling or tenderness.  Negative Homans' sign.  No DVT.  No hematoma formation.    Right knee is neurovascular intact.  The affected knee was examined and inspected and was tender to touch along the medial aspect.  The patient has catching/locking and occasional mechanical symptoms.  The skin was intact without breakdown or open wounds.  There was a mild Segundo exam seen without evidence of instability in the collateral ligaments or in the anterior posterior plane.  There was a negative Lachman's test, pivot shift test, and posterior drawer sign.  There was no foot drop, numbness or  tingling.  Sensation, reflexes, and pulses in the foot and ankle were present.  There was an effusion but range of motion was good and straight leg raise testing was normal.  Knee range of motion was 0 degrees of extension to 115 degrees of flexion.  The patient had the ability to bear weight but with discomfort.  The patient's gait was antalgic secondary to the discomfort.    Diagnostics  X-rays reviewed from 7/8/2024 when he was in the hospital.  CT femur right wo IV contrast    Result Date: 7/9/2024  Interpreted By:  Gustavo Casiano, STUDY: CT FEMUR RIGHT WO IV CONTRAST;  7/9/2024 12:31 am   INDICATION: Signs/Symptoms:fall with recent hip replacement.   COMPARISON: 07/08/2024 radiographs   ACCESSION NUMBER(S): [id number]   ORDERING CLINICIAN: ARDEN GUZMAN   TECHNIQUE: Axial CT of the right hip, femur, and knee was performed with sagittal and coronal reformats.  No intravenous contrast.   FINDINGS: Osteoarticular: Status post right total hip arthroplasty with intact appearing hardware. Metallic streak artifact limits evaluation of adjacent structures. No acute fracture or dislocation. No knee joint effusion.   Soft tissues: There is surgical scarring along the lateral right hip. No fluid collection. The periarticular myotendinous structures appear within normal limits.       1. No acute osseous abnormality detected. Postsurgical change related to right total hip arthroplasty with intact hardware.   Signed by: Gustavo Casiano 7/9/2024 1:40 AM Dictation workstation:   [id number]    XR shoulder right 2+ views    Result Date: 7/8/2024  Interpreted By:  Marquis Mccormack, STUDY: XR SHOULDER RIGHT 2+ VIEWS 7/8/2024 6:55 pm   INDICATION: Signs/Symptoms:fall   COMPARISON: 02/03/2023   ACCESSION NUMBER(S): [id number]   ORDERING CLINICIAN: ARDEN GUZMAN   TECHNIQUE: 3 views of the right shoulder including AP , Grashey and scapular Y-views were obtained.   FINDINGS: There is no radiographic evidence of  acute fracture or dislocation identified.  The joint spaces are well preserved without significant degenerative changes.       1. No evidence of acute fracture or dislocation.   MACRO: None.   Signed by: Marquis Mccormack 7/8/2024 7:24 PM Dictation workstation:   [id number]    XR femur right 2+ views    Result Date: 7/8/2024  Interpreted By:  Marquis Mccormack, STUDY: XR PELVIS 1-2 VIEWS; XR FEMUR RIGHT 2+ VIEWS 7/8/2024 6:55 pm   INDICATION: Signs/Symptoms:fall recent hip replacement   COMPARISON: 06/29/2024   ACCESSION NUMBER(S): [id number]; [id number]   ORDERING CLINICIAN: ARDEN GUZMAN   TECHNIQUE: A single AP view of the pelvis as well as AP and lateral views of the right femur were obtained.   FINDINGS: The patient is status post bilateral total hip arthroplasty.  There is no acute fracture or dislocation identified. Mild hypertrophic degenerative changes are seen in the sacroiliac joints bilaterally.       1. Postoperative changes status post bilateral total hip arthroplasty. 2. No evidence of acute fracture or dislocation. 3. Degenerative changes, as described above.   MACRO: None.   Signed by: Marquis Mccormack 7/8/2024 7:23 PM Dictation workstation:   [id number]    XR pelvis 1-2 views    Result Date: 7/8/2024  Interpreted By:  Marquis Mccormack, STUDY: XR PELVIS 1-2 VIEWS; XR FEMUR RIGHT 2+ VIEWS 7/8/2024 6:55 pm   INDICATION: Signs/Symptoms:fall recent hip replacement   COMPARISON: 06/29/2024   ACCESSION NUMBER(S): [id number]; [id number]   ORDERING CLINICIAN: ARDEN GUZMAN   TECHNIQUE: A single AP view of the pelvis as well as AP and lateral views of the right femur were obtained.   FINDINGS: The patient is status post bilateral total hip arthroplasty.  There is no acute fracture or dislocation identified. Mild hypertrophic degenerative changes are seen in the sacroiliac joints bilaterally.       1. Postoperative changes status post bilateral total hip arthroplasty. 2. No evidence of acute fracture or  dislocation. 3. Degenerative changes, as described above.   MACRO: None.   Signed by: Marquis Mccormack 7/8/2024 7:23 PM Dictation workstation:   [id number]    CT cervical spine wo IV contrast    Result Date: 7/8/2024  Interpreted By:  David Durant, STUDY: CT CERVICAL SPINE WO IV CONTRAST; 7/8/2024 6:16 pm   INDICATION: Signs/Symptoms:fall;   COMPARISON: 04/22/2024   ACCESSION NUMBER(S): [id number]   ORDERING CLINICIAN: ARDEN GUZMAN   TECHNIQUE: Contiguous axial images were acquired from the skull base to the lung apices. Coronal and sagittal reformatted images were obtained. All CT examinations are performed with 1 or more of the following dose reduction techniques: Automated exposure control, adjustment of mA and/or kv according to patient's size, or use of iterative reconstruction techniques.   FINDINGS: There is a normal cervical lordosis.  No acute fracture or spondylolisthesis is identified. Facet degenerative changes are again seen.     The occipital condyles, arch of C1, and the odontoid processes are intact. The atlantoaxial relationship is well maintained.   Intervertebral disc spaces are stable. The bony spinal canal appears patent.   At C2-3 there is mild-moderate right neural foramina stenosis and mild left neural foramina stenosis. At C3-4 there is moderate right neural foramina stenosis. At C4-5 there is right-sided hypertrophic facet degenerative change and mild-moderate right neural foramina stenosis.         1. No acute fracture or spondylolisthesis. 2. Stable appearing degenerative changes.     Signed by: David Durant 7/8/2024 6:57 PM Dictation workstation:   [id number]    CT head wo IV contrast    Result Date: 7/8/2024  Interpreted By:  David Durant, STUDY: ARDEN GUZMAN; 7/8/2024 6:16 pm   INDICATION: Signs/Symptoms:fall;   COMPARISON: 04/22/2024   ACCESSION NUMBER(S): [id number]   ORDERING CLINICIAN: ARDEN GUZMAN   TECHNIQUE: Contiguous axial images were acquired  from the vertex through the posterior fossa without IV contrast. All CT examinations are performed with 1 or more of the following dose reduction techniques: Automated exposure control, adjustment of mA and/or kv according to patient's size, or use of iterative reconstruction techniques.   FINDINGS: No focal mass effect or midline shift is identified. The ventricles and sulci are symmetric and appropriate for the patient's age.   The gray white matter differentiation is preserved.   No acute intracranial hemorrhage is seen. No intra-axial or extra-axial fluid collection is seen.   The visualized paranasal sinuses and mastoid air cells are clear.       No CT evidence for acute intracranial pathology.   Signed by: David Durant 7/8/2024 6:45 PM Dictation workstation:   [id number]    US scrotum    Result Date: 7/8/2024  Interpreted By:  David Durant, STUDY: US SCROTUM; 7/8/2024 5:55 pm   INDICATION: Signs/Symptoms:rule out torsion high riding right testicle.   COMPARISON: None.   ACCESSION NUMBER(S): [id number]   ORDERING CLINICIAN: ARDEN GUZMAN   TECHNIQUE: Gray scale and color doppler imaging of the scrotum was performed with spectral waveform analysis. Arterial inflow and venous outflow documented.   FINDINGS: Right testis: 4.2 x 2.4 x 2.6 cm.   Left testis: 4.1 x 2.4 x 2.8 cm.   Epididymides: The epididymides are normal in size and echogenicity. The right epididymal head measures 1.1 x 0.9 x 1.1 cm. The left epididymal head measures 1.0 x 1.1 x 1.1 cm.   The testes appear homogeneous with no intratesticular lesions. There is symmetric color flow bilaterally. There is no sign of testicular torsion.   Minimal-small right hydrocele. Minimal left hydrocele.   There is no sign of varicocele.       Normal appearance of the testicles and epididymi bilaterally. No sign of testicular torsion.   Minimal-small right hydrocele. Minimal left hydrocele.   MACRO: None.   Signed by: David Durant 7/8/2024 6:33  PM Dictation workstation:   [id number]    XR hip right with pelvis when performed 2 or 3 views    Result Date: 6/29/2024  Interpreted By:  Uche Munson, STUDY: XR HIP RIGHT WITH PELVIS WHEN PERFORMED 2 OR 3 VIEWS; ;  6/29/2024 2:28 am   INDICATION: Signs/Symptoms:Pain, hit on toilet.   COMPARISON: 06/28/2024   ACCESSION NUMBER(S): [id number]   ORDERING CLINICIAN: SONIA HILLIARD   FINDINGS: There is redemonstration of a right total hip arthroplasty. Hardware is intact without periprosthetic lucency or fracture.   There is a left total hip arthroplasty with uncomplicated appearance to the extent included in the study.   There is no acute fracture of the pelvis. The sacroiliac joints and pubic symphysis are intact.       No acute osseous injury to the pelvis or proximal right or left femur.   Intact right total hip arthroplasty.   MACRO: None.   Signed by: Uche Munson 6/29/2024 3:32 AM Dictation workstation:   [id number]    XR hip right with pelvis when performed 1 view    Result Date: 6/28/2024  Interpreted By:  Amilcar Ann, STUDY: XR HIP RIGHT WITH PELVIS WHEN PERFORMED 1 VIEW; ;  6/28/2024 11:25 am   INDICATION: Signs/Symptoms:Post op hip. Right   COMPARISON: 03/19/2024   ACCESSION NUMBER(S): [id number]   ORDERING CLINICIAN: BLUE LUCAS   FINDINGS: Single portable AP radiograph of the right hip is obtained.   Total hip replacement has been performed. The femoral and acetabular components appear in good position and alignment.   No focal bony abnormality is seen. The surrounding soft tissues are generally unremarkable.       Satisfactory postoperative appearance of the right hip on single view study following total joint replacement.     MACRO: None   Signed by: Amilcar Ann 6/28/2024 12:21 PM Dictation workstation:   [id number]    ECG 12 lead    Result Date: 6/21/2024  Normal sinus rhythm Incomplete right bundle branch block Borderline ECG When compared with ECG of 22-MAY-2024 09:19,  Aberrant conduction is no longer Present    ECG 12 lead    Result Date: 6/20/2024  Normal sinus rhythm Incomplete right bundle branch block Borderline ECG When compared with ECG of 22-MAY-2024 09:19, Aberrant conduction is no longer Present    XR chest 1 view    Result Date: 6/20/2024  STUDY: Chest Radiograph;  6/20/2024 9:25 AM. INDICATION: Shortness of breath. COMPARISON: Chest radiograph 5/22/2024. ACCESSION NUMBER(S): [id number] ORDERING CLINICIAN: MARY ELLEN BURCH TECHNIQUE:  Frontal chest was obtained at 09:24 hours. FINDINGS: CARDIOMEDIASTINAL SILHOUETTE: Cardiomediastinal silhouette is normal in size and configuration.  LUNGS: Minimal bibasilar linear atelectasis.  Lungs are otherwise clear.  ABDOMEN: No remarkable upper abdominal findings.  BONES: No acute osseous changes.    Minimal bibasilar linear atelectasis. Signed by Esa Ha MD       Procedure  Pelvic array sutures removed Steri-Strips placed over the right hip without abnormality.  Inj/Asp: Right knee on 7/17/2024 8:35 AM  Indications: pain and diagnostic evaluation  Details: 22 G needle, anteromedial approach  Medications: 4 mL lidocaine 10 mg/mL (1 %); 2 mL betamethasone acet,sod phos 6 mg/mL  Outcome: tolerated well, no immediate complications  Procedure, treatment alternatives, risks and benefits explained, specific risks discussed. Consent was given by the patient. Immediately prior to procedure a time out was called to verify the correct patient, procedure, equipment, support staff and site/side marked as required. Patient was prepped and draped in the usual sterile fashion.       Assessment  Status post right total hip Stephen  Right knee osteoarthritis    Treatment plan  1.  Wound instructions were discussed with patient today.  He will continue with weightbearing activity as tolerated right lower extremity.  2.  Right knee cortisone injection performed today without complication.  Prescription for outpatient therapy was given.  3.  He  will follow-up with us in 3 weeks for reevaluation without x-ray.  For complete plan and/or surgical details, please refer to Dr. Matamoros's portion of the split/shared dictation.  4.  All of the patient's questions were answered.    Orders Placed This Encounter    L Inj/Asp    Referral to Physical Therapy       This note was prepared using voice recognition software.  The details of this note are correct and have been reviewed, and corrected to the best of my ability.  Some grammatical areas may persist related to the Dragon software    Vish Barboza PA-C, [facility]  [facility] [place]    [phone]     In a face-to-face encounter performed today, I Gagandeep Matamoros MD performed a history and physical examination, discussed pertinent diagnostic studies if indicated, and discussed diagnosis and management strategies with both the patient and the midlevel provider.  I reviewed the midlevel's note and agree with the documented findings and plan of care.  Greater than 50% of the evaluation and treatment decision was performed by the physician myself during today's visit.    54-year-old male here for his follow-up of his right total hip replacement he had a fall and had some increased right-sided hip pain was seen in the [facility] had x-rays and a CT scan all of which were negative he states both knees are continue to give him trouble he is an ongoing diagnosis of knee arthritis the right knee gives him more trouble than the left examination of the right knee shows crepitus with range of motion tenderness medial joint space without instability brisk capillary refill.  Impression is right knee arthritis.  Plan is right knee cortisone injection which is performed today with his consent without complication.  Patient understands the cortisone may provide temporary relief how much it helps her how long it lasts is unpredictable.  Cortisone can be repeated after 3 months if symptoms return.  He  is can follow-up as scheduled.      Patient has severe impairment related to her presenting condition.  It is significantly impairing bodily function.  We did discuss surgical and nonsurgical options.    This note was prepared using voice recognition software.  The details of this note are correct and have been reviewed, and corrected to the best of my ability.  Some grammatical areas may persist related to the Dragon software    Gagandeep Matamoros MD  Senior Attending Physician  [facility]    [phone]

## 2024-07-16 NOTE — LETTER
[facility]  [address] [place]  [facility] [address]  PHONE: [phone]  FAX: [phone]      July 16, 2024    Patient: Akin Lynn   YOB: 1970   Date of Visit: 7/16/2024       To Whom It May Concern,    Akin Lynn was seen in my clinic on 7/16/2024, he has been advised he is cleared to drive on 7/26/2024.    If you have any questions or concerns, please contact the office by phone or fax.  Phone: [phone] or Fax: [phone]        Sincerely,      Gagandeep Matamoros MD

## 2024-07-16 NOTE — TELEPHONE ENCOUNTER
Pt called and today he said he went shopping, and now he is having hip and spine pain, denies any injury, would like a call back to discuss

## 2024-07-17 ENCOUNTER — HOME CARE VISIT (OUTPATIENT)
Dept: HOME HEALTH SERVICES | Facility: HOME HEALTH | Age: 54
End: 2024-07-17
Payer: COMMERCIAL

## 2024-07-17 PROCEDURE — 20610 DRAIN/INJ JOINT/BURSA W/O US: CPT | Performed by: ORTHOPAEDIC SURGERY

## 2024-07-17 PROCEDURE — G0151 HHCP-SERV OF PT,EA 15 MIN: HCPCS

## 2024-07-17 RX ORDER — BETAMETHASONE SODIUM PHOSPHATE AND BETAMETHASONE ACETATE 3; 3 MG/ML; MG/ML
2 INJECTION, SUSPENSION INTRA-ARTICULAR; INTRALESIONAL; INTRAMUSCULAR; SOFT TISSUE
Status: COMPLETED | OUTPATIENT
Start: 2024-07-17 | End: 2024-07-17

## 2024-07-17 RX ORDER — LIDOCAINE HYDROCHLORIDE 10 MG/ML
4 INJECTION INFILTRATION; PERINEURAL
Status: COMPLETED | OUTPATIENT
Start: 2024-07-17 | End: 2024-07-17

## 2024-07-17 ASSESSMENT — ACTIVITIES OF DAILY LIVING (ADL)
HOME_HEALTH_OASIS: 00
OASIS_M1830: 01

## 2024-07-17 ASSESSMENT — ENCOUNTER SYMPTOMS
PAIN LOCATION: BACK
PAIN LOCATION - PAIN SEVERITY: 5/10
PERSON REPORTING PAIN: PATIENT
PAIN LOCATION - PAIN SEVERITY: 5/10
PAIN LOCATION: RIGHT HIP
PAIN: 1

## 2024-07-18 ENCOUNTER — TELEPHONE (OUTPATIENT)
Dept: ORTHOPEDIC SURGERY | Facility: CLINIC | Age: 54
End: 2024-07-18
Payer: COMMERCIAL

## 2024-07-18 ENCOUNTER — APPOINTMENT (OUTPATIENT)
Dept: PAIN MEDICINE | Facility: CLINIC | Age: 54
End: 2024-07-18
Payer: COMMERCIAL

## 2024-07-18 DIAGNOSIS — Z96.641 S/P TOTAL RIGHT HIP ARTHROPLASTY: ICD-10-CM

## 2024-07-18 RX ORDER — OXYCODONE HYDROCHLORIDE 5 MG/1
5 TABLET ORAL EVERY 6 HOURS PRN
Qty: 28 TABLET | Refills: 0 | Status: SHIPPED | OUTPATIENT
Start: 2024-07-18 | End: 2024-07-25

## 2024-07-18 NOTE — TELEPHONE ENCOUNTER
Patient called asking for a refill on his Oxycodone. Patient had a status post right total hip Stephen on 6/28/24. Pharmacy is correct.

## 2024-07-24 NOTE — PROGRESS NOTES
Start time:  12:03  End time:  1:01     An interactive audio and video telecommunication system which permits real time communications between the patient (at the originating site) and provider (at the distant site) was utilized to provide this telehealth service. Verbal consent has been obtained from this patient for a telehealth visit.     The patient was informed of the current need to conduct treatment via virtual platform in light of COVID-19 pandemic. I have confirmed the patient’s identity via the following (minimum of three) acceptable identifiers as per  Policy PH-9: , address, phone number, and email address.        SUBJECTIVE:  Patient reported some relief from ketamine lately but not in neck and hip. Discussed how his depression has been better this week due to positive interactions with ambulance staff. Process bewilderment from different turns his life has taken and his plans for the future. Discussed invalidating interpersonal experiences and we discussed the various efforts patients has made towards balancing his health and other matters important to him. Discussed interplay between improvements in mood/social support and pain.    Progress: Good  Prognosis: Good     Duration of problem: years with recent improvement     Severity: moderate     OBJECTIVE:  Orientation & Cognition: Oriented x3. Thought processes normal and appropriate to situation.  Mood, Affect: Euthymic, frustrated at joe  Appearance: Optimal by patient standards.  Harm to self or others: Not reported  Substance abuse: Not reported  Psychiatric medication use: Not reported     IMPRESSION:    F41.9 Unspecified anxiety disorder  Z.91.49 History of psychological trauma     Goals for Therapy: Sharing difficult feelings in a therapeutic environment, expanding upon current social supports in his life, and addressing anxiety. Patient is also interested in medication management for his feelings of traumatization which he is worried will  return after he ends ketamine treatment.      PLAN:     Patient will continue controlling what he can control and behaving flexibly in terms of his experience of pain     Next apt: 8/30     --------------------------------------------  Other(s) present in the room: None.  --------------------------------------------  Time spent face-to-face with patient: 58 minutes

## 2024-07-25 ENCOUNTER — TELEPHONE (OUTPATIENT)
Dept: ORTHOPEDIC SURGERY | Facility: CLINIC | Age: 54
End: 2024-07-25
Payer: COMMERCIAL

## 2024-07-25 DIAGNOSIS — M79.18 MYOFASCIAL PAIN: Primary | ICD-10-CM

## 2024-07-25 DIAGNOSIS — Z96.641 S/P TOTAL RIGHT HIP ARTHROPLASTY: ICD-10-CM

## 2024-07-25 RX ORDER — LIDOCAINE 50 MG/G
1 PATCH TOPICAL DAILY
Qty: 30 PATCH | Refills: 11 | Status: SHIPPED | OUTPATIENT
Start: 2024-07-25

## 2024-07-25 NOTE — TELEPHONE ENCOUNTER
Patient LVM requesting a refill on his pain medication. He is S/p Rt. FAN HUNT 06/28/24. Pharmacy on file is correct.

## 2024-07-26 ENCOUNTER — APPOINTMENT (OUTPATIENT)
Dept: BEHAVIORAL HEALTH | Facility: CLINIC | Age: 54
End: 2024-07-26
Payer: COMMERCIAL

## 2024-07-26 DIAGNOSIS — F41.9 ANXIETY: ICD-10-CM

## 2024-07-26 DIAGNOSIS — Z91.49 HISTORY OF PSYCHOLOGICAL TRAUMA: ICD-10-CM

## 2024-07-26 PROCEDURE — 90791 PSYCH DIAGNOSTIC EVALUATION: CPT

## 2024-07-26 RX ORDER — OXYCODONE HYDROCHLORIDE 5 MG/1
5 TABLET ORAL EVERY 6 HOURS PRN
Qty: 28 TABLET | Refills: 0 | Status: SHIPPED | OUTPATIENT
Start: 2024-07-26 | End: 2024-08-02

## 2024-07-30 ENCOUNTER — OFFICE VISIT (OUTPATIENT)
Dept: PAIN MEDICINE | Facility: CLINIC | Age: 54
End: 2024-07-30
Payer: COMMERCIAL

## 2024-07-30 VITALS
SYSTOLIC BLOOD PRESSURE: 152 MMHG | DIASTOLIC BLOOD PRESSURE: 88 MMHG | OXYGEN SATURATION: 96 % | RESPIRATION RATE: 18 BRPM | HEART RATE: 80 BPM | TEMPERATURE: 97.5 F

## 2024-07-30 DIAGNOSIS — M47.816 LUMBAR SPONDYLOSIS: Primary | ICD-10-CM

## 2024-07-30 LAB
ATRIAL RATE: 87 BPM
ATRIAL RATE: 87 BPM
P AXIS: 32 DEGREES
P AXIS: 32 DEGREES
P OFFSET: 206 MS
P OFFSET: 206 MS
P ONSET: 153 MS
P ONSET: 153 MS
PR INTERVAL: 138 MS
PR INTERVAL: 138 MS
Q ONSET: 222 MS
Q ONSET: 222 MS
QRS COUNT: 14 BEATS
QRS COUNT: 14 BEATS
QRS DURATION: 98 MS
QRS DURATION: 98 MS
QT INTERVAL: 364 MS
QT INTERVAL: 364 MS
QTC CALCULATION(BAZETT): 438 MS
QTC CALCULATION(BAZETT): 438 MS
QTC FREDERICIA: 411 MS
QTC FREDERICIA: 411 MS
R AXIS: 22 DEGREES
R AXIS: 22 DEGREES
T AXIS: 43 DEGREES
T AXIS: 43 DEGREES
T OFFSET: 404 MS
T OFFSET: 404 MS
VENTRICULAR RATE: 87 BPM
VENTRICULAR RATE: 87 BPM

## 2024-07-30 PROCEDURE — 99213 OFFICE O/P EST LOW 20 MIN: CPT | Performed by: NURSE PRACTITIONER

## 2024-07-30 PROCEDURE — 99203 OFFICE O/P NEW LOW 30 MIN: CPT | Performed by: NURSE PRACTITIONER

## 2024-07-30 ASSESSMENT — PAIN SCALES - GENERAL
PAINLEVEL: 4
PAINLEVEL_OUTOF10: 4

## 2024-07-30 ASSESSMENT — PAIN - FUNCTIONAL ASSESSMENT: PAIN_FUNCTIONAL_ASSESSMENT: 0-10

## 2024-07-30 NOTE — PROGRESS NOTES
Here to talk options for injections for low back and bilateral hip pain. Post right hip replacement in june

## 2024-07-30 NOTE — H&P
History Of Present Illness  Akin Lynn is a 54 y.o. male presenting for follow up regarding his back pain.  He is known in this clinic because of chronic neck pain and chronic back pain. He recently had right hip replacement on June 28, 2024. Today his level of pain is about 4/10 mostly in his back and hips. He is inquiring about doing a procedure for his back pain. He reports that he had an RFA bilateral L3/L4, L4/L5, and L5/S1 done on 5/19/2020, he reports that he had about over 80% reduction in pain that lasted for over a year. OARRS obtained and reviewed, no abuse or misuse with prescribed medication noted.  He is able to perform ADL independently.     Past Medical History  Past Medical History:   Diagnosis Date    Anesthesia of skin     Numbness and tingling    Anxiety 09/21/2023    Arthritis Unk    Bilateral myopia 09/21/2023    Cervical radicular pain 09/21/2023    Chronic patellofemoral pain of left knee 09/21/2023    Complex regional pain syndrome type 1 of left lower extremity 09/21/2023    COVID-19     VACCINATED    Cubital tunnel syndrome on left 09/21/2023    Cubital tunnel syndrome on right 09/21/2023    Disease of intestine, unspecified     Bowel trouble    Erectile dysfunction 09/21/2023    Foot joint pain 09/21/2023    Fractures     Gastroparesis     GERD (gastroesophageal reflux disease)     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    History of psychological trauma 09/21/2023    Hyperlipidemia     Joint pain 09/21/2023    Kidney cysts 09/21/2023    Low back pain, unspecified 12/07/2022    Chronic low back pain without sciatica, unspecified back pain laterality    Low back pain, unspecified 08/09/2022    Lumbago    Lumbar spondylosis 09/21/2023    Lung nodule 09/21/2023    RIGHT    Metatarsalgia of left foot 09/21/2023    Myopia, bilateral 02/19/2019    Myopia of both eyes with astigmatism and presbyopia    Osteochondral defect of patella 09/21/2023    Osteoporosis     Other forms of angina pectoris  (CMS-[facility])     Stable angina pectoris    Other spondylosis, cervical region 09/21/2023    Patellofemoral arthritis 09/21/2023    Personal history of other diseases of the musculoskeletal system and connective tissue     History of arthritis    Personal history of other specified conditions     History of seizure    Reflex sympathetic dystrophy 09/21/2023    Scapular dyskinesis 09/21/2023    Seizure disorder (Multi)     D/T TRAMADOL REACTION - 25 YEARS AGO    Shoulder impingement 09/21/2023    Suspected sleep apnea 09/21/2023    Trauma and stressor-related disorder 09/21/2023    Trochanteric bursitis of both hips 09/22/2023    Unspecified disorder of ear, unspecified ear     Ear, nose and throat disorder    Wears glasses        Surgical History  Past Surgical History:   Procedure Laterality Date    ACHILLES TENDON SURGERY  1978 2016    CT ABDOMEN PELVIS ANGIOGRAM W AND/OR WO IV CONTRAST  03/18/2020    CT ABDOMEN PELVIS ANGIOGRAM W AND/OR WO IV CONTRAST 3/18/2020 [facility]    CT ANGIO NECK  12/16/2022    CT NECK ANGIO W AND WO IV CONTRAST 12/16/2022 [facility]    CT HEAD ANGIO W AND WO IV CONTRAST  12/16/2022    CT HEAD ANGIO W AND WO IV CONTRAST 12/16/2022 [facility]    HIP SURGERY Left     REPLACEMENT    JOINT REPLACEMENT      LEFT KNEE    KNEE ARTHROPLASTY Bilateral 06/13/2017    Knee Arthroplasty    OTHER SURGICAL HISTORY  01/06/2022    Ulnar nerve neuroplasty    OTHER SURGICAL HISTORY Bilateral 07/26/2019    Foot surgery  -- HARDWARE    OTHER SURGICAL HISTORY Bilateral     BILATERAL ANKLE SCOPE    OTHER SURGICAL HISTORY      POP ABDOMINAL PROCEDURE    PLANTAR FASCIA RELEASE      SHOULDER SURGERY Bilateral 06/13/2017    Shoulder Surgery        Social History  He reports that he has never smoked. He has never used smokeless tobacco. He reports that he does not currently use alcohol. He reports that he does not currently use drugs after having used the following drugs: Marijuana,  Ketamine, and Cocaine.    Family History  Family History   Problem Relation Name Age of Onset    Lung cancer Mother      Glaucoma Father      Skin cancer Father      Hypertension Other mul fam mem     Heart disease Other mul fam mem         Allergies  Morphine, Ultram [tramadol], Alprazolam, Ketorolac, and Fish containing products    Review of Systems    Review of systems x 10 is negative.   No recent injury or falls reported.   No recent change in medical condition reported.   No recent weakness reported.   Still able to control bowel and bladder function.  Denies any problem with constipation.   Denies fever, cough, shortness of breath recently.   No interval change with medication/health issues reported.  Denies opioids diversion and abuse. Denies overuse of pain medications.  Recent right hip replacement on June 28, 2024.  Physical Exam   Awake,alert, no acute distress, appropriate.  Spine is of normal curvature.  Full ROM on all 4 extremities, sensation and motor intact, no vascular compromise.  No pedal edema, normal gait.  Skin warm, dry, intact, turgor is normal.  Denies any numbness, tingling.    Last Recorded Vitals  Blood pressure 152/88, pulse 80, temperature 36.4 °C (97.5 °F), resp. rate 18, SpO2 96%.    Relevant Results  Xray of the lumbar spine done on 5/30/2024 showed  Osteopenia  No abnormal motion with flexion and extension.  Mild dextro convexity of the upper lumbar spine.  Osteoarthritic changes are partially visualized in the right hip  With superior joint space narrowing.     Assessment/Plan     Discussed about doing a Rfa lumbar area since he had a positive response at that time.  At present he is undergoing the PT for his hip, and back .  His Oswestry score is 25, severe disability level. Discussed case with Dr. Abel, he recommended that the procedure can be done.  He will be scheduled for a median nerve branch block bilateral L4-L5, L5-S1 under fluoroscopic guidance for control of back  pain.  Follow up in 3 months time or as needed basis  Explained plan to this patient, and patient verbalized understanding and agreement with the plan. If there is questions or concerns, please feel free to contact me to clarify at [phone], M-F 8-4 PM.    Chronic back pain associated with lumbago, lumbar spondylosis.       I spent 40 minutes in the professional and overall care of this patient.      Chelle Bonilla, APRN-CNP

## 2024-08-01 ENCOUNTER — TELEPHONE (OUTPATIENT)
Dept: ORTHOPEDIC SURGERY | Facility: CLINIC | Age: 54
End: 2024-08-01
Payer: COMMERCIAL

## 2024-08-01 DIAGNOSIS — Z96.641 S/P TOTAL RIGHT HIP ARTHROPLASTY: ICD-10-CM

## 2024-08-02 RX ORDER — OXYCODONE HYDROCHLORIDE 5 MG/1
5 TABLET ORAL EVERY 6 HOURS PRN
Qty: 28 TABLET | Refills: 0 | Status: SHIPPED | OUTPATIENT
Start: 2024-08-02 | End: 2024-08-09

## 2024-08-06 ENCOUNTER — OFFICE VISIT (OUTPATIENT)
Dept: ORTHOPEDIC SURGERY | Facility: CLINIC | Age: 54
End: 2024-08-06
Payer: COMMERCIAL

## 2024-08-06 DIAGNOSIS — Z96.641 AFTERCARE FOLLOWING RIGHT HIP JOINT REPLACEMENT SURGERY: Primary | ICD-10-CM

## 2024-08-06 DIAGNOSIS — Z47.1 AFTERCARE FOLLOWING RIGHT HIP JOINT REPLACEMENT SURGERY: Primary | ICD-10-CM

## 2024-08-06 DIAGNOSIS — M16.11 PRIMARY OSTEOARTHRITIS OF RIGHT HIP: ICD-10-CM

## 2024-08-06 DIAGNOSIS — M47.812 CERVICAL ARTHRITIS: ICD-10-CM

## 2024-08-06 PROCEDURE — 99024 POSTOP FOLLOW-UP VISIT: CPT | Performed by: ORTHOPAEDIC SURGERY

## 2024-08-06 PROCEDURE — 99211 OFF/OP EST MAY X REQ PHY/QHP: CPT | Performed by: ORTHOPAEDIC SURGERY

## 2024-08-06 PROCEDURE — 1036F TOBACCO NON-USER: CPT | Performed by: ORTHOPAEDIC SURGERY

## 2024-08-06 NOTE — PROGRESS NOTES
History of present illness    54-year-old gentleman here for postop visit little over 5 weeks postop ambulated without assistive aid states hip is doing very well pain is continue to improve most of his soreness along his incision some days is a 3 sometimes goes up to a 7 he is continue with his physical therapy no numbness or tingling no fevers or chills      Past medical , Surgical, Family and social history reviewed.      Physical exam  General: No acute distress and breathing comfortably.  Patient is pleasant and cooperative with the examination.    Extremity  Incisions well-approximated without sign infection good range of motion neurologic intact distally brisk cap refill compartments soft calf is nontender    Diagnostics      CT femur right wo IV contrast    Result Date: 7/9/2024  Interpreted By:  Gustavo Casiano, STUDY: CT FEMUR RIGHT WO IV CONTRAST;  7/9/2024 12:31 am   INDICATION: Signs/Symptoms:fall with recent hip replacement.   COMPARISON: 07/08/2024 radiographs   ACCESSION NUMBER(S): [id number]   ORDERING CLINICIAN: ARDEN GUZMAN   TECHNIQUE: Axial CT of the right hip, femur, and knee was performed with sagittal and coronal reformats.  No intravenous contrast.   FINDINGS: Osteoarticular: Status post right total hip arthroplasty with intact appearing hardware. Metallic streak artifact limits evaluation of adjacent structures. No acute fracture or dislocation. No knee joint effusion.   Soft tissues: There is surgical scarring along the lateral right hip. No fluid collection. The periarticular myotendinous structures appear within normal limits.       1. No acute osseous abnormality detected. Postsurgical change related to right total hip arthroplasty with intact hardware.   Signed by: Gustavo Casiano 7/9/2024 1:40 AM Dictation workstation:   [id number]    XR shoulder right 2+ views    Result Date: 7/8/2024  Interpreted By:  Marquis Mccormack, STUDY: XR SHOULDER RIGHT 2+  VIEWS 7/8/2024 6:55 pm   INDICATION: Signs/Symptoms:fall   COMPARISON: 02/03/2023   ACCESSION NUMBER(S): [id number]   ORDERING CLINICIAN: ARDEN GUZMAN   TECHNIQUE: 3 views of the right shoulder including AP , Grashey and scapular Y-views were obtained.   FINDINGS: There is no radiographic evidence of acute fracture or dislocation identified.  The joint spaces are well preserved without significant degenerative changes.       1. No evidence of acute fracture or dislocation.   MACRO: None.   Signed by: Marquis Mccormack 7/8/2024 7:24 PM Dictation workstation:   [id number]    XR femur right 2+ views    Result Date: 7/8/2024  Interpreted By:  Marquis Mccormack, STUDY: XR PELVIS 1-2 VIEWS; XR FEMUR RIGHT 2+ VIEWS 7/8/2024 6:55 pm   INDICATION: Signs/Symptoms:fall recent hip replacement   COMPARISON: 06/29/2024   ACCESSION NUMBER(S): [id number]; [id number]   ORDERING CLINICIAN: ARDEN GUZMAN   TECHNIQUE: A single AP view of the pelvis as well as AP and lateral views of the right femur were obtained.   FINDINGS: The patient is status post bilateral total hip arthroplasty.  There is no acute fracture or dislocation identified. Mild hypertrophic degenerative changes are seen in the sacroiliac joints bilaterally.       1. Postoperative changes status post bilateral total hip arthroplasty. 2. No evidence of acute fracture or dislocation. 3. Degenerative changes, as described above.   MACRO: None.   Signed by: Marquis Mccormack 7/8/2024 7:23 PM Dictation workstation:   [id number]    XR pelvis 1-2 views    Result Date: 7/8/2024  Interpreted By:  Marquis Mccormack, STUDY: XR PELVIS 1-2 VIEWS; XR FEMUR RIGHT 2+ VIEWS 7/8/2024 6:55 pm   INDICATION: Signs/Symptoms:fall recent hip replacement   COMPARISON: 06/29/2024   ACCESSION NUMBER(S): [id number]; [id number]   ORDERING CLINICIAN: ARDEN GUZMAN   TECHNIQUE: A single AP view of the pelvis as well as AP and lateral views of the right femur were obtained.   FINDINGS: The  patient is status post bilateral total hip arthroplasty.  There is no acute fracture or dislocation identified. Mild hypertrophic degenerative changes are seen in the sacroiliac joints bilaterally.       1. Postoperative changes status post bilateral total hip arthroplasty. 2. No evidence of acute fracture or dislocation. 3. Degenerative changes, as described above.   MACRO: None.   Signed by: Marquis Mccormack 7/8/2024 7:23 PM Dictation workstation:   [id number]    CT cervical spine wo IV contrast    Result Date: 7/8/2024  Interpreted By:  David Durant, STUDY: CT CERVICAL SPINE WO IV CONTRAST; 7/8/2024 6:16 pm   INDICATION: Signs/Symptoms:fall;   COMPARISON: 04/22/2024   ACCESSION NUMBER(S): [id number]   ORDERING CLINICIAN: ARDEN GUZMAN   TECHNIQUE: Contiguous axial images were acquired from the skull base to the lung apices. Coronal and sagittal reformatted images were obtained. All CT examinations are performed with 1 or more of the following dose reduction techniques: Automated exposure control, adjustment of mA and/or kv according to patient's size, or use of iterative reconstruction techniques.   FINDINGS: There is a normal cervical lordosis.  No acute fracture or spondylolisthesis is identified. Facet degenerative changes are again seen.     The occipital condyles, arch of C1, and the odontoid processes are intact. The atlantoaxial relationship is well maintained.   Intervertebral disc spaces are stable. The bony spinal canal appears patent.   At C2-3 there is mild-moderate right neural foramina stenosis and mild left neural foramina stenosis. At C3-4 there is moderate right neural foramina stenosis. At C4-5 there is right-sided hypertrophic facet degenerative change and mild-moderate right neural foramina stenosis.         1. No acute fracture or spondylolisthesis. 2. Stable appearing degenerative changes.     Signed by: David Durant 7/8/2024 6:57 PM Dictation workstation:   [id number]    CT  head wo IV contrast    Result Date: 7/8/2024  Interpreted By:  David Durant, STUDY: ARDEN GUZMAN; 7/8/2024 6:16 pm   INDICATION: Signs/Symptoms:fall;   COMPARISON: 04/22/2024   ACCESSION NUMBER(S): [id number]   ORDERING CLINICIAN: ARDEN GUZMAN   TECHNIQUE: Contiguous axial images were acquired from the vertex through the posterior fossa without IV contrast. All CT examinations are performed with 1 or more of the following dose reduction techniques: Automated exposure control, adjustment of mA and/or kv according to patient's size, or use of iterative reconstruction techniques.   FINDINGS: No focal mass effect or midline shift is identified. The ventricles and sulci are symmetric and appropriate for the patient's age.   The gray white matter differentiation is preserved.   No acute intracranial hemorrhage is seen. No intra-axial or extra-axial fluid collection is seen.   The visualized paranasal sinuses and mastoid air cells are clear.       No CT evidence for acute intracranial pathology.   Signed by: David Durant 7/8/2024 6:45 PM Dictation workstation:   [id number]    US scrotum    Result Date: 7/8/2024  Interpreted By:  David Durant, STUDY: US SCROTUM; 7/8/2024 5:55 pm   INDICATION: Signs/Symptoms:rule out torsion high riding right testicle.   COMPARISON: None.   ACCESSION NUMBER(S): [id number]   ORDERING CLINICIAN: ARDEN GUZMAN   TECHNIQUE: Gray scale and color doppler imaging of the scrotum was performed with spectral waveform analysis. Arterial inflow and venous outflow documented.   FINDINGS: Right testis: 4.2 x 2.4 x 2.6 cm.   Left testis: 4.1 x 2.4 x 2.8 cm.   Epididymides: The epididymides are normal in size and echogenicity. The right epididymal head measures 1.1 x 0.9 x 1.1 cm. The left epididymal head measures 1.0 x 1.1 x 1.1 cm.   The testes appear homogeneous with no intratesticular lesions. There is symmetric color flow bilaterally. There is no sign of testicular  torsion.   Minimal-small right hydrocele. Minimal left hydrocele.   There is no sign of varicocele.       Normal appearance of the testicles and epididymi bilaterally. No sign of testicular torsion.   Minimal-small right hydrocele. Minimal left hydrocele.   MACRO: None.   Signed by: David Durant 7/8/2024 6:33 PM Dictation workstation:   [id number]       Procedure  [ none]    Assessment  Status post total hip replacement right    Treatment plan  1.  New referral for physical therapy is provided to include his neck and his hip he is can a follow-up with me in 6 weeks with x-rays sooner if he has increased pain or discomfort.  2. [   ]  3. [   ]  4.  All of the patient's questions were answered.    Orders Placed This Encounter    Referral to Physical Therapy       This note was prepared using voice recognition software.  The details of this note are correct and have been reviewed, and corrected to the best of my ability.  Some grammatical areas may persist related to the Dragon software    Gagandeep Matamoros MD  Senior Attending Physician  [facility]  [facility]    [phone]

## 2024-08-08 ENCOUNTER — TELEPHONE (OUTPATIENT)
Dept: ORTHOPEDIC SURGERY | Facility: CLINIC | Age: 54
End: 2024-08-08
Payer: COMMERCIAL

## 2024-08-08 DIAGNOSIS — Z96.641 S/P TOTAL RIGHT HIP ARTHROPLASTY: ICD-10-CM

## 2024-08-08 NOTE — TELEPHONE ENCOUNTER
Patient LVM requesting a refill on his pain medication. He is S/p Rt. GILBERT 06/28/24. He is one day shy of being 6 weeks post op/ 8/09/24 will be 6 weeks.

## 2024-08-09 RX ORDER — OXYCODONE HYDROCHLORIDE 5 MG/1
5 TABLET ORAL EVERY 6 HOURS PRN
Qty: 28 TABLET | Refills: 0 | Status: SHIPPED | OUTPATIENT
Start: 2024-08-09 | End: 2024-08-16

## 2024-08-10 ENCOUNTER — APPOINTMENT (OUTPATIENT)
Dept: CARDIOLOGY | Facility: HOSPITAL | Age: 54
End: 2024-08-10
Payer: COMMERCIAL

## 2024-08-10 ENCOUNTER — HOSPITAL ENCOUNTER (EMERGENCY)
Facility: HOSPITAL | Age: 54
Discharge: HOME | End: 2024-08-10
Attending: STUDENT IN AN ORGANIZED HEALTH CARE EDUCATION/TRAINING PROGRAM
Payer: COMMERCIAL

## 2024-08-10 VITALS
DIASTOLIC BLOOD PRESSURE: 73 MMHG | HEART RATE: 87 BPM | WEIGHT: 186 LBS | TEMPERATURE: 97.5 F | OXYGEN SATURATION: 96 % | SYSTOLIC BLOOD PRESSURE: 135 MMHG | RESPIRATION RATE: 18 BRPM | BODY MASS INDEX: 28.19 KG/M2 | HEIGHT: 68 IN

## 2024-08-10 DIAGNOSIS — M54.41 ACUTE MIDLINE LOW BACK PAIN WITH BILATERAL SCIATICA: Primary | ICD-10-CM

## 2024-08-10 DIAGNOSIS — M54.42 ACUTE MIDLINE LOW BACK PAIN WITH BILATERAL SCIATICA: Primary | ICD-10-CM

## 2024-08-10 LAB
ALBUMIN SERPL BCP-MCNC: 4.4 G/DL (ref 3.4–5)
ALP SERPL-CCNC: 77 U/L (ref 33–120)
ALT SERPL W P-5'-P-CCNC: 21 U/L (ref 10–52)
ANION GAP SERPL CALC-SCNC: 13 MMOL/L (ref 10–20)
AST SERPL W P-5'-P-CCNC: 15 U/L (ref 9–39)
BASOPHILS # BLD AUTO: 0.08 X10*3/UL (ref 0–0.1)
BASOPHILS NFR BLD AUTO: 0.8 %
BILIRUB SERPL-MCNC: 0.6 MG/DL (ref 0–1.2)
BUN SERPL-MCNC: 17 MG/DL (ref 6–23)
CALCIUM SERPL-MCNC: 9.3 MG/DL (ref 8.6–10.3)
CHLORIDE SERPL-SCNC: 101 MMOL/L (ref 98–107)
CO2 SERPL-SCNC: 25 MMOL/L (ref 21–32)
CREAT SERPL-MCNC: 0.7 MG/DL (ref 0.5–1.3)
EGFRCR SERPLBLD CKD-EPI 2021: >90 ML/MIN/1.73M*2
EOSINOPHIL # BLD AUTO: 0.46 X10*3/UL (ref 0–0.7)
EOSINOPHIL NFR BLD AUTO: 4.4 %
ERYTHROCYTE [DISTWIDTH] IN BLOOD BY AUTOMATED COUNT: 14.1 % (ref 11.5–14.5)
GLUCOSE SERPL-MCNC: 92 MG/DL (ref 74–99)
HCT VFR BLD AUTO: 41.2 % (ref 41–52)
HGB BLD-MCNC: 13.4 G/DL (ref 13.5–17.5)
IMM GRANULOCYTES # BLD AUTO: 0.02 X10*3/UL (ref 0–0.7)
IMM GRANULOCYTES NFR BLD AUTO: 0.2 % (ref 0–0.9)
LYMPHOCYTES # BLD AUTO: 1.82 X10*3/UL (ref 1.2–4.8)
LYMPHOCYTES NFR BLD AUTO: 17.6 %
MCH RBC QN AUTO: 27.1 PG (ref 26–34)
MCHC RBC AUTO-ENTMCNC: 32.5 G/DL (ref 32–36)
MCV RBC AUTO: 83 FL (ref 80–100)
MONOCYTES # BLD AUTO: 0.88 X10*3/UL (ref 0.1–1)
MONOCYTES NFR BLD AUTO: 8.5 %
NEUTROPHILS # BLD AUTO: 7.11 X10*3/UL (ref 1.2–7.7)
NEUTROPHILS NFR BLD AUTO: 68.5 %
NRBC BLD-RTO: 0 /100 WBCS (ref 0–0)
PLATELET # BLD AUTO: 253 X10*3/UL (ref 150–450)
POTASSIUM SERPL-SCNC: 3.7 MMOL/L (ref 3.5–5.3)
PROT SERPL-MCNC: 7.2 G/DL (ref 6.4–8.2)
RBC # BLD AUTO: 4.94 X10*6/UL (ref 4.5–5.9)
SODIUM SERPL-SCNC: 135 MMOL/L (ref 136–145)
WBC # BLD AUTO: 10.4 X10*3/UL (ref 4.4–11.3)

## 2024-08-10 PROCEDURE — 93005 ELECTROCARDIOGRAM TRACING: CPT

## 2024-08-10 PROCEDURE — 99284 EMERGENCY DEPT VISIT MOD MDM: CPT | Performed by: STUDENT IN AN ORGANIZED HEALTH CARE EDUCATION/TRAINING PROGRAM

## 2024-08-10 PROCEDURE — 2500000004 HC RX 250 GENERAL PHARMACY W/ HCPCS (ALT 636 FOR OP/ED)

## 2024-08-10 PROCEDURE — 85025 COMPLETE CBC W/AUTO DIFF WBC: CPT

## 2024-08-10 PROCEDURE — 93010 ELECTROCARDIOGRAM REPORT: CPT | Performed by: STUDENT IN AN ORGANIZED HEALTH CARE EDUCATION/TRAINING PROGRAM

## 2024-08-10 PROCEDURE — 96374 THER/PROPH/DIAG INJ IV PUSH: CPT

## 2024-08-10 PROCEDURE — 96375 TX/PRO/DX INJ NEW DRUG ADDON: CPT

## 2024-08-10 PROCEDURE — 36415 COLL VENOUS BLD VENIPUNCTURE: CPT

## 2024-08-10 PROCEDURE — 80053 COMPREHEN METABOLIC PANEL: CPT

## 2024-08-10 PROCEDURE — 2500000005 HC RX 250 GENERAL PHARMACY W/O HCPCS

## 2024-08-10 PROCEDURE — 99285 EMERGENCY DEPT VISIT HI MDM: CPT

## 2024-08-10 PROCEDURE — 2500000001 HC RX 250 WO HCPCS SELF ADMINISTERED DRUGS (ALT 637 FOR MEDICARE OP): Performed by: STUDENT IN AN ORGANIZED HEALTH CARE EDUCATION/TRAINING PROGRAM

## 2024-08-10 RX ORDER — OXYCODONE HYDROCHLORIDE 5 MG/1
5 TABLET ORAL ONCE
Status: COMPLETED | OUTPATIENT
Start: 2024-08-10 | End: 2024-08-10

## 2024-08-10 RX ORDER — PREDNISONE 20 MG/1
50 TABLET ORAL DAILY
Qty: 10 TABLET | Refills: 0 | Status: SHIPPED | OUTPATIENT
Start: 2024-08-10 | End: 2024-08-14

## 2024-08-10 RX ORDER — LIDOCAINE 560 MG/1
1 PATCH PERCUTANEOUS; TOPICAL; TRANSDERMAL DAILY
Status: DISCONTINUED | OUTPATIENT
Start: 2024-08-10 | End: 2024-08-10

## 2024-08-10 RX ORDER — ONDANSETRON HYDROCHLORIDE 2 MG/ML
4 INJECTION, SOLUTION INTRAVENOUS ONCE
Status: COMPLETED | OUTPATIENT
Start: 2024-08-10 | End: 2024-08-10

## 2024-08-10 RX ORDER — PREDNISONE 20 MG/1
50 TABLET ORAL DAILY
Qty: 13 TABLET | Refills: 0 | Status: SHIPPED | OUTPATIENT
Start: 2024-08-10 | End: 2024-08-10

## 2024-08-10 RX ADMIN — METHYLPREDNISOLONE SODIUM SUCCINATE 40 MG: 40 INJECTION, POWDER, FOR SOLUTION INTRAMUSCULAR; INTRAVENOUS at 17:13

## 2024-08-10 RX ADMIN — OXYCODONE HYDROCHLORIDE 5 MG: 5 TABLET ORAL at 15:00

## 2024-08-10 RX ADMIN — ONDANSETRON 4 MG: 2 INJECTION INTRAMUSCULAR; INTRAVENOUS at 15:00

## 2024-08-10 ASSESSMENT — PAIN DESCRIPTION - DESCRIPTORS: DESCRIPTORS: THROBBING

## 2024-08-10 ASSESSMENT — PAIN DESCRIPTION - LOCATION
LOCATION: BACK
LOCATION: BACK

## 2024-08-10 ASSESSMENT — PAIN - FUNCTIONAL ASSESSMENT
PAIN_FUNCTIONAL_ASSESSMENT: 0-10
PAIN_FUNCTIONAL_ASSESSMENT: 0-10

## 2024-08-10 ASSESSMENT — PAIN DESCRIPTION - PAIN TYPE: TYPE: CHRONIC PAIN

## 2024-08-10 ASSESSMENT — PAIN SCALES - GENERAL
PAINLEVEL_OUTOF10: 10 - WORST POSSIBLE PAIN
PAINLEVEL_OUTOF10: 0 - NO PAIN
PAINLEVEL_OUTOF10: 8

## 2024-08-10 ASSESSMENT — PAIN DESCRIPTION - FREQUENCY: FREQUENCY: CONSTANT/CONTINUOUS

## 2024-08-10 NOTE — DISCHARGE INSTRUCTIONS
Please utilize anti-inflammatories acetaminophen (Tylenol) and ibuprofen (Advil) or naproxen (aleve) for your pain as recommended. You can take both acetaminophen and ibuprofen together.     You have been prescribed prednisone 50 mg for 4 days starting tomorrow.  Please take 1 pill/day, if you if you miss a dose please do not double dose the next day, you may extend your course to 5-days to ensure all pills are taken.    Follow-up with the pain physician at the first available appointment.    Seek immediate medical attention should you have:   Numbness, tingling, weakness, fevers, chills, nausea, palpitations, confusion, vomiting, dizziness, painful urination, inability to control your urine or bowel movements, or any other concerning symptoms.

## 2024-08-10 NOTE — ED PROVIDER NOTES
Emergency Department Provider Note        History of Present Illness     History provided by: Patient  Limitations to History: None  External Records Reviewed with Brief Summary: Outpatient progress note from 5/31/2024 which showed patient frequently presents to the ED for pain and regularly sees pain management..    HPI:  Akin Lynn is a 54 y.o. male with a history of gastroparesis, osteoporosis, HLD, right GILBERT 6/28/2024 and chronic regional pain syndrome following with Dr. Hull on oxycodone and ketamine infusions presenting to the ED with complaint of atraumatic low back pain.  Patient reports that he took his pain medication regimen this morning and was feeling well in which he was doing his exercises and daily activities then began having gradually worsening low back pain without trauma or obvious trigger.  States the pain was so severe he had to slowly lie down on the couch.  States that he awoke with nausea and a small episode of vomitus he swallowed secondary to the pain but denies any nausea or abdominal pain .  States that he typically has severe pain to his upper back and neck and has known herniations to his C-spine and L-spine.  No previous history of spinal surgeries that he endorses.  No history of IV drug use or active cancer.  No fevers, reported IV drug use, weakness of the legs, urinary or bowel incontinence, rash, trauma, or diarrhea.    Physical Exam   Triage vitals:  T 36.4 °C (97.5 °F)  HR 92  /73  RR 18  O2 97 % None (Room air)    Physical Exam  Vitals and nursing note reviewed.   Constitutional:       General: He is not in acute distress.     Appearance: He is well-developed.   HENT:      Head: Normocephalic and atraumatic.      Right Ear: External ear normal.      Left Ear: External ear normal.      Nose: Nose normal. No congestion or rhinorrhea.      Mouth/Throat:      Mouth: Mucous membranes are moist.      Pharynx: No oropharyngeal exudate or posterior oropharyngeal  erythema.   Eyes:      General:         Right eye: No discharge.         Left eye: No discharge.      Extraocular Movements: Extraocular movements intact.      Conjunctiva/sclera: Conjunctivae normal.   Cardiovascular:      Rate and Rhythm: Normal rate and regular rhythm.      Pulses: Normal pulses.      Heart sounds: No murmur (grade 3 or above) heard.     No friction rub.   Pulmonary:      Effort: Pulmonary effort is normal. No respiratory distress.      Breath sounds: Normal breath sounds. No stridor. No wheezing or rales.   Abdominal:      General: There is no distension.      Palpations: Abdomen is soft.      Tenderness: There is no abdominal tenderness. There is no guarding.   Musculoskeletal:         General: Tenderness (BL hips and L spine) present. No swelling, deformity or signs of injury. Normal range of motion.      Cervical back: Neck supple.      Right lower leg: No edema.      Left lower leg: No edema.   Skin:     General: Skin is warm and dry.      Capillary Refill: Capillary refill takes less than 2 seconds.      Coloration: Skin is not jaundiced or pale.      Findings: No lesion or rash.   Neurological:      General: No focal deficit present.      Mental Status: He is alert. Mental status is at baseline.      Cranial Nerves: No cranial nerve deficit.      Sensory: No sensory deficit.      Motor: No weakness.      Comments: Cranial nerves II through XII intact.  Strength 5 out of 5 in all 4 extremities.  Sensation intact to light touch in all 4 extremities.  No dysdiadochokinesia, no dysmetria.  Gait is narrow and stable.   Psychiatric:         Mood and Affect: Mood normal.         Behavior: Behavior normal.         Thought Content: Thought content normal.         Judgment: Judgment normal.          Medical Decision Making & ED Course   Medical Decision Makin y.o. male with a history of osteoporosis, C-spine and L-spine herniation, chronic regional pain syndrome, recent R GILBERT, and chronic pain  following with a pain physician presenting to the ED with complaint of atraumatic low back pain. Patient is afebrile, sinus rhythm on the monitor, normotensive, saturating well on room air, and in no acute distress.      I estimate there is LOW risk for ABDOMINAL AORTIC ANEURYSM, CAUDA EQUINA SYNDROME, EPIDURAL MASS LESION, SPINAL STENOSIS, OR HERNIATED DISK CAUSING SEVERE STENOSIS, thus I consider the discharge disposition reasonable. Denies any neurological symptoms including sensory loss, weakness that is not secondary to pain restrictions, bowel or bladder incontinence/retention or saddle anesthesia. Denies any history of cancer, IV drug use, hemodialysis, spinal surgery, HIV, insulin use, or fevers. No recent trauma in general or to the low back area.    We have discussed the diagnosis and risks, and we agree with discharging home to follow-up with their primary doctor. We also discussed returning to the Emergency Department immediately if new or worsening symptoms occur. We have discussed the symptoms which are most concerning (e.g., saddle anesthesia, urinary or bowel incontinence or retention, changing or worsening pain) that necessitate immediate return.    MRI considered however not indicated due to lack of history of IV drug use, spinal surgery, malignancy.  ----    Differential diagnoses considered include but are not limited to: Urolithiasis, epidural abscess, MSK strain, acute on chronic LBP pain, cauda equina     Social Determinants of Health which Significantly Impact Care: None identified     EKG Independent Interpretation: EKG interpreted by myself. Please see ED Course for full interpretation.    Independent Result Review and Interpretation: Relevant laboratory and radiographic results were reviewed and independently interpreted by myself.  As necessary, they are commented on in the ED Course.    Chronic conditions affecting the patient's care: As documented above in MDM    The patient was  discussed with the following consultants/services: None    Care Considerations: As documented above in [facility]    ED Course:  ED Course as of 08/10/24 1802   Sat Aug 10, 2024   1446 My interpretation of EKG at 1428. Ventricular Rate: 88. Rhythm: Sinus with occasional PVC. NAD. No acute ischemic changes. QTc: not prolonged at 411 ms.  [ES]   1554 On my interpretation of labs, results are unimpressive for systemic inflammation or infection, acute anemia or blood loss, KANDY, hepatitis, or electrolyte abnormalities. [ES]   1555 Patient reports pain is a 9/10 from a 10/10 after oxycodone.  Already has lidocaine patch in place.  Patient reports he received Decadron steroid in the past that helped with his pain.  Did not find any prior Decadron administration in EMR from the ED.  Patient has been on a prednisone burst in the past.  Will give 40 mg Solu-Medrol. [ES]   1724 Patient informs nurse that his pain has resolved and feels comfortable returning home.  Patient is ambulating in the and in no distress. Additional 4 days of prednisone prescribed.  Strict return precautions provided. [ES]      ED Course User Index  [ES] Luis Shaffer MD         Diagnoses as of 08/10/24 1802   Acute midline low back pain with bilateral sciatica - Acute on chronic     Disposition   As a result of the work-up, the patient was discharged home.  he was informed of his diagnosis and instructed to come back with any concerns or worsening of condition.  he and was agreeable to the plan as discussed above.  he was given the opportunity to ask questions.  All of the patient's questions were answered.    Procedures   Procedures    Patient seen and discussed with ED attending physician.    Luis Shaffer MD  Emergency Medicine     Luis Shaffer MD  Resident  08/10/24 1802      I performed a history and physical examination of Akin Lynn and discussed his management with Dr. Shaffer.  I agree with the history, physical, assessment, and plan of care, with  the following exceptions: None    I was present for the following procedures: None  Time Spent in Critical Care of the patient: None  Time spent in discussions with the patient and family: 35    DO Reji Ratliff DO  08/11/24 0750

## 2024-08-12 NOTE — PROGRESS NOTES
No chief complaint on file.    History of Present Illness:  DOS: 09/15/21 left total knee arthroplasty. Patient relates his left knee is doing okay.  He still has some occasional mechanical symptoms and does not catch or rub on occasion.  Does have a mild amount of crepitus.  Also recently underwent hip replacement states this done well.    Imaging:  X-ray left knee: Shows Left patellofemoral arthroplasty     Assessment:   Left patellofemoral arthroplasty doing well    Plan:  We reviewed the role of imaging, physical therapy, injections and the time frame to healing and correlation with outcome.  1.  Will call us if he was to go to formal physical therapy  2.  NSAID: Ibuprofen.  GI side effects and medical risks discussed  3.  Ice: 30 minutes on and off  4.  Exercise home program: Medically directed knee therapy / Handout given  See us back in 6 months     Physical Exam:  Well-nourished, well-developed. No acute distress. Alert and oriented x3. Responds appropriately to questioning. Good mood. Normal affect.  Physical Exam  Left knee:  Skin healthy and intact  No gross swelling or ecchymosis  Trace Effusion:   ROM: 110  Crepitance with range of motion  No pain with internal rotation of the hip  Tenderness to palpation over medial and lateral joint line and with patellar compression   Moderate crepitus patellofemoral exam  Mild laxity to valgus stress/varus stress  Negative Lachman´s test  Positive Segundo´s test/Apley Grind  Neurovascular exam normal distally  Palpable pedal pulse and good cap refill     Review of Systems:  GENERAL: Negative for malaise, significant weight loss, fever  MUSCULOSKELETAL: See HPI  NEURO:  Negative     Past Medical History:   Diagnosis Date    Anesthesia of skin     Numbness and tingling    Anxiety 09/21/2023    Arthritis Unk    Bilateral myopia 09/21/2023    Cervical radicular pain 09/21/2023    Chronic patellofemoral pain of left knee 09/21/2023    Complex regional pain syndrome  type 1 of left lower extremity 09/21/2023    COVID-19     VACCINATED    Cubital tunnel syndrome on left 09/21/2023    Cubital tunnel syndrome on right 09/21/2023    Disease of intestine, unspecified     Bowel trouble    Erectile dysfunction 09/21/2023    Foot joint pain 09/21/2023    Fractures     Gastroparesis     GERD (gastroesophageal reflux disease)     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    History of psychological trauma 09/21/2023    Hyperlipidemia     Joint pain 09/21/2023    Kidney cysts 09/21/2023    Low back pain, unspecified 12/07/2022    Chronic low back pain without sciatica, unspecified back pain laterality    Low back pain, unspecified 08/09/2022    Lumbago    Lumbar spondylosis 09/21/2023    Lung nodule 09/21/2023    RIGHT    Metatarsalgia of left foot 09/21/2023    Myopia, bilateral 02/19/2019    Myopia of both eyes with astigmatism and presbyopia    Osteochondral defect of patella 09/21/2023    Osteoporosis     Other forms of angina pectoris (CMS-[facility])     Stable angina pectoris    Other spondylosis, cervical region 09/21/2023    Patellofemoral arthritis 09/21/2023    Personal history of other diseases of the musculoskeletal system and connective tissue     History of arthritis    Personal history of other specified conditions     History of seizure    Reflex sympathetic dystrophy 09/21/2023    Scapular dyskinesis 09/21/2023    Seizure disorder (Multi)     D/T TRAMADOL REACTION - 25 YEARS AGO    Shoulder impingement 09/21/2023    Suspected sleep apnea 09/21/2023    Trauma and stressor-related disorder 09/21/2023    Trochanteric bursitis of both hips 09/22/2023    Unspecified disorder of ear, unspecified ear     Ear, nose and throat disorder    Wears glasses        Medication Documentation Review Audit       Reviewed by Mavis Niño MA (Medical Assistant) on 08/06/24 at 0840      Medication Order Taking? Sig Documenting Provider Last Dose Status   acetaminophen (Tylenol) 500 mg tablet [id number] No Take 2  tablets (1,000 mg) by mouth every 8 hours if needed for mild pain (1 - 3). Historical Provider, MD Taking Active   albuterol (Ventolin HFA) 90 mcg/actuation inhaler [id number] No Inhale 2 puffs every 4 hours if needed for wheezing. Historical Provider, MD Taking Active   atorvastatin (Lipitor) 80 mg tablet [id number] No Take 1 tablet (80 mg) by mouth once daily at bedtime. Historical Provider, MD Taking Active   baclofen (Lioresal) 20 mg tablet [id number] No Take 1 tablet (20 mg) by mouth 3 times a day. Olivia Alonzo MD Taking Active   chlorhexidine (Peridex) 0.12 % solution [id number] No Use 15 mL in the mouth or throat if needed for wound care for up to 2 doses. 15 mls swish and spit the night before surgery and 15mls swish and spit the morning of surgery do not swallow Joslyn Berumen APRN-CNP Taking Active   diazePAM (Valium) 5 mg tablet [id number] No Take 1 tablet (5 mg) by mouth every 12 hours if needed for anxiety. Historical MD Isaac Taking Active   diclofenac epolamine 1.3 % patch [id number] No Place 1 patch over 12 hours on the skin 2 times a day. Olivia Alonzo MD Taking Active   diphenhydrAMINE (Sominex) 25 mg tablet [id number] No Take 1 tablet (25 mg) by mouth if needed for sleep. Historical Provider, MD Taking Active   DULoxetine (Cymbalta) 60 mg DR capsule [id number] No Take 1 capsule (60 mg) by mouth once daily. Historical Provider, MD Taking Active   fluticasone (Flonase) 50 mcg/actuation nasal spray [id number] No Administer 1 spray into each nostril once daily as needed. Historical Provider, MD Taking Active   ibuprofen 800 mg tablet [id number] No Take 1 tablet (800 mg) by mouth every 8 hours if needed for moderate pain (4 - 6).   Patient taking differently: Take 1 tablet (800 mg) by mouth 3 times a day.    Jeanette Crowell, APRN-CNP Taking Active   ketamine HCl (ketamine, bulk,) 100 % powder [id number] No Ketamine 100 mg/mL + lidocaine 40 mg/mL 1 puff 4 times daily as needed, DSP#20 mL x 5 refills  "Olivia Alonzo MD Taking Active   lidocaine (Lidoderm) 5 % patch [id number]  Place 1 patch over 12 hours on the skin once daily. Olivia Alonzo MD  Active   naloxone (Narcan) 4 mg/0.1 mL nasal spray [id number] No Instil 1 spray intranasally for opioid overdose; repeat in 5 inutes if no response Damian Spicer MD [facility] Taking Active   omeprazole (PriLOSEC) 40 mg DR capsule [id number] No Take 1 capsule (40 mg) by mouth 2 times a day. For 90 days Historical Provider, MD Taking Active     Discontinued 08/02/24 0944   oxyCODONE (Roxicodone) 5 mg immediate release tablet [id number]  Take 1 tablet (5 mg) by mouth every 6 hours if needed for moderate pain (4 - 6) or severe pain (7 - 10) for up to 7 days. Gagandeep Matamoros MD  Active   pregabalin (Lyrica) 300 mg capsule [id number] No Take 1 capsule (300 mg) by mouth 2 times a day. Vish Barboza PA-C Taking Active   urea (Carmol) 40 % cream [id number] No Apply 1 Application topically once daily. feet Historical Provider, MD Taking Active                    Allergies   Allergen Reactions    Morphine Hives, Rash and Shortness of breath     Tolerates hydromorphone    Ultram [Tramadol] Seizure    Alprazolam Psychosis     \"GOES CRAZY\", psychosis    Ketorolac Seizure    Fish Containing Products Hives and Itching     IT WAS A PROCESSED FISH MEAL, BUT STATES EATS FRESH AND FROZEN FISH ALL THE TIME.       Social History     Socioeconomic History    Marital status: Single     Spouse name: Not on file    Number of children: Not on file    Years of education: Not on file    Highest education level: Not on file   Occupational History    Not on file   Tobacco Use    Smoking status: Never    Smokeless tobacco: Never    Tobacco comments:     It killed my mother smoking and she left me with a gift a nodule on my right lung   Vaping Use    Vaping status: Never Used   Substance and Sexual Activity    Alcohol use: Not Currently    Drug use: Not Currently     Types: Other     Comment: Ketamine " treatments for pain    Sexual activity: Not Currently     Partners: Female     Birth control/protection: None   Other Topics Concern    Not on file   Social History Narrative    Not on file     Social Determinants of Health     Financial Resource Strain: Low Risk  (6/28/2024)    Overall Financial Resource Strain (CARDIA)     Difficulty of Paying Living Expenses: Not hard at all   Food Insecurity: No Food Insecurity (6/28/2024)    Hunger Vital Sign     Worried About Running Out of Food in the Last Year: Never true     Ran Out of Food in the Last Year: Never true   Transportation Needs: No Transportation Needs (7/17/2024)    OASIS : Transportation     Lack of Transportation (Medical): No     Lack of Transportation (Non-Medical): No     Patient Unable or Declines to Respond: No   Physical Activity: Insufficiently Active (6/28/2024)    Exercise Vital Sign     Days of Exercise per Week: 7 days     Minutes of Exercise per Session: 10 min   Stress: Stress Concern Present (6/28/2024)    Citizen of the [place] [employer] - Occupational Stress Questionnaire     Feeling of Stress : Rather much   Social Connections: Feeling Socially Integrated (7/17/2024)    OASIS : Social Isolation     Frequency of experiencing loneliness or isolation: Never   Recent Concern: Social Connections - Socially Isolated (5/16/2024)    Received from [employer], [employer]    Social Connection and Isolation Panel [NHANES]     Frequency of Communication with Friends and Family: Once a week     Frequency of Social Gatherings with Friends and Family: Never     Attends Yarsani Services: Never     Active Member of Clubs or Organizations: No     Attends Club or Organization Meetings: Never     Marital Status: Living with partner   Intimate Partner Violence: Not At Risk (6/28/2024)    Humiliation, Afraid, Rape, and Kick questionnaire     Fear of Current or Ex-Partner: No     Emotionally Abused: No     Physically Abused: No     Sexually  Abused: No   Housing Stability: Low Risk  (6/28/2024)    Housing Stability Vital Sign     Unable to Pay for Housing in the Last Year: No     Number of Places Lived in the Last Year: 1     Unstable Housing in the Last Year: No   Recent Concern: Housing Stability - High Risk (5/16/2024)    [facility]    Housing Stability Vital Sign     Unable to Pay for Housing in the Last Year: Yes     Number of Places Lived in the Last Year: 1     Unstable Housing in the Last Year: No       Past Surgical History:   Procedure Laterality Date    ACHILLES TENDON SURGERY  1978 2016    CT ABDOMEN PELVIS ANGIOGRAM W AND/OR WO IV CONTRAST  03/18/2020    CT ABDOMEN PELVIS ANGIOGRAM W AND/OR WO IV CONTRAST 3/18/2020 [facility]    CT ANGIO NECK  12/16/2022    CT NECK ANGIO W AND WO IV CONTRAST 12/16/2022 [facility]    CT HEAD ANGIO W AND WO IV CONTRAST  12/16/2022    CT HEAD ANGIO W AND WO IV CONTRAST 12/16/2022 [facility]    HIP SURGERY Left     REPLACEMENT    JOINT REPLACEMENT      LEFT KNEE    KNEE ARTHROPLASTY Bilateral 06/13/2017    Knee Arthroplasty    OTHER SURGICAL HISTORY  01/06/2022    Ulnar nerve neuroplasty    OTHER SURGICAL HISTORY Bilateral 07/26/2019    Foot surgery  -- HARDWARE    OTHER SURGICAL HISTORY Bilateral     BILATERAL ANKLE SCOPE    OTHER SURGICAL HISTORY      POP ABDOMINAL PROCEDURE    PLANTAR FASCIA RELEASE      SHOULDER SURGERY Bilateral 06/13/2017    Shoulder Surgery       CT ABDOMEN PELVIS W IV CONTRAST    Result Date: 1/22/2024  * * *Final Report* * * DATE OF EXAM: Jan 22 2024  8:50AM   [facility]   0530  -  CT ABD/PEL W IVCON  / ACCESSION #  [id number] PROCEDURE REASON: Abdominal pain, acute, nonlocalized      * * * * Physician Interpretation * * * *  EXAMINATION:  CT ABDOMEN AND PELVIS WITH IV CONTRAST CLINICAL HISTORY: Acute abdominal pain TECHNIQUE: CT of the abdomen and pelvis was performed using standard technique, scanning from just above the dome of the  diaphragm to the symphysis pubis. MQ:  CTAP_3 Contrast: IV:  100 ml of Omnipaque 350 CT Radiation dose: Integrated Dose-length product (DLP) for this visit =   269 mGy*cm. CT Dose Reduction Employed: Automated exposure control(AEC) and iterative recon COMPARISON: 12/26/2023 RESULT: Liver: No mass. Biliary: No bile duct dilation.  The gallbladder is within normal limits for the modality. Spleen: No mass. No splenomegaly. Pancreas: No mass or duct dilation. Adrenals: No mass. Kidneys: There are symmetric bilateral nephrograms.  In the medial right upper renal pole there is a 4.3 cm exophytic hypodensity consistent with a cyst. GI tract: No dilation or wall thickening. There is a small sliding hiatal hernia.  There is colonic residue with a moderate to large stool burden.   There is no acute appendicitis or diverticulitis. Lymph nodes: No abdominal or pelvic lymphadenopathy. Mesentery/Peritoneum: No ascites or mass. Retroperitoneum: No mass. Vasculature:  - Abdominal aorta and iliac arteries: Atherosclerotic calcifications without aneurysm.  - Celiac and SMA: Questionable mild narrowing at the origin of the celiac axis.  - Portal venous system (SMV, splenic vein, portal vein and branches): Patent.  - Hepatic veins: Patent. Pelvis: No mass, ascites or fluid collection. There is artifact from a left hip arthroplasty. Bones/Soft Tissues: There are no aggressive osseous lesions identified. Lower thorax: Bibasilar scarring.  (topogram) images: No additional findings.    IMPRESSION: Stable appearance.  There is no acute intra-abdominal process identified. : [facility]   Transcribe Date/Time: Jan 22 2024  8:50A Dictated by : INES ROLON MD This examination was interpreted and the report reviewed and electronically signed by: INES ROLON MD on Jan 22 2024  9:05AM  EST    US GALLBLADDER    Result Date: 1/22/2024  * * *Final Report* * * DATE OF EXAM: Jan 22 2024  6:48AM   U   1032  -  US ABD  RIGHT UPPER QUADRANT  / ACCESSION #  [id number] PROCEDURE REASON: RUQ pain, no fever, no elev WBC      * * * * Physician Interpretation * * * *  EXAMINATION:   RIGHT UPPER QUADRANT ULTRASOUND CLINICAL HISTORY: RIGHT upper quadrant pain TECHNIQUE:  Sonography of the right upper quadrant  was performed.   Images were obtained and stored in a permanent archive. MQ:  URUQ_2 COMPARISON: CT abdomen pelvis 12/26/2023 RESULT: Pancreas: Not well visualized Liver:      Echotexture:  Homogeneous      Echogenicity:  Increased      Surface contour:  Smooth      Lesions:  None. Biliary: No intrahepatic biliary duct dilation.      CBD: 0.3 cm at the hilum.      Gallbladder: Normal caliber           -Contents:  No cholelithiasis           -Wall:  Normal           -Other:  No pericholecystic fluid.  Negative sonographic Bautista sign. Right Kidney: No hydronephrosis.  Anechoic upper pole cyst with lobular borders measures 3.1 x 2.6 x 3.8 cm.  This appears unchanged from the prior CT. Ascites: None.    IMPRESSION: Hepatic steatosis.  Otherwise, no acute sonographic abnormality in the RIGHT upper quadrant. : KERLINE   Transcribe Date/Time: Jan 22 2024  6:55A Dictated by : DEE MCKEON MD This examination was interpreted and the report reviewed and electronically signed by: DEE MCKEON MD on Jan 22 2024  6:59AM  EST    XR CHEST 1 VIEW    Result Date: 1/22/2024  * * *Final Report* * * DATE OF EXAM: Jan 22 2024  6:22AM   VHX   5376  -  XR CHEST 1V FRONTAL PORT  / ACCESSION #  [id number] PROCEDURE REASON: Tachycardia      * * * * Physician Interpretation * * * *  EXAMINATION:  CHEST RADIOGRAPH (SINGLE VIEW AP OR PA) CLINICAL INFORMATION (AS PROVIDED BY ORDERING CLINICIAN) :  Tachycardia Comparison:  9/3/2023 RESULT: Lines, tubes, and devices:  N/A Lungs and pleura:  No confluent infiltrate, large pleural effusion or pneumothorax.   Cardiomediastinal silhouette:  Within normal limits. Other:  No acute bony abnormality  identified.    IMPRESSION: No significant acute radiographic abnormality. : KERLINE   Transcribe Date/Time: Jan 22 2024  6:28A Dictated by : MIRNA CHAO MD This examination was interpreted and the report reviewed and electronically signed by: MIRNA CHAO MD on Jan 22 2024  6:30AM  EST                             Scribe Attestation  By signing my name below, I Argelia Donaldson, Scribe   attest that this documentation has been prepared under the direction and in the presence of Royce Louis MD.

## 2024-08-14 LAB
ATRIAL RATE: 88 BPM
P AXIS: 57 DEGREES
P OFFSET: 200 MS
P ONSET: 144 MS
PR INTERVAL: 158 MS
Q ONSET: 223 MS
QRS COUNT: 14 BEATS
QRS DURATION: 98 MS
QT INTERVAL: 340 MS
QTC CALCULATION(BAZETT): 411 MS
QTC FREDERICIA: 386 MS
R AXIS: 37 DEGREES
T AXIS: 39 DEGREES
T OFFSET: 393 MS
VENTRICULAR RATE: 88 BPM

## 2024-08-15 ENCOUNTER — APPOINTMENT (OUTPATIENT)
Dept: ORTHOPEDIC SURGERY | Facility: CLINIC | Age: 54
End: 2024-08-15
Payer: COMMERCIAL

## 2024-08-23 ENCOUNTER — APPOINTMENT (OUTPATIENT)
Dept: RADIOLOGY | Facility: HOSPITAL | Age: 54
End: 2024-08-23
Payer: COMMERCIAL

## 2024-08-23 ENCOUNTER — HOSPITAL ENCOUNTER (EMERGENCY)
Facility: HOSPITAL | Age: 54
Discharge: HOME | End: 2024-08-23
Attending: EMERGENCY MEDICINE
Payer: COMMERCIAL

## 2024-08-23 VITALS
HEART RATE: 75 BPM | RESPIRATION RATE: 18 BRPM | DIASTOLIC BLOOD PRESSURE: 85 MMHG | WEIGHT: 180 LBS | BODY MASS INDEX: 27.28 KG/M2 | HEIGHT: 68 IN | TEMPERATURE: 97.2 F | SYSTOLIC BLOOD PRESSURE: 147 MMHG | OXYGEN SATURATION: 97 %

## 2024-08-23 DIAGNOSIS — M25.551 PAIN OF RIGHT HIP: Primary | ICD-10-CM

## 2024-08-23 PROCEDURE — 73502 X-RAY EXAM HIP UNI 2-3 VIEWS: CPT | Mod: RT

## 2024-08-23 PROCEDURE — 96372 THER/PROPH/DIAG INJ SC/IM: CPT

## 2024-08-23 PROCEDURE — 99283 EMERGENCY DEPT VISIT LOW MDM: CPT

## 2024-08-23 PROCEDURE — 73502 X-RAY EXAM HIP UNI 2-3 VIEWS: CPT | Mod: RIGHT SIDE | Performed by: RADIOLOGY

## 2024-08-23 PROCEDURE — 99284 EMERGENCY DEPT VISIT MOD MDM: CPT | Performed by: EMERGENCY MEDICINE

## 2024-08-23 PROCEDURE — 2500000004 HC RX 250 GENERAL PHARMACY W/ HCPCS (ALT 636 FOR OP/ED)

## 2024-08-23 PROCEDURE — 2500000005 HC RX 250 GENERAL PHARMACY W/O HCPCS

## 2024-08-23 PROCEDURE — 2500000001 HC RX 250 WO HCPCS SELF ADMINISTERED DRUGS (ALT 637 FOR MEDICARE OP)

## 2024-08-23 RX ORDER — OXYCODONE HYDROCHLORIDE 5 MG/1
5 TABLET ORAL ONCE
Status: COMPLETED | OUTPATIENT
Start: 2024-08-23 | End: 2024-08-23

## 2024-08-23 RX ORDER — LIDOCAINE 560 MG/1
1 PATCH PERCUTANEOUS; TOPICAL; TRANSDERMAL ONCE
Status: DISCONTINUED | OUTPATIENT
Start: 2024-08-23 | End: 2024-08-23 | Stop reason: HOSPADM

## 2024-08-23 RX ORDER — GABAPENTIN 300 MG/1
300 CAPSULE ORAL EVERY 8 HOURS SCHEDULED
Status: DISCONTINUED | OUTPATIENT
Start: 2024-08-23 | End: 2024-08-23 | Stop reason: HOSPADM

## 2024-08-23 RX ORDER — BACLOFEN 20 MG/1
20 TABLET ORAL ONCE
Status: COMPLETED | OUTPATIENT
Start: 2024-08-23 | End: 2024-08-23

## 2024-08-23 RX ORDER — IBUPROFEN 600 MG/1
600 TABLET ORAL ONCE
Status: COMPLETED | OUTPATIENT
Start: 2024-08-23 | End: 2024-08-23

## 2024-08-23 ASSESSMENT — PAIN DESCRIPTION - ORIENTATION: ORIENTATION: RIGHT

## 2024-08-23 ASSESSMENT — PAIN DESCRIPTION - LOCATION: LOCATION: HIP

## 2024-08-23 ASSESSMENT — PAIN DESCRIPTION - PAIN TYPE: TYPE: ACUTE PAIN

## 2024-08-23 ASSESSMENT — PAIN SCALES - GENERAL: PAINLEVEL_OUTOF10: 10 - WORST POSSIBLE PAIN

## 2024-08-23 ASSESSMENT — PAIN - FUNCTIONAL ASSESSMENT: PAIN_FUNCTIONAL_ASSESSMENT: 0-10

## 2024-08-23 NOTE — ED PROVIDER NOTES
EMERGENCY DEPARTMENT ENCOUNTER      Pt Name: Akin Lynn  MRN: [id number]  Birthdate 1970  Date of evaluation: 8/23/2024  Provider: Uche Avila DO    CHIEF COMPLAINT       Chief Complaint   Patient presents with    Hip Pain         HISTORY OF PRESENT ILLNESS    Akin Lynn is a 54 y.o. male pmhx gastroparesis, osteoporosis, HLD, right GILBERT 6/28/2024 and chronic regional pain syndrome, and right hip replacement in 6/28/2024 who presents to the ED by EMS for right hip pain. Pt states around 10 Am today he felt like his hip gave out, heard a pop and tripped. He hit his low back and back of head on the wall. He denies loc. He was able to pull himself up after and went to PT able to ambulate. About an hour ago his hip pain started to worsen when laying down. He has not been able to ambulate since. He denies headache, fevers, chills, sob, n/v/d, saddle paraesthesia, incontinence, dysuria. This morning he took baclofen, ibuprofen, and tylenol as his usual pain regimen. He is also prescribed ketamine intra nasal 4x a day prn and did not take any today.           History provided by:  Patient   used: No        Nursing Notes were reviewed.    PAST MEDICAL HISTORY     Past Medical History:   Diagnosis Date    Anesthesia of skin     Numbness and tingling    Anxiety 09/21/2023    Arthritis Unk    Bilateral myopia 09/21/2023    Cervical radicular pain 09/21/2023    Chronic patellofemoral pain of left knee 09/21/2023    Complex regional pain syndrome type 1 of left lower extremity 09/21/2023    COVID-19     VACCINATED    Cubital tunnel syndrome on left 09/21/2023    Cubital tunnel syndrome on right 09/21/2023    Disease of intestine, unspecified     Bowel trouble    Erectile dysfunction 09/21/2023    Foot joint pain 09/21/2023    Fractures     Gastroparesis     GERD (gastroesophageal reflux disease)     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    History of psychological trauma 09/21/2023    Hyperlipidemia     Joint  pain 09/21/2023    Kidney cysts 09/21/2023    Low back pain, unspecified 12/07/2022    Chronic low back pain without sciatica, unspecified back pain laterality    Low back pain, unspecified 08/09/2022    Lumbago    Lumbar spondylosis 09/21/2023    Lung nodule 09/21/2023    RIGHT    Metatarsalgia of left foot 09/21/2023    Myopia, bilateral 02/19/2019    Myopia of both eyes with astigmatism and presbyopia    Osteochondral defect of patella 09/21/2023    Osteoporosis     Other forms of angina pectoris ([facility])     Stable angina pectoris    Other spondylosis, cervical region 09/21/2023    Patellofemoral arthritis 09/21/2023    Personal history of other diseases of the musculoskeletal system and connective tissue     History of arthritis    Personal history of other specified conditions     History of seizure    Reflex sympathetic dystrophy 09/21/2023    Scapular dyskinesis 09/21/2023    Seizure disorder (Multi)     D/T TRAMADOL REACTION - 25 YEARS AGO    Shoulder impingement 09/21/2023    Suspected sleep apnea 09/21/2023    Trauma and stressor-related disorder 09/21/2023    Trochanteric bursitis of both hips 09/22/2023    Unspecified disorder of ear, unspecified ear     Ear, nose and throat disorder    Wears glasses          SURGICAL HISTORY       Past Surgical History:   Procedure Laterality Date    ACHILLES TENDON SURGERY  1978 2016    CT ABDOMEN PELVIS ANGIOGRAM W AND/OR WO IV CONTRAST  03/18/2020    CT ABDOMEN PELVIS ANGIOGRAM W AND/OR WO IV CONTRAST 3/18/2020 [facility]GENCY [facility]GAC[facility]    CT ANGIO NECK  12/16/2022    CT NECK ANGIO W AND WO IV CONTRAST 12/16/2022 DOCTOR OFFICE LEGACY    CT HEAD ANGIO W AND WO IV CONTRAST  12/16/2022    CT HEAD ANGIO W AND WO IV CONTRAST 12/16/2022 DOCTOR OFFICE LEGACY    HIP SURGERY Left     REPLACEMENT    JOINT REPLACEMENT      LEFT KNEE    KNEE ARTHROPLASTY Bilateral 06/13/2017    Knee Arthroplasty    OTHER SURGICAL HISTORY  01/06/2022    Ulnar nerve neuroplasty    OTHER SURGICAL  HISTORY Bilateral 07/26/2019    Foot surgery  -- HARDWARE    OTHER SURGICAL HISTORY Bilateral     BILATERAL ANKLE SCOPE    OTHER SURGICAL HISTORY      POP ABDOMINAL PROCEDURE    PLANTAR FASCIA RELEASE      SHOULDER SURGERY Bilateral 06/13/2017    Shoulder Surgery         CURRENT MEDICATIONS       Discharge Medication List as of 8/23/2024  4:34 PM        CONTINUE these medications which have NOT CHANGED    Details   acetaminophen (Tylenol) 500 mg tablet Take 2 tablets (1,000 mg) by mouth every 8 hours if needed for mild pain (1 - 3)., Historical Med      albuterol (Ventolin HFA) 90 mcg/actuation inhaler Inhale 2 puffs every 4 hours if needed for wheezing., Historical Med      atorvastatin (Lipitor) 80 mg tablet Take 1 tablet (80 mg) by mouth once daily at bedtime., Historical Med      baclofen (Lioresal) 20 mg tablet Take 1 tablet (20 mg) by mouth 3 times a day., Starting Thu 2/1/2024, Until Sun 1/26/2025, Normal      chlorhexidine (Peridex) 0.12 % solution Use 15 mL in the mouth or throat if needed for wound care for up to 2 doses. 15 mls swish and spit the night before surgery and 15mls swish and spit the morning of surgery do not swallow, Starting Mon 6/10/2024, Normal      diazePAM (Valium) 5 mg tablet Take 1 tablet (5 mg) by mouth every 12 hours if needed for anxiety., Starting Thu 3/14/2024, Historical Med      diclofenac epolamine 1.3 % patch Place 1 patch over 12 hours on the skin 2 times a day., Starting Fri 5/31/2024, Normal      diphenhydrAMINE (Sominex) 25 mg tablet Take 1 tablet (25 mg) by mouth if needed for sleep., Historical Med      DULoxetine (Cymbalta) 60 mg DR capsule Take 1 capsule (60 mg) by mouth once daily., Starting Wed 8/2/2023, Historical Med      fluticasone (Flonase) 50 mcg/actuation nasal spray Administer 1 spray into each nostril once daily as needed., Historical Med      ibuprofen 800 mg tablet Take 1 tablet (800 mg) by mouth every 8 hours if needed for moderate pain (4 - 6)., Starting  Tue 4/23/2024, No Print      ketamine HCl (ketamine, bulk,) 100 % powder Ketamine 100 mg/mL + lidocaine 40 mg/mL 1 puff 4 times daily as needed, DSP#20 mL x 5 refills, Normal      lidocaine (Lidoderm) 5 % patch Place 1 patch over 12 hours on the skin once daily., Starting Thu 7/25/2024, Normal      naloxone (Narcan) 4 mg/0.1 mL nasal spray Instil 1 spray intranasally for opioid overdose; repeat in 5 inutes if no response, Starting Tue 6/25/2024, Normal      omeprazole (PriLOSEC) 40 mg DR capsule Take 1 capsule (40 mg) by mouth 2 times a day. For 90 days, Historical Med      pregabalin (Lyrica) 300 mg capsule Take 1 capsule (300 mg) by mouth 2 times a day., Starting Fri 10/6/2023, Normal      urea (Carmol) 40 % cream Apply 1 Application topically once daily. feet, Historical Med             ALLERGIES     Morphine, Ultram [tramadol], Alprazolam, Ketorolac, and Fish containing products    FAMILY HISTORY       Family History   Problem Relation Name Age of Onset    Lung cancer Mother      Glaucoma Father      Skin cancer Father      Hypertension Other mul fam mem     Heart disease Other mul fam mem           SOCIAL HISTORY       Social History     Socioeconomic History    Marital status: Single   Tobacco Use    Smoking status: Never    Smokeless tobacco: Never    Tobacco comments:     It killed my mother smoking and she left me with a gift a nodule on my right lung   Vaping Use    Vaping status: Never Used   Substance and Sexual Activity    Alcohol use: Not Currently    Drug use: Not Currently     Types: Other     Comment: Ketamine treatments for pain    Sexual activity: Not Currently     Partners: Female     Birth control/protection: None     Social Determinants of Health     Financial Resource Strain: Low Risk  (6/28/2024)    Overall Financial Resource Strain (CARDIA)     Difficulty of Paying Living Expenses: Not hard at all   Food Insecurity: No Food Insecurity (6/28/2024)    Hunger Vital Sign     Worried About  Running Out of Food in the Last Year: Never true     Ran Out of Food in the Last Year: Never true   Transportation Needs: No Transportation Needs (7/17/2024)    OASIS : Transportation     Lack of Transportation (Medical): No     Lack of Transportation (Non-Medical): No     Patient Unable or Declines to Respond: No   Physical Activity: Insufficiently Active (6/28/2024)    Exercise Vital Sign     Days of Exercise per Week: 7 days     Minutes of Exercise per Session: 10 min   Stress: Stress Concern Present (6/28/2024)    [employer] - Occupational Stress Questionnaire     Feeling of Stress : Rather much   Social Connections: Feeling Socially Integrated (7/17/2024)    OASIS : Social Isolation     Frequency of experiencing loneliness or isolation: Never   Recent Concern: Social Connections - Socially Isolated (5/16/2024)    Received from [facility]    Social Connection and Isolation Panel [NHANES]     Frequency of Communication with Friends and Family: Once a week     Frequency of Social Gatherings with Friends and Family: Never     Attends Anabaptism Services: Never     Active Member of Clubs or Organizations: No     Attends Club or Organization Meetings: Never     Marital Status: Living with partner   Intimate Partner Violence: Not At Risk (6/28/2024)    Humiliation, Afraid, Rape, and Kick questionnaire     Fear of Current or Ex-Partner: No     Emotionally Abused: No     Physically Abused: No     Sexually Abused: No   Housing Stability: Low Risk  (6/28/2024)    Housing Stability Vital Sign     Unable to Pay for Housing in the Last Year: No     Number of Places Lived in the Last Year: 1     Unstable Housing in the Last Year: No   Recent Concern: Housing Stability - High Risk (5/16/2024)    Received from [facility]    Housing Stability Vital Sign     Unable to Pay for Housing in the Last Year: Yes     Number of Places Lived in the Last Year: 1     Unstable  Housing in the Last Year: No       SCREENINGS                        PHYSICAL EXAM    (up to 7 for level 4, 8 or more for level 5)     ED Triage Vitals [08/23/24 1358]   Temperature Heart Rate Respirations BP   36.2 °C (97.2 °F) 73 18 155/90      Pulse Ox Temp Source Heart Rate Source Patient Position   99 % Temporal Monitor Lying      BP Location FiO2 (%)     Right arm --       Physical Exam  Constitutional:       Appearance: Normal appearance.   HENT:      Head: Normocephalic and atraumatic.      Right Ear: External ear normal.      Left Ear: External ear normal.      Nose: Nose normal.   Eyes:      Extraocular Movements: Extraocular movements intact.      Conjunctiva/sclera: Conjunctivae normal.   Cardiovascular:      Rate and Rhythm: Normal rate and regular rhythm.      Pulses: Normal pulses.           Dorsalis pedis pulses are 2+ on the right side and 2+ on the left side.      Heart sounds: Normal heart sounds.   Pulmonary:      Effort: Pulmonary effort is normal.      Breath sounds: Normal breath sounds.   Abdominal:      Tenderness: There is no abdominal tenderness.   Musculoskeletal:         General: Tenderness (Right hip and right low back) present.      Cervical back: Normal range of motion and neck supple.      Comments: Plantarflexion and dorsiflexion +5/5 bilaterally. Flexion of right hip limited by pain.    Skin:     General: Skin is warm.   Neurological:      General: No focal deficit present.      Mental Status: He is alert and oriented to person, place, and time.      Cranial Nerves: Cranial nerves 2-12 are intact.      Sensory: Sensation is intact.      Motor: Motor function is intact.      Coordination: Coordination is intact.   Psychiatric:         Mood and Affect: Mood normal.          DIAGNOSTIC RESULTS     LABS:  Labs Reviewed - No data to display    All other labs were within normal range or not returned as of this dictation.    Imaging  XR hip right with pelvis when performed 2 or 3 views    Final Result   Right total hip arthroplasty without periprosthetic fracture or   malalignment.  Soft tissue swelling.   Signed by Royce Salcedo DO           Procedures  Procedures     EMERGENCY DEPARTMENT COURSE/MDM:     Diagnoses as of 08/24/24 0802   Pain of right hip        Medical Decision Making  Akin Lynn is a 54 y.o. male pmhx gastroparesis, osteoporosis, HLD, right GILBERT 6/28/2024 and chronic regional pain syndrome, and right hip replacement in 6/28/2024 who presents to the ED by EMS for right hip pain. Pt tripped at 10am this morning and heard a pop in his hip. Able to pull himself up after and ambulate. Pt went to pt after and pain gradually worsened. He has not ambulated since returning home 1-2 hrs ago. Pain is in right groin, right hip, and right low back.  His main concern is right hip pain.  No saddle paraesthesia, urinary dysfunction, midline low back tenderness noted. Pt has limited hip flexion 2/2 pain. Xray pelvis  showed no periprosthetic fracture or malalignment of right hip. Pt having pain in neck which is chronic.  He is given his home pain medications, along with a dose of Dilaudid IM.  Findings were discussed with the patient.  He is instructed follow-up with his orthopedic doctor.  Home care and return instructions discussed. Patient expressed understanding and agreement. Patient discharged in stable condition.    Uche Avila DO, PGY-4  Emergency Medicine Resident      Amount and/or Complexity of Data Reviewed  Radiology: ordered. Decision-making details documented in ED Course.        Patient and or family in agreement and understanding of treatment plan.  All questions answered.      I reviewed the case with the attending ED physician. The attending ED physician agrees with the plan. Patient and/or patient´s representative was counseled regarding labs, imaging, likely diagnosis, and plan. All questions were answered.    ED Medications administered this visit:    Medications    baclofen (Lioresal) tablet 20 mg (20 mg oral Given 8/23/24 1606)   oxyCODONE (Roxicodone) immediate release tablet 5 mg (5 mg oral Given 8/23/24 1439)   ibuprofen tablet 600 mg (600 mg oral Given 8/23/24 1606)   HYDROmorphone (Dilaudid) injection 0.5 mg (0.5 mg intramuscular Given 8/23/24 1612)       New Prescriptions from this visit:    Discharge Medication List as of 8/23/2024  4:34 PM          Follow-up:  Gagandeep Matamoros MD  [id number]   [facility], [address] [place], [place] [id number]  [phone]    On 8/26/2024          Final Impression:   1. Pain of right hip          (Please note that portions of this note were completed with a voice recognition program.  Efforts were made to edit the dictations but occasionally words are mis-transcribed.)     Uche Avila DO  Resident  08/24/24 0898

## 2024-08-23 NOTE — DISCHARGE INSTRUCTIONS
Continue to take your medications as prescribed.  Follow-up with your orthopedic doctor soon as possible.  Return to the emerged part with any worsening pain or inability to walk or falls.

## 2024-08-26 ENCOUNTER — TELEPHONE (OUTPATIENT)
Dept: ORTHOPEDIC SURGERY | Facility: CLINIC | Age: 54
End: 2024-08-26
Payer: COMMERCIAL

## 2024-08-26 NOTE — TELEPHONE ENCOUNTER
Patient called and left a  - he states he was seen in the ED recently from a fall injury.  He is stating he is in a lot of pain with his hip.  He stated he called and could not get an appointment to see. Dr. Matamoros for a couple of weeks.  He asked for a return call to discuss.  He can be reached at [phone].

## 2024-08-27 ENCOUNTER — APPOINTMENT (OUTPATIENT)
Dept: RHEUMATOLOGY | Facility: CLINIC | Age: 54
End: 2024-08-27
Payer: COMMERCIAL

## 2024-08-28 ENCOUNTER — APPOINTMENT (OUTPATIENT)
Dept: BEHAVIORAL HEALTH | Facility: CLINIC | Age: 54
End: 2024-08-28
Payer: COMMERCIAL

## 2024-08-28 DIAGNOSIS — F41.9 ANXIETY: ICD-10-CM

## 2024-08-28 DIAGNOSIS — Z91.49 HISTORY OF PSYCHOLOGICAL TRAUMA: ICD-10-CM

## 2024-08-28 PROCEDURE — 90837 PSYTX W PT 60 MINUTES: CPT

## 2024-08-28 NOTE — PROGRESS NOTES
No chief complaint on file.    History of Present Illness:  DOS: 09/15/21 left total knee arthroplasty. Patient relates his left knee is doing okay.  He still has some occasional mechanical symptoms and does not catch or rub on occasion.  Does have a mild amount of crepitus.  Also recently underwent hip replacement states this done well.    Imaging:  X-ray left knee: Shows Left patellofemoral arthroplasty     Assessment:   Left patellofemoral arthroplasty doing well    Plan:  We reviewed the role of imaging, physical therapy, injections and the time frame to healing and correlation with outcome.  1.  Will call us if he was to go to formal physical therapy  2.  NSAID: Ibuprofen.  GI side effects and medical risks discussed  3.  Ice: 30 minutes on and off  4.  Exercise home program: Medically directed knee therapy / Handout given  See us back in 6 months     Physical Exam:  Well-nourished, well-developed. No acute distress. Alert and oriented x3. Responds appropriately to questioning. Good mood. Normal affect.  Physical Exam  Left knee:  Skin healthy and intact  No gross swelling or ecchymosis  Trace Effusion:   ROM: 110  Crepitance with range of motion  No pain with internal rotation of the hip  Tenderness to palpation over medial and lateral joint line and with patellar compression   Moderate crepitus patellofemoral exam  Mild laxity to valgus stress/varus stress  Negative Lachman´s test  Positive Segundo´s test/Apley Grind  Neurovascular exam normal distally  Palpable pedal pulse and good cap refill     Review of Systems:  GENERAL: Negative for malaise, significant weight loss, fever  MUSCULOSKELETAL: See HPI  NEURO:  Negative     Past Medical History:   Diagnosis Date    Anesthesia of skin     Numbness and tingling    Anxiety 09/21/2023    Arthritis Unk    Bilateral myopia 09/21/2023    Cervical radicular pain 09/21/2023    Chronic patellofemoral pain of left knee 09/21/2023    Complex regional pain syndrome  type 1 of left lower extremity 09/21/2023    COVID-19     VACCINATED    Cubital tunnel syndrome on left 09/21/2023    Cubital tunnel syndrome on right 09/21/2023    Disease of intestine, unspecified     Bowel trouble    Erectile dysfunction 09/21/2023    Foot joint pain 09/21/2023    Fractures     Gastroparesis     GERD (gastroesophageal reflux disease)     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    History of psychological trauma 09/21/2023    Hyperlipidemia     Joint pain 09/21/2023    Kidney cysts 09/21/2023    Low back pain, unspecified 12/07/2022    Chronic low back pain without sciatica, unspecified back pain laterality    Low back pain, unspecified 08/09/2022    Lumbago    Lumbar spondylosis 09/21/2023    Lung nodule 09/21/2023    RIGHT    Metatarsalgia of left foot 09/21/2023    Myopia, bilateral 02/19/2019    Myopia of both eyes with astigmatism and presbyopia    Osteochondral defect of patella 09/21/2023    Osteoporosis     Other forms of angina pectoris (CMS-[facility])     Stable angina pectoris    Other spondylosis, cervical region 09/21/2023    Patellofemoral arthritis 09/21/2023    Personal history of other diseases of the musculoskeletal system and connective tissue     History of arthritis    Personal history of other specified conditions     History of seizure    Reflex sympathetic dystrophy 09/21/2023    Scapular dyskinesis 09/21/2023    Seizure disorder (Multi)     D/T TRAMADOL REACTION - 25 YEARS AGO    Shoulder impingement 09/21/2023    Suspected sleep apnea 09/21/2023    Trauma and stressor-related disorder 09/21/2023    Trochanteric bursitis of both hips 09/22/2023    Unspecified disorder of ear, unspecified ear     Ear, nose and throat disorder    Wears glasses        Medication Documentation Review Audit       Reviewed by Mavis Niño MA (Medical Assistant) on 08/06/24 at 0840      Medication Order Taking? Sig Documenting Provider Last Dose Status   acetaminophen (Tylenol) 500 mg tablet [id number] No Take 2  tablets (1,000 mg) by mouth every 8 hours if needed for mild pain (1 - 3). Historical Provider, MD Taking Active   albuterol (Ventolin HFA) 90 mcg/actuation inhaler [id number] No Inhale 2 puffs every 4 hours if needed for wheezing. Historical Provider, MD Taking Active   atorvastatin (Lipitor) 80 mg tablet [id number] No Take 1 tablet (80 mg) by mouth once daily at bedtime. Historical Provider, MD Taking Active   baclofen (Lioresal) 20 mg tablet [id number] No Take 1 tablet (20 mg) by mouth 3 times a day. Olivia Alonzo MD Taking Active   chlorhexidine (Peridex) 0.12 % solution [id number] No Use 15 mL in the mouth or throat if needed for wound care for up to 2 doses. 15 mls swish and spit the night before surgery and 15mls swish and spit the morning of surgery do not swallow Joslyn Berumen APRN-CNP Taking Active   diazePAM (Valium) 5 mg tablet [id number] No Take 1 tablet (5 mg) by mouth every 12 hours if needed for anxiety. Historical MD Isaac Taking Active   diclofenac epolamine 1.3 % patch [id number] No Place 1 patch over 12 hours on the skin 2 times a day. Olivia Alonzo MD Taking Active   diphenhydrAMINE (Sominex) 25 mg tablet [id number] No Take 1 tablet (25 mg) by mouth if needed for sleep. Historical Provider, MD Taking Active   DULoxetine (Cymbalta) 60 mg DR capsule [id number] No Take 1 capsule (60 mg) by mouth once daily. Historical Provider, MD Taking Active   fluticasone (Flonase) 50 mcg/actuation nasal spray [id number] No Administer 1 spray into each nostril once daily as needed. Historical Provider, MD Taking Active   ibuprofen 800 mg tablet [id number] No Take 1 tablet (800 mg) by mouth every 8 hours if needed for moderate pain (4 - 6).   Patient taking differently: Take 1 tablet (800 mg) by mouth 3 times a day.    Jeanette Crowell, APRN-CNP Taking Active   ketamine HCl (ketamine, bulk,) 100 % powder [id number] No Ketamine 100 mg/mL + lidocaine 40 mg/mL 1 puff 4 times daily as needed, DSP#20 mL x 5 refills  "Olivia Alonzo MD Taking Active   lidocaine (Lidoderm) 5 % patch [id number]  Place 1 patch over 12 hours on the skin once daily. Olivai Alonzo MD  Active   naloxone (Narcan) 4 mg/0.1 mL nasal spray [id number] No Instil 1 spray intranasally for opioid overdose; repeat in 5 inutes if no response Damian Spicer MD [facility] Taking Active   omeprazole (PriLOSEC) 40 mg DR capsule [id number] No Take 1 capsule (40 mg) by mouth 2 times a day. For 90 days Historical Provider, MD Taking Active     Discontinued 08/02/24 0944   oxyCODONE (Roxicodone) 5 mg immediate release tablet [id number]  Take 1 tablet (5 mg) by mouth every 6 hours if needed for moderate pain (4 - 6) or severe pain (7 - 10) for up to 7 days. Gagandeep Matamoros MD  Active   pregabalin (Lyrica) 300 mg capsule [id number] No Take 1 capsule (300 mg) by mouth 2 times a day. Vish Barboza PA-C Taking Active   urea (Carmol) 40 % cream [id number] No Apply 1 Application topically once daily. feet Historical Provider, MD Taking Active                    Allergies   Allergen Reactions    Morphine Hives, Rash and Shortness of breath     Tolerates hydromorphone    Ultram [Tramadol] Seizure    Alprazolam Psychosis     \"GOES CRAZY\", psychosis    Ketorolac Seizure    Fish Containing Products Hives and Itching     IT WAS A PROCESSED FISH MEAL, BUT STATES EATS FRESH AND FROZEN FISH ALL THE TIME.       Social History     Socioeconomic History    Marital status: Single     Spouse name: Not on file    Number of children: Not on file    Years of education: Not on file    Highest education level: Not on file   Occupational History    Not on file   Tobacco Use    Smoking status: Never    Smokeless tobacco: Never    Tobacco comments:     It killed my mother smoking and she left me with a gift a nodule on my right lung   Vaping Use    Vaping status: Never Used   Substance and Sexual Activity    Alcohol use: Not Currently    Drug use: Not Currently     Types: Other     Comment: Ketamine " treatments for pain    Sexual activity: Not Currently     Partners: Female     Birth control/protection: None   Other Topics Concern    Not on file   Social History Narrative    Not on file     Social Determinants of Health     Financial Resource Strain: Low Risk  (6/28/2024)    Overall Financial Resource Strain (CARDIA)     Difficulty of Paying Living Expenses: Not hard at all   Food Insecurity: No Food Insecurity (6/28/2024)    Hunger Vital Sign     Worried About Running Out of Food in the Last Year: Never true     Ran Out of Food in the Last Year: Never true   Transportation Needs: No Transportation Needs (7/17/2024)    OASIS : Transportation     Lack of Transportation (Medical): No     Lack of Transportation (Non-Medical): No     Patient Unable or Declines to Respond: No   Physical Activity: Insufficiently Active (6/28/2024)    Exercise Vital Sign     Days of Exercise per Week: 7 days     Minutes of Exercise per Session: 10 min   Stress: Stress Concern Present (6/28/2024)    [employer] - Occupational Stress Questionnaire     Feeling of Stress : Rather much   Social Connections: Feeling Socially Integrated (7/17/2024)    OASIS : Social Isolation     Frequency of experiencing loneliness or isolation: Never   Recent Concern: Social Connections - Socially Isolated (5/16/2024)    Received from [email or web address], [email or web address]    Social Connection and Isolation Panel [NHANES]     Frequency of Communication with Friends and Family: Once a week     Frequency of Social Gatherings with Friends and Family: Never     Attends Restorationist Services: Never     Active Member of Clubs or Organizations: No     Attends Club or Organization Meetings: Never     Marital Status: Living with partner   Intimate Partner Violence: Not At Risk (6/28/2024)    Humiliation, Afraid, Rape, and Kick questionnaire     Fear of Current or Ex-Partner: No     Emotionally Abused: No     Physically Abused: No     Sexually  Abused: No   Housing Stability: Low Risk  (6/28/2024)    Housing Stability Vital Sign     Unable to Pay for Housing in the Last Year: No     Number of Places Lived in the Last Year: 1     Unstable Housing in the Last Year: No   Recent Concern: Housing Stability - High Risk (5/16/2024)    [facility], [facility]    Housing Stability Vital Sign     Unable to Pay for Housing in the Last Year: Yes     Number of Places Lived in the Last Year: 1     Unstable Housing in the Last Year: No       Past Surgical History:   Procedure Laterality Date    ACHILLES TENDON SURGERY  1978 2016    CT ABDOMEN PELVIS ANGIOGRAM W AND/OR WO IV CONTRAST  03/18/2020    CT ABDOMEN PELVIS ANGIOGRAM W AND/OR WO IV CONTRAST 3/18/2020 [facility]    CT ANGIO NECK  12/16/2022    CT NECK ANGIO W AND WO IV CONTRAST 12/16/2022 [facility]    CT HEAD ANGIO W AND WO IV CONTRAST  12/16/2022    CT HEAD ANGIO W AND WO IV CONTRAST 12/16/2022 [facility]    HIP SURGERY Left     REPLACEMENT    JOINT REPLACEMENT      LEFT KNEE    KNEE ARTHROPLASTY Bilateral 06/13/2017    Knee Arthroplasty    OTHER SURGICAL HISTORY  01/06/2022    Ulnar nerve neuroplasty    OTHER SURGICAL HISTORY Bilateral 07/26/2019    Foot surgery  -- HARDWARE    OTHER SURGICAL HISTORY Bilateral     BILATERAL ANKLE SCOPE    OTHER SURGICAL HISTORY      POP ABDOMINAL PROCEDURE    PLANTAR FASCIA RELEASE      SHOULDER SURGERY Bilateral 06/13/2017    Shoulder Surgery       CT ABDOMEN PELVIS W IV CONTRAST    Result Date: 1/22/2024  * * *Final Report* * * DATE OF EXAM: Jan 22 2024  8:50AM   [facility]   0530  -  CT ABD/PEL W IVCON  / ACCESSION #  [id number] PROCEDURE REASON: Abdominal pain, acute, nonlocalized      * * * * Physician Interpretation * * * *  EXAMINATION:  CT ABDOMEN AND PELVIS WITH IV CONTRAST CLINICAL HISTORY: Acute abdominal pain TECHNIQUE: CT of the abdomen and pelvis was performed using standard technique, scanning from just above the dome of the  diaphragm to the symphysis pubis. MQ:  CTAP_3 Contrast: IV:  100 ml of Omnipaque 350 CT Radiation dose: Integrated Dose-length product (DLP) for this visit =   269 mGy*cm. CT Dose Reduction Employed: Automated exposure control(AEC) and iterative recon COMPARISON: 12/26/2023 RESULT: Liver: No mass. Biliary: No bile duct dilation.  The gallbladder is within normal limits for the modality. Spleen: No mass. No splenomegaly. Pancreas: No mass or duct dilation. Adrenals: No mass. Kidneys: There are symmetric bilateral nephrograms.  In the medial right upper renal pole there is a 4.3 cm exophytic hypodensity consistent with a cyst. GI tract: No dilation or wall thickening. There is a small sliding hiatal hernia.  There is colonic residue with a moderate to large stool burden.   There is no acute appendicitis or diverticulitis. Lymph nodes: No abdominal or pelvic lymphadenopathy. Mesentery/Peritoneum: No ascites or mass. Retroperitoneum: No mass. Vasculature:  - Abdominal aorta and iliac arteries: Atherosclerotic calcifications without aneurysm.  - Celiac and SMA: Questionable mild narrowing at the origin of the celiac axis.  - Portal venous system (SMV, splenic vein, portal vein and branches): Patent.  - Hepatic veins: Patent. Pelvis: No mass, ascites or fluid collection. There is artifact from a left hip arthroplasty. Bones/Soft Tissues: There are no aggressive osseous lesions identified. Lower thorax: Bibasilar scarring.  (topogram) images: No additional findings.    IMPRESSION: Stable appearance.  There is no acute intra-abdominal process identified. : [facility]   Transcribe Date/Time: Jan 22 2024  8:50A Dictated by : INES ROLON MD This examination was interpreted and the report reviewed and electronically signed by: INES ROLON MD on Jan 22 2024  9:05AM  EST    US GALLBLADDER    Result Date: 1/22/2024  * * *Final Report* * * DATE OF EXAM: Jan 22 2024  6:48AM   U   1032  -  US ABD  RIGHT UPPER QUADRANT  / ACCESSION #  [id number] PROCEDURE REASON: RUQ pain, no fever, no elev WBC      * * * * Physician Interpretation * * * *  EXAMINATION:   RIGHT UPPER QUADRANT ULTRASOUND CLINICAL HISTORY: RIGHT upper quadrant pain TECHNIQUE:  Sonography of the right upper quadrant  was performed.   Images were obtained and stored in a permanent archive. MQ:  URUQ_2 COMPARISON: CT abdomen pelvis 12/26/2023 RESULT: Pancreas: Not well visualized Liver:      Echotexture:  Homogeneous      Echogenicity:  Increased      Surface contour:  Smooth      Lesions:  None. Biliary: No intrahepatic biliary duct dilation.      CBD: 0.3 cm at the hilum.      Gallbladder: Normal caliber           -Contents:  No cholelithiasis           -Wall:  Normal           -Other:  No pericholecystic fluid.  Negative sonographic Bautista sign. Right Kidney: No hydronephrosis.  Anechoic upper pole cyst with lobular borders measures 3.1 x 2.6 x 3.8 cm.  This appears unchanged from the prior CT. Ascites: None.    IMPRESSION: Hepatic steatosis.  Otherwise, no acute sonographic abnormality in the RIGHT upper quadrant. : [facility]   Transcribe Date/Time: Jan 22 2024  6:55A Dictated by : DEE MCKEON MD This examination was interpreted and the report reviewed and electronically signed by: DEE MCKEON MD on Jan 22 2024  6:59AM  EST    XR CHEST 1 VIEW    Result Date: 1/22/2024  * * *Final Report* * * DATE OF EXAM: Jan 22 2024  6:22AM   VHX   5376  -  XR CHEST 1V FRONTAL PORT  / ACCESSION #  [id number] PROCEDURE REASON: Tachycardia      * * * * Physician Interpretation * * * *  EXAMINATION:  CHEST RADIOGRAPH (SINGLE VIEW AP OR PA) CLINICAL INFORMATION (AS PROVIDED BY ORDERING CLINICIAN) :  Tachycardia Comparison:  9/3/2023 RESULT: Lines, tubes, and devices:  N/A Lungs and pleura:  No confluent infiltrate, large pleural effusion or pneumothorax.   Cardiomediastinal silhouette:  Within normal limits. Other:  No acute bony abnormality  identified.    IMPRESSION: No significant acute radiographic abnormality. : KERLINE   Transcribe Date/Time: Jan 22 2024  6:28A Dictated by : MIRNA CHAO MD This examination was interpreted and the report reviewed and electronically signed by: MIRNA CHAO MD on Jan 22 2024  6:30AM  EST                             Scribe Attestation  By signing my name below, I Argelia Donaldson, Scribe   attest that this documentation has been prepared under the direction and in the presence of Royce Louis MD.

## 2024-08-28 NOTE — PROGRESS NOTES
Start time:  3:02  End time:  4:00     An interactive audio and video telecommunication system which permits real time communications between the patient (at the originating site) and provider (at the distant site) was utilized to provide this telehealth service. Verbal consent has been obtained from this patient for a telehealth visit.     The patient was informed of the current need to conduct treatment via virtual platform in light of COVID-19 pandemic. I have confirmed the patient’s identity via the following (minimum of three) acceptable identifiers as per  Policy PH-9: , address, phone number, and email address.        SUBJECTIVE:  Pain has been higher since recent fall; ablation was delayed because he was deemed as not meeting criteria for having completed prior treatments however this is not the case; could be delayed to Oct/Nov. Will be starting Celebrex and ketamine again. Discussed recent experiences with valued activities leading to changes in pain experience and opportunities to engage in this in the future. Processed difficult experiences with medical system as patient with chronic pain. Discussed next steps and patient is open to restarting hypnosis next session for pain.     Progress: Good  Prognosis: Good     Duration of problem: years with recent improvement     Severity: moderate     OBJECTIVE:  Orientation & Cognition: Oriented x3. Thought processes normal and appropriate to situation.  Mood, Affect: Euthymic, frustrated at joe  Appearance: Optimal by patient standards.  Harm to self or others: Not reported  Substance abuse: Not reported  Psychiatric medication use: Not reported     IMPRESSION:     F41.9 Unspecified anxiety disorder  [id number].91.49 History of psychological trauma     Goals for Therapy: Sharing difficult feelings in a therapeutic environment, expanding upon current social supports in his life, and addressing anxiety. Patient is also interested in medication management for his  feelings of traumatization which he is worried will return after he ends ketamine treatment.      PLAN:     Patient will continue controlling what he can control and behaving flexibly in terms of his experience of pain. We will resume hypnosis next session.     Next apt: 9/16     --------------------------------------------  Other(s) present in the room: None.  --------------------------------------------  Time spent face-to-face with patient: 58 minutes

## 2024-08-28 NOTE — TELEPHONE ENCOUNTER
Patient called again because he hasn't received a phone call back regarding his hip . I stated Dr. Morfin team has been in clinic yesterday and is currently in a clinic right now that could be why he hasn't received a phone call back yet. But patient said he has appointment with Heriberto tomorrow for another issue , I stated he could come into the walk-in to be seen if it becomes to much pain and or he could also bring it up to Heriberto tomorrow and see if there is anything he can give him to hold him over as his appointment with  isnt until 9/17/24

## 2024-08-29 ENCOUNTER — TELEPHONE (OUTPATIENT)
Dept: ORTHOPEDIC SURGERY | Facility: CLINIC | Age: 54
End: 2024-08-29
Payer: COMMERCIAL

## 2024-08-29 ENCOUNTER — APPOINTMENT (OUTPATIENT)
Dept: ORTHOPEDIC SURGERY | Facility: CLINIC | Age: 54
End: 2024-08-29

## 2024-08-29 NOTE — TELEPHONE ENCOUNTER
"Spoke with pt. He stated that he cancelled his appt with DM for today for his left hip and rescheduled for next Thursday, stated that he wants to do PT and rest for a week.   Pt stated that he called last week to speak with someone from Dr. Matamoros's team with no return call. Pt stated that he felt \"abandoned\". Pt would like a call back from Vish.   "

## 2024-09-03 NOTE — PROGRESS NOTES
Chief Complaint   Patient presents with    Left Hip - Pain     Had a fall, xrays done 8/23/2024      History of Present Illness:  Patient is here today with right groin pain after a fall 2 weeks ago.  Patient had a right total hip replacement done with Dr. Matamoros 6/28/2024, states he was doing well up until his follow-up.  He was unable to get in to Dr. Matamoros's office for evaluation.  Reports pain with standing and hip flexion. He is taking 1000 mg of Tylenol daily. He is in severe acute pain after his fall. He's been able to walk with some discomfort.      DOS: 09/15/21 left total knee arthroplasty. Patient relates his left knee is doing okay.  He still has some occasional mechanical symptoms and does not catch or rub on occasion.  Does have a mild amount of crepitus.  Also recently underwent hip replacement states this done well.    Imaging:  X-ray right hip: Status post right hip replacement with no evidence of hardware failure     Assessment:   Right hip flexor strain    Plan:  1. We gave him a one time script today for acute pain relief. He will follow up with Dr. Matamoros for repeat evaluation.      Physical Exam:  Well-nourished, well-developed. No acute distress. Alert and oriented x3. Responds appropriately to questioning. Good mood. Normal affect.  Left knee:  Skin healthy and intact  No gross swelling or ecchymosis  Trace Effusion:   Hip flexes to 110 degrees internally rotates 20 degrees, externally rotates 30 degrees.  Pain with hip flexion.  Neurovascular exam normal distally  Palpable pedal pulse and good cap refill     Review of Systems:  GENERAL: Negative for malaise, significant weight loss, fever  MUSCULOSKELETAL: See HPI  NEURO:  Negative     Past Medical History:   Diagnosis Date    Anesthesia of skin     Numbness and tingling    Anxiety 09/21/2023    Arthritis Unk    Bilateral myopia 09/21/2023    Cervical radicular pain 09/21/2023    Chronic patellofemoral pain of left knee  09/21/2023    Complex regional pain syndrome type 1 of left lower extremity 09/21/2023    COVID-19     VACCINATED    Cubital tunnel syndrome on left 09/21/2023    Cubital tunnel syndrome on right 09/21/2023    Disease of intestine, unspecified     Bowel trouble    Erectile dysfunction 09/21/2023    Foot joint pain 09/21/2023    Fractures     Gastroparesis     GERD (gastroesophageal reflux disease)     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    History of psychological trauma 09/21/2023    Hyperlipidemia     Joint pain 09/21/2023    Kidney cysts 09/21/2023    Low back pain, unspecified 12/07/2022    Chronic low back pain without sciatica, unspecified back pain laterality    Low back pain, unspecified 08/09/2022    Lumbago    Lumbar spondylosis 09/21/2023    Lung nodule 09/21/2023    RIGHT    Metatarsalgia of left foot 09/21/2023    Myopia, bilateral 02/19/2019    Myopia of both eyes with astigmatism and presbyopia    Osteochondral defect of patella 09/21/2023    Osteoporosis     Other forms of angina pectoris ([facility])     Stable angina pectoris    Other spondylosis, cervical region 09/21/2023    Patellofemoral arthritis 09/21/2023    Personal history of other diseases of the musculoskeletal system and connective tissue     History of arthritis    Personal history of other specified conditions     History of seizure    Reflex sympathetic dystrophy 09/21/2023    Scapular dyskinesis 09/21/2023    Seizure disorder (Multi)     D/T TRAMADOL REACTION - 25 YEARS AGO    Shoulder impingement 09/21/2023    Suspected sleep apnea 09/21/2023    Trauma and stressor-related disorder 09/21/2023    Trochanteric bursitis of both hips 09/22/2023    Unspecified disorder of ear, unspecified ear     Ear, nose and throat disorder    Wears glasses        Medication Documentation Review Audit       Reviewed by Mavis Niño MA (Medical Assistant) on 08/06/24 at 0840      Medication Order Taking? Sig Documenting Provider Last Dose Status   acetaminophen  (Tylenol) 500 mg tablet [id number] No Take 2 tablets (1,000 mg) by mouth every 8 hours if needed for mild pain (1 - 3). Historical Provider, MD Taking Active   albuterol (Ventolin HFA) 90 mcg/actuation inhaler [id number] No Inhale 2 puffs every 4 hours if needed for wheezing. Historical Provider, MD Taking Active   atorvastatin (Lipitor) 80 mg tablet [id number] No Take 1 tablet (80 mg) by mouth once daily at bedtime. Historical Provider, MD Taking Active   baclofen (Lioresal) 20 mg tablet [id number] No Take 1 tablet (20 mg) by mouth 3 times a day. Olivia Alonzo MD Taking Active   chlorhexidine (Peridex) 0.12 % solution [id number] No Use 15 mL in the mouth or throat if needed for wound care for up to 2 doses. 15 mls swish and spit the night before surgery and 15mls swish and spit the morning of surgery do not swallow MIKAELA Pagan-CNP Taking Active   diazePAM (Valium) 5 mg tablet [id number] No Take 1 tablet (5 mg) by mouth every 12 hours if needed for anxiety. Historical MD Isaac Taking Active   diclofenac epolamine 1.3 % patch [id number] No Place 1 patch over 12 hours on the skin 2 times a day. Olivia Alonzo MD Taking Active   diphenhydrAMINE (Sominex) 25 mg tablet [id number] No Take 1 tablet (25 mg) by mouth if needed for sleep. Historical Provider, MD Taking Active   DULoxetine (Cymbalta) 60 mg DR capsule [id number] No Take 1 capsule (60 mg) by mouth once daily. Historical Provider, MD Taking Active   fluticasone (Flonase) 50 mcg/actuation nasal spray [id number] No Administer 1 spray into each nostril once daily as needed. Historical Provider, MD Taking Active   ibuprofen 800 mg tablet [id number] No Take 1 tablet (800 mg) by mouth every 8 hours if needed for moderate pain (4 - 6).   Patient taking differently: Take 1 tablet (800 mg) by mouth 3 times a day.    Jeanette Crowell APRN-CNP Taking Active   ketamine HCl (ketamine, bulk,) 100 % powder [id number] No Ketamine 100 mg/mL + lidocaine 40 mg/mL 1 puff 4 times  "daily as needed, DSP#20 mL x 5 refills Olivia Alonzo MD Taking Active   lidocaine (Lidoderm) 5 % patch [id number]  Place 1 patch over 12 hours on the skin once daily. Olivia Alonzo MD  Active   naloxone (Narcan) 4 mg/0.1 mL nasal spray [id number] No Instil 1 spray intranasally for opioid overdose; repeat in 5 inutes if no response Damian Spicer MD [facility] Taking Active   omeprazole (PriLOSEC) 40 mg DR capsule [id number] No Take 1 capsule (40 mg) by mouth 2 times a day. For 90 days Historical Provider, MD Taking Active     Discontinued 08/02/24 0944   oxyCODONE (Roxicodone) 5 mg immediate release tablet [id number]  Take 1 tablet (5 mg) by mouth every 6 hours if needed for moderate pain (4 - 6) or severe pain (7 - 10) for up to 7 days. Gagandeep Matamoros MD  Active   pregabalin (Lyrica) 300 mg capsule [id number] No Take 1 capsule (300 mg) by mouth 2 times a day. Vish Barboza PA-C Taking Active   urea (Carmol) 40 % cream [id number] No Apply 1 Application topically once daily. feet Historical Provider, MD Taking Active                    Allergies   Allergen Reactions    Morphine Hives, Rash and Shortness of breath     Tolerates hydromorphone    Ultram [Tramadol] Seizure    Alprazolam Psychosis     \"GOES CRAZY\", psychosis    Ketorolac Seizure    Fish Containing Products Hives and Itching     IT WAS A PROCESSED FISH MEAL, BUT STATES EATS FRESH AND FROZEN FISH ALL THE TIME.       Social History     Socioeconomic History    Marital status: Single     Spouse name: Not on file    Number of children: Not on file    Years of education: Not on file    Highest education level: Not on file   Occupational History    Not on file   Tobacco Use    Smoking status: Never    Smokeless tobacco: Never    Tobacco comments:     It killed my mother smoking and she left me with a gift a nodule on my right lung   Vaping Use    Vaping status: Never Used   Substance and Sexual Activity    Alcohol use: Not Currently    Drug use: Not Currently     " Types: Other     Comment: Ketamine treatments for pain    Sexual activity: Not Currently     Partners: Female     Birth control/protection: None   Other Topics Concern    Not on file   Social History Narrative    Not on file     Social Determinants of Health     Financial Resource Strain: Low Risk  (6/28/2024)    Overall Financial Resource Strain (CARDIA)     Difficulty of Paying Living Expenses: Not hard at all   Food Insecurity: No Food Insecurity (6/28/2024)    Hunger Vital Sign     Worried About Running Out of Food in the Last Year: Never true     Ran Out of Food in the Last Year: Never true   Transportation Needs: No Transportation Needs (7/17/2024)    OASIS : Transportation     Lack of Transportation (Medical): No     Lack of Transportation (Non-Medical): No     Patient Unable or Declines to Respond: No   Physical Activity: Insufficiently Active (6/28/2024)    Exercise Vital Sign     Days of Exercise per Week: 7 days     Minutes of Exercise per Session: 10 min   Stress: Stress Concern Present (6/28/2024)    [employer] - Occupational Stress Questionnaire     Feeling of Stress : Rather much   Social Connections: Feeling Socially Integrated (7/17/2024)    OASIS : Social Isolation     Frequency of experiencing loneliness or isolation: Never   Recent Concern: Social Connections - Socially Isolated (5/16/2024)    Received from [employer]    Social Connection and Isolation Panel [NHANES]     Frequency of Communication with Friends and Family: Once a week     Frequency of Social Gatherings with Friends and Family: Never     Attends [employer] Services: Never     Active Member of Clubs or Organizations: No     Attends Club or Organization Meetings: Never     Marital Status: Living with partner   Intimate Partner Violence: Not At Risk (6/28/2024)    Humiliation, Afraid, Rape, and Kick questionnaire     Fear of Current or Ex-Partner: No     Emotionally Abused: No      Physically Abused: No     Sexually Abused: No   Housing Stability: Low Risk  (6/28/2024)    Housing Stability Vital Sign     Unable to Pay for Housing in the Last Year: No     Number of Places Lived in the Last Year: 1     Unstable Housing in the Last Year: No   Recent Concern: Housing Stability - High Risk (5/16/2024)    Received from [employer], [employer]    Housing Stability Vital Sign     Unable to Pay for Housing in the Last Year: Yes     Number of Places Lived in the Last Year: 1     Unstable Housing in the Last Year: No       Past Surgical History:   Procedure Laterality Date    ACHILLES TENDON SURGERY  1978 2016    CT ABDOMEN PELVIS ANGIOGRAM W AND/OR WO IV CONTRAST  03/18/2020    CT ABDOMEN PELVIS ANGIOGRAM W AND/OR WO IV CONTRAST 3/18/2020 [facility]    CT ANGIO NECK  12/16/2022    CT NECK ANGIO W AND WO IV CONTRAST 12/16/2022 [facility]    CT HEAD ANGIO W AND WO IV CONTRAST  12/16/2022    CT HEAD ANGIO W AND WO IV CONTRAST 12/16/2022 [facility]    HIP SURGERY Left     REPLACEMENT    JOINT REPLACEMENT      LEFT KNEE    KNEE ARTHROPLASTY Bilateral 06/13/2017    Knee Arthroplasty    OTHER SURGICAL HISTORY  01/06/2022    Ulnar nerve neuroplasty    OTHER SURGICAL HISTORY Bilateral 07/26/2019    Foot surgery  -- HARDWARE    OTHER SURGICAL HISTORY Bilateral     BILATERAL ANKLE SCOPE    OTHER SURGICAL HISTORY      POP ABDOMINAL PROCEDURE    PLANTAR FASCIA RELEASE      SHOULDER SURGERY Bilateral 06/13/2017    Shoulder Surgery       CT ABDOMEN PELVIS W IV CONTRAST    Result Date: 1/22/2024  * * *Final Report* * * DATE OF EXAM: Jan 22 2024  8:50AM   [facility]   0530  -  CT ABD/PEL W IVCON  / ACCESSION #  [id number] PROCEDURE REASON: Abdominal pain, acute, nonlocalized      * * * * Physician Interpretation * * * *  EXAMINATION:  CT ABDOMEN AND PELVIS WITH IV CONTRAST CLINICAL HISTORY: Acute abdominal pain TECHNIQUE: CT of the abdomen and pelvis was performed using standard technique,  scanning from just above the dome of the diaphragm to the symphysis pubis. MQ:  CTAP_3 Contrast: IV:  100 ml of Omnipaque 350 CT Radiation dose: Integrated Dose-length product (DLP) for this visit =   269 mGy*cm. CT Dose Reduction Employed: Automated exposure control(AEC) and iterative recon COMPARISON: 12/26/2023 RESULT: Liver: No mass. Biliary: No bile duct dilation.  The gallbladder is within normal limits for the modality. Spleen: No mass. No splenomegaly. Pancreas: No mass or duct dilation. Adrenals: No mass. Kidneys: There are symmetric bilateral nephrograms.  In the medial right upper renal pole there is a 4.3 cm exophytic hypodensity consistent with a cyst. GI tract: No dilation or wall thickening. There is a small sliding hiatal hernia.  There is colonic residue with a moderate to large stool burden.   There is no acute appendicitis or diverticulitis. Lymph nodes: No abdominal or pelvic lymphadenopathy. Mesentery/Peritoneum: No ascites or mass. Retroperitoneum: No mass. Vasculature:  - Abdominal aorta and iliac arteries: Atherosclerotic calcifications without aneurysm.  - Celiac and SMA: Questionable mild narrowing at the origin of the celiac axis.  - Portal venous system (SMV, splenic vein, portal vein and branches): Patent.  - Hepatic veins: Patent. Pelvis: No mass, ascites or fluid collection. There is artifact from a left hip arthroplasty. Bones/Soft Tissues: There are no aggressive osseous lesions identified. Lower thorax: Bibasilar scarring.  (topogram) images: No additional findings.    IMPRESSION: Stable appearance.  There is no acute intra-abdominal process identified. : KERLINE   Transcribe Date/Time: Jan 22 2024  8:50A Dictated by : INES ROLON MD This examination was interpreted and the report reviewed and electronically signed by: INES ROLON MD on Jan 22 2024  9:05AM  EST    US GALLBLADDER    Result Date: 1/22/2024  * * *Final Report* * * DATE OF EXAM: Jan 22  2024  6:48AM   U   1032  -  US ABD RIGHT UPPER QUADRANT  / ACCESSION #  [id number] PROCEDURE REASON: RUQ pain, no fever, no elev WBC      * * * * Physician Interpretation * * * *  EXAMINATION:   RIGHT UPPER QUADRANT ULTRASOUND CLINICAL HISTORY: RIGHT upper quadrant pain TECHNIQUE:  Sonography of the right upper quadrant  was performed.   Images were obtained and stored in a permanent archive. MQ:  URUQ_2 COMPARISON: CT abdomen pelvis 12/26/2023 RESULT: Pancreas: Not well visualized Liver:      Echotexture:  Homogeneous      Echogenicity:  Increased      Surface contour:  Smooth      Lesions:  None. Biliary: No intrahepatic biliary duct dilation.      CBD: 0.3 cm at the hilum.      Gallbladder: Normal caliber           -Contents:  No cholelithiasis           -Wall:  Normal           -Other:  No pericholecystic fluid.  Negative sonographic Bautista sign. Right Kidney: No hydronephrosis.  Anechoic upper pole cyst with lobular borders measures 3.1 x 2.6 x 3.8 cm.  This appears unchanged from the prior CT. Ascites: None.    IMPRESSION: Hepatic steatosis.  Otherwise, no acute sonographic abnormality in the RIGHT upper quadrant. : PSCB   Transcribe Date/Time: Jan 22 2024  6:55A Dictated by : DEE MCKEON MD This examination was interpreted and the report reviewed and electronically signed by: DEE MCKEON MD on Jan 22 2024  6:59AM  EST    XR CHEST 1 VIEW    Result Date: 1/22/2024  * * *Final Report* * * DATE OF EXAM: Jan 22 2024  6:22AM   VHX   5376  -  XR CHEST 1V FRONTAL PORT  / ACCESSION #  [id number] PROCEDURE REASON: Tachycardia      * * * * Physician Interpretation * * * *  EXAMINATION:  CHEST RADIOGRAPH (SINGLE VIEW AP OR PA) CLINICAL INFORMATION (AS PROVIDED BY ORDERING CLINICIAN) :  Tachycardia Comparison:  9/3/2023 RESULT: Lines, tubes, and devices:  N/A Lungs and pleura:  No confluent infiltrate, large pleural effusion or pneumothorax.   Cardiomediastinal silhouette:  Within normal limits. Other:   No acute bony abnormality identified.    IMPRESSION: No significant acute radiographic abnormality. : PSCB   Transcribe Date/Time: Jan 22 2024  6:28A Dictated by : MIRNA CHAO MD This examination was interpreted and the report reviewed and electronically signed by: MIRNA CHAO MD on Jan 22 2024  6:30AM  EST                             Scribe Attestation  By signing my name below, IArgelia, Scribe   attest that this documentation has been prepared under the direction and in the presence of Royce Louis MD.

## 2024-09-05 ENCOUNTER — OFFICE VISIT (OUTPATIENT)
Dept: ORTHOPEDIC SURGERY | Facility: CLINIC | Age: 54
End: 2024-09-05
Payer: COMMERCIAL

## 2024-09-05 DIAGNOSIS — Z96.641 S/P TOTAL RIGHT HIP ARTHROPLASTY: Primary | ICD-10-CM

## 2024-09-05 PROCEDURE — 99214 OFFICE O/P EST MOD 30 MIN: CPT | Performed by: ORTHOPAEDIC SURGERY

## 2024-09-05 RX ORDER — OXYCODONE AND ACETAMINOPHEN 5; 325 MG/1; MG/1
1 TABLET ORAL EVERY 6 HOURS PRN
Qty: 15 TABLET | Refills: 0 | Status: SHIPPED | OUTPATIENT
Start: 2024-09-05 | End: 2024-09-12

## 2024-09-07 ENCOUNTER — APPOINTMENT (OUTPATIENT)
Dept: RADIOLOGY | Facility: HOSPITAL | Age: 54
End: 2024-09-07
Payer: COMMERCIAL

## 2024-09-07 ENCOUNTER — HOSPITAL ENCOUNTER (EMERGENCY)
Facility: HOSPITAL | Age: 54
Discharge: HOME | End: 2024-09-07
Attending: STUDENT IN AN ORGANIZED HEALTH CARE EDUCATION/TRAINING PROGRAM
Payer: COMMERCIAL

## 2024-09-07 ENCOUNTER — APPOINTMENT (OUTPATIENT)
Dept: CARDIOLOGY | Facility: HOSPITAL | Age: 54
End: 2024-09-07
Payer: COMMERCIAL

## 2024-09-07 VITALS
SYSTOLIC BLOOD PRESSURE: 129 MMHG | OXYGEN SATURATION: 98 % | BODY MASS INDEX: 27.4 KG/M2 | HEIGHT: 68 IN | DIASTOLIC BLOOD PRESSURE: 82 MMHG | TEMPERATURE: 98.4 F | RESPIRATION RATE: 14 BRPM | HEART RATE: 72 BPM | WEIGHT: 180.78 LBS

## 2024-09-07 DIAGNOSIS — R07.9 CHEST PAIN, UNSPECIFIED TYPE: ICD-10-CM

## 2024-09-07 DIAGNOSIS — M54.2 NECK PAIN: Primary | ICD-10-CM

## 2024-09-07 LAB
ALBUMIN SERPL BCP-MCNC: 4.2 G/DL (ref 3.4–5)
ALP SERPL-CCNC: 64 U/L (ref 33–120)
ALT SERPL W P-5'-P-CCNC: 17 U/L (ref 10–52)
ANION GAP SERPL CALC-SCNC: 9 MMOL/L (ref 10–20)
AST SERPL W P-5'-P-CCNC: 14 U/L (ref 9–39)
BASOPHILS # BLD AUTO: 0.05 X10*3/UL (ref 0–0.1)
BASOPHILS NFR BLD AUTO: 0.9 %
BILIRUB SERPL-MCNC: 0.4 MG/DL (ref 0–1.2)
BUN SERPL-MCNC: 15 MG/DL (ref 6–23)
CALCIUM SERPL-MCNC: 9.1 MG/DL (ref 8.6–10.3)
CARDIAC TROPONIN I PNL SERPL HS: 29 NG/L (ref 0–20)
CARDIAC TROPONIN I PNL SERPL HS: 31 NG/L (ref 0–20)
CHLORIDE SERPL-SCNC: 107 MMOL/L (ref 98–107)
CO2 SERPL-SCNC: 29 MMOL/L (ref 21–32)
CREAT SERPL-MCNC: 0.79 MG/DL (ref 0.5–1.3)
EGFRCR SERPLBLD CKD-EPI 2021: >90 ML/MIN/1.73M*2
EOSINOPHIL # BLD AUTO: 0.35 X10*3/UL (ref 0–0.7)
EOSINOPHIL NFR BLD AUTO: 6.5 %
ERYTHROCYTE [DISTWIDTH] IN BLOOD BY AUTOMATED COUNT: 14.5 % (ref 11.5–14.5)
GLUCOSE SERPL-MCNC: 76 MG/DL (ref 74–99)
HCT VFR BLD AUTO: 42.3 % (ref 41–52)
HGB BLD-MCNC: 13.4 G/DL (ref 13.5–17.5)
IMM GRANULOCYTES # BLD AUTO: 0.01 X10*3/UL (ref 0–0.7)
IMM GRANULOCYTES NFR BLD AUTO: 0.2 % (ref 0–0.9)
INR PPP: 1.1 (ref 0.9–1.1)
LYMPHOCYTES # BLD AUTO: 1.56 X10*3/UL (ref 1.2–4.8)
LYMPHOCYTES NFR BLD AUTO: 29 %
MAGNESIUM SERPL-MCNC: 1.7 MG/DL (ref 1.6–2.4)
MCH RBC QN AUTO: 27 PG (ref 26–34)
MCHC RBC AUTO-ENTMCNC: 31.7 G/DL (ref 32–36)
MCV RBC AUTO: 85 FL (ref 80–100)
MONOCYTES # BLD AUTO: 0.75 X10*3/UL (ref 0.1–1)
MONOCYTES NFR BLD AUTO: 13.9 %
NEUTROPHILS # BLD AUTO: 2.66 X10*3/UL (ref 1.2–7.7)
NEUTROPHILS NFR BLD AUTO: 49.5 %
NRBC BLD-RTO: 0 /100 WBCS (ref 0–0)
PLATELET # BLD AUTO: 210 X10*3/UL (ref 150–450)
POTASSIUM SERPL-SCNC: 4 MMOL/L (ref 3.5–5.3)
PROT SERPL-MCNC: 6.8 G/DL (ref 6.4–8.2)
PROTHROMBIN TIME: 12.3 SECONDS (ref 9.8–12.8)
RBC # BLD AUTO: 4.96 X10*6/UL (ref 4.5–5.9)
SODIUM SERPL-SCNC: 141 MMOL/L (ref 136–145)
WBC # BLD AUTO: 5.4 X10*3/UL (ref 4.4–11.3)

## 2024-09-07 PROCEDURE — 2500000001 HC RX 250 WO HCPCS SELF ADMINISTERED DRUGS (ALT 637 FOR MEDICARE OP)

## 2024-09-07 PROCEDURE — 84484 ASSAY OF TROPONIN QUANT: CPT

## 2024-09-07 PROCEDURE — 99285 EMERGENCY DEPT VISIT HI MDM: CPT | Performed by: STUDENT IN AN ORGANIZED HEALTH CARE EDUCATION/TRAINING PROGRAM

## 2024-09-07 PROCEDURE — 36415 COLL VENOUS BLD VENIPUNCTURE: CPT

## 2024-09-07 PROCEDURE — 80053 COMPREHEN METABOLIC PANEL: CPT

## 2024-09-07 PROCEDURE — 85610 PROTHROMBIN TIME: CPT

## 2024-09-07 PROCEDURE — 93005 ELECTROCARDIOGRAM TRACING: CPT

## 2024-09-07 PROCEDURE — 2500000005 HC RX 250 GENERAL PHARMACY W/O HCPCS

## 2024-09-07 PROCEDURE — 83735 ASSAY OF MAGNESIUM: CPT

## 2024-09-07 PROCEDURE — 93010 ELECTROCARDIOGRAM REPORT: CPT | Performed by: STUDENT IN AN ORGANIZED HEALTH CARE EDUCATION/TRAINING PROGRAM

## 2024-09-07 PROCEDURE — 71045 X-RAY EXAM CHEST 1 VIEW: CPT

## 2024-09-07 PROCEDURE — 85025 COMPLETE CBC W/AUTO DIFF WBC: CPT

## 2024-09-07 PROCEDURE — 99284 EMERGENCY DEPT VISIT MOD MDM: CPT | Mod: 25

## 2024-09-07 RX ORDER — LIDOCAINE 560 MG/1
1 PATCH PERCUTANEOUS; TOPICAL; TRANSDERMAL ONCE
Status: DISCONTINUED | OUTPATIENT
Start: 2024-09-07 | End: 2024-09-07 | Stop reason: HOSPADM

## 2024-09-07 RX ORDER — ACETAMINOPHEN 325 MG/1
650 TABLET ORAL ONCE
Status: COMPLETED | OUTPATIENT
Start: 2024-09-07 | End: 2024-09-07

## 2024-09-07 RX ORDER — LIDOCAINE 50 MG/G
1 PATCH TOPICAL DAILY
Qty: 15 PATCH | Refills: 0 | Status: SHIPPED | OUTPATIENT
Start: 2024-09-07 | End: 2024-09-22

## 2024-09-07 RX ORDER — ACETAMINOPHEN 325 MG/1
975 TABLET ORAL ONCE
Status: DISCONTINUED | OUTPATIENT
Start: 2024-09-07 | End: 2024-09-07

## 2024-09-07 RX ORDER — OXYCODONE AND ACETAMINOPHEN 5; 325 MG/1; MG/1
1 TABLET ORAL ONCE
Status: COMPLETED | OUTPATIENT
Start: 2024-09-07 | End: 2024-09-07

## 2024-09-07 RX ORDER — METHOCARBAMOL 500 MG/1
500 TABLET, FILM COATED ORAL ONCE
Status: COMPLETED | OUTPATIENT
Start: 2024-09-07 | End: 2024-09-07

## 2024-09-07 RX ADMIN — OXYCODONE HYDROCHLORIDE AND ACETAMINOPHEN 1 TABLET: 5; 325 TABLET ORAL at 15:31

## 2024-09-07 RX ADMIN — METHOCARBAMOL TABLETS 500 MG: 500 TABLET, COATED ORAL at 15:32

## 2024-09-07 RX ADMIN — ACETAMINOPHEN 650 MG: 325 TABLET ORAL at 15:33

## 2024-09-07 RX ADMIN — LIDOCAINE 4% 1 PATCH: 40 PATCH TOPICAL at 15:32

## 2024-09-07 ASSESSMENT — PAIN DESCRIPTION - PAIN TYPE
TYPE: CHRONIC PAIN
TYPE: ACUTE PAIN

## 2024-09-07 ASSESSMENT — PAIN SCALES - GENERAL
PAINLEVEL_OUTOF10: 10 - WORST POSSIBLE PAIN
PAINLEVEL_OUTOF10: 9
PAINLEVEL_OUTOF10: 10 - WORST POSSIBLE PAIN
PAINLEVEL_OUTOF10: 10 - WORST POSSIBLE PAIN

## 2024-09-07 ASSESSMENT — PAIN DESCRIPTION - LOCATION
LOCATION: NECK

## 2024-09-07 ASSESSMENT — LIFESTYLE VARIABLES
EVER HAD A DRINK FIRST THING IN THE MORNING TO STEADY YOUR NERVES TO GET RID OF A HANGOVER: NO
TOTAL SCORE: 0
EVER FELT BAD OR GUILTY ABOUT YOUR DRINKING: NO
HAVE PEOPLE ANNOYED YOU BY CRITICIZING YOUR DRINKING: NO
HAVE YOU EVER FELT YOU SHOULD CUT DOWN ON YOUR DRINKING: NO

## 2024-09-07 ASSESSMENT — HEART SCORE
ECG: NORMAL
RISK FACTORS: 1-2 RISK FACTORS
TROPONIN: 1-3 TIMES NORMAL LIMIT
HISTORY: SLIGHTLY SUSPICIOUS
AGE: 45-64
HEART SCORE: 3

## 2024-09-07 ASSESSMENT — PAIN - FUNCTIONAL ASSESSMENT
PAIN_FUNCTIONAL_ASSESSMENT: 0-10
PAIN_FUNCTIONAL_ASSESSMENT: 0-10

## 2024-09-07 NOTE — ED PROVIDER NOTES
EMERGENCY DEPARTMENT ENCOUNTER      Pt Name: Akin Lynn  MRN: [id number]  Birthdate 1970  Date of evaluation: 9/7/2024  Provider: Mak Almodovar MD    CHIEF COMPLAINT       Chief Complaint   Patient presents with    Neck Pain     Some N/T down left arm and feels like [facility]'s         HISTORY OF PRESENT ILLNESS    HPI  Patient is a 54-year-old male history of CRPS, HLD, osteoporosis, gastroparesis presenting with chest pain and left arm tingling.  This occurred within the last hour while he was at [employer].  He has known stenosis issues in his neck and the numbness and tingling with neck pain occurs occasionally.  Pain is 10/10, sharp, radiating down the left arm, exacerbated with movement of the shoulder, constant, without consistent alleviating factors.  There is associated numbness and tingling.  Chest pain is 10/10, sharp, nonradiating, without consistent alleviating or exacerbating factors.  There is shortness of breath.  He has been evaluated for this several times before and has been referred to cardiology.  He is post to see them on the 19th.  He otherwise denies fever, chills, headache, lightheadedness, visual changes, nausea, vomiting, weakness.    Nursing Notes were reviewed.    PAST MEDICAL HISTORY     Past Medical History:   Diagnosis Date    Anesthesia of skin     Numbness and tingling    Anxiety 09/21/2023    Arthritis Unk    Bilateral myopia 09/21/2023    Cervical radicular pain 09/21/2023    Chronic patellofemoral pain of left knee 09/21/2023    Complex regional pain syndrome type 1 of left lower extremity 09/21/2023    COVID-19     VACCINATED    Cubital tunnel syndrome on left 09/21/2023    Cubital tunnel syndrome on right 09/21/2023    Disease of intestine, unspecified     Bowel trouble    Erectile dysfunction 09/21/2023    Foot joint pain 09/21/2023    Fractures     Gastroparesis     GERD (gastroesophageal reflux disease)     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    History of psychological trauma  09/21/2023    Hyperlipidemia     Joint pain 09/21/2023    Kidney cysts 09/21/2023    Low back pain, unspecified 12/07/2022    Chronic low back pain without sciatica, unspecified back pain laterality    Low back pain, unspecified 08/09/2022    Lumbago    Lumbar spondylosis 09/21/2023    Lung nodule 09/21/2023    RIGHT    Metatarsalgia of left foot 09/21/2023    Myopia, bilateral 02/19/2019    Myopia of both eyes with astigmatism and presbyopia    Osteochondral defect of patella 09/21/2023    Osteoporosis     Other forms of angina pectoris (CMS-[facility])     Stable angina pectoris    Other spondylosis, cervical region 09/21/2023    Patellofemoral arthritis 09/21/2023    Personal history of other diseases of the musculoskeletal system and connective tissue     History of arthritis    Personal history of other specified conditions     History of seizure    Reflex sympathetic dystrophy 09/21/2023    Scapular dyskinesis 09/21/2023    Seizure disorder (Multi)     D/T TRAMADOL REACTION - 25 YEARS AGO    Shoulder impingement 09/21/2023    Suspected sleep apnea 09/21/2023    Trauma and stressor-related disorder 09/21/2023    Trochanteric bursitis of both hips 09/22/2023    Unspecified disorder of ear, unspecified ear     Ear, nose and throat disorder    Wears glasses          SURGICAL HISTORY       Past Surgical History:   Procedure Laterality Date    ACHILLES TENDON SURGERY  1978 2016    CT ABDOMEN PELVIS ANGIOGRAM W AND/OR WO IV CONTRAST  03/18/2020    CT ABDOMEN PELVIS ANGIOGRAM W AND/OR WO IV CONTRAST 3/18/2020 [facility]    CT ANGIO NECK  12/16/2022    CT NECK ANGIO W AND WO IV CONTRAST 12/16/2022 [facility]    CT HEAD ANGIO W AND WO IV CONTRAST  12/16/2022    CT HEAD ANGIO W AND WO IV CONTRAST 12/16/2022 [facility]    HIP SURGERY Left     REPLACEMENT    JOINT REPLACEMENT      LEFT KNEE    KNEE ARTHROPLASTY Bilateral 06/13/2017    Knee Arthroplasty    OTHER SURGICAL HISTORY  01/06/2022    Ulnar  nerve neuroplasty    OTHER SURGICAL HISTORY Bilateral 07/26/2019    Foot surgery  -- HARDWARE    OTHER SURGICAL HISTORY Bilateral     BILATERAL ANKLE SCOPE    OTHER SURGICAL HISTORY      POP ABDOMINAL PROCEDURE    PLANTAR FASCIA RELEASE      SHOULDER SURGERY Bilateral 06/13/2017    Shoulder Surgery         CURRENT MEDICATIONS       Previous Medications    ACETAMINOPHEN (TYLENOL) 500 MG TABLET    Take 2 tablets (1,000 mg) by mouth every 8 hours if needed for mild pain (1 - 3).    ALBUTEROL (VENTOLIN HFA) 90 MCG/ACTUATION INHALER    Inhale 2 puffs every 4 hours if needed for wheezing.    ATORVASTATIN (LIPITOR) 80 MG TABLET    Take 1 tablet (80 mg) by mouth once daily at bedtime.    BACLOFEN (LIORESAL) 20 MG TABLET    Take 1 tablet (20 mg) by mouth 3 times a day.    CHLORHEXIDINE (PERIDEX) 0.12 % SOLUTION    Use 15 mL in the mouth or throat if needed for wound care for up to 2 doses. 15 mls swish and spit the night before surgery and 15mls swish and spit the morning of surgery do not swallow    DIAZEPAM (VALIUM) 5 MG TABLET    Take 1 tablet (5 mg) by mouth every 12 hours if needed for anxiety.    DICLOFENAC EPOLAMINE 1.3 % PATCH    Place 1 patch over 12 hours on the skin 2 times a day.    DIPHENHYDRAMINE (SOMINEX) 25 MG TABLET    Take 1 tablet (25 mg) by mouth if needed for sleep.    DULOXETINE (CYMBALTA) 60 MG DR CAPSULE    Take 1 capsule (60 mg) by mouth once daily.    FLUTICASONE (FLONASE) 50 MCG/ACTUATION NASAL SPRAY    Administer 1 spray into each nostril once daily as needed.    IBUPROFEN 800 MG TABLET    Take 1 tablet (800 mg) by mouth every 8 hours if needed for moderate pain (4 - 6).    KETAMINE HCL (KETAMINE, BULK,) 100 % POWDER    Ketamine 100 mg/mL + lidocaine 40 mg/mL 1 puff 4 times daily as needed, DSP#20 mL x 5 refills    NALOXONE (NARCAN) 4 MG/0.1 ML NASAL SPRAY    Instil 1 spray intranasally for opioid overdose; repeat in 5 inutes if no response    OMEPRAZOLE (PRILOSEC) 40 MG DR CAPSULE    Take 1  capsule (40 mg) by mouth 2 times a day. For 90 days    OXYCODONE-ACETAMINOPHEN (PERCOCET) 5-325 MG TABLET    Take 1 tablet by mouth every 6 hours if needed for severe pain (7 - 10) for up to 7 days.    PREGABALIN (LYRICA) 300 MG CAPSULE    Take 1 capsule (300 mg) by mouth 2 times a day.    UREA (CARMOL) 40 % CREAM    Apply 1 Application topically once daily. feet       ALLERGIES     Morphine, Ultram [tramadol], Alprazolam, Ketorolac, and Fish containing products    FAMILY HISTORY       Family History   Problem Relation Name Age of Onset    Lung cancer Mother      Glaucoma Father      Skin cancer Father      Hypertension Other mul fam mem     Heart disease Other mul fam mem           SOCIAL HISTORY       Social History     Socioeconomic History    Marital status: Single   Tobacco Use    Smoking status: Never    Smokeless tobacco: Never    Tobacco comments:     It killed my mother smoking and she left me with a gift a nodule on my right lung   Vaping Use    Vaping status: Never Used   Substance and Sexual Activity    Alcohol use: Not Currently    Drug use: Not Currently     Types: Other     Comment: Ketamine treatments for pain    Sexual activity: Not Currently     Partners: Female     Birth control/protection: None     Social Determinants of Health     Financial Resource Strain: Low Risk  (6/28/2024)    Overall Financial Resource Strain (CARDIA)     Difficulty of Paying Living Expenses: Not hard at all   Food Insecurity: No Food Insecurity (6/28/2024)    Hunger Vital Sign     Worried About Running Out of Food in the Last Year: Never true     Ran Out of Food in the Last Year: Never true   Transportation Needs: No Transportation Needs (7/17/2024)    OASIS : Transportation     Lack of Transportation (Medical): No     Lack of Transportation (Non-Medical): No     Patient Unable or Declines to Respond: No   Physical Activity: Insufficiently Active (6/28/2024)    Exercise Vital Sign     Days of Exercise per Week: 7  days     Minutes of Exercise per Session: 10 min   Stress: Stress Concern Present (6/28/2024)    [employer] [place] [employer] - Occupational Stress Questionnaire     Feeling of Stress : Rather much   Social Connections: Feeling Socially Integrated (7/17/2024)    OASIS : Social Isolation     Frequency of experiencing loneliness or isolation: Never   Recent Concern: Social Connections - Socially Isolated (5/16/2024)    Received from [employer]    Social Connection and Isolation Panel [NHANES]     Frequency of Communication with Friends and Family: Once a week     Frequency of Social Gatherings with Friends and Family: Never     Attends [employer] Services: Never     Active Member of Clubs or Organizations: No     Attends Club or Organization Meetings: Never     Marital Status: Living with partner   Intimate Partner Violence: Not At Risk (6/28/2024)    Humiliation, Afraid, Rape, and Kick questionnaire     Fear of Current or Ex-Partner: No     Emotionally Abused: No     Physically Abused: No     Sexually Abused: No   Housing Stability: Low Risk  (6/28/2024)    Housing Stability Vital Sign     Unable to Pay for Housing in the Last Year: No     Number of Places Lived in the Last Year: 1     Unstable Housing in the Last Year: No   Recent Concern: Housing Stability - High Risk (5/16/2024)    Received from [employer]    Housing Stability Vital Sign     Unable to Pay for Housing in the Last Year: Yes     Number of Places Lived in the Last Year: 1     Unstable Housing in the Last Year: No       SCREENINGS                        PHYSICAL EXAM    (up to 7 for level 4, 8 or more for level 5)     ED Triage Vitals [09/07/24 1447]   Temperature Heart Rate Respirations BP   36.9 °C (98.4 °F) 82 16 (!) 163/95      Pulse Ox Temp Source Heart Rate Source Patient Position   99 % Tympanic Monitor Sitting      BP Location FiO2 (%)     Right arm --       Physical Exam  Constitutional:        General: He is not in acute distress.     Appearance: He is not toxic-appearing.   HENT:      Head: Normocephalic and atraumatic.      Nose: Nose normal.      Mouth/Throat:      Mouth: Mucous membranes are moist.      Pharynx: Oropharynx is clear.   Eyes:      Extraocular Movements: Extraocular movements intact.      Conjunctiva/sclera: Conjunctivae normal.   Neck:      Comments: No midline tenderness.  No palpable step-offs or deformities of the cervical spine.  Tight left-sided paraspinal muscles  Cardiovascular:      Rate and Rhythm: Normal rate and regular rhythm.      Pulses: Normal pulses.      Heart sounds: Normal heart sounds.   Pulmonary:      Effort: Pulmonary effort is normal. No respiratory distress.      Breath sounds: Normal breath sounds.   Abdominal:      General: There is no distension.      Palpations: Abdomen is soft.   Musculoskeletal:         General: No deformity or signs of injury.      Cervical back: Normal range of motion and neck supple.      Right lower leg: No edema.      Left lower leg: No edema.   Skin:     General: Skin is warm and dry.   Neurological:      Comments: Patient reports diminished sensation to physician touch in the left arm compared to the right          DIAGNOSTIC RESULTS     LABS:  Labs Reviewed   CBC WITH AUTO DIFFERENTIAL - Abnormal       Result Value    WBC 5.4      nRBC 0.0      RBC 4.96      Hemoglobin 13.4 (*)     Hematocrit 42.3      MCV 85      MCH 27.0      MCHC 31.7 (*)     RDW 14.5      Platelets 210      Neutrophils % 49.5      Immature Granulocytes %, Automated 0.2      Lymphocytes % 29.0      Monocytes % 13.9      Eosinophils % 6.5      Basophils % 0.9      Neutrophils Absolute 2.66      Immature Granulocytes Absolute, Automated 0.01      Lymphocytes Absolute 1.56      Monocytes Absolute 0.75      Eosinophils Absolute 0.35      Basophils Absolute 0.05     COMPREHENSIVE METABOLIC PANEL - Abnormal    Glucose 76      Sodium 141      Potassium 4.0       Chloride 107      Bicarbonate 29      Anion Gap 9 (*)     Urea Nitrogen 15      Creatinine 0.79      eGFR >90      Calcium 9.1      Albumin 4.2      Alkaline Phosphatase 64      Total Protein 6.8      AST 14      Bilirubin, Total 0.4      ALT 17     SERIAL TROPONIN-INITIAL - Abnormal    Troponin I, High Sensitivity 29 (*)     Narrative:     Less than 99th percentile of normal range cutoff-  Female and children under 18 years old <14 ng/L; Male <21 ng/L: Negative  Repeat testing should be performed if clinically indicated.     Female and children under 18 years old 14-50 ng/L; Male 21-50 ng/L:  Consistent with possible cardiac damage and possible increased clinical   risk. Serial measurements may help to assess extent of myocardial damage.     >50 ng/L: Consistent with cardiac damage, increased clinical risk and  myocardial infarction. Serial measurements may help assess extent of   myocardial damage.      NOTE: Children less than 1 year old may have higher baseline troponin   levels and results should be interpreted in conjunction with the overall   clinical context.     NOTE: Troponin I testing is performed using a different   testing methodology at [facility] than at other   [facility]. Direct result comparisons should only   be made within the same method.   SERIAL TROPONIN, 1 HOUR - Abnormal    Troponin I, High Sensitivity 31 (*)     Narrative:     Less than 99th percentile of normal range cutoff-  Female and children under 18 years old <14 ng/L; Male <21 ng/L: Negative  Repeat testing should be performed if clinically indicated.     Female and children under 18 years old 14-50 ng/L; Male 21-50 ng/L:  Consistent with possible cardiac damage and possible increased clinical   risk. Serial measurements may help to assess extent of myocardial damage.     >50 ng/L: Consistent with cardiac damage, increased clinical risk and  myocardial infarction. Serial measurements may help assess extent of    myocardial damage.      NOTE: Children less than 1 year old may have higher baseline troponin   levels and results should be interpreted in conjunction with the overall   clinical context.     NOTE: Troponin I testing is performed using a different   testing methodology at [facility]] than at other   [facility] hospitals. Direct result comparisons should only   be made within the same method.   MAGNESIUM - Normal    Magnesium 1.70     PROTIME-INR - Normal    Protime 12.3      INR 1.1     TROPONIN SERIES- (INITIAL, 1 HR)    Narrative:     The following orders were created for panel order Troponin I Series, High Sensitivity (0, 1 HR).  Procedure                               Abnormality         Status                     ---------                               -----------         ------                     Troponin I, High Sensiti...[[id number]]  Abnormal            Final result               Troponin, High Sensitivi...[[id number]]  Abnormal            Final result                 Please view results for these tests on the individual orders.       All other labs were within normal range or not returned as of this dictation.    Imaging  XR chest 1 view   Final Result   No acute radiographic abnormality        MACRO:   None.        Signed by: Betty Bryan 9/7/2024 3:37 PM   Dictation workstation:   [id number]           Procedures  Procedures     EMERGENCY DEPARTMENT COURSE/MDM:     ED Course as of 09/07/24 1724   Sat Sep 07, 2024   1503 EKG taken at 1458 on 7 September 2024 showing normal sinus rate and rhythm, normal axis, normal intervals, no acute ST elevation or depression [DS]      ED Course User Index  [DS] Robin Iglesias MD         Diagnoses as of 09/07/24 1724   Neck pain   Chest pain, unspecified type        Medical Decision Making  History obtained from the patient.  Records including labs, imaging, notes reviewed.  Chest pain concerning for possible ACS, musculoskeletal pain, cardiac workup  initiated.  Neck pain is consistent with his known cervical disease, no evidence of stroke at this time.  Further workup deferred at this time.  Patient given Tylenol, Robaxin, his home Percocet dose, and a lidocaine patch with improvement in his pain.  Cardiac workup notable for mildly elevated troponin of 29, stable at 31 on repeat.  Troponin is notably chronically elevated per record review.  EKG with no concerning findings X2.  CBC with a mild anemia hemoglobin 13.4 consistent with multiple prior lab values.  CMP, magnesium, INR within normal limits.  Chest x-ray without acute cardiopulmonary findings.  Patient subsequently discharged home in satisfactory condition with instruction to keep his new patient visit with cardiology upcoming later in the month.  All questions answered and return precautions discussed.    ADDENDUM: After discharge, patient requested to speak with a physician before leaving again.  He stated that he was grateful that we addressed his chest pain, but that we had not fixed his neck pain.  We reevaluated the patient, there was no trauma to the area, and patient once again reiterated that he had been evaluated by neurosurgery and that no surgery was offered.  With no new or red flag symptoms, we discussed that we would be unable to treat his chronic problem through the emergency department.  Given that he is established with pain medicine, is chronically on opiates, we discussed that I would not be able to provide him with additional medication at this time.  He was already sent Lidoderm patches, and has other adjuncts at home.  I offered to refer him to a different neurosurgeon which he was unsatisfied with.  Patient began to tear off his pulse ox and blood pressure cuff, stating that he would go to the [facility].    EKG demonstrated normal sinus rhythm at a rate of 78, normal intervals.  No acute injury pattern.    Repeat EKG demonstrates normal sinus rhythm at a rate of 78, normal  intervals, no acute injury pattern.    Patient and or family in agreement and understanding of treatment plan.  All questions answered.      I reviewed the case with the attending ED physician. The attending ED physician agrees with the plan. Patient and/or patient´s representative was counseled regarding labs, imaging, likely diagnosis, and plan. All questions were answered.    ED Medications administered this visit:    Medications   lidocaine 4 % patch 1 patch (1 patch transdermal Medication Applied 9/7/24 1532)   oxyCODONE-acetaminophen (Percocet) 5-325 mg per tablet 1 tablet (1 tablet oral Given 9/7/24 1531)   methocarbamol (Robaxin) tablet 500 mg (500 mg oral Given 9/7/24 1532)   acetaminophen (Tylenol) tablet 650 mg (650 mg oral Given 9/7/24 1533)       New Prescriptions from this visit:    New Prescriptions    LIDOCAINE (LIDODERM) 5 % PATCH    Place 1 patch over 12 hours on the skin once daily for 15 doses. Remove & discard patch within 12 hours or as directed by MD.       Follow-up:  No follow-up provider specified.      Final Impression:   1. Neck pain    2. Chest pain, unspecified type          (Please note that portions of this note were completed with a voice recognition program.  Efforts were made to edit the dictations but occasionally words are mis-transcribed.)     Mak Almodovar MD  Resident  09/07/24 1727       Mak Almodovar MD  Resident  09/07/24 1801

## 2024-09-07 NOTE — DISCHARGE INSTRUCTIONS
Please keep your upcoming appointment with cardiology.  If your chest pain continues to get worse, you develop respiratory distress, or other worrisome symptoms, return to the ER.

## 2024-09-08 LAB
ATRIAL RATE: 78 BPM
ATRIAL RATE: 78 BPM
P AXIS: 51 DEGREES
P AXIS: 53 DEGREES
P OFFSET: 196 MS
P OFFSET: 201 MS
P ONSET: 137 MS
P ONSET: 140 MS
PR INTERVAL: 166 MS
PR INTERVAL: 168 MS
Q ONSET: 220 MS
Q ONSET: 224 MS
QRS COUNT: 13 BEATS
QRS COUNT: 13 BEATS
QRS DURATION: 90 MS
QRS DURATION: 94 MS
QT INTERVAL: 372 MS
QT INTERVAL: 376 MS
QTC CALCULATION(BAZETT): 424 MS
QTC CALCULATION(BAZETT): 428 MS
QTC FREDERICIA: 406 MS
QTC FREDERICIA: 410 MS
R AXIS: 12 DEGREES
R AXIS: 28 DEGREES
T AXIS: 25 DEGREES
T AXIS: 42 DEGREES
T OFFSET: 406 MS
T OFFSET: 412 MS
VENTRICULAR RATE: 78 BPM
VENTRICULAR RATE: 78 BPM

## 2024-09-13 ENCOUNTER — APPOINTMENT (OUTPATIENT)
Dept: INFUSION THERAPY | Facility: CLINIC | Age: 54
End: 2024-09-13
Payer: COMMERCIAL

## 2024-09-16 ENCOUNTER — APPOINTMENT (OUTPATIENT)
Dept: BEHAVIORAL HEALTH | Facility: CLINIC | Age: 54
End: 2024-09-16
Payer: COMMERCIAL

## 2024-09-16 DIAGNOSIS — Z91.49 HISTORY OF PSYCHOLOGICAL TRAUMA: ICD-10-CM

## 2024-09-16 DIAGNOSIS — F41.9 ANXIETY: ICD-10-CM

## 2024-09-16 PROCEDURE — 90837 PSYTX W PT 60 MINUTES: CPT

## 2024-09-16 NOTE — PROGRESS NOTES
Start time:  3:05  End time:  4:07     An interactive audio and video telecommunication system which permits real time communications between the patient (at the originating site) and provider (at the distant site) was utilized to provide this telehealth service. Verbal consent has been obtained from this patient for a telehealth visit.     The patient was informed of the current need to conduct treatment via virtual platform in light of COVID-19 pandemic. I have confirmed the patient’s identity via the following (minimum of three) acceptable identifiers as per  Policy PH-9: , address, phone number, and email address.        SUBJECTIVE:  Patient has lost 5 pounds - eating once a day and vegetables with dinner. Groin and hip pain is significantly better. Dr. Mora prescribed Celebrex which is helping with groin and neck. Thinking PT is doing more harm than good and thinking of doing a home program set up. Lower back is more painful at this point.   Ablation is happening Friday. Ana will be down for 8 days.   Patient opted to discuss and process life updates rather than proceed with hypnosis today. Discussed future living plans and considered how to balance his need for security while his value of acting in best interest of partner. Also discussed negative automatic thoughts that arise with meeting others and opportunities to socialize.   Progress: Good  Prognosis: Good     Duration of problem: years with recent improvement     Severity: moderate     OBJECTIVE:  Orientation & Cognition: Oriented x3. Thought processes normal and appropriate to situation.  Mood, Affect: Euthymic, frustrated at joe  Appearance: Optimal by patient standards.  Harm to self or others: Not reported  Substance abuse: Not reported  Psychiatric medication use: Not reported     IMPRESSION:     F41.9 Unspecified anxiety disorder  Z.91.49 History of psychological trauma     Goals for Therapy: Sharing difficult feelings in a therapeutic  environment, expanding upon current social supports in his life, and addressing anxiety. Patient is also interested in medication management for his feelings of traumatization which he is worried will return after he ends ketamine treatment.      PLAN:     Discussed future living plans and considered how to balance his need for security while his value of acting in best interest of partner. Also discussed negative automatic thoughts that arise with meeting others and opportunities to socialize. Discussed possibly restarting hypnosis next time.     Next apt: 10/21     --------------------------------------------  Other(s) present in the room: None.  --------------------------------------------  Time spent face-to-face with patient: 62 minutes

## 2024-09-17 ENCOUNTER — OFFICE VISIT (OUTPATIENT)
Dept: ORTHOPEDIC SURGERY | Facility: CLINIC | Age: 54
End: 2024-09-17
Payer: COMMERCIAL

## 2024-09-17 DIAGNOSIS — Z96.641 S/P TOTAL RIGHT HIP ARTHROPLASTY: ICD-10-CM

## 2024-09-17 DIAGNOSIS — Z96.641 AFTERCARE FOLLOWING RIGHT HIP JOINT REPLACEMENT SURGERY: Primary | ICD-10-CM

## 2024-09-17 DIAGNOSIS — Z47.1 AFTERCARE FOLLOWING RIGHT HIP JOINT REPLACEMENT SURGERY: Primary | ICD-10-CM

## 2024-09-17 PROCEDURE — 99211 OFF/OP EST MAY X REQ PHY/QHP: CPT | Performed by: ORTHOPAEDIC SURGERY

## 2024-09-17 NOTE — LETTER
[facility]  [address] [place]  [facility] [address]  PHONE: [phone]  FAX: [phone]      September 17, 2024    Patient: Akin Lynn   YOB: 1970   Date of Visit: 9/17/2024       To Whom It May Concern,    Akin Lynn was seen in my clinic on 9/17/2024, in regards to his hips, he has been cleared to return to activities fully duty without restrictions     If you have any questions or concerns, please contact the office by phone or fax.  Phone: [phone] or Fax: [phone]        Sincerely,      Gagandeep Matamoros MD

## 2024-09-17 NOTE — PROGRESS NOTES
Chief Complaint   Patient presents with    Left Knee - Worker's Compensation, Follow-up     Patellofemoral arthroplasty from 9/15/251 after Ibuprofen/HEP      History of Present Illness:  He is still having some occasional aches and pain in his knee but notes that his groin pain is gone. He is getting an RFA tomorrow from [facility], and this morning he woke up with his shoulder swollen and painful. He's been on oral steroids 4 times this year.     Patient is here today with right groin pain after a fall 2 weeks ago.  Patient had a right total hip replacement done with Dr. Matamoros 6/28/2024, states he was doing well up until his follow-up.  He was unable to get in to Dr. Matamoros's office for evaluation.  Reports pain with standing and hip flexion. He is taking 1000 mg of Tylenol daily. He is in severe acute pain after his fall. He's been able to walk with some discomfort.      DOS: 09/15/21 left total knee arthroplasty. Patient relates his left knee is doing okay.  He still has some occasional mechanical symptoms and does not catch or rub on occasion.  Does have a mild amount of crepitus.      Imaging:  X-ray right hip: Status post right hip replacement with no evidence of hardware failure     Assessment:   Right knee patellofemoral replacement    Plan:  Follow-up in 6 months with x-rays 3 view knee. He may see us back as needed for shoulder complaints.        Physical Exam:  Well-nourished, well-developed. No acute distress. Alert and oriented x3. Responds appropriately to questioning. Good mood. Normal affect.  Left knee:  Skin healthy and intact  No gross swelling or ecchymosis  Trace Effusion:   Hip flexes to 120 degrees internally rotates 20 degrees, externally rotates 30 degrees.  Pain with hip flexion.  Minor pain with patellar grind  Neurovascular exam normal distally  Palpable pedal pulse and good cap refill     Review of Systems:  GENERAL: Negative for malaise, significant weight loss,  fever  MUSCULOSKELETAL: See HPI  NEURO:  Negative     Past Medical History:   Diagnosis Date    Anesthesia of skin     Numbness and tingling    Anxiety 09/21/2023    Arthritis Unk    Bilateral myopia 09/21/2023    Cervical radicular pain 09/21/2023    Chronic patellofemoral pain of left knee 09/21/2023    Complex regional pain syndrome type 1 of left lower extremity 09/21/2023    COVID-19     VACCINATED    Cubital tunnel syndrome on left 09/21/2023    Cubital tunnel syndrome on right 09/21/2023    Disease of intestine, unspecified     Bowel trouble    Erectile dysfunction 09/21/2023    Foot joint pain 09/21/2023    Fractures     Gastroparesis     GERD (gastroesophageal reflux disease)     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    History of psychological trauma 09/21/2023    Hyperlipidemia     Joint pain 09/21/2023    Kidney cysts 09/21/2023    Low back pain, unspecified 12/07/2022    Chronic low back pain without sciatica, unspecified back pain laterality    Low back pain, unspecified 08/09/2022    Lumbago    Lumbar spondylosis 09/21/2023    Lung nodule 09/21/2023    RIGHT    Metatarsalgia of left foot 09/21/2023    Myopia, bilateral 02/19/2019    Myopia of both eyes with astigmatism and presbyopia    Osteochondral defect of patella 09/21/2023    Osteoporosis     Other forms of angina pectoris (CMS-HCC)     Stable angina pectoris    Other spondylosis, cervical region 09/21/2023    Patellofemoral arthritis 09/21/2023    Personal history of other diseases of the musculoskeletal system and connective tissue     History of arthritis    Personal history of other specified conditions     History of seizure    Reflex sympathetic dystrophy 09/21/2023    Scapular dyskinesis 09/21/2023    Seizure disorder (Multi)     D/T TRAMADOL REACTION - 25 YEARS AGO    Shoulder impingement 09/21/2023    Suspected sleep apnea 09/21/2023    Trauma and stressor-related disorder 09/21/2023    Trochanteric bursitis of both hips 09/22/2023    Unspecified  disorder of ear, unspecified ear     Ear, nose and throat disorder    Wears glasses        Medication Documentation Review Audit       Reviewed by Tiki Kong (Nursing Student) on 09/19/24 at 1009      Medication Order Taking? Sig Documenting Provider Last Dose Status   acetaminophen (Tylenol) 500 mg tablet [id number] Yes Take 2 tablets (1,000 mg) by mouth every 8 hours if needed for mild pain (1 - 3). Historical MD Isaac Taking Active   albuterol (Ventolin HFA) 90 mcg/actuation inhaler [id number] Yes Inhale 2 puffs every 4 hours if needed for wheezing. Historical Provider, MD Taking Active   atorvastatin (Lipitor) 80 mg tablet [id number] Yes Take 1 tablet (80 mg) by mouth once daily at bedtime. Historical Provider, MD Taking Active   baclofen (Lioresal) 20 mg tablet [id number] Yes Take 1 tablet (20 mg) by mouth 3 times a day. Olivia Alonzo MD Taking Active   celecoxib (CeleBREX) 200 mg capsule [id number] Yes Take 1 capsule (200 mg) by mouth twice a day. Historical Provider, MD Taking Active   chlorhexidine (Peridex) 0.12 % solution [id number] Yes Use 15 mL in the mouth or throat if needed for wound care for up to 2 doses. 15 mls swish and spit the night before surgery and 15mls swish and spit the morning of surgery do not swallow CHASITY Pagan Taking Active   diazePAM (Valium) 5 mg tablet [id number] Yes Take 1 tablet (5 mg) by mouth every 12 hours if needed for anxiety. Historical Provider, MD Taking Active   diclofenac epolamine 1.3 % patch [id number] No Place 1 patch over 12 hours on the skin 2 times a day.   Patient not taking: Reported on 9/19/2024    Olivia Alonzo MD Not Taking Flag for Review   Discontinued 09/19/24 1007   DULoxetine (Cymbalta) 60 mg DR capsule [id number] Yes Take 1 capsule (60 mg) by mouth once daily. Historical Provider, MD Taking Active   fluticasone (Flonase) 50 mcg/actuation nasal spray [id number] Yes Administer 1 spray into each nostril once daily as needed. Historical  "Provider, MD Taking Active   ibuprofen 800 mg tablet [id number] No Take 1 tablet (800 mg) by mouth every 8 hours if needed for moderate pain (4 - 6).   Patient not taking: Reported on 2024    Jeanette Crowell APRN-CNP Not Taking Flag for Review   ketamine HCl (ketamine, bulk,) 100 % powder [id number] Yes Ketamine 100 mg/mL + lidocaine 40 mg/mL 1 puff 4 times daily as needed, DSP#20 mL x 5 refills Olivia Alonzo MD Taking Active   lidocaine (Lidoderm) 5 % patch [id number] Yes Place 1 patch over 12 hours on the skin once daily for 15 doses. Remove & discard patch within 12 hours or as directed by MD. Mak Almodovar MD Taking Active   naloxone (Narcan) 4 mg/0.1 mL nasal spray [id number] Yes Instil 1 spray intranasally for opioid overdose; repeat in 5 inutes if no response Damian Spicer MD Health system Taking Active   omeprazole (PriLOSEC) 40 mg DR capsule [id number] Yes Take 1 capsule (40 mg) by mouth 2 times a day. For 90 days Historical Provider, MD Taking Active   oxyCODONE-acetaminophen (Percocet) 5-325 mg tablet [id number]  Take 1 tablet by mouth every 6 hours if needed for severe pain (7 - 10) for up to 7 days. Royce Louis MD   24 2359   pregabalin (Lyrica) 300 mg capsule [id number] Yes Take 1 capsule (300 mg) by mouth 2 times a day. Vish Barboza PA-C Taking Active   urea (Carmol) 40 % cream [id number] Yes Apply 1 Application topically once daily. feet Historical Provider, MD Taking Active                    Allergies   Allergen Reactions    Morphine Hives, Rash and Shortness of breath     Tolerates hydromorphone    Ultram [Tramadol] Seizure    Alprazolam Psychosis     \"GOES CRAZY\", psychosis    Ketorolac Seizure    Fish Containing Products Hives and Itching     IT WAS A PROCESSED FISH MEAL, BUT STATES EATS FRESH AND FROZEN FISH ALL THE TIME.       Social History     Socioeconomic History    Marital status: Single     Spouse name: Not on file    Number of children: Not on file    Years of education: Not " on file    Highest education level: Not on file   Occupational History    Not on file   Tobacco Use    Smoking status: Never    Smokeless tobacco: Never    Tobacco comments:     It killed my mother smoking and she left me with a gift a nodule on my right lung   Vaping Use    Vaping status: Never Used   Substance and Sexual Activity    Alcohol use: Not Currently    Drug use: Not Currently     Types: Other     Comment: Ketamine treatments for pain    Sexual activity: Not Currently     Partners: Female     Birth control/protection: None   Other Topics Concern    Not on file   Social History Narrative    Not on file     Social Determinants of Health     Financial Resource Strain: Low Risk  (6/28/2024)    Overall Financial Resource Strain (CARDIA)     Difficulty of Paying Living Expenses: Not hard at all   Food Insecurity: No Food Insecurity (6/28/2024)    Hunger Vital Sign     Worried About Running Out of Food in the Last Year: Never true     Ran Out of Food in the Last Year: Never true   Transportation Needs: No Transportation Needs (7/17/2024)    OASIS : Transportation     Lack of Transportation (Medical): No     Lack of Transportation (Non-Medical): No     Patient Unable or Declines to Respond: No   Physical Activity: Insufficiently Active (6/28/2024)    Exercise Vital Sign     Days of Exercise per Week: 7 days     Minutes of Exercise per Session: 10 min   Stress: Stress Concern Present (6/28/2024)    [employer] - Occupational Stress Questionnaire     Feeling of Stress : Rather much   Social Connections: Feeling Socially Integrated (7/17/2024)    OASIS : Social Isolation     Frequency of experiencing loneliness or isolation: Never   Recent Concern: Social Connections - Socially Isolated (5/16/2024)    [facility] from [employer] [employer]    Social Connection and Isolation Panel [NHANES]     Frequency of Communication with Friends and Family: Once a week     Frequency of  Social Gatherings with Friends and Family: Never     Attends Druze Services: Never     Active Member of Clubs or Organizations: No     Attends Club or Organization Meetings: Never     Marital Status: Living with partner   Intimate Partner Violence: Not At Risk (6/28/2024)    Humiliation, Afraid, Rape, and Kick questionnaire     Fear of Current or Ex-Partner: No     Emotionally Abused: No     Physically Abused: No     Sexually Abused: No   Housing Stability: Low Risk  (6/28/2024)    Housing Stability Vital Sign     Unable to Pay for Housing in the Last Year: No     Number of Places Lived in the Last Year: 1     Unstable Housing in the Last Year: No   Recent Concern: Housing Stability - High Risk (5/16/2024)    Received from [employer] [employer]    Housing Stability Vital Sign     Unable to Pay for Housing in the Last Year: Yes     Number of Places Lived in the Last Year: 1     Unstable Housing in the Last Year: No       Past Surgical History:   Procedure Laterality Date    ACHILLES TENDON SURGERY  1978 2016    CT ABDOMEN PELVIS ANGIOGRAM W AND/OR WO IV CONTRAST  03/18/2020    CT ABDOMEN PELVIS ANGIOGRAM W AND/OR WO IV CONTRAST 3/18/2020 [facility]    CT ANGIO NECK  12/16/2022    CT NECK ANGIO W AND WO IV CONTRAST 12/16/2022 [facility]    CT HEAD ANGIO W AND WO IV CONTRAST  12/16/2022    CT HEAD ANGIO W AND WO IV CONTRAST 12/16/2022 [facility]    HIP SURGERY Left     REPLACEMENT    JOINT REPLACEMENT      LEFT KNEE    KNEE ARTHROPLASTY Bilateral 06/13/2017    Knee Arthroplasty    OTHER SURGICAL HISTORY  01/06/2022    Ulnar nerve neuroplasty    OTHER SURGICAL HISTORY Bilateral 07/26/2019    Foot surgery  -- HARDWARE    OTHER SURGICAL HISTORY Bilateral     BILATERAL ANKLE SCOPE    OTHER SURGICAL HISTORY      POP ABDOMINAL PROCEDURE    PLANTAR FASCIA RELEASE      SHOULDER SURGERY Bilateral 06/13/2017    Shoulder Surgery       CT ABDOMEN PELVIS W IV CONTRAST    Result Date:  1/22/2024  * * *Final Report* * * DATE OF EXAM: Jan 22 2024  8:50AM   [facility]   0530  -  CT ABD/PEL W IVCON  / ACCESSION #  [id number] PROCEDURE REASON: Abdominal pain, acute, nonlocalized      * * * * Physician Interpretation * * * *  EXAMINATION:  CT ABDOMEN AND PELVIS WITH IV CONTRAST CLINICAL HISTORY: Acute abdominal pain TECHNIQUE: CT of the abdomen and pelvis was performed using standard technique, scanning from just above the dome of the diaphragm to the symphysis pubis. MQ:  CTAP_3 Contrast: IV:  100 ml of Omnipaque 350 CT Radiation dose: Integrated Dose-length product (DLP) for this visit =   269 mGy*cm. CT Dose Reduction Employed: Automated exposure control(AEC) and iterative recon COMPARISON: 12/26/2023 RESULT: Liver: No mass. Biliary: No bile duct dilation.  The gallbladder is within normal limits for the modality. Spleen: No mass. No splenomegaly. Pancreas: No mass or duct dilation. Adrenals: No mass. Kidneys: There are symmetric bilateral nephrograms.  In the medial right upper renal pole there is a 4.3 cm exophytic hypodensity consistent with a cyst. GI tract: No dilation or wall thickening. There is a small sliding hiatal hernia.  There is colonic residue with a moderate to large stool burden.   There is no acute appendicitis or diverticulitis. Lymph nodes: No abdominal or pelvic lymphadenopathy. Mesentery/Peritoneum: No ascites or mass. Retroperitoneum: No mass. Vasculature:  - Abdominal aorta and iliac arteries: Atherosclerotic calcifications without aneurysm.  - Celiac and SMA: Questionable mild narrowing at the origin of the celiac axis.  - Portal venous system (SMV, splenic vein, portal vein and branches): Patent.  - Hepatic veins: Patent. Pelvis: No mass, ascites or fluid collection. There is artifact from a left hip arthroplasty. Bones/Soft Tissues: There are no aggressive osseous lesions identified. Lower thorax: Bibasilar scarring.  (topogram) images: No additional findings.    IMPRESSION:  Stable appearance.  There is no acute intra-abdominal process identified. : [facility]   Transcribe Date/Time: Jan 22 2024  8:50A Dictated by : INES ROLON MD This examination was interpreted and the report reviewed and electronically signed by: INES ROLON MD on Jan 22 2024  9:05AM  EST    US GALLBLADDER    Result Date: 1/22/2024  * * *Final Report* * * DATE OF EXAM: Jan 22 2024  6:48AM   [facility]   1032  -  US ABD RIGHT UPPER QUADRANT  / ACCESSION #  [id number] PROCEDURE REASON: RUQ pain, no fever, no elev WBC      * * * * Physician Interpretation * * * *  EXAMINATION:   RIGHT UPPER QUADRANT ULTRASOUND CLINICAL HISTORY: RIGHT upper quadrant pain TECHNIQUE:  Sonography of the right upper quadrant  was performed.   Images were obtained and stored in a permanent archive. MQ:  URUQ_2 COMPARISON: CT abdomen pelvis 12/26/2023 RESULT: Pancreas: Not well visualized Liver:      Echotexture:  Homogeneous      Echogenicity:  Increased      Surface contour:  Smooth      Lesions:  None. Biliary: No intrahepatic biliary duct dilation.      CBD: 0.3 cm at the hilum.      Gallbladder: Normal caliber           -Contents:  No cholelithiasis           -Wall:  Normal           -Other:  No pericholecystic fluid.  Negative sonographic Bautista sign. Right Kidney: No hydronephrosis.  Anechoic upper pole cyst with lobular borders measures 3.1 x 2.6 x 3.8 cm.  This appears unchanged from the prior CT. Ascites: None.    IMPRESSION: Hepatic steatosis.  Otherwise, no acute sonographic abnormality in the RIGHT upper quadrant. : [facility]   Transcribe Date/Time: Jan 22 2024  6:55A Dictated by : DEE MCKEON MD This examination was interpreted and the report reviewed and electronically signed by: DEE MCKEON MD on Jan 22 2024  6:59AM  EST    XR CHEST 1 VIEW    Result Date: 1/22/2024  * * *Final Report* * * DATE OF EXAM: Jan 22 2024  6:22AM   [facility]   5376  -  XR CHEST 1V FRONTAL PORT  / ACCESSION #  [id number]  PROCEDURE REASON: Tachycardia      * * * * Physician Interpretation * * * *  EXAMINATION:  CHEST RADIOGRAPH (SINGLE VIEW AP OR PA) CLINICAL INFORMATION (AS PROVIDED BY ORDERING CLINICIAN) :  Tachycardia Comparison:  9/3/2023 RESULT: Lines, tubes, and devices:  N/A Lungs and pleura:  No confluent infiltrate, large pleural effusion or pneumothorax.   Cardiomediastinal silhouette:  Within normal limits. Other:  No acute bony abnormality identified.    IMPRESSION: No significant acute radiographic abnormality. : [facility]   Transcribe Date/Time: Jan 22 2024  6:28A Dictated by : MIRNA CHAO MD This examination was interpreted and the report reviewed and electronically signed by: MIRNA CHAO MD on Jan 22 2024  6:30AM  EST                             Scribe Attestation  By signing my name below, IArgelia, Scribe   attest that this documentation has been prepared under the direction and in the presence of Royce Louis MD.

## 2024-09-18 NOTE — PROGRESS NOTES
Subjective    Patient ID: Akin    Chief Complaint:   Chief Complaint   Patient presents with    Right Hip - Follow-up     RT STEPHEN THR 6/28/24   x-rays today     History of present illness    54-year-old gentleman presented clinic today for his 2 and half month postop follow-up visit status post right total hip Stephen.  Overall the right hip seems to be doing much better.  He had an incident where he strained his adductor region.  He had called the office multiple times trying to get a hold of our clinic for advice and medication.  This ended up being taken care of by Dr. Louis and his team.  He is ambulating without assistance at this time.  He states that both hips are seem to be doing well at this point.  He has RFA treatments scheduled.  In the next week or 2 for his neck.       Past medical , Surgical, Family and social history reviewed.      Physical exam  General: No acute distress and breathing comfortably.  Patient is pleasant and cooperative with the examination.    Extremity  Right hip is neurovascular intact.  Good range of motion.  No sign of infection.  No instability.  Improved strength.  No capsular tenderness.  Compartments are soft.    Diagnostics  Patient refused x-rays today  XR chest 1 view    Result Date: 9/12/2024  * * *Final Report* * * DATE OF EXAM: Sep 12 2024 12:15PM   VHX   5376  -  XR CHEST 1V FRONTAL PORT  / ACCESSION #  [id number] PROCEDURE REASON: Chest pain      * * * * Physician Interpretation * * * *  EXAMINATION:  CHEST RADIOGRAPH (PORTABLE SINGLE VIEW AP) Exam Date/Time:  9/12/2024 12:15 PM CLINICAL HISTORY: Chest pain MQ:  XCPR_5 Comparison:  X-ray 01/22/2024 RESULT: Lines, tubes, and devices:  None Lungs and pleura:  No pneumothorax, pleural effusions, or focal consolidation. Cardiomediastinal silhouette:  Stable cardiomediastinal silhouette. Other:  .    IMPRESSION: No acute process : [facility]   Transcribe Date/Time: Sep 12 2024 12:41P Dictated by : REBEKAH REVELES  MD This examination was interpreted and the report reviewed and electronically signed by: REBEKAH REVELES MD on Sep 12 2024 12:42PM  EST    ECG 12 lead    Result Date: 9/8/2024  Normal sinus rhythm Normal ECG When compared with ECG of 10-AUG-2024 14:28, Premature ventricular complexes are no longer Present Reconfirmed by Rosas Lee ([id number]) on 9/8/2024 11:38:57 AM    ECG 12 lead    Result Date: 9/8/2024  Normal sinus rhythm Incomplete right bundle branch block When compared with ECG of 07-SEP-2024 14:58, (unconfirmed) No significant change was found Reconfirmed by Rosas Lee ([id number]) on 9/8/2024 11:38:44 AM    XR chest 1 view    Result Date: 9/7/2024  Interpreted By:  Betty Bryan, STUDY: XR CHEST 1 VIEW;  9/7/2024 3:27 pm   INDICATION: Signs/Symptoms:Chest Pain.     COMPARISON: 06/20/2024   ACCESSION NUMBER(S): [id number]   ORDERING CLINICIAN: FILIBERTO ZAMORA   TECHNIQUE: Portable AP upright   FINDINGS: The cardiac silhouette and mediastinal contours are nonenlarged. Lungs are clear. No pleural effusion is noted and there is no pneumothorax. No acute osseous abnormality.       No acute radiographic abnormality   MACRO: None.   Signed by: Betty Bryan 9/7/2024 3:37 PM Dictation workstation:   [id number]    XR hip right with pelvis when performed 2 or 3 views    Result Date: 8/23/2024  STUDY: Pelvis and Right Hip Radiographs; 8/23/2024 15:50 INDICATION: Right hip pain after a fall today. COMPARISON: 3/19/2024 XR Hip Bilateral. ACCESSION NUMBER(S): [id number] ORDERING CLINICIAN: MEJIA SIDDIQUI TECHNIQUE:  AP view of the pelvis (two images) and two view(s) of the right hip. FINDINGS:  PELVIS:  The pelvic ring is intact.  There is no acute fracture. There is left total hip arthroplasty. RIGHT HIP: There is right total hip arthroplasty without periprosthetic fracture.  There is mild proximal soft tissue swelling.    Right total hip arthroplasty without periprosthetic fracture or malalignment.  Soft tissue  swelling. Signed by Royce Salcedo DO       Procedure  [ none]    Assessment  Status post right total hip Stephen    Treatment plan  1.  At this time he was encouraged continue with weightbearing activity as tolerated.  2.  Continue with home exercise program.  At today's appointment patient was given a note stating that he will be cleared for all activity without restriction in regards to both hips.  3.  Follow-up in approximately 3 months for reevaluation new x-rays at that time.  4.  All of the patient's questions were answered.    Orders Placed This Encounter    XR hips bilateral 3 or 4 VW w pelvis when performed       This note was prepared using voice recognition software.  The details of this note are correct and have been reviewed, and corrected to the best of my ability.  Some grammatical areas may persist related to the Dragon software    Vish Barboza PA-C, [facility]  [facility] [place]    [phone]

## 2024-09-19 ENCOUNTER — LAB (OUTPATIENT)
Dept: LAB | Facility: LAB | Age: 54
End: 2024-09-19
Payer: COMMERCIAL

## 2024-09-19 ENCOUNTER — TELEPHONE (OUTPATIENT)
Dept: CARDIOLOGY | Facility: CLINIC | Age: 54
End: 2024-09-19

## 2024-09-19 ENCOUNTER — APPOINTMENT (OUTPATIENT)
Dept: CARDIOLOGY | Facility: CLINIC | Age: 54
End: 2024-09-19
Payer: COMMERCIAL

## 2024-09-19 ENCOUNTER — OFFICE VISIT (OUTPATIENT)
Dept: ORTHOPEDIC SURGERY | Facility: CLINIC | Age: 54
End: 2024-09-19
Payer: COMMERCIAL

## 2024-09-19 VITALS
OXYGEN SATURATION: 96 % | DIASTOLIC BLOOD PRESSURE: 80 MMHG | WEIGHT: 174 LBS | HEART RATE: 90 BPM | BODY MASS INDEX: 26.37 KG/M2 | SYSTOLIC BLOOD PRESSURE: 152 MMHG | HEIGHT: 68 IN

## 2024-09-19 DIAGNOSIS — F31.9 BIPOLAR AFFECTIVE DISORDER, REMISSION STATUS UNSPECIFIED (MULTI): ICD-10-CM

## 2024-09-19 DIAGNOSIS — M79.602 LEFT ARM PAIN: ICD-10-CM

## 2024-09-19 DIAGNOSIS — R29.818 SUSPECTED SLEEP APNEA: ICD-10-CM

## 2024-09-19 DIAGNOSIS — R79.89 ELEVATED TROPONIN I LEVEL: ICD-10-CM

## 2024-09-19 DIAGNOSIS — R53.83 FATIGUE, UNSPECIFIED TYPE: ICD-10-CM

## 2024-09-19 DIAGNOSIS — Z96.641 S/P TOTAL RIGHT HIP ARTHROPLASTY: ICD-10-CM

## 2024-09-19 DIAGNOSIS — R79.89 TROPONIN LEVEL ELEVATED: ICD-10-CM

## 2024-09-19 DIAGNOSIS — E78.2 MIXED HYPERLIPIDEMIA: ICD-10-CM

## 2024-09-19 DIAGNOSIS — Z82.49 FAMILY HISTORY OF ISCHEMIC HEART DISEASE: ICD-10-CM

## 2024-09-19 DIAGNOSIS — R94.31 ABNORMAL EKG: ICD-10-CM

## 2024-09-19 DIAGNOSIS — R00.2 PALPITATIONS: ICD-10-CM

## 2024-09-19 DIAGNOSIS — R91.1 LUNG NODULE: ICD-10-CM

## 2024-09-19 DIAGNOSIS — R94.31 ABNORMAL EKG: Primary | ICD-10-CM

## 2024-09-19 DIAGNOSIS — M47.816 LUMBAR SPONDYLOSIS: ICD-10-CM

## 2024-09-19 DIAGNOSIS — M19.90 ARTHRITIS: ICD-10-CM

## 2024-09-19 DIAGNOSIS — K21.9 GASTROESOPHAGEAL REFLUX DISEASE WITHOUT ESOPHAGITIS: ICD-10-CM

## 2024-09-19 DIAGNOSIS — R07.9 CHEST PAIN, UNSPECIFIED TYPE: ICD-10-CM

## 2024-09-19 DIAGNOSIS — I45.10 INCOMPLETE RBBB: ICD-10-CM

## 2024-09-19 DIAGNOSIS — M54.12 CERVICAL RADICULAR PAIN: ICD-10-CM

## 2024-09-19 DIAGNOSIS — M54.50 CHRONIC LOW BACK PAIN WITHOUT SCIATICA, UNSPECIFIED BACK PAIN LATERALITY: ICD-10-CM

## 2024-09-19 DIAGNOSIS — R40.0 DAYTIME SLEEPINESS: ICD-10-CM

## 2024-09-19 DIAGNOSIS — M54.6 THORACIC BACK PAIN, UNSPECIFIED BACK PAIN LATERALITY, UNSPECIFIED CHRONICITY: ICD-10-CM

## 2024-09-19 DIAGNOSIS — K31.84 GASTROPARESIS: ICD-10-CM

## 2024-09-19 DIAGNOSIS — G89.29 CHRONIC LOW BACK PAIN WITHOUT SCIATICA, UNSPECIFIED BACK PAIN LATERALITY: ICD-10-CM

## 2024-09-19 DIAGNOSIS — I49.3 PVC (PREMATURE VENTRICULAR CONTRACTION): ICD-10-CM

## 2024-09-19 DIAGNOSIS — Z96.652 STATUS POST TOTAL LEFT KNEE REPLACEMENT: Primary | ICD-10-CM

## 2024-09-19 LAB
ALBUMIN SERPL BCP-MCNC: 4.8 G/DL (ref 3.4–5)
ALP SERPL-CCNC: 81 U/L (ref 33–120)
ALT SERPL W P-5'-P-CCNC: 21 U/L (ref 10–52)
ANION GAP SERPL CALC-SCNC: 14 MMOL/L (ref 10–20)
AST SERPL W P-5'-P-CCNC: 19 U/L (ref 9–39)
BILIRUB DIRECT SERPL-MCNC: 0.1 MG/DL (ref 0–0.3)
BILIRUB SERPL-MCNC: 0.7 MG/DL (ref 0–1.2)
BUN SERPL-MCNC: 17 MG/DL (ref 6–23)
CALCIUM SERPL-MCNC: 9.8 MG/DL (ref 8.6–10.3)
CHLORIDE SERPL-SCNC: 105 MMOL/L (ref 98–107)
CHOLEST SERPL-MCNC: 192 MG/DL (ref 0–199)
CHOLESTEROL/HDL RATIO: 5.7
CO2 SERPL-SCNC: 25 MMOL/L (ref 21–32)
CREAT SERPL-MCNC: 0.75 MG/DL (ref 0.5–1.3)
EGFRCR SERPLBLD CKD-EPI 2021: >90 ML/MIN/1.73M*2
ERYTHROCYTE [DISTWIDTH] IN BLOOD BY AUTOMATED COUNT: 14.6 % (ref 11.5–14.5)
ERYTHROCYTE [SEDIMENTATION RATE] IN BLOOD BY WESTERGREN METHOD: 39 MM/H (ref 0–20)
GLUCOSE SERPL-MCNC: 81 MG/DL (ref 74–99)
HCT VFR BLD AUTO: 48.5 % (ref 41–52)
HDLC SERPL-MCNC: 33.7 MG/DL
HGB BLD-MCNC: 15.7 G/DL (ref 13.5–17.5)
LDLC SERPL CALC-MCNC: 126 MG/DL
MAGNESIUM SERPL-MCNC: 1.9 MG/DL (ref 1.6–2.4)
MCH RBC QN AUTO: 26.8 PG (ref 26–34)
MCHC RBC AUTO-ENTMCNC: 32.4 G/DL (ref 32–36)
MCV RBC AUTO: 83 FL (ref 80–100)
NON HDL CHOLESTEROL: 158 MG/DL (ref 0–149)
NRBC BLD-RTO: 0 /100 WBCS (ref 0–0)
PLATELET # BLD AUTO: 325 X10*3/UL (ref 150–450)
POTASSIUM SERPL-SCNC: 3.9 MMOL/L (ref 3.5–5.3)
PROT SERPL-MCNC: 7.6 G/DL (ref 6.4–8.2)
RBC # BLD AUTO: 5.86 X10*6/UL (ref 4.5–5.9)
SODIUM SERPL-SCNC: 140 MMOL/L (ref 136–145)
TRIGL SERPL-MCNC: 161 MG/DL (ref 0–149)
TSH SERPL-ACNC: 0.61 MIU/L (ref 0.44–3.98)
VLDL: 32 MG/DL (ref 0–40)
WBC # BLD AUTO: 9.7 X10*3/UL (ref 4.4–11.3)

## 2024-09-19 PROCEDURE — 83735 ASSAY OF MAGNESIUM: CPT

## 2024-09-19 PROCEDURE — 36415 COLL VENOUS BLD VENIPUNCTURE: CPT

## 2024-09-19 PROCEDURE — 1036F TOBACCO NON-USER: CPT | Performed by: INTERNAL MEDICINE

## 2024-09-19 PROCEDURE — 80053 COMPREHEN METABOLIC PANEL: CPT

## 2024-09-19 PROCEDURE — 85652 RBC SED RATE AUTOMATED: CPT

## 2024-09-19 PROCEDURE — 3008F BODY MASS INDEX DOCD: CPT | Performed by: INTERNAL MEDICINE

## 2024-09-19 PROCEDURE — 86141 C-REACTIVE PROTEIN HS: CPT

## 2024-09-19 PROCEDURE — 82248 BILIRUBIN DIRECT: CPT

## 2024-09-19 PROCEDURE — 80061 LIPID PANEL: CPT

## 2024-09-19 PROCEDURE — 93000 ELECTROCARDIOGRAM COMPLETE: CPT | Performed by: INTERNAL MEDICINE

## 2024-09-19 PROCEDURE — 99213 OFFICE O/P EST LOW 20 MIN: CPT | Performed by: ORTHOPAEDIC SURGERY

## 2024-09-19 PROCEDURE — 85027 COMPLETE CBC AUTOMATED: CPT

## 2024-09-19 PROCEDURE — 99205 OFFICE O/P NEW HI 60 MIN: CPT | Performed by: INTERNAL MEDICINE

## 2024-09-19 PROCEDURE — 84443 ASSAY THYROID STIM HORMONE: CPT

## 2024-09-19 RX ORDER — CELECOXIB 200 MG/1
200 CAPSULE ORAL 2 TIMES DAILY
COMMUNITY
Start: 2024-08-28 | End: 2024-09-27

## 2024-09-19 NOTE — TELEPHONE ENCOUNTER
7 day Z[employer]atch [id number] is approved per the [facility] Portal and is valid from 9/19/24-12/19/24-Reference#[id number].

## 2024-09-19 NOTE — PROGRESS NOTES
CARDIOLOGY CONSULTATION NOTE       Patient:    Akin Lynn    YOB: 1970  MRN:    [id number]    Date:   9/19/2024     Reason for Visit: Cardiology consultation for chest pain and palpitations.     IMPRESSION:      Chest pain  Palpitations  Dyspnea on exertion  Fatigue  Elevated troponins 9/2024.  Hyperlipidemia  Abnormal resting electrocardiogram  Incomplete right bundle branch block  Negative cardiac catheterization, [facility], 2016.  Normal LV systolic function, LVEF 60%, echocardiogram 4/2024  Aortic valve sclerosis without stenosis, echocardiogram 4/2024.  Chronic pain syndrome  Degenerative joint disease with diffuse arthralgias  Cervical and lumbar disc disease with probable radiculopathy.  GERD  History of aspiration pneumonia  Manic depression  Prior bilateral hip replacements  Prior bilateral rotator cuff surgery  Family history of coronary artery disease  Otherwise as per assessment below.    RECOMMENDATIONS:      Patient has above-noted history and findings.  Given his symptomatology and a chronic illness would suggest ruling out significant cardiovascular disease with the following: Lexiscan Myoview perfusion stress test (cannot walk due to degenerative joint disease), CT calcium scoring, 7-day ZIO cardiac monitor.  Further recommendations to follow up after review.    Exercise dietary program.  Hydration.    He will continue his current medications.    HomeStay portal use was encouraged.    We will plan to see back after the above testing with Laboratory Studies and ECG as noted.     Patient will follow up with their primary physician for general care.    The patient knows to contact medical care earlier if need be.      HPI:     Akin Lynn was seen in cardiac evaluation at the  Cardiology office September 19, 2024.      The patients problems are listed as in the impression above.    Electronic medical records reviewed.    Patient is a pleasant 54-year-old hyperlipidemic gentleman  "without prior significant cardiovascular history whom presents for evaluation of chest pain and palpitations and chronic pain history.  This is our first evaluation.    Patient has a prior history of significant degenerative joint disease with prior bilateral hip surgeries last June 2024, 2 elbow surgeries, 2 shoulder surgeries and cervical and lumbar disc disease.  He does have a history of GERD as well.  States that he has had chest pain and palpitations for quite some time on on and off.  He has had multiple ER visits over with noted PVCs.  He was admitted at [facility] recently seen by cardiology in consultation 9/7/2024.  His troponins were mildly elevated.  Echocardiogram 4/2024 was prior obtained and reportedly negative.  He had had a negative cardiac catheterization in 2016.  He was suggested to have a perfusion stress test for which he has not gotten to date.  Cardiology consultation recommendations were reviewed from previous hospitalization.    Patient states that he still has chest pain with and without exertion.  He had some sometimes radiating to his left arm.  Is noted no palpitations with his heart rate goes fast.  He has been told that he has premature ventricular contractions.  He has dyspnea on exertion at times.  He is on chronic pain medications including ketamine.  He states that he has daytime somnolence.  Is not think he snores.    Patient denies Lightheadedness, Dizziness, TIA or CVA symptoms.  No CHF or Edema.  No GI,  or Bleeding Issues. No Recent Fever or Chills.     Cardiovascular and general review of systems is otherwise negative.    A 14-system review is otherwise negative, other than noted.    ALLERGIES:     Allergies   Allergen Reactions    Morphine Hives, Rash and Shortness of breath     Tolerates hydromorphone    Ultram [Tramadol] Seizure    Alprazolam Psychosis     \"GOES CRAZY\", psychosis    Ketorolac Seizure    Fish Containing Products Hives and Itching     IT WAS A " PROCESSED FISH MEAL, BUT STATES EATS FRESH AND FROZEN FISH ALL THE TIME.        MEDICATIONS:     Current Outpatient Medications   Medication Instructions    acetaminophen (TYLENOL) 1,000 mg, oral, Every 8 hours PRN    albuterol (Ventolin HFA) 90 mcg/actuation inhaler 2 puffs, inhalation, Every 4 hours PRN    atorvastatin (Lipitor) 80 mg tablet 1 tablet, oral, Nightly    baclofen (LIORESAL) 20 mg, oral, 3 times daily    celecoxib (CELEBREX) 200 mg, oral, 2 times daily    chlorhexidine (Peridex) 0.12 % solution 15 mL, Mouth/Throat, As needed, 15 mls swish and spit the night before surgery and 15mls swish and spit the morning of surgery do not swallow    diazePAM (Valium) 5 mg tablet 1 tablet, oral, Every 12 hours PRN    diclofenac epolamine 1.3 % patch 1 patch, transdermal, 2 times daily    DULoxetine (CYMBALTA) 60 mg, oral, Daily    fluticasone (Flonase) 50 mcg/actuation nasal spray 1 spray, Each Nostril, Daily PRN    ibuprofen 800 mg, oral, Every 8 hours PRN    ketamine HCl (ketamine, bulk,) 100 % powder Ketamine 100 mg/mL + lidocaine 40 mg/mL 1 puff 4 times daily as needed, DSP#20 mL x 5 refills    lidocaine (Lidoderm) 5 % patch 1 patch, transdermal, Daily, Remove & discard patch within 12 hours or as directed by MD.    naloxone (Narcan) 4 mg/0.1 mL nasal spray Instil 1 spray intranasally for opioid overdose; repeat in 5 inutes if no response    omeprazole (PriLOSEC) 40 mg DR capsule 1 capsule, oral, 2 times daily, For 90 days    pregabalin (LYRICA) 300 mg, oral, 2 times daily    urea (Carmol) 40 % cream 1 Application, Topical, Daily, feet       PAST MEDICAL HISTORY:   As per impression above.  No other significant past medical or surgical history appreciated.    SOCIAL HISTORY:   .  Lives with girlfriend for the last 18 years.   He is retired paramedic.  Denies tobacco alcohol or illicit drug use.    FAMILY HISTORY:   Positive family history of CAD.  Father with 5 vessel coronary bypass surgery.    VITALS:      Vitals:    09/19/24 1003   BP: 152/80   Pulse: 90   SpO2: 96%       Wt Readings from Last 4 Encounters:   09/19/24 78.9 kg (174 lb)   09/07/24 82 kg (180 lb 12.4 oz)   08/23/24 81.6 kg (180 lb)   08/10/24 84.4 kg (186 lb)       PHYSICAL EXAMINATION:      General: No acute distress. Vital signs as noted. Alert and oriented.  Head And Neck Examination: No jugular venous distention, no carotid bruits, no mass. Carotid upstrokes preserved. Oral mucosa moist.  No xanthelasma. Head and neck examination otherwise unremarkable.  Lungs: Clear to auscultation and percussion. No wheezes, no rales,  and no rhonchi.  Chest: Excursion appeared to be normal. No chest wall tenderness on palpation.  Heart: Normal S1 and S2. No S3. No S4. No rub. Grade 1/6 systolic murmur, best heard at the left sternal border. Point of maximal impulse was within normal limits.  Abdomen: Soft. Nontender. No organomegaly. No bruits. No masses.   Extremities: No bipedal edema. No clubbing. No cyanosis.  Pulses are strong throughout. No bruits.  Musculoskeletal Exam: No ulcers, otherwise unremarkable.  Neuro: Neurologically appeared grossly intact.    ELECTROCARDIOGRAM:      9/7/2024:  Sinus rhythm, incomplete right bundle branch block, nonspecific x-ray changes.  Rate 78.    CARDIAC TESTING:      None this visit    LABORATORY DATA:      CBC:   Lab Results   Component Value Date    WBC 5.4 09/07/2024    RBC 4.96 09/07/2024    HGB 13.4 (L) 09/07/2024    HCT 42.3 09/07/2024     09/07/2024        CMP:    Lab Results   Component Value Date     09/07/2024    K 4.0 09/07/2024     09/07/2024    CO2 29 09/07/2024    BUN 15 09/07/2024    CREATININE 0.79 09/07/2024    GLUCOSE 76 09/07/2024    CALCIUM 9.1 09/07/2024       Magnesium:    Lab Results   Component Value Date    MG 1.70 09/07/2024       Lipid Profile:    Lab Results   Component Value Date    CHOL 246 (H) 03/19/2020    HDL 33.0 (A) 03/19/2020       Hepatic Function Panel:    Lab  Results   Component Value Date    ALKPHOS 64 09/07/2024    ALT 17 09/07/2024    AST 14 09/07/2024    PROT 6.8 09/07/2024    BILITOT 0.4 09/07/2024    BILIDIR 0.1 07/09/2021       TSH:    Lab Results   Component Value Date    TSH 0.86 05/13/2024       HgBA1c:    Lab Results   Component Value Date    HGBA1C 5.3 06/12/2024       BNP:   Lab Results   Component Value Date    BNP 19 07/14/2023        PT/INR:    Lab Results   Component Value Date    PROTIME 12.3 09/07/2024    INR 1.1 09/07/2024       Cardiac Enzymes:    Lab Results   Component Value Date    TROPHS 31 (H) 09/07/2024    TROPHS 29 (H) 09/07/2024    TROPHS 38 (H) 06/20/2024                 PROBLEM LIST:     Patient Active Problem List   Diagnosis    Anxiety    Astigmatism, bilateral    Myopia of both eyes with astigmatism and presbyopia    Bilateral presbyopia    Cervical radicular pain    Chronic low back pain without sciatica    Chronic patellofemoral pain of left knee    Patellofemoral arthritis    Cubital tunnel syndrome on left    Cubital tunnel syndrome on right    Daytime sleepiness    Foot joint pain    History of psychological trauma    Joint pain    Myofascial pain    Lumbar spondylosis    Other spondylosis, cervical region    Metatarsalgia of left foot    Muscle spasm    Narcotic drug use    Reflex sympathetic dystrophy    Osteochondral defect of patella    Complex regional pain syndrome type 1 of left lower extremity    Scapular dyskinesis    Shoulder impingement    Suspected sleep apnea    Thoracic back pain    Arthritis    Atrophy of quadriceps femoris muscle    Erectile dysfunction    Guyon syndrome    Kidney cysts    Lung nodule    Primary osteoarthritis of left hip    Trauma and stressor-related disorder    Trochanteric bursitis of both hips    Status post left hip replacement    Chest pain, unspecified type    Elevated troponin I level    Neck pain    Unilateral primary osteoarthritis, right hip    S/P total right hip arthroplasty              Ronni Dey MD, [facility] /  Cardiology      Of Note:  [employer] voice recognition dictation software was utilized partially in the preparation of this note, therefore, inaccuracies in spelling, word choice and punctuation may have occurred which were not recognized the time of signing.    Patient was seen and examined with total time of visit including chart preparation, rooming, and chart completion exceeding 40 minutes.    ----

## 2024-09-20 LAB — CRP SERPL HS-MCNC: 2.5 MG/L

## 2024-09-25 NOTE — PROGRESS NOTES
Chief Complaint   Patient presents with    Left Shoulder - Pain     Xrays 3/20/2024      History of Present Illness:  Patient is here today for his left shoulder pain for the past 10 days.  States he was at physical therapy for his neck working on the arm bike and the following morning noted increased pain and inflammation of the left shoulder.    Imaging:  No acute fractures no dislocations of the left shoulder.  No arthritic change     Assessment:   Left shoulder impingement with biceps tendonitis    Plan:  Left shoulder cortisone injection today.  NSAID: Celebrex.  GI side effects and medical risks discussed  Ice: 60 minutes on and off  Exercise home program: Medically directed Shoulder therapy / Handout given  Follow-up in 4 weeks.     L Inj/Asp: L glenohumeral on 9/26/2024 2:22 PM  Indications: pain  Details: 22 G needle, posterior approach  Medications: 8 mL lidocaine 10 mg/mL (1 %); 40 mg triamcinolone acetonide 40 mg/mL  Outcome: tolerated well, no immediate complications  Procedure, treatment alternatives, risks and benefits explained, specific risks discussed. Consent was given by the patient. Immediately prior to procedure a time out was called to verify the correct patient, procedure, equipment, support staff and site/side marked as required. Patient was prepped and draped in the usual sterile fashion.            Physical Exam:  Well-nourished, well-developed. No acute distress. Alert and oriented x3. Responds appropriately to questioning. Good mood. Normal affect.  Left shoulder:  ROM flexion to 135 degrees, externally rotates 45  5/5 rotator cuff strength  Pain in the biceps  Neurovascular exam normal distally  Palpable pedal pulse and good cap refill     Review of Systems:  GENERAL: Negative for malaise, significant weight loss, fever  MUSCULOSKELETAL: See HPI  NEURO:  Negative     Past Medical History:   Diagnosis Date    Anesthesia of skin     Numbness and tingling    Anxiety 09/21/2023     Arthritis Unk    Bilateral myopia 09/21/2023    Cervical radicular pain 09/21/2023    Chronic patellofemoral pain of left knee 09/21/2023    Complex regional pain syndrome type 1 of left lower extremity 09/21/2023    COVID-19     VACCINATED    Cubital tunnel syndrome on left 09/21/2023    Cubital tunnel syndrome on right 09/21/2023    Disease of intestine, unspecified     Bowel trouble    Erectile dysfunction 09/21/2023    Foot joint pain 09/21/2023    Fractures     Gastroparesis     GERD (gastroesophageal reflux disease)     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    History of psychological trauma 09/21/2023    Hyperlipidemia     Joint pain 09/21/2023    Kidney cysts 09/21/2023    Low back pain, unspecified 12/07/2022    Chronic low back pain without sciatica, unspecified back pain laterality    Low back pain, unspecified 08/09/2022    Lumbago    Lumbar spondylosis 09/21/2023    Lung nodule 09/21/2023    RIGHT    Metatarsalgia of left foot 09/21/2023    Myopia, bilateral 02/19/2019    Myopia of both eyes with astigmatism and presbyopia    Osteochondral defect of patella 09/21/2023    Osteoporosis     Other forms of angina pectoris (CMS-HCC)     Stable angina pectoris    Other spondylosis, cervical region 09/21/2023    Patellofemoral arthritis 09/21/2023    Personal history of other diseases of the musculoskeletal system and connective tissue     History of arthritis    Personal history of other specified conditions     History of seizure    Reflex sympathetic dystrophy 09/21/2023    Scapular dyskinesis 09/21/2023    Seizure disorder (Multi)     D/T TRAMADOL REACTION - 25 YEARS AGO    Shoulder impingement 09/21/2023    Suspected sleep apnea 09/21/2023    Trauma and stressor-related disorder 09/21/2023    Trochanteric bursitis of both hips 09/22/2023    Unspecified disorder of ear, unspecified ear     Ear, nose and throat disorder    Wears glasses        Medication Documentation Review Audit       Reviewed by Tiki Kong  (Nursing Student) on 09/19/24 at 1009      Medication Order Taking? Sig Documenting Provider Last Dose Status   acetaminophen (Tylenol) 500 mg tablet [id number] Yes Take 2 tablets (1,000 mg) by mouth every 8 hours if needed for mild pain (1 - 3). Bre Gray MD Taking Active   albuterol (Ventolin HFA) 90 mcg/actuation inhaler [id number] Yes Inhale 2 puffs every 4 hours if needed for wheezing. Bre Provider, MD Taking Active   atorvastatin (Lipitor) 80 mg tablet [id number] Yes Take 1 tablet (80 mg) by mouth once daily at bedtime. Historical Provider, MD Taking Active   baclofen (Lioresal) 20 mg tablet [id number] Yes Take 1 tablet (20 mg) by mouth 3 times a day. Olivia Alonzo MD Taking Active   celecoxib (CeleBREX) 200 mg capsule [id number] Yes Take 1 capsule (200 mg) by mouth twice a day. Bre Gray MD Taking Active   chlorhexidine (Peridex) 0.12 % solution [id number] Yes Use 15 mL in the mouth or throat if needed for wound care for up to 2 doses. 15 mls swish and spit the night before surgery and 15mls swish and spit the morning of surgery do not swallow CHASITY Pagan Taking Active   diazePAM (Valium) 5 mg tablet [id number] Yes Take 1 tablet (5 mg) by mouth every 12 hours if needed for anxiety. Bre Gray MD Taking Active   diclofenac epolamine 1.3 % patch [id number] No Place 1 patch over 12 hours on the skin 2 times a day.   Patient not taking: Reported on 9/19/2024    Olivia Alonzo MD Not Taking Flag for Review   Discontinued 09/19/24 1007   DULoxetine (Cymbalta) 60 mg DR capsule [id number] Yes Take 1 capsule (60 mg) by mouth once daily. Bre Gray MD Taking Active   fluticasone (Flonase) 50 mcg/actuation nasal spray [id number] Yes Administer 1 spray into each nostril once daily as needed. Bre Gray MD Taking Active   ibuprofen 800 mg tablet [id number] No Take 1 tablet (800 mg) by mouth every 8 hours if needed for moderate pain (4 - 6).   Patient not  "taking: Reported on 2024    Jeanette Crowell, APRN-CNP Not Taking Flag for Review   ketamine HCl (ketamine, bulk,) 100 % powder [id number] Yes Ketamine 100 mg/mL + lidocaine 40 mg/mL 1 puff 4 times daily as needed, DSP#20 mL x 5 refills Olivia Alonzo MD Taking Active   lidocaine (Lidoderm) 5 % patch [id number] Yes Place 1 patch over 12 hours on the skin once daily for 15 doses. Remove & discard patch within 12 hours or as directed by MD. Mak Almodovar MD Taking Active   naloxone (Narcan) 4 mg/0.1 mL nasal spray [id number] Yes Instil 1 spray intranasally for opioid overdose; repeat in 5 inutes if no response Damian Spicer MD MPH Taking Active   omeprazole (PriLOSEC) 40 mg DR capsule [id number] Yes Take 1 capsule (40 mg) by mouth 2 times a day. For 90 days Historical Provider, MD Taking Active   oxyCODONE-acetaminophen (Percocet) 5-325 mg tablet [id number]  Take 1 tablet by mouth every 6 hours if needed for severe pain (7 - 10) for up to 7 days. Royce Louis MD   24 2359   pregabalin (Lyrica) 300 mg capsule [id number] Yes Take 1 capsule (300 mg) by mouth 2 times a day. Vish Barboza PA-C Taking Active   urea (Carmol) 40 % cream [id number] Yes Apply 1 Application topically once daily. feet Historical Provider, MD Taking Active                    Allergies   Allergen Reactions    Morphine Hives, Rash and Shortness of breath     Tolerates hydromorphone    Ultram [Tramadol] Seizure    Alprazolam Psychosis     \"GOES CRAZY\", psychosis    Ketorolac Seizure    Fish Containing Products Hives and Itching     IT WAS A PROCESSED FISH MEAL, BUT STATES EATS FRESH AND FROZEN FISH ALL THE TIME.       Social History     Socioeconomic History    Marital status: Single     Spouse name: Not on file    Number of children: Not on file    Years of education: Not on file    Highest education level: Not on file   Occupational History    Not on file   Tobacco Use    Smoking status: Never    Smokeless tobacco: Never    Tobacco " comments:     It killed my mother smoking and she left me with a gift a nodule on my right lung   Vaping Use    Vaping status: Never Used   Substance and Sexual Activity    Alcohol use: Not Currently    Drug use: Not Currently     Types: Other     Comment: Ketamine treatments for pain    Sexual activity: Not Currently     Partners: Female     Birth control/protection: None   Other Topics Concern    Not on file   Social History Narrative    Not on file     Social Determinants of Health     Financial Resource Strain: Low Risk  (6/28/2024)    Overall Financial Resource Strain (CARDIA)     Difficulty of Paying Living Expenses: Not hard at all   Food Insecurity: No Food Insecurity (6/28/2024)    Hunger Vital Sign     Worried About Running Out of Food in the Last Year: Never true     Ran Out of Food in the Last Year: Never true   Transportation Needs: No Transportation Needs (7/17/2024)    OASIS : Transportation     Lack of Transportation (Medical): No     Lack of Transportation (Non-Medical): No     Patient Unable or Declines to Respond: No   Physical Activity: Insufficiently Active (6/28/2024)    Exercise Vital Sign     Days of Exercise per Week: 7 days     Minutes of Exercise per Session: 10 min   Stress: Stress Concern Present (6/28/2024)    [employer] - Occupational Stress Questionnaire     Feeling of Stress : Rather much   Social Connections: Feeling Socially Integrated (7/17/2024)    OASIS : Social Isolation     Frequency of experiencing loneliness or isolation: Never   Recent Concern: Social Connections - Socially Isolated (5/16/2024)    Received from [facility]    Social Connection and Isolation Panel [NHANES]     Frequency of Communication with Friends and Family: Once a week     Frequency of Social Gatherings with Friends and Family: Never     Attends Worship Services: Never     Active Member of Clubs or Organizations: No     Attends Club or Organization  Meetings: Never     Marital Status: Living with partner   Intimate Partner Violence: Not At Risk (6/28/2024)    Humiliation, Afraid, Rape, and Kick questionnaire     Fear of Current or Ex-Partner: No     Emotionally Abused: No     Physically Abused: No     Sexually Abused: No   Housing Stability: Low Risk  (6/28/2024)    Housing Stability Vital Sign     Unable to Pay for Housing in the Last Year: No     Number of Places Lived in the Last Year: 1     Unstable Housing in the Last Year: No   Recent Concern: Housing Stability - High Risk (5/16/2024)    [facility] from [facility]    Housing Stability Vital Sign     Unable to Pay for Housing in the Last Year: Yes     Number of Places Lived in the Last Year: 1     Unstable Housing in the Last Year: No       Past Surgical History:   Procedure Laterality Date    ACHILLES TENDON SURGERY  1978 2016    CT ABDOMEN PELVIS ANGIOGRAM W AND/OR WO IV CONTRAST  03/18/2020    CT ABDOMEN PELVIS ANGIOGRAM W AND/OR WO IV CONTRAST 3/18/2020 [facility]    CT ANGIO NECK  12/16/2022    CT NECK ANGIO W AND WO IV CONTRAST 12/16/2022 [facility]    CT HEAD ANGIO W AND WO IV CONTRAST  12/16/2022    CT HEAD ANGIO W AND WO IV CONTRAST 12/16/2022 [facility]    HIP SURGERY Left     REPLACEMENT    JOINT REPLACEMENT      LEFT KNEE    KNEE ARTHROPLASTY Bilateral 06/13/2017    Knee Arthroplasty    OTHER SURGICAL HISTORY  01/06/2022    Ulnar nerve neuroplasty    OTHER SURGICAL HISTORY Bilateral 07/26/2019    Foot surgery  -- HARDWARE    OTHER SURGICAL HISTORY Bilateral     BILATERAL ANKLE SCOPE    OTHER SURGICAL HISTORY      POP ABDOMINAL PROCEDURE    PLANTAR FASCIA RELEASE      SHOULDER SURGERY Bilateral 06/13/2017    Shoulder Surgery       CT ABDOMEN PELVIS W IV CONTRAST    Result Date: 1/22/2024  * * *Final Report* * * DATE OF EXAM: Jan 22 2024  8:50AM   [facility]   0530  -  CT ABD/PEL W IVCON  / ACCESSION #  [id number] PROCEDURE REASON: Abdominal pain, acute,  nonlocalized      * * * * Physician Interpretation * * * *  EXAMINATION:  CT ABDOMEN AND PELVIS WITH IV CONTRAST CLINICAL HISTORY: Acute abdominal pain TECHNIQUE: CT of the abdomen and pelvis was performed using standard technique, scanning from just above the dome of the diaphragm to the symphysis pubis. MQ:  CTAP_3 Contrast: IV:  100 ml of Omnipaque 350 CT Radiation dose: Integrated Dose-length product (DLP) for this visit =   269 mGy*cm. CT Dose Reduction Employed: Automated exposure control(AEC) and iterative recon COMPARISON: 12/26/2023 RESULT: Liver: No mass. Biliary: No bile duct dilation.  The gallbladder is within normal limits for the modality. Spleen: No mass. No splenomegaly. Pancreas: No mass or duct dilation. Adrenals: No mass. Kidneys: There are symmetric bilateral nephrograms.  In the medial right upper renal pole there is a 4.3 cm exophytic hypodensity consistent with a cyst. GI tract: No dilation or wall thickening. There is a small sliding hiatal hernia.  There is colonic residue with a moderate to large stool burden.   There is no acute appendicitis or diverticulitis. Lymph nodes: No abdominal or pelvic lymphadenopathy. Mesentery/Peritoneum: No ascites or mass. Retroperitoneum: No mass. Vasculature:  - Abdominal aorta and iliac arteries: Atherosclerotic calcifications without aneurysm.  - Celiac and SMA: Questionable mild narrowing at the origin of the celiac axis.  - Portal venous system (SMV, splenic vein, portal vein and branches): Patent.  - Hepatic veins: Patent. Pelvis: No mass, ascites or fluid collection. There is artifact from a left hip arthroplasty. Bones/Soft Tissues: There are no aggressive osseous lesions identified. Lower thorax: Bibasilar scarring.  (topogram) images: No additional findings.    IMPRESSION: Stable appearance.  There is no acute intra-abdominal process identified. : KERLINE   Transcribe Date/Time: Jan 22 2024  8:50A Dictated by : INES  MD GONZALES This examination was interpreted and the report reviewed and electronically signed by: INES ROLON MD on Jan 22 2024  9:05AM  EST    US GALLBLADDER    Result Date: 1/22/2024  * * *Final Report* * * DATE OF EXAM: Jan 22 2024  6:48AM   [facility]   1032  -  US ABD RIGHT UPPER QUADRANT  / ACCESSION #  [id number] PROCEDURE REASON: RUQ pain, no fever, no elev WBC      * * * * Physician Interpretation * * * *  EXAMINATION:   RIGHT UPPER QUADRANT ULTRASOUND CLINICAL HISTORY: RIGHT upper quadrant pain TECHNIQUE:  Sonography of the right upper quadrant  was performed.   Images were obtained and stored in a permanent archive. MQ:  URUQ_2 COMPARISON: CT abdomen pelvis 12/26/2023 RESULT: Pancreas: Not well visualized Liver:      Echotexture:  Homogeneous      Echogenicity:  Increased      Surface contour:  Smooth      Lesions:  None. Biliary: No intrahepatic biliary duct dilation.      CBD: 0.3 cm at the hilum.      Gallbladder: Normal caliber           -Contents:  No cholelithiasis           -Wall:  Normal           -Other:  No pericholecystic fluid.  Negative sonographic Bautista sign. Right Kidney: No hydronephrosis.  Anechoic upper pole cyst with lobular borders measures 3.1 x 2.6 x 3.8 cm.  This appears unchanged from the prior CT. Ascites: None.    IMPRESSION: Hepatic steatosis.  Otherwise, no acute sonographic abnormality in the RIGHT upper quadrant. : KERLINE   Transcribe Date/Time: Jan 22 2024  6:55A Dictated by : DEE MCKEON MD This examination was interpreted and the report reviewed and electronically signed by: DEE MCKEON MD on Jan 22 2024  6:59AM  EST    XR CHEST 1 VIEW    Result Date: 1/22/2024  * * *Final Report* * * DATE OF EXAM: Jan 22 2024  6:22AM   [facility]   5376  -  XR CHEST 1V FRONTAL PORT  / ACCESSION #  [id number] PROCEDURE REASON: Tachycardia      * * * * Physician Interpretation * * * *  EXAMINATION:  CHEST RADIOGRAPH (SINGLE VIEW AP OR PA) CLINICAL INFORMATION (AS PROVIDED BY  ORDERING CLINICIAN) :  Tachycardia Comparison:  9/3/2023 RESULT: Lines, tubes, and devices:  N/A Lungs and pleura:  No confluent infiltrate, large pleural effusion or pneumothorax.   Cardiomediastinal silhouette:  Within normal limits. Other:  No acute bony abnormality identified.    IMPRESSION: No significant acute radiographic abnormality. : KERLINE   Transcribe Date/Time: Jan 22 2024  6:28A Dictated by : MIRNA CHAO MD This examination was interpreted and the report reviewed and electronically signed by: MIRNA CHAO MD on Jan 22 2024  6:30AM  EST                             Scribe Attestation  By signing my name below, IArgelia, Scribe   attest that this documentation has been prepared under the direction and in the presence of Royce Louis MD.

## 2024-09-26 ENCOUNTER — OFFICE VISIT (OUTPATIENT)
Dept: ORTHOPEDIC SURGERY | Facility: CLINIC | Age: 54
End: 2024-09-26
Payer: COMMERCIAL

## 2024-09-26 DIAGNOSIS — M77.8 TENDINITIS OF LEFT SHOULDER: Primary | ICD-10-CM

## 2024-09-26 PROCEDURE — 2500000005 HC RX 250 GENERAL PHARMACY W/O HCPCS: Performed by: ORTHOPAEDIC SURGERY

## 2024-09-26 PROCEDURE — 99214 OFFICE O/P EST MOD 30 MIN: CPT | Performed by: ORTHOPAEDIC SURGERY

## 2024-09-26 PROCEDURE — 20610 DRAIN/INJ JOINT/BURSA W/O US: CPT | Mod: LT | Performed by: ORTHOPAEDIC SURGERY

## 2024-09-26 PROCEDURE — 2500000004 HC RX 250 GENERAL PHARMACY W/ HCPCS (ALT 636 FOR OP/ED): Performed by: ORTHOPAEDIC SURGERY

## 2024-09-26 RX ORDER — TRIAMCINOLONE ACETONIDE 40 MG/ML
40 INJECTION, SUSPENSION INTRA-ARTICULAR; INTRAMUSCULAR
Status: COMPLETED | OUTPATIENT
Start: 2024-09-26 | End: 2024-09-26

## 2024-09-26 RX ORDER — LIDOCAINE HYDROCHLORIDE 10 MG/ML
8 INJECTION, SOLUTION INFILTRATION; PERINEURAL
Status: COMPLETED | OUTPATIENT
Start: 2024-09-26 | End: 2024-09-26

## 2024-10-03 ENCOUNTER — APPOINTMENT (OUTPATIENT)
Dept: RADIOLOGY | Facility: HOSPITAL | Age: 54
End: 2024-10-03
Payer: COMMERCIAL

## 2024-10-03 ENCOUNTER — HOSPITAL ENCOUNTER (EMERGENCY)
Facility: HOSPITAL | Age: 54
Discharge: HOME | End: 2024-10-03
Attending: STUDENT IN AN ORGANIZED HEALTH CARE EDUCATION/TRAINING PROGRAM
Payer: COMMERCIAL

## 2024-10-03 VITALS
OXYGEN SATURATION: 98 % | HEART RATE: 74 BPM | HEIGHT: 68 IN | BODY MASS INDEX: 27.28 KG/M2 | TEMPERATURE: 96.8 F | WEIGHT: 180 LBS | RESPIRATION RATE: 16 BRPM | DIASTOLIC BLOOD PRESSURE: 92 MMHG | SYSTOLIC BLOOD PRESSURE: 140 MMHG

## 2024-10-03 DIAGNOSIS — M54.9 BACK PAIN, UNSPECIFIED BACK LOCATION, UNSPECIFIED BACK PAIN LATERALITY, UNSPECIFIED CHRONICITY: Primary | ICD-10-CM

## 2024-10-03 DIAGNOSIS — M54.41 ACUTE RIGHT-SIDED LOW BACK PAIN WITH RIGHT-SIDED SCIATICA: ICD-10-CM

## 2024-10-03 PROCEDURE — 2500000005 HC RX 250 GENERAL PHARMACY W/O HCPCS: Performed by: STUDENT IN AN ORGANIZED HEALTH CARE EDUCATION/TRAINING PROGRAM

## 2024-10-03 PROCEDURE — 72131 CT LUMBAR SPINE W/O DYE: CPT

## 2024-10-03 PROCEDURE — 99284 EMERGENCY DEPT VISIT MOD MDM: CPT

## 2024-10-03 PROCEDURE — 2500000001 HC RX 250 WO HCPCS SELF ADMINISTERED DRUGS (ALT 637 FOR MEDICARE OP): Performed by: STUDENT IN AN ORGANIZED HEALTH CARE EDUCATION/TRAINING PROGRAM

## 2024-10-03 PROCEDURE — 72131 CT LUMBAR SPINE W/O DYE: CPT | Performed by: RADIOLOGY

## 2024-10-03 RX ORDER — LIDOCAINE 560 MG/1
1 PATCH PERCUTANEOUS; TOPICAL; TRANSDERMAL ONCE
Status: DISCONTINUED | OUTPATIENT
Start: 2024-10-03 | End: 2024-10-03 | Stop reason: HOSPADM

## 2024-10-03 RX ORDER — ACETAMINOPHEN 325 MG/1
975 TABLET ORAL ONCE
Status: DISCONTINUED | OUTPATIENT
Start: 2024-10-03 | End: 2024-10-03 | Stop reason: HOSPADM

## 2024-10-03 RX ORDER — METHOCARBAMOL 500 MG/1
500 TABLET, FILM COATED ORAL ONCE
Status: COMPLETED | OUTPATIENT
Start: 2024-10-03 | End: 2024-10-03

## 2024-10-03 RX ADMIN — LIDOCAINE 4% 1 PATCH: 40 PATCH TOPICAL at 13:30

## 2024-10-03 RX ADMIN — METHOCARBAMOL TABLETS 500 MG: 500 TABLET, COATED ORAL at 13:29

## 2024-10-03 ASSESSMENT — LIFESTYLE VARIABLES
HAVE YOU EVER FELT YOU SHOULD CUT DOWN ON YOUR DRINKING: NO
HAVE PEOPLE ANNOYED YOU BY CRITICIZING YOUR DRINKING: NO
EVER FELT BAD OR GUILTY ABOUT YOUR DRINKING: NO
EVER HAD A DRINK FIRST THING IN THE MORNING TO STEADY YOUR NERVES TO GET RID OF A HANGOVER: NO
TOTAL SCORE: 0

## 2024-10-03 ASSESSMENT — PAIN DESCRIPTION - PAIN TYPE: TYPE: CHRONIC PAIN

## 2024-10-03 ASSESSMENT — PAIN SCALES - GENERAL
PAINLEVEL_OUTOF10: 5 - MODERATE PAIN
PAINLEVEL_OUTOF10: 8

## 2024-10-03 ASSESSMENT — PAIN - FUNCTIONAL ASSESSMENT: PAIN_FUNCTIONAL_ASSESSMENT: 0-10

## 2024-10-03 ASSESSMENT — PAIN DESCRIPTION - LOCATION: LOCATION: BACK

## 2024-10-03 NOTE — DISCHARGE INSTRUCTIONS
Please follow-up with Dr. Chen in regard to your back pain.  Your imaging today showed evidence of mild disc bulge.  It is very important that you follow-up with your primary care provider for ongoing management of your pain and symptoms.  Please do not hesitate to return to the ER or seek immediate medical attention if you begin to have any of the following symptoms: Numbness in your groin, numbness and weakness in your lower extremities, inability to control your lower extremities, loss of control of your bladder or bowels, or any new or concerning symptoms.

## 2024-10-03 NOTE — ED PROVIDER NOTES
EMERGENCY DEPARTMENT ENCOUNTER      Pt Name: Akin Lynn  MRN: [id number]  Birthdate 1970  Date of evaluation: 10/3/2024  Provider: Eloy Carroll DO    CHIEF COMPLAINT       Chief Complaint   Patient presents with    Back Pain     Pt has hx of chronic back pain     Fall     Pt states his back hurt him and he couldn't feel his legs and then fell down 3 steps hit his head, denies blood thinners, denies loc         HISTORY OF PRESENT ILLNESS    Akin Lynn is a 54-year-old male with past medical history of chronic pain, GERD, s/p right hip replacement this year who presents to the emergency department after a fall.  He states that he was cleaning his floor this morning and upon standing felt a sharp pain in his lower back.  As he walked down the stairs he had severe shooting pain, loss of feeling in his legs and fell 5 feet.  He reports hitting the left side of his head and falling on his left shoulder.  He denies LOC and is not on thinners. He endorses loss of strength and sensation in his right lower extremity and diminished in his left lower extremity. Patient reports inability to ambulate.  He is endorsing pain over his left shoulder and his lower back.  He denies chest pain but does endorse shortness of breath.  He denies abdominal pain, saddle anesthesia, urinary incontinence.       History provided by:  Patient      Nursing Notes were reviewed.    PAST MEDICAL HISTORY     Past Medical History:   Diagnosis Date    Anesthesia of skin     Numbness and tingling    Anxiety 09/21/2023    Arthritis Unk    Bilateral myopia 09/21/2023    Cervical radicular pain 09/21/2023    Chronic patellofemoral pain of left knee 09/21/2023    Complex regional pain syndrome type 1 of left lower extremity 09/21/2023    COVID-19     VACCINATED    Cubital tunnel syndrome on left 09/21/2023    Cubital tunnel syndrome on right 09/21/2023    Disease of intestine, unspecified     Bowel trouble    Erectile dysfunction 09/21/2023     Foot joint pain 09/21/2023    Fractures     Gastroparesis     GERD (gastroesophageal reflux disease)     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    History of psychological trauma 09/21/2023    Hyperlipidemia     Joint pain 09/21/2023    Kidney cysts 09/21/2023    Low back pain, unspecified 12/07/2022    Chronic low back pain without sciatica, unspecified back pain laterality    Low back pain, unspecified 08/09/2022    Lumbago    Lumbar spondylosis 09/21/2023    Lung nodule 09/21/2023    RIGHT    Metatarsalgia of left foot 09/21/2023    Myopia, bilateral 02/19/2019    Myopia of both eyes with astigmatism and presbyopia    Osteochondral defect of patella 09/21/2023    Osteoporosis     Other forms of angina pectoris     Stable angina pectoris    Other spondylosis, cervical region 09/21/2023    Patellofemoral arthritis 09/21/2023    Personal history of other diseases of the musculoskeletal system and connective tissue     History of arthritis    Personal history of other specified conditions     History of seizure    Reflex sympathetic dystrophy 09/21/2023    Scapular dyskinesis 09/21/2023    Seizure disorder (Multi)     D/T TRAMADOL REACTION - 25 YEARS AGO    Shoulder impingement 09/21/2023    Suspected sleep apnea 09/21/2023    Trauma and stressor-related disorder 09/21/2023    Trochanteric bursitis of both hips 09/22/2023    Unspecified disorder of ear, unspecified ear     Ear, nose and throat disorder    Wears glasses          SURGICAL HISTORY       Past Surgical History:   Procedure Laterality Date    ACHILLES TENDON SURGERY  1978 2016    CT ABDOMEN PELVIS ANGIOGRAM W AND/OR WO IV CONTRAST  03/18/2020    CT ABDOMEN PELVIS ANGIOGRAM W AND/OR WO IV CONTRAST 3/18/2020 [facility]    CT ANGIO NECK  12/16/2022    CT NECK ANGIO W AND WO IV CONTRAST 12/16/2022 [facility]    CT HEAD ANGIO W AND WO IV CONTRAST  12/16/2022    CT HEAD ANGIO W AND WO IV CONTRAST 12/16/2022 [facility]    HIP SURGERY Left      REPLACEMENT    JOINT REPLACEMENT      LEFT KNEE    KNEE ARTHROPLASTY Bilateral 06/13/2017    Knee Arthroplasty    OTHER SURGICAL HISTORY  01/06/2022    Ulnar nerve neuroplasty    OTHER SURGICAL HISTORY Bilateral 07/26/2019    Foot surgery  -- HARDWARE    OTHER SURGICAL HISTORY Bilateral     BILATERAL ANKLE SCOPE    OTHER SURGICAL HISTORY      POP ABDOMINAL PROCEDURE    PLANTAR FASCIA RELEASE      SHOULDER SURGERY Bilateral 06/13/2017    Shoulder Surgery         CURRENT MEDICATIONS       Discharge Medication List as of 10/3/2024  1:31 PM        CONTINUE these medications which have NOT CHANGED    Details   acetaminophen (Tylenol) 500 mg tablet Take 2 tablets (1,000 mg) by mouth every 8 hours if needed for mild pain (1 - 3)., Historical Med      albuterol (Ventolin HFA) 90 mcg/actuation inhaler Inhale 2 puffs every 4 hours if needed for wheezing., Historical Med      atorvastatin (Lipitor) 80 mg tablet Take 1 tablet (80 mg) by mouth once daily at bedtime., Historical Med      baclofen (Lioresal) 20 mg tablet Take 1 tablet (20 mg) by mouth 3 times a day., Starting Thu 2/1/2024, Until Sun 1/26/2025, Normal      chlorhexidine (Peridex) 0.12 % solution Use 15 mL in the mouth or throat if needed for wound care for up to 2 doses. 15 mls swish and spit the night before surgery and 15mls swish and spit the morning of surgery do not swallow, Starting Mon 6/10/2024, Normal      diazePAM (Valium) 5 mg tablet Take 1 tablet (5 mg) by mouth every 12 hours if needed for anxiety., Starting Thu 3/14/2024, Historical Med      DULoxetine (Cymbalta) 60 mg DR capsule Take 1 capsule (60 mg) by mouth once daily., Starting Wed 8/2/2023, Historical Med      fluticasone (Flonase) 50 mcg/actuation nasal spray Administer 1 spray into each nostril once daily as needed., Historical Med      ketamine HCl (ketamine, bulk,) 100 % powder Ketamine 100 mg/mL + lidocaine 40 mg/mL 1 puff 4 times daily as needed, DSP#20 mL x 5 refills, Normal      naloxone  (Narcan) 4 mg/0.1 mL nasal spray Instil 1 spray intranasally for opioid overdose; repeat in 5 inutes if no response, Starting Tue 6/25/2024, Normal      omeprazole (PriLOSEC) 40 mg DR capsule Take 1 capsule (40 mg) by mouth 2 times a day. For 90 days, Historical Med      pregabalin (Lyrica) 300 mg capsule Take 1 capsule (300 mg) by mouth 2 times a day., Starting Fri 10/6/2023, Normal      urea (Carmol) 40 % cream Apply 1 Application topically once daily. feet, Historical Med             ALLERGIES     Morphine, Ultram [tramadol], Alprazolam, Ketorolac, Nsaids (non-steroidal anti-inflammatory drug), Fish containing products, and Opioids - morphine analogues    FAMILY HISTORY       Family History   Problem Relation Name Age of Onset    Lung cancer Mother      Glaucoma Father      Skin cancer Father      Hypertension Other mul fam mem     Heart disease Other mul fam mem           SOCIAL HISTORY       Social History     Socioeconomic History    Marital status: Single   Tobacco Use    Smoking status: Never    Smokeless tobacco: Never    Tobacco comments:     It killed my mother smoking and she left me with a gift a nodule on my right lung   Vaping Use    Vaping status: Never Used   Substance and Sexual Activity    Alcohol use: Not Currently    Drug use: Not Currently     Types: Other     Comment: Ketamine treatments for pain    Sexual activity: Not Currently     Partners: Female     Birth control/protection: None     Social Determinants of Health     Financial Resource Strain: Low Risk  (6/28/2024)    Overall Financial Resource Strain ([facility])     Difficulty of Paying Living Expenses: Not hard at all   Food Insecurity: No Food Insecurity (6/28/2024)    Hunger Vital Sign     Worried About Running Out of Food in the Last Year: Never true     Ran Out of Food in the Last Year: Never true   Transportation Needs: No Transportation Needs (7/17/2024)    OASIS : Transportation     Lack of Transportation (Medical): No     Lack  of Transportation (Non-Medical): No     Patient Unable or Declines to Respond: No   Physical Activity: Insufficiently Active (6/28/2024)    Exercise Vital Sign     Days of Exercise per Week: 7 days     Minutes of Exercise per Session: 10 min   Stress: Stress Concern Present (6/28/2024)    [employer] [place] [employer] - Occupational Stress Questionnaire     Feeling of Stress : Rather much   Social Connections: Feeling Socially Integrated (7/17/2024)    OASIS : Social Isolation     Frequency of experiencing loneliness or isolation: Never   Recent Concern: Social Connections - Socially Isolated (5/16/2024)    Received from [employer]    Social Connection and Isolation Panel [NHANES]     Frequency of Communication with Friends and Family: Once a week     Frequency of Social Gatherings with Friends and Family: Never     Attends Orthodoxy Services: Never     Active Member of Clubs or Organizations: No     Attends Club or Organization Meetings: Never     Marital Status: Living with partner   Intimate Partner Violence: Not At Risk (6/28/2024)    Humiliation, Afraid, Rape, and Kick questionnaire     Fear of Current or Ex-Partner: No     Emotionally Abused: No     Physically Abused: No     Sexually Abused: No   Housing Stability: Low Risk  (6/28/2024)    Housing Stability Vital Sign     Unable to Pay for Housing in the Last Year: No     Number of Places Lived in the Last Year: 1     Unstable Housing in the Last Year: No   Recent Concern: Housing Stability - High Risk (5/16/2024)    Received from [employer]    Housing Stability Vital Sign     Unable to Pay for Housing in the Last Year: Yes     Number of Places Lived in the Last Year: 1     Unstable Housing in the Last Year: No       SCREENINGS                        PHYSICAL EXAM    (up to 7 for level 4, 8 or more for level 5)     ED Triage Vitals   Temp Pulse Resp BP   -- -- -- --      SpO2 Temp src Heart Rate Source Patient  Position   -- -- -- --      BP Location FiO2 (%)     -- --       Physical Exam  Constitutional:       General: He is not in acute distress.     Appearance: Normal appearance. He is normal weight.   HENT:      Head: Normocephalic and atraumatic. No raccoon eyes, Cagle's sign or contusion.      Jaw: No tenderness or swelling.      Right Ear: Tympanic membrane normal.      Left Ear: Tympanic membrane normal.      Ears:      Comments: No hemotympanum bilaterally.     Nose: Nose normal.      Comments: No nasoseptal hematoma.  Eyes:      Extraocular Movements: Extraocular movements intact.      Conjunctiva/sclera: Conjunctivae normal.   Neck:      Trachea: Trachea normal.   Cardiovascular:      Rate and Rhythm: Normal rate and regular rhythm.      Heart sounds: Normal heart sounds.   Pulmonary:      Effort: Pulmonary effort is normal. No respiratory distress.      Breath sounds: Normal breath sounds.   Abdominal:      General: There is no distension.      Tenderness: There is no abdominal tenderness.   Musculoskeletal:         General: No swelling, tenderness or deformity.      Right shoulder: Normal.      Left shoulder: No swelling or deformity. Decreased range of motion.      Cervical back: Pain with movement present. Decreased range of motion.      Lumbar back: Decreased range of motion.   Neurological:      Mental Status: He is alert and oriented to person, place, and time.      Sensory: Sensory deficit present.      Motor: Weakness present.      Comments: Patient is subjective pins and needle sensation over the right lateral leg, is still responsive to painful pinprick stimuli throughout the right and left lower extremities.  Is able to hold both legs individually up from the bed for a full 5 seconds.  2+ bilateral radial pulses are present.  5 out of 5 strength bilateral dorsiflexion, plantarflexion.   Psychiatric:         Mood and Affect: Mood normal.         Behavior: Behavior normal.          DIAGNOSTIC RESULTS      LABS:  Labs Reviewed - No data to display    All other labs were within normal range or not returned as of this dictation.    Imaging  CT lumbar spine wo IV contrast   Final Result   Mild lumbar facet arthropathy.   L5-S1 disc bulge.        MACRO:   None        Signed by: Arsenio Navas 10/3/2024 1:15 PM   Dictation workstation:   [id number]           Procedures  Procedures     EMERGENCY DEPARTMENT COURSE/MDM:   Medical Decision Making  Akin Lynn is a 54-year-old male with past medical history of chronic pain, GERD, s/p right hip replacement this year who presents to the emergency department after a fall.  Upon arrival to the emergency department, patient is hemodynamically stable in no acute distress.  Patient is endorsing moderate to severe lower back pain that is worse with any movement.    Differential includes but is not limited to disc herniation, hip dislocation, lumbar fracture, cauda equina. Cauda equina not likely due to lack of saddle anesthesia and urinary symptoms. [place] CT head rule suggested CT head is not necessary in this patient likely ruling out any intracranial injury.  We will CT the lumbar spine and give the patient 975 mg of Tylenol for pain in addition to lidocaine patch and muscle relaxer.     CT lumbar spine revealed L5-S1 disc bulge with mild lumbar facet arthropathy. Workup in the Emergency Department did not reveal any emergent causes for treatment.  Patient was advised to follow-up with Dr. Chen in regard to his back pain and CT scan revealing a bulging disc at the level of L5-S1.  He was advised to return to the ER if symptoms worsen or he develops numbness in the groin, numbness or weakness in the lower extremities, loss of control of bladder or bowels.  CT imaging negative for new acute traumatic finding.  Patient after pain medications was ultimately able to stand up, walk with no apparent distress.  No gait instability present.  Full-strength bilateral lower  extremities.  Recommended follow-up with chronic pain management and spine clinic as needed.  Ultimately discharged in stable condition.    I have seen and evaluated the patient and agree with the medical student's documentation as above.  I have edited and made adjustments as needed.  Plan of care was discussed with the attending physician.    Please see ED course for the rest of the MDM.    Eloy Carroll , PGY-3  Emergency Medicine    Amount and/or Complexity of Data Reviewed  External Data Reviewed: notes.     Details: Patient [facility]24 complaining of neck pain and chest pain.  Chest workup came back unremarkable.  Neck pain was consistent with his known cervical disease.  Radiology: ordered. Decision-making details documented in ED Course.        Please see ED course for additional MDM.    Diagnoses as of 10/03/24 1605   Back pain, unspecified back location, unspecified back pain laterality, unspecified chronicity   Acute right-sided low back pain with right-sided sciatica        CT lumbar spine wo IV contrast   Final Result   Mild lumbar facet arthropathy.   L5-S1 disc bulge.        MACRO:   None        Signed by: Arsenio Navas 10/3/2024 1:15 PM   Dictation workstation:   [id number]          Patient and or family in agreement and understanding of treatment plan.  All questions answered.      I reviewed the case with the attending ED physician. The attending ED physician agrees with the plan. Patient and/or patient´s representative was counseled regarding labs, imaging, likely diagnosis, and plan. All questions were answered.    ED Medications administered this visit:    Medications   methocarbamol (Robaxin) tablet 500 mg (500 mg oral Given 10/3/24 1329)       New Prescriptions from this visit:    Discharge Medication List as of 10/3/2024  1:31 PM          Follow-up:  Bryan Chen MD  [address]   [facility], [address] [place] [address]  [address]  [phone]    Schedule an appointment as soon as possible for a visit       Otoniel Lane MD  [address] [facility] [address]  [facility] [address] [phone]    Schedule an appointment as soon as possible for a visit   As needed        Final Impression:   1. Back pain, unspecified back location, unspecified back pain laterality, unspecified chronicity    2. Acute right-sided low back pain with right-sided sciatica            Bo Chapito, MS4  (Please note that portions of this note were completed with a voice recognition program.  Efforts were made to edit the dictations but occasionally words are mis-transcribed.)     Eloy Carroll DO  Resident  10/03/24 1605      Emergency Medicine Attending Attestation:     Diagnoses as of 10/03/24 1709   Back pain, unspecified back location, unspecified back pain laterality, unspecified chronicity   Acute right-sided low back pain with right-sided sciatica       The patient was seen by the resident/fellow.  I have personally performed a substantive portion of the encounter.  I have seen and examined the patient; agree with the workup, evaluation, MDM, management and diagnosis.  The care plan has been discussed with the resident; I have reviewed the resident’s note and agree with the documented findings.      I independently interpreted patient's EKG and agree with the above mentioned interpretation.      MD Robin Longo MD  10/03/24 0117

## 2024-10-03 NOTE — Clinical Note
Akin Lynn was seen and treated in our emergency department on 10/3/2024.  He may return to work on 10/04/2024.       If you have any questions or concerns, please don't hesitate to call.      Robin Iglesias MD

## 2024-10-09 ENCOUNTER — HOSPITAL ENCOUNTER (OUTPATIENT)
Dept: CARDIOLOGY | Facility: CLINIC | Age: 54
Discharge: HOME | End: 2024-10-09
Payer: COMMERCIAL

## 2024-10-09 ENCOUNTER — ANCILLARY PROCEDURE (OUTPATIENT)
Dept: CARDIOLOGY | Facility: CLINIC | Age: 54
End: 2024-10-09
Payer: COMMERCIAL

## 2024-10-09 ENCOUNTER — HOSPITAL ENCOUNTER (OUTPATIENT)
Dept: RADIOLOGY | Facility: CLINIC | Age: 54
Discharge: HOME | End: 2024-10-09
Payer: COMMERCIAL

## 2024-10-09 DIAGNOSIS — M79.602 LEFT ARM PAIN: ICD-10-CM

## 2024-10-09 DIAGNOSIS — R40.0 DAYTIME SLEEPINESS: ICD-10-CM

## 2024-10-09 DIAGNOSIS — M47.816 LUMBAR SPONDYLOSIS: ICD-10-CM

## 2024-10-09 DIAGNOSIS — R00.2 PALPITATIONS: ICD-10-CM

## 2024-10-09 DIAGNOSIS — R07.9 CHEST PAIN, UNSPECIFIED TYPE: ICD-10-CM

## 2024-10-09 DIAGNOSIS — M54.50 CHRONIC LOW BACK PAIN WITHOUT SCIATICA, UNSPECIFIED BACK PAIN LATERALITY: ICD-10-CM

## 2024-10-09 DIAGNOSIS — Z96.641 S/P TOTAL RIGHT HIP ARTHROPLASTY: ICD-10-CM

## 2024-10-09 DIAGNOSIS — R53.83 FATIGUE, UNSPECIFIED TYPE: ICD-10-CM

## 2024-10-09 DIAGNOSIS — K31.84 GASTROPARESIS: ICD-10-CM

## 2024-10-09 DIAGNOSIS — E78.2 MIXED HYPERLIPIDEMIA: ICD-10-CM

## 2024-10-09 DIAGNOSIS — M54.6 THORACIC BACK PAIN, UNSPECIFIED BACK PAIN LATERALITY, UNSPECIFIED CHRONICITY: ICD-10-CM

## 2024-10-09 DIAGNOSIS — I45.10 INCOMPLETE RBBB: ICD-10-CM

## 2024-10-09 DIAGNOSIS — R29.818 SUSPECTED SLEEP APNEA: ICD-10-CM

## 2024-10-09 DIAGNOSIS — R91.1 LUNG NODULE: ICD-10-CM

## 2024-10-09 DIAGNOSIS — R94.31 ABNORMAL EKG: ICD-10-CM

## 2024-10-09 DIAGNOSIS — R79.89 ELEVATED TROPONIN I LEVEL: ICD-10-CM

## 2024-10-09 DIAGNOSIS — M19.90 ARTHRITIS: ICD-10-CM

## 2024-10-09 DIAGNOSIS — M54.12 CERVICAL RADICULAR PAIN: ICD-10-CM

## 2024-10-09 DIAGNOSIS — Z82.49 FAMILY HISTORY OF ISCHEMIC HEART DISEASE: ICD-10-CM

## 2024-10-09 DIAGNOSIS — I49.3 PVC (PREMATURE VENTRICULAR CONTRACTION): ICD-10-CM

## 2024-10-09 DIAGNOSIS — G89.29 CHRONIC LOW BACK PAIN WITHOUT SCIATICA, UNSPECIFIED BACK PAIN LATERALITY: ICD-10-CM

## 2024-10-09 DIAGNOSIS — K21.9 GASTROESOPHAGEAL REFLUX DISEASE WITHOUT ESOPHAGITIS: ICD-10-CM

## 2024-10-09 DIAGNOSIS — F31.9 BIPOLAR AFFECTIVE DISORDER, REMISSION STATUS UNSPECIFIED (MULTI): ICD-10-CM

## 2024-10-09 PROCEDURE — 2500000004 HC RX 250 GENERAL PHARMACY W/ HCPCS (ALT 636 FOR OP/ED): Performed by: INTERNAL MEDICINE

## 2024-10-09 PROCEDURE — 93246 EXT ECG>7D<15D RECORDING: CPT | Performed by: INTERNAL MEDICINE

## 2024-10-09 PROCEDURE — 3430000001 HC RX 343 DIAGNOSTIC RADIOPHARMACEUTICALS: Performed by: INTERNAL MEDICINE

## 2024-10-09 PROCEDURE — 93017 CV STRESS TEST TRACING ONLY: CPT

## 2024-10-09 PROCEDURE — A9502 TC99M TETROFOSMIN: HCPCS | Performed by: INTERNAL MEDICINE

## 2024-10-09 PROCEDURE — 78452 HT MUSCLE IMAGE SPECT MULT: CPT

## 2024-10-09 PROCEDURE — 93248 EXT ECG>7D<15D REV&INTERPJ: CPT | Performed by: INTERNAL MEDICINE

## 2024-10-09 RX ORDER — REGADENOSON 0.08 MG/ML
0.4 INJECTION, SOLUTION INTRAVENOUS ONCE
Status: COMPLETED | OUTPATIENT
Start: 2024-10-09 | End: 2024-10-09

## 2024-10-10 ENCOUNTER — APPOINTMENT (OUTPATIENT)
Dept: ORTHOPEDIC SURGERY | Facility: CLINIC | Age: 54
End: 2024-10-10
Payer: COMMERCIAL

## 2024-10-10 ENCOUNTER — APPOINTMENT (OUTPATIENT)
Dept: RHEUMATOLOGY | Facility: CLINIC | Age: 54
End: 2024-10-10
Payer: COMMERCIAL

## 2024-10-13 ENCOUNTER — APPOINTMENT (OUTPATIENT)
Dept: CARDIOLOGY | Facility: HOSPITAL | Age: 54
End: 2024-10-13
Payer: COMMERCIAL

## 2024-10-13 ENCOUNTER — HOSPITAL ENCOUNTER (EMERGENCY)
Facility: HOSPITAL | Age: 54
Discharge: HOME | End: 2024-10-13
Attending: EMERGENCY MEDICINE
Payer: COMMERCIAL

## 2024-10-13 ENCOUNTER — APPOINTMENT (OUTPATIENT)
Dept: RADIOLOGY | Facility: HOSPITAL | Age: 54
End: 2024-10-13
Payer: COMMERCIAL

## 2024-10-13 VITALS
WEIGHT: 173 LBS | HEIGHT: 68 IN | DIASTOLIC BLOOD PRESSURE: 86 MMHG | BODY MASS INDEX: 26.22 KG/M2 | TEMPERATURE: 97.3 F | RESPIRATION RATE: 12 BRPM | HEART RATE: 78 BPM | OXYGEN SATURATION: 95 % | SYSTOLIC BLOOD PRESSURE: 143 MMHG

## 2024-10-13 DIAGNOSIS — G89.29 CHRONIC LEFT-SIDED LOW BACK PAIN WITH LEFT-SIDED SCIATICA: Primary | ICD-10-CM

## 2024-10-13 DIAGNOSIS — M54.42 CHRONIC LEFT-SIDED LOW BACK PAIN WITH LEFT-SIDED SCIATICA: Primary | ICD-10-CM

## 2024-10-13 DIAGNOSIS — M54.2 CERVICAL SPINE PAIN: ICD-10-CM

## 2024-10-13 DIAGNOSIS — R00.2 PALPITATIONS: ICD-10-CM

## 2024-10-13 LAB
ALBUMIN SERPL BCP-MCNC: 4.6 G/DL (ref 3.4–5)
ALP SERPL-CCNC: 72 U/L (ref 33–120)
ALT SERPL W P-5'-P-CCNC: 46 U/L (ref 10–52)
ANION GAP SERPL CALC-SCNC: 13 MMOL/L (ref 10–20)
AST SERPL W P-5'-P-CCNC: 34 U/L (ref 9–39)
BASOPHILS # BLD AUTO: 0.08 X10*3/UL (ref 0–0.1)
BASOPHILS NFR BLD AUTO: 0.8 %
BILIRUB SERPL-MCNC: 0.5 MG/DL (ref 0–1.2)
BNP SERPL-MCNC: 23 PG/ML (ref 0–99)
BUN SERPL-MCNC: 18 MG/DL (ref 6–23)
CALCIUM SERPL-MCNC: 9.8 MG/DL (ref 8.6–10.3)
CARDIAC TROPONIN I PNL SERPL HS: 23 NG/L (ref 0–20)
CHLORIDE SERPL-SCNC: 102 MMOL/L (ref 98–107)
CO2 SERPL-SCNC: 27 MMOL/L (ref 21–32)
CREAT SERPL-MCNC: 0.83 MG/DL (ref 0.5–1.3)
D DIMER PPP FEU-MCNC: 298 NG/ML FEU
EGFRCR SERPLBLD CKD-EPI 2021: >90 ML/MIN/1.73M*2
EOSINOPHIL # BLD AUTO: 0.27 X10*3/UL (ref 0–0.7)
EOSINOPHIL NFR BLD AUTO: 2.8 %
ERYTHROCYTE [DISTWIDTH] IN BLOOD BY AUTOMATED COUNT: 15.4 % (ref 11.5–14.5)
FLUAV RNA RESP QL NAA+PROBE: NOT DETECTED
FLUBV RNA RESP QL NAA+PROBE: NOT DETECTED
GLUCOSE SERPL-MCNC: 78 MG/DL (ref 74–99)
HCT VFR BLD AUTO: 47.9 % (ref 41–52)
HGB BLD-MCNC: 15 G/DL (ref 13.5–17.5)
IMM GRANULOCYTES # BLD AUTO: 0.1 X10*3/UL (ref 0–0.7)
IMM GRANULOCYTES NFR BLD AUTO: 1 % (ref 0–0.9)
INR PPP: 0.9 (ref 0.9–1.1)
LYMPHOCYTES # BLD AUTO: 2.23 X10*3/UL (ref 1.2–4.8)
LYMPHOCYTES NFR BLD AUTO: 23 %
MAGNESIUM SERPL-MCNC: 2.13 MG/DL (ref 1.6–2.4)
MCH RBC QN AUTO: 27.1 PG (ref 26–34)
MCHC RBC AUTO-ENTMCNC: 31.3 G/DL (ref 32–36)
MCV RBC AUTO: 87 FL (ref 80–100)
MONOCYTES # BLD AUTO: 0.72 X10*3/UL (ref 0.1–1)
MONOCYTES NFR BLD AUTO: 7.4 %
NEUTROPHILS # BLD AUTO: 6.3 X10*3/UL (ref 1.2–7.7)
NEUTROPHILS NFR BLD AUTO: 65 %
NRBC BLD-RTO: 0 /100 WBCS (ref 0–0)
PLATELET # BLD AUTO: 252 X10*3/UL (ref 150–450)
POTASSIUM SERPL-SCNC: 4.4 MMOL/L (ref 3.5–5.3)
PROT SERPL-MCNC: 7.4 G/DL (ref 6.4–8.2)
PROTHROMBIN TIME: 10.6 SECONDS (ref 9.8–12.8)
RBC # BLD AUTO: 5.54 X10*6/UL (ref 4.5–5.9)
SARS-COV-2 RNA RESP QL NAA+PROBE: NOT DETECTED
SODIUM SERPL-SCNC: 138 MMOL/L (ref 136–145)
WBC # BLD AUTO: 9.7 X10*3/UL (ref 4.4–11.3)

## 2024-10-13 PROCEDURE — 85379 FIBRIN DEGRADATION QUANT: CPT

## 2024-10-13 PROCEDURE — 2500000004 HC RX 250 GENERAL PHARMACY W/ HCPCS (ALT 636 FOR OP/ED)

## 2024-10-13 PROCEDURE — 80053 COMPREHEN METABOLIC PANEL: CPT

## 2024-10-13 PROCEDURE — 96376 TX/PRO/DX INJ SAME DRUG ADON: CPT

## 2024-10-13 PROCEDURE — 93005 ELECTROCARDIOGRAM TRACING: CPT

## 2024-10-13 PROCEDURE — 2500000004 HC RX 250 GENERAL PHARMACY W/ HCPCS (ALT 636 FOR OP/ED): Performed by: EMERGENCY MEDICINE

## 2024-10-13 PROCEDURE — 96375 TX/PRO/DX INJ NEW DRUG ADDON: CPT

## 2024-10-13 PROCEDURE — 99284 EMERGENCY DEPT VISIT MOD MDM: CPT | Mod: 25

## 2024-10-13 PROCEDURE — 85610 PROTHROMBIN TIME: CPT

## 2024-10-13 PROCEDURE — 71045 X-RAY EXAM CHEST 1 VIEW: CPT

## 2024-10-13 PROCEDURE — 85025 COMPLETE CBC W/AUTO DIFF WBC: CPT

## 2024-10-13 PROCEDURE — 83735 ASSAY OF MAGNESIUM: CPT

## 2024-10-13 PROCEDURE — 87636 SARSCOV2 & INF A&B AMP PRB: CPT

## 2024-10-13 PROCEDURE — 36415 COLL VENOUS BLD VENIPUNCTURE: CPT

## 2024-10-13 PROCEDURE — 84484 ASSAY OF TROPONIN QUANT: CPT

## 2024-10-13 PROCEDURE — 2500000001 HC RX 250 WO HCPCS SELF ADMINISTERED DRUGS (ALT 637 FOR MEDICARE OP)

## 2024-10-13 PROCEDURE — 96374 THER/PROPH/DIAG INJ IV PUSH: CPT

## 2024-10-13 PROCEDURE — 83880 ASSAY OF NATRIURETIC PEPTIDE: CPT

## 2024-10-13 PROCEDURE — 71045 X-RAY EXAM CHEST 1 VIEW: CPT | Performed by: STUDENT IN AN ORGANIZED HEALTH CARE EDUCATION/TRAINING PROGRAM

## 2024-10-13 RX ORDER — METHOCARBAMOL 500 MG/1
1000 TABLET, FILM COATED ORAL ONCE
Status: COMPLETED | OUTPATIENT
Start: 2024-10-13 | End: 2024-10-13

## 2024-10-13 RX ORDER — METHOCARBAMOL 100 MG/ML
1000 INJECTION, SOLUTION INTRAMUSCULAR; INTRAVENOUS ONCE
Status: DISCONTINUED | OUTPATIENT
Start: 2024-10-13 | End: 2024-10-13

## 2024-10-13 ASSESSMENT — PAIN DESCRIPTION - ORIENTATION: ORIENTATION: MID;LOWER

## 2024-10-13 ASSESSMENT — LIFESTYLE VARIABLES
HAVE PEOPLE ANNOYED YOU BY CRITICIZING YOUR DRINKING: NO
EVER HAD A DRINK FIRST THING IN THE MORNING TO STEADY YOUR NERVES TO GET RID OF A HANGOVER: NO
TOTAL SCORE: 0
EVER FELT BAD OR GUILTY ABOUT YOUR DRINKING: NO
HAVE YOU EVER FELT YOU SHOULD CUT DOWN ON YOUR DRINKING: NO

## 2024-10-13 ASSESSMENT — PAIN SCALES - GENERAL
PAINLEVEL_OUTOF10: 7
PAINLEVEL_OUTOF10: 10 - WORST POSSIBLE PAIN

## 2024-10-13 ASSESSMENT — PAIN DESCRIPTION - DESCRIPTORS: DESCRIPTORS: ACHING

## 2024-10-13 ASSESSMENT — PAIN DESCRIPTION - PAIN TYPE: TYPE: CHRONIC PAIN

## 2024-10-13 ASSESSMENT — PAIN DESCRIPTION - LOCATION
LOCATION: BACK
LOCATION: BACK

## 2024-10-13 ASSESSMENT — PAIN DESCRIPTION - ONSET: ONSET: ONGOING

## 2024-10-13 ASSESSMENT — PAIN DESCRIPTION - PROGRESSION: CLINICAL_PROGRESSION: NOT CHANGED

## 2024-10-13 ASSESSMENT — HEART SCORE
RISK FACTORS: 1-2 RISK FACTORS
HISTORY: MODERATELY SUSPICIOUS
AGE: 45-64
ECG: NORMAL
TROPONIN: LESS THAN OR EQUAL TO NORMAL LIMIT
HEART SCORE: 3

## 2024-10-13 ASSESSMENT — PAIN - FUNCTIONAL ASSESSMENT
PAIN_FUNCTIONAL_ASSESSMENT: 0-10
PAIN_FUNCTIONAL_ASSESSMENT: 0-10

## 2024-10-13 ASSESSMENT — PAIN DESCRIPTION - FREQUENCY: FREQUENCY: CONSTANT/CONTINUOUS

## 2024-10-13 NOTE — ED PROVIDER NOTES
Emergency Department Provider Note        History of Present Illness     History provided by: Patient  Limitations to History: None  External Records Reviewed with Brief Summary:  Multiple ER visits including 1 on 9/7/2024 1 on 9/12/2024 1 from 10/3/2024 which shows that the patient has well-known cervical disease usually treated with Tylenol Robaxin home Percocet dose and lidocaine patch.    HPI:  Akin Lynn is a 54 y.o. male CRPS, HLD, osteoporosis, gastroparesis, history of cocaine use, stated sober for 13 years, reportedly allergic to morphine however tolerates hydromorphone and allergic to Toradol, tramadol and Xanax.  Patient has well-known diffuse tenderness to the lower cervical and upper thoracic spine.  Patient states he usually gets a Dilaudid for pain management.  On 9/12/2024 patient was given fentanyl, usually gets 1 dose of steroids and Robaxin as well.  The patient states he does have mild left chest pain at the point where he has a Holter monitor.  He also endorses cervical pain.  He states he attempted to make it through the pain however he was unable to last through the day.  He does follow-up with Dr. Bhatt for pain management, additionally has a cardiologist.  He states he does want evaluation for chest pain as well as the standard treatment for his cervical chronic pain that generally triggers his chest pain and palpitations.    Physical Exam   Triage vitals:  T 36.3 °C (97.3 °F)  HR 78  BP (!) 141/101  RR 18  O2 99 % None (Room air)    General: Awake, alert, in no acute distress  Eyes: Gaze conjugate.  No scleral icterus or injection  HENT: Normo-cephalic, atraumatic. No stridor  CV: Regular rate, regular rhythm. Radial pulses 2+ bilaterally  Resp: Breathing non-labored, speaking in full sentences.  Clear to auscultation bilaterally  GI: Soft, non-distended, non-tender. No rebound or guarding.  : Deferred  MSK/Extremities: No gross bony deformities. Moving all extremities  Skin:  Warm. Appropriate color  Neuro: Alert. Oriented. Face symmetric. Speech is fluent.  Gross strength and sensation intact in b/l UE and LEs  Psych: Appropriate mood and affect    Medical Decision Making & ED Course   Medical Decision Makin y.o. male who arrives chief complaint of chronic neck pain cervical pain that sets off chest pain and palpitations, additional palpitations.  Please see ED course for further medical decision making.  Ultimately there were no laboratory or imaging abnormalities, patient's pain was treated in the usual fashion.  The patient states that he felt better will follow-up with his pain management doctor and cardiologist.  He was grateful for treatment, states he will follow-up with his physical therapy on Tuesday in 3 days as well.  Ultimately patient was given return precautions understood plan of care.  Patient discharged home.  ----  Scoring Tools Utilized:       Differential diagnoses considered include but are not limited to: Chronic regional pain syndrome, cervical pain, chronic cervalgia, sciatica, ACS, pulmonary embolism, CHF exacerbation, respiratory infection     Social Determinants of Health which Significantly Impact Care: None identified     EKG Independent Interpretation: EKG interpreted by myself. Please see ED Course for full interpretation.    Independent Result Review and Interpretation: Relevant laboratory and radiographic results were reviewed and independently interpreted by myself.  As necessary, they are commented on in the ED Course.    Chronic conditions affecting the patient's care: As documented above in [facility]    The patient was discussed with the following consultants/services: None    Care Considerations: As documented above in [facility]    ED Course:  ED Course as of 10/15/24 1225   Sun Oct 13, 2024   1237 Flu A Result: Not Detected [PV]   1237 Flu B Result: Not Detected [PV]   1237 MAGNESIUM: 2.13 [PV]   1237 Coronavirus 2019, PCR: Not Detected [PV]   1237  Protime: 10.6 [PV]   1237 BNP: 23 [PV]   1237 Troponin I, High Sensitivity(!): 23 [PV]   1237 D-Dimer, Quantitative VTE Exclusion: 298 [PV]   1237 XR chest 1 view  X-ray was interpreted as:    1. Persistent and slightly more conspicuous right  right-greater-than-left lower interstitial opacities which could be  either infiltrates or atelectasis. Advise clinical correlation and  follow-up to ensure resolution.  Patient has no complaint of cough, no complaint of chills and is afebrile, D-dimer negative [PV]   1238 Do not suspect the patient has pneumonia as he has no shortness of breath or respiratory symptoms.  Ruled out pulmonary embolism D-dimer 298.  Troponin 23, this is actually lower than his usual troponin levels.  CBC shows no leukocytosis to indicate acute infection hemoglobin hematocrit were stable at 15 and 47.9.  No acute electrolyte dyscrasia, normal renal and hepatic function.  Patient tested negative for influenza and COVID.  PT/INR within normal limits. [PV]   1239 There are no acute abnormalities found on physical exam or on x-ray.  The patient has ruled out pulmonary embolism, ruled out ACS, ruled out infection, ruled out CHF exacerbation. [PV]   1259 Patient is requesting another dose of pain management medication.  This has been ordered.  We reviewed the results with the patient, he states his pain has been better managed.  He will follow-up with his pain management doctor, Dr. Velasquez, as well as with his cardiologist and primary care provider, Dr. Lane. patient was given strict return precautions, he arrange for a safe ride home.  Patient discharged home. [PV]      ED Course User Index  [PV] Keith Hernandez DO         Diagnoses as of 10/15/24 1225   Chronic left-sided low back pain with left-sided sciatica   Cervical spine pain   Palpitations     Disposition   As a result of the work-up, the patient was discharged home.  he was informed of his diagnosis and instructed to come back with any  concerns or worsening of condition.  he and was agreeable to the plan as discussed above.  he was given the opportunity to ask questions.  All of the patient's questions were answered.    Procedures   Procedures    Patient seen and discussed with ED attending physician.    Noam Escudero DO  Emergency Medicine     Keith Hernandez DO  Resident  10/13/24 2197    The patient was seen by the resident/fellow.  I have personally performed a substantive portion of the encounter.  I have seen and examined the patient; agree with the workup, evaluation, MDM, management and diagnosis.  The care plan has been discussed with the resident; I have reviewed the resident’s note and agree with the documented findings.      I saw the patient, at the end of the visit.  We have had several encounters together in the past in the emergency room.  He has a chronic pain syndrome, and when the pain gets exacerbated it causes him to have acute chest pain and palpitations.  As the pain alleviates, the chest pain palpitations also alleviate.  This is the same issue here today.  After the visit, he states he feels better.  He is looking forward to seeing the [employer] play in football today, as well as the [employer] in baselball.   Discharge instructions were provided however, he would like them all shredded as he has access to the info via My Chart and he states he knows what he has to do and why this happened.   Therefore, on his way out of the ER, his instructions were placed in the confidential shredder bin.                                                      Noam Escudero DO  10/15/24 7056

## 2024-10-13 NOTE — DISCHARGE INSTRUCTIONS
Please follow-up with your primary care doctor as well as with your pain management doctor and her cardiologist.  Please return to the emergency department if you have significant worsening of chest pain, if you have severe difficulty breathing, if you are having episodes of fainting or passing out.

## 2024-10-14 ENCOUNTER — TELEPHONE (OUTPATIENT)
Dept: CARDIOLOGY | Facility: CLINIC | Age: 54
End: 2024-10-14
Payer: COMMERCIAL

## 2024-10-14 NOTE — TELEPHONE ENCOUNTER
Pt states that he needs a cardiac clearance form sent to Dr. Partida at [facility] for ketamine infusions for pain. He is scheduled for this Monday. Pt needs clearance to be faxed to [phone]. Pt states that he is taking off zio patch and mailing tomorrow. Please advise. Danya MOHRN

## 2024-10-15 LAB
ATRIAL RATE: 70 BPM
P AXIS: 46 DEGREES
P OFFSET: 203 MS
P ONSET: 147 MS
PR INTERVAL: 150 MS
Q ONSET: 222 MS
QRS COUNT: 11 BEATS
QRS DURATION: 88 MS
QT INTERVAL: 376 MS
QTC CALCULATION(BAZETT): 406 MS
QTC FREDERICIA: 396 MS
R AXIS: 48 DEGREES
T AXIS: 52 DEGREES
T OFFSET: 410 MS
VENTRICULAR RATE: 70 BPM

## 2024-10-15 NOTE — TELEPHONE ENCOUNTER
Clearance form faxed with confirmation. Advised pt verbalized understanding. Danya FERRARI

## 2024-10-17 ENCOUNTER — OFFICE VISIT (OUTPATIENT)
Dept: ORTHOPEDIC SURGERY | Facility: CLINIC | Age: 54
End: 2024-10-17
Payer: COMMERCIAL

## 2024-10-17 DIAGNOSIS — M54.16 LUMBAR RADICULOPATHY: ICD-10-CM

## 2024-10-17 DIAGNOSIS — M54.16 LUMBAR RADICULOPATHY: Primary | ICD-10-CM

## 2024-10-17 PROCEDURE — 99214 OFFICE O/P EST MOD 30 MIN: CPT | Performed by: ORTHOPAEDIC SURGERY

## 2024-10-17 NOTE — PROGRESS NOTES
Akin Lynn is a 54 y.o. male who presents for Follow-up of the Lower Back (MRI denied/Did Pt with no relief).    HPI:  54-year-old gentleman here for follow-up of lower back.  He did physical therapy that did not give him any relief.  His MRI was denied.  He denies any fever chills nausea vomiting night sweats.  He has no bowel or bladder complaints.    Physical exam:  Well-nourished, well kept.No lymphangitis or lymphadenopathy in the examined extremities.  Gait normal.  Can stand on heels and toes with little difficulty.   Examination of the back shows no significant tenderness in the paraspinous musculature.  There is no decreased range of motion in all directions due to guarding/muscle spasms and pain at extremes.  There is good strength and no instability.  Examination of the lower extremities reveals no point tenderness, swelling, or deformity.  Range of motion of the hips, knees, and ankles are full without crepitance, instability, or exacerbation of pain.  Strength is 5/5 throughout.  No redness, abrasions, or lesions on extremities  Gross sensation intact in the extremities.   Affect normal.  Alert and oriented ×3.  Coordination normal.    Assessment:  54-year-old gentleman here for follow-up of low back.  His MRI was denied due to lack of physical therapy.  He did a full course of physical therapy along with therapy for a recent right hip FAN.  The therapy did not really help him much.  He is still having back pain.  He fell recently and went to the emergency department, those notes were reviewed from October 13, 2024.  They gave him some steroids and anti-inflammatories and it did help somewhat.  He would still like to proceed with the MRI.    We have ordered tests, MRI.  I reviewed the provider notes from the emergency department from October 13, 2024.  This is an exacerbation of a chronic problem that is affecting his bodily function.    For complete plan and/or surgical details, please refer to  Dr. Chen's portion of this split dictation.    -Akin Tanner PA-C      In a face-to-face encounter, I performed a history and physical examination, discussed pertinent diagnostic studies if indicated, and discussed diagnosis and management strategies with both the patient and the midlevel provider.  I reviewed the midlevel's note and agree with the documented findings and plan of care.    Patient who I saw in May.  I have seen him in the past as well.  We ordered an MRI of his lumbar spine but it was denied.  He still has back pain but he is somewhat improved.  He still has some leg symptoms but somewhat improved.  He has been to the ER twice in the past 2 weeks.  The last time was for left leg radicular symptoms.  He had a CT scan done in the ER couple weeks ago which is relatively normal.  We did work this problem up further.  Back pain is his main complaint.  We are going to get an MRI of his lumbar spine and see him back after that.  He is done physical therapy for his back.  He still has this new acute problem of uncertain prognosis.  We reviewed the notes from his emergency department visit on October 13.  I have also reviewed Dr. Matamoros's notes and appears that the patient is okay for us to proceed with anything we need to do from a spine standpoint.    Bryan Chen MD  Orthopedic surgery

## 2024-10-21 ENCOUNTER — APPOINTMENT (OUTPATIENT)
Dept: BEHAVIORAL HEALTH | Facility: CLINIC | Age: 54
End: 2024-10-21
Payer: COMMERCIAL

## 2024-10-21 ENCOUNTER — TELEMEDICINE (OUTPATIENT)
Dept: BEHAVIORAL HEALTH | Facility: CLINIC | Age: 54
End: 2024-10-21
Payer: COMMERCIAL

## 2024-10-21 DIAGNOSIS — F41.9 ANXIETY: ICD-10-CM

## 2024-10-21 DIAGNOSIS — F45.42 PAIN DISORDER ASSOCIATED WITH PSYCHOLOGICAL AND PHYSICAL FACTORS: ICD-10-CM

## 2024-10-21 DIAGNOSIS — Z91.49 HISTORY OF PSYCHOLOGICAL TRAUMA: ICD-10-CM

## 2024-10-21 PROCEDURE — 90834 PSYTX W PT 45 MINUTES: CPT

## 2024-10-21 PROCEDURE — 90791 PSYCH DIAGNOSTIC EVALUATION: CPT | Performed by: PSYCHOLOGIST

## 2024-10-21 NOTE — PROGRESS NOTES
Start time:  3:24  End time:  4:02     An interactive audio and video telecommunication system which permits real time communications between the patient (at the originating site) and provider (at the distant site) was utilized to provide this telehealth service. Verbal consent has been obtained from this patient for a telehealth visit.     The patient was informed of the current need to conduct treatment via virtual platform in light of COVID-19 pandemic. I have confirmed the patient’s identity via the following (minimum of three) acceptable identifiers as per  Policy PH-9: , address, phone number, and email address.        SUBJECTIVE: Continued discussing conflict between honesty and stability. Discussed recent progress of channeling anger/frustration about pain into desired/valued activities. Discussed in future considering and reflecting on values based action as a means of helping him reduce pain related distress.     Progress: Good  Prognosis: Good     Duration of problem: years with recent improvement     Severity: moderate     OBJECTIVE:  Orientation & Cognition: Oriented x3. Thought processes normal and appropriate to situation.  Mood, Affect: Elevated (had ketamine infusion this AM)  Appearance: Optimal by patient standards.  Harm to self or others: Not reported  Substance abuse: Not reported  Psychiatric medication use: Not reported     IMPRESSION:     F41.9 Unspecified anxiety disorder  Z.91.49 History of psychological trauma     Goals for Therapy: Sharing difficult feelings in a therapeutic environment, expanding upon current social supports in his life, and addressing anxiety. Patient is also interested in medication management for his feelings of traumatization which he is worried will return after he ends ketamine treatment.      PLAN:     Discussed in future considering and reflecting on values based action as a means of helping him reduce pain related distress.      Next apt:       --------------------------------------------  Other(s) present in the room: None.  --------------------------------------------  Time spent face-to-face with patient: 38 minutes

## 2024-10-24 ENCOUNTER — APPOINTMENT (OUTPATIENT)
Dept: ORTHOPEDIC SURGERY | Facility: CLINIC | Age: 54
End: 2024-10-24
Payer: COMMERCIAL

## 2024-10-24 NOTE — PROGRESS NOTES
"Medical Psychology Evaluation     Verbal consent was requested and obtained from patient on this date for a telehealth visit.  The televideo session connected with the patient at his home.  The patient consented to an unrestricted note.    Akin Lynn is a 54-year-old man referred by his pain management physician Tan Vargas MD for evaluation of his suitability to be treated with opiates.  Mr. Lynn is currently on a regimen that includes ketamine, Valium, duloxetine, lyrica, baclofen and oxycodone-acetaminophen.  He is being treated for chronic neck and back pain, foot and hip pain and whole body pain.    Pain status.  He reports whole body pain that he quantifies as 5/10 in intensity during our evaluation.  Prior to his most recent oxycodone prescription, he reports that his pain was 9/10 in intensity and it dropped to 5-10 in intensity within 45 minutes of beginning his oxycodone dose.  He currently has a prescription for oxycodone from his primary care physician.  He reports that he has had multiple major surgeries to his knee, foot, both hips, bilateral rotator cuffs and has been diagnosed with CRPS in his left leg.  He has had nerve blocks from Dr. Anthony and is receiving ketamine infusions from Dr. Alonzo.  He reports that he functions much better and experiences less pain when managed on an opioid regimen.    Psychosocial status.  He lives with his girlfriend and has no children.  He worked as a paramedic for 17 years and sustained a work-related injury in October, 2008 when he slipped and tore his meniscus while coming down a flight of stairs.  He and his girlfriend have 12 cats and he characterizes himself now as a \"stay-at-home dad.\"  He reports that he feels socially isolated.    Mental Status Exam  Orientation:  Alert. Oriented x3.  Memory: Grossly intact.  Attention/Concentration:  Distractible.  Appearance:  Well-groomed. Neat.   Behavior/Attitude: Cooperative. Pleasant. Good eye " "contact.  Motor: Calm. Normal motor activity.   Speech: Regular rate and volume. Fluent. No pressure.   Mood: \"I have problems with anxiety\"  Affect: Congruent to stated mood. Broad. Reactive.  Thought process: Goal-directed. Linear. Organized.  Thought content: No paranoia, delusion or ideas of reference. No hallucinations in auditory, visual or other sensory modalities.   Suicidal ideation: denied.  Homicidal ideation: denied.   Insight: Fair.    Psychological status  He reports that his mood is stable and euthymic with adequate energy and motivation.  He denies anhedonia.  He has been seeing Dr. Carolina Haque for some time now in treatment for anxiety.  Since working with her he describes that he no longer struggles with short temper and has no complaints of irritability or mood instability.  He reports no history of mary nor of hypomania.  He is maintained on a regimen that includes Valium and Cymbalta prescribed by his primary care physician.    He reports a history of suicide attempt after his best friend's death in 2005.  He attempted suicide by severing and abdominal artery.  Since that time, he reports no suicidal or homicidal ideation and no  subsequent suicide attempt.    He reports that he quit drinking following that suicide attempt and characterizes himself as having been a social drinker.  He also acknowledges using cocaine in the past but has not used street drugs by his report for the past 20 years.  Family history includes addiction and a grandmother who he characterizes as both having struggled with alcohol abuse and eventually she suicided.  He also has a half sibling who he characterizes as having a heroin addiction.    He reports experiencing social and situational anxiety for which he sought psychotherapy.  With psychotherapy, along with the psychotropic regimen, he reports that his situational anxiety and social anxiety are in considerably better control.    OARRS report  His OARRS report " reviewed this date indicates an unintentional overdose risk score of 500 which is considered above average.  Contributing to this score was 110 days with benzo/narcotic overlap in the past 2 years and 128 total days of short-acting drug prescriptions.  Over the past year he has received oxycodone HCl or oxycodone-acetaminophen prescriptions from 8 different providers over 19 visits.  There is no evidence of multiple simultaneous opiate prescriptions.    Discussion  This evaluation suggests that short-acting opiates, while not absolutely contraindicated, may pose a moderate level of risk.  Moreover, there is a more general concern about the inadvisability of using opiates over the long-term for the management of chronic pain particularly in conjunction with a benzodiazepine.  A reasonable alternative to consider might be the use of buprenorphine which has a considerably lower risk profile in the management of chronic pain.    Please feel free to contact me with any questions you might have.

## 2024-10-29 ENCOUNTER — APPOINTMENT (OUTPATIENT)
Dept: PAIN MEDICINE | Facility: CLINIC | Age: 54
End: 2024-10-29
Payer: COMMERCIAL

## 2024-10-31 ENCOUNTER — INFUSION (OUTPATIENT)
Dept: INFUSION THERAPY | Facility: CLINIC | Age: 54
End: 2024-10-31
Payer: COMMERCIAL

## 2024-10-31 VITALS
RESPIRATION RATE: 15 BRPM | HEART RATE: 78 BPM | TEMPERATURE: 97.9 F | DIASTOLIC BLOOD PRESSURE: 84 MMHG | OXYGEN SATURATION: 97 % | SYSTOLIC BLOOD PRESSURE: 135 MMHG

## 2024-10-31 DIAGNOSIS — G90.522 COMPLEX REGIONAL PAIN SYNDROME TYPE 1 OF LEFT LOWER EXTREMITY: Primary | ICD-10-CM

## 2024-10-31 PROCEDURE — 96368 THER/DIAG CONCURRENT INF: CPT | Mod: INF

## 2024-10-31 PROCEDURE — 2500000004 HC RX 250 GENERAL PHARMACY W/ HCPCS (ALT 636 FOR OP/ED): Performed by: NURSE PRACTITIONER

## 2024-10-31 PROCEDURE — 96365 THER/PROPH/DIAG IV INF INIT: CPT | Mod: INF

## 2024-10-31 RX ORDER — ALBUTEROL SULFATE 0.83 MG/ML
3 SOLUTION RESPIRATORY (INHALATION) AS NEEDED
OUTPATIENT
Start: 2024-11-14

## 2024-10-31 RX ORDER — METOPROLOL TARTRATE 25 MG/1
25 TABLET, FILM COATED ORAL ONCE
OUTPATIENT
Start: 2024-11-14 | End: 2024-11-14

## 2024-10-31 RX ORDER — METOCLOPRAMIDE HYDROCHLORIDE 5 MG/ML
10 INJECTION INTRAMUSCULAR; INTRAVENOUS ONCE
OUTPATIENT
Start: 2024-11-14 | End: 2024-11-14

## 2024-10-31 RX ORDER — NITROGLYCERIN 0.4 MG/1
0.4 TABLET SUBLINGUAL ONCE
OUTPATIENT
Start: 2024-11-14 | End: 2024-11-14

## 2024-10-31 RX ORDER — DIPHENHYDRAMINE HYDROCHLORIDE 50 MG/ML
50 INJECTION INTRAMUSCULAR; INTRAVENOUS AS NEEDED
OUTPATIENT
Start: 2024-11-14

## 2024-10-31 RX ORDER — DIPHENHYDRAMINE HYDROCHLORIDE 50 MG/ML
25 INJECTION INTRAMUSCULAR; INTRAVENOUS ONCE
OUTPATIENT
Start: 2024-11-14 | End: 2024-11-14

## 2024-10-31 RX ORDER — ONDANSETRON HYDROCHLORIDE 2 MG/ML
4 INJECTION, SOLUTION INTRAVENOUS ONCE
OUTPATIENT
Start: 2024-11-14 | End: 2024-11-14

## 2024-10-31 RX ORDER — KETAMINE HCL IN NACL, ISO-OSM 100MG/10ML
SYRINGE (ML) INJECTION ONCE
Status: COMPLETED | OUTPATIENT
Start: 2024-10-31 | End: 2024-10-31

## 2024-10-31 RX ORDER — EPINEPHRINE 0.3 MG/.3ML
0.3 INJECTION SUBCUTANEOUS EVERY 5 MIN PRN
OUTPATIENT
Start: 2024-11-14

## 2024-10-31 RX ORDER — FAMOTIDINE 10 MG/ML
20 INJECTION INTRAVENOUS ONCE AS NEEDED
OUTPATIENT
Start: 2024-11-14

## 2024-10-31 ASSESSMENT — PAIN SCALES - GENERAL
PAINLEVEL_OUTOF10: 7

## 2024-10-31 ASSESSMENT — ENCOUNTER SYMPTOMS
LOSS OF SENSATION IN FEET: 1
DEPRESSION: 0
OCCASIONAL FEELINGS OF UNSTEADINESS: 1

## 2024-10-31 ASSESSMENT — PATIENT HEALTH QUESTIONNAIRE - PHQ9
2. FEELING DOWN, DEPRESSED OR HOPELESS: NOT AT ALL
1. LITTLE INTEREST OR PLEASURE IN DOING THINGS: SEVERAL DAYS
10. IF YOU CHECKED OFF ANY PROBLEMS, HOW DIFFICULT HAVE THESE PROBLEMS MADE IT FOR YOU TO DO YOUR WORK, TAKE CARE OF THINGS AT HOME, OR GET ALONG WITH OTHER PEOPLE: NOT DIFFICULT AT ALL
SUM OF ALL RESPONSES TO PHQ9 QUESTIONS 1 AND 2: 1

## 2024-10-31 ASSESSMENT — COLUMBIA-SUICIDE SEVERITY RATING SCALE - C-SSRS
2. HAVE YOU ACTUALLY HAD ANY THOUGHTS OF KILLING YOURSELF?: NO
6. HAVE YOU EVER DONE ANYTHING, STARTED TO DO ANYTHING, OR PREPARED TO DO ANYTHING TO END YOUR LIFE?: NO
6. HAVE YOU EVER DONE ANYTHING, STARTED TO DO ANYTHING, OR PREPARED TO DO ANYTHING TO END YOUR LIFE?: NO
1. IN THE PAST MONTH, HAVE YOU WISHED YOU WERE DEAD OR WISHED YOU COULD GO TO SLEEP AND NOT WAKE UP?: NO

## 2024-10-31 ASSESSMENT — PAIN DESCRIPTION - LOCATION: LOCATION: GENERALIZED

## 2024-11-01 ENCOUNTER — APPOINTMENT (OUTPATIENT)
Dept: INFUSION THERAPY | Facility: CLINIC | Age: 54
End: 2024-11-01
Payer: COMMERCIAL

## 2024-11-04 NOTE — PROGRESS NOTES
This is 54 y.o.  male with who has been treated for Chronic left-sided low back pain with left-sided sciatica . Pain is {DESC; BETTER/WORSE:51388}, The pain is described as {Pain description:13272} and is relieved by {pain relief:33499} with who is here for follow-up No chief complaint on file.      Pain Therapies:  IV infusions , medications and injections

## 2024-11-04 NOTE — PROGRESS NOTES
The patient goes to 3 pain centers he gets cervical radiofrequency ablation from Sam at [facility]'s, lumbar RFA from Dr. Abel at [place].  In addition he gets opioid sparing infusion at [place] in addition to ketamine lidocaine nasal spray and ketamine infusion at the [facility]  The patient on diazepam 5 mg twice daily and oxycodone 5 mg 2 times daily from PCP Dr. Sanjay Mendoza  The patient was referred to Dr. Solis by Dr. Vargas for the suitability of using opioid in his therapy!!  Which he recommended against it since he is already on diazepam as well    SUBJECTIVE:  This is 54 y.o.  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  The patient had in 2022 MRI of his cervical to lumbar spine showed principally normal MRI minimal degenerative changes.  The patient has been to the ED multiple visit and have seen Dr. Chen as well for pain since his neck back etc. the patient received on 9/20/2024 cervical MB RFA of left C5-C7 by Dr. Vargas at [facility] who is here for follow-up ***      Prior office visit:  5/31/2024: 6/10/2024 bone density study showed lowest T-score -2.2 for the right femoral neck patient will need referral to rheumatology which was done today     This is 54 y.o.  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  The patient had in 2022 MRI of his cervical to lumbar spine showed principally normal MRI minimal degenerative changes.  The patient has been to the ED multiple visit and have seen Dr. Chen as well for pain since his neck back etc. who is here for follow-up stating that he is hurting everywhere in his cervical thoracic lumbar spine.  The patient has had medial branch  block by Dr. Vargas which helped him significantly but he cannot have his next block until next months.  The patient failed spinal cord stimulator and Prialt therapy trial as well.  I explained to him I really do not know what to do with him besides continuing with his IV infusion therapy.  All his prednisone prescriptions where for Medrol Dosepak which is very short lived treatment.  I ordered a DEXA scan on him in addition to my prednisone taper dose.  The patient was told in his last emergency room visit that he was having PVCs which improved after they give him hydromorphone.  The patient does not want to go to cardiology because he thinks that his arrhythmia is related to his pain.  I explained to him that morphine and opioid is treatment for patient with heart problem and heart attack and he need to follow-up with cardiology to evaluate what is wrong with him.  I told the patient that he has been in the emergency department more than he has been at his home!!        Prior office visit:  2/1/2024: This is 53 y.o.  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  Who is here for follow-up requesting to continue IV infusion therapy with us or every 2 weeks since he tried the [facility] 5-day ketamine program which helped him for 4 weeks but the pain is back again.  I do not see any problem to restart him on the IV infusion therapy since it has helped him significantly in the past.        Prior office visit:  8/17/2023: This is 53 year year old male recall past medical history of CRPS type one of the left lower extremity, left hip arthritis which is going for surgery soon, neck pain with spondylosis that has been treated with Dr. Abel with medial branch block and radiofrequency Who is here for follow-up to discuss having IV infusion therapy prior to his hip  replacement since she had such a good result with the IV infusion therapy and his pain. I gave an order today to give him IV infusion a day or 2 before his hip replacement. Also I ordered some ketamine/lidocaine nasal spray so he can use it in the perioperative.. The patient stated that he is going to change his pain management physician to Dr. Jorgensen since he is happy with the treatment at [facility] [place] but so far the 3 injections that he received from Prialt did not do much for his pain in his CRPS.        Last procedure:   10/17/2022 bilateral C6-7 interlaminar DEVONTE the patient has had a 0% improvement in pain and function   IV infusion therapy the patient has had a 40-80% improvement in pain for his neck but not for his left leg CRPS symptoms     Portions of record reviewed for pertinent issues: active problem list, medication list, allergies, family history, social history, notes from last encounter, encounters, lab results, imaging and other system records.        I have personally reviewed the OARRS report for this patient. This report is scanned into the electronic medical record. I have considered the risks of abuse, dependence, addiction and diversion. Pregabalin 300 mg twice daily from office, in addition to Valium 5 mg twice daily and few Percocet 5 mg from different providers  OPIOID RISK ASSESSMENT SCORE 0/26           Appointment/pending law suits: [facility] case after fall at work in 2008, used to be a paramedic in [place]  Social History: Lives with his fiance for many years with 12 cats denies smoking drinking or use of illicit drugs              Diagnostic studies:  3/7/2023 left foot x-ray Stable surgical changes in the foot as described. Interval surgery  involving the distal end of the 5th metatarsal as well, and interval surgery of the posterior calcaneus.        11/17/2022 MRI of the cervical thoracic and lumbar spine showed some degenerative changes but no significant canal or foraminal stenosis, no  bone marrow edema or endplate changes:  Cervical spine MRI  TECHNIQUE:  Sagittal T1, T2, axial T1 and axial gradient echo T2 weighted images  were acquired through the cervical spine. Sagittal and transaxial T1  and T2 weighted images of the thoracic spine were acquired along with  sagittal STIR imaging. Sagittal and axial T1 and T2 weighted images  were also acquired through the lumbar spine.     FINDINGS:  Cervical spine:     ALIGNMENT: The vertebral alignment is within normal limits. There is  mild straightening of cervical lordosis.     VERTEBRAE/INTERVERTEBRAL DISCS: The vertebral bodies demonstrate  expected height.The marrow signal is within normal limits. The  intervertebral discs demonstrate expected signal and morphology.     CORD: Normal in caliber and signal.     C1-2: The cervicomedullary junction appears unremarkable. There is no  central canal stenosis.     C2-3: Moderate right foraminal narrowing. There is no significant  central canal or left neural foraminal stenosis.     C3-4: Moderate right and mild left foraminal narrowing. There is no  significant central canal stenosis.     C4-5: Moderate to severe right and mild left foraminal narrowing.  There is no significant central canal stenosis.     C5-6: Mild left foraminal narrowing. There is no significant central  canal or right neural foraminal stenosis.     C6-7: Mild to moderate right foraminal stenosis. There is no  significant central canal or left neural foraminal stenosis.     C7-T1: There is no posterior disc contour abnormality. There is no  significant central canal or neural foraminal stenosis.     The upper thoracic vertebrae and spinal canal are unremarkable.     The prevertebral and posterior paraspinous soft tissues are within  normal limits.     Thoracic spine:     ALIGNMENT: The vertebral alignment is within normal limits.     VERTEBRAE/INTERVERTEBRAL DISCS: The vertebral bodies demonstrate  expected height.The marrow signal is  within normal limits. The  intervertebral discs demonstrate expected signal and morphology.     CORD: Normal in caliber and signal.     CENTRAL CANAL: There is no evidence of significant central canal  stenosis.     The prevertebral and posterior paraspinous soft tissues are within  normal limits.     Lumbar spine:     ALIGNMENT: Mild straightening.     VERTEBRAE/INTERVERTEBRAL DISCS: The vertebral bodies demonstrate  expected height.There are patchy discogenic marrow changes involving  L5 S1 vertebral bodies with patchy marrow edema. There is diminished  height and T2 signal within L5-S1 intervertebral disc with mild  endplate irregularity to suggest degenerative changes.     CORD: The lower thoracic cord appears unremarkable. The conus  terminates appropriately at L1-L2.     At T12-L1, there is no central canal stenosis or neural foraminal  narrowing.     At L1-L2, there is no central canal stenosis or neural foraminal  narrowing.     At L2-L3, there is no central canal stenosis or neural foraminal  narrowing.     At L3-L4, there is no central canal stenosis or neural foraminal  narrowing.     At L4-L5, there is a small disc bulge. No central canal stenosis or  neural foramina narrowing.     At L5-S1, there is a small disc bulge. No central canal stenosis or  neural foramina narrowing.     The prevertebral and posterior paraspinous soft tissues are within  normal limits.     3.6 cm right exophytic renal cyst.     IMPRESSION:  Multilevel degenerative changes. No significant canal stenosis of the  cervical, thoracic or lumbar spine.     Cervical foraminal narrowing as detailed.     Lower lumbar small disc bulges.     11/17/2022 thoracic spine MRI  Cervical spine:     ALIGNMENT: The vertebral alignment is within normal limits. There is  mild straightening of cervical lordosis.     VERTEBRAE/INTERVERTEBRAL DISCS: The vertebral bodies demonstrate  expected height.The marrow signal is within normal limits.  The  intervertebral discs demonstrate expected signal and morphology.     CORD: Normal in caliber and signal.     C1-2: The cervicomedullary junction appears unremarkable. There is no  central canal stenosis.     C2-3: Moderate right foraminal narrowing. There is no significant  central canal or left neural foraminal stenosis.     C3-4: Moderate right and mild left foraminal narrowing. There is no  significant central canal stenosis.     C4-5: Moderate to severe right and mild left foraminal narrowing.  There is no significant central canal stenosis.     C5-6: Mild left foraminal narrowing. There is no significant central  canal or right neural foraminal stenosis.     C6-7: Mild to moderate right foraminal stenosis. There is no  significant central canal or left neural foraminal stenosis.     C7-T1: There is no posterior disc contour abnormality. There is no  significant central canal or neural foraminal stenosis.     The upper thoracic vertebrae and spinal canal are unremarkable.     The prevertebral and posterior paraspinous soft tissues are within  normal limits.     Thoracic spine:     ALIGNMENT: The vertebral alignment is within normal limits.     VERTEBRAE/INTERVERTEBRAL DISCS: The vertebral bodies demonstrate  expected height.The marrow signal is within normal limits. The  intervertebral discs demonstrate expected signal and morphology.     CORD: Normal in caliber and signal.     CENTRAL CANAL: There is no evidence of significant central canal  stenosis.     The prevertebral and posterior paraspinous soft tissues are within  normal limits.     Lumbar spine:     ALIGNMENT: Mild straightening.     VERTEBRAE/INTERVERTEBRAL DISCS: The vertebral bodies demonstrate  expected height.There are patchy discogenic marrow changes involving  L5 S1 vertebral bodies with patchy marrow edema. There is diminished  height and T2 signal within L5-S1 intervertebral disc with mild  endplate irregularity to suggest degenerative  changes.     CORD: The lower thoracic cord appears unremarkable. The conus  terminates appropriately at L1-L2.     At T12-L1, there is no central canal stenosis or neural foraminal  narrowing.     At L1-L2, there is no central canal stenosis or neural foraminal  narrowing.     At L2-L3, there is no central canal stenosis or neural foraminal  narrowing.     At L3-L4, there is no central canal stenosis or neural foraminal  narrowing.     At L4-L5, there is a small disc bulge. No central canal stenosis or  neural foramina narrowing.     At L5-S1, there is a small disc bulge. No central canal stenosis or  neural foramina narrowing.     The prevertebral and posterior paraspinous soft tissues are within  normal limits.     3.6 cm right exophytic renal cyst.     IMPRESSION:  Multilevel degenerative changes. No significant canal stenosis of the  cervical, thoracic or lumbar spine.     Cervical foraminal narrowing as detailed.     Lower lumbar small disc bulges.        2/26/2020 EMG of the left lower extremity did not show any polyneuropathy radiculopathy or mononeuropathy        9/10/2021 MRI of the cervical spine showed some degenerative changes and some facet joint hypertrophy but no foraminal or canal stenosis:  FINDINGS:  Height alignment and signal of the cervical vertebral bodies are  preserved.     C2-C3: There is no posterior disc contour abnormality. There is no  significant central canal or neural foraminal stenosis.     C3-C4: There is no posterior disc contour abnormality. There is no  significant central canal or neural foraminal stenosis.     C4-C5: Minimal disc bulge without significant central canal or  neuroforaminal stenosis. Minimal to moderate hypertrophic facet  change bilaterally.     C5-C6: Disc bulge without significant central canal neuroforaminal  stenosis.     C6-C7: There is no posterior disc contour abnormality. There is no  significant central canal or neural foraminal stenosis.     C7-T1:  There is no posterior disc contour abnormality. There is no  significant central canal or neural foraminal stenosis.     The upper thoracic vertebrae and spinal canal are unremarkable.     IMPRESSION:  No significant central canal or neuroforaminal stenosis and no  significant change was detected compared to the prior examination of  08/04/2017.           Review of Systems   HENT: Negative.     Eyes: Negative.    Respiratory: Negative.     Cardiovascular: Negative.    Gastrointestinal: Negative.    Endocrine: Negative.    Genitourinary: Negative.    Musculoskeletal:  Positive for arthralgias, back pain, myalgias and neck pain.   Skin: Negative.    Neurological: Negative.    Hematological: Negative.    Psychiatric/Behavioral: Negative.           Physical Exam  Vitals and nursing note reviewed.   Constitutional:       Appearance: Normal appearance.   HENT:      Head: Normocephalic and atraumatic.      Nose: Nose normal.   Eyes:      Extraocular Movements: Extraocular movements intact.      Conjunctiva/sclera: Conjunctivae normal.      Pupils: Pupils are equal, round, and reactive to light.   Cardiovascular:      Rate and Rhythm: Normal rate and regular rhythm.      Pulses: Normal pulses.      Heart sounds: Normal heart sounds.   Pulmonary:      Effort: Pulmonary effort is normal.      Breath sounds: Normal breath sounds.   Abdominal:      General: Abdomen is flat. Bowel sounds are normal.      Palpations: Abdomen is soft.   Musculoskeletal:         General: Tenderness present.   Skin:     General: Skin is warm.   Neurological:      General: No focal deficit present.      Mental Status: He is alert and oriented to person, place, and time.   Psychiatric:         Mood and Affect: Mood normal.         Behavior: Behavior normal.                Procedures:  *** the patient has had a ***% improvement in pain and function      Portions of record reviewed for pertinent issues: active problem list, medication list, allergies,  family history, social history, notes from last encounter, encounters, lab results, imaging and other available records.      I have personally reviewed the OARRS report for this patient. This report is scanned into the electronic medical record. I have considered the risks of abuse, dependence, addiction and diversion. It showed: ***  OPIOID RISK ASSESSMENT SCORE ***/26  Opioid agreement: ***  Activities of daily living: ***  Adverse effects: ***  Analgesia: W/O: ***/10, W ***/10    Toxicology screen: ***  Aberrant behavior: ***        Review of Systems     Physical Exam                 Plan  At least 50% of the visit was involved in the discussion of the options for treatment. We discussed exercises, medication, interventional therapies and surgery. Healthy life style is essential with patient hard work to achieve the wellness. In addition; discussion with the patient and/or family about any of the diagnostic results, impressions and/or recommended diagnostic studies, prognosis, risks and benefits of treatment options, instructions for treatment and/or follow-up, importance of compliance with chosen treatment options, risk-factor reduction, and patient/family education.         Recommended Pool therapy, walking in the pool, at least 3x per week for 30 minutes  Continue self-directed physical therapy with at least daily exercises for minimum of 20-minute, brochure was handed to the patient  *** Smoking cessation  Healthy lifestyle and anti-inflammatory diet in addition to weight control discussed with the patient  Alternative chronic pain therapies was discussed, encouraged and information was handed  Return to Clinic ***     *Please note this report has been produced using speech recognition software and may contain errors related to that system including grammar, punctuation and spelling as well as words and phrases that may be inappropriate. If there are questions or concerns, please feel free to contact me to  clarify.    Olivia Alonzo MD

## 2024-11-05 ENCOUNTER — APPOINTMENT (OUTPATIENT)
Dept: PAIN MEDICINE | Facility: CLINIC | Age: 54
End: 2024-11-05
Payer: COMMERCIAL

## 2024-11-05 NOTE — PROGRESS NOTES
No chief complaint on file.     History of Present Illness:  He had a left shoulder injection on 09/26/24.    Patient is here today for his left shoulder pain for the past 10 days.  States he was at physical therapy for his neck working on the arm bike and the following morning noted increased pain and inflammation of the left shoulder.    Imaging:  No acute fractures no dislocations of the left shoulder.  No arthritic change     Assessment:   Left shoulder impingement with biceps tendonitis    Plan:  Left shoulder cortisone injection today.  NSAID: Celebrex.  GI side effects and medical risks discussed  Ice: 60 minutes on and off  Exercise home program: Medically directed Shoulder therapy / Handout given  Follow-up in 4 weeks.     Procedures     Physical Exam:  Well-nourished, well-developed. No acute distress. Alert and oriented x3. Responds appropriately to questioning. Good mood. Normal affect.  Left shoulder:  ROM flexion to 135 degrees, externally rotates 45  5/5 rotator cuff strength  Pain in the biceps  Neurovascular exam normal distally  Palpable pedal pulse and good cap refill     Review of Systems:  GENERAL: Negative for malaise, significant weight loss, fever  MUSCULOSKELETAL: See HPI  NEURO:  Negative     Past Medical History:   Diagnosis Date    Anesthesia of skin     Numbness and tingling    Anxiety 09/21/2023    Arthritis Unk    Bilateral myopia 09/21/2023    Cervical radicular pain 09/21/2023    Chronic patellofemoral pain of left knee 09/21/2023    Complex regional pain syndrome type 1 of left lower extremity 09/21/2023    COVID-19     VACCINATED    Cubital tunnel syndrome on left 09/21/2023    Cubital tunnel syndrome on right 09/21/2023    Disease of intestine, unspecified     Bowel trouble    Erectile dysfunction 09/21/2023    Foot joint pain 09/21/2023    Fractures     Gastroparesis     GERD (gastroesophageal reflux disease)     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    History of psychological trauma  09/21/2023    Hyperlipidemia     Joint pain 09/21/2023    Kidney cysts 09/21/2023    Low back pain, unspecified 12/07/2022    Chronic low back pain without sciatica, unspecified back pain laterality    Low back pain, unspecified 08/09/2022    Lumbago    Lumbar spondylosis 09/21/2023    Lung nodule 09/21/2023    RIGHT    Metatarsalgia of left foot 09/21/2023    Myopia, bilateral 02/19/2019    Myopia of both eyes with astigmatism and presbyopia    Osteochondral defect of patella 09/21/2023    Osteoporosis     Other forms of angina pectoris     Stable angina pectoris    Other spondylosis, cervical region 09/21/2023    Patellofemoral arthritis 09/21/2023    Personal history of other diseases of the musculoskeletal system and connective tissue     History of arthritis    Personal history of other specified conditions     History of seizure    Reflex sympathetic dystrophy 09/21/2023    Scapular dyskinesis 09/21/2023    Seizure disorder (Multi)     D/T TRAMADOL REACTION - 25 YEARS AGO    Shoulder impingement 09/21/2023    Suspected sleep apnea 09/21/2023    Trauma and stressor-related disorder 09/21/2023    Trochanteric bursitis of both hips 09/22/2023    Unspecified disorder of ear, unspecified ear     Ear, nose and throat disorder    Wears glasses        Medication Documentation Review Audit       Reviewed by Maura Robert RN (Registered Nurse) on 10/31/24 at 1256      Medication Order Taking? Sig Documenting Provider Last Dose Status   acetaminophen (Tylenol) 500 mg tablet [id number] Yes Take 2 tablets (1,000 mg) by mouth every 8 hours if needed for mild pain (1 - 3). Historical Provider, MD Taking Active   albuterol (Ventolin HFA) 90 mcg/actuation inhaler [id number] Yes Inhale 2 puffs every 4 hours if needed for wheezing. Historical Provider, MD Taking Active   atorvastatin (Lipitor) 80 mg tablet [id number] Yes Take 1 tablet (80 mg) by mouth once daily at bedtime. Historical Provider, MD Taking Active   baclofen  "(Lioresal) 20 mg tablet [id number] Yes Take 1 tablet (20 mg) by mouth 3 times a day. Olivia Alonzo MD Taking Active   chlorhexidine (Peridex) 0.12 % solution [id number] Yes Use 15 mL in the mouth or throat if needed for wound care for up to 2 doses. 15 mls swish and spit the night before surgery and 15mls swish and spit the morning of surgery do not swallow CHASITY Pagan Taking Active   diazePAM (Valium) 5 mg tablet [id number] Yes Take 1 tablet (5 mg) by mouth every 12 hours if needed for anxiety. Historical Provider, MD Taking Active   DULoxetine (Cymbalta) 60 mg DR capsule [id number] Yes Take 1 capsule (60 mg) by mouth once daily. Historical Provider, MD Taking Active   fluticasone (Flonase) 50 mcg/actuation nasal spray [id number] Yes Administer 1 spray into each nostril once daily as needed. Historical Provider, MD Taking Active   ketamine HCl (ketamine, bulk,) 100 % powder [id number] Yes Ketamine 100 mg/mL + lidocaine 40 mg/mL 1 puff 4 times daily as needed, DSP#20 mL x 5 refills Olivia Alonzo MD Taking Active   naloxone (Narcan) 4 mg/0.1 mL nasal spray [id number] Yes Instil 1 spray intranasally for opioid overdose; repeat in 5 inutes if no response Damian Spicer MD [facility] Taking Active   omeprazole (PriLOSEC) 40 mg DR capsule [id number] Yes Take 1 capsule (40 mg) by mouth 2 times a day. For 90 days Historical Provider, MD Taking Active   pregabalin (Lyrica) 300 mg capsule [id number] Yes Take 1 capsule (300 mg) by mouth 2 times a day. Vish Barboza PA-C Taking Active   urea (Carmol) 40 % cream [id number] Yes Apply 1 Application topically once daily. feet Historical Provider, MD Taking Active                    Allergies   Allergen Reactions    Morphine Hives, Rash and Shortness of breath     Tolerates hydromorphone    Tylenol 3      Tolerates hydromorphone    Ultram [Tramadol] Seizure    Alprazolam Psychosis     \"GOES CRAZY\", psychosis    Ketorolac Seizure    Nsaids (Non-Steroidal Anti-Inflammatory Drug) " Unknown    Fish Containing Products Hives and Itching     IT WAS A PROCESSED FISH MEAL, BUT STATES EATS FRESH AND FROZEN FISH ALL THE TIME.    Opioids - Morphine Analogues Rash       Social History     Socioeconomic History    Marital status: Single     Spouse name: Not on file    Number of children: Not on file    Years of education: Not on file    Highest education level: Not on file   Occupational History    Not on file   Tobacco Use    Smoking status: Never    Smokeless tobacco: Never    Tobacco comments:     It killed my mother smoking and she left me with a gift a nodule on my right lung   Vaping Use    Vaping status: Never Used   Substance and Sexual Activity    Alcohol use: Not Currently    Drug use: Not Currently     Types: Other     Comment: Ketamine treatments for pain    Sexual activity: Not Currently     Partners: Female     Birth control/protection: None   Other Topics Concern    Not on file   Social History Narrative    Not on file     Social Drivers of Health     Financial Resource Strain: Low Risk  (6/28/2024)    Overall Financial Resource Strain (CARDIA)     Difficulty of Paying Living Expenses: Not hard at all   Food Insecurity: No Food Insecurity (6/28/2024)    Hunger Vital Sign     Worried About Running Out of Food in the Last Year: Never true     Ran Out of Food in the Last Year: Never true   Transportation Needs: No Transportation Needs (7/17/2024)    OASIS : Transportation     Lack of Transportation (Medical): No     Lack of Transportation (Non-Medical): No     Patient Unable or Declines to Respond: No   Physical Activity: Insufficiently Active (6/28/2024)    Exercise Vital Sign     Days of Exercise per Week: 7 days     Minutes of Exercise per Session: 10 min   Stress: Stress Concern Present (6/28/2024)    [employer] - Occupational Stress Questionnaire     Feeling of Stress : Rather much   Social Connections: Feeling Socially Integrated (7/17/2024)    OASIS  : Social Isolation     Frequency of experiencing loneliness or isolation: Never   Recent Concern: Social Connections - Socially Isolated (5/16/2024)    Received from [employer]    Social Connection and Isolation Panel [NHANES]     Frequency of Communication with Friends and Family: Once a week     Frequency of Social Gatherings with Friends and Family: Never     Attends [employer] Services: Never     Active Member of Clubs or Organizations: No     Attends Club or Organization Meetings: Never     Marital Status: Living with partner   Intimate Partner Violence: Not At Risk (6/28/2024)    Humiliation, Afraid, Rape, and Kick questionnaire     Fear of Current or Ex-Partner: No     Emotionally Abused: No     Physically Abused: No     Sexually Abused: No   Housing Stability: Low Risk  (6/28/2024)    Housing Stability Vital Sign     Unable to Pay for Housing in the Last Year: No     Number of Places Lived in the Last Year: 1     Unstable Housing in the Last Year: No   Recent Concern: Housing Stability - High Risk (5/16/2024)    Received from [employer]    Housing Stability Vital Sign     Unable to Pay for Housing in the Last Year: Yes     Number of Places Lived in the Last Year: 1     Unstable Housing in the Last Year: No       Past Surgical History:   Procedure Laterality Date    ACHILLES TENDON SURGERY  1978 2016    CT ABDOMEN PELVIS ANGIOGRAM W AND/OR WO IV CONTRAST  03/18/2020    CT ABDOMEN PELVIS ANGIOGRAM W AND/OR WO IV CONTRAST 3/18/2020 [facility]    CT ANGIO NECK  12/16/2022    CT NECK ANGIO W AND WO IV CONTRAST 12/16/2022 [facility]    CT HEAD ANGIO W AND WO IV CONTRAST  12/16/2022    CT HEAD ANGIO W AND WO IV CONTRAST 12/16/2022 [facility]    HIP SURGERY Left     REPLACEMENT    JOINT REPLACEMENT      LEFT KNEE    KNEE ARTHROPLASTY Bilateral 06/13/2017    Knee Arthroplasty    OTHER SURGICAL HISTORY  01/06/2022    Ulnar nerve neuroplasty    OTHER  SURGICAL HISTORY Bilateral 07/26/2019    Foot surgery  -- HARDWARE    OTHER SURGICAL HISTORY Bilateral     BILATERAL ANKLE SCOPE    OTHER SURGICAL HISTORY      POP ABDOMINAL PROCEDURE    PLANTAR FASCIA RELEASE      SHOULDER SURGERY Bilateral 06/13/2017    Shoulder Surgery       CT ABDOMEN PELVIS W IV CONTRAST    Result Date: 1/22/2024  * * *Final Report* * * DATE OF EXAM: Jan 22 2024  8:50AM   [facility]   0530  -  CT ABD/PEL W IVCON  / ACCESSION #  [id number] PROCEDURE REASON: Abdominal pain, acute, nonlocalized      * * * * Physician Interpretation * * * *  EXAMINATION:  CT ABDOMEN AND PELVIS WITH IV CONTRAST CLINICAL HISTORY: Acute abdominal pain TECHNIQUE: CT of the abdomen and pelvis was performed using standard technique, scanning from just above the dome of the diaphragm to the symphysis pubis. MQ:  CTAP_3 Contrast: IV:  100 ml of Omnipaque 350 CT Radiation dose: Integrated Dose-length product (DLP) for this visit =   269 mGy*cm. CT Dose Reduction Employed: Automated exposure control(AEC) and iterative recon COMPARISON: 12/26/2023 RESULT: Liver: No mass. Biliary: No bile duct dilation.  The gallbladder is within normal limits for the modality. Spleen: No mass. No splenomegaly. Pancreas: No mass or duct dilation. Adrenals: No mass. Kidneys: There are symmetric bilateral nephrograms.  In the medial right upper renal pole there is a 4.3 cm exophytic hypodensity consistent with a cyst. GI tract: No dilation or wall thickening. There is a small sliding hiatal hernia.  There is colonic residue with a moderate to large stool burden.   There is no acute appendicitis or diverticulitis. Lymph nodes: No abdominal or pelvic lymphadenopathy. Mesentery/Peritoneum: No ascites or mass. Retroperitoneum: No mass. Vasculature:  - Abdominal aorta and iliac arteries: Atherosclerotic calcifications without aneurysm.  - Celiac and SMA: Questionable mild narrowing at the origin of the celiac axis.  - Portal venous system (SMV, splenic vein,  portal vein and branches): Patent.  - Hepatic veins: Patent. Pelvis: No mass, ascites or fluid collection. There is artifact from a left hip arthroplasty. Bones/Soft Tissues: There are no aggressive osseous lesions identified. Lower thorax: Bibasilar scarring.  (topogram) images: No additional findings.    IMPRESSION: Stable appearance.  There is no acute intra-abdominal process identified. : KERLINE   Transcribe Date/Time: Jan 22 2024  8:50A Dictated by : INES ROLON MD This examination was interpreted and the report reviewed and electronically signed by: INES ROLON MD on Jan 22 2024  9:05AM  EST    US GALLBLADDER    Result Date: 1/22/2024  * * *Final Report* * * DATE OF EXAM: Jan 22 2024  6:48AM   U   1032  -  US ABD RIGHT UPPER QUADRANT  / ACCESSION #  [id number] PROCEDURE REASON: RUQ pain, no fever, no elev WBC      * * * * Physician Interpretation * * * *  EXAMINATION:   RIGHT UPPER QUADRANT ULTRASOUND CLINICAL HISTORY: RIGHT upper quadrant pain TECHNIQUE:  Sonography of the right upper quadrant  was performed.   Images were obtained and stored in a permanent archive. MQ:  URUQ_2 COMPARISON: CT abdomen pelvis 12/26/2023 RESULT: Pancreas: Not well visualized Liver:      Echotexture:  Homogeneous      Echogenicity:  Increased      Surface contour:  Smooth      Lesions:  None. Biliary: No intrahepatic biliary duct dilation.      CBD: 0.3 cm at the hilum.      Gallbladder: Normal caliber           -Contents:  No cholelithiasis           -Wall:  Normal           -Other:  No pericholecystic fluid.  Negative sonographic Bautista sign. Right Kidney: No hydronephrosis.  Anechoic upper pole cyst with lobular borders measures 3.1 x 2.6 x 3.8 cm.  This appears unchanged from the prior CT. Ascites: None.    IMPRESSION: Hepatic steatosis.  Otherwise, no acute sonographic abnormality in the RIGHT upper quadrant. : KERLINE   Transcribe Date/Time: Jan 22 2024  6:55A Dictated by :  DEE MCKEON MD This examination was interpreted and the report reviewed and electronically signed by: DEE MCKEON MD on Jan 22 2024  6:59AM  EST    XR CHEST 1 VIEW    Result Date: 1/22/2024  * * *Final Report* * * DATE OF EXAM: Jan 22 2024  6:22AM   VHX   5376  -  XR CHEST 1V FRONTAL PORT  / ACCESSION #  [id number] PROCEDURE REASON: Tachycardia      * * * * Physician Interpretation * * * *  EXAMINATION:  CHEST RADIOGRAPH (SINGLE VIEW AP OR PA) CLINICAL INFORMATION (AS PROVIDED BY ORDERING CLINICIAN) :  Tachycardia Comparison:  9/3/2023 RESULT: Lines, tubes, and devices:  N/A Lungs and pleura:  No confluent infiltrate, large pleural effusion or pneumothorax.   Cardiomediastinal silhouette:  Within normal limits. Other:  No acute bony abnormality identified.    IMPRESSION: No significant acute radiographic abnormality. : KERLINE   Transcribe Date/Time: Jan 22 2024  6:28A Dictated by : MIRNA CHAO MD This examination was interpreted and the report reviewed and electronically signed by: MIRNA CHAO MD on Jan 22 2024  6:30AM  EST                             Scribe Attestation  By signing my name below, IArgelia Scrministerio   attest that this documentation has been prepared under the direction and in the presence of Royce Louis MD.

## 2024-11-07 ENCOUNTER — APPOINTMENT (OUTPATIENT)
Dept: ORTHOPEDIC SURGERY | Facility: CLINIC | Age: 54
End: 2024-11-07
Payer: COMMERCIAL

## 2024-11-07 ENCOUNTER — HOSPITAL ENCOUNTER (OUTPATIENT)
Dept: RADIOLOGY | Facility: HOSPITAL | Age: 54
Discharge: HOME | End: 2024-11-07
Payer: COMMERCIAL

## 2024-11-07 DIAGNOSIS — I49.3 PVC (PREMATURE VENTRICULAR CONTRACTION): ICD-10-CM

## 2024-11-07 DIAGNOSIS — M19.90 ARTHRITIS: ICD-10-CM

## 2024-11-07 DIAGNOSIS — R00.2 PALPITATIONS: ICD-10-CM

## 2024-11-07 DIAGNOSIS — M79.602 LEFT ARM PAIN: ICD-10-CM

## 2024-11-07 DIAGNOSIS — E78.2 MIXED HYPERLIPIDEMIA: ICD-10-CM

## 2024-11-07 DIAGNOSIS — M54.12 CERVICAL RADICULAR PAIN: ICD-10-CM

## 2024-11-07 DIAGNOSIS — R07.9 CHEST PAIN, UNSPECIFIED TYPE: ICD-10-CM

## 2024-11-07 DIAGNOSIS — R94.31 ABNORMAL EKG: ICD-10-CM

## 2024-11-07 DIAGNOSIS — Z96.641 S/P TOTAL RIGHT HIP ARTHROPLASTY: ICD-10-CM

## 2024-11-07 DIAGNOSIS — M54.6 THORACIC BACK PAIN, UNSPECIFIED BACK PAIN LATERALITY, UNSPECIFIED CHRONICITY: ICD-10-CM

## 2024-11-07 DIAGNOSIS — R40.0 DAYTIME SLEEPINESS: ICD-10-CM

## 2024-11-07 DIAGNOSIS — M54.50 CHRONIC LOW BACK PAIN WITHOUT SCIATICA, UNSPECIFIED BACK PAIN LATERALITY: ICD-10-CM

## 2024-11-07 DIAGNOSIS — I45.10 INCOMPLETE RBBB: ICD-10-CM

## 2024-11-07 DIAGNOSIS — R91.1 LUNG NODULE: ICD-10-CM

## 2024-11-07 DIAGNOSIS — Z82.49 FAMILY HISTORY OF ISCHEMIC HEART DISEASE: ICD-10-CM

## 2024-11-07 DIAGNOSIS — K21.9 GASTROESOPHAGEAL REFLUX DISEASE WITHOUT ESOPHAGITIS: ICD-10-CM

## 2024-11-07 DIAGNOSIS — M47.816 LUMBAR SPONDYLOSIS: ICD-10-CM

## 2024-11-07 DIAGNOSIS — R29.818 SUSPECTED SLEEP APNEA: ICD-10-CM

## 2024-11-07 DIAGNOSIS — R79.89 ELEVATED TROPONIN I LEVEL: ICD-10-CM

## 2024-11-07 DIAGNOSIS — F31.9 BIPOLAR AFFECTIVE DISORDER, REMISSION STATUS UNSPECIFIED (MULTI): ICD-10-CM

## 2024-11-07 DIAGNOSIS — R53.83 FATIGUE, UNSPECIFIED TYPE: ICD-10-CM

## 2024-11-07 DIAGNOSIS — K31.84 GASTROPARESIS: ICD-10-CM

## 2024-11-07 DIAGNOSIS — G89.29 CHRONIC LOW BACK PAIN WITHOUT SCIATICA, UNSPECIFIED BACK PAIN LATERALITY: ICD-10-CM

## 2024-11-07 PROCEDURE — 75571 CT HRT W/O DYE W/CA TEST: CPT

## 2024-11-10 ENCOUNTER — HOSPITAL ENCOUNTER (EMERGENCY)
Facility: HOSPITAL | Age: 54
Discharge: HOME | End: 2024-11-10
Attending: STUDENT IN AN ORGANIZED HEALTH CARE EDUCATION/TRAINING PROGRAM
Payer: COMMERCIAL

## 2024-11-10 ENCOUNTER — APPOINTMENT (OUTPATIENT)
Dept: CARDIOLOGY | Facility: HOSPITAL | Age: 54
End: 2024-11-10
Payer: COMMERCIAL

## 2024-11-10 ENCOUNTER — APPOINTMENT (OUTPATIENT)
Dept: RADIOLOGY | Facility: HOSPITAL | Age: 54
End: 2024-11-10
Payer: COMMERCIAL

## 2024-11-10 VITALS
WEIGHT: 173 LBS | TEMPERATURE: 97.7 F | BODY MASS INDEX: 26.22 KG/M2 | RESPIRATION RATE: 14 BRPM | HEART RATE: 80 BPM | SYSTOLIC BLOOD PRESSURE: 140 MMHG | DIASTOLIC BLOOD PRESSURE: 89 MMHG | OXYGEN SATURATION: 96 % | HEIGHT: 68 IN

## 2024-11-10 DIAGNOSIS — I49.3 PVC (PREMATURE VENTRICULAR CONTRACTION): Primary | ICD-10-CM

## 2024-11-10 DIAGNOSIS — G89.29 CHRONIC NECK PAIN: ICD-10-CM

## 2024-11-10 DIAGNOSIS — M54.2 CHRONIC NECK PAIN: ICD-10-CM

## 2024-11-10 LAB
ALBUMIN SERPL BCP-MCNC: 4.7 G/DL (ref 3.4–5)
ALP SERPL-CCNC: 70 U/L (ref 33–120)
ALT SERPL W P-5'-P-CCNC: 29 U/L (ref 10–52)
ANION GAP SERPL CALC-SCNC: 13 MMOL/L (ref 10–20)
APPEARANCE UR: CLEAR
AST SERPL W P-5'-P-CCNC: 35 U/L (ref 9–39)
BASOPHILS # BLD AUTO: 0.06 X10*3/UL (ref 0–0.1)
BASOPHILS NFR BLD AUTO: 0.6 %
BILIRUB SERPL-MCNC: 0.7 MG/DL (ref 0–1.2)
BILIRUB UR STRIP.AUTO-MCNC: NEGATIVE MG/DL
BUN SERPL-MCNC: 21 MG/DL (ref 6–23)
CALCIUM SERPL-MCNC: 9.7 MG/DL (ref 8.6–10.3)
CARDIAC TROPONIN I PNL SERPL HS: 40 NG/L (ref 0–20)
CARDIAC TROPONIN I PNL SERPL HS: 46 NG/L (ref 0–20)
CHLORIDE SERPL-SCNC: 104 MMOL/L (ref 98–107)
CK SERPL-CCNC: 80 U/L (ref 0–325)
CO2 SERPL-SCNC: 26 MMOL/L (ref 21–32)
COLOR UR: COLORLESS
CREAT SERPL-MCNC: 0.83 MG/DL (ref 0.5–1.3)
EGFRCR SERPLBLD CKD-EPI 2021: >90 ML/MIN/1.73M*2
EOSINOPHIL # BLD AUTO: 0.43 X10*3/UL (ref 0–0.7)
EOSINOPHIL NFR BLD AUTO: 4.4 %
ERYTHROCYTE [DISTWIDTH] IN BLOOD BY AUTOMATED COUNT: 15.9 % (ref 11.5–14.5)
FLUAV RNA RESP QL NAA+PROBE: NOT DETECTED
FLUBV RNA RESP QL NAA+PROBE: NOT DETECTED
GLUCOSE SERPL-MCNC: 80 MG/DL (ref 74–99)
GLUCOSE UR STRIP.AUTO-MCNC: NORMAL MG/DL
HCT VFR BLD AUTO: 47.8 % (ref 41–52)
HGB BLD-MCNC: 15.3 G/DL (ref 13.5–17.5)
HOLD SPECIMEN: NORMAL
IMM GRANULOCYTES # BLD AUTO: 0.09 X10*3/UL (ref 0–0.7)
IMM GRANULOCYTES NFR BLD AUTO: 0.9 % (ref 0–0.9)
KETONES UR STRIP.AUTO-MCNC: NEGATIVE MG/DL
LEUKOCYTE ESTERASE UR QL STRIP.AUTO: NEGATIVE
LYMPHOCYTES # BLD AUTO: 2.31 X10*3/UL (ref 1.2–4.8)
LYMPHOCYTES NFR BLD AUTO: 23.8 %
MAGNESIUM SERPL-MCNC: 2 MG/DL (ref 1.6–2.4)
MCH RBC QN AUTO: 27.3 PG (ref 26–34)
MCHC RBC AUTO-ENTMCNC: 32 G/DL (ref 32–36)
MCV RBC AUTO: 85 FL (ref 80–100)
MONOCYTES # BLD AUTO: 0.92 X10*3/UL (ref 0.1–1)
MONOCYTES NFR BLD AUTO: 9.5 %
NEUTROPHILS # BLD AUTO: 5.9 X10*3/UL (ref 1.2–7.7)
NEUTROPHILS NFR BLD AUTO: 60.8 %
NITRITE UR QL STRIP.AUTO: NEGATIVE
NRBC BLD-RTO: 0 /100 WBCS (ref 0–0)
PH UR STRIP.AUTO: 7 [PH]
PHOSPHATE SERPL-MCNC: 2.8 MG/DL (ref 2.5–4.9)
PLATELET # BLD AUTO: 249 X10*3/UL (ref 150–450)
POTASSIUM SERPL-SCNC: 4.4 MMOL/L (ref 3.5–5.3)
PROT SERPL-MCNC: 8 G/DL (ref 6.4–8.2)
PROT UR STRIP.AUTO-MCNC: NEGATIVE MG/DL
RBC # BLD AUTO: 5.61 X10*6/UL (ref 4.5–5.9)
RBC # UR STRIP.AUTO: NEGATIVE /UL
SARS-COV-2 RNA RESP QL NAA+PROBE: NOT DETECTED
SODIUM SERPL-SCNC: 139 MMOL/L (ref 136–145)
SP GR UR STRIP.AUTO: 1.01
UROBILINOGEN UR STRIP.AUTO-MCNC: NORMAL MG/DL
WBC # BLD AUTO: 9.7 X10*3/UL (ref 4.4–11.3)

## 2024-11-10 PROCEDURE — 2500000001 HC RX 250 WO HCPCS SELF ADMINISTERED DRUGS (ALT 637 FOR MEDICARE OP): Performed by: EMERGENCY MEDICINE

## 2024-11-10 PROCEDURE — 96372 THER/PROPH/DIAG INJ SC/IM: CPT | Performed by: EMERGENCY MEDICINE

## 2024-11-10 PROCEDURE — 71045 X-RAY EXAM CHEST 1 VIEW: CPT | Performed by: RADIOLOGY

## 2024-11-10 PROCEDURE — 80053 COMPREHEN METABOLIC PANEL: CPT | Performed by: STUDENT IN AN ORGANIZED HEALTH CARE EDUCATION/TRAINING PROGRAM

## 2024-11-10 PROCEDURE — 36415 COLL VENOUS BLD VENIPUNCTURE: CPT | Performed by: STUDENT IN AN ORGANIZED HEALTH CARE EDUCATION/TRAINING PROGRAM

## 2024-11-10 PROCEDURE — 84484 ASSAY OF TROPONIN QUANT: CPT | Performed by: STUDENT IN AN ORGANIZED HEALTH CARE EDUCATION/TRAINING PROGRAM

## 2024-11-10 PROCEDURE — 99285 EMERGENCY DEPT VISIT HI MDM: CPT | Performed by: STUDENT IN AN ORGANIZED HEALTH CARE EDUCATION/TRAINING PROGRAM

## 2024-11-10 PROCEDURE — 2500000004 HC RX 250 GENERAL PHARMACY W/ HCPCS (ALT 636 FOR OP/ED): Performed by: EMERGENCY MEDICINE

## 2024-11-10 PROCEDURE — 93005 ELECTROCARDIOGRAM TRACING: CPT

## 2024-11-10 PROCEDURE — 99284 EMERGENCY DEPT VISIT MOD MDM: CPT | Mod: 25

## 2024-11-10 PROCEDURE — 87636 SARSCOV2 & INF A&B AMP PRB: CPT | Performed by: STUDENT IN AN ORGANIZED HEALTH CARE EDUCATION/TRAINING PROGRAM

## 2024-11-10 PROCEDURE — 84100 ASSAY OF PHOSPHORUS: CPT | Performed by: EMERGENCY MEDICINE

## 2024-11-10 PROCEDURE — 93010 ELECTROCARDIOGRAM REPORT: CPT | Performed by: STUDENT IN AN ORGANIZED HEALTH CARE EDUCATION/TRAINING PROGRAM

## 2024-11-10 PROCEDURE — 85025 COMPLETE CBC W/AUTO DIFF WBC: CPT | Performed by: STUDENT IN AN ORGANIZED HEALTH CARE EDUCATION/TRAINING PROGRAM

## 2024-11-10 PROCEDURE — 71045 X-RAY EXAM CHEST 1 VIEW: CPT

## 2024-11-10 PROCEDURE — 82550 ASSAY OF CK (CPK): CPT | Performed by: STUDENT IN AN ORGANIZED HEALTH CARE EDUCATION/TRAINING PROGRAM

## 2024-11-10 PROCEDURE — 83735 ASSAY OF MAGNESIUM: CPT | Performed by: STUDENT IN AN ORGANIZED HEALTH CARE EDUCATION/TRAINING PROGRAM

## 2024-11-10 PROCEDURE — 81003 URINALYSIS AUTO W/O SCOPE: CPT | Performed by: EMERGENCY MEDICINE

## 2024-11-10 RX ORDER — METHOCARBAMOL 500 MG/1
1000 TABLET, FILM COATED ORAL ONCE
Status: COMPLETED | OUTPATIENT
Start: 2024-11-10 | End: 2024-11-10

## 2024-11-10 RX ORDER — PREDNISONE 50 MG/1
50 TABLET ORAL ONCE
Status: DISCONTINUED | OUTPATIENT
Start: 2024-11-10 | End: 2024-11-10

## 2024-11-10 ASSESSMENT — PAIN DESCRIPTION - DESCRIPTORS
DESCRIPTORS_2: ACHING
DESCRIPTORS: ACHING

## 2024-11-10 ASSESSMENT — PAIN DESCRIPTION - PAIN TYPE: TYPE: CHRONIC PAIN

## 2024-11-10 ASSESSMENT — PAIN - FUNCTIONAL ASSESSMENT: PAIN_FUNCTIONAL_ASSESSMENT: 0-10

## 2024-11-10 ASSESSMENT — PAIN DESCRIPTION - PROGRESSION: CLINICAL_PROGRESSION: NOT CHANGED

## 2024-11-10 ASSESSMENT — LIFESTYLE VARIABLES
TOTAL SCORE: 0
HAVE YOU EVER FELT YOU SHOULD CUT DOWN ON YOUR DRINKING: NO
HAVE PEOPLE ANNOYED YOU BY CRITICIZING YOUR DRINKING: NO
EVER FELT BAD OR GUILTY ABOUT YOUR DRINKING: NO
EVER HAD A DRINK FIRST THING IN THE MORNING TO STEADY YOUR NERVES TO GET RID OF A HANGOVER: NO

## 2024-11-10 ASSESSMENT — PAIN DESCRIPTION - LOCATION
LOCATION: NECK
LOCATION_2: SHOULDER

## 2024-11-10 ASSESSMENT — PAIN DESCRIPTION - FREQUENCY: FREQUENCY: CONSTANT/CONTINUOUS

## 2024-11-10 ASSESSMENT — PAIN SCALES - GENERAL
PAINLEVEL_OUTOF10: 7
PAINLEVEL_OUTOF10: 7

## 2024-11-10 ASSESSMENT — PAIN DESCRIPTION - ONSET: ONSET: ONGOING

## 2024-11-10 NOTE — ED TRIAGE NOTES
Pt to ED with neck pain and shoulder pain on the left side which is chronic, pt states he follows with a pain management doctor for this, he states the pain is causing palpitations.

## 2024-11-10 NOTE — DISCHARGE INSTRUCTIONS
Please return to the ER or seek immediate medical attention if you experience new or worsening chest pain, shortness of breath, fever of 38C (100.4) or higher, persistent vomiting, weakness, numbness, tingling, excessive sweating,  loss of motion in your arms or legs, fainting, vision changes, or any new or worsening symptoms.    You are welcome back any time. Thank you for entrusting your care to us, I hope we made your visit as pleasant as possible. Wishing you well!    Dr. Booth

## 2024-11-10 NOTE — ED PROVIDER NOTES
EMERGENCY DEPARTMENT ENCOUNTER      Pt Name: Akin Lynn  MRN: [id number]  Birthdate 1970  Date of evaluation: 11/10/2024  Provider: Toby Booth DO    CHIEF COMPLAINT       Chief Complaint   Patient presents with    Palpitations    Neck Pain    Shoulder Pain     HISTORY OF PRESENT ILLNESS    Akin Lynn is a 54 y.o. year old male who presents to the ER for palpitations and acute on chronic pain. Palpitations started yesterday while doing dishes, come and go.  Has pain today in his neck, back, and left extremities. He is on a statin. His pain comes and goes, feels like his chronic regional pain syndrome and his left bicipital tendinitis. No new pain sensations, no new injuries. 6 weeks ago had cortisone shot with Dr. Louis in his Bicep, will see him again on Thursday of this week.     No vomiting, diarrhea, urinary changes, fevers, fainting, chest pain, abdominal pain, dyspnea.      Follows with Dr. Carpenter, cardiology    PMH complex regional pain syndrome, L bicep tendinitis, b/l carpal tunnel, ED, HLD, GERD  FH: father - MI and CABG at 72, uncle - MI at 37 y/o  Allergies to morphine, tramadol, xanax, toradol/nsaids.  No Hx tobacco use, former alcohol use, no drug use  PAST MEDICAL HISTORY     Past Medical History:   Diagnosis Date    Anesthesia of skin     Numbness and tingling    Anxiety 09/21/2023    Arthritis Unk    Bilateral myopia 09/21/2023    Cervical radicular pain 09/21/2023    Chronic patellofemoral pain of left knee 09/21/2023    Complex regional pain syndrome type 1 of left lower extremity 09/21/2023    COVID-19     VACCINATED    Cubital tunnel syndrome on left 09/21/2023    Cubital tunnel syndrome on right 09/21/2023    Disease of intestine, unspecified     Bowel trouble    Erectile dysfunction 09/21/2023    Foot joint pain 09/21/2023    Fractures     Gastroparesis     GERD (gastroesophageal reflux disease)     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    History of psychological trauma 09/21/2023     Hyperlipidemia     Joint pain 09/21/2023    Kidney cysts 09/21/2023    Low back pain, unspecified 12/07/2022    Chronic low back pain without sciatica, unspecified back pain laterality    Low back pain, unspecified 08/09/2022    Lumbago    Lumbar spondylosis 09/21/2023    Lung nodule 09/21/2023    RIGHT    Metatarsalgia of left foot 09/21/2023    Myopia, bilateral 02/19/2019    Myopia of both eyes with astigmatism and presbyopia    Osteochondral defect of patella 09/21/2023    Osteoporosis     Other forms of angina pectoris     Stable angina pectoris    Other spondylosis, cervical region 09/21/2023    Patellofemoral arthritis 09/21/2023    Personal history of other diseases of the musculoskeletal system and connective tissue     History of arthritis    Personal history of other specified conditions     History of seizure    Reflex sympathetic dystrophy 09/21/2023    Scapular dyskinesis 09/21/2023    Seizure disorder (Multi)     D/T TRAMADOL REACTION - 25 YEARS AGO    Shoulder impingement 09/21/2023    Suspected sleep apnea 09/21/2023    Trauma and stressor-related disorder 09/21/2023    Trochanteric bursitis of both hips 09/22/2023    Unspecified disorder of ear, unspecified ear     Ear, nose and throat disorder    Wears glasses      CURRENT MEDICATIONS       Previous Medications    ACETAMINOPHEN (TYLENOL) 500 MG TABLET    Take 2 tablets (1,000 mg) by mouth every 8 hours if needed for mild pain (1 - 3).    ALBUTEROL (VENTOLIN HFA) 90 MCG/ACTUATION INHALER    Inhale 2 puffs every 4 hours if needed for wheezing.    ATORVASTATIN (LIPITOR) 80 MG TABLET    Take 1 tablet (80 mg) by mouth once daily at bedtime.    BACLOFEN (LIORESAL) 20 MG TABLET    Take 1 tablet (20 mg) by mouth 3 times a day.    CHLORHEXIDINE (PERIDEX) 0.12 % SOLUTION    Use 15 mL in the mouth or throat if needed for wound care for up to 2 doses. 15 mls swish and spit the night before surgery and 15mls swish and spit the morning of surgery do not  swallow    DIAZEPAM (VALIUM) 5 MG TABLET    Take 1 tablet (5 mg) by mouth every 12 hours if needed for anxiety.    DULOXETINE (CYMBALTA) 60 MG DR CAPSULE    Take 1 capsule (60 mg) by mouth once daily.    FLUTICASONE (FLONASE) 50 MCG/ACTUATION NASAL SPRAY    Administer 1 spray into each nostril once daily as needed.    KETAMINE HCL (KETAMINE, BULK,) 100 % POWDER    Ketamine 100 mg/mL + lidocaine 40 mg/mL 1 puff 4 times daily as needed, DSP#20 mL x 5 refills    NALOXONE (NARCAN) 4 MG/0.1 ML NASAL SPRAY    Instil 1 spray intranasally for opioid overdose; repeat in 5 inutes if no response    OMEPRAZOLE (PRILOSEC) 40 MG DR CAPSULE    Take 1 capsule (40 mg) by mouth 2 times a day. For 90 days    PREGABALIN (LYRICA) 300 MG CAPSULE    Take 1 capsule (300 mg) by mouth 2 times a day.    UREA (CARMOL) 40 % CREAM    Apply 1 Application topically once daily. feet     SURGICAL HISTORY       Past Surgical History:   Procedure Laterality Date    ACHILLES TENDON SURGERY  1978 2016    CT ABDOMEN PELVIS ANGIOGRAM W AND/OR WO IV CONTRAST  03/18/2020    CT ABDOMEN PELVIS ANGIOGRAM W AND/OR WO IV CONTRAST 3/18/2020 [facility]ERGENCY [facility]GAC[facility]    CT ANGIO NECK  12/16/2022    CT NECK ANGIO W AND WO IV CONTRAST 12/16/2022 DOCTOR OFFICE LEGACY    CT HEAD ANGIO W AND WO IV CONTRAST  12/16/2022    CT HEAD ANGIO W AND WO IV CONTRAST 12/16/2022 DOCTOR OFFICE LEGACY    HIP SURGERY Left     REPLACEMENT    JOINT REPLACEMENT      LEFT KNEE    KNEE ARTHROPLASTY Bilateral 06/13/2017    Knee Arthroplasty    OTHER SURGICAL HISTORY  01/06/2022    Ulnar nerve neuroplasty    OTHER SURGICAL HISTORY Bilateral 07/26/2019    Foot surgery  -- HARDWARE    OTHER SURGICAL HISTORY Bilateral     BILATERAL ANKLE SCOPE    OTHER SURGICAL HISTORY      POP ABDOMINAL PROCEDURE    PLANTAR FASCIA RELEASE      SHOULDER SURGERY Bilateral 06/13/2017    Shoulder Surgery     ALLERGIES     Morphine, Ultram [tramadol], Alprazolam, Ketorolac, Nsaids (non-steroidal anti-inflammatory drug),  Fish containing products, and Opioids - morphine analogues  FAMILY HISTORY       Family History   Problem Relation Name Age of Onset    Lung cancer Mother      Glaucoma Father      Skin cancer Father      Hypertension Other mul fam mem     Heart disease Other mul fam mem      SOCIAL HISTORY       Social History     Tobacco Use    Smoking status: Never    Smokeless tobacco: Never    Tobacco comments:     It killed my mother smoking and she left me with a gift a nodule on my right lung   Vaping Use    Vaping status: Never Used   Substance Use Topics    Alcohol use: Not Currently    Drug use: Not Currently     Types: Other     Comment: Ketamine treatments for pain     PHYSICAL EXAM  (up to 7 for level 4, 8 or more for level 5)     ED Triage Vitals [11/10/24 0751]   Temperature Heart Rate Respirations BP   36.5 °C (97.7 °F) 76 18 156/88      Pulse Ox Temp src Heart Rate Source Patient Position   98 % -- Monitor Sitting      BP Location FiO2 (%)     Right arm --       Physical Exam  Vitals and nursing note reviewed.   Constitutional:       General: He is not in acute distress.     Appearance: Normal appearance. He is not ill-appearing.   HENT:      Head: Normocephalic and atraumatic. No raccoon eyes, Cagle's sign, contusion or laceration.      Jaw: No trismus or malocclusion.      Right Ear: External ear normal.      Left Ear: External ear normal.      Mouth/Throat:      Mouth: Mucous membranes are moist.      Pharynx: Oropharynx is clear.   Eyes:      Extraocular Movements: Extraocular movements intact.      Pupils: Pupils are equal, round, and reactive to light.   Neck:      Trachea: No tracheal deviation.   Cardiovascular:      Rate and Rhythm: Normal rate and regular rhythm.      Pulses: Normal pulses.   Pulmonary:      Effort: No respiratory distress.      Breath sounds: No wheezing, rhonchi or rales.   Chest:      Chest wall: No tenderness.   Abdominal:      General: Abdomen is flat.      Palpations: Abdomen is  soft. There is no mass.      Tenderness: There is no abdominal tenderness.   Musculoskeletal:         General: No tenderness or signs of injury.      Right lower leg: No edema.      Left lower leg: No edema.   Skin:     Coloration: Skin is not jaundiced or pale.      Findings: No petechiae, rash or wound.   Neurological:      Mental Status: He is alert.   Psychiatric:         Speech: Speech normal.         Thought Content: Thought content does not include homicidal or suicidal ideation.        DIAGNOSTIC RESULTS   LABS:  Labs Reviewed   CBC WITH AUTO DIFFERENTIAL - Abnormal       Result Value    WBC 9.7      nRBC 0.0      RBC 5.61      Hemoglobin 15.3      Hematocrit 47.8      MCV 85      MCH 27.3      MCHC 32.0      RDW 15.9 (*)     Platelets 249      Neutrophils % 60.8      Immature Granulocytes %, Automated 0.9      Lymphocytes % 23.8      Monocytes % 9.5      Eosinophils % 4.4      Basophils % 0.6      Neutrophils Absolute 5.90      Immature Granulocytes Absolute, Automated 0.09      Lymphocytes Absolute 2.31      Monocytes Absolute 0.92      Eosinophils Absolute 0.43      Basophils Absolute 0.06     SERIAL TROPONIN-INITIAL - Abnormal    Troponin I, High Sensitivity 46 (*)     Narrative:     Less than 99th percentile of normal range cutoff-  Female and children under 18 years old <14 ng/L; Male <21 ng/L: Negative  Repeat testing should be performed if clinically indicated.     Female and children under 18 years old 14-50 ng/L; Male 21-50 ng/L:  Consistent with possible cardiac damage and possible increased clinical   risk. Serial measurements may help to assess extent of myocardial damage.     >50 ng/L: Consistent with cardiac damage, increased clinical risk and  myocardial infarction. Serial measurements may help assess extent of   myocardial damage.      NOTE: Children less than 1 year old may have higher baseline troponin   levels and results should be interpreted in conjunction with the overall   clinical  context.     NOTE: Troponin I testing is performed using a different   testing methodology at [facility] than at other   system [facility]. Direct result comparisons should only   be made within the same method.   URINALYSIS WITH REFLEX CULTURE AND MICROSCOPIC - Abnormal    Color, Urine Colorless (*)     Appearance, Urine Clear      Specific Gravity, Urine 1.010      pH, Urine 7.0      Protein, Urine NEGATIVE      Glucose, Urine Normal      Blood, Urine NEGATIVE      Ketones, Urine NEGATIVE      Bilirubin, Urine NEGATIVE      Urobilinogen, Urine Normal      Nitrite, Urine NEGATIVE      Leukocyte Esterase, Urine NEGATIVE     SERIAL TROPONIN, 1 HOUR - Abnormal    Troponin I, High Sensitivity 40 (*)     Narrative:     Less than 99th percentile of normal range cutoff-  Female and children under 18 years old <14 ng/L; Male <21 ng/L: Negative  Repeat testing should be performed if clinically indicated.     Female and children under 18 years old 14-50 ng/L; Male 21-50 ng/L:  Consistent with possible cardiac damage and possible increased clinical   risk. Serial measurements may help to assess extent of myocardial damage.     >50 ng/L: Consistent with cardiac damage, increased clinical risk and  myocardial infarction. Serial measurements may help assess extent of   myocardial damage.      NOTE: Children less than 1 year old may have higher baseline troponin   levels and results should be interpreted in conjunction with the overall   clinical context.     NOTE: Troponin I testing is performed using a different   testing methodology at [facility] than at other   [facility]. Direct result comparisons should only   be made within the same method.   SARS-COV-2 PCR - Normal    Coronavirus 2019, PCR Not Detected      Narrative:     This assay has received FDA Emergency Use Authorization (EUA) and is only authorized for the duration of time that circumstances exist to justify the authorization of the  emergency use of in vitro diagnostic tests for the detection of SARS-CoV-2 virus and/or diagnosis of COVID-19 infection under section 564(b)(1) of the Act, 21 U.S.C. 360bbb-3(b)(1). This assay is an in vitro diagnostic nucleic acid amplification test for the qualitative detection of SARS-CoV-2 from nasopharyngeal specimens and has been validated for use at [facility]. Negative results do not preclude COVID-19 infections and should not be used as the sole basis for diagnosis, treatment, or other management decisions.     INFLUENZA A AND B PCR - Normal    Flu A Result Not Detected      Flu B Result Not Detected      Narrative:     This assay is an in vitro diagnostic multiplex nucleic acid amplification test for the detection and discrimination of Influenza A & B from nasopharyngeal specimens, and has been validated for use at [facility]. Negative results do not preclude Influenza A/B infections, and should not be used as the sole basis for diagnosis, treatment, or other management decisions. If Influenza A/B and RSV PCR results are negative, testing for Parainfluenza virus, Adenovirus and Metapneumovirus is routinely performed for [facility] [place] [place] pediatric oncology and intensive care inpatients, and is available on other patients by placing an add-on request.   COMPREHENSIVE METABOLIC PANEL - Normal    Glucose 80      Sodium 139      Potassium 4.4      Chloride 104      Bicarbonate 26      Anion Gap 13      Urea Nitrogen 21      Creatinine 0.83      eGFR >90      Calcium 9.7      Albumin 4.7      Alkaline Phosphatase 70      Total Protein 8.0      AST 35      Bilirubin, Total 0.7      ALT 29     MAGNESIUM - Normal    Magnesium 2.00     CREATINE KINASE - Normal    Creatine Kinase 80     PHOSPHORUS - Normal    Phosphorus 2.8     TROPONIN SERIES- (INITIAL, 1 HR)    Narrative:     The following orders were created for panel order Troponin I Series, High Sensitivity (0, 1  "HR).  Procedure                               Abnormality         Status                     ---------                               -----------         ------                     Troponin I, High Sensiti...[[id number]]  Abnormal            Final result               Troponin, High Sensitivi...[[id number]]  Abnormal            Final result                 Please view results for these tests on the individual orders.   URINALYSIS WITH REFLEX CULTURE AND MICROSCOPIC    Narrative:     The following orders were created for panel order Urinalysis with Reflex Culture and Microscopic.  Procedure                               Abnormality         Status                     ---------                               -----------         ------                     Urinalysis with Reflex C...[[id number]]  Abnormal            Final result               Extra Urine Gray Tube[[id number]]                            In process                   Please view results for these tests on the individual orders.   EXTRA URINE GRAY TUBE     All other labs were within normal range or not returned as of this dictation.  Imaging  XR chest 1 view   Final Result   No focal infiltrate or pneumothorax is identified.        MACRO:   None.        Signed by: Marquis Mccormack 11/10/2024 8:35 AM   Dictation workstation:   [id number]         Procedure  Procedures  EMERGENCY DEPARTMENT COURSE/MDM:   Medical Decision Making    Vitals:    Vitals:    11/10/24 0751 11/10/24 0900   BP: 156/88 (!) 161/98   BP Location: Right arm    Patient Position: Sitting    Pulse: 76 80   Resp: 18 15   Temp: 36.5 °C (97.7 °F)    SpO2: 98% 97%   Weight: 78.5 kg (173 lb)    Height: 1.727 m (5' 8\")      Akin Lynn is a male 54 y.o. who presents to the ER for flareup of his acute on chronic complex regional pain and palpitations. On arrival the patients vital signs were: Afebrile, Reguar HR, Normotensive, Regular RR, and Normoxic on room air. History obtained from: patient.  Plan " for cardiac workup, expanded electrolytes to assess for underlying etiology of palpitations.  No trauma, midline spinal tenderness, new symptoms of his complex regional pain, plan for discussion with his pain team.  ED Course as of 11/10/24 1021   Sun Nov 10, 2024   0818 EKG performed at 08: 05 and independently reviewed by provider: Reveals NSR with a rate of 25 bpm, normal axis, normal intervals with incomplete right bundle branch block, no ST changes, no T wave abnormalities, two monomorphic PVCs noted.  No STEMI. [TL]   0825 Chest Radiograph interpretation: No acute cardiopulmonary process.  No pneumothorax, widened mediastinum, pneumonia. [TL]   0833 Patient reports [facility] orthopedics on-call ALIYA advised patient to come in yesterday evening when he called to discuss his persistent pain  despite radiofrequency ablation, use of Tylenol, Lyrica, baclofen at home.  Neurologically intact.  Good  strength.  No fall or trauma.  No midline tenderness to palpation of the cervical spine. [TL]   0906 XR chest 1 view  Per my independent interpretation of chest xray no pneumothorax,  consolidation, edema, effusions, pneumomediastinum, widened mediastinum, or other obvious acute pathology visualized. [CB]   0906 Coronavirus 2019, PCR: Not Detected [CB]   0906 Flu B Result: Not Detected [CB]   0906 Flu A Result: Not Detected [CB]   0906 Urinalysis with Reflex Culture and Microscopic(!)  No bacteria, nitrite, or leukocyte esterase to suggest UTI. No ketonuria. No hematuria. No glucosuria. [CB]   0908 Discussed with ortho, Dr. Matamoros, who recommended avoid opitates, look at what has been provided in prior ED visits for analgesia, repeat that, and have him follow up with his outpatient pain management team.  [CB]   0915 EKG performed at 09: 07 and independently reviewed by provider: Reveals NSR with a rate of 85 bpm, normal axis, normal intervals, no ST changes, no T wave abnormalities, no ectopy. No STEMI. [TL]    0915 On chart review during last encounter patient received Decadron 10 mg, Robaxin 1000 mg, hydromorphone.  Given plan to avoid hydromorphone, will provide Decadron and Robaxin. [CB]   0952 Troponin I, High Sensitivity(!): 46  slightly elevated from prior, no reported chest pain, will repeat, doubt ACS at this time [CB]   1011 Repeat troponin is stable.  I do not suspect ACS.  No active chest pain at this time.  Patient with chronically elevated troponins. [TL]   1011 Patient advised to follow-up with his outpatient pain management/orthopedic team.  Should he develop any neurologic decompensation I recommend coming to the emergency room for further evaluation. [TL]   1018 Troponin I, High Sensitivity(!): 40 [CB]   1018 PHOSPHORUS: 2.8  No hypo/hyperphosphatemia [CB]   1019 Comprehensive Metabolic Panel  Normoglycemic, no acute electrolyte or hepatorenal abnormality [CB]   1019 CBC and Auto Differential(!)  No acute leukocytosis, leukopenia, thrombocytosis, thrombocytopenia, or anemia [CB]      ED Course User Index  [CB] Toby Booth DO  [TL] Reji Marquez DO         Diagnoses as of 11/10/24 1021   PVC (premature ventricular contraction)   Chronic neck pain     External Records Reviewed: I reviewed recent and relevant outside records including inpatient notes, outpatient records    Shared decision making for disposition  Patient and/or patient´s representative was counseled regarding labs, imaging, likely diagnosis. All questions were answered. Recommendation was made   for discharge home. The patient agreed and was discharged home in stable condition with appropriate relevant educational materials. Return precautions were provided which included new or worsening chest pain, shortness of breath, fever of 38C (100.4) or higher, persistent vomiting, weakness, numbness, tingling, excessive sweating,  loss of motion in your arms or legs, fainting, vision changes, or any new or worsening symptoms..     ED  Medications administered this visit:    Medications   methocarbamol (Robaxin) tablet 1,000 mg (1,000 mg oral Given 11/10/24 1007)   dexAMETHasone (Decadron) injection 10 mg (10 mg intramuscular Given 11/10/24 1007)     Follow-up:  Otoniel Lane MD  [id number] [facility]  [address]  [facility] [id number] [phone]          Olivia Alonzo MD  [address] [facility]  [facility] [id number] [phone]              Final Impression:   1. PVC (premature ventricular contraction)    2. Chronic neck pain          Please excuse any misspellings or unintended errors related to the Dragon speech recognition software used to dictate this note.    I reviewed the case with the attending ED physician. The attending ED physician agrees with the plan.      Toby Booth DO  Resident  11/10/24 1021       Toby Booth DO  Resident  11/10/24 0055

## 2024-11-11 ENCOUNTER — HOSPITAL ENCOUNTER (OUTPATIENT)
Dept: RADIOLOGY | Facility: HOSPITAL | Age: 54
Discharge: HOME | End: 2024-11-11
Payer: COMMERCIAL

## 2024-11-11 ENCOUNTER — APPOINTMENT (OUTPATIENT)
Dept: BEHAVIORAL HEALTH | Facility: CLINIC | Age: 54
End: 2024-11-11
Payer: COMMERCIAL

## 2024-11-11 DIAGNOSIS — M54.16 LUMBAR RADICULOPATHY: ICD-10-CM

## 2024-11-11 DIAGNOSIS — F41.9 ANXIETY: ICD-10-CM

## 2024-11-11 DIAGNOSIS — Z91.49 HISTORY OF PSYCHOLOGICAL TRAUMA: ICD-10-CM

## 2024-11-11 PROCEDURE — 72148 MRI LUMBAR SPINE W/O DYE: CPT | Performed by: RADIOLOGY

## 2024-11-11 PROCEDURE — 90837 PSYTX W PT 60 MINUTES: CPT

## 2024-11-11 PROCEDURE — 72148 MRI LUMBAR SPINE W/O DYE: CPT

## 2024-11-11 NOTE — PROGRESS NOTES
Start time:  10:02  End time:  11:07     An interactive audio and video telecommunication system which permits real time communications between the patient (at the originating site) and provider (at the distant site) was utilized to provide this telehealth service. Verbal consent has been obtained from this patient for a telehealth visit.     The patient was informed of the current need to conduct treatment via virtual platform in light of COVID-19 pandemic. I have confirmed the patient’s identity via the following (minimum of three) acceptable identifiers as per  Policy PH-9: , address, phone number, and email address.        SUBJECTIVE: Patient described feeling discouraged; went to ER last night in extreme pain which leads to PVCs and providers talked down to him and did not give him pain medications. Left feeling ashamed and demoralized. Describes having passive SI without any active SI, plans, or intent. We discussed 988 number as a tool to use in emergency if active SI ever emerges. Processed frustration and feelings of dehumanization. We discussed promising points in the future both in terms of his medical care and future goals and discussed in the meantime continuing to meet more regularly to increase coping. Provided pain psychoeducation today on how damage/structural issues do not have a 1 to 1 relationship with intensity of pain due to central sensitization and examples from his own life of downward pain regulation. In addition to internalizing hurt does not equal harm, also discussed targeting sense of control by intentionally pursuing pleasant/meaningful activities.    Discussed upcoming transition for provider.    Progress: Good  Prognosis: Good     Duration of problem: years with recent improvement     Severity: moderate     OBJECTIVE:  Orientation & Cognition: Oriented x3. Thought processes normal and appropriate to situation.  Mood, Affect: Elevated (had ketamine infusion this AM)  Appearance:  Optimal by patient standards.  Harm to self or others: Not reported; denies suicidaility   Substance abuse: Not reported  Psychiatric medication use: Not reported     IMPRESSION:     F41.9 Unspecified anxiety disorder  [id number].91.49 History of psychological trauma     Goals for Therapy: Sharing difficult feelings in a therapeutic environment, expanding upon current social supports in his life, and addressing anxiety. Patient is also interested in medication management for his feelings of traumatization which he is worried will return after he ends ketamine treatment.      PLAN:      In addition to internalizing hurt does not equal harm, also discussed targeting sense of control by intentionally pursuing pleasant/meaningful activities.      Next apt: 11/25     --------------------------------------------  Other(s) present in the room: None.  --------------------------------------------  Time spent face-to-face with patient: 65 minutes

## 2024-11-12 ENCOUNTER — HOSPITAL ENCOUNTER (OUTPATIENT)
Dept: PAIN MEDICINE | Facility: CLINIC | Age: 54
Discharge: HOME | End: 2024-11-12
Payer: COMMERCIAL

## 2024-11-12 VITALS
DIASTOLIC BLOOD PRESSURE: 74 MMHG | HEART RATE: 76 BPM | TEMPERATURE: 97.7 F | SYSTOLIC BLOOD PRESSURE: 122 MMHG | RESPIRATION RATE: 18 BRPM | OXYGEN SATURATION: 97 %

## 2024-11-12 DIAGNOSIS — M47.816 LUMBAR SPONDYLOSIS: ICD-10-CM

## 2024-11-12 PROCEDURE — 64494 INJ PARAVERT F JNT L/S 2 LEV: CPT | Performed by: PAIN MEDICINE

## 2024-11-12 PROCEDURE — 64494 INJ PARAVERT F JNT L/S 2 LEV: CPT | Mod: 50 | Performed by: PAIN MEDICINE

## 2024-11-12 PROCEDURE — 2500000004 HC RX 250 GENERAL PHARMACY W/ HCPCS (ALT 636 FOR OP/ED): Performed by: PAIN MEDICINE

## 2024-11-12 PROCEDURE — 64493 INJ PARAVERT F JNT L/S 1 LEV: CPT | Mod: 50 | Performed by: PAIN MEDICINE

## 2024-11-12 PROCEDURE — 64493 INJ PARAVERT F JNT L/S 1 LEV: CPT | Performed by: PAIN MEDICINE

## 2024-11-12 RX ORDER — LIDOCAINE HYDROCHLORIDE 10 MG/ML
INJECTION, SOLUTION EPIDURAL; INFILTRATION; INTRACAUDAL; PERINEURAL AS NEEDED
Status: COMPLETED | OUTPATIENT
Start: 2024-11-12 | End: 2024-11-12

## 2024-11-12 RX ORDER — LIDOCAINE HYDROCHLORIDE 20 MG/ML
INJECTION, SOLUTION EPIDURAL; INFILTRATION; INTRACAUDAL; PERINEURAL AS NEEDED
Status: COMPLETED | OUTPATIENT
Start: 2024-11-12 | End: 2024-11-12

## 2024-11-12 ASSESSMENT — PAIN SCALES - GENERAL: PAINLEVEL_OUTOF10: 5 - MODERATE PAIN

## 2024-11-12 ASSESSMENT — PAIN - FUNCTIONAL ASSESSMENT: PAIN_FUNCTIONAL_ASSESSMENT: 0-10

## 2024-11-12 ASSESSMENT — PAIN DESCRIPTION - DESCRIPTORS: DESCRIPTORS: DULL;ACHING;SHARP;SHOOTING

## 2024-11-12 NOTE — DISCHARGE INSTRUCTIONS
Post-injection instructions FOR FACET BLOCK      Pay attention to how much pain relief (what percentage compared to before the procedure) you get and for how long it lasts.     THIS IS A TEMPORARY NUMBING OF PAIN THIS IS BEING USED AS A DIAGNOSTIC INDICATOR IF THIS IS THE SOURCE OF YOUR PAIN    Activity:  RETURN TO NORMAL ACTIVITY  SEE IF YOU CAN APPRECIATE AN IMPROVEMENT IN THE PAIN    Bandages: Remove after 24 hours     Showering/Bathing: You may shower after bandage is removed     Follow up: CALL OFFICE NEXT BUSINESS -[phone] LEAVE MESSAGE ABOUT THE % OF RELIEF THAT WAS OBTAINED AND FOR HOW MANY HOURS      Call the OFFICE immediately: if you notice:     Excessive bleeding from procedure site (brisk bright red bleeding from the site or bleeding that soaks the bandages or does not stop)   Severe headache  Inability to walk, leg or arm weakness or numbness that is worse after the procedure   Uncontrolled pain   New urinary or fecal incontinence   Signs of infection: Fever above 101.5F, redness, swelling, pus or drainage from the site

## 2024-11-12 NOTE — PROGRESS NOTES
Chief Complaint   Patient presents with    Left Shoulder - Pain     Impingement with biceps tendinitis, s/p cortisone injection 9/26/2024      History of Present Illness:  11/14/24: He had a left shoulder injection on 09/26/24. The injection helped for a little while but didn't give him much long term relief. He has a history of osteoporosis. He is in significant pain. He is seeing pain management on Monday.     09/26/24: Patient is here today for his left shoulder pain for the past 10 days. States he was at physical therapy for his neck working on the arm bike and the following morning noted increased pain and inflammation of the left shoulder.    Imaging:  No acute fractures no dislocations of the left shoulder.  No arthritic change     Assessment:   Left shoulder impingement with biceps tendonitis    Plan:  Medrol Dosepak  Continue ice and Celebrex as needed.   Ordered a rheumatoid workup. He will follow-up with rheumatology after.   MRI of left shoulder for rotator cuff tear. He will follow-up after MRI.     Physical Exam:  Well-nourished, well-developed. No acute distress. Alert and oriented x3. Responds appropriately to questioning. Good mood. Normal affect.  Left shoulder:  ROM flexion to 135 degrees, externally rotates 45  5/5 rotator cuff strength  Pain in the biceps  Neurovascular exam normal distally  Palpable pedal pulse and good cap refill     Review of Systems:  GENERAL: Negative for malaise, significant weight loss, fever  MUSCULOSKELETAL: See HPI  NEURO:  Negative     Past Medical History:   Diagnosis Date    Anesthesia of skin     Numbness and tingling    Anxiety 09/21/2023    Arthritis Unk    Bilateral myopia 09/21/2023    Cervical radicular pain 09/21/2023    Chronic patellofemoral pain of left knee 09/21/2023    Complex regional pain syndrome type 1 of left lower extremity 09/21/2023    COVID-19     VACCINATED    Cubital tunnel syndrome on left 09/21/2023    Cubital tunnel syndrome on right  09/21/2023    Disease of intestine, unspecified     Bowel trouble    Erectile dysfunction 09/21/2023    Foot joint pain 09/21/2023    Fractures     Gastroparesis     GERD (gastroesophageal reflux disease)     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    History of psychological trauma 09/21/2023    Hyperlipidemia     Joint pain 09/21/2023    Kidney cysts 09/21/2023    Low back pain, unspecified 12/07/2022    Chronic low back pain without sciatica, unspecified back pain laterality    Low back pain, unspecified 08/09/2022    Lumbago    Lumbar spondylosis 09/21/2023    Lung nodule 09/21/2023    RIGHT    Metatarsalgia of left foot 09/21/2023    Myopia, bilateral 02/19/2019    Myopia of both eyes with astigmatism and presbyopia    Osteochondral defect of patella 09/21/2023    Osteoporosis     Other forms of angina pectoris     Stable angina pectoris    Other spondylosis, cervical region 09/21/2023    Patellofemoral arthritis 09/21/2023    Personal history of other diseases of the musculoskeletal system and connective tissue     History of arthritis    Personal history of other specified conditions     History of seizure    Reflex sympathetic dystrophy 09/21/2023    Scapular dyskinesis 09/21/2023    Seizure disorder (Multi)     D/T TRAMADOL REACTION - 25 YEARS AGO    Shoulder impingement 09/21/2023    Suspected sleep apnea 09/21/2023    Trauma and stressor-related disorder 09/21/2023    Trochanteric bursitis of both hips 09/22/2023    Unspecified disorder of ear, unspecified ear     Ear, nose and throat disorder    Wears glasses        Medication Documentation Review Audit       Reviewed by Khoa Whelan RN (Registered Nurse) on 11/14/24 at 1001      Medication Order Taking? Sig Documenting Provider Last Dose Status   acetaminophen (Tylenol) 500 mg tablet [id number] Yes Take 2 tablets (1,000 mg) by mouth every 8 hours if needed for mild pain (1 - 3). Historical Provider, MD  Active   albuterol (Ventolin HFA) 90 mcg/actuation inhaler  [id number] Yes Inhale 2 puffs every 4 hours if needed for wheezing. Historical Provider, MD  Active   atorvastatin (Lipitor) 80 mg tablet [id number] Yes Take 1 tablet (80 mg) by mouth once daily at bedtime. Historical Provider, MD  Active   baclofen (Lioresal) 20 mg tablet [id number] Yes Take 1 tablet (20 mg) by mouth 3 times a day. Olivia Alonzo MD  Active   celecoxib (CeleBREX) 200 mg capsule [id number]  Take 1 capsule (200 mg) by mouth 2 times a day. Historical Provider, MD  Active   chlorhexidine (Peridex) 0.12 % solution [id number] Yes Use 15 mL in the mouth or throat if needed for wound care for up to 2 doses. 15 mls swish and spit the night before surgery and 15mls swish and spit the morning of surgery do not swallow CHASITY Pagan  Active   diazePAM (Valium) 5 mg tablet [id number] Yes Take 1 tablet (5 mg) by mouth every 12 hours if needed for anxiety. Historical Provider, MD  Active   DULoxetine (Cymbalta) 60 mg DR capsule [id number] Yes Take 1 capsule (60 mg) by mouth once daily. Historical Provider, MD  Active   fluticasone (Flonase) 50 mcg/actuation nasal spray [id number] Yes Administer 1 spray into each nostril once daily as needed. Historical Provider, MD  Active   ketamine HCl (ketamine, bulk,) 100 % powder [id number] Yes Ketamine 100 mg/mL + lidocaine 40 mg/mL 1 puff 4 times daily as needed, DSP#20 mL x 5 refills Olivia Alonzo MD  Active   naloxone (Narcan) 4 mg/0.1 mL nasal spray [id number] Yes Instil 1 spray intranasally for opioid overdose; repeat in 5 inutes if no response Damian Spicer MD [facility]  Active   NON FORMULARY [id number]  Take by mouth once daily. Jesustoabbie Historical Provider, MD  Active   omeprazole (PriLOSEC) 40 mg DR capsule [id number] Yes Take 1 capsule (40 mg) by mouth 2 times a day. For 90 days Bre Gray MD  Active   pregabalin (Lyrica) 300 mg capsule [id number] Yes Take 1 capsule (300 mg) by mouth 2 times a day. Vish Barboza PA-C  Active   urea (Carmol) 40 % cream  "[id number] Yes Apply 1 Application topically once daily. feet Historical Provider, MD  Active                    Allergies   Allergen Reactions    Morphine Hives, Rash and Shortness of breath     Tolerates hydromorphone    Tylenol 3      Tolerates hydromorphone    Ultram [Tramadol] Seizure    Alprazolam Psychosis     \"GOES CRAZY\", psychosis    Ketorolac Seizure    Nsaids (Non-Steroidal Anti-Inflammatory Drug) Unknown    Fish Containing Products Hives and Itching     IT WAS A PROCESSED FISH MEAL, BUT STATES EATS FRESH AND FROZEN FISH ALL THE TIME.    Opioids - Morphine Analogues Rash       Social History     Socioeconomic History    Marital status: Single     Spouse name: Not on file    Number of children: Not on file    Years of education: Not on file    Highest education level: Not on file   Occupational History    Not on file   Tobacco Use    Smoking status: Never    Smokeless tobacco: Never    Tobacco comments:     It killed my mother smoking and she left me with a gift a nodule on my right lung   Vaping Use    Vaping status: Never Used   Substance and Sexual Activity    Alcohol use: Not Currently    Drug use: Not Currently     Types: Other, Marijuana     Comment: Ketamine treatments for pain,eatable marijuana    Sexual activity: Not Currently     Partners: Female     Birth control/protection: None   Other Topics Concern    Not on file   Social History Narrative    Not on file     Social Drivers of Health     Financial Resource Strain: Low Risk  (6/28/2024)    Overall Financial Resource Strain (CARDIA)     Difficulty of Paying Living Expenses: Not hard at all   Food Insecurity: No Food Insecurity (6/28/2024)    Hunger Vital Sign     Worried About Running Out of Food in the Last Year: Never true     Ran Out of Food in the Last Year: Never true   Transportation Needs: No Transportation Needs (7/17/2024)    OASIS : Transportation     Lack of Transportation (Medical): No     Lack of Transportation (Non-Medical): " No     Patient Unable or Declines to Respond: No   Physical Activity: Insufficiently Active (6/28/2024)    Exercise Vital Sign     Days of Exercise per Week: 7 days     Minutes of Exercise per Session: 10 min   Stress: Stress Concern Present (6/28/2024)    [place] [employer] - Occupational Stress Questionnaire     Feeling of Stress : Rather much   Social Connections: Feeling Socially Integrated (7/17/2024)    OASIS : Social Isolation     Frequency of experiencing loneliness or isolation: Never   Recent Concern: Social Connections - Socially Isolated (5/16/2024)    Received from [facility]    Social Connection and Isolation Panel [NHANES]     Frequency of Communication with Friends and Family: Once a week     Frequency of Social Gatherings with Friends and Family: Never     Attends Holiness Services: Never     Active Member of Clubs or Organizations: No     Attends Club or Organization Meetings: Never     Marital Status: Living with partner   Intimate Partner Violence: Not At Risk (6/28/2024)    Humiliation, Afraid, Rape, and Kick questionnaire     Fear of Current or Ex-Partner: No     Emotionally Abused: No     Physically Abused: No     Sexually Abused: No   Housing Stability: Low Risk  (6/28/2024)    Housing Stability Vital Sign     Unable to Pay for Housing in the Last Year: No     Number of Places Lived in the Last Year: 1     Unstable Housing in the Last Year: No   Recent Concern: Housing Stability - High Risk (5/16/2024)    Received from [facility]    Housing Stability Vital Sign     Unable to Pay for Housing in the Last Year: Yes     Number of Places Lived in the Last Year: 1     Unstable Housing in the Last Year: No       Past Surgical History:   Procedure Laterality Date    ACHILLES TENDON SURGERY  1978 2016    CT ABDOMEN PELVIS ANGIOGRAM W AND/OR WO IV CONTRAST  03/18/2020    CT ABDOMEN PELVIS ANGIOGRAM W AND/OR WO IV CONTRAST 3/18/2020 [facility]    CT ANGIO NECK  12/16/2022    CT NECK ANGIO W AND WO IV CONTRAST 12/16/2022 DO[facility]    CT HEAD ANGIO W AND WO IV CONTRAST  12/16/2022    CT HEAD ANGIO W AND WO IV CONTRAST 12/16/2022 [facility]CTOR [facility]ICE [facility]    HIP SURGERY Left     REPLACEMENT    JOINT REPLACEMENT      LEFT KNEE    KNEE ARTHROPLASTY Bilateral 06/13/2017    Knee Arthroplasty    OTHER SURGICAL HISTORY  01/06/2022    Ulnar nerve neuroplasty    OTHER SURGICAL HISTORY Bilateral 07/26/2019    Foot surgery  -- HARDWARE    OTHER SURGICAL HISTORY Bilateral     BILATERAL ANKLE SCOPE    OTHER SURGICAL HISTORY      POP ABDOMINAL PROCEDURE    PLANTAR FASCIA RELEASE      SHOULDER SURGERY Bilateral 06/13/2017    Shoulder Surgery       CT ABDOMEN PELVIS W IV CONTRAST    Result Date: 1/22/2024  * * *Final Report* * * DATE OF EXAM: Jan 22 2024  8:50AM   [facility]   0530  -  CT ABD/PEL W IVCON  / ACCESSION #  [id number] PROCEDURE REASON: Abdominal pain, acute, nonlocalized      * * * * Physician Interpretation * * * *  EXAMINATION:  CT ABDOMEN AND PELVIS WITH IV CONTRAST CLINICAL HISTORY: Acute abdominal pain TECHNIQUE: CT of the abdomen and pelvis was performed using standard technique, scanning from just above the dome of the diaphragm to the symphysis pubis. MQ:  CTAP_3 Contrast: IV:  100 ml of Omnipaque 350 CT Radiation dose: Integrated Dose-length product (DLP) for this visit =   269 mGy*cm. CT Dose Reduction Employed: Automated exposure control(AEC) and iterative recon COMPARISON: 12/26/2023 RESULT: Liver: No mass. Biliary: No bile duct dilation.  The gallbladder is within normal limits for the modality. Spleen: No mass. No splenomegaly. Pancreas: No mass or duct dilation. Adrenals: No mass. Kidneys: There are symmetric bilateral nephrograms.  In the medial right upper renal pole there is a 4.3 cm exophytic hypodensity consistent with a cyst. GI tract: No dilation or wall thickening. There is a small sliding hiatal hernia.  There is colonic residue  with a moderate to large stool burden.   There is no acute appendicitis or diverticulitis. Lymph nodes: No abdominal or pelvic lymphadenopathy. Mesentery/Peritoneum: No ascites or mass. Retroperitoneum: No mass. Vasculature:  - Abdominal aorta and iliac arteries: Atherosclerotic calcifications without aneurysm.  - Celiac and SMA: Questionable mild narrowing at the origin of the celiac axis.  - Portal venous system (SMV, splenic vein, portal vein and branches): Patent.  - Hepatic veins: Patent. Pelvis: No mass, ascites or fluid collection. There is artifact from a left hip arthroplasty. Bones/Soft Tissues: There are no aggressive osseous lesions identified. Lower thorax: Bibasilar scarring.  (topogram) images: No additional findings.    IMPRESSION: Stable appearance.  There is no acute intra-abdominal process identified. : [facility]   Transcribe Date/Time: Jan 22 2024  8:50A Dictated by : INES ROLON MD This examination was interpreted and the report reviewed and electronically signed by: INES ROLON MD on Jan 22 2024  9:05AM  EST    US GALLBLADDER    Result Date: 1/22/2024  * * *Final Report* * * DATE OF EXAM: Jan 22 2024  6:48AM   [facility]   1032  -  US ABD RIGHT UPPER QUADRANT  / ACCESSION #  [id number] PROCEDURE REASON: RUQ pain, no fever, no elev WBC      * * * * Physician Interpretation * * * *  EXAMINATION:   RIGHT UPPER QUADRANT ULTRASOUND CLINICAL HISTORY: RIGHT upper quadrant pain TECHNIQUE:  Sonography of the right upper quadrant  was performed.   Images were obtained and stored in a permanent archive. MQ:  URUQ_2 COMPARISON: CT abdomen pelvis 12/26/2023 RESULT: Pancreas: Not well visualized Liver:      Echotexture:  Homogeneous      Echogenicity:  Increased      Surface contour:  Smooth      Lesions:  None. Biliary: No intrahepatic biliary duct dilation.      CBD: 0.3 cm at the hilum.      Gallbladder: Normal caliber           -Contents:  No cholelithiasis           -Wall:   Normal           -Other:  No pericholecystic fluid.  Negative sonographic Bautista sign. Right Kidney: No hydronephrosis.  Anechoic upper pole cyst with lobular borders measures 3.1 x 2.6 x 3.8 cm.  This appears unchanged from the prior CT. Ascites: None.    IMPRESSION: Hepatic steatosis.  Otherwise, no acute sonographic abnormality in the RIGHT upper quadrant. : [facility]   Transcribe Date/Time: Jan 22 2024  6:55A Dictated by : DEE MCKEON MD This examination was interpreted and the report reviewed and electronically signed by: DEE MCKEON MD on Jan 22 2024  6:59AM  EST    XR CHEST 1 VIEW    Result Date: 1/22/2024  * * *Final Report* * * DATE OF EXAM: Jan 22 2024  6:22AM   [facility]   [id number]  -  XR CHEST 1V FRONTAL PORT  / ACCESSION #  [id number] PROCEDURE REASON: Tachycardia      * * * * Physician Interpretation * * * *  EXAMINATION:  CHEST RADIOGRAPH (SINGLE VIEW AP OR PA) CLINICAL INFORMATION (AS PROVIDED BY ORDERING CLINICIAN) :  Tachycardia Comparison:  9/3/2023 RESULT: Lines, tubes, and devices:  N/A Lungs and pleura:  No confluent infiltrate, large pleural effusion or pneumothorax.   Cardiomediastinal silhouette:  Within normal limits. Other:  No acute bony abnormality identified.    IMPRESSION: No significant acute radiographic abnormality. : [facility]   Transcribe Date/Time: Jan 22 2024  6:28A Dictated by : MIRNA CHAO MD This examination was interpreted and the report reviewed and electronically signed by: MIRNA CHAO MD on Jan 22 2024  6:30AM  EST                             Scribe Attestation  By signing my name below, Argelia GARCIA Scribe   attest that this documentation has been prepared under the direction and in the presence of Royce Louis MD.

## 2024-11-13 ENCOUNTER — TELEPHONE (OUTPATIENT)
Dept: PAIN MEDICINE | Facility: CLINIC | Age: 54
End: 2024-11-13
Payer: COMMERCIAL

## 2024-11-13 NOTE — TELEPHONE ENCOUNTER
Spoke to him and relayed Dr Abel's input in regards to the MRI patient states that he has an appointment with a surgeon next week he will call us back as needed

## 2024-11-13 NOTE — TELEPHONE ENCOUNTER
He states no releif was obtained after the lumbar facet on 11/12  He did have a lumbar MRI on 11/11 and wanted Dr Abel to take a look at it and based on the findings  what would he advise based on the MRI results and with the facet giving no releif what would you suggest he do??

## 2024-11-13 NOTE — PROGRESS NOTES
SUBJECTIVE:  This is 54 y.o.  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  The patient had in 2022 MRI of his cervical to lumbar spine showed principally normal MRI minimal degenerative changes.  The patient has been to the ED multiple visit and have seen Dr. Chen as well for pain since his neck back etc. who is here for follow-up stating that he has now a lot of pain in his right foot that did not response to multiple surgeries and now it feels the same thing like his left leg which has [facility] case of CRPS.  I examined the foot today does not look like typical CRPS diagnosis has some tenderness over the scar and there is some pseudomotor changes good pulses.  The patient stated that he had an EMG done which was normal I explained to him that does not mean that he has CRPS or not CRPS is a disease that diagnosed clinically he may have a PM&R I suggested lumbar sympathetic block as differential diagnosis to see if that sympathetically mediated pain or not but he is not interested in that.  The patient is worried about his marijuana usage and if that will cause him any problem getting his infusion therapy I told him no he can continue his infusion therapy and he can continue with his ketamine/lidocaine nasal spray.    Prior office visit:  5/31/2024:     The patient goes to 3 pain centers he gets cervical radiofrequency ablation from Sam at [facility], lumbar RFA from Dr. Abel at [facility].  And he just received on 11/6/2024 right C5-7 MB RFA by Sam  In addition he gets opioid sparing infusion at [place] in addition to ketamine lidocaine nasal spray and ketamine infusion at the [facility]  The patient on diazepam 5 mg twice daily and oxycodone 5 mg 2 times daily from PCP Dr. Sanjay Mendoza  The patient was referred to Dr. Solis by Dr. Vargas for the  suitability of using opioid in his therapy!!  Which he recommended against it since he is already on diazepam as well           6/10/2024 addendum:  6/10/2024 bone density study showed lowest T-score -2.2 for the right femoral neck patient will need referral to rheumatology which was done today     SUBJECTIVE:  This is 54 y.o.  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  The patient had in 2022 MRI of his cervical to lumbar spine showed principally normal MRI minimal degenerative changes.  The patient has been to the ED multiple visit and have seen Dr. Chen as well for pain since his neck back etc. who is here for follow-up stating that he is hurting everywhere in his cervical thoracic lumbar spine.  The patient has had medial branch block by Dr. Vargas which helped him significantly but he cannot have his next block until next months.  The patient failed spinal cord stimulator and Prialt therapy trial as well.  I explained to him I really do not know what to do with him besides continuing with his IV infusion therapy.  All his prednisone prescriptions where for Medrol Dosepak which is very short lived treatment.  I ordered a DEXA scan on him in addition to my prednisone taper dose.  The patient was told in his last emergency room visit that he was having PVCs which improved after they give him hydromorphone.  The patient does not want to go to cardiology because he thinks that his arrhythmia is related to his pain.  I explained to him that morphine and opioid is treatment for patient with heart problem and heart attack and he need to follow-up with cardiology to evaluate what is wrong with him.  I told the patient that he has been in the emergency department more than he has been at his home!!             Last procedure:   10/17/2022 bilateral C6-7  interlaminar DEVONTE the patient has had a 0% improvement in pain and function   IV infusion therapy the patient has had a 40-80% improvement in pain for his neck but not for his left leg CRPS symptoms     Portions of record reviewed for pertinent issues: active problem list, medication list, allergies, family history, social history, notes from last encounter, encounters, lab results, imaging and other system records.        I have personally reviewed the [facility] report for this patient. This report is scanned into the electronic medical record. I have considered the risks of abuse, dependence, addiction and diversion. Pregabalin 300 mg twice daily from office, in addition to Valium 5 mg twice daily and few Percocet 5 mg from different providers  OPIOID RISK ASSESSMENT SCORE 0/26           Pending law suits: [facility] case after fall at work in 2008, used to be a paramedic in [place]  Social History: Lives with his fiance for many years with 12 cats denies smoking drinking or use of illicit drugs              Diagnostic studies:  3/7/2023 left foot x-ray Stable surgical changes in the foot as described. Interval surgery involving the distal end of the 5th metatarsal as well, and interval surgery of the posterior calcaneus.        11/17/2022 MRI of the cervical thoracic and lumbar spine showed some degenerative changes but no significant canal or foraminal stenosis, no bone marrow edema or endplate changes:  Cervical spine MRI  TECHNIQUE:  Sagittal T1, T2, axial T1 and axial gradient echo T2 weighted images  were acquired through the cervical spine. Sagittal and transaxial T1  and T2 weighted images of the thoracic spine were acquired along with  sagittal STIR imaging. Sagittal and axial T1 and T2 weighted images  were also acquired through the lumbar spine.     FINDINGS:  Cervical spine:     ALIGNMENT: The vertebral alignment is within normal limits. There is  mild straightening of cervical lordosis.      VERTEBRAE/INTERVERTEBRAL DISCS: The vertebral bodies demonstrate  expected height.The marrow signal is within normal limits. The  intervertebral discs demonstrate expected signal and morphology.     CORD: Normal in caliber and signal.     C1-2: The cervicomedullary junction appears unremarkable. There is no  central canal stenosis.     C2-3: Moderate right foraminal narrowing. There is no significant  central canal or left neural foraminal stenosis.     C3-4: Moderate right and mild left foraminal narrowing. There is no  significant central canal stenosis.     C4-5: Moderate to severe right and mild left foraminal narrowing.  There is no significant central canal stenosis.     C5-6: Mild left foraminal narrowing. There is no significant central  canal or right neural foraminal stenosis.     C6-7: Mild to moderate right foraminal stenosis. There is no  significant central canal or left neural foraminal stenosis.     C7-T1: There is no posterior disc contour abnormality. There is no  significant central canal or neural foraminal stenosis.     The upper thoracic vertebrae and spinal canal are unremarkable.     The prevertebral and posterior paraspinous soft tissues are within  normal limits.     Thoracic spine:     ALIGNMENT: The vertebral alignment is within normal limits.     VERTEBRAE/INTERVERTEBRAL DISCS: The vertebral bodies demonstrate  expected height.The marrow signal is within normal limits. The  intervertebral discs demonstrate expected signal and morphology.     CORD: Normal in caliber and signal.     CENTRAL CANAL: There is no evidence of significant central canal  stenosis.     The prevertebral and posterior paraspinous soft tissues are within  normal limits.     Lumbar spine:     ALIGNMENT: Mild straightening.     VERTEBRAE/INTERVERTEBRAL DISCS: The vertebral bodies demonstrate  expected height.There are patchy discogenic marrow changes involving  L5 S1 vertebral bodies with patchy marrow edema. There  is diminished  height and T2 signal within L5-S1 intervertebral disc with mild  endplate irregularity to suggest degenerative changes.     CORD: The lower thoracic cord appears unremarkable. The conus  terminates appropriately at L1-L2.     At T12-L1, there is no central canal stenosis or neural foraminal  narrowing.     At L1-L2, there is no central canal stenosis or neural foraminal  narrowing.     At L2-L3, there is no central canal stenosis or neural foraminal  narrowing.     At L3-L4, there is no central canal stenosis or neural foraminal  narrowing.     At L4-L5, there is a small disc bulge. No central canal stenosis or  neural foramina narrowing.     At L5-S1, there is a small disc bulge. No central canal stenosis or  neural foramina narrowing.     The prevertebral and posterior paraspinous soft tissues are within  normal limits.     3.6 cm right exophytic renal cyst.     IMPRESSION:  Multilevel degenerative changes. No significant canal stenosis of the  cervical, thoracic or lumbar spine.     Cervical foraminal narrowing as detailed.     Lower lumbar small disc bulges.     11/17/2022 thoracic spine MRI  Cervical spine:     ALIGNMENT: The vertebral alignment is within normal limits. There is  mild straightening of cervical lordosis.     VERTEBRAE/INTERVERTEBRAL DISCS: The vertebral bodies demonstrate  expected height.The marrow signal is within normal limits. The  intervertebral discs demonstrate expected signal and morphology.     CORD: Normal in caliber and signal.     C1-2: The cervicomedullary junction appears unremarkable. There is no  central canal stenosis.     C2-3: Moderate right foraminal narrowing. There is no significant  central canal or left neural foraminal stenosis.     C3-4: Moderate right and mild left foraminal narrowing. There is no  significant central canal stenosis.     C4-5: Moderate to severe right and mild left foraminal narrowing.  There is no significant central canal stenosis.      C5-6: Mild left foraminal narrowing. There is no significant central  canal or right neural foraminal stenosis.     C6-7: Mild to moderate right foraminal stenosis. There is no  significant central canal or left neural foraminal stenosis.     C7-T1: There is no posterior disc contour abnormality. There is no  significant central canal or neural foraminal stenosis.     The upper thoracic vertebrae and spinal canal are unremarkable.     The prevertebral and posterior paraspinous soft tissues are within  normal limits.     Thoracic spine:     ALIGNMENT: The vertebral alignment is within normal limits.     VERTEBRAE/INTERVERTEBRAL DISCS: The vertebral bodies demonstrate  expected height.The marrow signal is within normal limits. The  intervertebral discs demonstrate expected signal and morphology.     CORD: Normal in caliber and signal.     CENTRAL CANAL: There is no evidence of significant central canal  stenosis.     The prevertebral and posterior paraspinous soft tissues are within  normal limits.     Lumbar spine:     ALIGNMENT: Mild straightening.     VERTEBRAE/INTERVERTEBRAL DISCS: The vertebral bodies demonstrate  expected height.There are patchy discogenic marrow changes involving  L5 S1 vertebral bodies with patchy marrow edema. There is diminished  height and T2 signal within L5-S1 intervertebral disc with mild  endplate irregularity to suggest degenerative changes.     CORD: The lower thoracic cord appears unremarkable. The conus  terminates appropriately at L1-L2.     At T12-L1, there is no central canal stenosis or neural foraminal  narrowing.     At L1-L2, there is no central canal stenosis or neural foraminal  narrowing.     At L2-L3, there is no central canal stenosis or neural foraminal  narrowing.     At L3-L4, there is no central canal stenosis or neural foraminal  narrowing.     At L4-L5, there is a small disc bulge. No central canal stenosis or  neural foramina narrowing.     At L5-S1, there is a  small disc bulge. No central canal stenosis or  neural foramina narrowing.     The prevertebral and posterior paraspinous soft tissues are within  normal limits.     3.6 cm right exophytic renal cyst.     IMPRESSION:  Multilevel degenerative changes. No significant canal stenosis of the  cervical, thoracic or lumbar spine.     Cervical foraminal narrowing as detailed.     Lower lumbar small disc bulges.        2/26/2020 EMG of the left lower extremity did not show any polyneuropathy radiculopathy or mononeuropathy        9/10/2021 MRI of the cervical spine showed some degenerative changes and some facet joint hypertrophy:  FINDINGS:  Height alignment and signal of the cervical vertebral bodies are  preserved.     C2-C3: There is no posterior disc contour abnormality. There is no  significant central canal or neural foraminal stenosis.     C3-C4: There is no posterior disc contour abnormality. There is no  significant central canal or neural foraminal stenosis.     C4-C5: Minimal disc bulge without significant central canal or  neuroforaminal stenosis. Minimal to moderate hypertrophic facet  change bilaterally.     C5-C6: Disc bulge without significant central canal neuroforaminal  stenosis.     C6-C7: There is no posterior disc contour abnormality. There is no  significant central canal or neural foraminal stenosis.     C7-T1: There is no posterior disc contour abnormality. There is no  significant central canal or neural foraminal stenosis.     The upper thoracic vertebrae and spinal canal are unremarkable.     IMPRESSION:  No significant central canal or neuroforaminal stenosis and no  significant change was detected compared to the prior examination of  08/04/2017.     Review of Systems   HENT: Negative.     Eyes: Negative.    Respiratory: Negative.     Cardiovascular: Negative.    Gastrointestinal: Negative.    Endocrine: Negative.    Genitourinary: Negative.    Musculoskeletal:  Positive for arthralgias, back  pain, myalgias and neck pain.   Skin: Negative.    Neurological:  Positive for numbness.   Hematological: Negative.    Psychiatric/Behavioral: Negative.          Physical Exam  Vitals and nursing note reviewed.   Constitutional:       Appearance: Normal appearance.   HENT:      Head: Normocephalic and atraumatic.      Nose: Nose normal.   Eyes:      Extraocular Movements: Extraocular movements intact.      Conjunctiva/sclera: Conjunctivae normal.      Pupils: Pupils are equal, round, and reactive to light.   Cardiovascular:      Rate and Rhythm: Normal rate and regular rhythm.      Pulses: Normal pulses.      Heart sounds: Normal heart sounds.   Pulmonary:      Effort: Pulmonary effort is normal.      Breath sounds: Normal breath sounds.   Abdominal:      General: Abdomen is flat. Bowel sounds are normal.      Palpations: Abdomen is soft.   Musculoskeletal:         General: Tenderness present.   Skin:     General: Skin is warm.   Neurological:      General: No focal deficit present.      Mental Status: He is alert and oriented to person, place, and time.   Psychiatric:         Mood and Affect: Mood normal.         Behavior: Behavior normal.                      Plan  At least 50% of the visit was involved in the discussion of the options for treatment. We discussed exercises, medication, interventional therapies and surgery. Healthy life style is essential with patient hard work to achieve the wellness. In addition; discussion with the patient and/or family about any of the diagnostic results, impressions and/or recommended diagnostic studies, prognosis, risks and benefits of treatment options, instructions for treatment and/or follow-up, importance of compliance with chosen treatment options, risk-factor reduction, and patient/family education.         Recommended Pool therapy, walking in the pool, at least 3x per week for 30 minutes  Continue self-directed physical therapy with at least daily exercises for minimum  of 20-minute, brochure was handed to the patient  Continue opioid sparing infusion and ketamine/lidocaine nasal spray  Recommended right L3 LSP as diagnostic and therapeutic injection for his possible CRPS of the right foot  Healthy lifestyle and anti-inflammatory diet in addition to weight control discussed with the patient  Alternative chronic pain therapies was discussed, encouraged and information was handed  Return to Clinic 3 to 6 months     *Please note this report has been produced using speech recognition software and may contain errors related to that system including grammar, punctuation and spelling as well as words and phrases that may be inappropriate. If there are questions or concerns, please feel free to contact me to clarify.    Olivia Alonzo MD

## 2024-11-14 ENCOUNTER — OFFICE VISIT (OUTPATIENT)
Dept: PAIN MEDICINE | Facility: CLINIC | Age: 54
End: 2024-11-14
Payer: COMMERCIAL

## 2024-11-14 ENCOUNTER — TELEPHONE (OUTPATIENT)
Dept: PAIN MEDICINE | Facility: CLINIC | Age: 54
End: 2024-11-14

## 2024-11-14 ENCOUNTER — OFFICE VISIT (OUTPATIENT)
Dept: ORTHOPEDIC SURGERY | Facility: CLINIC | Age: 54
End: 2024-11-14
Payer: COMMERCIAL

## 2024-11-14 VITALS
BODY MASS INDEX: 26.22 KG/M2 | RESPIRATION RATE: 18 BRPM | WEIGHT: 173 LBS | HEART RATE: 73 BPM | TEMPERATURE: 97.9 F | DIASTOLIC BLOOD PRESSURE: 88 MMHG | OXYGEN SATURATION: 95 % | HEIGHT: 68 IN | SYSTOLIC BLOOD PRESSURE: 136 MMHG

## 2024-11-14 DIAGNOSIS — M25.812 IMPINGEMENT OF LEFT SHOULDER: ICD-10-CM

## 2024-11-14 DIAGNOSIS — M17.10 PATELLOFEMORAL ARTHRITIS: ICD-10-CM

## 2024-11-14 DIAGNOSIS — M25.50 ARTHRALGIA, UNSPECIFIED JOINT: ICD-10-CM

## 2024-11-14 DIAGNOSIS — G90.522 COMPLEX REGIONAL PAIN SYNDROME TYPE 1 OF LEFT LOWER EXTREMITY: ICD-10-CM

## 2024-11-14 DIAGNOSIS — Z79.899 MEDICATION MANAGEMENT: Primary | ICD-10-CM

## 2024-11-14 DIAGNOSIS — M19.90 ARTHRITIS: ICD-10-CM

## 2024-11-14 LAB
AMPHETAMINES UR QL SCN: NORMAL
ATRIAL RATE: 75 BPM
ATRIAL RATE: 85 BPM
BARBITURATES UR QL SCN: NORMAL
BZE UR QL SCN: NORMAL
CANNABINOIDS UR QL SCN: NORMAL
CREAT UR-MCNC: 100.8 MG/DL (ref 20–370)
P AXIS: 31 DEGREES
P AXIS: 54 DEGREES
P OFFSET: 196 MS
P OFFSET: 203 MS
P ONSET: 145 MS
P ONSET: 146 MS
PCP UR QL SCN: NORMAL
PR INTERVAL: 152 MS
PR INTERVAL: 156 MS
Q ONSET: 222 MS
Q ONSET: 223 MS
QRS COUNT: 12 BEATS
QRS COUNT: 14 BEATS
QRS DURATION: 96 MS
QRS DURATION: 96 MS
QT INTERVAL: 380 MS
QT INTERVAL: 388 MS
QTC CALCULATION(BAZETT): 433 MS
QTC CALCULATION(BAZETT): 452 MS
QTC FREDERICIA: 417 MS
QTC FREDERICIA: 426 MS
R AXIS: 16 DEGREES
R AXIS: 38 DEGREES
T AXIS: 32 DEGREES
T AXIS: 49 DEGREES
T OFFSET: 413 MS
T OFFSET: 416 MS
VENTRICULAR RATE: 75 BPM
VENTRICULAR RATE: 85 BPM

## 2024-11-14 PROCEDURE — 99214 OFFICE O/P EST MOD 30 MIN: CPT | Performed by: ANESTHESIOLOGY

## 2024-11-14 PROCEDURE — 99214 OFFICE O/P EST MOD 30 MIN: CPT | Performed by: ORTHOPAEDIC SURGERY

## 2024-11-14 PROCEDURE — 80307 DRUG TEST PRSMV CHEM ANLYZR: CPT | Performed by: ANESTHESIOLOGY

## 2024-11-14 RX ORDER — CELECOXIB 200 MG/1
200 CAPSULE ORAL 2 TIMES DAILY
COMMUNITY

## 2024-11-14 RX ORDER — METHYLPREDNISOLONE 4 MG/1
TABLET ORAL
Qty: 21 TABLET | Refills: 0 | Status: SHIPPED | OUTPATIENT
Start: 2024-11-14

## 2024-11-14 ASSESSMENT — PAIN DESCRIPTION - DESCRIPTORS: DESCRIPTORS: SHARP;STABBING;ACHING

## 2024-11-14 ASSESSMENT — ENCOUNTER SYMPTOMS
ARTHRALGIAS: 1
NECK PAIN: 1
LOSS OF SENSATION IN FEET: 1
CARDIOVASCULAR NEGATIVE: 1
DEPRESSION: 0
EYES NEGATIVE: 1
PSYCHIATRIC NEGATIVE: 1
RESPIRATORY NEGATIVE: 1
OCCASIONAL FEELINGS OF UNSTEADINESS: 1
GASTROINTESTINAL NEGATIVE: 1
ENDOCRINE NEGATIVE: 1
HEMATOLOGIC/LYMPHATIC NEGATIVE: 1
NUMBNESS: 1
BACK PAIN: 1
MYALGIAS: 1

## 2024-11-14 ASSESSMENT — PAIN SCALES - GENERAL
PAINLEVEL_OUTOF10: 8
PAINLEVEL_OUTOF10: 8

## 2024-11-14 ASSESSMENT — PAIN - FUNCTIONAL ASSESSMENT: PAIN_FUNCTIONAL_ASSESSMENT: 0-10

## 2024-11-14 NOTE — PROGRESS NOTES
This is 54 y.o.  male with who has been treated for  complex regional pain syndrome right lower extremity  . Pain is unchanged, The pain is described as achiness, sharp, and stabbing and is relieved by Medications nasal ketamine , and maureen provides a temporary 80% relief depends on his activity    Here for follow-up   Chief Complaint   Patient presents with    Follow-up     Review EMG results       Pain Therapies:  nasal ketamine   patient admits to having eatable marijuana to help his pain ,but not helpful with his pain he will never try marijuana edibles again.

## 2024-11-18 ENCOUNTER — APPOINTMENT (OUTPATIENT)
Dept: PAIN MEDICINE | Facility: CLINIC | Age: 54
End: 2024-11-18
Payer: COMMERCIAL

## 2024-11-19 ENCOUNTER — OFFICE VISIT (OUTPATIENT)
Dept: ORTHOPEDIC SURGERY | Facility: CLINIC | Age: 54
End: 2024-11-19
Payer: COMMERCIAL

## 2024-11-19 DIAGNOSIS — M54.9 BACK PAIN WITHOUT RADICULOPATHY: Primary | ICD-10-CM

## 2024-11-19 LAB
1OH-MIDAZOLAM UR CFM-MCNC: <25 NG/ML
6MAM UR CFM-MCNC: <25 NG/ML
7AMINOCLONAZEPAM UR CFM-MCNC: <25 NG/ML
A-OH ALPRAZ UR CFM-MCNC: <25 NG/ML
ALPRAZ UR CFM-MCNC: <25 NG/ML
CHLORDIAZEP UR CFM-MCNC: <25 NG/ML
CLONAZEPAM UR CFM-MCNC: <25 NG/ML
CODEINE UR CFM-MCNC: <50 NG/ML
DIAZEPAM UR CFM-MCNC: <25 NG/ML
EDDP UR CFM-MCNC: <25 NG/ML
FENTANYL UR CFM-MCNC: <2.5 NG/ML
HYDROCODONE CTO UR CFM-MCNC: <25 NG/ML
HYDROMORPHONE UR CFM-MCNC: <25 NG/ML
LORAZEPAM UR CFM-MCNC: <25 NG/ML
METHADONE UR CFM-MCNC: <25 NG/ML
MIDAZOLAM UR CFM-MCNC: <25 NG/ML
MORPHINE UR CFM-MCNC: <50 NG/ML
NORDIAZEPAM UR CFM-MCNC: 366 NG/ML
NORFENTANYL UR CFM-MCNC: <2.5 NG/ML
NORHYDROCODONE UR CFM-MCNC: <25 NG/ML
NOROXYCODONE UR CFM-MCNC: <25 NG/ML
NORTRAMADOL UR-MCNC: <50 NG/ML
OXAZEPAM UR CFM-MCNC: >1000 NG/ML
OXYCODONE UR CFM-MCNC: <25 NG/ML
OXYMORPHONE UR CFM-MCNC: <25 NG/ML
TEMAZEPAM UR CFM-MCNC: >1000 NG/ML
TRAMADOL UR CFM-MCNC: <50 NG/ML
ZOLPIDEM UR CFM-MCNC: <25 NG/ML
ZOLPIDEM UR-MCNC: <25 NG/ML

## 2024-11-19 PROCEDURE — 1036F TOBACCO NON-USER: CPT | Performed by: ORTHOPAEDIC SURGERY

## 2024-11-19 PROCEDURE — 99213 OFFICE O/P EST LOW 20 MIN: CPT | Performed by: ORTHOPAEDIC SURGERY

## 2024-11-19 NOTE — PROGRESS NOTES
Chief complaint: MRI follow-up    HPI: Patient having mainly just back pain.  Occasionally legs will bother him.  He finally got his MRI and is here to follow-up with that.  His back does hurt.  No fevers chills nausea vomiting.  No bowel or bladder changes.    Physical exam: The back is mildly tender.  He can bend over and just about touch his toes.  His good good smooth back range of motion.  Some pain at extremes.  Good strength in his back.  Good strength in his legs.  He walks normally.    MRI was reviewed and the MRI of the lumbar spine is completely normal.  There is no evidence of any stenosis or fractures or cysts or infections in the spine.  However, there is a 4 x 4 centimeter cyst coming off the right kidney which he says he thinks he was aware of.  He already has a urologist.  I recommended he follow-up with that physician.  He says he will do that.    Assessment/plan: Patient with back pain only.  Nonsurgical problem.  Mechanical in nature.  I do recommend he revisit physical therapy, chiropractic and pain management as he is done all those before.  He can follow-up with me on a as needed basis.

## 2024-11-21 ENCOUNTER — APPOINTMENT (OUTPATIENT)
Dept: CARDIOLOGY | Facility: CLINIC | Age: 54
End: 2024-11-21
Payer: COMMERCIAL

## 2024-11-25 ENCOUNTER — APPOINTMENT (OUTPATIENT)
Dept: BEHAVIORAL HEALTH | Facility: CLINIC | Age: 54
End: 2024-11-25
Payer: COMMERCIAL

## 2024-11-25 DIAGNOSIS — F41.9 ANXIETY: ICD-10-CM

## 2024-11-25 DIAGNOSIS — Z91.49 HISTORY OF PSYCHOLOGICAL TRAUMA: ICD-10-CM

## 2024-11-25 PROCEDURE — 90837 PSYTX W PT 60 MINUTES: CPT

## 2024-11-25 NOTE — PROGRESS NOTES
Start time:  11:02  End time:  12:01     An interactive audio and video telecommunication system which permits real time communications between the patient (at the originating site) and provider (at the distant site) was utilized to provide this telehealth service. Verbal consent has been obtained from this patient for a telehealth visit.     The patient was informed of the current need to conduct treatment via virtual platform in light of COVID-19 pandemic. I have confirmed the patient’s identity via the following (minimum of three) acceptable identifiers as per  Policy PH-9: , address, phone number, and email address.        SUBJECTIVE: Patient reported that he consulted with his pharmacist and started taking Kratom for pain - discussed importance of discussing with provider overseeing ketamine infusion to make sure there are no interactions. Discussed anxiety he experiences outside home and feeling unworthy due to his various conditions as well as general dissatisfaction with life in present. Explored his interest in making friends and discussed investigating local opportunities for meeting others.  Progress: Good  Prognosis: Good     Duration of problem: years with recent improvement     Severity: moderate     OBJECTIVE:  Orientation & Cognition: Oriented x3. Thought processes normal and appropriate to situation.  Mood, Affect: Euthymic  Appearance: Optimal by patient standards.  Harm to self or others: Denied SI  Substance abuse: Not reported  Psychiatric medication use: Not reported     IMPRESSION:     F41.9 Unspecified anxiety disorder  [id number].91.49 History of psychological trauma     Goals for Therapy: Sharing difficult feelings in a therapeutic environment, expanding upon current social supports in his life, and addressing anxiety. Patient is also interested in medication management for his feelings of traumatization which he is worried will return after he ends ketamine treatment.      PLAN:     Explored  his interest in making friends and discussed investigating local opportunities for meeting others.     Next apt: 12/9     --------------------------------------------  Other(s) present in the room: None.  --------------------------------------------  Time spent face-to-face with patient: 59 minutes

## 2024-11-27 ENCOUNTER — LAB (OUTPATIENT)
Dept: LAB | Facility: LAB | Age: 54
End: 2024-11-27
Payer: COMMERCIAL

## 2024-11-27 DIAGNOSIS — M17.10 PATELLOFEMORAL ARTHRITIS: ICD-10-CM

## 2024-11-27 DIAGNOSIS — M19.90 ARTHRITIS: ICD-10-CM

## 2024-11-27 DIAGNOSIS — M25.50 ARTHRALGIA, UNSPECIFIED JOINT: ICD-10-CM

## 2024-11-27 LAB
CCP IGG SERPL-ACNC: <1 U/ML
CRP SERPL-MCNC: 0.5 MG/DL
ERYTHROCYTE [SEDIMENTATION RATE] IN BLOOD BY WESTERGREN METHOD: 28 MM/H (ref 0–20)
RHEUMATOID FACT SER NEPH-ACNC: <10 IU/ML (ref 0–15)

## 2024-11-27 PROCEDURE — 85652 RBC SED RATE AUTOMATED: CPT

## 2024-11-27 PROCEDURE — 86140 C-REACTIVE PROTEIN: CPT

## 2024-11-27 PROCEDURE — 86200 CCP ANTIBODY: CPT

## 2024-11-27 PROCEDURE — 86431 RHEUMATOID FACTOR QUANT: CPT

## 2024-11-27 PROCEDURE — 86038 ANTINUCLEAR ANTIBODIES: CPT

## 2024-11-29 ENCOUNTER — APPOINTMENT (OUTPATIENT)
Dept: RADIOLOGY | Facility: HOSPITAL | Age: 54
End: 2024-11-29
Payer: COMMERCIAL

## 2024-11-29 LAB — ANA SER QL HEP2 SUBST: NEGATIVE

## 2024-12-02 ENCOUNTER — OFFICE VISIT (OUTPATIENT)
Dept: PAIN MEDICINE | Facility: CLINIC | Age: 54
End: 2024-12-02
Payer: COMMERCIAL

## 2024-12-02 VITALS
RESPIRATION RATE: 18 BRPM | HEART RATE: 83 BPM | OXYGEN SATURATION: 96 % | DIASTOLIC BLOOD PRESSURE: 94 MMHG | TEMPERATURE: 97.5 F | SYSTOLIC BLOOD PRESSURE: 141 MMHG

## 2024-12-02 DIAGNOSIS — K59.03 DRUG-INDUCED CONSTIPATION: Primary | ICD-10-CM

## 2024-12-02 DIAGNOSIS — M47.816 LUMBAR SPONDYLOSIS: ICD-10-CM

## 2024-12-02 PROCEDURE — 99213 OFFICE O/P EST LOW 20 MIN: CPT | Performed by: NURSE PRACTITIONER

## 2024-12-02 RX ORDER — DOCUSATE SODIUM 100 MG/1
100 CAPSULE, LIQUID FILLED ORAL 2 TIMES DAILY PRN
Qty: 60 CAPSULE | Refills: 0 | Status: SHIPPED | OUTPATIENT
Start: 2024-12-02 | End: 2025-01-01

## 2024-12-02 ASSESSMENT — PAIN - FUNCTIONAL ASSESSMENT: PAIN_FUNCTIONAL_ASSESSMENT: 0-10

## 2024-12-02 ASSESSMENT — PAIN SCALES - GENERAL: PAINLEVEL_OUTOF10: 3

## 2024-12-02 ASSESSMENT — PAIN DESCRIPTION - DESCRIPTORS: DESCRIPTORS: ACHING;SHARP;SHOOTING

## 2024-12-02 NOTE — H&P
History Of Present Illness  Akin Lynn is a 54 y.o. male presenting for follow up after a procedure.  He is known in this clinic because of chronic back pain. His level of pain at this time is about 3/10 mostly in his back described aching, sharp, shooting type os discomfort.  He recently had a median nerve branch block bilateral L4-L5, L5-S1 done on 11/12/2024, he confirms today that he had about 80% reduction in pain that lasted for about 5 hours duration.  OARRS obtained and reviewed, no abuse or misuse with prescribed medication noted.  He is receiving pain medications from other Doctors.  He is still able to perform activities of daily living with some difficulty.     Past Medical History  Past Medical History:   Diagnosis Date    Anesthesia of skin     Numbness and tingling    Anxiety 09/21/2023    Arthritis Unk    Bilateral myopia 09/21/2023    Cervical radicular pain 09/21/2023    Chronic patellofemoral pain of left knee 09/21/2023    Complex regional pain syndrome type 1 of left lower extremity 09/21/2023    COVID-19     VACCINATED    Cubital tunnel syndrome on left 09/21/2023    Cubital tunnel syndrome on right 09/21/2023    Disease of intestine, unspecified     Bowel trouble    Erectile dysfunction 09/21/2023    Foot joint pain 09/21/2023    Fractures     Gastroparesis     GERD (gastroesophageal reflux disease)     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    History of psychological trauma 09/21/2023    Hyperlipidemia     Joint pain 09/21/2023    Kidney cysts 09/21/2023    Low back pain, unspecified 12/07/2022    Chronic low back pain without sciatica, unspecified back pain laterality    Low back pain, unspecified 08/09/2022    Lumbago    Lumbar spondylosis 09/21/2023    Lung nodule 09/21/2023    RIGHT    Metatarsalgia of left foot 09/21/2023    Myopia, bilateral 02/19/2019    Myopia of both eyes with astigmatism and presbyopia    Osteochondral defect of patella 09/21/2023    Osteoporosis     Other forms of angina  pectoris     Stable angina pectoris    Other spondylosis, cervical region 09/21/2023    Patellofemoral arthritis 09/21/2023    Personal history of other diseases of the musculoskeletal system and connective tissue     History of arthritis    Personal history of other specified conditions     History of seizure    Reflex sympathetic dystrophy 09/21/2023    Scapular dyskinesis 09/21/2023    Seizure disorder (Multi)     D/T TRAMADOL REACTION - 25 YEARS AGO    Shoulder impingement 09/21/2023    Suspected sleep apnea 09/21/2023    Trauma and stressor-related disorder 09/21/2023    Trochanteric bursitis of both hips 09/22/2023    Unspecified disorder of ear, unspecified ear     Ear, nose and throat disorder    Wears glasses        Surgical History  Past Surgical History:   Procedure Laterality Date    ACHILLES TENDON SURGERY  1978 2016    CT ABDOMEN PELVIS ANGIOGRAM W AND/OR WO IV CONTRAST  03/18/2020    CT ABDOMEN PELVIS ANGIOGRAM W AND/OR WO IV CONTRAST 3/18/2020 [facility]GENCY LEGAC[facility]    CT ANGIO NECK  12/16/2022    CT NECK ANGIO W AND WO IV CONTRAST 12/16/2022 DOCTOR OFFICE LEGACY    CT HEAD ANGIO W AND WO IV CONTRAST  12/16/2022    CT HEAD ANGIO W AND WO IV CONTRAST 12/16/2022 DOCTOR OFFICE LEGACY    HIP SURGERY Left     REPLACEMENT    JOINT REPLACEMENT      LEFT KNEE    KNEE ARTHROPLASTY Bilateral 06/13/2017    Knee Arthroplasty    OTHER SURGICAL HISTORY  01/06/2022    Ulnar nerve neuroplasty    OTHER SURGICAL HISTORY Bilateral 07/26/2019    Foot surgery  -- HARDWARE    OTHER SURGICAL HISTORY Bilateral     BILATERAL ANKLE SCOPE    OTHER SURGICAL HISTORY      POP ABDOMINAL PROCEDURE    PLANTAR FASCIA RELEASE      SHOULDER SURGERY Bilateral 06/13/2017    Shoulder Surgery        Social History  He reports that he has never smoked. He has never used smokeless tobacco. He reports that he does not currently use alcohol. He reports that he does not currently use drugs after having used the following drugs: Other and  Marijuana.    Family History  Family History   Problem Relation Name Age of Onset    Lung cancer Mother      Glaucoma Father      Skin cancer Father      Hypertension Other mul fam mem     Heart disease Other mul fam mem         Allergies  Morphine, Ultram [tramadol], Alprazolam, Ketorolac, Nsaids (non-steroidal anti-inflammatory drug), Fish containing products, and Opioids - morphine analogues    Review of Systems   Review of systems x 10 is negative.   No recent injury or falls reported.   No recent change in medical condition reported.   No recent weakness reported.   Still able to control bowel and bladder function.  Denies any problem with constipation.   Denies fever, cough, shortness of breath recently.   No interval change with medication/health issues reported.  Denies opioids diversion and abuse. Denies overuse of pain medications.     Physical Exam  Awake,alert, no acute distress, appropriate.  Spine is of normal curvature.  Full ROM on all 4 extremities, sensation and motor intact, no vascular compromise.  No pedal edema, normal gait.  Skin warm, dry, intact, turgor is normal.  Denies any numbness, tingling.     Last Recorded Vitals  Blood pressure (!) 141/94, pulse 83, temperature 36.4 °C (97.5 °F), temperature source Temporal, resp. rate 18, SpO2 96%.    Relevant Results  No recent imaging noted.     Assessment/Plan     Discussed about doing the diagnostic facet since he had a good positive response.  He will be scheduled for a Median nerve branch block bilateral L4-L5, L5-S1 under fluoroscopic guidance to assist with the pain.  Denies taking any blood thinner.  His Oswestry score today is 30.  Follow up in 3 months time or as needed basis  Explained plan to this patient, and patient verbalized understanding and agreement with the plan. If there is questions or concerns, please feel free to contact me to clarify at [phone], M-F 8-4 PM.    Chronic back pain associated with lumbago, lumbar  spondylosis.       I spent 25 minutes in the professional and overall care of this patient.      Chelle Bonilla, APRN-CNP

## 2024-12-03 ENCOUNTER — APPOINTMENT (OUTPATIENT)
Dept: ORTHOPEDIC SURGERY | Facility: CLINIC | Age: 54
End: 2024-12-03
Payer: COMMERCIAL

## 2024-12-03 ENCOUNTER — TELEPHONE (OUTPATIENT)
Dept: PAIN MEDICINE | Facility: CLINIC | Age: 54
End: 2024-12-03
Payer: COMMERCIAL

## 2024-12-03 RX ORDER — LIDOCAINE 50 MG/G
1 PATCH TOPICAL DAILY
COMMUNITY
End: 2024-12-09

## 2024-12-04 NOTE — PROGRESS NOTES
Chief Complaint   Patient presents with    Left Shoulder - Pain     Impingement with biceps tendinitis, s/p cortisone injection 9/26/2024      History of Present Illness:  12/05/24: The MRI of his left shoulder was denied.  His arm is becoming unusable even with Celebrex and Lidocaine patches. He completed a longer than 6 week course of formal therapy in August, including exercises for his neck and shoulder in which he feels he injured his shoulder doing.  He has been doing home based exercise under physician direction with no improvement in his pain beyond his PT.    11/14/24: He had a left shoulder injection on 09/26/24. The injection helped for a little while but didn't give him much long term relief. He has a history of osteoporosis. He is in significant pain. He is seeing pain management on Monday.     09/26/24: Patient is here today for his left shoulder pain for the past 10 days. States he was at physical therapy for his neck working on the arm bike and the following morning noted increased pain and inflammation of the left shoulder.    Imaging:  No acute fractures no dislocations of the left shoulder.  No arthritic change     Assessment:   Left shoulder impingement with biceps tendonitis    Plan:  Continue ice and Celebrex as needed.   MRI of left shoulder for rotator cuff tear. I think this is medically reasonable and appropriate given his failed treatments of physical therapy, medications, and injections. He will follow-up after MRI.     Physical Exam:  Well-nourished, well-developed. No acute distress. Alert and oriented x3. Responds appropriately to questioning. Good mood. Normal affect.  Left shoulder:  ROM flexion to 135 degrees, externally rotates 45  5/5 rotator cuff strength  Pain in the biceps  Neurovascular exam normal distally  Palpable pedal pulse and good cap refill     Review of Systems:  GENERAL: Negative for malaise, significant weight loss, fever  MUSCULOSKELETAL: See HPI  NEURO:   Negative     Past Medical History:   Diagnosis Date    Anesthesia of skin     Numbness and tingling    Anxiety 09/21/2023    Arthritis Unk    Bilateral myopia 09/21/2023    Cervical radicular pain 09/21/2023    Chronic patellofemoral pain of left knee 09/21/2023    Complex regional pain syndrome type 1 of left lower extremity 09/21/2023    COVID-19     VACCINATED    Cubital tunnel syndrome on left 09/21/2023    Cubital tunnel syndrome on right 09/21/2023    Disease of intestine, unspecified     Bowel trouble    Erectile dysfunction 09/21/2023    Foot joint pain 09/21/2023    Fractures     Gastroparesis     GERD (gastroesophageal reflux disease)     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    History of psychological trauma 09/21/2023    Hyperlipidemia     Joint pain 09/21/2023    Kidney cysts 09/21/2023    Low back pain, unspecified 12/07/2022    Chronic low back pain without sciatica, unspecified back pain laterality    Low back pain, unspecified 08/09/2022    Lumbago    Lumbar spondylosis 09/21/2023    Lung nodule 09/21/2023    RIGHT    Metatarsalgia of left foot 09/21/2023    Myopia, bilateral 02/19/2019    Myopia of both eyes with astigmatism and presbyopia    Osteochondral defect of patella 09/21/2023    Osteoporosis     Other forms of angina pectoris     Stable angina pectoris    Other spondylosis, cervical region 09/21/2023    Patellofemoral arthritis 09/21/2023    Personal history of other diseases of the musculoskeletal system and connective tissue     History of arthritis    Personal history of other specified conditions     History of seizure    Reflex sympathetic dystrophy 09/21/2023    Scapular dyskinesis 09/21/2023    Seizure disorder (Multi)     D/T TRAMADOL REACTION - 25 YEARS AGO    Shoulder impingement 09/21/2023    Suspected sleep apnea 09/21/2023    Trauma and stressor-related disorder 09/21/2023    Trochanteric bursitis of both hips 09/22/2023    Unspecified disorder of ear, unspecified ear     Ear, nose and  throat disorder    Wears glasses        Medication Documentation Review Audit       Reviewed by Merly Santos RN (Registered Nurse) on 12/02/24 at 0855      Medication Order Taking? Sig Documenting Provider Last Dose Status   acetaminophen (Tylenol) 500 mg tablet [id number] No Take 2 tablets (1,000 mg) by mouth every 8 hours if needed for mild pain (1 - 3). Bre Gray MD Taking Active   albuterol (Ventolin HFA) 90 mcg/actuation inhaler [id number] No Inhale 2 puffs every 4 hours if needed for wheezing. Bre Provider, MD Taking Active   atorvastatin (Lipitor) 80 mg tablet [id number] No Take 1 tablet (80 mg) by mouth once daily at bedtime. Bre Gray MD Taking Active   baclofen (Lioresal) 20 mg tablet [id number] No Take 1 tablet (20 mg) by mouth 3 times a day. Olivia Alonzo MD Taking Active   celecoxib (CeleBREX) 200 mg capsule [id number]  Take 1 capsule (200 mg) by mouth 2 times a day. Historical Provider, MD  Active   chlorhexidine (Peridex) 0.12 % solution [id number] No Use 15 mL in the mouth or throat if needed for wound care for up to 2 doses. 15 mls swish and spit the night before surgery and 15mls swish and spit the morning of surgery do not swallow CHASITY Pagan Taking Active   diazePAM (Valium) 5 mg tablet [id number] No Take 1 tablet (5 mg) by mouth every 12 hours if needed for anxiety. Historical MD Isaac Taking Active   DULoxetine (Cymbalta) 60 mg DR capsule [id number] No Take 1 capsule (60 mg) by mouth once daily. Historical Provider, MD Taking Active   fluticasone (Flonase) 50 mcg/actuation nasal spray [id number] No Administer 1 spray into each nostril once daily as needed. Bre Provider, MD Taking Active   ketamine HCl (ketamine, bulk,) 100 % powder [id number] No Ketamine 100 mg/mL + lidocaine 40 mg/mL 1 puff 4 times daily as needed, DSP#20 mL x 5 refills Olivia Alonzo MD Taking Active   methylPREDNISolone (Medrol Dospak) 4 mg tablets [id number]  Use as directed by  "package instructions Naty Abbott PA-C  Active   naloxone (Narcan) 4 mg/0.1 mL nasal spray [id number] No Instil 1 spray intranasally for opioid overdose; repeat in 5 inutes if no response Damian Spicer MD MPH Taking Active    Patient not taking:   Discontinued 12/02/24 0855   omeprazole (PriLOSEC) 40 mg DR capsule [id number] No Take 1 capsule (40 mg) by mouth 2 times a day. For 90 days Historical Provider, MD Taking Active   pregabalin (Lyrica) 300 mg capsule [id number] No Take 1 capsule (300 mg) by mouth 2 times a day. Vish Barboza PA-C Taking Active   urea (Carmol) 40 % cream [id number] No Apply 1 Application topically once daily. feet Historical Provider, MD Taking Active                    Allergies   Allergen Reactions    Morphine Hives, Rash and Shortness of breath     Tolerates hydromorphone    Tylenol 3      Tolerates hydromorphone    Ultram [Tramadol] Seizure    Alprazolam Psychosis     \"GOES CRAZY\", psychosis    Ketorolac Seizure    Nsaids (Non-Steroidal Anti-Inflammatory Drug) Unknown    Fish Containing Products Hives and Itching     IT WAS A PROCESSED FISH MEAL, BUT STATES EATS FRESH AND FROZEN FISH ALL THE TIME.    Opioids - Morphine Analogues Rash       Social History     Socioeconomic History    Marital status: Single     Spouse name: Not on file    Number of children: Not on file    Years of education: Not on file    Highest education level: Not on file   Occupational History    Not on file   Tobacco Use    Smoking status: Never    Smokeless tobacco: Never    Tobacco comments:     It killed my mother smoking and she left me with a gift a nodule on my right lung   Vaping Use    Vaping status: Never Used   Substance and Sexual Activity    Alcohol use: Not Currently    Drug use: Not Currently     Types: Other, Marijuana     Comment: Ketamine treatments for pain,eatable marijuana    Sexual activity: Not Currently     Partners: Female     Birth control/protection: None   Other Topics Concern    Not on " file   Social History Narrative    Not on file     Social Drivers of Health     Financial Resource Strain: Low Risk  (6/28/2024)    Overall Financial Resource Strain (CARDIA)     Difficulty of Paying Living Expenses: Not hard at all   Food Insecurity: No Food Insecurity (6/28/2024)    Hunger Vital Sign     Worried About Running Out of Food in the Last Year: Never true     Ran Out of Food in the Last Year: Never true   Transportation Needs: No Transportation Needs (7/17/2024)    OASIS : Transportation     Lack of Transportation (Medical): No     Lack of Transportation (Non-Medical): No     Patient Unable or Declines to Respond: No   Physical Activity: Insufficiently Active (6/28/2024)    Exercise Vital Sign     Days of Exercise per Week: 7 days     Minutes of Exercise per Session: 10 min   Stress: Stress Concern Present (6/28/2024)    [employer] [place] [employer] - Occupational Stress Questionnaire     Feeling of Stress : Rather much   Social Connections: Feeling Socially Integrated (7/17/2024)    OASIS : Social Isolation     Frequency of experiencing loneliness or isolation: Never   Recent Concern: Social Connections - Socially Isolated (5/16/2024)    Received from [facility]    Social Connection and Isolation Panel [NHANES]     Frequency of Communication with Friends and Family: Once a week     Frequency of Social Gatherings with Friends and Family: Never     Attends Nondenominational Services: Never     Active Member of Clubs or Organizations: No     Attends Club or Organization Meetings: Never     Marital Status: Living with partner   Intimate Partner Violence: Not At Risk (6/28/2024)    Humiliation, Afraid, Rape, and Kick questionnaire     Fear of Current or Ex-Partner: No     Emotionally Abused: No     Physically Abused: No     Sexually Abused: No   Housing Stability: Low Risk  (6/28/2024)    Housing Stability Vital Sign     Unable to Pay for Housing in the Last Year: No     Number  of Places Lived in the Last Year: 1     Unstable Housing in the Last Year: No   Recent Concern: Housing Stability - High Risk (5/16/2024)    Received from [facility]    Housing Stability Vital Sign     Unable to Pay for Housing in the Last Year: Yes     Number of Places Lived in the Last Year: 1     Unstable Housing in the Last Year: No       Past Surgical History:   Procedure Laterality Date    ACHILLES TENDON SURGERY  1978 2016    CT ABDOMEN PELVIS ANGIOGRAM W AND/OR WO IV CONTRAST  03/18/2020    CT ABDOMEN PELVIS ANGIOGRAM W AND/OR WO IV CONTRAST 3/18/2020 [facility] EMERGENCY LEGACY    CT ANGIO NECK  12/16/2022    CT NECK ANGIO W AND WO IV CONTRAST 12/16/2022 DOCTOR OFFICE LEGACY    CT HEAD ANGIO W AND WO IV CONTRAST  12/16/2022    CT HEAD [facility] AND [facility]    HIP SURGERY Left     REPLACEMENT    JOINT REPLACEMENT      LEFT KNEE    KNEE ARTHROPLASTY Bilateral 06/13/2017    Knee Arthroplasty    OTHER SURGICAL HISTORY  01/06/2022    Ulnar nerve neuroplasty    OTHER SURGICAL HISTORY Bilateral 07/26/2019    Foot surgery  -- HARDWARE    OTHER SURGICAL HISTORY Bilateral     BILATERAL ANKLE SCOPE    OTHER SURGICAL HISTORY      POP ABDOMINAL PROCEDURE    PLANTAR FASCIA RELEASE      SHOULDER SURGERY Bilateral 06/13/2017    Shoulder Surgery       CT ABDOMEN PELVIS W IV CONTRAST    Result Date: 1/22/2024  * * *Final Report* * * DATE OF EXAM: Jan 22 2024  8:50AM   [facility]   0530  -  CT ABD/PEL W IVCON  / ACCESSION #  [id number] PROCEDURE REASON: Abdominal pain, acute, nonlocalized      * * * * Physician Interpretation * * * *  EXAMINATION:  CT ABDOMEN AND PELVIS WITH IV CONTRAST CLINICAL HISTORY: Acute abdominal pain TECHNIQUE: CT of the abdomen and pelvis was performed using standard technique, scanning from just above the dome of the diaphragm to the symphysis pubis. MQ:  CTAP_3 Contrast: IV:  100 ml of Omnipaque 350 CT Radiation dose: Integrated Dose-length product (DLP) for this  visit =   269 mGy*cm. CT Dose Reduction Employed: Automated exposure control(AEC) and iterative recon COMPARISON: 12/26/2023 RESULT: Liver: No mass. Biliary: No bile duct dilation.  The gallbladder is within normal limits for the modality. Spleen: No mass. No splenomegaly. Pancreas: No mass or duct dilation. Adrenals: No mass. Kidneys: There are symmetric bilateral nephrograms.  In the medial right upper renal pole there is a 4.3 cm exophytic hypodensity consistent with a cyst. GI tract: No dilation or wall thickening. There is a small sliding hiatal hernia.  There is colonic residue with a moderate to large stool burden.   There is no acute appendicitis or diverticulitis. Lymph nodes: No abdominal or pelvic lymphadenopathy. Mesentery/Peritoneum: No ascites or mass. Retroperitoneum: No mass. Vasculature:  - Abdominal aorta and iliac arteries: Atherosclerotic calcifications without aneurysm.  - Celiac and SMA: Questionable mild narrowing at the origin of the celiac axis.  - Portal venous system (SMV, splenic vein, portal vein and branches): Patent.  - Hepatic veins: Patent. Pelvis: No mass, ascites or fluid collection. There is artifact from a left hip arthroplasty. Bones/Soft Tissues: There are no aggressive osseous lesions identified. Lower thorax: Bibasilar scarring.  (topogram) images: No additional findings.    IMPRESSION: Stable appearance.  There is no acute intra-abdominal process identified. : KERLINE   Transcribe Date/Time: Jan 22 2024  8:50A Dictated by : INES ROLON MD This examination was interpreted and the report reviewed and electronically signed by: INES ROLON MD on Jan 22 2024  9:05AM  EST    US GALLBLADDER    Result Date: 1/22/2024  * * *Final Report* * * DATE OF EXAM: Jan 22 2024  6:48AM   U   1032  -  US ABD RIGHT UPPER QUADRANT  / ACCESSION #  [id number] PROCEDURE REASON: RUQ pain, no fever, no elev WBC      * * * * Physician Interpretation * * * *   EXAMINATION:   RIGHT UPPER QUADRANT ULTRASOUND CLINICAL HISTORY: RIGHT upper quadrant pain TECHNIQUE:  Sonography of the right upper quadrant  was performed.   Images were obtained and stored in a permanent archive. MQ:  URUQ_2 COMPARISON: CT abdomen pelvis 12/26/2023 RESULT: Pancreas: Not well visualized Liver:      Echotexture:  Homogeneous      Echogenicity:  Increased      Surface contour:  Smooth      Lesions:  None. Biliary: No intrahepatic biliary duct dilation.      CBD: 0.3 cm at the hilum.      Gallbladder: Normal caliber           -Contents:  No cholelithiasis           -Wall:  Normal           -Other:  No pericholecystic fluid.  Negative sonographic Bautista sign. Right Kidney: No hydronephrosis.  Anechoic upper pole cyst with lobular borders measures 3.1 x 2.6 x 3.8 cm.  This appears unchanged from the prior CT. Ascites: None.    IMPRESSION: Hepatic steatosis.  Otherwise, no acute sonographic abnormality in the RIGHT upper quadrant. : KERLINE   Transcribe Date/Time: Jan 22 2024  6:55A Dictated by : DEE MCKEON MD This examination was interpreted and the report reviewed and electronically signed by: DEE MCKEON MD on Jan 22 2024  6:59AM  EST    XR CHEST 1 VIEW    Result Date: 1/22/2024  * * *Final Report* * * DATE OF EXAM: Jan 22 2024  6:22AM   VHX   5376  -  XR CHEST 1V FRONTAL PORT  / ACCESSION #  [id number] PROCEDURE REASON: Tachycardia      * * * * Physician Interpretation * * * *  EXAMINATION:  CHEST RADIOGRAPH (SINGLE VIEW AP OR PA) CLINICAL INFORMATION (AS PROVIDED BY ORDERING CLINICIAN) :  Tachycardia Comparison:  9/3/2023 RESULT: Lines, tubes, and devices:  N/A Lungs and pleura:  No confluent infiltrate, large pleural effusion or pneumothorax.   Cardiomediastinal silhouette:  Within normal limits. Other:  No acute bony abnormality identified.    IMPRESSION: No significant acute radiographic abnormality. : KERLINE   Transcribe Date/Time: Jan 22 2024  6:28A Dictated by :  MIRNA CHAO MD This examination was interpreted and the report reviewed and electronically signed by: MIRNA CHAO MD on Jan 22 2024  6:30AM  EST                             Scribe Attestation  By signing my name below, I, Argelia Donaldson, Scribrivera   attest that this documentation has been prepared under the direction and in the presence of Royce Louis MD.

## 2024-12-05 ENCOUNTER — OFFICE VISIT (OUTPATIENT)
Dept: ORTHOPEDIC SURGERY | Facility: CLINIC | Age: 54
End: 2024-12-05
Payer: COMMERCIAL

## 2024-12-05 ENCOUNTER — APPOINTMENT (OUTPATIENT)
Dept: CARDIOLOGY | Facility: CLINIC | Age: 54
End: 2024-12-05
Payer: COMMERCIAL

## 2024-12-05 VITALS
WEIGHT: 166 LBS | HEART RATE: 86 BPM | BODY MASS INDEX: 25.16 KG/M2 | OXYGEN SATURATION: 96 % | DIASTOLIC BLOOD PRESSURE: 82 MMHG | HEIGHT: 68 IN | SYSTOLIC BLOOD PRESSURE: 112 MMHG

## 2024-12-05 DIAGNOSIS — M75.42 SHOULDER IMPINGEMENT SYNDROME, LEFT: ICD-10-CM

## 2024-12-05 DIAGNOSIS — R29.818 SUSPECTED SLEEP APNEA: ICD-10-CM

## 2024-12-05 DIAGNOSIS — R79.89 ELEVATED TROPONIN I LEVEL: ICD-10-CM

## 2024-12-05 DIAGNOSIS — E78.2 MIXED HYPERLIPIDEMIA: ICD-10-CM

## 2024-12-05 DIAGNOSIS — M47.816 LUMBAR SPONDYLOSIS: ICD-10-CM

## 2024-12-05 DIAGNOSIS — M54.6 THORACIC BACK PAIN, UNSPECIFIED BACK PAIN LATERALITY, UNSPECIFIED CHRONICITY: ICD-10-CM

## 2024-12-05 DIAGNOSIS — I49.3 PVC (PREMATURE VENTRICULAR CONTRACTION): ICD-10-CM

## 2024-12-05 DIAGNOSIS — M19.90 ARTHRITIS: ICD-10-CM

## 2024-12-05 DIAGNOSIS — K21.9 GASTROESOPHAGEAL REFLUX DISEASE WITHOUT ESOPHAGITIS: ICD-10-CM

## 2024-12-05 DIAGNOSIS — Z96.641 S/P TOTAL RIGHT HIP ARTHROPLASTY: ICD-10-CM

## 2024-12-05 DIAGNOSIS — R94.31 ABNORMAL EKG: ICD-10-CM

## 2024-12-05 DIAGNOSIS — M54.50 CHRONIC LOW BACK PAIN WITHOUT SCIATICA, UNSPECIFIED BACK PAIN LATERALITY: ICD-10-CM

## 2024-12-05 DIAGNOSIS — G89.29 CHRONIC LOW BACK PAIN WITHOUT SCIATICA, UNSPECIFIED BACK PAIN LATERALITY: ICD-10-CM

## 2024-12-05 DIAGNOSIS — R53.83 FATIGUE, UNSPECIFIED TYPE: ICD-10-CM

## 2024-12-05 DIAGNOSIS — Z82.49 FAMILY HISTORY OF ISCHEMIC HEART DISEASE: ICD-10-CM

## 2024-12-05 DIAGNOSIS — K31.84 GASTROPARESIS: ICD-10-CM

## 2024-12-05 DIAGNOSIS — F31.9 BIPOLAR AFFECTIVE DISORDER, REMISSION STATUS UNSPECIFIED (MULTI): ICD-10-CM

## 2024-12-05 DIAGNOSIS — R40.0 DAYTIME SLEEPINESS: ICD-10-CM

## 2024-12-05 DIAGNOSIS — G90.522 COMPLEX REGIONAL PAIN SYNDROME TYPE 1 OF LEFT LOWER EXTREMITY: ICD-10-CM

## 2024-12-05 DIAGNOSIS — M79.602 LEFT ARM PAIN: ICD-10-CM

## 2024-12-05 DIAGNOSIS — M54.12 CERVICAL RADICULAR PAIN: ICD-10-CM

## 2024-12-05 DIAGNOSIS — I45.10 INCOMPLETE RBBB: ICD-10-CM

## 2024-12-05 DIAGNOSIS — R00.2 PALPITATIONS: ICD-10-CM

## 2024-12-05 DIAGNOSIS — R07.9 CHEST PAIN, UNSPECIFIED TYPE: ICD-10-CM

## 2024-12-05 DIAGNOSIS — R91.1 LUNG NODULE: ICD-10-CM

## 2024-12-05 PROCEDURE — 1036F TOBACCO NON-USER: CPT | Performed by: INTERNAL MEDICINE

## 2024-12-05 PROCEDURE — 99214 OFFICE O/P EST MOD 30 MIN: CPT | Performed by: INTERNAL MEDICINE

## 2024-12-05 PROCEDURE — 3008F BODY MASS INDEX DOCD: CPT | Performed by: INTERNAL MEDICINE

## 2024-12-05 RX ORDER — EZETIMIBE 10 MG/1
10 TABLET ORAL DAILY
Qty: 30 TABLET | Refills: 11 | Status: SHIPPED | OUTPATIENT
Start: 2024-12-05 | End: 2025-12-05

## 2024-12-05 RX ORDER — LANOLIN ALCOHOL/MO/W.PET/CERES
400 CREAM (GRAM) TOPICAL DAILY
Qty: 90 TABLET | Refills: 3 | Status: SHIPPED | OUTPATIENT
Start: 2024-12-05 | End: 2025-12-05

## 2024-12-05 RX ORDER — METOPROLOL SUCCINATE 25 MG/1
25 TABLET, EXTENDED RELEASE ORAL DAILY
Qty: 90 TABLET | Refills: 3 | Status: SHIPPED | OUTPATIENT
Start: 2024-12-05 | End: 2025-12-05

## 2024-12-05 NOTE — PROGRESS NOTES
CARDIOLOGY OFFICE NOTE     Date:   12/5/2024    Patient:    Akin Lynn    YOB: 1970    Primary Physician: Otoniel Lane MD       Reason for Visit: Cardiology follow-up post testing.       HPI:     Akin Lynn was seen in cardiac evaluation at the  Cardiology office December 5, 2024.      The patients problems are listed as in the impression below.    Electronic medical records reviewed.    The patient returns up.  He has been having chest pain, dyspnea on exertion and palpitations.  He was in the hospital in September with elevated troponins up.  He returns for cardiac follow-up.  Lexiscan perfusion study fortunately was negative.  Ejection fraction 65%.  CT calcium score was low at 5.  7-day cardiac monitoring however noted nonsustained ventricular tachycardia and some ventricular bigeminy.    He states that overall he feels about the same.  He still has some chest pain and palpitations.  He did injure his shoulder and and is seeing orthopedic surgery with possible surgery down the road.    He has no other cardiovascular complaints.    Patient denies Lightheadedness, Dizziness, TIA or CVA symptoms.  No CHF or Edema.  No GI,  or Bleeding Issues. No Recent Fever or Chills.     Cardiovascular and general review of systems is otherwise negative.    A 14-system review is otherwise negative, other than noted.     PHYSICAL EXAMINATION:      Vitals:    12/05/24 1518   BP: 112/82   Pulse: 86   SpO2: 96%     General: No acute distress.  Alert and oriented.  Head And Neck Examination: No jugular venous distention, no carotid bruits, no mass. Carotid upstrokes preserved. Oral mucosa moist.  No xanthelasma. Head and neck examination otherwise unremarkable.  Lungs: Clear to auscultation and percussion. No wheezes, no rales,  and no rhonchi.  Chest: Excursion appeared to be normal. No chest wall tenderness on palpation.  Heart: Normal S1 and S2. No S3. No S4. No rub. Grade 1/6 systolic murmur,  best heard at the left sternal border. Point of maximal impulse was within normal limits.  Abdomen: Soft. Nontender. No organomegaly. No bruits. No masses.   Extremities: No bipedal edema. No clubbing. No cyanosis.  Pulses are strong throughout. No bruits.  Musculoskeletal Exam: No ulcers, otherwise unremarkable.  Neuro: Neurologically appeared grossly intact.  .  IMPRESSION:      Cardiovascular status stable  Chest pain  Palpitations  Dyspnea on exertion  Fatigue  Elevated troponins 9/2024.  Hyperlipidemia  Abnormal resting electrocardiogram  Coronary artery disease with CT calcium score of 5, 11/2024.  Negative Lexiscan perfusion study for ischemia or MI, 11/2024.  Nonsustained ventricular tachycardia by cardiac monitor 10/2024  Incomplete right bundle branch block  Negative cardiac catheterization, [facility], 2016.  Normal LV systolic function, LVEF 60%, echocardiogram 4/2024  Aortic valve sclerosis without stenosis, echocardiogram 4/2024.  Chronic pain syndrome  Degenerative joint disease with diffuse arthralgias  Cervical and lumbar disc disease with probable radiculopathy.  GERD  History of aspiration pneumonia  Manic depression  Prior bilateral hip replacements  Prior bilateral rotator cuff surgery  Family history of coronary artery disease  Otherwise as per assessment below.    RECOMMENDATIONS:      The patient has above-noted history and findings.  Would suggest given his findings of nonsustained ventricular tachycardia, ventricular bigeminy, chest pain and palpitations and elevated CT calcium score despite negative perfusion study that we proceed with cardiac catheterization to definitively rule out significant coronary artery disease.  He may be looking at orthopedic surgery as well.  We discussed cardiac catheterization and coronary angioplasty.  He is agreeable.  Risk goals and alternatives were discussed in detail the patient.  Understood was proceed.    Would suggest adding Toprol-XL 25 mg daily.  Magnesium  "oxide 4 mg daily.  Zetia 10 mg daily.  He will continue his other medications.  Refills were provided.    Exercise dietary program.  Hydration.    [employer] portal use was encouraged.    We will plan to see back following his cardiac catheterization with Laboratory Studies and ECG as ordered.     Patient will follow up with their primary physician for general care.    The patient knows to contact medical care earlier if need be.      ALLERGIES:     Allergies   Allergen Reactions    Morphine Hives, Rash and Shortness of breath     Tolerates hydromorphone    Tylenol 3      Tolerates hydromorphone    Ultram [Tramadol] Seizure    Alprazolam Psychosis     \"GOES CRAZY\", psychosis    Ketorolac Seizure    Nsaids (Non-Steroidal Anti-Inflammatory Drug) Unknown    Fish Containing Products Hives and Itching     IT WAS A PROCESSED FISH MEAL, BUT STATES EATS FRESH AND FROZEN FISH ALL THE TIME.    Opioids - Morphine Analogues Rash        MEDICATIONS:     Current Outpatient Medications   Medication Instructions    acetaminophen (TYLENOL) 1,000 mg, Every 8 hours PRN    albuterol (Ventolin HFA) 90 mcg/actuation inhaler 2 puffs, Every 4 hours PRN    atorvastatin (Lipitor) 80 mg tablet 1 tablet, Nightly    baclofen (LIORESAL) 20 mg, oral, 3 times daily    celecoxib (CELEBREX) 200 mg, 2 times daily    diazePAM (Valium) 5 mg tablet 1 tablet, Every 12 hours PRN    docusate sodium (COLACE) 100 mg, oral, 2 times daily PRN    DULoxetine (CYMBALTA) 60 mg, Daily    fluticasone (Flonase) 50 mcg/actuation nasal spray 1 spray, Daily PRN    ketamine HCl (ketamine, bulk,) 100 % powder Ketamine 100 mg/mL + lidocaine 40 mg/mL 1 puff 4 times daily as needed, DSP#20 mL x 5 refills    lidocaine (Lidoderm) 5 % patch 1 patch, Daily    omeprazole (PriLOSEC) 40 mg DR capsule 1 capsule, 2 times daily    pregabalin (LYRICA) 300 mg, oral, 2 times daily       ELECTROCARDIOGRAM:      None this visit    CARDIAC TESTING:      CT calcium score, 11/2024:  Elevated at " 5.    Lexiscan Myoview perfusion study, 11/2024:  Negative without evidence of ischemia or myocardial infarction.  LVEF 65%.    6-day cardiac monitor, 10/2024:  Predominant sinus rhythm.    Short salvos of nonsustained ventricular tachycardia and ventricular bigeminy.    LABORATORY DATA:      CBC:   Lab Results   Component Value Date    WBC 9.7 11/10/2024    RBC 5.61 11/10/2024    HGB 15.3 11/10/2024    HCT 47.8 11/10/2024     11/10/2024        CMP:    Lab Results   Component Value Date     11/10/2024    K 4.4 11/10/2024     11/10/2024    CO2 26 11/10/2024    BUN 21 11/10/2024    CREATININE 0.83 11/10/2024    GLUCOSE 80 11/10/2024    CALCIUM 9.7 11/10/2024       Magnesium:    Lab Results   Component Value Date    MG 2.00 11/10/2024       Lipid Profile:    Lab Results   Component Value Date    CHOL 192 09/19/2024    TRIG 161 (H) 09/19/2024    HDL 33.7 09/19/2024     9/2024.    Hepatic Function Panel:    Lab Results   Component Value Date    ALKPHOS 70 11/10/2024    ALT 29 11/10/2024    AST 35 11/10/2024    PROT 8.0 11/10/2024    BILITOT 0.7 11/10/2024    BILIDIR 0.1 09/19/2024       TSH:    Lab Results   Component Value Date    TSH 0.61 09/19/2024       HgBA1c:    Lab Results   Component Value Date    HGBA1C 5.3 06/12/2024       BNP:   Lab Results   Component Value Date    BNP 23 10/13/2024        PT/INR:    Lab Results   Component Value Date    PROTIME 10.6 10/13/2024    INR 0.9 10/13/2024       Cardiac Enzymes:    Lab Results   Component Value Date    TROPHS 40 (H) 11/10/2024    TROPHS 46 (H) 11/10/2024    TROPHS 23 (H) 10/13/2024                   PROBLEM LIST:     Patient Active Problem List   Diagnosis    Anxiety    Astigmatism, bilateral    Myopia of both eyes with astigmatism and presbyopia    Bilateral presbyopia    Cervical radicular pain    Chronic left-sided low back pain with left-sided sciatica    Chronic patellofemoral pain of left knee    Patellofemoral arthritis    Cubital  tunnel syndrome on left    Cubital tunnel syndrome on right    Daytime sleepiness    Foot joint pain    History of psychological trauma    Joint pain    Myofascial pain    Lumbar spondylosis    Other spondylosis, cervical region    Metatarsalgia of left foot    Muscle spasm    Narcotic drug use    Reflex sympathetic dystrophy    Osteochondral defect of patella    Complex regional pain syndrome type 1 of left lower extremity    Scapular dyskinesis    Shoulder impingement    Suspected sleep apnea    Thoracic back pain    Arthritis    Atrophy of quadriceps femoris muscle    Erectile dysfunction    Guyon syndrome    Kidney cysts    Lung nodule    Primary osteoarthritis of left hip    Trauma and stressor-related disorder    Trochanteric bursitis of both hips    Status post left hip replacement    Chest pain, unspecified type    Troponin level elevated    Cervical spine pain    Unilateral primary osteoarthritis, right hip    S/P total right hip arthroplasty    PVC (premature ventricular contraction)    Palpitations    Left arm pain    Incomplete RBBB    Family history of ischemic heart disease    Fatigue    Gastroesophageal reflux disease without esophagitis    Gastroparesis    Bipolar disorder             Ronni Dey MD, [facility] / [facility] /  Cardiology      Of Note:  [employer] voice recognition dictation software was utilized partially in the preparation of this note, therefore, inaccuracies in spelling, word choice and punctuation may have occurred which were not recognized the time of signing.    Patient was seen and examined with total time of visit including chart preparation, rooming, and chart completion exceeding 40 minutes.      ----

## 2024-12-05 NOTE — PATIENT INSTRUCTIONS
Exercise diet weight loss program.    Hydrate    Use My Chart portal for reviewing records, testing and contacting office.     Cardiac Cath Possible PCI at [facility] with Dr Thomson.  Beckie well before.

## 2024-12-05 NOTE — H&P (VIEW-ONLY)
CARDIOLOGY OFFICE NOTE     Date:   12/5/2024    Patient:    Akin Lynn    YOB: 1970    Primary Physician: Otoniel Lane MD       Reason for Visit: Cardiology follow-up post testing.       HPI:     Akin Lynn was seen in cardiac evaluation at the  Cardiology office December 5, 2024.      The patients problems are listed as in the impression below.    Electronic medical records reviewed.    The patient returns up.  He has been having chest pain, dyspnea on exertion and palpitations.  He was in the hospital in September with elevated troponins up.  He returns for cardiac follow-up.  Lexiscan perfusion study fortunately was negative.  Ejection fraction 65%.  CT calcium score was low at 5.  7-day cardiac monitoring however noted nonsustained ventricular tachycardia and some ventricular bigeminy.    He states that overall he feels about the same.  He still has some chest pain and palpitations.  He did injure his shoulder and and is seeing orthopedic surgery with possible surgery down the road.    He has no other cardiovascular complaints.    Patient denies Lightheadedness, Dizziness, TIA or CVA symptoms.  No CHF or Edema.  No GI,  or Bleeding Issues. No Recent Fever or Chills.     Cardiovascular and general review of systems is otherwise negative.    A 14-system review is otherwise negative, other than noted.     PHYSICAL EXAMINATION:      Vitals:    12/05/24 1518   BP: 112/82   Pulse: 86   SpO2: 96%     General: No acute distress.  Alert and oriented.  Head And Neck Examination: No jugular venous distention, no carotid bruits, no mass. Carotid upstrokes preserved. Oral mucosa moist.  No xanthelasma. Head and neck examination otherwise unremarkable.  Lungs: Clear to auscultation and percussion. No wheezes, no rales,  and no rhonchi.  Chest: Excursion appeared to be normal. No chest wall tenderness on palpation.  Heart: Normal S1 and S2. No S3. No S4. No rub. Grade 1/6 systolic murmur,  best heard at the left sternal border. Point of maximal impulse was within normal limits.  Abdomen: Soft. Nontender. No organomegaly. No bruits. No masses.   Extremities: No bipedal edema. No clubbing. No cyanosis.  Pulses are strong throughout. No bruits.  Musculoskeletal Exam: No ulcers, otherwise unremarkable.  Neuro: Neurologically appeared grossly intact.  .  IMPRESSION:      Cardiovascular status stable  Chest pain  Palpitations  Dyspnea on exertion  Fatigue  Elevated troponins 9/2024.  Hyperlipidemia  Abnormal resting electrocardiogram  Coronary artery disease with CT calcium score of 5, 11/2024.  Negative Lexiscan perfusion study for ischemia or MI, 11/2024.  Nonsustained ventricular tachycardia by cardiac monitor 10/2024  Incomplete right bundle branch block  Negative cardiac catheterization, [facility], 2016.  Normal LV systolic function, LVEF 60%, echocardiogram 4/2024  Aortic valve sclerosis without stenosis, echocardiogram 4/2024.  Chronic pain syndrome  Degenerative joint disease with diffuse arthralgias  Cervical and lumbar disc disease with probable radiculopathy.  GERD  History of aspiration pneumonia  Manic depression  Prior bilateral hip replacements  Prior bilateral rotator cuff surgery  Family history of coronary artery disease  Otherwise as per assessment below.    RECOMMENDATIONS:      The patient has above-noted history and findings.  Would suggest given his findings of nonsustained ventricular tachycardia, ventricular bigeminy, chest pain and palpitations and elevated CT calcium score despite negative perfusion study that we proceed with cardiac catheterization to definitively rule out significant coronary artery disease.  He may be looking at orthopedic surgery as well.  We discussed cardiac catheterization and coronary angioplasty.  He is agreeable.  Risk goals and alternatives were discussed in detail the patient.  Understood was proceed.    Would suggest adding Toprol-XL 25 mg daily.  Magnesium  "oxide 4 mg daily.  Zetia 10 mg daily.  He will continue his other medications.  Refills were provided.    Exercise dietary program.  Hydration.    [employer] portal use was encouraged.    We will plan to see back following his cardiac catheterization with Laboratory Studies and ECG as ordered.     Patient will follow up with their primary physician for general care.    The patient knows to contact medical care earlier if need be.      ALLERGIES:     Allergies   Allergen Reactions    Morphine Hives, Rash and Shortness of breath     Tolerates hydromorphone    Tylenol 3      Tolerates hydromorphone    Ultram [Tramadol] Seizure    Alprazolam Psychosis     \"GOES CRAZY\", psychosis    Ketorolac Seizure    Nsaids (Non-Steroidal Anti-Inflammatory Drug) Unknown    Fish Containing Products Hives and Itching     IT WAS A PROCESSED FISH MEAL, BUT STATES EATS FRESH AND FROZEN FISH ALL THE TIME.    Opioids - Morphine Analogues Rash        MEDICATIONS:     Current Outpatient Medications   Medication Instructions    acetaminophen (TYLENOL) 1,000 mg, Every 8 hours PRN    albuterol (Ventolin HFA) 90 mcg/actuation inhaler 2 puffs, Every 4 hours PRN    atorvastatin (Lipitor) 80 mg tablet 1 tablet, Nightly    baclofen (LIORESAL) 20 mg, oral, 3 times daily    celecoxib (CELEBREX) 200 mg, 2 times daily    diazePAM (Valium) 5 mg tablet 1 tablet, Every 12 hours PRN    docusate sodium (COLACE) 100 mg, oral, 2 times daily PRN    DULoxetine (CYMBALTA) 60 mg, Daily    fluticasone (Flonase) 50 mcg/actuation nasal spray 1 spray, Daily PRN    ketamine HCl (ketamine, bulk,) 100 % powder Ketamine 100 mg/mL + lidocaine 40 mg/mL 1 puff 4 times daily as needed, DSP#20 mL x 5 refills    lidocaine (Lidoderm) 5 % patch 1 patch, Daily    omeprazole (PriLOSEC) 40 mg DR capsule 1 capsule, 2 times daily    pregabalin (LYRICA) 300 mg, oral, 2 times daily       ELECTROCARDIOGRAM:      None this visit    CARDIAC TESTING:      CT calcium score, 11/2024:  Elevated at " 5.    Lexiscan Myoview perfusion study, 11/2024:  Negative without evidence of ischemia or myocardial infarction.  LVEF 65%.    6-day cardiac monitor, 10/2024:  Predominant sinus rhythm.    Short salvos of nonsustained ventricular tachycardia and ventricular bigeminy.    LABORATORY DATA:      CBC:   Lab Results   Component Value Date    WBC 9.7 11/10/2024    RBC 5.61 11/10/2024    HGB 15.3 11/10/2024    HCT 47.8 11/10/2024     11/10/2024        CMP:    Lab Results   Component Value Date     11/10/2024    K 4.4 11/10/2024     11/10/2024    CO2 26 11/10/2024    BUN 21 11/10/2024    CREATININE 0.83 11/10/2024    GLUCOSE 80 11/10/2024    CALCIUM 9.7 11/10/2024       Magnesium:    Lab Results   Component Value Date    MG 2.00 11/10/2024       Lipid Profile:    Lab Results   Component Value Date    CHOL 192 09/19/2024    TRIG 161 (H) 09/19/2024    HDL 33.7 09/19/2024     9/2024.    Hepatic Function Panel:    Lab Results   Component Value Date    ALKPHOS 70 11/10/2024    ALT 29 11/10/2024    AST 35 11/10/2024    PROT 8.0 11/10/2024    BILITOT 0.7 11/10/2024    BILIDIR 0.1 09/19/2024       TSH:    Lab Results   Component Value Date    TSH 0.61 09/19/2024       HgBA1c:    Lab Results   Component Value Date    HGBA1C 5.3 06/12/2024       BNP:   Lab Results   Component Value Date    BNP 23 10/13/2024        PT/INR:    Lab Results   Component Value Date    PROTIME 10.6 10/13/2024    INR 0.9 10/13/2024       Cardiac Enzymes:    Lab Results   Component Value Date    TROPHS 40 (H) 11/10/2024    TROPHS 46 (H) 11/10/2024    TROPHS 23 (H) 10/13/2024                   PROBLEM LIST:     Patient Active Problem List   Diagnosis    Anxiety    Astigmatism, bilateral    Myopia of both eyes with astigmatism and presbyopia    Bilateral presbyopia    Cervical radicular pain    Chronic left-sided low back pain with left-sided sciatica    Chronic patellofemoral pain of left knee    Patellofemoral arthritis    Cubital  tunnel syndrome on left    Cubital tunnel syndrome on right    Daytime sleepiness    Foot joint pain    History of psychological trauma    Joint pain    Myofascial pain    Lumbar spondylosis    Other spondylosis, cervical region    Metatarsalgia of left foot    Muscle spasm    Narcotic drug use    Reflex sympathetic dystrophy    Osteochondral defect of patella    Complex regional pain syndrome type 1 of left lower extremity    Scapular dyskinesis    Shoulder impingement    Suspected sleep apnea    Thoracic back pain    Arthritis    Atrophy of quadriceps femoris muscle    Erectile dysfunction    Guyon syndrome    Kidney cysts    Lung nodule    Primary osteoarthritis of left hip    Trauma and stressor-related disorder    Trochanteric bursitis of both hips    Status post left hip replacement    Chest pain, unspecified type    Troponin level elevated    Cervical spine pain    Unilateral primary osteoarthritis, right hip    S/P total right hip arthroplasty    PVC (premature ventricular contraction)    Palpitations    Left arm pain    Incomplete RBBB    Family history of ischemic heart disease    Fatigue    Gastroesophageal reflux disease without esophagitis    Gastroparesis    Bipolar disorder             Ronni Dey MD, [facility] / [facility] /  Cardiology      Of Note:  [employer] voice recognition dictation software was utilized partially in the preparation of this note, therefore, inaccuracies in spelling, word choice and punctuation may have occurred which were not recognized the time of signing.    Patient was seen and examined with total time of visit including chart preparation, rooming, and chart completion exceeding 40 minutes.      ----

## 2024-12-06 ENCOUNTER — APPOINTMENT (OUTPATIENT)
Dept: RADIOLOGY | Facility: HOSPITAL | Age: 54
End: 2024-12-06
Payer: COMMERCIAL

## 2024-12-06 ENCOUNTER — TELEPHONE (OUTPATIENT)
Dept: CARDIOLOGY | Facility: CLINIC | Age: 54
End: 2024-12-06
Payer: COMMERCIAL

## 2024-12-06 PROBLEM — R79.89 ELEVATED TROPONIN I LEVEL: Status: ACTIVE | Noted: 2024-12-05

## 2024-12-06 PROBLEM — R94.31 ABNORMAL EKG: Status: ACTIVE | Noted: 2024-12-05

## 2024-12-06 PROBLEM — E78.2 MIXED HYPERLIPIDEMIA: Status: ACTIVE | Noted: 2024-12-05

## 2024-12-06 RX ORDER — KETAMINE HCL 100 %
POWDER (GRAM) MISCELLANEOUS
Qty: 1 G | Refills: 5 | Status: SHIPPED | OUTPATIENT
Start: 2024-12-06

## 2024-12-06 NOTE — TELEPHONE ENCOUNTER
Pt called the office stating he wake up at 4:30 am this morning with fast heart rate and pain to neck. Pt says he sat up and watched some TV. He described being more fatigue than usual and a little more SOB. Pt says last time this happened it was triggered by pain.     Pt was in to see Dr. Dey yesterday and was prescribed Toprol XL 25 mg daily and Magnesium Oxide 400 mg daily. He has not yet picked up script.  HR now is 120 bpm.     Routed to Dr. Dey for any new orders or recommendations. Pt was advised to stay hydrated and avoid caffeinated beverages. Pt will  medication and start taking today. Pt advised to ER for persistent or worsening symptoms.

## 2024-12-09 ENCOUNTER — APPOINTMENT (OUTPATIENT)
Dept: BEHAVIORAL HEALTH | Facility: CLINIC | Age: 54
End: 2024-12-09
Payer: COMMERCIAL

## 2024-12-09 DIAGNOSIS — M79.18 MYOFASCIAL PAIN: Primary | ICD-10-CM

## 2024-12-09 DIAGNOSIS — Z91.49 HISTORY OF PSYCHOLOGICAL TRAUMA: ICD-10-CM

## 2024-12-09 DIAGNOSIS — F41.9 ANXIETY: ICD-10-CM

## 2024-12-09 PROCEDURE — 90837 PSYTX W PT 60 MINUTES: CPT

## 2024-12-09 RX ORDER — LIDOCAINE 50 MG/G
PATCH TOPICAL
Qty: 60 PATCH | Refills: 11 | Status: SHIPPED | OUTPATIENT
Start: 2024-12-09

## 2024-12-09 NOTE — PROGRESS NOTES
Start time:  1:05  End time:  2:02     An interactive audio and video telecommunication system which permits real time communications between the patient (at the originating site) and provider (at the distant site) was utilized to provide this telehealth service. Verbal consent has been obtained from this patient for a telehealth visit.     The patient was informed of the current need to conduct treatment via virtual platform in light of COVID-19 pandemic. I have confirmed the patient’s identity via the following (minimum of three) acceptable identifiers as per  Policy PH-9: , address, phone number, and email address.        SUBJECTIVE: Having a lot of stressors healthwise. Getting heart catheter done to monitor heart issues. Notes though pain continues to be severe he is channeling his anger with this into increased productivity and outings. Discussed difficulty with feeling invalidated/disrespected by partner; patient will consider ways to manifest self-respect while avoiding unhelpful confrontation.       Progress: Good  Prognosis: Good     Duration of problem: years with recent improvement     Severity: moderate     OBJECTIVE:  Orientation & Cognition: Oriented x3. Thought processes normal and appropriate to situation.  Mood, Affect: Euthymic  Appearance: Optimal by patient standards.  Harm to self or others: Denied SI  Substance abuse: Not reported  Psychiatric medication use: Not reported     IMPRESSION:     F41.9 Unspecified anxiety disorder  Z.91.49 History of psychological trauma     Goals for Therapy: Sharing difficult feelings in a therapeutic environment, expanding upon current social supports in his life, and addressing anxiety. Patient is also interested in medication management for his feelings of traumatization which he is worried will return after he ends ketamine treatment.      PLAN:     Discussed difficulty with feeling invalidated/disrespected by partner; patient will consider ways to  manifest self-respect while avoiding unhelpful confrontation.      Next apt: 1/2     --------------------------------------------  Other(s) present in the room: None.  --------------------------------------------  Time spent face-to-face with patient: 57 minutes

## 2024-12-12 ENCOUNTER — APPOINTMENT (OUTPATIENT)
Dept: CARDIOLOGY | Facility: CLINIC | Age: 54
End: 2024-12-12
Payer: COMMERCIAL

## 2024-12-12 ENCOUNTER — LAB (OUTPATIENT)
Dept: LAB | Facility: LAB | Age: 54
End: 2024-12-12
Payer: COMMERCIAL

## 2024-12-12 ENCOUNTER — APPOINTMENT (OUTPATIENT)
Dept: INFUSION THERAPY | Facility: CLINIC | Age: 54
End: 2024-12-12
Payer: COMMERCIAL

## 2024-12-12 DIAGNOSIS — G90.522 COMPLEX REGIONAL PAIN SYNDROME TYPE 1 OF LEFT LOWER EXTREMITY: Primary | ICD-10-CM

## 2024-12-12 DIAGNOSIS — N40.1 BENIGN PROSTATIC HYPERPLASIA WITH LOWER URINARY TRACT SYMPTOMS: Primary | ICD-10-CM

## 2024-12-12 LAB — PSA SERPL-MCNC: 1.12 NG/ML

## 2024-12-12 PROCEDURE — 84153 ASSAY OF PSA TOTAL: CPT

## 2024-12-12 RX ORDER — KETAMINE HCL IN NACL, ISO-OSM 100MG/10ML
SYRINGE (ML) INJECTION ONCE
Status: CANCELLED | OUTPATIENT
Start: 2024-12-13

## 2024-12-12 NOTE — TELEPHONE ENCOUNTER
Called patient in follow up to previous conversation. Pt was able to  mediation and is feeling much better. Pt says he still has pain to neck and left arm which triggers the elevated heart rates, but is feeling much better since starting the medication. Pt is scheduled for heart cath 12/20/24. Pt advised to make sure he is scheduled for a follow up appt post cath as pt currently has no pending appts. Agreeable with plan.

## 2024-12-13 ENCOUNTER — INFUSION (OUTPATIENT)
Dept: INFUSION THERAPY | Facility: CLINIC | Age: 54
End: 2024-12-13
Payer: COMMERCIAL

## 2024-12-13 VITALS
DIASTOLIC BLOOD PRESSURE: 91 MMHG | OXYGEN SATURATION: 96 % | SYSTOLIC BLOOD PRESSURE: 141 MMHG | RESPIRATION RATE: 12 BRPM | TEMPERATURE: 97.7 F | HEART RATE: 66 BPM

## 2024-12-13 DIAGNOSIS — G90.522 COMPLEX REGIONAL PAIN SYNDROME TYPE 1 OF LEFT LOWER EXTREMITY: ICD-10-CM

## 2024-12-13 PROCEDURE — 96368 THER/DIAG CONCURRENT INF: CPT | Mod: INF

## 2024-12-13 PROCEDURE — 96365 THER/PROPH/DIAG IV INF INIT: CPT | Mod: INF

## 2024-12-13 PROCEDURE — 2500000004 HC RX 250 GENERAL PHARMACY W/ HCPCS (ALT 636 FOR OP/ED): Performed by: NURSE PRACTITIONER

## 2024-12-13 RX ORDER — ONDANSETRON HYDROCHLORIDE 2 MG/ML
4 INJECTION, SOLUTION INTRAVENOUS ONCE
OUTPATIENT
Start: 2024-12-27 | End: 2024-12-27

## 2024-12-13 RX ORDER — ALBUTEROL SULFATE 0.83 MG/ML
3 SOLUTION RESPIRATORY (INHALATION) AS NEEDED
OUTPATIENT
Start: 2024-12-27

## 2024-12-13 RX ORDER — DIPHENHYDRAMINE HYDROCHLORIDE 50 MG/ML
25 INJECTION INTRAMUSCULAR; INTRAVENOUS ONCE
OUTPATIENT
Start: 2024-12-27 | End: 2024-12-27

## 2024-12-13 RX ORDER — NITROGLYCERIN 0.4 MG/1
0.4 TABLET SUBLINGUAL ONCE
OUTPATIENT
Start: 2024-12-27 | End: 2024-12-27

## 2024-12-13 RX ORDER — EPINEPHRINE 0.3 MG/.3ML
0.3 INJECTION SUBCUTANEOUS EVERY 5 MIN PRN
OUTPATIENT
Start: 2024-12-27

## 2024-12-13 RX ORDER — DIPHENHYDRAMINE HYDROCHLORIDE 50 MG/ML
50 INJECTION INTRAMUSCULAR; INTRAVENOUS AS NEEDED
OUTPATIENT
Start: 2024-12-27

## 2024-12-13 RX ORDER — METOCLOPRAMIDE HYDROCHLORIDE 5 MG/ML
10 INJECTION INTRAMUSCULAR; INTRAVENOUS ONCE
OUTPATIENT
Start: 2024-12-27 | End: 2024-12-27

## 2024-12-13 RX ORDER — METOPROLOL TARTRATE 25 MG/1
25 TABLET, FILM COATED ORAL ONCE
OUTPATIENT
Start: 2024-12-27 | End: 2024-12-27

## 2024-12-13 RX ORDER — FAMOTIDINE 10 MG/ML
20 INJECTION INTRAVENOUS ONCE AS NEEDED
OUTPATIENT
Start: 2024-12-27

## 2024-12-13 RX ORDER — KETAMINE HCL IN NACL, ISO-OSM 100MG/10ML
SYRINGE (ML) INJECTION ONCE
Status: COMPLETED | OUTPATIENT
Start: 2024-12-13 | End: 2024-12-13

## 2024-12-13 ASSESSMENT — PAIN DESCRIPTION - DESCRIPTORS: DESCRIPTORS: ACHING;SHARP

## 2024-12-13 ASSESSMENT — PAIN SCALES - GENERAL
PAINLEVEL_OUTOF10: 4
PAINLEVEL_OUTOF10: 6
PAINLEVEL_OUTOF10: 6

## 2024-12-13 ASSESSMENT — PAIN - FUNCTIONAL ASSESSMENT
PAIN_FUNCTIONAL_ASSESSMENT: 0-10
PAIN_FUNCTIONAL_ASSESSMENT: 0-10

## 2024-12-13 NOTE — PROGRESS NOTES
S: Patient here for 20 opioid sparing pain infusion. Patient reports 75% reduction in pain after last infusion that lasted 2 weeks.    Purpose of pain infusion meds explained along with potential side effects.  Patient verbalized understanding.    B: Pain Issues 6/10 generalized Is Patient breast feeding: N/A    A: Patient currently has pain described on flow sheet documentation. Designated  is Beverley @ [phone]. Patient last ate solid food 4 hours ago, and had liquid 1 hours ago.    R: Plan; Obtain IV access, do patient risk assessment, and start opioid sparing infusion as ordered. Monitoring for S/S of adverse reactions.

## 2024-12-13 NOTE — PATIENT INSTRUCTIONS
Today :We administered ketamine 30 mg-lidocaine 300 mg and propofol.     For:   1. Complex regional pain syndrome type 1 of left lower extremity              Please read the  Medication Guide that was given to you and reviewed during todays visit.     (Tell all doctors including dentists that you are taking this medication)     Go to the emergency room or call 911 if:  -You have signs of allergic reaction:   -Rash, hives, itching.   -Swollen, blistered, peeling skin.   -Swelling of face, lips, mouth, tongue or throat.   -Tightness of chest, trouble breathing, swallowing or talking     Call your doctor:  - If IV / injection site gets red, warm, swollen, itchy or leaks fluid or pus.     (Leave dressing on your IV site for at least 2 hours and keep area clean and dry  - If you get sick or have symptoms of infection or are not feeling well for any reason.    (Wash your hands often, stay away from people who are sick)  - If you have side effects from your medication that do not go away or are bothersome.     (Refer to the teaching your nurse gave you for side effects to call your doctor about)    - Common side effects may include:  stuffy nose, headache, feeling tired, muscle aches, upset stomach  - Before receiving any vaccines     - Call the [facility] at   If:  - You get sick, are on antibiotics, have had a recent vaccine, have surgery or dental work and your doctor wants your visit rescheduled.  - You need to cancel and reschedule your visit for any reason. Call at least 2 days before your visit if you need to cancel.   - Your insurance changes before your next visit.    (We will need to get approval from your new insurance. This can take up to two weeks.)     The [facility] is opened Monday thru Friday. We are closed on weekends and holidays.   Voice mail will take your call if the center is closed. If you leave a message please allow 24 hours for a call back during weekdays. If you  leave a message on a weekend/holiday, we will call you back the next business day.    A pharmacist is available Monday - Friday from 8:30AM to 3:30PM to help answer any questions you may have about your prescriptions(s). Please call pharmacy at:    [facility]: [phone]  [facility]: [phone]  [facility]: [phone]              [facility]      Pain Infusion Aftercare Instructions      1. It is normal to feel sedated, tired and low in energy after a pain infusion. DO NOT DRIVE, OPERATE ANY MACHINERY, OR MAKE ANY IMPORTANT DECISIONS FOR AT LEAST 24 HOURS AFTER THE INFUSION.     2. Call the pain center at [phone] with any problems, questions, or concerns.     3. Eat light after the infusion. If you feel queasy or sick to your stomach, laying down with your eyes closed may help. When you resume eating start with something mild like clear liquids, yogurt, applesauce, crackers, etc… Gradually advance to a regular diet.     4. Do not leave your house alone the evening of your pain infusion.     5. No alcohol or sedative medications, such as sleeping pills, for 24 hours after your pain infusion.     6. Resume all other prescribed medications unless directed otherwise by you physician.     7. If you have any medical emergencies, call 911 or go directly to the closest emergency room.

## 2024-12-13 NOTE — PROGRESS NOTES
Post infusion teaching provided. Patient verbalized understanding. VSS, Patient states pain is 4/10 left shoulder. Will assist patient to waiting car via wheelchair.

## 2024-12-20 ENCOUNTER — HOSPITAL ENCOUNTER (OUTPATIENT)
Facility: HOSPITAL | Age: 54
Setting detail: OUTPATIENT SURGERY
Discharge: HOME | End: 2024-12-20
Attending: INTERNAL MEDICINE | Admitting: INTERNAL MEDICINE
Payer: COMMERCIAL

## 2024-12-20 VITALS
HEART RATE: 70 BPM | OXYGEN SATURATION: 99 % | HEIGHT: 68 IN | DIASTOLIC BLOOD PRESSURE: 70 MMHG | BODY MASS INDEX: 26.67 KG/M2 | WEIGHT: 176 LBS | TEMPERATURE: 97.7 F | SYSTOLIC BLOOD PRESSURE: 121 MMHG | RESPIRATION RATE: 14 BRPM

## 2024-12-20 DIAGNOSIS — E78.2 MIXED HYPERLIPIDEMIA: ICD-10-CM

## 2024-12-20 DIAGNOSIS — R94.31 ABNORMAL EKG: ICD-10-CM

## 2024-12-20 DIAGNOSIS — R79.89 ELEVATED TROPONIN I LEVEL: ICD-10-CM

## 2024-12-20 DIAGNOSIS — R07.9 CHEST PAIN, UNSPECIFIED TYPE: ICD-10-CM

## 2024-12-20 DIAGNOSIS — I49.3 PVC (PREMATURE VENTRICULAR CONTRACTION): ICD-10-CM

## 2024-12-20 LAB
ANION GAP SERPL CALC-SCNC: 14 MMOL/L (ref 10–20)
BUN SERPL-MCNC: 17 MG/DL (ref 6–23)
CALCIUM SERPL-MCNC: 8.9 MG/DL (ref 8.6–10.3)
CHLORIDE SERPL-SCNC: 107 MMOL/L (ref 98–107)
CO2 SERPL-SCNC: 21 MMOL/L (ref 21–32)
CREAT SERPL-MCNC: 0.66 MG/DL (ref 0.5–1.3)
EGFRCR SERPLBLD CKD-EPI 2021: >90 ML/MIN/1.73M*2
ERYTHROCYTE [DISTWIDTH] IN BLOOD BY AUTOMATED COUNT: 16.2 % (ref 11.5–14.5)
GLUCOSE SERPL-MCNC: 102 MG/DL (ref 74–99)
HCT VFR BLD AUTO: 44.4 % (ref 41–52)
HGB BLD-MCNC: 14 G/DL (ref 13.5–17.5)
MCH RBC QN AUTO: 27 PG (ref 26–34)
MCHC RBC AUTO-ENTMCNC: 31.5 G/DL (ref 32–36)
MCV RBC AUTO: 86 FL (ref 80–100)
NRBC BLD-RTO: 0 /100 WBCS (ref 0–0)
PLATELET # BLD AUTO: 253 X10*3/UL (ref 150–450)
POTASSIUM SERPL-SCNC: 3.9 MMOL/L (ref 3.5–5.3)
RBC # BLD AUTO: 5.19 X10*6/UL (ref 4.5–5.9)
SODIUM SERPL-SCNC: 138 MMOL/L (ref 136–145)
WBC # BLD AUTO: 13.4 X10*3/UL (ref 4.4–11.3)

## 2024-12-20 PROCEDURE — 93458 L HRT ARTERY/VENTRICLE ANGIO: CPT | Performed by: INTERNAL MEDICINE

## 2024-12-20 PROCEDURE — C1887 CATHETER, GUIDING: HCPCS | Performed by: INTERNAL MEDICINE

## 2024-12-20 PROCEDURE — 99152 MOD SED SAME PHYS/QHP 5/>YRS: CPT | Performed by: INTERNAL MEDICINE

## 2024-12-20 PROCEDURE — C1894 INTRO/SHEATH, NON-LASER: HCPCS | Performed by: INTERNAL MEDICINE

## 2024-12-20 PROCEDURE — 36415 COLL VENOUS BLD VENIPUNCTURE: CPT | Performed by: INTERNAL MEDICINE

## 2024-12-20 PROCEDURE — C1760 CLOSURE DEV, VASC: HCPCS | Performed by: INTERNAL MEDICINE

## 2024-12-20 PROCEDURE — 96373 THER/PROPH/DIAG INJ IA: CPT | Performed by: INTERNAL MEDICINE

## 2024-12-20 PROCEDURE — 2500000004 HC RX 250 GENERAL PHARMACY W/ HCPCS (ALT 636 FOR OP/ED): Performed by: INTERNAL MEDICINE

## 2024-12-20 PROCEDURE — 82374 ASSAY BLOOD CARBON DIOXIDE: CPT | Performed by: INTERNAL MEDICINE

## 2024-12-20 PROCEDURE — 85027 COMPLETE CBC AUTOMATED: CPT | Performed by: INTERNAL MEDICINE

## 2024-12-20 PROCEDURE — 2720000007 HC OR 272 NO HCPCS: Performed by: INTERNAL MEDICINE

## 2024-12-20 PROCEDURE — 7100000009 HC PHASE TWO TIME - INITIAL BASE CHARGE: Performed by: INTERNAL MEDICINE

## 2024-12-20 PROCEDURE — 7100000010 HC PHASE TWO TIME - EACH INCREMENTAL 1 MINUTE: Performed by: INTERNAL MEDICINE

## 2024-12-20 PROCEDURE — 2550000001 HC RX 255 CONTRASTS: Performed by: INTERNAL MEDICINE

## 2024-12-20 RX ORDER — MIDAZOLAM HYDROCHLORIDE 1 MG/ML
INJECTION, SOLUTION INTRAMUSCULAR; INTRAVENOUS AS NEEDED
Status: DISCONTINUED | OUTPATIENT
Start: 2024-12-20 | End: 2024-12-20 | Stop reason: HOSPADM

## 2024-12-20 RX ORDER — LIDOCAINE HYDROCHLORIDE 20 MG/ML
INJECTION, SOLUTION INFILTRATION; PERINEURAL AS NEEDED
Status: DISCONTINUED | OUTPATIENT
Start: 2024-12-20 | End: 2024-12-20 | Stop reason: HOSPADM

## 2024-12-20 RX ORDER — NITROGLYCERIN 5 MG/ML
INJECTION, SOLUTION INTRAVENOUS AS NEEDED
Status: DISCONTINUED | OUTPATIENT
Start: 2024-12-20 | End: 2024-12-20 | Stop reason: HOSPADM

## 2024-12-20 RX ORDER — FENTANYL CITRATE 50 UG/ML
INJECTION, SOLUTION INTRAMUSCULAR; INTRAVENOUS AS NEEDED
Status: DISCONTINUED | OUTPATIENT
Start: 2024-12-20 | End: 2024-12-20 | Stop reason: HOSPADM

## 2024-12-20 RX ORDER — NICARDIPINE HYDROCHLORIDE 0.2 MG/ML
INJECTION INTRAVENOUS AS NEEDED
Status: DISCONTINUED | OUTPATIENT
Start: 2024-12-20 | End: 2024-12-20 | Stop reason: HOSPADM

## 2024-12-20 RX ORDER — HEPARIN SODIUM 1000 [USP'U]/ML
INJECTION, SOLUTION INTRAVENOUS; SUBCUTANEOUS AS NEEDED
Status: DISCONTINUED | OUTPATIENT
Start: 2024-12-20 | End: 2024-12-20 | Stop reason: HOSPADM

## 2024-12-20 ASSESSMENT — PAIN SCALES - GENERAL
PAINLEVEL_OUTOF10: 0 - NO PAIN
PAINLEVEL_OUTOF10: 8
PAINLEVEL_OUTOF10: 0 - NO PAIN

## 2024-12-20 ASSESSMENT — PAIN - FUNCTIONAL ASSESSMENT: PAIN_FUNCTIONAL_ASSESSMENT: 0-10

## 2024-12-20 NOTE — POST-PROCEDURE NOTE
Physician Transition of Care Summary  Invasive Cardiovascular Lab    Procedure Date: 12/20/2024  Attending:    * Kale Funk - Primary  Resident/Fellow/Other Assistant: Surgeons and Role:  * No surgeons found with a matching role *    Indications:   Pre-op Diagnosis      * Abnormal EKG [R94.31]     * Chest pain, unspecified type [R07.9]     * Mixed hyperlipidemia [E78.2]     * Elevated troponin I level [R79.89]     * PVC (premature ventricular contraction) [I49.3]    Post-procedure diagnosis:   Post-op Diagnosis     * Abnormal EKG [R94.31]     * Chest pain, unspecified type [R07.9]     * Mixed hyperlipidemia [E78.2]     * Elevated troponin I level [R79.89]     * PVC (premature ventricular contraction) [I49.3]    Procedure(s):   Left Heart Cath  76171 - [place] L HRT CATH W/NJX L VENTRICULOGRAPHY IMG S&I        Procedure Findings:   LMT normal, LAD 30%, LCX trivial, RCA dom mild irreg.  Mildly elevated LVEDP.  EF normal.    Description of the Procedure:   R radial 5/6 heparin, Oscar and pig    Complications:   none    Stents/Implants:   Implants       No implant documentation for this case.            Anticoagulation/Antiplatelet Plan:   ASA    Estimated Blood Loss:   * No values recorded between 12/20/2024  7:59 AM and 12/20/2024  8:45 AM *    Anesthesia: Moderate Sedation Anesthesia Staff: No anesthesia staff entered.    Any Specimen(s) Removed:   Order Name Source Comment Collection Info Order Time   CBC Blood, Venous  Collected By: Fara Steele RN 12/20/2024  6:32 AM     Release result to [employer]   Immediate        BASIC METABOLIC PANEL Blood, Venous  Collected By: Fara Steele RN 12/20/2024  6:32 AM     Release result to [employer]   Immediate            Disposition:   Followup with Mariusz as outpatient.      Electronically signed by: Kale Funk MD, 12/20/2024 8:45 AM

## 2024-12-24 ENCOUNTER — HOSPITAL ENCOUNTER (OUTPATIENT)
Dept: RADIOLOGY | Facility: HOSPITAL | Age: 54
Discharge: HOME | End: 2024-12-24
Payer: COMMERCIAL

## 2024-12-24 ENCOUNTER — APPOINTMENT (OUTPATIENT)
Dept: RADIOLOGY | Facility: HOSPITAL | Age: 54
End: 2024-12-24
Payer: COMMERCIAL

## 2024-12-24 DIAGNOSIS — M75.42 SHOULDER IMPINGEMENT SYNDROME, LEFT: ICD-10-CM

## 2024-12-24 PROCEDURE — 73221 MRI JOINT UPR EXTREM W/O DYE: CPT | Mod: LT

## 2024-12-24 PROCEDURE — 73221 MRI JOINT UPR EXTREM W/O DYE: CPT | Mod: LEFT SIDE | Performed by: RADIOLOGY

## 2024-12-26 ENCOUNTER — HOSPITAL ENCOUNTER (OUTPATIENT)
Dept: RADIOLOGY | Facility: HOSPITAL | Age: 54
Discharge: HOME | End: 2024-12-26
Payer: COMMERCIAL

## 2024-12-26 DIAGNOSIS — R10.9 UNSPECIFIED ABDOMINAL PAIN: ICD-10-CM

## 2024-12-26 PROCEDURE — 2550000001 HC RX 255 CONTRASTS: Performed by: UROLOGY

## 2024-12-26 PROCEDURE — 76377 3D RENDER W/INTRP POSTPROCES: CPT

## 2025-01-02 ENCOUNTER — APPOINTMENT (OUTPATIENT)
Dept: BEHAVIORAL HEALTH | Facility: CLINIC | Age: 55
End: 2025-01-02
Payer: COMMERCIAL

## 2025-01-02 DIAGNOSIS — Z91.49 HISTORY OF PSYCHOLOGICAL TRAUMA: ICD-10-CM

## 2025-01-02 DIAGNOSIS — F41.9 ANXIETY: ICD-10-CM

## 2025-01-02 PROCEDURE — 90837 PSYTX W PT 60 MINUTES: CPT

## 2025-01-03 ENCOUNTER — APPOINTMENT (OUTPATIENT)
Dept: RADIOLOGY | Facility: HOSPITAL | Age: 55
End: 2025-01-03
Payer: COMMERCIAL
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
## 2025-01-08 DIAGNOSIS — G90.522 COMPLEX REGIONAL PAIN SYNDROME TYPE 1 OF LEFT LOWER EXTREMITY: Primary | ICD-10-CM

## 2025-01-08 RX ORDER — KETAMINE HCL IN NACL, ISO-OSM 100MG/10ML
SYRINGE (ML) INJECTION ONCE
OUTPATIENT
Start: 2025-01-09

## 2025-01-09 ENCOUNTER — APPOINTMENT (OUTPATIENT)
Dept: INFUSION THERAPY | Facility: CLINIC | Age: 55
End: 2025-01-09
Payer: COMMERCIAL

## 2025-01-13 NOTE — PROGRESS NOTES
No chief complaint on file.     History of Present Illness:  01/14/25: He is here today to review the results of his recent MRI of the left  shoulder.     12/05/24: The MRI of his left shoulder was denied.  His arm is becoming unusable even with Celebrex and Lidocaine patches. He completed a longer than 6 week course of formal therapy in August, including exercises for his neck and shoulder in which he feels he injured his shoulder doing.  He has been doing home based exercise under physician direction with no improvement in his pain beyond his PT.    11/14/24: He had a left shoulder injection on 09/26/24. The injection helped for a little while but didn't give him much long term relief. He has a history of osteoporosis. He is in significant pain. He is seeing pain management on Monday.     09/26/24: Patient is here today for his left shoulder pain for the past 10 days. States he was at physical therapy for his neck working on the arm bike and the following morning noted increased pain and inflammation of the left shoulder.    Imaging:  X-rays left shoulder: No acute fractures no dislocations of the left shoulder.  No arthritic change  MRI left shoulder: ***      Assessment:   Left shoulder impingement with biceps tendonitis    Plan:  Continue ice and Celebrex as needed.   MRI of left shoulder for rotator cuff tear. I think this is medically reasonable and appropriate given his failed treatments of physical therapy, medications, and injections. He will follow-up after MRI.     Physical Exam:  Well-nourished, well-developed. No acute distress. Alert and oriented x3. Responds appropriately to questioning. Good mood. Normal affect.  Left shoulder:  ROM flexion to 135 degrees, externally rotates 45  5/5 rotator cuff strength  Pain in the biceps  Neurovascular exam normal distally  Palpable pedal pulse and good cap refill     Review of Systems:  GENERAL: Negative for malaise, significant weight loss,  fever  MUSCULOSKELETAL: See HPI  NEURO:  Negative     Past Medical History:   Diagnosis Date    Anesthesia of skin     Numbness and tingling    Anxiety 09/21/2023    Arthritis Unk    Bilateral myopia 09/21/2023    Cervical radicular pain 09/21/2023    Chronic patellofemoral pain of left knee 09/21/2023    Complex regional pain syndrome type 1 of left lower extremity 09/21/2023    COVID-19     VACCINATED    Cubital tunnel syndrome on left 09/21/2023    Cubital tunnel syndrome on right 09/21/2023    Disease of intestine, unspecified     Bowel trouble    Erectile dysfunction 09/21/2023    Foot joint pain 09/21/2023    Fractures     Gastroparesis     GERD (gastroesophageal reflux disease)     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    History of psychological trauma 09/21/2023    Hyperlipidemia     Joint pain 09/21/2023    Kidney cysts 09/21/2023    Low back pain, unspecified 12/07/2022    Chronic low back pain without sciatica, unspecified back pain laterality    Low back pain, unspecified 08/09/2022    Lumbago    Lumbar spondylosis 09/21/2023    Lung nodule 09/21/2023    RIGHT    Metatarsalgia of left foot 09/21/2023    Myopia, bilateral 02/19/2019    Myopia of both eyes with astigmatism and presbyopia    Osteochondral defect of patella 09/21/2023    Osteoporosis     Other forms of angina pectoris     Stable angina pectoris    Other spondylosis, cervical region 09/21/2023    Patellofemoral arthritis 09/21/2023    Personal history of other diseases of the musculoskeletal system and connective tissue     History of arthritis    Personal history of other specified conditions     History of seizure    Reflex sympathetic dystrophy 09/21/2023    Scapular dyskinesis 09/21/2023    Seizure disorder (Multi)     D/T TRAMADOL REACTION - 25 YEARS AGO    Shoulder impingement 09/21/2023    Suspected sleep apnea 09/21/2023    Trauma and stressor-related disorder 09/21/2023    Trochanteric bursitis of both hips 09/22/2023    Unspecified disorder of  ear, unspecified ear     Ear, nose and throat disorder    Wears glasses        Medication Documentation Review Audit       Reviewed by Zully Hernandez RN (Registered Nurse) on 12/20/24 at 0639      Medication Order Taking? Sig Documenting Provider Last Dose Status   acetaminophen (Tylenol) 500 mg tablet [id number] Yes Take 2 tablets (1,000 mg) by mouth every 8 hours if needed for mild pain (1 - 3). Bre Gray MD 12/20/2024 Morning Active   albuterol (Ventolin HFA) 90 mcg/actuation inhaler [id number] Yes Inhale 2 puffs every 4 hours if needed for wheezing. Bre Provider, MD Past Week Active   atorvastatin (Lipitor) 80 mg tablet [id number] Yes Take 1 tablet (80 mg) by mouth once daily at bedtime. Bre Gray MD 12/20/2024 Morning Active   baclofen (Lioresal) 20 mg tablet [id number] Yes Take 1 tablet (20 mg) by mouth 3 times a day. Olivia Alonzo MD 12/20/2024 Morning Active   celecoxib (CeleBREX) 200 mg capsule [id number] Yes Take 1 capsule (200 mg) by mouth 2 times a day. Bre Gray MD 12/20/2024 Morning Active   diazePAM (Valium) 5 mg tablet [id number]  Take 1 tablet (5 mg) by mouth every 12 hours if needed for anxiety. Bre Gray MD  Active   docusate sodium (Colace) 100 mg capsule [id number] Yes Take 1 capsule (100 mg) by mouth 2 times a day as needed for constipation. Chelle Bonilla, APRN-CNP 12/20/2024 Morning Active   DULoxetine (Cymbalta) 60 mg DR capsule [id number] Yes Take 1 capsule (60 mg) by mouth once daily. Bre Gray MD 12/20/2024 Morning Active   ezetimibe (Zetia) 10 mg tablet [id number] Yes Take 1 tablet (10 mg) by mouth once daily. Ronni Dey MD 12/20/2024 Morning Active   fluticasone (Flonase) 50 mcg/actuation nasal spray [id number] Yes Administer 1 spray into each nostril once daily as needed. Bre Gray MD 12/19/2024 Active   ketamine HCl (ketamine, bulk,) 100 % powder [id number] Yes Ketamine 100 mg/mL + lidocaine 40 mg/mL 1 puff 4  "times daily as needed, DSP#20 mL x 5 refills Olivia Alonzo MD 12/19/2024 Active   lidocaine (Lidoderm) 5 % patch [id number] Yes apply 2 patches to affected area daily as needed. Olivia Alonzo MD Past Week Active   magnesium oxide (Mag-Ox) 400 mg (241.3 mg magnesium) tablet [id number] Yes Take 1 tablet (400 mg) by mouth once daily. Ronni Dey MD 12/20/2024 Morning Active   metoprolol succinate XL (Toprol-XL) 25 mg 24 hr tablet [id number] Yes Take 1 tablet (25 mg) by mouth once daily. Do not crush or chew. Ronni Dey MD 12/20/2024 Morning Active   omeprazole (PriLOSEC) 40 mg DR capsule [id number] Yes Take 1 capsule (40 mg) by mouth 2 times a day. For 90 days Historical MD Isaac 12/20/2024 Morning Active   pregabalin (Lyrica) 300 mg capsule [id number] Yes Take 1 capsule (300 mg) by mouth 2 times a day. Vish Barboza PA-C 12/20/2024 Morning Active                    Allergies   Allergen Reactions    Morphine Hives, Rash and Shortness of breath     Tolerates hydromorphone    Tylenol 3      Tolerates hydromorphone    Ultram [Tramadol] Seizure    Alprazolam Psychosis     \"GOES CRAZY\", psychosis    Ketorolac Seizure    Fish Containing Products Hives and Itching     IT WAS A PROCESSED FISH MEAL, BUT STATES EATS FRESH AND FROZEN FISH ALL THE TIME.    Opioids - Morphine Analogues Rash       Social History     Socioeconomic History    Marital status: Single     Spouse name: Not on file    Number of children: Not on file    Years of education: Not on file    Highest education level: Not on file   Occupational History    Not on file   Tobacco Use    Smoking status: Never    Smokeless tobacco: Never    Tobacco comments:     It killed my mother smoking and she left me with a gift a nodule on my right lung   Vaping Use    Vaping status: Never Used   Substance and Sexual Activity    Alcohol use: Not Currently    Drug use: Not Currently     Comment: Ketamine treatments for pain,eatable marijuana    Sexual " activity: Not Currently     Partners: Female     Birth control/protection: None   Other Topics Concern    Not on file   Social History Narrative    Not on file     Social Drivers of Health     Financial Resource Strain: Low Risk  (6/28/2024)    Overall Financial Resource Strain (CARDIA)     Difficulty of Paying Living Expenses: Not hard at all   Food Insecurity: No Food Insecurity (6/28/2024)    Hunger Vital Sign     Worried About Running Out of Food in the Last Year: Never true     Ran Out of Food in the Last Year: Never true   Transportation Needs: No Transportation Needs (7/17/2024)    OASIS : Transportation     Lack of Transportation (Medical): No     Lack of Transportation (Non-Medical): No     Patient Unable or Declines to Respond: No   Physical Activity: Insufficiently Active (6/28/2024)    Exercise Vital Sign     Days of Exercise per Week: 7 days     Minutes of Exercise per Session: 10 min   Stress: Stress Concern Present (6/28/2024)    [employer] - Occupational Stress Questionnaire     Feeling of Stress : Rather much   Social Connections: Feeling Socially Integrated (7/17/2024)    OASIS : Social Isolation     Frequency of experiencing loneliness or isolation: Never   Recent Concern: Social Connections - Socially Isolated (5/16/2024)    [facility]    Social Connection and Isolation Panel [NHANES]     Frequency of Communication with Friends and Family: Once a week     Frequency of Social Gatherings with Friends and Family: Never     Attends Evangelical Services: Never     Active Member of Clubs or Organizations: No     Attends Club or Organization Meetings: Never     Marital Status: Living with partner   Intimate Partner Violence: Not At Risk (6/28/2024)    Humiliation, Afraid, Rape, and Kick questionnaire     Fear of Current or Ex-Partner: No     Emotionally Abused: No     Physically Abused: No     Sexually Abused: No   Housing Stability: Low  Risk  (6/28/2024)    Housing Stability Vital Sign     Unable to Pay for Housing in the Last Year: No     Number of Places Lived in the Last Year: 1     Unstable Housing in the Last Year: No   Recent Concern: Housing Stability - High Risk (5/16/2024)    [facility]    Housing Stability Vital Sign     Unable to Pay for Housing in the Last Year: Yes     Number of Places Lived in the Last Year: 1     Unstable Housing in the Last Year: No       Past Surgical History:   Procedure Laterality Date    ACHILLES TENDON SURGERY  1978 2016    CARDIAC CATHETERIZATION N/A 12/20/2024    Procedure: Left Heart Cath;  Surgeon: Kale Funk MD;  Location: [facility] Cardiac Cath Lab;  Service: Cardiovascular;  Laterality: N/A;    CT ABDOMEN PELVIS ANGIOGRAM W AND/OR WO IV CONTRAST  03/18/2020    CT ABDOMEN PELVIS ANGIOGRAM W AND/OR WO IV CONTRAST 3/18/2020 [facility] EMERGENCY LEGACY    CT ANGIO NECK  12/16/2022    CT NECK ANGIO W AND WO IV CONTRAST 12/16/2022 DOCTOR OFFICE LEGACY    CT HEAD ANGIO W AND WO IV CONTRAST  12/16/2022    CT HEAD [facility] W AND WO [facility]    HIP SURGERY Left     REPLACEMENT    JOINT REPLACEMENT      LEFT KNEE    KNEE ARTHROPLASTY Bilateral 06/13/2017    Knee Arthroplasty    OTHER SURGICAL HISTORY  01/06/2022    Ulnar nerve neuroplasty    OTHER SURGICAL HISTORY Bilateral 07/26/2019    Foot surgery  -- HARDWARE    OTHER SURGICAL HISTORY Bilateral     BILATERAL ANKLE SCOPE    OTHER SURGICAL HISTORY      POP ABDOMINAL PROCEDURE    PLANTAR FASCIA RELEASE      SHOULDER SURGERY Bilateral 06/13/2017    Shoulder Surgery       CT ABDOMEN PELVIS W IV CONTRAST    Result Date: 1/22/2024  * * *Final Report* * * DATE OF EXAM: Jan 22 2024  8:50AM   [facility]   0530  -  CT ABD/PEL W IVCON  / ACCESSION #  [id number] PROCEDURE REASON: Abdominal pain, acute, nonlocalized      * * * * Physician Interpretation * * * *  EXAMINATION:  CT ABDOMEN AND PELVIS WITH IV CONTRAST CLINICAL  HISTORY: Acute abdominal pain TECHNIQUE: CT of the abdomen and pelvis was performed using standard technique, scanning from just above the dome of the diaphragm to the symphysis pubis. MQ:  CTAP_3 Contrast: IV:  100 ml of Omnipaque 350 CT Radiation dose: Integrated Dose-length product (DLP) for this visit =   269 mGy*cm. CT Dose Reduction Employed: Automated exposure control(AEC) and iterative recon COMPARISON: 12/26/2023 RESULT: Liver: No mass. Biliary: No bile duct dilation.  The gallbladder is within normal limits for the modality. Spleen: No mass. No splenomegaly. Pancreas: No mass or duct dilation. Adrenals: No mass. Kidneys: There are symmetric bilateral nephrograms.  In the medial right upper renal pole there is a 4.3 cm exophytic hypodensity consistent with a cyst. GI tract: No dilation or wall thickening. There is a small sliding hiatal hernia.  There is colonic residue with a moderate to large stool burden.   There is no acute appendicitis or diverticulitis. Lymph nodes: No abdominal or pelvic lymphadenopathy. Mesentery/Peritoneum: No ascites or mass. Retroperitoneum: No mass. Vasculature:  - Abdominal aorta and iliac arteries: Atherosclerotic calcifications without aneurysm.  - Celiac and SMA: Questionable mild narrowing at the origin of the celiac axis.  - Portal venous system (SMV, splenic vein, portal vein and branches): Patent.  - Hepatic veins: Patent. Pelvis: No mass, ascites or fluid collection. There is artifact from a left hip arthroplasty. Bones/Soft Tissues: There are no aggressive osseous lesions identified. Lower thorax: Bibasilar scarring.  (topogram) images: No additional findings.    IMPRESSION: Stable appearance.  There is no acute intra-abdominal process identified. : KERLINE   Transcribe Date/Time: Jan 22 2024  8:50A Dictated by : INES ROLON MD This examination was interpreted and the report reviewed and electronically signed by: INES ROLON MD on Jan  22 2024  9:05AM  EST    US GALLBLADDER    Result Date: 1/22/2024  * * *Final Report* * * DATE OF EXAM: Jan 22 2024  6:48AM   [facility]   1032  -  US ABD RIGHT UPPER QUADRANT  / ACCESSION #  [id number] PROCEDURE REASON: RUQ pain, no fever, no elev WBC      * * * * Physician Interpretation * * * *  EXAMINATION:   RIGHT UPPER QUADRANT ULTRASOUND CLINICAL HISTORY: RIGHT upper quadrant pain TECHNIQUE:  Sonography of the right upper quadrant  was performed.   Images were obtained and stored in a permanent archive. MQ:  URUQ_2 COMPARISON: CT abdomen pelvis 12/26/2023 RESULT: Pancreas: Not well visualized Liver:      Echotexture:  Homogeneous      Echogenicity:  Increased      Surface contour:  Smooth      Lesions:  None. Biliary: No intrahepatic biliary duct dilation.      CBD: 0.3 cm at the hilum.      Gallbladder: Normal caliber           -Contents:  No cholelithiasis           -Wall:  Normal           -Other:  No pericholecystic fluid.  Negative sonographic Bautista sign. Right Kidney: No hydronephrosis.  Anechoic upper pole cyst with lobular borders measures 3.1 x 2.6 x 3.8 cm.  This appears unchanged from the prior CT. Ascites: None.    IMPRESSION: Hepatic steatosis.  Otherwise, no acute sonographic abnormality in the RIGHT upper quadrant. : PSCB   Transcribe Date/Time: Jan 22 2024  6:55A Dictated by : DEE MCKEON MD This examination was interpreted and the report reviewed and electronically signed by: DEE MCKEON MD on Jan 22 2024  6:59AM  EST    XR CHEST 1 VIEW    Result Date: 1/22/2024  * * *Final Report* * * DATE OF EXAM: Jan 22 2024  6:22AM   V[facility]X   [id number]  -  XR CHEST 1V FRONTAL PORT  / ACCESSION #  [id number] PROCEDURE REASON: Tachycardia      * * * * Physician Interpretation * * * *  EXAMINATION:  CHEST RADIOGRAPH (SINGLE VIEW AP OR PA) CLINICAL INFORMATION (AS PROVIDED BY ORDERING CLINICIAN) :  Tachycardia Comparison:  9/3/2023 RESULT: Lines, tubes, and devices:  N/A Lungs and pleura:  No confluent  infiltrate, large pleural effusion or pneumothorax.   Cardiomediastinal silhouette:  Within normal limits. Other:  No acute bony abnormality identified.    IMPRESSION: No significant acute radiographic abnormality. : PSCB   Transcribe Date/Time: Jan 22 2024  6:28A Dictated by : MIRNA CHAO MD This examination was interpreted and the report reviewed and electronically signed by: MIRNA CHAO MD on Jan 22 2024  6:30AM  EST                             Scribe Attestation  By signing my name below, IArgelia, Scribrivera   attest that this documentation has been prepared under the direction and in the presence of Royce Louis MD.

## 2025-01-14 ENCOUNTER — HOSPITAL ENCOUNTER (OUTPATIENT)
Dept: PAIN MEDICINE | Facility: CLINIC | Age: 55
Discharge: HOME | End: 2025-01-14
Payer: COMMERCIAL

## 2025-01-14 ENCOUNTER — APPOINTMENT (OUTPATIENT)
Dept: ORTHOPEDIC SURGERY | Facility: CLINIC | Age: 55
End: 2025-01-14
Payer: COMMERCIAL

## 2025-01-14 VITALS
SYSTOLIC BLOOD PRESSURE: 131 MMHG | DIASTOLIC BLOOD PRESSURE: 67 MMHG | HEART RATE: 67 BPM | OXYGEN SATURATION: 98 % | TEMPERATURE: 97.3 F | RESPIRATION RATE: 20 BRPM

## 2025-01-14 DIAGNOSIS — M47.816 LUMBAR SPONDYLOSIS: ICD-10-CM

## 2025-01-14 PROCEDURE — 2500000004 HC RX 250 GENERAL PHARMACY W/ HCPCS (ALT 636 FOR OP/ED): Performed by: PAIN MEDICINE

## 2025-01-14 PROCEDURE — 64493 INJ PARAVERT F JNT L/S 1 LEV: CPT | Mod: 50 | Performed by: PAIN MEDICINE

## 2025-01-14 PROCEDURE — 64494 INJ PARAVERT F JNT L/S 2 LEV: CPT | Mod: 50 | Performed by: PAIN MEDICINE

## 2025-01-14 RX ORDER — LIDOCAINE HYDROCHLORIDE 20 MG/ML
INJECTION, SOLUTION EPIDURAL; INFILTRATION; INTRACAUDAL; PERINEURAL AS NEEDED
Status: DISCONTINUED | OUTPATIENT
Start: 2025-01-14 | End: 2025-01-15 | Stop reason: HOSPADM

## 2025-01-14 RX ORDER — LIDOCAINE HYDROCHLORIDE 10 MG/ML
INJECTION, SOLUTION EPIDURAL; INFILTRATION; INTRACAUDAL; PERINEURAL AS NEEDED
Status: DISCONTINUED | OUTPATIENT
Start: 2025-01-14 | End: 2025-01-15 | Stop reason: HOSPADM

## 2025-01-14 RX ADMIN — LIDOCAINE HYDROCHLORIDE 10 ML: 20 INJECTION, SOLUTION EPIDURAL; INFILTRATION; INTRACAUDAL at 08:09

## 2025-01-14 RX ADMIN — LIDOCAINE HYDROCHLORIDE 10 ML: 10 INJECTION, SOLUTION EPIDURAL; INFILTRATION; INTRACAUDAL; PERINEURAL at 08:08

## 2025-01-14 ASSESSMENT — PAIN - FUNCTIONAL ASSESSMENT
PAIN_FUNCTIONAL_ASSESSMENT: 0-10
PAIN_FUNCTIONAL_ASSESSMENT: 0-10

## 2025-01-14 ASSESSMENT — PAIN SCALES - GENERAL
PAINLEVEL_OUTOF10: 6
PAINLEVEL_OUTOF10: 0 - NO PAIN

## 2025-01-16 ENCOUNTER — APPOINTMENT (OUTPATIENT)
Dept: ORTHOPEDIC SURGERY | Facility: CLINIC | Age: 55
End: 2025-01-16
Payer: COMMERCIAL

## 2025-01-16 ENCOUNTER — APPOINTMENT (OUTPATIENT)
Dept: UROLOGY | Facility: CLINIC | Age: 55
End: 2025-01-16
Payer: COMMERCIAL

## 2025-01-16 ENCOUNTER — TELEPHONE (OUTPATIENT)
Dept: PAIN MEDICINE | Facility: CLINIC | Age: 55
End: 2025-01-16

## 2025-01-16 DIAGNOSIS — M47.816 SPONDYLOSIS OF LUMBAR REGION WITHOUT MYELOPATHY OR RADICULOPATHY: Primary | ICD-10-CM

## 2025-01-16 NOTE — PROGRESS NOTES
No chief complaint on file.     History of Present Illness:  01/21/25: He is here today to review the results of his recent MRI of the left  shoulder.     12/05/24: The MRI of his left shoulder was denied.  His arm is becoming unusable even with Celebrex and Lidocaine patches. He completed a longer than 6 week course of formal therapy in August, including exercises for his neck and shoulder in which he feels he injured his shoulder doing.  He has been doing home based exercise under physician direction with no improvement in his pain beyond his PT.    11/14/24: He had a left shoulder injection on 09/26/24. The injection helped for a little while but didn't give him much long term relief. He has a history of osteoporosis. He is in significant pain. He is seeing pain management on Monday.     09/26/24: Patient is here today for his left shoulder pain for the past 10 days. States he was at physical therapy for his neck working on the arm bike and the following morning noted increased pain and inflammation of the left shoulder.    Imaging:  X-rays left shoulder: No acute fractures no dislocations of the left shoulder.  No arthritic change  MRI left shoulder: ***      Assessment:   Left shoulder impingement with biceps tendonitis    Plan:  Continue ice and Celebrex as needed.   MRI of left shoulder for rotator cuff tear. I think this is medically reasonable and appropriate given his failed treatments of physical therapy, medications, and injections. He will follow-up after MRI.     Physical Exam:  Well-nourished, well-developed. No acute distress. Alert and oriented x3. Responds appropriately to questioning. Good mood. Normal affect.  Left shoulder:  ROM flexion to 135 degrees, externally rotates 45  5/5 rotator cuff strength  Pain in the biceps  Neurovascular exam normal distally  Palpable pedal pulse and good cap refill     Review of Systems:  GENERAL: Negative for malaise, significant weight loss,  fever  MUSCULOSKELETAL: See HPI  NEURO:  Negative     Past Medical History:   Diagnosis Date    Anesthesia of skin     Numbness and tingling    Anxiety 09/21/2023    Arthritis Unk    Bilateral myopia 09/21/2023    Cervical radicular pain 09/21/2023    Chronic patellofemoral pain of left knee 09/21/2023    Complex regional pain syndrome type 1 of left lower extremity 09/21/2023    COVID-19     VACCINATED    Cubital tunnel syndrome on left 09/21/2023    Cubital tunnel syndrome on right 09/21/2023    Disease of intestine, unspecified     Bowel trouble    Erectile dysfunction 09/21/2023    Foot joint pain 09/21/2023    Fractures     Gastroparesis     GERD (gastroesophageal reflux disease)     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    History of psychological trauma 09/21/2023    Hyperlipidemia     Joint pain 09/21/2023    Kidney cysts 09/21/2023    Low back pain, unspecified 12/07/2022    Chronic low back pain without sciatica, unspecified back pain laterality    Low back pain, unspecified 08/09/2022    Lumbago    Lumbar spondylosis 09/21/2023    Lung nodule 09/21/2023    RIGHT    Metatarsalgia of left foot 09/21/2023    Myopia, bilateral 02/19/2019    Myopia of both eyes with astigmatism and presbyopia    Osteochondral defect of patella 09/21/2023    Osteoporosis     Other forms of angina pectoris     Stable angina pectoris    Other spondylosis, cervical region 09/21/2023    Patellofemoral arthritis 09/21/2023    Personal history of other diseases of the musculoskeletal system and connective tissue     History of arthritis    Personal history of other specified conditions     History of seizure    Reflex sympathetic dystrophy 09/21/2023    Scapular dyskinesis 09/21/2023    Seizure disorder (Multi)     D/T TRAMADOL REACTION - 25 YEARS AGO    Shoulder impingement 09/21/2023    Suspected sleep apnea 09/21/2023    Trauma and stressor-related disorder 09/21/2023    Trochanteric bursitis of both hips 09/22/2023    Unspecified disorder of  ear, unspecified ear     Ear, nose and throat disorder    Wears glasses        Medication Documentation Review Audit       Reviewed by Deepthi Cleaning RN (Registered Nurse) on 01/14/25 at 0755      Medication Order Taking? Sig Documenting Provider Last Dose Status   acetaminophen (Tylenol) 500 mg tablet [id number]  Take 2 tablets (1,000 mg) by mouth every 8 hours if needed for mild pain (1 - 3). Historical Provider, MD  Active   albuterol (Ventolin HFA) 90 mcg/actuation inhaler [id number]  Inhale 2 puffs every 4 hours if needed for wheezing. Historical Provider, MD  Active   atorvastatin (Lipitor) 80 mg tablet [id number]  Take 1 tablet (80 mg) by mouth once daily at bedtime. Historical Provider, MD  Active   baclofen (Lioresal) 20 mg tablet [id number]  Take 1 tablet (20 mg) by mouth 3 times a day. Olivia Alonzo MD  Active   celecoxib (CeleBREX) 200 mg capsule [id number]  Take 1 capsule (200 mg) by mouth 2 times a day. Historical Provider, MD  Active   diazePAM (Valium) 5 mg tablet [id number]  Take 1 tablet (5 mg) by mouth every 12 hours if needed for anxiety. Historical Provider, MD  Active   DULoxetine (Cymbalta) 60 mg DR capsule [id number]  Take 1 capsule (60 mg) by mouth once daily. Historical Provider, MD  Active   ezetimibe (Zetia) 10 mg tablet [id number]  Take 1 tablet (10 mg) by mouth once daily. Ronni Dey MD  Active   fluticasone (Flonase) 50 mcg/actuation nasal spray [id number]  Administer 1 spray into each nostril once daily as needed. Historical Provider, MD  Active   ketamine HCl (ketamine, bulk,) 100 % powder [id number]  Ketamine 100 mg/mL + lidocaine 40 mg/mL 1 puff 4 times daily as needed, DSP#20 mL x 5 refills Olivia Alonzo MD  Active   lidocaine (Lidoderm) 5 % patch [id number]  apply 2 patches to affected area daily as needed. Olivia Alonzo MD  Active   magnesium oxide (Mag-Ox) 400 mg (241.3 mg magnesium) tablet [id number]  Take 1 tablet (400 mg) by mouth once daily. Ronni Dey MD   "Active   metoprolol succinate XL (Toprol-XL) 25 mg 24 hr tablet [id number]  Take 1 tablet (25 mg) by mouth once daily. Do not crush or chew. Ronni Dey MD  Active   omeprazole (PriLOSEC) 40 mg DR capsule [id number]  Take 1 capsule (40 mg) by mouth 2 times a day. For 90 days Historical Provider, MD  Active   pregabalin (Lyrica) 300 mg capsule [id number]  Take 1 capsule (300 mg) by mouth 2 times a day. Vish Barboza PA-C  Active                    Allergies   Allergen Reactions    Morphine Hives, Rash and Shortness of breath     Tolerates hydromorphone    Tylenol 3      Tolerates hydromorphone    Ultram [Tramadol] Seizure    Alprazolam Psychosis     \"GOES CRAZY\", psychosis    Ketorolac Seizure    Fish Containing Products Hives and Itching     IT WAS A PROCESSED FISH MEAL, BUT STATES EATS FRESH AND FROZEN FISH ALL THE TIME.    Opioids - Morphine Analogues Rash       Social History     Socioeconomic History    Marital status: Single     Spouse name: Not on file    Number of children: Not on file    Years of education: Not on file    Highest education level: Not on file   Occupational History    Not on file   Tobacco Use    Smoking status: Never    Smokeless tobacco: Never    Tobacco comments:     It killed my mother smoking and she left me with a gift a nodule on my right lung   Vaping Use    Vaping status: Never Used   Substance and Sexual Activity    Alcohol use: Not Currently    Drug use: Not Currently     Comment: Ketamine treatments for pain,eatable marijuana    Sexual activity: Not Currently     Partners: Female     Birth control/protection: None   Other Topics Concern    Not on file   Social History Narrative    Not on file     Social Drivers of Health     Financial Resource Strain: Low Risk  (6/28/2024)    Overall Financial Resource Strain (CARDIA)     Difficulty of Paying Living Expenses: Not hard at all   Food Insecurity: No Food Insecurity (6/28/2024)    Hunger Vital Sign     Worried About Running Out " of Food in the Last Year: Never true     Ran Out of Food in the Last Year: Never true   Transportation Needs: No Transportation Needs (7/17/2024)    OASIS : Transportation     Lack of Transportation (Medical): No     Lack of Transportation (Non-Medical): No     Patient Unable or Declines to Respond: No   Physical Activity: Insufficiently Active (6/28/2024)    Exercise Vital Sign     Days of Exercise per Week: 7 days     Minutes of Exercise per Session: 10 min   Stress: Stress Concern Present (6/28/2024)    [employer] [place] [employer] - Occupational Stress Questionnaire     Feeling of Stress : Rather much   Social Connections: Feeling Socially Integrated (7/17/2024)    OASIS : Social Isolation     Frequency of experiencing loneliness or isolation: Never   Recent Concern: Social Connections - Socially Isolated (5/16/2024)    Received from [employer]    Social Connection and Isolation Panel [NHANES]     Frequency of Communication with Friends and Family: Once a week     Frequency of Social Gatherings with Friends and Family: Never     Attends Scientologist Services: Never     Active Member of Clubs or Organizations: No     Attends Club or Organization Meetings: Never     Marital Status: Living with partner   Intimate Partner Violence: Not At Risk (6/28/2024)    Humiliation, Afraid, Rape, and Kick questionnaire     Fear of Current or Ex-Partner: No     Emotionally Abused: No     Physically Abused: No     Sexually Abused: No   Housing Stability: Low Risk  (6/28/2024)    Housing Stability Vital Sign     Unable to Pay for Housing in the Last Year: No     Number of Places Lived in the Last Year: 1     Unstable Housing in the Last Year: No   Recent Concern: Housing Stability - High Risk (5/16/2024)    Received from [employer]    Housing Stability Vital Sign     Unable to Pay for Housing in the Last Year: Yes     Number of Places Lived in the Last Year: 1     Unstable Housing in  the Last Year: No       Past Surgical History:   Procedure Laterality Date    ACHILLES TENDON SURGERY  1978 2016    CARDIAC CATHETERIZATION N/A 12/20/2024    Procedure: Left Heart Cath;  Surgeon: Kale Funk MD;  Location: [facility] Cardiac Cath Lab;  Service: Cardiovascular;  Laterality: N/A;    CT ABDOMEN PELVIS ANGIOGRAM W AND/OR WO IV CONTRAST  03/18/2020    CT ABDOMEN PELVIS ANGIOGRAM W AND/OR WO IV CONTRAST 3/18/2020 [facility] EMERGENCY LEGACY    CT ANGIO NECK  12/16/2022    CT NECK ANGIO W AND WO IV CONTRAST 12/16/2022 DOCTOR OFFICE LEGACY    CT HEAD ANGIO W AND WO IV CONTRAST  12/16/2022    [facility] HEAD [facility]    [facility] SURGERY Left     REPLACEMENT    JOINT REPLACEMENT      LEFT KNEE    KNEE ARTHROPLASTY Bilateral 06/13/2017    Knee Arthroplasty    OTHER SURGICAL HISTORY  01/06/2022    Ulnar nerve neuroplasty    OTHER SURGICAL HISTORY Bilateral 07/26/2019    Foot surgery  -- HARDWARE    OTHER SURGICAL HISTORY Bilateral     BILATERAL ANKLE SCOPE    OTHER SURGICAL HISTORY      POP ABDOMINAL PROCEDURE    PLANTAR FASCIA RELEASE      SHOULDER SURGERY Bilateral 06/13/2017    Shoulder Surgery       CT ABDOMEN PELVIS W IV CONTRAST    Result Date: 1/22/2024  * * *Final Report* * * DATE OF EXAM: Jan 22 2024  8:50AM   [facility]   0530  -  CT ABD/PEL W IVCON  / ACCESSION #  [id number] PROCEDURE REASON: Abdominal pain, acute, nonlocalized      * * * * Physician Interpretation * * * *  EXAMINATION:  CT ABDOMEN AND PELVIS WITH IV CONTRAST CLINICAL HISTORY: Acute abdominal pain TECHNIQUE: CT of the abdomen and pelvis was performed using standard technique, scanning from just above the dome of the diaphragm to the symphysis pubis. MQ:  CTAP_3 Contrast: IV:  100 ml of Omnipaque 350 CT Radiation dose: Integrated Dose-length product (DLP) for this visit =   269 mGy*cm. CT Dose Reduction Employed: Automated exposure control(AEC) and iterative recon COMPARISON: 12/26/2023 RESULT: Liver: No  mass. Biliary: No bile duct dilation.  The gallbladder is within normal limits for the modality. Spleen: No mass. No splenomegaly. Pancreas: No mass or duct dilation. Adrenals: No mass. Kidneys: There are symmetric bilateral nephrograms.  In the medial right upper renal pole there is a 4.3 cm exophytic hypodensity consistent with a cyst. GI tract: No dilation or wall thickening. There is a small sliding hiatal hernia.  There is colonic residue with a moderate to large stool burden.   There is no acute appendicitis or diverticulitis. Lymph nodes: No abdominal or pelvic lymphadenopathy. Mesentery/Peritoneum: No ascites or mass. Retroperitoneum: No mass. Vasculature:  - Abdominal aorta and iliac arteries: Atherosclerotic calcifications without aneurysm.  - Celiac and SMA: Questionable mild narrowing at the origin of the celiac axis.  - Portal venous system (SMV, splenic vein, portal vein and branches): Patent.  - Hepatic veins: Patent. Pelvis: No mass, ascites or fluid collection. There is artifact from a left hip arthroplasty. Bones/Soft Tissues: There are no aggressive osseous lesions identified. Lower thorax: Bibasilar scarring.  (topogram) images: No additional findings.    IMPRESSION: Stable appearance.  There is no acute intra-abdominal process identified. : [facility]   Transcribe Date/Time: Jan 22 2024  8:50A Dictated by : INES ROLON MD This examination was interpreted and the report reviewed and electronically signed by: INES ROLON MD on Jan 22 2024  9:05AM  EST    US GALLBLADDER    Result Date: 1/22/2024  * * *Final Report* * * DATE OF EXAM: Jan 22 2024  6:48AM   U   1032  -  US ABD RIGHT UPPER QUADRANT  / ACCESSION #  [id number] PROCEDURE REASON: RUQ pain, no fever, no elev WBC      * * * * Physician Interpretation * * * *  EXAMINATION:   RIGHT UPPER QUADRANT ULTRASOUND CLINICAL HISTORY: RIGHT upper quadrant pain TECHNIQUE:  Sonography of the right upper quadrant  was  performed.   Images were obtained and stored in a permanent archive. MQ:  URUQ_2 COMPARISON: CT abdomen pelvis 12/26/2023 RESULT: Pancreas: Not well visualized Liver:      Echotexture:  Homogeneous      Echogenicity:  Increased      Surface contour:  Smooth      Lesions:  None. Biliary: No intrahepatic biliary duct dilation.      CBD: 0.3 cm at the hilum.      Gallbladder: Normal caliber           -Contents:  No cholelithiasis           -Wall:  Normal           -Other:  No pericholecystic fluid.  Negative sonographic Bautista sign. Right Kidney: No hydronephrosis.  Anechoic upper pole cyst with lobular borders measures 3.1 x 2.6 x 3.8 cm.  This appears unchanged from the prior CT. Ascites: None.    IMPRESSION: Hepatic steatosis.  Otherwise, no acute sonographic abnormality in the RIGHT upper quadrant. : [facility]   Transcribe Date/Time: Jan 22 2024  6:55A Dictated by : DEE MCKEON MD This examination was interpreted and the report reviewed and electronically signed by: DEE MCKEON MD on Jan 22 2024  6:59AM  EST    XR CHEST 1 VIEW    Result Date: 1/22/2024  * * *Final Report* * * DATE OF EXAM: Jan 22 2024  6:22AM   VHX   5376  -  XR CHEST 1V FRONTAL PORT  / ACCESSION #  [id number] PROCEDURE REASON: Tachycardia      * * * * Physician Interpretation * * * *  EXAMINATION:  CHEST RADIOGRAPH (SINGLE VIEW AP OR PA) CLINICAL INFORMATION (AS PROVIDED BY ORDERING CLINICIAN) :  Tachycardia Comparison:  9/3/2023 RESULT: Lines, tubes, and devices:  N/A Lungs and pleura:  No confluent infiltrate, large pleural effusion or pneumothorax.   Cardiomediastinal silhouette:  Within normal limits. Other:  No acute bony abnormality identified.    IMPRESSION: No significant acute radiographic abnormality. : [facility]   Transcribe Date/Time: Jan 22 2024  6:28A Dictated by : MIRNA CHAO MD This examination was interpreted and the report reviewed and electronically signed by: MIRNA CHAO MD on Jan 22 2024   6:30AM  EST        [place] Attestation  By signing my name below, I, Amador Copeland   attest that this documentation has been prepared under the direction and in the presence of Royce Louis MD.

## 2025-01-20 ENCOUNTER — APPOINTMENT (OUTPATIENT)
Dept: BEHAVIORAL HEALTH | Facility: CLINIC | Age: 55
End: 2025-01-20
Payer: COMMERCIAL

## 2025-01-21 ENCOUNTER — APPOINTMENT (OUTPATIENT)
Dept: ORTHOPEDIC SURGERY | Facility: CLINIC | Age: 55
End: 2025-01-21
Payer: COMMERCIAL

## 2025-01-22 DIAGNOSIS — M47.816 LUMBAR SPONDYLOSIS: Primary | ICD-10-CM

## 2025-01-23 ENCOUNTER — TELEPHONE (OUTPATIENT)
Dept: BEHAVIORAL HEALTH | Facility: CLINIC | Age: 55
End: 2025-01-23

## 2025-01-23 ENCOUNTER — APPOINTMENT (OUTPATIENT)
Dept: CARDIOLOGY | Facility: CLINIC | Age: 55
End: 2025-01-23
Payer: COMMERCIAL

## 2025-01-23 DIAGNOSIS — G90.522 COMPLEX REGIONAL PAIN SYNDROME TYPE 1 OF LEFT LOWER EXTREMITY: Primary | ICD-10-CM

## 2025-01-26 ENCOUNTER — HOSPITAL ENCOUNTER (EMERGENCY)
Facility: HOSPITAL | Age: 55
Discharge: HOME | End: 2025-01-26
Attending: EMERGENCY MEDICINE
Payer: COMMERCIAL

## 2025-01-26 ENCOUNTER — APPOINTMENT (OUTPATIENT)
Dept: RADIOLOGY | Facility: HOSPITAL | Age: 55
End: 2025-01-26
Payer: COMMERCIAL

## 2025-01-26 ENCOUNTER — APPOINTMENT (OUTPATIENT)
Dept: CARDIOLOGY | Facility: HOSPITAL | Age: 55
End: 2025-01-26
Payer: COMMERCIAL

## 2025-01-26 VITALS
SYSTOLIC BLOOD PRESSURE: 117 MMHG | HEIGHT: 68 IN | WEIGHT: 164 LBS | RESPIRATION RATE: 15 BRPM | DIASTOLIC BLOOD PRESSURE: 75 MMHG | HEART RATE: 88 BPM | BODY MASS INDEX: 24.86 KG/M2 | TEMPERATURE: 97.9 F | OXYGEN SATURATION: 97 %

## 2025-01-26 DIAGNOSIS — J40 BRONCHITIS: ICD-10-CM

## 2025-01-26 DIAGNOSIS — R79.89 ELEVATED D-DIMER: Primary | ICD-10-CM

## 2025-01-26 LAB
ALBUMIN SERPL BCP-MCNC: 4.3 G/DL (ref 3.4–5)
ALP SERPL-CCNC: 71 U/L (ref 33–120)
ALT SERPL W P-5'-P-CCNC: 23 U/L (ref 10–52)
ANION GAP SERPL CALC-SCNC: 12 MMOL/L (ref 10–20)
AST SERPL W P-5'-P-CCNC: 23 U/L (ref 9–39)
BASOPHILS # BLD AUTO: 0.07 X10*3/UL (ref 0–0.1)
BASOPHILS NFR BLD AUTO: 0.7 %
BILIRUB SERPL-MCNC: 0.6 MG/DL (ref 0–1.2)
BNP SERPL-MCNC: 82 PG/ML (ref 0–99)
BUN SERPL-MCNC: 11 MG/DL (ref 6–23)
CALCIUM SERPL-MCNC: 9.3 MG/DL (ref 8.6–10.3)
CARDIAC TROPONIN I PNL SERPL HS: 40 NG/L (ref 0–20)
CARDIAC TROPONIN I PNL SERPL HS: 40 NG/L (ref 0–20)
CHLORIDE SERPL-SCNC: 107 MMOL/L (ref 98–107)
CO2 SERPL-SCNC: 26 MMOL/L (ref 21–32)
CREAT SERPL-MCNC: 0.72 MG/DL (ref 0.5–1.3)
D DIMER PPP FEU-MCNC: 1176 NG/ML FEU
EGFRCR SERPLBLD CKD-EPI 2021: >90 ML/MIN/1.73M*2
EOSINOPHIL # BLD AUTO: 0.24 X10*3/UL (ref 0–0.7)
EOSINOPHIL NFR BLD AUTO: 2.5 %
ERYTHROCYTE [DISTWIDTH] IN BLOOD BY AUTOMATED COUNT: 14.6 % (ref 11.5–14.5)
FLUAV RNA RESP QL NAA+PROBE: NOT DETECTED
FLUBV RNA RESP QL NAA+PROBE: NOT DETECTED
GLUCOSE SERPL-MCNC: 88 MG/DL (ref 74–99)
HCT VFR BLD AUTO: 42.6 % (ref 41–52)
HGB BLD-MCNC: 14.1 G/DL (ref 13.5–17.5)
IMM GRANULOCYTES # BLD AUTO: 0.04 X10*3/UL (ref 0–0.7)
IMM GRANULOCYTES NFR BLD AUTO: 0.4 % (ref 0–0.9)
LYMPHOCYTES # BLD AUTO: 2.99 X10*3/UL (ref 1.2–4.8)
LYMPHOCYTES NFR BLD AUTO: 30.6 %
MAGNESIUM SERPL-MCNC: 1.89 MG/DL (ref 1.6–2.4)
MCH RBC QN AUTO: 28.3 PG (ref 26–34)
MCHC RBC AUTO-ENTMCNC: 33.1 G/DL (ref 32–36)
MCV RBC AUTO: 86 FL (ref 80–100)
MONOCYTES # BLD AUTO: 0.84 X10*3/UL (ref 0.1–1)
MONOCYTES NFR BLD AUTO: 8.6 %
NEUTROPHILS # BLD AUTO: 5.6 X10*3/UL (ref 1.2–7.7)
NEUTROPHILS NFR BLD AUTO: 57.2 %
NRBC BLD-RTO: 0 /100 WBCS (ref 0–0)
PLATELET # BLD AUTO: 275 X10*3/UL (ref 150–450)
POTASSIUM SERPL-SCNC: 3.5 MMOL/L (ref 3.5–5.3)
PROT SERPL-MCNC: 6.6 G/DL (ref 6.4–8.2)
RBC # BLD AUTO: 4.98 X10*6/UL (ref 4.5–5.9)
RSV RNA RESP QL NAA+PROBE: NOT DETECTED
SARS-COV-2 RNA RESP QL NAA+PROBE: NOT DETECTED
SODIUM SERPL-SCNC: 141 MMOL/L (ref 136–145)
WBC # BLD AUTO: 9.8 X10*3/UL (ref 4.4–11.3)

## 2025-01-26 PROCEDURE — 2550000001 HC RX 255 CONTRASTS: Performed by: EMERGENCY MEDICINE

## 2025-01-26 PROCEDURE — 2500000001 HC RX 250 WO HCPCS SELF ADMINISTERED DRUGS (ALT 637 FOR MEDICARE OP)

## 2025-01-26 PROCEDURE — 72125 CT NECK SPINE W/O DYE: CPT

## 2025-01-26 PROCEDURE — 87634 RSV DNA/RNA AMP PROBE: CPT

## 2025-01-26 PROCEDURE — 96361 HYDRATE IV INFUSION ADD-ON: CPT

## 2025-01-26 PROCEDURE — 83880 ASSAY OF NATRIURETIC PEPTIDE: CPT

## 2025-01-26 PROCEDURE — 71275 CT ANGIOGRAPHY CHEST: CPT | Performed by: STUDENT IN AN ORGANIZED HEALTH CARE EDUCATION/TRAINING PROGRAM

## 2025-01-26 PROCEDURE — 85379 FIBRIN DEGRADATION QUANT: CPT

## 2025-01-26 PROCEDURE — 71045 X-RAY EXAM CHEST 1 VIEW: CPT

## 2025-01-26 PROCEDURE — 93005 ELECTROCARDIOGRAM TRACING: CPT

## 2025-01-26 PROCEDURE — 71275 CT ANGIOGRAPHY CHEST: CPT

## 2025-01-26 PROCEDURE — 83735 ASSAY OF MAGNESIUM: CPT

## 2025-01-26 PROCEDURE — 85025 COMPLETE CBC W/AUTO DIFF WBC: CPT

## 2025-01-26 PROCEDURE — 70450 CT HEAD/BRAIN W/O DYE: CPT | Performed by: STUDENT IN AN ORGANIZED HEALTH CARE EDUCATION/TRAINING PROGRAM

## 2025-01-26 PROCEDURE — 36415 COLL VENOUS BLD VENIPUNCTURE: CPT

## 2025-01-26 PROCEDURE — 99285 EMERGENCY DEPT VISIT HI MDM: CPT | Mod: 25 | Performed by: EMERGENCY MEDICINE

## 2025-01-26 PROCEDURE — 96375 TX/PRO/DX INJ NEW DRUG ADDON: CPT | Mod: 59

## 2025-01-26 PROCEDURE — 84075 ASSAY ALKALINE PHOSPHATASE: CPT

## 2025-01-26 PROCEDURE — 96374 THER/PROPH/DIAG INJ IV PUSH: CPT | Mod: 59

## 2025-01-26 PROCEDURE — 84484 ASSAY OF TROPONIN QUANT: CPT

## 2025-01-26 PROCEDURE — 70450 CT HEAD/BRAIN W/O DYE: CPT

## 2025-01-26 PROCEDURE — 71045 X-RAY EXAM CHEST 1 VIEW: CPT | Performed by: STUDENT IN AN ORGANIZED HEALTH CARE EDUCATION/TRAINING PROGRAM

## 2025-01-26 PROCEDURE — 2500000004 HC RX 250 GENERAL PHARMACY W/ HCPCS (ALT 636 FOR OP/ED)

## 2025-01-26 PROCEDURE — 72125 CT NECK SPINE W/O DYE: CPT | Performed by: STUDENT IN AN ORGANIZED HEALTH CARE EDUCATION/TRAINING PROGRAM

## 2025-01-26 RX ORDER — ONDANSETRON HYDROCHLORIDE 2 MG/ML
4 INJECTION, SOLUTION INTRAVENOUS ONCE
Status: COMPLETED | OUTPATIENT
Start: 2025-01-26 | End: 2025-01-26

## 2025-01-26 RX ORDER — ACETAMINOPHEN 325 MG/1
975 TABLET ORAL ONCE
Status: COMPLETED | OUTPATIENT
Start: 2025-01-26 | End: 2025-01-26

## 2025-01-26 RX ADMIN — SODIUM CHLORIDE 1000 ML: 9 INJECTION, SOLUTION INTRAVENOUS at 20:30

## 2025-01-26 RX ADMIN — ACETAMINOPHEN 975 MG: 325 TABLET ORAL at 20:30

## 2025-01-26 RX ADMIN — ONDANSETRON 4 MG: 2 INJECTION INTRAMUSCULAR; INTRAVENOUS at 20:30

## 2025-01-26 RX ADMIN — IOHEXOL 60 ML: 350 INJECTION, SOLUTION INTRAVENOUS at 20:56

## 2025-01-26 RX ADMIN — HYDROMORPHONE HYDROCHLORIDE 0.4 MG: 1 INJECTION, SOLUTION INTRAMUSCULAR; INTRAVENOUS; SUBCUTANEOUS at 22:26

## 2025-01-26 ASSESSMENT — PAIN DESCRIPTION - ORIENTATION: ORIENTATION: MID

## 2025-01-26 ASSESSMENT — PAIN DESCRIPTION - LOCATION
LOCATION: ABDOMEN
LOCATION: BACK
LOCATION: BACK

## 2025-01-26 ASSESSMENT — PAIN - FUNCTIONAL ASSESSMENT
PAIN_FUNCTIONAL_ASSESSMENT: 0-10
PAIN_FUNCTIONAL_ASSESSMENT: 0-10

## 2025-01-26 ASSESSMENT — LIFESTYLE VARIABLES
HAVE YOU EVER FELT YOU SHOULD CUT DOWN ON YOUR DRINKING: NO
EVER FELT BAD OR GUILTY ABOUT YOUR DRINKING: NO
HAVE PEOPLE ANNOYED YOU BY CRITICIZING YOUR DRINKING: NO
EVER HAD A DRINK FIRST THING IN THE MORNING TO STEADY YOUR NERVES TO GET RID OF A HANGOVER: NO
TOTAL SCORE: 0

## 2025-01-26 ASSESSMENT — PAIN DESCRIPTION - FREQUENCY: FREQUENCY: CONSTANT/CONTINUOUS

## 2025-01-26 ASSESSMENT — PAIN SCALES - GENERAL
PAINLEVEL_OUTOF10: 9
PAINLEVEL_OUTOF10: 10 - WORST POSSIBLE PAIN
PAINLEVEL_OUTOF10: 4

## 2025-01-26 ASSESSMENT — PAIN DESCRIPTION - PROGRESSION: CLINICAL_PROGRESSION: NOT CHANGED

## 2025-01-26 ASSESSMENT — PAIN DESCRIPTION - PAIN TYPE
TYPE: ACUTE PAIN
TYPE: ACUTE PAIN

## 2025-01-26 NOTE — ED PROVIDER NOTES
Emergency Department Provider Note        History of Present Illness     History provided by: Patient  Limitations to History: None  External Records Reviewed with Brief Summary: None    HPI:  Akin Lynn is a 54 y.o. male with a history of mixed lipidemia, GERD, bipolar disorder, who presents to the ED with complaint of syncope and abdominal pain.  Patient stated that he stood up from a sitting position and felt a sharp stabbing chest pain that radiates to his back.  Rates the pain a 10/10.  Stated that he fell and hit his head and neck.  Stated that he had no loss of consciousness, or vomiting.  Denies fever, nausea, vomiting, diarrhea, or urinary symptoms.    Physical Exam   Triage vitals:  T    HR    BP    RR    O2        General: Awake, alert, in no acute distress  Eyes: Gaze conjugate.  No scleral icterus or injection  HENT: Normo-cephalic, atraumatic. No stridor  CV: Regular rate, regular rhythm. Radial pulses 2+ bilaterally  Resp: Breathing non-labored, speaking in full sentences.  Clear to auscultation bilaterally  GI: Soft, non-distended, non-tender. No rebound or guarding.  : Not examined.  MSK/Extremities: No gross bony deformities. Moving all extremities  Skin: Warm. Appropriate color  Neuro: Alert. Oriented. Face symmetric. Speech is fluent.  Gross strength and sensation intact in b/l UE and LEs  Psych: Appropriate mood and affect    Medical Decision Making & ED Course   Medical Decision Makin y.o. male with a history of mixed lipidemia, GERD, bipolar disorder, who presents to the ED with complaint of syncope and abdominal pain. On arrival to the ED, the patient was stable, A&Ox3, non-toxic, well-appearing, and in no acute distress. Primary assessment is evident for intact ABC. The patient was able to give account of current event and verbalize presenting symptoms.    My personal assessment is directed towards ordering cardiac workup labs including CBC, CMP, coagulation status and key  cardiac enzyme to rule out anemia, electrolyte abnormality, stroke, and cardiac event. Also, I ordered EKG and chest x-ray to rule out possible cardiopulmonary abnormality, or critical esophageal etiology.  Of note, the patient cannot be PERC out, hence we will order D-dimer to evaluate for risk of PE.    Lab shows no evidence of significant anemia, or leukocytosis.  Chemistry panel was unremarkable.  Magnesium was normal.  BNP was normal.  Cardiac troponin was mildly elevated.  Patient tested negative for COVID, flu, and RSV viruses.  D-dimer was elevated, requiring further evaluation with CT angiogram of the chest to rule out for pulmonary embolism.  CT result came back negative for PE.  Chest x-ray shows no evidence of acute cardiopulmonary abnormality.  CT head was negative for intracranial hemorrhage or depressed calvarial fracture.  CT C-spine shows no evidence of malalignment of the cervical spine or an acute fracture.    EKG shows normal sinus rhythm with ventricular rate of 88 bpm and normal axis, MN interval of 168 ms, QRS duration of 104 ms, QTc interval of 428 ms, and P-R-T axis of 65/50/28.  No ST elevation.    Patient was given 4 mg of Zofran for nausea, he receives 5 mg of Tylenol for pain,   And was started on a liter of normal saline IV fluid.  On reassessment, patient endorses mild pain relief and was administered 0.4 mg of Dilaudid for additional pain control.    Patient was updated with lab and imaging results and was reassured of the benign finding.  He was medically cleared to be discharged home and follow-up with primary care physician.  Patient was amenable to the plan.  He was discharged in stable condition with strict return precautions.    ----  Differential diagnoses considered include but are not limited to: ACS, PE, stroke,    EKG Independent Interpretation: EKG interpreted by myself. Please see ED Course for full interpretation.    Care Considerations: As documented above in MDM    ED  Course:  Diagnoses as of 01/28/25 1913   Elevated d-dimer   Bronchitis     Disposition   As a result of the work-up, the patient was discharged home.  he was informed of his diagnosis and instructed to come back with any concerns or worsening of condition.  he and was agreeable to the plan as discussed above.  he was given the opportunity to ask questions.  All of the patient's questions were answered.    Procedures   Procedures    This was a shared visit with an ED attending.  The patient was seen and discussed with the ED attending, Dr. Hebert.    Ida Duarte MD  Emergency Medicine, PGY-2     Ida Duarte MD  Resident  01/28/25 1907       Ida Duarte MD  Resident  01/28/25 1913

## 2025-01-27 NOTE — PROGRESS NOTES
Chief Complaint   Patient presents with    Left Shoulder - Pain     Impingement with biceps tendinitis, s/p cortisone injection 9/26/2024, MRI review       History of Present Illness:  01/28/25: He is here today to review the results of his recent MRI of the left  shoulder. He continues to have pain in the anterior shoulder and into the biceps.     12/05/24: The MRI of his left shoulder was denied.  His arm is becoming unusable even with Celebrex and Lidocaine patches. He completed a longer than 6 week course of formal therapy in August, including exercises for his neck and shoulder in which he feels he injured his shoulder doing.  He has been doing home based exercise under physician direction with no improvement in his pain beyond his PT.    11/14/24: He had a left shoulder injection on 09/26/24. The injection helped for a little while but didn't give him much long term relief. He has a history of osteoporosis. He is in significant pain. He is seeing pain management on Monday.     09/26/24: Patient is here today for his left shoulder pain for the past 10 days. States he was at physical therapy for his neck working on the arm bike and the following morning noted increased pain and inflammation of the left shoulder.    Imaging:  X-rays left shoulder: No acute fractures no dislocations of the left shoulder.  No arthritic change  MRI left shoulder: No obvious tear, some fraying in the biceps.      Assessment:   Left shoulder biceps tendonitis    Plan:  Continue ice and Celebrex as needed.   We will get him set up for a USG injection into the long head biceps with Dr. Holbrook. He will follow this injection with some therapy. He will see me back 3-4 weeks after injection.      Physical Exam:  Well-nourished, well-developed. No acute distress. Alert and oriented x3. Responds appropriately to questioning. Good mood. Normal affect.  Left shoulder:  ROM flexion to 135 degrees, externally rotates 45  5/5 rotator cuff  strength  Pain in the biceps  Neurovascular exam normal distally  Palpable pedal pulse and good cap refill     Review of Systems:  GENERAL: Negative for malaise, significant weight loss, fever  MUSCULOSKELETAL: See HPI  NEURO:  Negative     Past Medical History:   Diagnosis Date    Anesthesia of skin     Numbness and tingling    Anxiety 09/21/2023    Arthritis Unk    Bilateral myopia 09/21/2023    Cervical radicular pain 09/21/2023    Chronic patellofemoral pain of left knee 09/21/2023    Complex regional pain syndrome type 1 of left lower extremity 09/21/2023    COVID-19     VACCINATED    Cubital tunnel syndrome on left 09/21/2023    Cubital tunnel syndrome on right 09/21/2023    Disease of intestine, unspecified     Bowel trouble    Erectile dysfunction 09/21/2023    Foot joint pain 09/21/2023    Fractures     Gastroparesis     GERD (gastroesophageal reflux disease)     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    History of psychological trauma 09/21/2023    Hyperlipidemia     Joint pain 09/21/2023    Kidney cysts 09/21/2023    Low back pain, unspecified 12/07/2022    Chronic low back pain without sciatica, unspecified back pain laterality    Low back pain, unspecified 08/09/2022    Lumbago    Lumbar spondylosis 09/21/2023    Lung nodule 09/21/2023    RIGHT    Metatarsalgia of left foot 09/21/2023    Myopia, bilateral 02/19/2019    Myopia of both eyes with astigmatism and presbyopia    Osteochondral defect of patella 09/21/2023    Osteoporosis     Other forms of angina pectoris     Stable angina pectoris    Other spondylosis, cervical region 09/21/2023    Patellofemoral arthritis 09/21/2023    Personal history of other diseases of the musculoskeletal system and connective tissue     History of arthritis    Personal history of other specified conditions     History of seizure    Reflex sympathetic dystrophy 09/21/2023    Scapular dyskinesis 09/21/2023    Seizure disorder (Multi)     D/T TRAMADOL REACTION - 25 YEARS AGO     Shoulder impingement 09/21/2023    Suspected sleep apnea 09/21/2023    Trauma and stressor-related disorder 09/21/2023    Trochanteric bursitis of both hips 09/22/2023    Unspecified disorder of ear, unspecified ear     Ear, nose and throat disorder    Wears glasses        Medication Documentation Review Audit       Reviewed by Edie Farrell RN (Registered Nurse) on 01/26/25 at 1857      Medication Order Taking? Sig Documenting Provider Last Dose Status   acetaminophen (Tylenol) 500 mg tablet [id number]  Take 2 tablets (1,000 mg) by mouth every 8 hours if needed for mild pain (1 - 3). Historical Provider, MD  Active   albuterol (Ventolin HFA) 90 mcg/actuation inhaler [id number]  Inhale 2 puffs every 4 hours if needed for wheezing. Historical Provider, MD  Active   atorvastatin (Lipitor) 80 mg tablet [id number]  Take 1 tablet (80 mg) by mouth once daily at bedtime. Historical Provider, MD  Active   baclofen (Lioresal) 20 mg tablet [id number]  Take 1 tablet (20 mg) by mouth 3 times a day. Olivia Alonzo MD  Active   celecoxib (CeleBREX) 200 mg capsule [id number]  Take 1 capsule (200 mg) by mouth 2 times a day. Historical Provider, MD  Active   diazePAM (Valium) 5 mg tablet [id number]  Take 1 tablet (5 mg) by mouth every 12 hours if needed for anxiety. Historical Provider, MD  Active   DULoxetine (Cymbalta) 60 mg DR capsule [id number]  Take 1 capsule (60 mg) by mouth once daily. Historical Provider, MD  Active   ezetimibe (Zetia) 10 mg tablet [id number]  Take 1 tablet (10 mg) by mouth once daily. Ronni Dey MD  Active   fluticasone (Flonase) 50 mcg/actuation nasal spray [id number]  Administer 1 spray into each nostril once daily as needed. Historical Provider, MD  Active   ketamine HCl (ketamine, bulk,) 100 % powder [id number]  Ketamine 100 mg/mL + lidocaine 40 mg/mL 1 puff 4 times daily as needed, DSP#20 mL x 5 refills Olivia Alonzo MD  Active   lidocaine (Lidoderm) 5 % patch [id number]  apply 2 patches to  "affected area daily as needed. Olivia Alonzo MD  Active   magnesium oxide (Mag-Ox) 400 mg (241.3 mg magnesium) tablet [id number]  Take 1 tablet (400 mg) by mouth once daily. Ronni Dey MD  Active   metoprolol succinate XL (Toprol-XL) 25 mg 24 hr tablet [id number]  Take 1 tablet (25 mg) by mouth once daily. Do not crush or chew. Ronni Dey MD  Active   omeprazole (PriLOSEC) 40 mg DR capsule [id number]  Take 1 capsule (40 mg) by mouth 2 times a day. For 90 days Historical Provider, MD  Active   pregabalin (Lyrica) 300 mg capsule [id number]  Take 1 capsule (300 mg) by mouth 2 times a day. Vish Barboza PA-C  Active                    Allergies   Allergen Reactions    Morphine Hives, Rash and Shortness of breath     Tolerates hydromorphone    Tylenol 3      Tolerates hydromorphone    Ultram [Tramadol] Seizure    Alprazolam Psychosis     \"GOES CRAZY\", psychosis    Ketorolac Seizure    Fish Containing Products Hives and Itching     IT WAS A PROCESSED FISH MEAL, BUT STATES EATS FRESH AND FROZEN FISH ALL THE TIME.    Opioids - Morphine Analogues Rash       Social History     Socioeconomic History    Marital status: Single     Spouse name: Not on file    Number of children: Not on file    Years of education: Not on file    Highest education level: Not on file   Occupational History    Not on file   Tobacco Use    Smoking status: Never    Smokeless tobacco: Never    Tobacco comments:     It killed my mother smoking and she left me with a gift a nodule on my right lung   Vaping Use    Vaping status: Never Used   Substance and Sexual Activity    Alcohol use: Not Currently    Drug use: Not Currently     Comment: Ketamine treatments for pain,eatable marijuana    Sexual activity: Not Currently     Partners: Female     Birth control/protection: None   Other Topics Concern    Not on file   Social History Narrative    Not on file     Social Drivers of Health     Financial Resource Strain: Low Risk  (6/28/2024)    " Overall Financial Resource Strain (CARDIA)     Difficulty of Paying Living Expenses: Not hard at all   Food Insecurity: No Food Insecurity (6/28/2024)    Hunger Vital Sign     Worried About Running Out of Food in the Last Year: Never true     Ran Out of Food in the Last Year: Never true   Transportation Needs: No Transportation Needs (7/17/2024)    OASIS : Transportation     Lack of Transportation (Medical): No     Lack of Transportation (Non-Medical): No     Patient Unable or Declines to Respond: No   Physical Activity: Insufficiently Active (6/28/2024)    Exercise Vital Sign     Days of Exercise per Week: 7 days     Minutes of Exercise per Session: 10 min   Stress: Stress Concern Present (6/28/2024)    [employer] - Occupational Stress Questionnaire     Feeling of Stress : Rather much   Social Connections: Feeling Socially Integrated (7/17/2024)    OASIS : Social Isolation     Frequency of experiencing loneliness or isolation: Never   Recent Concern: Social Connections - Socially Isolated (5/16/2024)    Received from [employer]    Social Connection and Isolation Panel [NHANES]     Frequency of Communication with Friends and Family: Once a week     Frequency of Social Gatherings with Friends and Family: Never     Attends Sabianist Services: Never     Active Member of Clubs or Organizations: No     Attends Club or Organization Meetings: Never     Marital Status: Living with partner   Intimate Partner Violence: Not At Risk (6/28/2024)    Humiliation, Afraid, Rape, and Kick questionnaire     Fear of Current or Ex-Partner: No     Emotionally Abused: No     Physically Abused: No     Sexually Abused: No   Housing Stability: Low Risk  (6/28/2024)    Housing Stability Vital Sign     Unable to Pay for Housing in the Last Year: No     Number of Places Lived in the Last Year: 1     Unstable Housing in the Last Year: No   Recent Concern: Housing Stability - High Risk  (5/16/2024)    Received from [employer]    Housing Stability Vital Sign     Unable to Pay for Housing in the Last Year: Yes     Number of Places Lived in the Last Year: 1     Unstable Housing in the Last Year: No       Past Surgical History:   Procedure Laterality Date    ACHILLES TENDON SURGERY  1978 2016    CARDIAC CATHETERIZATION N/A 12/20/2024    Procedure: Left Heart Cath;  Surgeon: Kale Funk MD;  Location: [facility] Cardiac Cath Lab;  Service: Cardiovascular;  Laterality: N/A;    CT ABDOMEN PELVIS ANGIOGRAM W AND/OR WO IV CONTRAST  03/18/2020    CT ABDOMEN PELVIS ANGIOGRAM W AND/OR WO IV CONTRAST 3/18/2020 [facility] EMERGENCY LEGACY    CT ANGIO NECK  12/16/2022    CT NECK ANGIO W AND WO IV CONTRAST 12/16/2022 DOCTOR OFFICE LEGACY    CT HEAD ANGIO W AND WO IV CONTRAST  12/16/2022    CT HEAD [facility] AND [facility]    HIP SURGERY Left     REPLACEMENT    JOINT REPLACEMENT      LEFT KNEE    KNEE ARTHROPLASTY Bilateral 06/13/2017    Knee Arthroplasty    OTHER SURGICAL HISTORY  01/06/2022    Ulnar nerve neuroplasty    OTHER SURGICAL HISTORY Bilateral 07/26/2019    Foot surgery  -- HARDWARE    OTHER SURGICAL HISTORY Bilateral     BILATERAL ANKLE SCOPE    OTHER SURGICAL HISTORY      POP ABDOMINAL PROCEDURE    PLANTAR FASCIA RELEASE      SHOULDER SURGERY Bilateral 06/13/2017    Shoulder Surgery       CT ABDOMEN PELVIS W IV CONTRAST    Result Date: 1/22/2024  * * *Final Report* * * DATE OF EXAM: Jan 22 2024  8:50AM   [facility]   0530  -  CT ABD/PEL W IVCON  / ACCESSION #  [id number] PROCEDURE REASON: Abdominal pain, acute, nonlocalized      * * * * Physician Interpretation * * * *  EXAMINATION:  CT ABDOMEN AND PELVIS WITH IV CONTRAST CLINICAL HISTORY: Acute abdominal pain TECHNIQUE: CT of the abdomen and pelvis was performed using standard technique, scanning from just above the dome of the diaphragm to the symphysis pubis. MQ:  CTAP_3 Contrast: IV:  100 ml of Omnipaque 350 CT  Radiation dose: Integrated Dose-length product (DLP) for this visit =   269 mGy*cm. CT Dose Reduction Employed: Automated exposure control(AEC) and iterative recon COMPARISON: 12/26/2023 RESULT: Liver: No mass. Biliary: No bile duct dilation.  The gallbladder is within normal limits for the modality. Spleen: No mass. No splenomegaly. Pancreas: No mass or duct dilation. Adrenals: No mass. Kidneys: There are symmetric bilateral nephrograms.  In the medial right upper renal pole there is a 4.3 cm exophytic hypodensity consistent with a cyst. GI tract: No dilation or wall thickening. There is a small sliding hiatal hernia.  There is colonic residue with a moderate to large stool burden.   There is no acute appendicitis or diverticulitis. Lymph nodes: No abdominal or pelvic lymphadenopathy. Mesentery/Peritoneum: No ascites or mass. Retroperitoneum: No mass. Vasculature:  - Abdominal aorta and iliac arteries: Atherosclerotic calcifications without aneurysm.  - Celiac and SMA: Questionable mild narrowing at the origin of the celiac axis.  - Portal venous system (SMV, splenic vein, portal vein and branches): Patent.  - Hepatic veins: Patent. Pelvis: No mass, ascites or fluid collection. There is artifact from a left hip arthroplasty. Bones/Soft Tissues: There are no aggressive osseous lesions identified. Lower thorax: Bibasilar scarring.  (topogram) images: No additional findings.    IMPRESSION: Stable appearance.  There is no acute intra-abdominal process identified. : KERLINE   Transcribe Date/Time: Jan 22 2024  8:50A Dictated by : INES ROLON MD This examination was interpreted and the report reviewed and electronically signed by: INES ROLON MD on Jan 22 2024  9:05AM  EST    US GALLBLADDER    Result Date: 1/22/2024  * * *Final Report* * * DATE OF EXAM: Jan 22 2024  6:48AM   U   1032  -  US ABD RIGHT UPPER QUADRANT  / ACCESSION #  [id number] PROCEDURE REASON: RUQ pain, no fever, no elev  WBC      * * * * Physician Interpretation * * * *  EXAMINATION:   RIGHT UPPER QUADRANT ULTRASOUND CLINICAL HISTORY: RIGHT upper quadrant pain TECHNIQUE:  Sonography of the right upper quadrant  was performed.   Images were obtained and stored in a permanent archive. MQ:  URUQ_2 COMPARISON: CT abdomen pelvis 12/26/2023 RESULT: Pancreas: Not well visualized Liver:      Echotexture:  Homogeneous      Echogenicity:  Increased      Surface contour:  Smooth      Lesions:  None. Biliary: No intrahepatic biliary duct dilation.      CBD: 0.3 cm at the hilum.      Gallbladder: Normal caliber           -Contents:  No cholelithiasis           -Wall:  Normal           -Other:  No pericholecystic fluid.  Negative sonographic Bautista sign. Right Kidney: No hydronephrosis.  Anechoic upper pole cyst with lobular borders measures 3.1 x 2.6 x 3.8 cm.  This appears unchanged from the prior CT. Ascites: None.    IMPRESSION: Hepatic steatosis.  Otherwise, no acute sonographic abnormality in the RIGHT upper quadrant. : KERLINE   Transcribe Date/Time: Jan 22 2024  6:55A Dictated by : DEE MCKEON MD This examination was interpreted and the report reviewed and electronically signed by: DEE MCKEON MD on Jan 22 2024  6:59AM  EST    XR CHEST 1 VIEW    Result Date: 1/22/2024  * * *Final Report* * * DATE OF EXAM: Jan 22 2024  6:22AM   VHX   5376  -  XR CHEST 1V FRONTAL PORT  / ACCESSION #  [id number] PROCEDURE REASON: Tachycardia      * * * * Physician Interpretation * * * *  EXAMINATION:  CHEST RADIOGRAPH (SINGLE VIEW AP OR PA) CLINICAL INFORMATION (AS PROVIDED BY ORDERING CLINICIAN) :  Tachycardia Comparison:  9/3/2023 RESULT: Lines, tubes, and devices:  N/A Lungs and pleura:  No confluent infiltrate, large pleural effusion or pneumothorax.   Cardiomediastinal silhouette:  Within normal limits. Other:  No acute bony abnormality identified.    IMPRESSION: No significant acute radiographic abnormality. : KERLINE    Transcribe Date/Time: Jan 22 2024  6:28A Dictated by : MIRNA CHAO MD This examination was interpreted and the report reviewed and electronically signed by: MIRNA CHAO MD on Jan 22 2024  6:30AM  EST        Scribe Attestation  By signing my name below, IArgelia, Scribrivera   attest that this documentation has been prepared under the direction and in the presence of Royce Louis MD.

## 2025-01-27 NOTE — DISCHARGE INSTRUCTIONS
Thank you for giving us the opportunity to take care of you.  Note that your CT angiogram of your chest does not show evidence of blood clot.  Follow-up with your primary care physician and your pain management doctor within 48 hours regarding your ED visit.     Seek immediate medical attention if you experience lightheadedness, dizziness, severe pain, shortness of drug, fever, or any new onset medical reason.

## 2025-01-28 ENCOUNTER — INFUSION (OUTPATIENT)
Dept: INFUSION THERAPY | Facility: CLINIC | Age: 55
End: 2025-01-28
Payer: COMMERCIAL

## 2025-01-28 ENCOUNTER — APPOINTMENT (OUTPATIENT)
Dept: ORTHOPEDIC SURGERY | Facility: CLINIC | Age: 55
End: 2025-01-28
Payer: COMMERCIAL

## 2025-01-28 VITALS
RESPIRATION RATE: 10 BRPM | OXYGEN SATURATION: 97 % | SYSTOLIC BLOOD PRESSURE: 134 MMHG | DIASTOLIC BLOOD PRESSURE: 91 MMHG | TEMPERATURE: 96.3 F | HEART RATE: 70 BPM

## 2025-01-28 DIAGNOSIS — M77.8 TENDINITIS OF LEFT SHOULDER: Primary | ICD-10-CM

## 2025-01-28 DIAGNOSIS — G90.522 COMPLEX REGIONAL PAIN SYNDROME TYPE 1 OF LEFT LOWER EXTREMITY: ICD-10-CM

## 2025-01-28 PROCEDURE — 99213 OFFICE O/P EST LOW 20 MIN: CPT | Performed by: ORTHOPAEDIC SURGERY

## 2025-01-28 PROCEDURE — 96368 THER/DIAG CONCURRENT INF: CPT | Mod: INF

## 2025-01-28 PROCEDURE — 99214 OFFICE O/P EST MOD 30 MIN: CPT | Performed by: ORTHOPAEDIC SURGERY

## 2025-01-28 PROCEDURE — 2500000004 HC RX 250 GENERAL PHARMACY W/ HCPCS (ALT 636 FOR OP/ED): Performed by: NURSE PRACTITIONER

## 2025-01-28 PROCEDURE — 96365 THER/PROPH/DIAG IV INF INIT: CPT | Mod: INF

## 2025-01-28 RX ORDER — NITROGLYCERIN 0.4 MG/1
0.4 TABLET SUBLINGUAL ONCE
OUTPATIENT
Start: 2025-02-11 | End: 2025-02-11

## 2025-01-28 RX ORDER — ALBUTEROL SULFATE 0.83 MG/ML
3 SOLUTION RESPIRATORY (INHALATION) AS NEEDED
OUTPATIENT
Start: 2025-02-11

## 2025-01-28 RX ORDER — FAMOTIDINE 10 MG/ML
20 INJECTION INTRAVENOUS ONCE AS NEEDED
OUTPATIENT
Start: 2025-02-11

## 2025-01-28 RX ORDER — METOCLOPRAMIDE HYDROCHLORIDE 5 MG/ML
10 INJECTION INTRAMUSCULAR; INTRAVENOUS ONCE
OUTPATIENT
Start: 2025-02-11 | End: 2025-02-11

## 2025-01-28 RX ORDER — KETAMINE HCL IN NACL, ISO-OSM 100MG/10ML
SYRINGE (ML) INJECTION ONCE
Status: COMPLETED | OUTPATIENT
Start: 2025-01-28 | End: 2025-01-28

## 2025-01-28 RX ORDER — ONDANSETRON HYDROCHLORIDE 2 MG/ML
4 INJECTION, SOLUTION INTRAVENOUS ONCE
OUTPATIENT
Start: 2025-02-11 | End: 2025-02-11

## 2025-01-28 RX ORDER — DIPHENHYDRAMINE HYDROCHLORIDE 50 MG/ML
50 INJECTION INTRAMUSCULAR; INTRAVENOUS AS NEEDED
OUTPATIENT
Start: 2025-02-11

## 2025-01-28 RX ORDER — DIPHENHYDRAMINE HYDROCHLORIDE 50 MG/ML
25 INJECTION INTRAMUSCULAR; INTRAVENOUS ONCE
OUTPATIENT
Start: 2025-02-11 | End: 2025-02-11

## 2025-01-28 RX ORDER — EPINEPHRINE 0.3 MG/.3ML
0.3 INJECTION SUBCUTANEOUS EVERY 5 MIN PRN
OUTPATIENT
Start: 2025-02-11

## 2025-01-28 RX ORDER — METOPROLOL TARTRATE 25 MG/1
25 TABLET, FILM COATED ORAL ONCE
OUTPATIENT
Start: 2025-02-11 | End: 2025-02-11

## 2025-01-28 RX ADMIN — PROPOFOL 100 MG: 10 INJECTION, EMULSION INTRAVENOUS at 12:18

## 2025-01-28 RX ADMIN — Medication: at 12:19

## 2025-01-28 ASSESSMENT — ENCOUNTER SYMPTOMS
OCCASIONAL FEELINGS OF UNSTEADINESS: 1
LOSS OF SENSATION IN FEET: 1
DEPRESSION: 0

## 2025-01-28 ASSESSMENT — PAIN SCALES - GENERAL
PAINLEVEL_OUTOF10: 7
PAINLEVEL_OUTOF10: 0 - NO PAIN

## 2025-01-28 NOTE — PROGRESS NOTES
S: Patient here for 21st opioid sparing pain infusion.   Purpose of pain infusion meds explained along with potential side effects.  Patient verbalized understanding.    B: Pain Issues. Low back, left leg and knee    Is Patient breast feeding: n/a    A: Patient currently has pain described on flow sheet documentation. Designated  is Transportation friend with him. Patient last ate solid food >12 hours ago, and had liquid 1 hours ago.    R: Plan; Obtain IV access, do patient risk assessment, and start opioid sparing infusion as ordered. Monitoring for S/S of adverse reactions.    Post infusion teaching provided. Patient verbalized understanding. VSS, Patient states pain is 0/10. Will assist patient to waiting car via wheelchair.

## 2025-01-28 NOTE — PATIENT INSTRUCTIONS
Today :We administered ketamine 30 mg-lidocaine 300 mg and propofol (Diprivan).     For:   1. Complex regional pain syndrome type 1 of left lower extremity       (Tell all doctors including dentists that you are taking this medication)     Go to the emergency room or call 911 if:  -You have signs of allergic reaction:   -Rash, hives, itching.   -Swollen, blistered, peeling skin.   -Swelling of face, lips, mouth, tongue or throat.   -Tightness of chest, trouble breathing, swallowing or talking     Call your doctor:  - If IV / injection site gets red, warm, swollen, itchy or leaks fluid or pus.     (Leave dressing on your IV site for at least 2 hours and keep area clean and dry  - If you get sick or have symptoms of infection or are not feeling well for any reason.    (Wash your hands often, stay away from people who are sick)  - If you have side effects from your medication that do not go away or are bothersome.     (Refer to the teaching your nurse gave you for side effects to call your doctor about)    - Common side effects may include:  stuffy nose, headache, feeling tired, muscle aches, upset stomach  - Before receiving any vaccines     - Call the [facility] at   If:  - You get sick, are on antibiotics, have had a recent vaccine, have surgery or dental work and your doctor wants your visit rescheduled.  - You need to cancel and reschedule your visit for any reason. Call at least 2 days before your visit if you need to cancel.   - Your insurance changes before your next visit.    (We will need to get approval from your new insurance. This can take up to two weeks.)     The [facility] is opened Monday thru Friday. We are closed on weekends and holidays.   Voice mail will take your call if the center is closed. If you leave a message please allow 24 hours for a call back during weekdays. If you leave a message on a weekend/holiday, we will call you back the next business day.    A pharmacist is  available Monday - Friday from 8:30AM to 3:30PM to help answer any questions you may have about your prescriptions(s). Please call pharmacy at:    [facility]: [phone]  [facility]: [phone]  [facility]: [phone]              [facility]      Pain Infusion Aftercare Instructions      1. It is normal to feel sedated, tired and low in energy after a pain infusion. DO NOT DRIVE, OPERATE ANY MACHINERY, OR MAKE ANY IMPORTANT DECISIONS FOR AT LEAST 24 HOURS AFTER THE INFUSION.     2. Call the pain center at [phone] with any problems, questions, or concerns.     3. Eat light after the infusion. If you feel queasy or sick to your stomach, laying down with your eyes closed may help. When you resume eating start with something mild like clear liquids, yogurt, applesauce, crackers, etc… Gradually advance to a regular diet.     4. Do not leave your house alone the evening of your pain infusion.     5. No alcohol or sedative medications, such as sleeping pills, for 24 hours after your pain infusion.     6. Resume all other prescribed medications unless directed otherwise by you physician.     7. If you have any medical emergencies, call 911 or go directly to the closest emergency room.

## 2025-01-29 ENCOUNTER — HOSPITAL ENCOUNTER (OUTPATIENT)
Facility: HOSPITAL | Age: 55
Setting detail: OBSERVATION
Discharge: HOME | End: 2025-02-01
Attending: EMERGENCY MEDICINE | Admitting: INTERNAL MEDICINE
Payer: COMMERCIAL

## 2025-01-29 ENCOUNTER — APPOINTMENT (OUTPATIENT)
Dept: RADIOLOGY | Facility: HOSPITAL | Age: 55
End: 2025-01-29
Payer: COMMERCIAL

## 2025-01-29 ENCOUNTER — APPOINTMENT (OUTPATIENT)
Dept: CARDIOLOGY | Facility: HOSPITAL | Age: 55
End: 2025-01-29
Payer: COMMERCIAL

## 2025-01-29 ENCOUNTER — OFFICE VISIT (OUTPATIENT)
Dept: PAIN MEDICINE | Facility: CLINIC | Age: 55
End: 2025-01-29
Payer: COMMERCIAL

## 2025-01-29 VITALS — DIASTOLIC BLOOD PRESSURE: 89 MMHG | HEART RATE: 79 BPM | SYSTOLIC BLOOD PRESSURE: 144 MMHG

## 2025-01-29 DIAGNOSIS — R55 SYNCOPE, UNSPECIFIED SYNCOPE TYPE: Primary | ICD-10-CM

## 2025-01-29 DIAGNOSIS — R55 SYNCOPE AND COLLAPSE: ICD-10-CM

## 2025-01-29 DIAGNOSIS — R91.1 LUNG NODULE: ICD-10-CM

## 2025-01-29 DIAGNOSIS — M47.816 LUMBAR SPONDYLOSIS: Primary | ICD-10-CM

## 2025-01-29 DIAGNOSIS — M54.50 ACUTE LOW BACK PAIN WITHOUT SCIATICA, UNSPECIFIED BACK PAIN LATERALITY: ICD-10-CM

## 2025-01-29 LAB
ALBUMIN SERPL BCP-MCNC: 3.9 G/DL (ref 3.4–5)
ALP SERPL-CCNC: 64 U/L (ref 33–120)
ALT SERPL W P-5'-P-CCNC: 19 U/L (ref 10–52)
ANION GAP SERPL CALC-SCNC: 12 MMOL/L (ref 10–20)
AST SERPL W P-5'-P-CCNC: 18 U/L (ref 9–39)
BASOPHILS # BLD AUTO: 0.08 X10*3/UL (ref 0–0.1)
BASOPHILS NFR BLD AUTO: 1 %
BILIRUB SERPL-MCNC: 0.7 MG/DL (ref 0–1.2)
BUN SERPL-MCNC: 14 MG/DL (ref 6–23)
CALCIUM SERPL-MCNC: 8.5 MG/DL (ref 8.6–10.3)
CARDIAC TROPONIN I PNL SERPL HS: 40 NG/L (ref 0–20)
CARDIAC TROPONIN I PNL SERPL HS: 46 NG/L (ref 0–20)
CHLORIDE SERPL-SCNC: 106 MMOL/L (ref 98–107)
CO2 SERPL-SCNC: 27 MMOL/L (ref 21–32)
CREAT SERPL-MCNC: 0.64 MG/DL (ref 0.5–1.3)
EGFRCR SERPLBLD CKD-EPI 2021: >90 ML/MIN/1.73M*2
EOSINOPHIL # BLD AUTO: 0.39 X10*3/UL (ref 0–0.7)
EOSINOPHIL NFR BLD AUTO: 4.7 %
ERYTHROCYTE [DISTWIDTH] IN BLOOD BY AUTOMATED COUNT: 14.7 % (ref 11.5–14.5)
GLUCOSE SERPL-MCNC: 110 MG/DL (ref 74–99)
HCT VFR BLD AUTO: 39.7 % (ref 41–52)
HGB BLD-MCNC: 12.6 G/DL (ref 13.5–17.5)
IMM GRANULOCYTES # BLD AUTO: 0.05 X10*3/UL (ref 0–0.7)
IMM GRANULOCYTES NFR BLD AUTO: 0.6 % (ref 0–0.9)
LYMPHOCYTES # BLD AUTO: 1.98 X10*3/UL (ref 1.2–4.8)
LYMPHOCYTES NFR BLD AUTO: 23.7 %
MAGNESIUM SERPL-MCNC: 1.77 MG/DL (ref 1.6–2.4)
MCH RBC QN AUTO: 28 PG (ref 26–34)
MCHC RBC AUTO-ENTMCNC: 31.7 G/DL (ref 32–36)
MCV RBC AUTO: 88 FL (ref 80–100)
MONOCYTES # BLD AUTO: 0.49 X10*3/UL (ref 0.1–1)
MONOCYTES NFR BLD AUTO: 5.9 %
NEUTROPHILS # BLD AUTO: 5.38 X10*3/UL (ref 1.2–7.7)
NEUTROPHILS NFR BLD AUTO: 64.1 %
NRBC BLD-RTO: 0 /100 WBCS (ref 0–0)
PLATELET # BLD AUTO: 227 X10*3/UL (ref 150–450)
POTASSIUM SERPL-SCNC: 3.6 MMOL/L (ref 3.5–5.3)
PROT SERPL-MCNC: 6.4 G/DL (ref 6.4–8.2)
RBC # BLD AUTO: 4.5 X10*6/UL (ref 4.5–5.9)
SODIUM SERPL-SCNC: 141 MMOL/L (ref 136–145)
WBC # BLD AUTO: 8.4 X10*3/UL (ref 4.4–11.3)

## 2025-01-29 PROCEDURE — 99221 1ST HOSP IP/OBS SF/LOW 40: CPT

## 2025-01-29 PROCEDURE — 71275 CT ANGIOGRAPHY CHEST: CPT | Performed by: RADIOLOGY

## 2025-01-29 PROCEDURE — 70450 CT HEAD/BRAIN W/O DYE: CPT

## 2025-01-29 PROCEDURE — G0378 HOSPITAL OBSERVATION PER HR: HCPCS

## 2025-01-29 PROCEDURE — 80053 COMPREHEN METABOLIC PANEL: CPT

## 2025-01-29 PROCEDURE — 70450 CT HEAD/BRAIN W/O DYE: CPT | Performed by: RADIOLOGY

## 2025-01-29 PROCEDURE — 83735 ASSAY OF MAGNESIUM: CPT

## 2025-01-29 PROCEDURE — 36415 COLL VENOUS BLD VENIPUNCTURE: CPT

## 2025-01-29 PROCEDURE — 72128 CT CHEST SPINE W/O DYE: CPT | Performed by: RADIOLOGY

## 2025-01-29 PROCEDURE — 84484 ASSAY OF TROPONIN QUANT: CPT

## 2025-01-29 PROCEDURE — 72131 CT LUMBAR SPINE W/O DYE: CPT | Performed by: RADIOLOGY

## 2025-01-29 PROCEDURE — 93010 ELECTROCARDIOGRAM REPORT: CPT | Performed by: EMERGENCY MEDICINE

## 2025-01-29 PROCEDURE — 99291 CRITICAL CARE FIRST HOUR: CPT | Performed by: EMERGENCY MEDICINE

## 2025-01-29 PROCEDURE — 72125 CT NECK SPINE W/O DYE: CPT

## 2025-01-29 PROCEDURE — 2500000004 HC RX 250 GENERAL PHARMACY W/ HCPCS (ALT 636 FOR OP/ED): Mod: JZ

## 2025-01-29 PROCEDURE — 99212 OFFICE O/P EST SF 10 MIN: CPT

## 2025-01-29 PROCEDURE — 72125 CT NECK SPINE W/O DYE: CPT | Performed by: RADIOLOGY

## 2025-01-29 PROCEDURE — 2550000001 HC RX 255 CONTRASTS: Performed by: EMERGENCY MEDICINE

## 2025-01-29 PROCEDURE — 93005 ELECTROCARDIOGRAM TRACING: CPT | Mod: 59

## 2025-01-29 PROCEDURE — 71275 CT ANGIOGRAPHY CHEST: CPT

## 2025-01-29 PROCEDURE — 96376 TX/PRO/DX INJ SAME DRUG ADON: CPT

## 2025-01-29 PROCEDURE — 2500000005 HC RX 250 GENERAL PHARMACY W/O HCPCS

## 2025-01-29 PROCEDURE — 72128 CT CHEST SPINE W/O DYE: CPT | Mod: RCN

## 2025-01-29 PROCEDURE — 74174 CTA ABD&PLVS W/CONTRAST: CPT | Performed by: RADIOLOGY

## 2025-01-29 PROCEDURE — 99291 CRITICAL CARE FIRST HOUR: CPT | Mod: 25 | Performed by: EMERGENCY MEDICINE

## 2025-01-29 PROCEDURE — 72131 CT LUMBAR SPINE W/O DYE: CPT | Mod: RCN

## 2025-01-29 PROCEDURE — 2500000001 HC RX 250 WO HCPCS SELF ADMINISTERED DRUGS (ALT 637 FOR MEDICARE OP)

## 2025-01-29 PROCEDURE — 73560 X-RAY EXAM OF KNEE 1 OR 2: CPT | Mod: RT

## 2025-01-29 PROCEDURE — 93005 ELECTROCARDIOGRAM TRACING: CPT

## 2025-01-29 PROCEDURE — 73560 X-RAY EXAM OF KNEE 1 OR 2: CPT | Mod: RIGHT SIDE | Performed by: RADIOLOGY

## 2025-01-29 PROCEDURE — 85025 COMPLETE CBC W/AUTO DIFF WBC: CPT

## 2025-01-29 PROCEDURE — 96374 THER/PROPH/DIAG INJ IV PUSH: CPT

## 2025-01-29 RX ORDER — PANTOPRAZOLE SODIUM 40 MG/1
40 TABLET, DELAYED RELEASE ORAL
Status: DISCONTINUED | OUTPATIENT
Start: 2025-01-30 | End: 2025-02-01 | Stop reason: HOSPADM

## 2025-01-29 RX ORDER — DOCUSATE SODIUM 100 MG/1
100 CAPSULE, LIQUID FILLED ORAL 2 TIMES DAILY
COMMUNITY

## 2025-01-29 RX ORDER — OXYCODONE HYDROCHLORIDE 5 MG/1
5 TABLET ORAL 2 TIMES DAILY PRN
COMMUNITY

## 2025-01-29 RX ORDER — EZETIMIBE 10 MG/1
10 TABLET ORAL DAILY
Status: DISCONTINUED | OUTPATIENT
Start: 2025-01-30 | End: 2025-02-01 | Stop reason: HOSPADM

## 2025-01-29 RX ORDER — LANOLIN ALCOHOL/MO/W.PET/CERES
400 CREAM (GRAM) TOPICAL DAILY
Status: DISCONTINUED | OUTPATIENT
Start: 2025-01-30 | End: 2025-02-01 | Stop reason: HOSPADM

## 2025-01-29 RX ORDER — BACLOFEN 10 MG/1
20 TABLET ORAL 3 TIMES DAILY
Status: DISCONTINUED | OUTPATIENT
Start: 2025-01-29 | End: 2025-02-01 | Stop reason: HOSPADM

## 2025-01-29 RX ORDER — ATORVASTATIN CALCIUM 80 MG/1
80 TABLET, FILM COATED ORAL DAILY
Status: DISCONTINUED | OUTPATIENT
Start: 2025-01-30 | End: 2025-02-01 | Stop reason: HOSPADM

## 2025-01-29 RX ORDER — POLYETHYLENE GLYCOL 3350 17 G/17G
17 POWDER, FOR SOLUTION ORAL DAILY PRN
Status: DISCONTINUED | OUTPATIENT
Start: 2025-01-29 | End: 2025-02-01 | Stop reason: HOSPADM

## 2025-01-29 RX ORDER — DULOXETIN HYDROCHLORIDE 60 MG/1
60 CAPSULE, DELAYED RELEASE ORAL DAILY
Status: DISCONTINUED | OUTPATIENT
Start: 2025-01-30 | End: 2025-01-30

## 2025-01-29 RX ORDER — ORPHENADRINE CITRATE 100 MG/1
100 TABLET, EXTENDED RELEASE ORAL ONCE
Status: COMPLETED | OUTPATIENT
Start: 2025-01-29 | End: 2025-01-30

## 2025-01-29 RX ORDER — METOPROLOL SUCCINATE 25 MG/1
25 TABLET, EXTENDED RELEASE ORAL DAILY
Status: DISCONTINUED | OUTPATIENT
Start: 2025-01-30 | End: 2025-02-01 | Stop reason: HOSPADM

## 2025-01-29 RX ORDER — OXYCODONE HYDROCHLORIDE 5 MG/1
5 TABLET ORAL 2 TIMES DAILY PRN
Status: DISCONTINUED | OUTPATIENT
Start: 2025-01-29 | End: 2025-01-30

## 2025-01-29 RX ORDER — HYDROMORPHONE HYDROCHLORIDE 0.2 MG/ML
0.2 INJECTION INTRAMUSCULAR; INTRAVENOUS; SUBCUTANEOUS ONCE
Status: COMPLETED | OUTPATIENT
Start: 2025-01-29 | End: 2025-01-29

## 2025-01-29 RX ORDER — DIAZEPAM 5 MG/1
5 TABLET ORAL 2 TIMES DAILY PRN
COMMUNITY

## 2025-01-29 RX ORDER — DIAZEPAM 5 MG/1
5 TABLET ORAL 2 TIMES DAILY PRN
Status: DISCONTINUED | OUTPATIENT
Start: 2025-01-29 | End: 2025-02-01 | Stop reason: HOSPADM

## 2025-01-29 RX ORDER — PREGABALIN 150 MG/1
300 CAPSULE ORAL 2 TIMES DAILY
Status: DISCONTINUED | OUTPATIENT
Start: 2025-01-29 | End: 2025-02-01 | Stop reason: HOSPADM

## 2025-01-29 RX ORDER — ACETAMINOPHEN 325 MG/1
1000 TABLET ORAL 3 TIMES DAILY
Status: DISCONTINUED | OUTPATIENT
Start: 2025-01-29 | End: 2025-02-01 | Stop reason: HOSPADM

## 2025-01-29 RX ORDER — SILDENAFIL 100 MG/1
100 TABLET, FILM COATED ORAL DAILY PRN
COMMUNITY

## 2025-01-29 RX ORDER — DOCUSATE SODIUM 100 MG/1
100 CAPSULE, LIQUID FILLED ORAL 2 TIMES DAILY
Status: DISCONTINUED | OUTPATIENT
Start: 2025-01-29 | End: 2025-02-01 | Stop reason: HOSPADM

## 2025-01-29 RX ORDER — TALC
3 POWDER (GRAM) TOPICAL NIGHTLY PRN
Status: DISCONTINUED | OUTPATIENT
Start: 2025-01-29 | End: 2025-02-01 | Stop reason: HOSPADM

## 2025-01-29 RX ADMIN — OXYCODONE HYDROCHLORIDE 5 MG: 5 TABLET ORAL at 22:16

## 2025-01-29 RX ADMIN — IOHEXOL 90 ML: 350 INJECTION, SOLUTION INTRAVENOUS at 16:57

## 2025-01-29 RX ADMIN — HYDROMORPHONE HYDROCHLORIDE 0.2 MG: 0.2 INJECTION, SOLUTION INTRAMUSCULAR; INTRAVENOUS; SUBCUTANEOUS at 22:18

## 2025-01-29 RX ADMIN — PREGABALIN 300 MG: 150 CAPSULE ORAL at 22:16

## 2025-01-29 RX ADMIN — Medication 3 MG: at 22:16

## 2025-01-29 RX ADMIN — ACETAMINOPHEN 325MG 975 MG: 325 TABLET ORAL at 22:16

## 2025-01-29 RX ADMIN — DOCUSATE SODIUM 100 MG: 100 CAPSULE, LIQUID FILLED ORAL at 22:16

## 2025-01-29 RX ADMIN — HYDROMORPHONE HYDROCHLORIDE 0.2 MG: 0.2 INJECTION, SOLUTION INTRAMUSCULAR; INTRAVENOUS; SUBCUTANEOUS at 18:16

## 2025-01-29 SDOH — SOCIAL STABILITY: SOCIAL INSECURITY: HAVE YOU HAD THOUGHTS OF HARMING ANYONE ELSE?: UNABLE TO ASSESS

## 2025-01-29 SDOH — SOCIAL STABILITY: SOCIAL INSECURITY: DOES ANYONE TRY TO KEEP YOU FROM HAVING/CONTACTING OTHER FRIENDS OR DOING THINGS OUTSIDE YOUR HOME?: NO

## 2025-01-29 SDOH — SOCIAL STABILITY: SOCIAL INSECURITY: ARE THERE ANY APPARENT SIGNS OF INJURIES/BEHAVIORS THAT COULD BE RELATED TO ABUSE/NEGLECT?: NO

## 2025-01-29 SDOH — ECONOMIC STABILITY: HOUSING INSECURITY: IN THE PAST 12 MONTHS, HOW MANY TIMES HAVE YOU MOVED WHERE YOU WERE LIVING?: 1

## 2025-01-29 SDOH — SOCIAL STABILITY: SOCIAL INSECURITY: DO YOU FEEL ANYONE HAS EXPLOITED OR TAKEN ADVANTAGE OF YOU FINANCIALLY OR OF YOUR PERSONAL PROPERTY?: NO

## 2025-01-29 SDOH — SOCIAL STABILITY: SOCIAL INSECURITY
WITHIN THE LAST YEAR, HAVE YOU BEEN RAPED OR FORCED TO HAVE ANY KIND OF SEXUAL ACTIVITY BY YOUR PARTNER OR EX-PARTNER?: NO

## 2025-01-29 SDOH — SOCIAL STABILITY: SOCIAL INSECURITY: WITHIN THE LAST YEAR, HAVE YOU BEEN HUMILIATED OR EMOTIONALLY ABUSED IN OTHER WAYS BY YOUR PARTNER OR EX-PARTNER?: NO

## 2025-01-29 SDOH — SOCIAL STABILITY: SOCIAL INSECURITY: HAS ANYONE EVER THREATENED TO HURT YOUR FAMILY OR YOUR PETS?: NO

## 2025-01-29 SDOH — ECONOMIC STABILITY: FOOD INSECURITY
WITHIN THE PAST 12 MONTHS, YOU WORRIED THAT YOUR FOOD WOULD RUN OUT BEFORE YOU GOT THE MONEY TO BUY MORE.: PATIENT DECLINED

## 2025-01-29 SDOH — SOCIAL STABILITY: SOCIAL INSECURITY: WERE YOU ABLE TO COMPLETE ALL THE BEHAVIORAL HEALTH SCREENINGS?: YES

## 2025-01-29 SDOH — SOCIAL STABILITY: SOCIAL INSECURITY: WITHIN THE LAST YEAR, HAVE YOU BEEN AFRAID OF YOUR PARTNER OR EX-PARTNER?: NO

## 2025-01-29 SDOH — ECONOMIC STABILITY: INCOME INSECURITY
IN THE PAST 12 MONTHS HAS THE ELECTRIC, GAS, OIL, OR WATER COMPANY THREATENED TO SHUT OFF SERVICES IN YOUR HOME?: PATIENT DECLINED

## 2025-01-29 SDOH — ECONOMIC STABILITY: FOOD INSECURITY: WITHIN THE PAST 12 MONTHS, THE FOOD YOU BOUGHT JUST DIDN'T LAST AND YOU DIDN'T HAVE MONEY TO GET MORE.: PATIENT DECLINED

## 2025-01-29 SDOH — SOCIAL STABILITY: SOCIAL INSECURITY: HAVE YOU HAD ANY THOUGHTS OF HARMING ANYONE ELSE?: NO

## 2025-01-29 SDOH — ECONOMIC STABILITY: HOUSING INSECURITY: AT ANY TIME IN THE PAST 12 MONTHS, WERE YOU HOMELESS OR LIVING IN A SHELTER (INCLUDING NOW)?: PATIENT DECLINED

## 2025-01-29 SDOH — ECONOMIC STABILITY: HOUSING INSECURITY: IN THE LAST 12 MONTHS, WAS THERE A TIME WHEN YOU WERE NOT ABLE TO PAY THE MORTGAGE OR RENT ON TIME?: PATIENT DECLINED

## 2025-01-29 SDOH — SOCIAL STABILITY: SOCIAL INSECURITY: DO YOU FEEL UNSAFE GOING BACK TO THE PLACE WHERE YOU ARE LIVING?: NO

## 2025-01-29 SDOH — SOCIAL STABILITY: SOCIAL INSECURITY: ARE YOU OR HAVE YOU BEEN THREATENED OR ABUSED PHYSICALLY, EMOTIONALLY, OR SEXUALLY BY ANYONE?: NO

## 2025-01-29 SDOH — SOCIAL STABILITY: SOCIAL INSECURITY: ABUSE: ADULT

## 2025-01-29 SDOH — ECONOMIC STABILITY: TRANSPORTATION INSECURITY
IN THE PAST 12 MONTHS, HAS LACK OF TRANSPORTATION KEPT YOU FROM MEDICAL APPOINTMENTS OR FROM GETTING MEDICATIONS?: PATIENT DECLINED

## 2025-01-29 SDOH — ECONOMIC STABILITY: FOOD INSECURITY: HOW HARD IS IT FOR YOU TO PAY FOR THE VERY BASICS LIKE FOOD, HOUSING, MEDICAL CARE, AND HEATING?: PATIENT DECLINED

## 2025-01-29 ASSESSMENT — PAIN DESCRIPTION - LOCATION
LOCATION: BACK
LOCATION: KNEE
LOCATION: BACK

## 2025-01-29 ASSESSMENT — LIFESTYLE VARIABLES
SKIP TO QUESTIONS 9-10: 1
AUDIT-C TOTAL SCORE: 0
TOTAL SCORE: 0
EVER FELT BAD OR GUILTY ABOUT YOUR DRINKING: NO
AUDIT-C TOTAL SCORE: 0
HAVE PEOPLE ANNOYED YOU BY CRITICIZING YOUR DRINKING: NO
HOW MANY STANDARD DRINKS CONTAINING ALCOHOL DO YOU HAVE ON A TYPICAL DAY: PATIENT DOES NOT DRINK
EVER HAD A DRINK FIRST THING IN THE MORNING TO STEADY YOUR NERVES TO GET RID OF A HANGOVER: NO
HAVE YOU EVER FELT YOU SHOULD CUT DOWN ON YOUR DRINKING: NO
HOW OFTEN DO YOU HAVE 6 OR MORE DRINKS ON ONE OCCASION: NEVER
HOW OFTEN DO YOU HAVE A DRINK CONTAINING ALCOHOL: NEVER

## 2025-01-29 ASSESSMENT — ACTIVITIES OF DAILY LIVING (ADL)
GROOMING: INDEPENDENT
LACK_OF_TRANSPORTATION: PATIENT DECLINED
HEARING - LEFT EAR: FUNCTIONAL
DRESSING YOURSELF: NEEDS ASSISTANCE
HEARING - RIGHT EAR: FUNCTIONAL
BATHING: NEEDS ASSISTANCE
JUDGMENT_ADEQUATE_SAFELY_COMPLETE_DAILY_ACTIVITIES: YES
WALKS IN HOME: NEEDS ASSISTANCE
ADEQUATE_TO_COMPLETE_ADL: YES
PATIENT'S MEMORY ADEQUATE TO SAFELY COMPLETE DAILY ACTIVITIES?: YES
FEEDING YOURSELF: INDEPENDENT
TOILETING: NEEDS ASSISTANCE
LACK_OF_TRANSPORTATION: PATIENT DECLINED
ASSISTIVE_DEVICE: EYEGLASSES

## 2025-01-29 ASSESSMENT — PAIN SCALES - GENERAL
PAINLEVEL_OUTOF10: 10 - WORST POSSIBLE PAIN
PAINLEVEL_OUTOF10: 10 - WORST POSSIBLE PAIN
PAINLEVEL_OUTOF10: 0 - NO PAIN
PAINLEVEL_OUTOF10: 10 - WORST POSSIBLE PAIN
PAINLEVEL_OUTOF10: 10 - WORST POSSIBLE PAIN
PAINLEVEL_OUTOF10: 5 - MODERATE PAIN
PAINLEVEL_OUTOF10: 6

## 2025-01-29 ASSESSMENT — COGNITIVE AND FUNCTIONAL STATUS - GENERAL
MOBILITY SCORE: 24
PATIENT BASELINE BEDBOUND: NO
DAILY ACTIVITIY SCORE: 24

## 2025-01-29 ASSESSMENT — PATIENT HEALTH QUESTIONNAIRE - PHQ9
SUM OF ALL RESPONSES TO PHQ9 QUESTIONS 1 & 2: 6
2. FEELING DOWN, DEPRESSED OR HOPELESS: NEARLY EVERY DAY
1. LITTLE INTEREST OR PLEASURE IN DOING THINGS: NEARLY EVERY DAY

## 2025-01-29 ASSESSMENT — PAIN DESCRIPTION - PAIN TYPE: TYPE: CHRONIC PAIN;ACUTE PAIN

## 2025-01-29 ASSESSMENT — PAIN SCALES - WONG BAKER: WONGBAKER_NUMERICALRESPONSE: HURTS WORST

## 2025-01-29 ASSESSMENT — PAIN - FUNCTIONAL ASSESSMENT
PAIN_FUNCTIONAL_ASSESSMENT: 0-10

## 2025-01-29 ASSESSMENT — PAIN DESCRIPTION - ORIENTATION: ORIENTATION: LEFT

## 2025-01-29 ASSESSMENT — PAIN DESCRIPTION - DESCRIPTORS: DESCRIPTORS: ACHING;BURNING;THROBBING

## 2025-01-29 NOTE — ED PROVIDER NOTES
EMERGENCY DEPARTMENT ENCOUNTER      Pt Name: Akin Lynn  MRN: [id number]  Birthdate 1970  Date of evaluation: 1/29/2025  Provider: Leslie Julien MD    CHIEF COMPLAINT       Chief Complaint   Patient presents with    Back Pain    Syncope     HISTORY OF PRESENT ILLNESS    Akin Lynn is a 54 y.o. year old male who presents to the ER for back pain and syncope.  The patient reports that he was bending over to take care of his cats when he felt sharp pains in his back.  He felt lightheaded so he went to the couch to sit but he missed the couch and fell on his back, hitting his head.  He denies losing control of his bowels or bladder.  The patient reports that he had a nerve block and the L4-S1 levels yesterday.  Patient reports that he has had chronic back pain since December 23, 2024.  He currently sees a pain specialist and orthopedic surgery for his back pain.    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     PAST MEDICAL HISTORY     Past Medical History:   Diagnosis Date    Anesthesia of skin     Numbness and tingling    Anxiety 09/21/2023    Arthritis Unk    Bilateral myopia 09/21/2023    Cervical radicular pain 09/21/2023    Chronic patellofemoral pain of left knee 09/21/2023    Complex regional pain syndrome type 1 of left lower extremity 09/21/2023    COVID-19     VACCINATED    Cubital tunnel syndrome on left 09/21/2023    Cubital tunnel syndrome on right 09/21/2023    Disease of intestine, unspecified     Bowel trouble    Erectile dysfunction 09/21/2023    Foot joint pain 09/21/2023    Fractures     Gastroparesis     GERD (gastroesophageal reflux disease)     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    History of psychological trauma 09/21/2023    Hyperlipidemia     Joint pain 09/21/2023    Kidney cysts 09/21/2023    Low back pain, unspecified 12/07/2022    Chronic low back pain without sciatica, unspecified back  pain laterality    Low back pain, unspecified 08/09/2022    Lumbago    Lumbar spondylosis 09/21/2023    Lung nodule 09/21/2023    RIGHT    Metatarsalgia of left foot 09/21/2023    Myopia, bilateral 02/19/2019    Myopia of both eyes with astigmatism and presbyopia    Osteochondral defect of patella 09/21/2023    Osteoporosis     Other forms of angina pectoris     Stable angina pectoris    Other spondylosis, cervical region 09/21/2023    Patellofemoral arthritis 09/21/2023    Personal history of other diseases of the musculoskeletal system and connective tissue     History of arthritis    Personal history of other specified conditions     History of seizure    Reflex sympathetic dystrophy 09/21/2023    Scapular dyskinesis 09/21/2023    Seizure disorder (Multi)     D/T TRAMADOL REACTION - 25 YEARS AGO    Shoulder impingement 09/21/2023    Suspected sleep apnea 09/21/2023    Trauma and stressor-related disorder 09/21/2023    Trochanteric bursitis of both hips 09/22/2023    Unspecified disorder of ear, unspecified ear     Ear, nose and throat disorder    Wears glasses      CURRENT MEDICATIONS       Previous Medications    ACETAMINOPHEN (TYLENOL) 500 MG TABLET    Take 2 tablets (1,000 mg) by mouth every 8 hours if needed for mild pain (1 - 3).    ALBUTEROL (VENTOLIN HFA) 90 MCG/ACTUATION INHALER    Inhale 2 puffs every 4 hours if needed for wheezing.    ATORVASTATIN (LIPITOR) 80 MG TABLET    Take 1 tablet (80 mg) by mouth once daily at bedtime.    BACLOFEN (LIORESAL) 20 MG TABLET    Take 1 tablet (20 mg) by mouth 3 times a day.    CELECOXIB (CELEBREX) 200 MG CAPSULE    Take 1 capsule (200 mg) by mouth 2 times a day.    DIAZEPAM (VALIUM) 5 MG TABLET    Take 1 tablet (5 mg) by mouth every 12 hours if needed for anxiety.    DULOXETINE (CYMBALTA) 60 MG DR CAPSULE    Take 1 capsule (60 mg) by mouth once daily.    EZETIMIBE (ZETIA) 10 MG TABLET    Take 1 tablet (10 mg) by mouth once daily.    FLUTICASONE (FLONASE) 50  MCG/ACTUATION NASAL SPRAY    Administer 1 spray into each nostril once daily as needed.    KETAMINE HCL (KETAMINE, BULK,) 100 % POWDER    Ketamine 100 mg/mL + lidocaine 40 mg/mL 1 puff 4 times daily as needed, DSP#20 mL x 5 refills    LIDOCAINE (LIDODERM) 5 % PATCH    apply 2 patches to affected area daily as needed.    MAGNESIUM OXIDE (MAG-OX) 400 MG (241.3 MG MAGNESIUM) TABLET    Take 1 tablet (400 mg) by mouth once daily.    METOPROLOL SUCCINATE XL (TOPROL-XL) 25 MG 24 HR TABLET    Take 1 tablet (25 mg) by mouth once daily. Do not crush or chew.    OMEPRAZOLE (PRILOSEC) 40 MG DR CAPSULE    Take 1 capsule (40 mg) by mouth 2 times a day. For 90 days    PREGABALIN (LYRICA) 300 MG CAPSULE    Take 1 capsule (300 mg) by mouth 2 times a day.     SURGICAL HISTORY       Past Surgical History:   Procedure Laterality Date    ACHILLES TENDON SURGERY  1978 2016    CARDIAC CATHETERIZATION N/A 12/20/2024    Procedure: Left Heart Cath;  Surgeon: Kale Funk MD;  Location: [facility] Cardiac Cath Lab;  Service: Cardiovascular;  Laterality: N/A;    CT ABDOMEN PELVIS ANGIOGRAM W AND/OR WO IV CONTRAST  03/18/2020    CT ABDOMEN PELVIS ANGIOGRAM W AND/OR WO IV CONTRAST 3/18/2020 [facility] EMERGENCY LEGACY    CT ANGIO NECK  12/16/2022    CT NECK ANGIO W AND WO IV CONTRAST 12/16/2022 DOCTOR OFFICE LEGACY    CT HEAD ANGIO W AND WO IV CONTRAST  12/16/2022    CT HEAD [facility] W AND WO [facility]    HIP SURGERY Left     REPLACEMENT    JOINT REPLACEMENT      LEFT KNEE    KNEE ARTHROPLASTY Bilateral 06/13/2017    Knee Arthroplasty    OTHER SURGICAL HISTORY  01/06/2022    Ulnar nerve neuroplasty    OTHER SURGICAL HISTORY Bilateral 07/26/2019    Foot surgery  -- HARDWARE    OTHER SURGICAL HISTORY Bilateral     BILATERAL ANKLE SCOPE    OTHER SURGICAL HISTORY      POP ABDOMINAL PROCEDURE    PLANTAR FASCIA RELEASE      SHOULDER SURGERY Bilateral 06/13/2017    Shoulder Surgery     ALLERGIES     Morphine, Ultram  [tramadol], Alprazolam, Ketorolac, Fish containing products, and Opioids - morphine analogues  FAMILY HISTORY       Family History   Problem Relation Name Age of Onset    Lung cancer Mother      Glaucoma Father      Skin cancer Father      Hypertension Other mul fam mem     Heart disease Other mul fam mem      SOCIAL HISTORY       Social History     Tobacco Use    Smoking status: Never    Smokeless tobacco: Never    Tobacco comments:     It killed my mother smoking and she left me with a gift a nodule on my right lung   Vaping Use    Vaping status: Never Used   Substance Use Topics    Alcohol use: Not Currently    Drug use: Not Currently     Comment: Ketamine treatments for pain,eatable marijuana     PHYSICAL EXAM  (up to 7 for level 4, 8 or more for level 5)     ED Triage Vitals [01/29/25 1542]   Temperature Heart Rate Respirations BP   36.6 °C (97.9 °F) 89 16 128/89      Pulse Ox Temp Source Heart Rate Source Patient Position   97 % Temporal Monitor Lying      BP Location FiO2 (%)     Left arm --       Physical Exam  Constitutional:       Appearance: Normal appearance.   HENT:      Head: Normocephalic and atraumatic. No Cagle's sign or contusion.      Comments: Tenderness to palpation in the occiput     Nose: Nose normal.   Eyes:      General:         Right eye: No discharge.         Left eye: No discharge.      Extraocular Movements: Extraocular movements intact.      Conjunctiva/sclera: Conjunctivae normal.   Cardiovascular:      Rate and Rhythm: Regular rhythm. Tachycardia present.      Pulses: Normal pulses.      Heart sounds: Normal heart sounds. No murmur heard.  Pulmonary:      Effort: Pulmonary effort is normal.      Breath sounds: Normal breath sounds.   Abdominal:      General: Abdomen is flat. Bowel sounds are normal.      Palpations: Abdomen is soft.      Tenderness: There is no abdominal tenderness. There is no guarding or rebound.   Musculoskeletal:         General: No swelling or signs of injury.       Cervical back: Normal range of motion and neck supple. Tenderness present. No rigidity.      Right lower leg: No edema.      Left lower leg: No edema.   Skin:     General: Skin is warm and dry.      Capillary Refill: Capillary refill takes less than 2 seconds.   Neurological:      General: No focal deficit present.      Mental Status: He is alert and oriented to person, place, and time.      Motor: Weakness present.        DIAGNOSTIC RESULTS   LABS:  Labs Reviewed   CBC WITH AUTO DIFFERENTIAL - Abnormal       Result Value    WBC 8.4      nRBC 0.0      RBC 4.50      Hemoglobin 12.6 (*)     Hematocrit 39.7 (*)     MCV 88      MCH 28.0      MCHC 31.7 (*)     RDW 14.7 (*)     Platelets 227      Neutrophils % 64.1      Immature Granulocytes %, Automated 0.6      Lymphocytes % 23.7      Monocytes % 5.9      Eosinophils % 4.7      Basophils % 1.0      Neutrophils Absolute 5.38      Immature Granulocytes Absolute, Automated 0.05      Lymphocytes Absolute 1.98      Monocytes Absolute 0.49      Eosinophils Absolute 0.39      Basophils Absolute 0.08     COMPREHENSIVE METABOLIC PANEL - Abnormal    Glucose 110 (*)     Sodium 141      Potassium 3.6      Chloride 106      Bicarbonate 27      Anion Gap 12      Urea Nitrogen 14      Creatinine 0.64      eGFR >90      Calcium 8.5 (*)     Albumin 3.9      Alkaline Phosphatase 64      Total Protein 6.4      AST 18      Bilirubin, Total 0.7      ALT 19     SERIAL TROPONIN-INITIAL - Abnormal    Troponin I, High Sensitivity 46 (*)     Narrative:     Less than 99th percentile of normal range cutoff-  Female and children under 18 years old <14 ng/L; Male <21 ng/L: Negative  Repeat testing should be performed if clinically indicated.     Female and children under 18 years old 14-50 ng/L; Male 21-50 ng/L:  Consistent with possible cardiac damage and possible increased clinical   risk. Serial measurements may help to assess extent of myocardial damage.     >50 ng/L: Consistent with  cardiac damage, increased clinical risk and  myocardial infarction. Serial measurements may help assess extent of   myocardial damage.      NOTE: Children less than 1 year old may have higher baseline troponin   levels and results should be interpreted in conjunction with the overall   clinical context.     NOTE: Troponin I testing is performed using a different   testing methodology at [facility] than at other   [facility]. Direct result comparisons should only   be made within the same method.   SERIAL TROPONIN, 1 HOUR - Abnormal    Troponin I, High Sensitivity 40 (*)     Narrative:     Less than 99th percentile of normal range cutoff-  Female and children under 18 years old <14 ng/L; Male <21 ng/L: Negative  Repeat testing should be performed if clinically indicated.     Female and children under 18 years old 14-50 ng/L; Male 21-50 ng/L:  Consistent with possible cardiac damage and possible increased clinical   risk. Serial measurements may help to assess extent of myocardial damage.     >50 ng/L: Consistent with cardiac damage, increased clinical risk and  myocardial infarction. Serial measurements may help assess extent of   myocardial damage.      NOTE: Children less than 1 year old may have higher baseline troponin   levels and results should be interpreted in conjunction with the overall   clinical context.     NOTE: Troponin I testing is performed using a different   testing methodology at [facility] than at other   [facility]. Direct result comparisons should only   be made within the same method.   MAGNESIUM - Normal    Magnesium 1.77     TROPONIN SERIES- (INITIAL, 1 HR)    Narrative:     The following orders were created for panel order Troponin I Series, High Sensitivity (0, 1 HR).  Procedure                               Abnormality         Status                     ---------                               -----------         ------                     Troponin  I, High Sensiti...[[id number]]  Abnormal            Final result               Troponin, High Sensitivi...[[id number]]  Abnormal            Final result                 Please view results for these tests on the individual orders.     All other labs were within normal range or not returned as of this dictation.  Imaging  CT angio chest abdomen pelvis   Final Result   No evidence of aortic aneurysm, dissection, or acute intramural   hematoma. Atherosclerosis.        Right middle lobe 4 mm pulmonary nodule, stable from prior chest CT.        Nonobstructive bilateral renal calculi.        No acute traumatic abnormality in the thoracic or lumbar spine.        Signed by: Miguel Simpson 1/29/2025 6:07 PM   Dictation workstation:   [id number]      CT thoracic spine wo IV contrast   Final Result   No evidence of aortic aneurysm, dissection, or acute intramural   hematoma. Atherosclerosis.        Right middle lobe 4 mm pulmonary nodule, stable from prior chest CT.        Nonobstructive bilateral renal calculi.        No acute traumatic abnormality in the thoracic or lumbar spine.        Signed by: Miguel Simpson 1/29/2025 6:07 PM   Dictation workstation:   [id number]      CT lumbar spine wo IV contrast   Final Result   No evidence of aortic aneurysm, dissection, or acute intramural   hematoma. Atherosclerosis.        Right middle lobe 4 mm pulmonary nodule, stable from prior chest CT.        Nonobstructive bilateral renal calculi.        No acute traumatic abnormality in the thoracic or lumbar spine.        Signed by: Miguel Simpson 1/29/2025 6:07 PM   Dictation workstation:   [id number]      XR knee right 1-2 views   Final Result   No acute osseous abnormality.        Signed by: Miguel Simpson 1/29/2025 6:13 PM   Dictation workstation:   [id number]      CT head wo IV contrast   Final Result   No definite acute intracranial abnormality. Consider follow-up with   MRI as warranted.             Signed by: Miguel  "Tatiana 1/29/2025 5:45 PM   Dictation workstation:   [id number]      CT cervical spine wo IV contrast   Final Result   No evidence for an acute fracture or subluxation of the cervical   spine.        Signed by: Miguel Simpson 1/29/2025 6:12 PM   Dictation workstation:   [id number]         Procedure  Procedures  EMERGENCY DEPARTMENT COURSE/MDM:   Medical Decision Making    Vitals:    Vitals:    01/29/25 1542 01/29/25 1721 01/29/25 1820 01/29/25 1901   BP: 128/89 117/64 119/68 106/59   BP Location: Left arm Left arm Left arm Left arm   Patient Position: Lying   Lying   Pulse: 89 89 78 65   Resp: 16 16 16 16   Temp: 36.6 °C (97.9 °F) 36.4 °C (97.5 °F) 36.4 °C (97.5 °F) 36.4 °C (97.5 °F)   TempSrc: Temporal  Temporal Temporal   SpO2: 97% 97% 98%    Weight: 74.4 kg (164 lb)      Height: 1.74 m (5' 8.5\")        Akin Lynn is a male 54 y.o. who presents to the ER for back pain and syncope. On arrival the patients vital signs were: Afebrile, regular heart rate, normotensive, regular respiration rate, normoxic on room air. History obtained from: patient.     Differential diagnoses include head injury, cervical spine injury.     Physical exam significant for neck tenderness to the trapezius muscle, no midline cervical spine pain.  ROM is full, upper and lower extremity strength is 3 out of 5, limited by pain.  Coordination is intact, sensation is intact.  Patient able to squeeze his glutes.  There is no saddle anesthesia.  The patient refused the c-collar.    CBC was within normal limits, hemoglobin was 12.6, hematocrit 39.7 slightly decreased from his baseline.  CMP was within normal limits, no evidence of electrolyte abnormalities, KANDY, or acute liver dysfunction.  Magnesium was 1.77, troponins are 46, within his normal limits     CT of the head did not show any intracranial bleeds, fractures, acute stroke.  CT of the cervical spine did not show any acute fractures or subluxations.  There is moderate right-sided " facet arthropathy C3-C4 and C4-C5. Mild multilevel facet arthropathy on the left. CT of the lumbar spine did not show acute fractures or subluxations.  There is mild facet arthropathy in the lower lumbar spine. No prevertebral hematoma.  CT of the thoracic spine was within normal limits, no fractures or subluxations.  CTA of the chest abdomen pelvis did not show evidence of aortic aneurysm or dissection.  There is a right middle lobe 4 mm pulmonary nodule, stable from prior chest CT and nonobstructive bilateral renal calculi.  X-ray of the right knee is within normal limits.    The patient requested pain medication, complained that his back hurts.  On review of his previous ED visits the patient tolerated Dilaudid before so he was given Dilaudid here in the ED.    Patient was signed out to [facility] at 1900 pending completion of their work-up.  Please see the next provider's transition of care note for the remainder of the patient's care.      ED Course as of 01/29/25 1907 Wed Jan 29, 2025   1833 CT head wo IV contrast  EKG: Sinus rhythm at 72 bpm.  .  .  QTc 451.  Normal axis.  No ST elevation, no acute ischemic pattern [PS]      ED Course User Index  [PS] Darrius Abreu, DO           ED Medications administered this visit:    Medications   iohexol (OMNIPaque) 350 mg iodine/mL solution 90 mL (90 mL intravenous Given 1/29/25 1657)   HYDROmorphone PF (Dilaudid) injection 0.2 mg (0.2 mg intravenous Given 1/29/25 1816)       New Prescriptions from this visit:    New Prescriptions    No medications on file       Follow-up:  No follow-up provider specified.      Final Impression: No diagnosis found.      Please excuse any misspellings or unintended errors related to the Dragon speech recognition software used to dictate this note.    I reviewed the case with the attending ED physician. The attending ED physician agrees with the plan.      Leslie Julien MD  Resident  01/29/25 1907

## 2025-01-29 NOTE — H&P (VIEW-ONLY)
Subjective   Patient ID: Akin Lynn is a 54 y.o. male who presents for Back Pain (Patient here to discuss pain injections,states back pain is 6/10).  HPI    55 yo male presents today for follow up on low back pain. He has undergone two diagnostic medial nerve branch blocks and reports 90% pain relief for 24 hours from the last injection. He would like to schedule lumbar RFA. States that he fell 3 days ago, had syncopal episode. Was seen in the ED, CT of head/cervical spine was completed. He is reporting persistent pain in the mid- low back. States that this pain has been improving. Denies bowel or bladder incontinence. Denies worsening of numbness or tingling. Pain today is rated 6/10 in the low back , will radiate down both legs when bending forward. Had ketamine infusion yesterday with Dr. Alonzo.     Review of Systems  All 13 systems were reviewed and are within normal levels except as noted below or per HPI. Positive and pertinent negative responses are noted below or in the HPI   Denied any fever or chills. No weight loss and no night sweats. No cough or sputum production. No diarrhea   Denies constipation.   No bladder and bowel incontinence and no other changes in bladder and bowel. No skin changes. Reports tiredness and fatigability only if the pain is not controlled.   Denied opioids diversion and abuse and denies alcoholism. Denies overuse of the pain medications.      Objective   Physical Exam  General   Alert and oriented x4, pleasant and cooperative.      HEENT  Pupils are equal and normal in size. Ears, nose, mouth, and throat appear to be WNL.  Head atraumatic, symmetric.      No signs of sedation or signs of withdrawal apparent.     Psychiatric   No signs of depression apparent. Appropriate mood and affect.     Neuro   No focal neurological deficit apparent. Ambulation at baseline.      Respiratory  No respiratory distress, respirations equal and unlabored.     Abdomen  Soft and nontender, no  distention noted.     Skin  Warm, dry and intact. No skin markings supportive of recent IV drug usage .            Assessment/Plan     53yo male with chronic low back pain associated with lumbar spondylosis.     Schedule RFA medial nerve branch blocks bilateral L4- L5, L5- S1 under fluoroscopic guidance.     Follow up as needed.   Explained plan to this patient, and patient verbalized understanding and agreement with the plan.     Please do not hesitate to contact the pain clinic after your visit with any questions or concerns at  M-F 8-4 pm     Patient was reminded not to share medications, not to take prescription medications that were not prescribed to the patient, and not to increase or change dose without consulting the pain clinic. I advised the patient to always take the least amount of medication needed to keep symptoms under control.          Kayla Corona, MIKAELA-CNP 01/29/25 9:55 AM

## 2025-01-29 NOTE — PROGRESS NOTES
Subjective   Patient ID: Akin Lynn is a 54 y.o. male who presents for Back Pain (Patient here to discuss pain injections,states back pain is 6/10).  HPI    53 yo male presents today for follow up on low back pain. He has undergone two diagnostic medial nerve branch blocks and reports 90% pain relief for 24 hours from the last injection. He would like to schedule lumbar RFA. States that he fell 3 days ago, had syncopal episode. Was seen in the ED, CT of head/cervical spine was completed. He is reporting persistent pain in the mid- low back. States that this pain has been improving. Denies bowel or bladder incontinence. Denies worsening of numbness or tingling. Pain today is rated 6/10 in the low back , will radiate down both legs when bending forward. Had ketamine infusion yesterday with Dr. Alonzo.     Review of Systems  All 13 systems were reviewed and are within normal levels except as noted below or per HPI. Positive and pertinent negative responses are noted below or in the HPI   Denied any fever or chills. No weight loss and no night sweats. No cough or sputum production. No diarrhea   Denies constipation.   No bladder and bowel incontinence and no other changes in bladder and bowel. No skin changes. Reports tiredness and fatigability only if the pain is not controlled.   Denied opioids diversion and abuse and denies alcoholism. Denies overuse of the pain medications.      Objective   Physical Exam  General   Alert and oriented x4, pleasant and cooperative.      HEENT  Pupils are equal and normal in size. Ears, nose, mouth, and throat appear to be WNL.  Head atraumatic, symmetric.      No signs of sedation or signs of withdrawal apparent.     Psychiatric   No signs of depression apparent. Appropriate mood and affect.     Neuro   No focal neurological deficit apparent. Ambulation at baseline.      Respiratory  No respiratory distress, respirations equal and unlabored.     Abdomen  Soft and nontender, no  distention noted.     Skin  Warm, dry and intact. No skin markings supportive of recent IV drug usage .            Assessment/Plan     55yo male with chronic low back pain associated with lumbar spondylosis.     Schedule RFA medial nerve branch blocks bilateral L4- L5, L5- S1 under fluoroscopic guidance.     Follow up as needed.   Explained plan to this patient, and patient verbalized understanding and agreement with the plan.     Please do not hesitate to contact the pain clinic after your visit with any questions or concerns at  M-F 8-4 pm     Patient was reminded not to share medications, not to take prescription medications that were not prescribed to the patient, and not to increase or change dose without consulting the pain clinic. I advised the patient to always take the least amount of medication needed to keep symptoms under control.          Kayla Corona, MIKAELA-CNP 01/29/25 9:55 AM

## 2025-01-30 ENCOUNTER — APPOINTMENT (OUTPATIENT)
Dept: RADIOLOGY | Facility: HOSPITAL | Age: 55
End: 2025-01-30
Payer: COMMERCIAL

## 2025-01-30 ENCOUNTER — APPOINTMENT (OUTPATIENT)
Dept: CARDIOLOGY | Facility: HOSPITAL | Age: 55
End: 2025-01-30
Payer: COMMERCIAL

## 2025-01-30 PROBLEM — M54.9 CHRONIC BACK PAIN: Status: ACTIVE | Noted: 2025-01-30

## 2025-01-30 PROBLEM — M54.9 BACK PAIN: Status: ACTIVE | Noted: 2023-09-21

## 2025-01-30 PROBLEM — S27.0XXA PNEUMOTHORAX, TRAUMATIC: Status: ACTIVE | Noted: 2025-01-30

## 2025-01-30 PROBLEM — G89.29 CHRONIC BACK PAIN: Status: ACTIVE | Noted: 2025-01-30

## 2025-01-30 LAB
ANION GAP SERPL CALC-SCNC: 14 MMOL/L (ref 10–20)
AORTIC VALVE PEAK VELOCITY: 1.38 M/S
AV PEAK GRADIENT: 8 MMHG
AVA (PEAK VEL): 2.69 CM2
BUN SERPL-MCNC: 14 MG/DL (ref 6–23)
CALCIUM SERPL-MCNC: 8.6 MG/DL (ref 8.6–10.3)
CARDIAC TROPONIN I PNL SERPL HS: 39 NG/L (ref 0–20)
CHLORIDE SERPL-SCNC: 106 MMOL/L (ref 98–107)
CO2 SERPL-SCNC: 25 MMOL/L (ref 21–32)
CREAT SERPL-MCNC: 0.71 MG/DL (ref 0.5–1.3)
EGFRCR SERPLBLD CKD-EPI 2021: >90 ML/MIN/1.73M*2
EJECTION FRACTION APICAL 4 CHAMBER: 55.1
EJECTION FRACTION: 58 %
ERYTHROCYTE [DISTWIDTH] IN BLOOD BY AUTOMATED COUNT: 15.1 % (ref 11.5–14.5)
GLUCOSE BLD MANUAL STRIP-MCNC: 103 MG/DL (ref 74–99)
GLUCOSE SERPL-MCNC: 82 MG/DL (ref 74–99)
HCT VFR BLD AUTO: 41.9 % (ref 41–52)
HGB BLD-MCNC: 13 G/DL (ref 13.5–17.5)
LEFT VENTRICLE INTERNAL DIMENSION DIASTOLE: 4.16 CM (ref 3.5–6)
LEFT VENTRICULAR OUTFLOW TRACT DIAMETER: 2.1 CM
LV EJECTION FRACTION BIPLANE: 57 %
MAGNESIUM SERPL-MCNC: 1.93 MG/DL (ref 1.6–2.4)
MCH RBC QN AUTO: 28.3 PG (ref 26–34)
MCHC RBC AUTO-ENTMCNC: 31 G/DL (ref 32–36)
MCV RBC AUTO: 91 FL (ref 80–100)
MITRAL VALVE E/A RATIO: 1.2
MITRAL VALVE E/E' RATIO: 10.5
NRBC BLD-RTO: 0 /100 WBCS (ref 0–0)
PLATELET # BLD AUTO: 204 X10*3/UL (ref 150–450)
POTASSIUM SERPL-SCNC: 3.7 MMOL/L (ref 3.5–5.3)
RBC # BLD AUTO: 4.6 X10*6/UL (ref 4.5–5.9)
RIGHT VENTRICLE FREE WALL PEAK S': 13 CM/S
RIGHT VENTRICLE PEAK SYSTOLIC PRESSURE: 25.3 MMHG
SODIUM SERPL-SCNC: 141 MMOL/L (ref 136–145)
TRICUSPID ANNULAR PLANE SYSTOLIC EXCURSION: 2 CM
WBC # BLD AUTO: 7.2 X10*3/UL (ref 4.4–11.3)

## 2025-01-30 PROCEDURE — 2500000002 HC RX 250 W HCPCS SELF ADMINISTERED DRUGS (ALT 637 FOR MEDICARE OP, ALT 636 FOR OP/ED): Performed by: NURSE PRACTITIONER

## 2025-01-30 PROCEDURE — 2500000002 HC RX 250 W HCPCS SELF ADMINISTERED DRUGS (ALT 637 FOR MEDICARE OP, ALT 636 FOR OP/ED)

## 2025-01-30 PROCEDURE — 99223 1ST HOSP IP/OBS HIGH 75: CPT | Performed by: STUDENT IN AN ORGANIZED HEALTH CARE EDUCATION/TRAINING PROGRAM

## 2025-01-30 PROCEDURE — 93306 TTE W/DOPPLER COMPLETE: CPT | Performed by: INTERNAL MEDICINE

## 2025-01-30 PROCEDURE — 2500000001 HC RX 250 WO HCPCS SELF ADMINISTERED DRUGS (ALT 637 FOR MEDICARE OP): Performed by: NURSE PRACTITIONER

## 2025-01-30 PROCEDURE — 99291 CRITICAL CARE FIRST HOUR: CPT | Performed by: STUDENT IN AN ORGANIZED HEALTH CARE EDUCATION/TRAINING PROGRAM

## 2025-01-30 PROCEDURE — 83735 ASSAY OF MAGNESIUM: CPT

## 2025-01-30 PROCEDURE — 85027 COMPLETE CBC AUTOMATED: CPT

## 2025-01-30 PROCEDURE — 2500000004 HC RX 250 GENERAL PHARMACY W/ HCPCS (ALT 636 FOR OP/ED): Performed by: NURSE PRACTITIONER

## 2025-01-30 PROCEDURE — 72192 CT PELVIS W/O DYE: CPT

## 2025-01-30 PROCEDURE — 2500000004 HC RX 250 GENERAL PHARMACY W/ HCPCS (ALT 636 FOR OP/ED): Performed by: INTERNAL MEDICINE

## 2025-01-30 PROCEDURE — 36415 COLL VENOUS BLD VENIPUNCTURE: CPT

## 2025-01-30 PROCEDURE — 80048 BASIC METABOLIC PNL TOTAL CA: CPT

## 2025-01-30 PROCEDURE — 99254 IP/OBS CNSLTJ NEW/EST MOD 60: CPT | Performed by: INTERNAL MEDICINE

## 2025-01-30 PROCEDURE — 72192 CT PELVIS W/O DYE: CPT | Performed by: RADIOLOGY

## 2025-01-30 PROCEDURE — 73080 X-RAY EXAM OF ELBOW: CPT | Mod: RT

## 2025-01-30 PROCEDURE — G0378 HOSPITAL OBSERVATION PER HR: HCPCS

## 2025-01-30 PROCEDURE — 99231 SBSQ HOSP IP/OBS SF/LOW 25: CPT | Performed by: NURSE PRACTITIONER

## 2025-01-30 PROCEDURE — 2500000004 HC RX 250 GENERAL PHARMACY W/ HCPCS (ALT 636 FOR OP/ED): Mod: JZ

## 2025-01-30 PROCEDURE — 73030 X-RAY EXAM OF SHOULDER: CPT | Mod: RIGHT SIDE | Performed by: RADIOLOGY

## 2025-01-30 PROCEDURE — 71045 X-RAY EXAM CHEST 1 VIEW: CPT | Mod: FOREIGN READ | Performed by: RADIOLOGY

## 2025-01-30 PROCEDURE — 73030 X-RAY EXAM OF SHOULDER: CPT | Mod: RT

## 2025-01-30 PROCEDURE — 93306 TTE W/DOPPLER COMPLETE: CPT

## 2025-01-30 PROCEDURE — 94760 N-INVAS EAR/PLS OXIMETRY 1: CPT

## 2025-01-30 PROCEDURE — 99222 1ST HOSP IP/OBS MODERATE 55: CPT | Performed by: INTERNAL MEDICINE

## 2025-01-30 PROCEDURE — 96376 TX/PRO/DX INJ SAME DRUG ADON: CPT

## 2025-01-30 PROCEDURE — 2500000001 HC RX 250 WO HCPCS SELF ADMINISTERED DRUGS (ALT 637 FOR MEDICARE OP)

## 2025-01-30 PROCEDURE — 96375 TX/PRO/DX INJ NEW DRUG ADDON: CPT

## 2025-01-30 PROCEDURE — 2500000005 HC RX 250 GENERAL PHARMACY W/O HCPCS: Performed by: NURSE PRACTITIONER

## 2025-01-30 PROCEDURE — 84484 ASSAY OF TROPONIN QUANT: CPT

## 2025-01-30 PROCEDURE — 72131 CT LUMBAR SPINE W/O DYE: CPT

## 2025-01-30 PROCEDURE — 71045 X-RAY EXAM CHEST 1 VIEW: CPT

## 2025-01-30 PROCEDURE — 82947 ASSAY GLUCOSE BLOOD QUANT: CPT

## 2025-01-30 PROCEDURE — 73080 X-RAY EXAM OF ELBOW: CPT | Mod: RIGHT SIDE | Performed by: RADIOLOGY

## 2025-01-30 PROCEDURE — 72131 CT LUMBAR SPINE W/O DYE: CPT | Performed by: RADIOLOGY

## 2025-01-30 RX ORDER — METHYLPREDNISOLONE 4 MG/1
24 TABLET ORAL ONCE
Status: COMPLETED | OUTPATIENT
Start: 2025-01-30 | End: 2025-01-30

## 2025-01-30 RX ORDER — METHYLPREDNISOLONE 4 MG/1
12 TABLET ORAL ONCE
Status: DISCONTINUED | OUTPATIENT
Start: 2025-02-02 | End: 2025-02-01 | Stop reason: HOSPADM

## 2025-01-30 RX ORDER — METHYLPREDNISOLONE 4 MG/1
16 TABLET ORAL ONCE
Status: COMPLETED | OUTPATIENT
Start: 2025-02-01 | End: 2025-02-01

## 2025-01-30 RX ORDER — OXYCODONE HYDROCHLORIDE 10 MG/1
10 TABLET ORAL EVERY 6 HOURS PRN
Status: DISCONTINUED | OUTPATIENT
Start: 2025-01-30 | End: 2025-02-01 | Stop reason: HOSPADM

## 2025-01-30 RX ORDER — HYDROMORPHONE HYDROCHLORIDE 0.2 MG/ML
0.2 INJECTION INTRAMUSCULAR; INTRAVENOUS; SUBCUTANEOUS ONCE
Status: COMPLETED | OUTPATIENT
Start: 2025-01-30 | End: 2025-01-30

## 2025-01-30 RX ORDER — METHYLPREDNISOLONE 4 MG/1
4 TABLET ORAL ONCE
Status: DISCONTINUED | OUTPATIENT
Start: 2025-02-04 | End: 2025-02-01 | Stop reason: HOSPADM

## 2025-01-30 RX ORDER — POTASSIUM CHLORIDE 20 MEQ/1
20 TABLET, EXTENDED RELEASE ORAL ONCE
Status: COMPLETED | OUTPATIENT
Start: 2025-01-30 | End: 2025-01-30

## 2025-01-30 RX ORDER — METHYLPREDNISOLONE 4 MG/1
20 TABLET ORAL ONCE
Status: COMPLETED | OUTPATIENT
Start: 2025-01-31 | End: 2025-01-31

## 2025-01-30 RX ORDER — NAPROXEN SODIUM 220 MG/1
81 TABLET, FILM COATED ORAL DAILY
Status: DISCONTINUED | OUTPATIENT
Start: 2025-01-30 | End: 2025-02-01 | Stop reason: HOSPADM

## 2025-01-30 RX ORDER — METHYLPREDNISOLONE 4 MG/1
8 TABLET ORAL ONCE
Status: DISCONTINUED | OUTPATIENT
Start: 2025-02-03 | End: 2025-02-01 | Stop reason: HOSPADM

## 2025-01-30 RX ORDER — DEXAMETHASONE SODIUM PHOSPHATE 10 MG/ML
6 INJECTION INTRAMUSCULAR; INTRAVENOUS ONCE
Status: COMPLETED | OUTPATIENT
Start: 2025-01-30 | End: 2025-01-30

## 2025-01-30 RX ORDER — DULOXETIN HYDROCHLORIDE 30 MG/1
30 CAPSULE, DELAYED RELEASE ORAL ONCE
Status: COMPLETED | OUTPATIENT
Start: 2025-01-30 | End: 2025-01-30

## 2025-01-30 RX ORDER — LIDOCAINE 560 MG/1
2 PATCH PERCUTANEOUS; TOPICAL; TRANSDERMAL DAILY
Status: DISCONTINUED | OUTPATIENT
Start: 2025-01-30 | End: 2025-02-01 | Stop reason: HOSPADM

## 2025-01-30 RX ADMIN — ORPHENADRINE CITRATE 100 MG: 100 TABLET, EXTENDED RELEASE ORAL at 02:20

## 2025-01-30 RX ADMIN — DOCUSATE SODIUM 100 MG: 100 CAPSULE, LIQUID FILLED ORAL at 20:08

## 2025-01-30 RX ADMIN — PANTOPRAZOLE SODIUM 40 MG: 40 TABLET, DELAYED RELEASE ORAL at 05:56

## 2025-01-30 RX ADMIN — DIAZEPAM 5 MG: 5 TABLET ORAL at 19:09

## 2025-01-30 RX ADMIN — DULOXETINE HYDROCHLORIDE 30 MG: 30 CAPSULE, DELAYED RELEASE ORAL at 13:43

## 2025-01-30 RX ADMIN — DIAZEPAM 5 MG: 5 TABLET ORAL at 09:46

## 2025-01-30 RX ADMIN — ACETAMINOPHEN 325MG 975 MG: 325 TABLET ORAL at 08:26

## 2025-01-30 RX ADMIN — BACLOFEN 20 MG: 10 TABLET ORAL at 08:26

## 2025-01-30 RX ADMIN — OXYCODONE HYDROCHLORIDE 10 MG: 10 TABLET ORAL at 23:53

## 2025-01-30 RX ADMIN — ACETAMINOPHEN 325MG 975 MG: 325 TABLET ORAL at 20:07

## 2025-01-30 RX ADMIN — Medication 400 MG: at 08:26

## 2025-01-30 RX ADMIN — PREGABALIN 300 MG: 150 CAPSULE ORAL at 08:25

## 2025-01-30 RX ADMIN — BACLOFEN 20 MG: 10 TABLET ORAL at 16:13

## 2025-01-30 RX ADMIN — BACLOFEN 20 MG: 10 TABLET ORAL at 19:53

## 2025-01-30 RX ADMIN — DEXAMETHASONE SODIUM PHOSPHATE 6 MG: 10 INJECTION INTRAMUSCULAR; INTRAVENOUS at 18:11

## 2025-01-30 RX ADMIN — METOPROLOL SUCCINATE 25 MG: 25 TABLET, EXTENDED RELEASE ORAL at 08:25

## 2025-01-30 RX ADMIN — ACETAMINOPHEN 325MG 975 MG: 325 TABLET ORAL at 16:13

## 2025-01-30 RX ADMIN — OXYCODONE HYDROCHLORIDE 10 MG: 10 TABLET ORAL at 19:09

## 2025-01-30 RX ADMIN — DULOXETINE HYDROCHLORIDE 60 MG: 60 CAPSULE, DELAYED RELEASE ORAL at 08:26

## 2025-01-30 RX ADMIN — OXYCODONE HYDROCHLORIDE 10 MG: 10 TABLET ORAL at 13:43

## 2025-01-30 RX ADMIN — EZETIMIBE 10 MG: 10 TABLET ORAL at 08:26

## 2025-01-30 RX ADMIN — ASPIRIN 81 MG CHEWABLE TABLET 81 MG: 81 TABLET CHEWABLE at 08:26

## 2025-01-30 RX ADMIN — POTASSIUM CHLORIDE 20 MEQ: 1500 TABLET, EXTENDED RELEASE ORAL at 08:26

## 2025-01-30 RX ADMIN — HYDROMORPHONE HYDROCHLORIDE 0.2 MG: 0.2 INJECTION, SOLUTION INTRAMUSCULAR; INTRAVENOUS; SUBCUTANEOUS at 05:55

## 2025-01-30 RX ADMIN — PREGABALIN 300 MG: 150 CAPSULE ORAL at 19:51

## 2025-01-30 RX ADMIN — ATORVASTATIN CALCIUM 80 MG: 80 TABLET, FILM COATED ORAL at 08:26

## 2025-01-30 RX ADMIN — DOCUSATE SODIUM 100 MG: 100 CAPSULE, LIQUID FILLED ORAL at 08:26

## 2025-01-30 RX ADMIN — OXYCODONE HYDROCHLORIDE 5 MG: 5 TABLET ORAL at 08:28

## 2025-01-30 RX ADMIN — METHYLPREDNISOLONE 24 MG: 4 TABLET ORAL at 16:13

## 2025-01-30 RX ADMIN — LIDOCAINE 4% 2 PATCH: 40 PATCH TOPICAL at 16:13

## 2025-01-30 ASSESSMENT — COGNITIVE AND FUNCTIONAL STATUS - GENERAL
MOBILITY SCORE: 17
DRESSING REGULAR UPPER BODY CLOTHING: A LITTLE
TURNING FROM BACK TO SIDE WHILE IN FLAT BAD: A LITTLE
MOVING FROM LYING ON BACK TO SITTING ON SIDE OF FLAT BED WITH BEDRAILS: A LITTLE
MOVING TO AND FROM BED TO CHAIR: A LITTLE
CLIMB 3 TO 5 STEPS WITH RAILING: A LOT
TOILETING: A LITTLE
WALKING IN HOSPITAL ROOM: A LITTLE
HELP NEEDED FOR BATHING: A LITTLE
STANDING UP FROM CHAIR USING ARMS: A LITTLE
DRESSING REGULAR LOWER BODY CLOTHING: A LITTLE
PERSONAL GROOMING: A LITTLE
DAILY ACTIVITIY SCORE: 19

## 2025-01-30 ASSESSMENT — PAIN SCALES - GENERAL
PAINLEVEL_OUTOF10: 10 - WORST POSSIBLE PAIN
PAINLEVEL_OUTOF10: 8
PAINLEVEL_OUTOF10: 10 - WORST POSSIBLE PAIN
PAINLEVEL_OUTOF10: 10 - WORST POSSIBLE PAIN
PAINLEVEL_OUTOF10: 8
PAINLEVEL_OUTOF10: 10 - WORST POSSIBLE PAIN
PAINLEVEL_OUTOF10: 4
PAINLEVEL_OUTOF10: 8

## 2025-01-30 ASSESSMENT — ENCOUNTER SYMPTOMS
DIFFICULTY URINATING: 1
SHORTNESS OF BREATH: 0
AGITATION: 0
NERVOUS/ANXIOUS: 0
FLANK PAIN: 0
DIFFICULTY URINATING: 0
CONSTIPATION: 0
CHEST TIGHTNESS: 0
WOUND: 0
WEAKNESS: 0
TROUBLE SWALLOWING: 0
VOMITING: 0
ABDOMINAL PAIN: 0
FATIGUE: 0
ACTIVITY CHANGE: 1
CHILLS: 0
COLOR CHANGE: 0
DIARRHEA: 0
COUGH: 0
NECK PAIN: 1
DIZZINESS: 1
NAUSEA: 0
DYSURIA: 0
DYSPHORIC MOOD: 0
LIGHT-HEADEDNESS: 0
CONFUSION: 0
LIGHT-HEADEDNESS: 1
FREQUENCY: 0
SORE THROAT: 0
PALPITATIONS: 0
HEMATURIA: 0
BACK PAIN: 1
FEVER: 0

## 2025-01-30 ASSESSMENT — PAIN - FUNCTIONAL ASSESSMENT
PAIN_FUNCTIONAL_ASSESSMENT: 0-10
PAIN_FUNCTIONAL_ASSESSMENT: 0-10
PAIN_FUNCTIONAL_ASSESSMENT: CPOT (CRITICAL CARE PAIN OBSERVATION TOOL)
PAIN_FUNCTIONAL_ASSESSMENT: 0-10

## 2025-01-30 ASSESSMENT — VISUAL ACUITY: OU: 1

## 2025-01-30 ASSESSMENT — PAIN DESCRIPTION - DESCRIPTORS
DESCRIPTORS: ACHING
DESCRIPTORS: ACHING;BURNING;SHARP;SHOOTING

## 2025-01-30 ASSESSMENT — PAIN DESCRIPTION - LOCATION: LOCATION: GENERALIZED

## 2025-01-30 NOTE — PROGRESS NOTES
Patient was handed off to me from the previous team. For full history, physical, and prior ED course, please see previous provider note prior to patient handoff. This is an addendum to the record.    This is a 54-year-old male presenting to the ED for back pain and syncope.  Admission is pending at the time of signout.    Hospital Course/MDM:  Please see ED provider note for hospital course and MDM    Disposition: Hospital admission  Patient admitted to medicine under Dr. Martinez.  Patient agrees to hospital admission at this time.    Jarrett Whitfield PA-C  Emergency Medicine

## 2025-01-30 NOTE — ED PROCEDURE NOTE
Procedure  Critical Care    Performed by: Akin Grubbs DO  Authorized by: Akin Grubbs DO    Critical care provider statement:     Critical care time (minutes):  35    Critical care time was exclusive of:  Separately billable procedures and treating other patients and teaching time    Critical care was time spent personally by me on the following activities:  Ordering and performing treatments and interventions, ordering and review of laboratory studies, ordering and review of radiographic studies, pulse oximetry, re-evaluation of patient's condition and obtaining history from patient or surrogate  Comments:      Care time due to ruling out aortic dissection               Akin Grubbs DO  01/29/25 [id number]
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
## 2025-01-30 NOTE — CONSULTS
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 Mild isodense enlargement of the visualized proximal right thigh musculature   Dense excreted contrast material in the urinary bladder. No significant free fluid in the cul-de-sac.       Status post bilateral GILBERT with intact hardware and no evidence of Kamille implant fracture. No evidence of acute displaced osseous pelvic fracture.   Slight enlargement of the visualized proximal right thigh musculature which could be related to contusion in the setting of trauma.   MACRO: None.   Signed by: Keli Garay 1/30/2025 8:33 AM Dictation workstation:   [id number]    CT lumbar spine wo IV contrast    Result Date: 1/30/2025  Interpreted By:  Keli Garay, STUDY: CT LUMBAR SPINE WO IV CONTRAST  1/30/2025 6:32 am   INDICATION: Signs/Symptoms:Fall, worsening lower back pain and numbness/tingling     COMPARISON: 01/29/2025   ACCESSION NUMBER(S): [id number]   ORDERING CLINICIAN: JOYCE ARREOLA   TECHNIQUE: Contiguous axial CT images were obtained through the  lumbar spine without contrast administration. Sagittal and coronal reconstructions were generated.   FINDINGS:     ALIGNMENT: No significant spondylolisthesis.    VERTEBRAE/DISC SPACES: No compression deformity or acute displaced fracture.  Lumbar vertebral heights and intervertebral disc spaces are preserved. Mild L5-S1 disc bulge.   ADDITIONAL FINDINGS: Metallic streak artifact from bilateral hip arthroplasties on the most caudal images. Dense likely excreted contrast material in nondilated proximal renal collecting systems and partially included urinary bladder. 4.1 cm hypodense lesion exophytic from the right kidney again seen.       No lumbar vertebral displacement, compression deformity or acute displaced fracture. No significant change from previous day's exam.   MACRO: None.   Signed by: Keli Garay 1/30/2025 8:26 AM Dictation workstation:   [id number]    XR shoulder right 2+ views    Result Date: 1/30/2025  Interpreted By:  Keli Garay, STUDY: XR SHOULDER RIGHT 2+ VIEWS;  1/30/2025 6:26 am   INDICATION: Signs/Symptoms:Fall, right shoulder pain.     COMPARISON: None.   ACCESSION NUMBER(S): [id number]   ORDERING CLINICIAN: JOYCE ARREOLA   FINDINGS: Two views of the right shoulder obtained.   Acute fracture or dislocation. Glenohumeral and acromiohumeral spaces are preserved. AC joint narrowing and spurring.   Small right pneumothorax.       No evidence of fracture in the right shoulder.   Small right pneumothorax.   MACRO: None.   Signed by: Keli Garay 1/30/2025 8:22 AM Dictation workstation:   [id number]    XR elbow right 3+ views    Result Date: 1/30/2025  Interpreted By:  Keli Garay, STUDY: XR ELBOW RIGHT 3+ VIEWS;  1/30/2025 6:26 am   INDICATION: Signs/Symptoms:Fall, right elbow pain.     COMPARISON: None.   ACCESSION NUMBER(S): [id number]   ORDERING CLINICIAN: JOYCE ARREOLA   FINDINGS: Five views of the right elbow obtained.   Artifact from overlying IV catheter and tubing. There is a kink in the IV catheter near its insertion. No acute fracture or osseous displacement. Elbow joint space is preserved. No significant joint effusion. Probable dorsal soft  tissue swelling.       No evidence of fracture.   MACRO: None.   Signed by: Keli Garay 1/30/2025 8:20 AM Dictation workstation:   [id number]    XR knee right 1-2 views    Result Date: 1/29/2025  Interpreted By:  Miguel Simpson, STUDY: XR KNEE RIGHT 1-2 VIEWS; ;  1/29/2025 5:35 pm   INDICATION: Signs/Symptoms:pain, fall.   COMPARISON: None.   ACCESSION NUMBER(S): [id number]   ORDERING CLINICIAN: JACLYN SAGASTUME   FINDINGS: No acute fracture or dislocation. No significant soft tissue swelling. No joint effusion.       No acute osseous abnormality.   Signed by: Miguel Simpson 1/29/2025 6:13 PM Dictation workstation:   [id number]    CT cervical spine wo IV contrast    Result Date: 1/29/2025  Interpreted By:  Miguel Simpson, STUDY: CT CERVICAL SPINE WO IV CONTRAST;  1/29/2025 5:13 pm   INDICATION: Signs/Symptoms:fell, hit head, complains of neck pain.   COMPARISON: 01/26/2025   ACCESSION NUMBER(S): [id number]   ORDERING CLINICIAN: JACLYN SAGASTUME   TECHNIQUE: Axial CT images of the cervical spine are obtained. Axial, coronal and sagittal reconstructions are provided for review.   FINDINGS: No acute fracture or subluxation. No vertebral body or disc height loss. Small marginal osteophytes lower cervical spine. Moderate right-sided facet arthropathy C3-C4 and C4-C5. Mild multilevel facet arthropathy on the left. No prevertebral hematoma.       No evidence for an acute fracture or subluxation of the cervical spine.   Signed by: Miguel Simpson 1/29/2025 6:12 PM Dictation workstation:   [id number]    CT angio chest abdomen pelvis    Result Date: 1/29/2025  Interpreted By:  Miguel Simpson, STUDY: CT ANGIO CHEST ABDOMEN PELVIS; CT LUMBAR SPINE WO IV CONTRAST; CT THORACIC SPINE WO IV CONTRAST;  1/29/2025 5:37 pm   INDICATION: Signs/Symptoms:back pain, syncope, r/o dissection; Signs/Symptoms:upper lumbar pain after fall; Signs/Symptoms:low thoracic pain.   COMPARISON: CT chest dated 01/26/2025, CT urogram  dated 12/26/2024   ACCESSION NUMBER(S): [id number]; [id number]; [id number]   ORDERING CLINICIAN: JACLYN SAGASTUME   TECHNIQUE: CT of the chest, abdomen, and pelvis was performed with and without contrast. Contiguous axial images were obtained at 3 mm slice thickness through the chest, abdomen and pelvis. Coronal and sagittal reconstructions at 3 mm slice thickness were performed. 90 ml of contrast Omnipaque 350 were administered intravenously without immediate complication. 3D reconstructions.   Noncontrast axial images through the thoracic spine. Sagittal and coronal reconstructions. Noncontrast axial images through the lumbar spine. Sagittal and coronal reconstructions.   FINDINGS: Lungs and Pleura: Streaky opacities are seen in the lungs dependently and bilaterally. Right middle lobe 4 mm pulmonary nodule. No pulmonary consolidation.  No pleural effusion. No pneumothorax.   Mediastinum: No adenopathy by CT size criteria. No cardiomegaly or pericardial effusion. Small hiatal hernia.   Liver: Focal fatty infiltrate along the falciform ligament.   Gallbladder and Biliary: Unremarkable.   Pancreas: No abnormality identified in the pancreas.   Spleen: No abnormality identified in the spleen.   Adrenals: No abnormality identified in either adrenal gland.   Urinary: Multiple small 1-3 mm nonobstructive calculi in the kidneys bilaterally, greater in number on the right than the left. Upper pole cyst in the right kidney. Probable small parapelvic cyst in the right kidney lower pole. No hydronephrosis.   Gastrointestinal/Peritoneum: No small or large bowel obstruction in the visualized abdomen. In the abdomen, there is no extraluminal air. No significant free fluid. No evidence of acute appendicitis.   Vascular: No evidence of aortic aneurysm, dissection, or acute intramural hematoma. Mild atherosclerosis.   Lymphatics: No enlarged lymph nodes by size criteria.   MSK/Body Wall/Chest Wall: No aggressive bony lesion  identified. Bilateral hip arthroplasties with associated streak artifact limiting evaluation for pelvic structures. Small fat containing umbilical hernia.   Thoracic spine: No acute fracture or subluxation. No vertebral body or disc height loss. No significant osteophyte formation. No facet arthropathy. No prevertebral hematoma.   Lumbar spine: No acute fracture or subluxation. No vertebral body or disc height loss. No significant osteophyte formation. Mild facet arthropathy in the lower lumbar spine. No prevertebral hematoma.       No evidence of aortic aneurysm, dissection, or acute intramural hematoma. Atherosclerosis.   Right middle lobe 4 mm pulmonary nodule, stable from prior chest CT.   Nonobstructive bilateral renal calculi.   No acute traumatic abnormality in the thoracic or lumbar spine.   Signed by: Miguel Simpson 1/29/2025 6:07 PM Dictation workstation:   [id number]    CT thoracic spine wo IV contrast    Result Date: 1/29/2025  Interpreted By:  Miguel Simpson, STUDY: CT ANGIO CHEST ABDOMEN PELVIS; CT LUMBAR SPINE WO IV CONTRAST; CT THORACIC SPINE WO IV CONTRAST;  1/29/2025 5:37 pm   INDICATION: Signs/Symptoms:back pain, syncope, r/o dissection; Signs/Symptoms:upper lumbar pain after fall; Signs/Symptoms:low thoracic pain.   COMPARISON: CT chest dated 01/26/2025, CT urogram dated 12/26/2024   ACCESSION NUMBER(S): [id number]; [id number]; [id number]   ORDERING CLINICIAN: JACLYN SAGASTUME   TECHNIQUE: CT of the chest, abdomen, and pelvis was performed with and without contrast. Contiguous axial images were obtained at 3 mm slice thickness through the chest, abdomen and pelvis. Coronal and sagittal reconstructions at 3 mm slice thickness were performed. 90 ml of contrast Omnipaque 350 were administered intravenously without immediate complication. 3D reconstructions.   Noncontrast axial images through the thoracic spine. Sagittal and coronal reconstructions. Noncontrast axial images through the  lumbar spine. Sagittal and coronal reconstructions.   FINDINGS: Lungs and Pleura: Streaky opacities are seen in the lungs dependently and bilaterally. Right middle lobe 4 mm pulmonary nodule. No pulmonary consolidation.  No pleural effusion. No pneumothorax.   Mediastinum: No adenopathy by CT size criteria. No cardiomegaly or pericardial effusion. Small hiatal hernia.   Liver: Focal fatty infiltrate along the falciform ligament.   Gallbladder and Biliary: Unremarkable.   Pancreas: No abnormality identified in the pancreas.   Spleen: No abnormality identified in the spleen.   Adrenals: No abnormality identified in either adrenal gland.   Urinary: Multiple small 1-3 mm nonobstructive calculi in the kidneys bilaterally, greater in number on the right than the left. Upper pole cyst in the right kidney. Probable small parapelvic cyst in the right kidney lower pole. No hydronephrosis.   Gastrointestinal/Peritoneum: No small or large bowel obstruction in the visualized abdomen. In the abdomen, there is no extraluminal air. No significant free fluid. No evidence of acute appendicitis.   Vascular: No evidence of aortic aneurysm, dissection, or acute intramural hematoma. Mild atherosclerosis.   Lymphatics: No enlarged lymph nodes by size criteria.   MSK/Body Wall/Chest Wall: No aggressive bony lesion identified. Bilateral hip arthroplasties with associated streak artifact limiting evaluation for pelvic structures. Small fat containing umbilical hernia.   Thoracic spine: No acute fracture or subluxation. No vertebral body or disc height loss. No significant osteophyte formation. No facet arthropathy. No prevertebral hematoma.   Lumbar spine: No acute fracture or subluxation. No vertebral body or disc height loss. No significant osteophyte formation. Mild facet arthropathy in the lower lumbar spine. No prevertebral hematoma.       No evidence of aortic aneurysm, dissection, or acute intramural hematoma. Atherosclerosis.   Right  middle lobe 4 mm pulmonary nodule, stable from prior chest CT.   Nonobstructive bilateral renal calculi.   No acute traumatic abnormality in the thoracic or lumbar spine.   Signed by: Miguel Simpson 1/29/2025 6:07 PM Dictation workstation:   [id number]    CT lumbar spine wo IV contrast    Result Date: 1/29/2025  Interpreted By:  Miguel Simpson, STUDY: CT ANGIO CHEST ABDOMEN PELVIS; CT LUMBAR SPINE WO IV CONTRAST; CT THORACIC SPINE WO IV CONTRAST;  1/29/2025 5:37 pm   INDICATION: Signs/Symptoms:back pain, syncope, r/o dissection; Signs/Symptoms:upper lumbar pain after fall; Signs/Symptoms:low thoracic pain.   COMPARISON: CT chest dated 01/26/2025, CT urogram dated 12/26/2024   ACCESSION NUMBER(S): [id number]; [id number]; [id number]   ORDERING CLINICIAN: JACLYN SAGASTUME   TECHNIQUE: CT of the chest, abdomen, and pelvis was performed with and without contrast. Contiguous axial images were obtained at 3 mm slice thickness through the chest, abdomen and pelvis. Coronal and sagittal reconstructions at 3 mm slice thickness were performed. 90 ml of contrast Omnipaque 350 were administered intravenously without immediate complication. 3D reconstructions.   Noncontrast axial images through the thoracic spine. Sagittal and coronal reconstructions. Noncontrast axial images through the lumbar spine. Sagittal and coronal reconstructions.   FINDINGS: Lungs and Pleura: Streaky opacities are seen in the lungs dependently and bilaterally. Right middle lobe 4 mm pulmonary nodule. No pulmonary consolidation.  No pleural effusion. No pneumothorax.   Mediastinum: No adenopathy by CT size criteria. No cardiomegaly or pericardial effusion. Small hiatal hernia.   Liver: Focal fatty infiltrate along the falciform ligament.   Gallbladder and Biliary: Unremarkable.   Pancreas: No abnormality identified in the pancreas.   Spleen: No abnormality identified in the spleen.   Adrenals: No abnormality identified in either adrenal gland.    Urinary: Multiple small 1-3 mm nonobstructive calculi in the kidneys bilaterally, greater in number on the right than the left. Upper pole cyst in the right kidney. Probable small parapelvic cyst in the right kidney lower pole. No hydronephrosis.   Gastrointestinal/Peritoneum: No small or large bowel obstruction in the visualized abdomen. In the abdomen, there is no extraluminal air. No significant free fluid. No evidence of acute appendicitis.   Vascular: No evidence of aortic aneurysm, dissection, or acute intramural hematoma. Mild atherosclerosis.   Lymphatics: No enlarged lymph nodes by size criteria.   MSK/Body Wall/Chest Wall: No aggressive bony lesion identified. Bilateral hip arthroplasties with associated streak artifact limiting evaluation for pelvic structures. Small fat containing umbilical hernia.   Thoracic spine: No acute fracture or subluxation. No vertebral body or disc height loss. No significant osteophyte formation. No facet arthropathy. No prevertebral hematoma.   Lumbar spine: No acute fracture or subluxation. No vertebral body or disc height loss. No significant osteophyte formation. Mild facet arthropathy in the lower lumbar spine. No prevertebral hematoma.       No evidence of aortic aneurysm, dissection, or acute intramural hematoma. Atherosclerosis.   Right middle lobe 4 mm pulmonary nodule, stable from prior chest CT.   Nonobstructive bilateral renal calculi.   No acute traumatic abnormality in the thoracic or lumbar spine.   Signed by: Miguel Simpson 1/29/2025 6:07 PM Dictation workstation:   [id number]    CT head wo IV contrast    Result Date: 1/29/2025  Interpreted By:  Miguel Simpson, STUDY: CT HEAD WO IV CONTRAST;  1/29/2025 5:13 pm   INDICATION: Signs/Symptoms:fell and hit head.   COMPARISON: 01/26/2025   ACCESSION NUMBER(S): [id number]   ORDERING CLINICIAN: JACLYN SAGASTUME   TECHNIQUE: Noncontrast axial CT scan of head was performed. Multiplanar reconstructions   FINDINGS:  No definite acute intracranial hemorrhage, mass effect, midline shift, or herniation. Small foci of speckled high attenuation along left frontal sulci seen on series 202, image 58 suggestive of calcifications rather than subarachnoid hemorrhage. No evidence of hydrocephalus. The ventricles and sulci are unremarkable for age. The visualized paranasal sinuses and mastoid air cells are clear. No acute osseous abnormality of the calvarium. No destructive bone lesion.       No definite acute intracranial abnormality. Consider follow-up with MRI as warranted.     Signed by: Miguel Simpson 1/29/2025 5:45 PM Dictation workstation:   [id number]      Radiology:     Transthoracic Echo (TTE) Complete   Final Result      CT lumbar spine wo IV contrast   Final Result   No lumbar vertebral displacement, compression deformity or acute   displaced fracture. No significant change from previous day's exam.        MACRO:   None.        Signed by: Keli Garay 1/30/2025 8:26 AM   Dictation workstation:   [id number]      CT pelvis wo IV contrast   Final Result   Status post bilateral GILBERT with intact hardware and no evidence of   Kamille implant fracture. No evidence of acute displaced osseous pelvic   fracture.        Slight enlargement of the visualized proximal right thigh musculature   which could be related to contusion in the setting of trauma.        MACRO:   None.        Signed by: Keli Garay 1/30/2025 8:33 AM   Dictation workstation:   [id number]      XR elbow right 3+ views   Final Result   No evidence of fracture.        MACRO:   None.        Signed by: Keli Garay 1/30/2025 8:20 AM   Dictation workstation:   [id number]      XR shoulder right 2+ views   Final Result   No evidence of fracture in the right shoulder.        Small right pneumothorax.        MACRO:   None.        Signed by: Keli Garay 1/30/2025 8:22 AM   Dictation workstation:   [id number]      CT angio chest abdomen pelvis   Final Result   No evidence  of aortic aneurysm, dissection, or acute intramural   hematoma. Atherosclerosis.        Right middle lobe 4 mm pulmonary nodule, stable from prior chest CT.        Nonobstructive bilateral renal calculi.        No acute traumatic abnormality in the thoracic or lumbar spine.        Signed by: Miguel Simpson 1/29/2025 6:07 PM   Dictation workstation:   [id number]      CT thoracic spine wo IV contrast   Final Result   No evidence of aortic aneurysm, dissection, or acute intramural   hematoma. Atherosclerosis.        Right middle lobe 4 mm pulmonary nodule, stable from prior chest CT.        Nonobstructive bilateral renal calculi.        No acute traumatic abnormality in the thoracic or lumbar spine.        Signed by: Miguel Simpson 1/29/2025 6:07 PM   Dictation workstation:   [id number]      CT lumbar spine wo IV contrast   Final Result   No evidence of aortic aneurysm, dissection, or acute intramural   hematoma. Atherosclerosis.        Right middle lobe 4 mm pulmonary nodule, stable from prior chest CT.        Nonobstructive bilateral renal calculi.        No acute traumatic abnormality in the thoracic or lumbar spine.        Signed by: Miguel Simpson 1/29/2025 6:07 PM   Dictation workstation:   [id number]      XR knee right 1-2 views   Final Result   No acute osseous abnormality.        Signed by: Miguel Simpson 1/29/2025 6:13 PM   Dictation workstation:   [id number]      CT head wo IV contrast   Final Result   No definite acute intracranial abnormality. Consider follow-up with   MRI as warranted.             Signed by: Miguel Simpson 1/29/2025 5:45 PM   Dictation workstation:   [id number]      CT cervical spine wo IV contrast   Final Result   No evidence for an acute fracture or subluxation of the cervical   spine.        Signed by: Miguel Simpson 1/29/2025 6:12 PM   Dictation workstation:   [id number]      XR chest 1 view    (Results Pending)       Assessment:     Problem List Items  Addressed This Visit       Thoracic back pain    Lung nodule    Relevant Orders    Referral to [facility]    * (Principal) Syncope and collapse - Primary    Relevant Orders    Transthoracic Echo (TTE) Complete (Completed)       Patient Active Problem List   Diagnosis    Anxiety    Astigmatism, bilateral    Myopia of both eyes with astigmatism and presbyopia    Bilateral presbyopia    Cervical radicular pain    Chronic left-sided low back pain with left-sided sciatica    Chronic patellofemoral pain of left knee    Patellofemoral arthritis    Cubital tunnel syndrome on left    Cubital tunnel syndrome on right    Daytime sleepiness    Foot joint pain    History of psychological trauma    Joint pain    Myofascial pain    Lumbar spondylosis    Other spondylosis, cervical region    Metatarsalgia of left foot    Muscle spasm    Narcotic drug use    Reflex sympathetic dystrophy    Osteochondral defect of patella    Complex regional pain syndrome type 1 of left lower extremity    Scapular dyskinesis    Shoulder impingement    Suspected sleep apnea    Thoracic back pain    Arthritis    Atrophy of quadriceps femoris muscle    Erectile dysfunction    Guyon syndrome    Kidney cysts    Lung nodule    Primary osteoarthritis of left hip    Trauma and stressor-related disorder    Trochanteric bursitis of both hips    Status post left hip replacement    Chest pain, unspecified type    Troponin level elevated    Neck pain    Unilateral primary osteoarthritis, right hip    S/P total right hip arthroplasty    PVC (premature ventricular contraction)    Palpitations    Left arm pain    Incomplete RBBB    Family history of ischemic heart disease    Fatigue    Gastroesophageal reflux disease without esophagitis    Gastroparesis    Bipolar disorder    Abnormal EKG    Mixed hyperlipidemia    Elevated troponin I level    Syncope and collapse    Chronic back pain       Plan:     Recurrent syncope. Changing story between different  providers but to this physician he claims 2 episodes of syncope with either limited or no warning. Has hx of PVC with negative other cardiac work-up (normal LV function and no real CAD) on beta blocker therapy. No history of high degree av block/bradycardia. Its possible intense pain cause vagal event and syncope but would expect some prodrome or after effects and would not expect patient to be unconscious for 2-3 minutes. Not clear with changing story how much reshma to put in the patients description.     ? If he would benefit from tilt (may be hard to do with back issue) vs repeat monitor vs implantable loop recorder. Will consult EP. Patient placed on telemetry. Has not been on tele this admission.     2. Palpitations / PVC - continue beta blocker therapy.     If cleared by EP for DC patient can follow-up with Dr. Youngblood as outpatient. Please call if I can provide further assistance.     Thank you for the opportunity to participate in the care of your patient.  Please do not hesitate to call if you have any questions.    Electronically signed by Ayesha Peralta MD, on 1/30/2025 at 4:42 PM

## 2025-01-30 NOTE — CONSULTS
Department of Medicine  Division of Pulmonary, Critical Care, and Sleep Medicine    Reason For Consult  I was asked by Dr. Lynn to evaluate . Akin Lynn for small right pneumothorax.    History Of Present Illness  Akin Lynn is a 54 y.o. male with an extensive past medical history, most significantly essential hypertension hyperlipidemia GERD gastroparesis lumbar and cervical spondylosis chronic neck and back pain as well as complex regional pain syndrome who [place] [facility] [place].  The patient was seen and examined in pulmonology consultation after request was made by the patient's primary care physician for the management of a small right pneumothorax.  The patient sustained a fall this morning when he attempted to get out of bed he fell onto his right side.  He underwent subsequent shoulder x-ray which showed a small right pneumothorax which is likely traumatic in etiology.  Of note he had a CT scan of his chest yesterday which showed no evidence of pneumothorax.  This shoulder imaging was performed at 6 AM this morning, we will obtain a subsequent film and assess for resolution versus progression.  At the present time, the patient is not requiring any supplemental oxygen he appears to be saturating 98%.  Thank for the consultation.  Will follow closely with you.       Review of Systems  As per HPI    Past Medical History:  Past Medical History:   Diagnosis Date    Anesthesia of skin     Numbness and tingling    Anxiety 09/21/2023    Bilateral myopia 09/21/2023    Cervical radicular pain 09/21/2023    Chronic patellofemoral pain of left knee 09/21/2023    Complex regional pain syndrome type 1 of left lower extremity 09/21/2023    COVID-19     VACCINATED    Cubital tunnel syndrome on left 09/21/2023    Cubital tunnel syndrome on right 09/21/2023    Disease of intestine, unspecified     Bowel trouble    Erectile dysfunction 09/21/2023    Foot joint pain  09/21/2023    Fractures     Gastroparesis     GERD (gastroesophageal reflux disease)     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    History of psychological trauma 09/21/2023    Hyperlipidemia     Hypertension     Incomplete RBBB 09/19/2024    Joint pain 09/21/2023    Kidney cysts 09/21/2023    Low back pain, unspecified 12/07/2022    Chronic low back pain without sciatica, unspecified back pain laterality    Low back pain, unspecified 08/09/2022    Lumbago    Lumbar spondylosis 09/21/2023    Lung nodule 09/21/2023    RIGHT    Metatarsalgia of left foot 09/21/2023    Myopia, bilateral 02/19/2019    Myopia of both eyes with astigmatism and presbyopia    Non-sustained ventricular tachycardia (Multi)     Osteoarthritis     Osteochondral defect of patella 09/21/2023    Osteoporosis     Other spondylosis, cervical region 09/21/2023    Patellofemoral arthritis 09/21/2023    Personal history of other diseases of the musculoskeletal system and connective tissue     History of arthritis    Personal history of other specified conditions     History of seizure    Reflex sympathetic dystrophy 09/21/2023    Scapular dyskinesis 09/21/2023    Seizure disorder (Multi)     D/T TRAMADOL REACTION - 25 YEARS AGO    Shoulder impingement 09/21/2023    Stable angina (CMS-HCC)     Suspected sleep apnea 09/21/2023    Trauma and stressor-related disorder 09/21/2023    Trochanteric bursitis of both hips 09/22/2023       Surgical History:  Past Surgical History:   Procedure Laterality Date    ACHILLES TENDON SURGERY  1978 2016    CARDIAC CATHETERIZATION N/A 12/20/2024    Procedure: Left Heart Cath;  Surgeon: Kale Funk MD;  Location: [facility] Cardiac Cath Lab;  Service: Cardiovascular;  Laterality: N/A;    CARDIOVASCULAR STRESS TEST      CT ABDOMEN PELVIS ANGIOGRAM W AND/OR WO IV CONTRAST  03/18/2020    CT ABDOMEN PELVIS ANGIOGRAM W AND/OR WO IV CONTRAST 3/18/2020 [facility] EMERGENCY LEGACY    CT ANGIO NECK  12/16/2022    CT NECK ANGIO W AND WO IV CONTRAST  "12/16/2022 DOCTOR OFFICE LEGACY    CT HEAD ANGIO W AND WO IV CONTRAST  12/16/2022    CT HEAD ANGIO W AND WO IV CONTRAST 12/16/2022 DOCTOR OFFICE LEGACY    HIP SURGERY Left     REPLACEMENT    JOINT REPLACEMENT      LEFT KNEE    KNEE ARTHROPLASTY Bilateral 06/13/2017    Knee Arthroplasty    OTHER SURGICAL HISTORY  01/06/2022    Ulnar nerve neuroplasty    OTHER SURGICAL HISTORY Bilateral 07/26/2019    Foot surgery  -- HARDWARE    OTHER SURGICAL HISTORY Bilateral     BILATERAL ANKLE SCOPE    OTHER SURGICAL HISTORY      POP ABDOMINAL PROCEDURE    PLANTAR FASCIA RELEASE      SHOULDER SURGERY Bilateral 06/13/2017    Shoulder Surgery       Social History:   Social History     Tobacco Use    Smoking status: Never    Smokeless tobacco: Never    Tobacco comments:     It killed my mother smoking and she left me with a gift a nodule on my right lung   Vaping Use    Vaping status: Never Used   Substance Use Topics    Alcohol use: Not Currently    Drug use: Not Currently     Comment: Ketamine treatments for pain,eatable marijuana       Family History:  Family History   Problem Relation Name Age of Onset    Lung cancer Mother      Glaucoma Father      Skin cancer Father      Hypertension Other mul fam mem     Heart disease Other mul fam mem        Occupational & Environmental History          Vital Signs  Visit Vitals  /81 (BP Location: Right arm, Patient Position: Lying)   Pulse 67   Temp 35.9 °C (96.6 °F) (Temporal)   Resp 18   Ht 1.74 m (5' 8.5\")   Wt 74.4 kg (164 lb)   SpO2 98%   BMI 24.57 kg/m²   Smoking Status Never   BSA 1.9 m²        Physical Exam  Vitals and nursing note reviewed.   Constitutional:       General: No in acute distress.     Appearance: Normal appearance.   HENT:      Head: Normocephalic and atraumatic.      Mouth/Throat:      Mouth: Mucous membranes are moist.   Eyes:      Conjunctiva/sclera: Conjunctivae normal.   Cardiovascular:      Rate and Rhythm: Normal rate and regular rhythm.   Pulmonary:      " Effort: Pulmonary effort is normal. No respiratory distress.      Breath sounds: Normal breath sounds. No stridor. No wheezing or rhonchi.   Musculoskeletal:         General: Normal range of motion.      Cervical back: Normal range of motion and neck supple.   Skin:     General: Skin is warm and dry.      Coloration: Skin is not jaundiced.   Neurological:      General: No focal deficit present.      Mental Status: Alert and oriented to person, place, and time. Mental status is at baseline.   Psychiatric:         Mood and Affect: Mood normal.         Behavior: Behavior normal.         Judgment: Judgment normal.       Oxygen Therapy  SpO2: 98 %  Medical Gas Therapy: None (Room air)            Medications   Scheduled medications  acetaminophen, 975 mg, oral, TID  aspirin, 81 mg, oral, Daily  atorvastatin, 80 mg, oral, Daily  baclofen, 20 mg, oral, TID  docusate sodium, 100 mg, oral, BID  DULoxetine, 60 mg, oral, Daily  ezetimibe, 10 mg, oral, Daily  magnesium oxide, 400 mg, oral, Daily  metoprolol succinate XL, 25 mg, oral, Daily  pantoprazole, 40 mg, oral, Daily before breakfast  pregabalin, 300 mg, oral, BID      Continuous medications     PRN medications  PRN medications: diazePAM, melatonin, oxyCODONE, polyethylene glycol     Allergies  Morphine, Ultram [tramadol], Alprazolam, Ketorolac, Fish containing products, and Opioids - morphine analogues      Lab Results     Lab Results   Component Value Date    WBC 7.2 01/30/2025    HGB 13.0 (L) 01/30/2025    HCT 41.9 01/30/2025    MCV 91 01/30/2025     01/30/2025      Lab Results   Component Value Date    GLUCOSE 82 01/30/2025    CALCIUM 8.6 01/30/2025     01/30/2025    K 3.7 01/30/2025    CO2 25 01/30/2025     01/30/2025    BUN 14 01/30/2025    CREATININE 0.71 01/30/2025      Lab Results   Component Value Date    ALT 19 01/29/2025    AST 18 01/29/2025    ALKPHOS 64 01/29/2025    BILITOT 0.7 01/29/2025        Chest Radiograph   CT angio chest abdomen  pelvis 01/29/2025  CT thoracic spine wo IV contrast 01/29/2025  CT lumbar spine wo IV contrast 01/29/2025    Narrative  Interpreted By:  Miguel Simpson,  STUDY:  CT ANGIO CHEST ABDOMEN PELVIS; CT LUMBAR SPINE WO IV CONTRAST; CT  THORACIC SPINE WO IV CONTRAST;  1/29/2025 5:37 pm    INDICATION:  Signs/Symptoms:back pain, syncope, r/o dissection;  Signs/Symptoms:upper lumbar pain after fall; Signs/Symptoms:low  thoracic pain.    COMPARISON:  CT chest dated 01/26/2025, CT urogram dated 12/26/2024    ACCESSION NUMBER(S):  [id number]; [id number]; [id number]    ORDERING CLINICIAN:  JACLYN SAGASTUME    TECHNIQUE:  CT of the chest, abdomen, and pelvis was performed with and without  contrast. Contiguous axial images were obtained at 3 mm slice  thickness through the chest, abdomen and pelvis. Coronal and sagittal  reconstructions at 3 mm slice thickness were performed.  90 ml of contrast Omnipaque 350 were administered intravenously  without immediate complication. 3D reconstructions.    Noncontrast axial images through the thoracic spine. Sagittal and  coronal reconstructions. Noncontrast axial images through the lumbar  spine. Sagittal and coronal reconstructions.    FINDINGS:  Lungs and Pleura: Streaky opacities are seen in the lungs dependently  and bilaterally. Right middle lobe 4 mm pulmonary nodule. No  pulmonary consolidation.  No pleural effusion. No pneumothorax.    Mediastinum: No adenopathy by CT size criteria. No cardiomegaly or  pericardial effusion. Small hiatal hernia.    Liver: Focal fatty infiltrate along the falciform ligament.    Gallbladder and Biliary: Unremarkable.    Pancreas: No abnormality identified in the pancreas.    Spleen: No abnormality identified in the spleen.    Adrenals: No abnormality identified in either adrenal gland.    Urinary: Multiple small 1-3 mm nonobstructive calculi in the kidneys  bilaterally, greater in number on the right than the left. Upper pole  cyst in the right  kidney. Probable small parapelvic cyst in the right  kidney lower pole. No hydronephrosis.    Gastrointestinal/Peritoneum: No small or large bowel obstruction in  the visualized abdomen. In the abdomen, there is no extraluminal air.  No significant free fluid. No evidence of acute appendicitis.    Vascular: No evidence of aortic aneurysm, dissection, or acute  intramural hematoma. Mild atherosclerosis.    Lymphatics: No enlarged lymph nodes by size criteria.    MSK/Body Wall/Chest Wall: No aggressive bony lesion identified.  Bilateral hip arthroplasties with associated streak artifact limiting  evaluation for pelvic structures. Small fat containing umbilical  hernia.    Thoracic spine: No acute fracture or subluxation. No vertebral body  or disc height loss. No significant osteophyte formation. No facet  arthropathy. No prevertebral hematoma.    Lumbar spine: No acute fracture or subluxation. No vertebral body or  disc height loss. No significant osteophyte formation. Mild facet  arthropathy in the lower lumbar spine. No prevertebral hematoma.    Impression  No evidence of aortic aneurysm, dissection, or acute intramural  hematoma. Atherosclerosis.    Right middle lobe 4 mm pulmonary nodule, stable from prior chest CT.    Nonobstructive bilateral renal calculi.    No acute traumatic abnormality in the thoracic or lumbar spine.    Signed by: Miguel Simpson 1/29/2025 6:07 PM  Dictation workstation:   [id number]      CT angio chest for pulmonary embolism 01/26/2025    Narrative  Interpreted By:  Uche Munson,  STUDY:  CT ANGIO CHEST FOR PULMONARY EMBOLISM;  1/26/2025 9:04 pm    INDICATION:  Signs/Symptoms:Chest pain/ elevated DDimer/ rulte out PE.      COMPARISON:  11/07/2024 CT coronary artery calcium    ACCESSION NUMBER(S):  [id number]    ORDERING CLINICIAN:  ARDEN GUZMAN    TECHNIQUE:  Contiguous axial images of the chest were obtained after the  intravenous administration of iodinated contrast using  angiographic  PE protocol. Coronal and sagittal reformatted images were  reconstructed from the axial data. MIP images were created on an  independent workstation and reviewed.    FINDINGS:    MEDIASTINUM AND LYMPH NODES:  The esophageal wall appears within  normal limits.  No enlarged intrathoracic or axillary lymph nodes by  imaging criteria. No pneumomediastinum.    VESSELS:  Normal caliber thoracic aorta without dissection. No  significant aortic atherosclerosis.  No acute pulmonary embolism.    HEART: Normal in size.  Minimal coronary artery calcifications. No  significant pericardial effusion.    LUNG, AIRWAYS, AND PLEURA: There is mild bronchial wall thickening in  all lobes of both lungs most pronounced in the right and left lower  lobe (right > left) with some minimal mucous plugging in the distal  subsegmental bronchi. There is mild bibasilar atelectasis. No  consolidation, pulmonary edema, pleural effusion or pneumothorax.    OSSEOUS STRUCTURES: No acute osseous abnormality.    CHEST WALL SOFT TISSUES: No discernible abnormality.    UPPER ABDOMEN/OTHER: No acute abnormality.    Impression  Mild bronchial wall thickening in all lobes of both lungs most  pronounced in the right and left lower lobe (right > left) with some  minimal mucous plugging in the distal subsegmental bronchi. Correlate  for bronchitis/asthma. No evidence of pneumonia.    No acute pulmonary embolism.    MACRO:  None.    Signed by: Uche Munson 1/26/2025 9:42 PM  Dictation workstation:   [id number]      XR chest 1 view 01/26/2025    Narrative  Interpreted By:  Uche Munson,  STUDY:  XR CHEST 1 VIEW;  1/26/2025 8:29 pm    INDICATION:  Signs/Symptoms:Chest pain and syncope..      COMPARISON:  01/10/2024    ACCESSION NUMBER(S):  [id number]    ORDERING CLINICIAN:  ARDEN GUZMAN    FINDINGS:      The cardiomediastinal silhouette and pulmonary vasculature are within  normal limits.    No consolidation, pleural effusion or  pneumothorax.    Impression  No acute cardiopulmonary process.      MACRO:  None.    Signed by: Uche Munson 1/26/2025 8:44 PM  Dictation workstation:   [id number]         Pulmonary Function Tests         Assessment and Plan / Recommendations   Assessment/Plan   S/p mechanical fall  Small right pneumothorax, likely traumatic  Syncope, w/up in progress    Plan:  Patient doing well from a respiratory perspective  Supplemental oxygen as needed to maintain pulse oximetry  Small right pneumothorax likely traumatic d/t fall, repeat chest film to look for progression vs. resolution  Cardiology w/up in progress for syncope, await surface echocardiogram  Thank you for the consult, we will follow closely with you      Jewel Lal, DO      Date: 01/30/25  Time: 10:11 AM  Please excuse any misspellings or unintended errors related to the Dragon speech recognition software used to dictate this note.

## 2025-01-30 NOTE — CONSULTS
"Inpatient consult to Neurology  Consult performed by: MIKAELA Hung-CNP  Consult ordered by: MIKAELA Myers-CNP          History Of Present Illness  Akin Lynn is a 54 y.o. male presenting with syncopal episode. Pt states that he had bent down to fill the cat dish and had sudden severe sharp back pain. He stood up to walk to the couch when he became dizzy and lightheaded, called for his girlfriend who was upstairs, and the next thing he recalls is waking up on the floor. His girlfriend told him that he was out for about 2 minutes. When he came to, he was not confused or incontinent. He did hit his head. In the ED, his vital signs were stable and CT head was unremarkable. Pt states he had another episode this past Sunday very similar, where he was filling the cat dish, got the sudden severe back pain, and \"blacked out.\" He came to the ED and was later released home. Pt also reports that he had one seizure many years ago 2/2 to Ultram. He has not had one since. Pt was seen at bedside with Dr. Del Castillo. He was laying in bed, having severe back pain, requesting \"dilaudid.\" He denied headache, dizziness, vision or speech changes, facial droop, limb weakness or sensation changes.    Past Medical History  Past Medical History:   Diagnosis Date    Anesthesia of skin     Numbness and tingling    Anxiety 09/21/2023    Bilateral myopia 09/21/2023    Cervical radicular pain 09/21/2023    Chronic patellofemoral pain of left knee 09/21/2023    Complex regional pain syndrome type 1 of left lower extremity 09/21/2023    COVID-19     VACCINATED    Cubital tunnel syndrome on left 09/21/2023    Cubital tunnel syndrome on right 09/21/2023    Disease of intestine, unspecified     Bowel trouble    Erectile dysfunction 09/21/2023    Foot joint pain 09/21/2023    Fractures     Gastroparesis     GERD (gastroesophageal reflux disease)     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    History of psychological trauma 09/21/2023    " Hyperlipidemia     Hypertension     Incomplete RBBB 09/19/2024    Joint pain 09/21/2023    Kidney cysts 09/21/2023    Low back pain, unspecified 12/07/2022    Chronic low back pain without sciatica, unspecified back pain laterality    Low back pain, unspecified 08/09/2022    Lumbago    Lumbar spondylosis 09/21/2023    Lung nodule 09/21/2023    RIGHT    Metatarsalgia of left foot 09/21/2023    Myopia, bilateral 02/19/2019    Myopia of both eyes with astigmatism and presbyopia    Non-sustained ventricular tachycardia (Multi)     Osteoarthritis     Osteochondral defect of patella 09/21/2023    Osteoporosis     Other spondylosis, cervical region 09/21/2023    Patellofemoral arthritis 09/21/2023    Personal history of other diseases of the musculoskeletal system and connective tissue     History of arthritis    Personal history of other specified conditions     History of seizure    Reflex sympathetic dystrophy 09/21/2023    Scapular dyskinesis 09/21/2023    Seizure disorder (Multi)     D/T TRAMADOL REACTION - 25 YEARS AGO    Shoulder impingement 09/21/2023    Stable angina (CMS-[facility])     Suspected sleep apnea 09/21/2023    Trauma and stressor-related disorder 09/21/2023    Trochanteric bursitis of both hips 09/22/2023     Surgical History  Past Surgical History:   Procedure Laterality Date    ACHILLES TENDON SURGERY  1978 2016    CARDIAC CATHETERIZATION N/A 12/20/2024    Procedure: Left Heart Cath;  Surgeon: Kale Funk MD;  Location: [facility], [place] Cardiac Cath Lab;  Service: Cardiovascular;  Laterality: N/A;    CARDIOVASCULAR STRESS TEST      CT ABDOMEN PELVIS ANGIOGRAM W AND/OR WO IV CONTRAST  03/18/2020    CT ABDOMEN PELVIS ANGIOGRAM W AND/OR WO IV CONTRAST 3/18/2020 [facility] [place] EMERGENCY LEGACY    CT ANGIO NECK  12/16/2022    CT NECK ANGIO W AND WO IV CONTRAST 12/16/2022 DOCTOR OFFICE LEGACY    CT HEAD ANGIO W AND WO IV CONTRAST  12/16/2022    CT HEAD ANGIO W AND WO IV CONTRAST 12/16/2022 DOCTOR OFFICE LEGACY    HIP SURGERY  Left     REPLACEMENT    JOINT REPLACEMENT      LEFT KNEE    KNEE ARTHROPLASTY Bilateral 06/13/2017    Knee Arthroplasty    OTHER SURGICAL HISTORY  01/06/2022    Ulnar nerve neuroplasty    OTHER SURGICAL HISTORY Bilateral 07/26/2019    Foot surgery  -- HARDWARE    OTHER SURGICAL HISTORY Bilateral     BILATERAL ANKLE SCOPE    OTHER SURGICAL HISTORY      POP ABDOMINAL PROCEDURE    PLANTAR FASCIA RELEASE      SHOULDER SURGERY Bilateral 06/13/2017    Shoulder Surgery     Social History  Social History     Tobacco Use    Smoking status: Never    Smokeless tobacco: Never    Tobacco comments:     It killed my mother smoking and she left me with a gift a nodule on my right lung   Vaping Use    Vaping status: Never Used   Substance Use Topics    Alcohol use: Not Currently    Drug use: Not Currently     Comment: Ketamine treatments for pain,eatable marijuana     Allergies  Morphine, Ultram [tramadol], Alprazolam, Ketorolac, Fish containing products, and Opioids - morphine analogues  Medications Prior to Admission   Medication Sig Dispense Refill Last Dose/Taking    acetaminophen (Tylenol) 500 mg tablet Take 2 tablets (1,000 mg) by mouth 3 times a day.   1/29/2025 Morning    albuterol (Ventolin HFA) 90 mcg/actuation inhaler Inhale 2 puffs every 4 hours if needed for wheezing.   Past Month    atorvastatin (Lipitor) 80 mg tablet Take 1 tablet (80 mg) by mouth once daily.   1/29/2025 Morning    baclofen (Lioresal) 20 mg tablet Take 1 tablet (20 mg) by mouth 3 times a day. 90 tablet 11 1/29/2025 Morning    celecoxib (CeleBREX) 200 mg capsule Take 1 capsule (200 mg) by mouth 2 times a day.   1/29/2025 Morning    diazePAM (Valium) 5 mg tablet Take 1 tablet (5 mg) by mouth 2 times a day as needed for anxiety.   Past Month    docusate sodium (Colace) 100 mg capsule Take 1 capsule (100 mg) by mouth 2 times a day.   1/29/2025 Morning    DULoxetine (Cymbalta) 60 mg DR capsule Take 1 capsule (60 mg) by mouth once daily.   1/29/2025 Morning     ezetimibe (Zetia) 10 mg tablet Take 1 tablet (10 mg) by mouth once daily. 30 tablet 11 1/29/2025 Morning    fluticasone (Flonase) 50 mcg/actuation nasal spray Administer 1 spray into each nostril once daily as needed.   Past Month    lidocaine (Lidoderm) 5 % patch apply 2 patches to affected area daily as needed. 60 patch 11 1/29/2025 Morning    magnesium oxide (Mag-Ox) 400 mg (241.3 mg magnesium) tablet Take 1 tablet (400 mg) by mouth once daily. 90 tablet 3 1/29/2025 Morning    metoprolol succinate XL (Toprol-XL) 25 mg 24 hr tablet Take 1 tablet (25 mg) by mouth once daily. Do not crush or chew. 90 tablet 3 1/29/2025 Morning    omeprazole (PriLOSEC) 40 mg DR capsule Take 1 capsule (40 mg) by mouth 2 times a day. For 90 days   1/29/2025 Morning    oxyCODONE (Roxicodone) 5 mg immediate release tablet Take 1 tablet (5 mg) by mouth 2 times a day as needed for severe pain (7 - 10).   1/29/2025 Morning    pregabalin (Lyrica) 300 mg capsule Take 1 capsule (300 mg) by mouth 2 times a day. 60 capsule 0 1/29/2025 Morning    ketamine HCl (ketamine, bulk,) 100 % powder Ketamine 100 mg/mL + lidocaine 40 mg/mL 1 puff 4 times daily as needed, DSP#20 mL x 5 refills (Patient not taking: Reported on 1/29/2025) 1 g 5 Not Taking    sildenafil (Viagra) 100 mg tablet Take 1 tablet (100 mg) by mouth once daily as needed for erectile dysfunction.   Unknown       Review of Systems  Neurological Exam  Mental Status  Awake, alert and oriented to person, place and time. Recent and remote memory are intact. Speech is normal. Language is fluent with no aphasia. Attention and concentration are normal. Fund of knowledge is appropriate for level of education.  Slightly agitated.  Naming and repetition intact..    Cranial Nerves  CN II: Visual fields full to confrontation.  CN III, IV, VI: Extraocular movements intact bilaterally. Pupils equal round and reactive to light bilaterally.  CN V: Facial sensation is normal.  CN VII: Full and  "symmetric facial movement.  CN VIII: Hearing is normal.  CN IX, X: Palate elevates symmetrically  CN XI: Shoulder shrug strength is normal.  CN XII: Tongue midline without atrophy or fasciculations.    Motor   Strength is 5/5 throughout all four extremities.    Sensory  Light touch is normal in upper and lower extremities.     Coordination  Right: Finger-to-nose normal.Left: Finger-to-nose normal.    Physical Exam  Eyes:      Extraocular Movements: Extraocular movements intact.      Pupils: Pupils are equal, round, and reactive to light.   Neurological:      Motor: Motor strength is normal.  Psychiatric:         Speech: Speech normal.       Last Recorded Vitals  Blood pressure 123/74, pulse 63, temperature 36.6 °C (97.9 °F), temperature source Temporal, resp. rate 19, height 1.74 m (5' 8.5\"), weight 74.4 kg (164 lb), SpO2 98%.    Relevant Results  Scheduled medications  acetaminophen, 975 mg, oral, TID  aspirin, 81 mg, oral, Daily  atorvastatin, 80 mg, oral, Daily  baclofen, 20 mg, oral, TID  dexAMETHasone, 6 mg, intravenous, Once  docusate sodium, 100 mg, oral, BID  [START ON 1/31/2025] DULoxetine, 90 mg, oral, Daily  ezetimibe, 10 mg, oral, Daily  lidocaine, 2 patch, transdermal, Daily  magnesium oxide, 400 mg, oral, Daily  [START ON 1/31/2025] methylPREDNISolone, 20 mg, oral, Once   Followed by  [START ON 2/1/2025] methylPREDNISolone, 16 mg, oral, Once   Followed by  [START ON 2/2/2025] methylPREDNISolone, 12 mg, oral, Once   Followed by  [START ON 2/3/2025] methylPREDNISolone, 8 mg, oral, Once   Followed by  [START ON 2/4/2025] methylPREDNISolone, 4 mg, oral, Once  metoprolol succinate XL, 25 mg, oral, Daily  pantoprazole, 40 mg, oral, Daily before breakfast  pregabalin, 300 mg, oral, BID      Continuous medications     PRN medications  PRN medications: diazePAM, melatonin, oxyCODONE, polyethylene glycol  Results for orders placed or performed during the hospital encounter of 01/29/25 (from the past 24 hours) "   Troponin I, High Sensitivity, Initial   Result Value Ref Range    Troponin I, High Sensitivity 46 (H) 0 - 20 ng/L   CBC and Auto Differential   Result Value Ref Range    WBC 8.4 4.4 - 11.3 x10*3/uL    nRBC 0.0 0.0 - 0.0 /100 WBCs    RBC 4.50 4.50 - 5.90 x10*6/uL    Hemoglobin 12.6 (L) 13.5 - 17.5 g/dL    Hematocrit 39.7 (L) 41.0 - 52.0 %    MCV 88 80 - 100 fL    MCH 28.0 26.0 - 34.0 pg    MCHC 31.7 (L) 32.0 - 36.0 g/dL    RDW 14.7 (H) 11.5 - 14.5 %    Platelets 227 150 - 450 x10*3/uL    Neutrophils % 64.1 40.0 - 80.0 %    Immature Granulocytes %, Automated 0.6 0.0 - 0.9 %    Lymphocytes % 23.7 13.0 - 44.0 %    Monocytes % 5.9 2.0 - 10.0 %    Eosinophils % 4.7 0.0 - 6.0 %    Basophils % 1.0 0.0 - 2.0 %    Neutrophils Absolute 5.38 1.20 - 7.70 x10*3/uL    Immature Granulocytes Absolute, Automated 0.05 0.00 - 0.70 x10*3/uL    Lymphocytes Absolute 1.98 1.20 - 4.80 x10*3/uL    Monocytes Absolute 0.49 0.10 - 1.00 x10*3/uL    Eosinophils Absolute 0.39 0.00 - 0.70 x10*3/uL    Basophils Absolute 0.08 0.00 - 0.10 x10*3/uL   Comprehensive Metabolic Panel   Result Value Ref Range    Glucose 110 (H) 74 - 99 mg/dL    Sodium 141 136 - 145 mmol/L    Potassium 3.6 3.5 - 5.3 mmol/L    Chloride 106 98 - 107 mmol/L    Bicarbonate 27 21 - 32 mmol/L    Anion Gap 12 10 - 20 mmol/L    Urea Nitrogen 14 6 - 23 mg/dL    Creatinine 0.64 0.50 - 1.30 mg/dL    eGFR >90 >60 mL/min/1.73m*2    Calcium 8.5 (L) 8.6 - 10.3 mg/dL    Albumin 3.9 3.4 - 5.0 g/dL    Alkaline Phosphatase 64 33 - 120 U/L    Total Protein 6.4 6.4 - 8.2 g/dL    AST 18 9 - 39 U/L    Bilirubin, Total 0.7 0.0 - 1.2 mg/dL    ALT 19 10 - 52 U/L   Magnesium   Result Value Ref Range    Magnesium 1.77 1.60 - 2.40 mg/dL   Troponin, High Sensitivity, 1 Hour   Result Value Ref Range    Troponin I, High Sensitivity 40 (H) 0 - 20 ng/L   ECG 12 lead   Result Value Ref Range    Ventricular Rate 72 BPM    Atrial Rate 72 BPM    MN Interval 176 ms    QRS Duration 106 ms    QT Interval 412 ms     QTC Calculation(Bazett) 451 ms    P Axis 51 degrees    R Axis 44 degrees    T Axis 37 degrees    QRS Count 12 beats    Q Onset 220 ms    P Onset 132 ms    P Offset 196 ms    T Offset 426 ms    QTC Fredericia 438 ms   Basic metabolic panel   Result Value Ref Range    Glucose 82 74 - 99 mg/dL    Sodium 141 136 - 145 mmol/L    Potassium 3.7 3.5 - 5.3 mmol/L    Chloride 106 98 - 107 mmol/L    Bicarbonate 25 21 - 32 mmol/L    Anion Gap 14 10 - 20 mmol/L    Urea Nitrogen 14 6 - 23 mg/dL    Creatinine 0.71 0.50 - 1.30 mg/dL    eGFR >90 >60 mL/min/1.73m*2    Calcium 8.6 8.6 - 10.3 mg/dL   CBC   Result Value Ref Range    WBC 7.2 4.4 - 11.3 x10*3/uL    nRBC 0.0 0.0 - 0.0 /100 WBCs    RBC 4.60 4.50 - 5.90 x10*6/uL    Hemoglobin 13.0 (L) 13.5 - 17.5 g/dL    Hematocrit 41.9 41.0 - 52.0 %    MCV 91 80 - 100 fL    MCH 28.3 26.0 - 34.0 pg    MCHC 31.0 (L) 32.0 - 36.0 g/dL    RDW 15.1 (H) 11.5 - 14.5 %    Platelets 204 150 - 450 x10*3/uL   Magnesium   Result Value Ref Range    Magnesium 1.93 1.60 - 2.40 mg/dL   Troponin I, High Sensitivity   Result Value Ref Range    Troponin I, High Sensitivity 39 (H) 0 - 20 ng/L   Transthoracic Echo (TTE) Complete   Result Value Ref Range    AV pk willy 1.38 m/s    LV Biplane EF 57 %    LVOT diam 2.10 cm    MV E/A ratio 1.20     MV avg E/e' ratio 10.50     Tricuspid annular plane systolic excursion 2.0 cm    LV EF 58 %    RV free wall pk S' 13.00 cm/s    RVSP 25.3 mmHg    LVIDd 4.16 cm    Aortic Valve Area by Continuity of Peak Velocity 2.69 cm2    AV pk grad 8 mmHg    LV A4C EF 55.1    POCT GLUCOSE   Result Value Ref Range    POCT Glucose 103 (H) 74 - 99 mg/dL     *Note: Due to a large number of results and/or encounters for the requested time period, some results have not been displayed. A complete set of results can be found in Results Review.        I have personally reviewed the following imaging results Transthoracic Echo (TTE) Complete    Result Date: 1/30/2025            [facility] [place]  [place] [id number] [place] [id number]    Tel [phone] Fax [phone] TRANSTHORACIC ECHOCARDIOGRAM REPORT Patient Name:       JACLYN RAMOS      Reading Physician:    [id number] Ayesha Peralta MD Study Date:         1/30/2025            Ordering Provider:    [id number] JOYCE MAXWELL MRN/PID:            [id number]             Fellow: Accession#:         [id number]         Nurse: Date of Birth/Age:  1970 / 54 years Sonographer:          Kathryn Monsivais RD Gender Assigned at                      Additional Staff: Birth: Height:             172.72 cm            Admit Date: Weight:             74.39 kg             Admission Status:     Inpatient -                                                                Priority                                                                discharge BSA / BMI:          1.88 m2 / 24.94      Department Location:  [facility] Echo Lab                     kg/m2 Blood Pressure: 165 /109 mmHg Study Type:    TRANSTHORACIC ECHO (TTE) COMPLETE Diagnosis/ICD: Syncope and collapse-R55 Indication:    syncope and collapse CPT Codes:     Echo Complete w Full [id number] Patient History: Pertinent History: Hyperlipidemia. Study Detail: The following Echo studies were performed: 2D, M-Mode, Doppler and               color flow.  PHYSICIAN INTERPRETATION: Left Ventricle: Left ventricular ejection fraction is normal, by visual estimate at 55-60%. There are no regional wall motion abnormalities. The left ventricular cavity size is normal. There is mild increased septal and normal posterior left ventricular wall thickness. There is left ventricular concentric remodeling. Spectral Doppler shows a Grade I (impaired relaxation pattern) of left ventricular diastolic filling with normal left atrial filling pressure. Left Atrium: The left atrium is upper limits of normal in  size. There is no atrial septal defect present. Right Ventricle: The right ventricle is upper limits of normal in size. There is normal right ventricular global systolic function. Right Atrium: The right atrial size is normal. Aortic Valve: The aortic valve is trileaflet. There is mild aortic valve thickening. There is no evidence of aortic valve stenosis. There is trace aortic valve regurgitation. The peak instantaneous gradient of the aortic valve is 8 mmHg. Mitral Valve: The mitral valve is mildly thickened. There is no evidence of mitral valve prolapse. There is no evidence of mitral valve stenosis. There is mild mitral annular calcification. There is trace mitral valve regurgitation. Tricuspid Valve: The tricuspid valve is structurally normal. There is no evidence of tricuspid valve stenosis. There is mild tricuspid regurgitation. The Doppler estimated RVSP is within normal limits at 25.3 mmHg. Pulmonic Valve: The pulmonic valve is not well visualized. There is no indication of pulmonic valve regurgitation. Pericardium: No pericardial effusion noted. Aorta: The aortic root is normal. Systemic Veins: The inferior vena cava appears mildly dilated, with IVC inspiratory collapse less than 50%. In comparison to the previous echocardiogram(s): Compared with study dated 4/23/2024,. No significant change.  CONCLUSIONS:  1. Left ventricular ejection fraction is normal, by visual estimate at 55-60%.  2. Spectral Doppler shows a Grade I (impaired relaxation pattern) of left ventricular diastolic filling with normal left atrial filling pressure.  3. There is normal right ventricular global systolic function.  4. No evidence of mitral valve prolapse.  5. There is No tricuspid stenosis.  6. Right ventricular systolic pressure is within normal limits.  7. Aortic valve stenosis is not present.  8. The inferior vena cava appears mildly dilated, with IVC inspiratory collapse less than 50%.  9. No significant change. QUANTITATIVE  DATA SUMMARY:  2D MEASUREMENTS:             Normal Ranges: LAs:             3.80 cm     (2.7-4.0cm) IVSd:            1.09 cm     (0.6-1.1cm) LVPWd:           0.99 cm     (0.6-1.1cm) LVIDd:           4.16 cm     (3.9-5.9cm) LVIDs:           2.63 cm LV Mass Index:   75.9 g/m2 LVEDV Index:     28.56 ml/m2 LV % FS          36.8 %  LEFT ATRIUM:                 Normal Ranges: LA Area A4C:      16.5 cm2 LA Area A2C:      15.3 cm2 LA Volume Index:  22.0 ml/m2 LA Vol A4C:       41.0 ml LA Vol A2C:       39.0 ml LA Vol Index BSA: 21.3 ml/m2 LA Vol BP:        41.0 ml  M-MODE MEASUREMENTS:         Normal Ranges: Ao Root:             2.80 cm (2.0-3.7cm) AoV Exc:             1.80 cm (1.5-2.5cm)  AORTA MEASUREMENTS:         Normal Ranges: AoV Exc:            1.80 cm (1.5-2.5cm) Ao Sinus, d:        3.50 cm (2.1-3.5cm) Ao STJ, d:          2.90 cm (1.7-3.4cm) Asc Ao, d:          2.90 cm (2.1-3.4cm)  LV SYSTOLIC FUNCTION:                      Normal Ranges: EF-A4C View:    55 % (>=55%) EF-A2C View:    53 % EF-Biplane:     57 % EF-Visual:      58 % LV EF Reported: 58 %  LV DIASTOLIC FUNCTION:            Normal Ranges: MV Peak E:             0.87 m/s   (0.7-1.2 m/s) MV Peak A:             0.73 m/s   (0.42-0.7 m/s) E/A Ratio:             1.20       (1.0-2.2) MV e'                  0.081 m/s  (>8.0) MV lateral e'          0.08 m/s MV medial e'           0.08 m/s E/e' Ratio:            10.74      (<8.0) PulmV Sys Howard:         58.20 cm/s PulmV Mathews Howard:        72.10 cm/s PulmV S/D Howard:         0.80  MITRAL VALVE:          Normal Ranges: MV DT:        232 msec (150-240msec)  AORTIC VALVE:           Normal Ranges: AoV Vmax:      1.38 m/s (<=1.7m/s) AoV Peak P.6 mmHg (<20mmHg) LVOT Max Howard:  1.07 m/s (<=1.1m/s) LVOT Diameter: 2.10 cm  (1.8-2.4cm) AoV Area,Vmax: 2.69 cm2 (2.5-4.5cm2)  RIGHT VENTRICLE: RV Basal 3.50 cm RV Mid   2.20 cm RV Major 6.8 cm TAPSE:   19.7 mm RV s'    0.13 m/s  TRICUSPID VALVE/RVSP:          Normal Ranges: Peak TR  Velocity:     2.36 m/s RV Syst Pressure:     25 mmHg  (< 30mmHg) IVC Diam:             2.10 cm  PULMONIC VALVE:          Normal Ranges: PV Accel Time:  120 msec (>120ms)  PULMONARY VEINS: PulmV Mathews Howard: 72.10 cm/s PulmV S/D Howard:  0.80 PulmV Sys Howard:  58.20 cm/s  [id number] Ayesha Peralta MD Electronically signed on 1/30/2025 at 1:15:20 PM  ** Final **     ECG 12 lead    Result Date: 1/30/2025  Normal sinus rhythm Normal ECG When compared with ECG of 29-JAN-2025 16:27, No significant change was found    CT pelvis wo IV contrast    Result Date: 1/30/2025  Interpreted By:  Keli Garay, STUDY: CT PELVIS WO IV CONTRAST;  1/30/2025 6:32 am   INDICATION: Signs/Symptoms:Pelvic pain after fall.     COMPARISON: CT pelvis 01/29/2025   ACCESSION NUMBER(S): [id number]   ORDERING CLINICIAN: JOYCE ARREOLA   TECHNIQUE: CT of the pelvis was performed. Sagittal and coronal reconstructions were generated. Images with artifact suppression were generated. No intravenous contrast given for the exam.   FINDINGS: Artifact from bilateral hip arthroplasties limits assessment of adjacent structures. No acute displaced osseous pelvic fracture as visualized. SI joints are grossly intact and symmetric. Bilateral hip arthroplasty components in satisfactory alignment. No acute fracture or abnormal lucency around the hardware. Minimal smooth spurring of the proximal right femur. Mild isodense enlargement of the visualized proximal right thigh musculature   Dense excreted contrast material in the urinary bladder. No significant free fluid in the cul-de-sac.       Status post bilateral GILBERT with intact hardware and no evidence of Kamille implant fracture. No evidence of acute displaced osseous pelvic fracture.   Slight enlargement of the visualized proximal right thigh musculature which could be related to contusion in the setting of trauma.   MACRO: None.   Signed by: Keli Garay 1/30/2025 8:33 AM Dictation workstation:   [id number]    CT lumbar  spine wo IV contrast    Result Date: 1/30/2025  Interpreted By:  Keli Garay, STUDY: CT LUMBAR SPINE WO IV CONTRAST  1/30/2025 6:32 am   INDICATION: Signs/Symptoms:Fall, worsening lower back pain and numbness/tingling     COMPARISON: 01/29/2025   ACCESSION NUMBER(S): [id number]   ORDERING CLINICIAN: JOYCE ARREOLA   TECHNIQUE: Contiguous axial CT images were obtained through the  lumbar spine without contrast administration. Sagittal and coronal reconstructions were generated.   FINDINGS:     ALIGNMENT: No significant spondylolisthesis.   VERTEBRAE/DISC SPACES: No compression deformity or acute displaced fracture.  Lumbar vertebral heights and intervertebral disc spaces are preserved. Mild L5-S1 disc bulge.   ADDITIONAL FINDINGS: Metallic streak artifact from bilateral hip arthroplasties on the most caudal images. Dense likely excreted contrast material in nondilated proximal renal collecting systems and partially included urinary bladder. 4.1 cm hypodense lesion exophytic from the right kidney again seen.       No lumbar vertebral displacement, compression deformity or acute displaced fracture. No significant change from previous day's exam.   MACRO: None.   Signed by: Keli Garay 1/30/2025 8:26 AM Dictation workstation:   [id number]    XR shoulder right 2+ views    Result Date: 1/30/2025  Interpreted By:  Keli Garay, STUDY: XR SHOULDER RIGHT 2+ VIEWS;  1/30/2025 6:26 am   INDICATION: Signs/Symptoms:Fall, right shoulder pain.     COMPARISON: None.   ACCESSION NUMBER(S): [id number]   ORDERING CLINICIAN: JOYCE ARREOLA   FINDINGS: Two views of the right shoulder obtained.   Acute fracture or dislocation. Glenohumeral and acromiohumeral spaces are preserved. AC joint narrowing and spurring.   Small right pneumothorax.       No evidence of fracture in the right shoulder.   Small right pneumothorax.   MACRO: None.   Signed by: Keli Garay 1/30/2025 8:22 AM Dictation workstation:   [id number]    XR elbow right  3+ views    Result Date: 1/30/2025  Interpreted By:  Keli Garay, STUDY: XR ELBOW RIGHT 3+ VIEWS;  1/30/2025 6:26 am   INDICATION: Signs/Symptoms:Fall, right elbow pain.     COMPARISON: None.   ACCESSION NUMBER(S): [id number]   ORDERING CLINICIAN: JOYCE ARREOLA   FINDINGS: Five views of the right elbow obtained.   Artifact from overlying IV catheter and tubing. There is a kink in the IV catheter near its insertion. No acute fracture or osseous displacement. Elbow joint space is preserved. No significant joint effusion. Probable dorsal soft tissue swelling.       No evidence of fracture.   MACRO: None.   Signed by: Keli Garay 1/30/2025 8:20 AM Dictation workstation:   [id number]    XR knee right 1-2 views    Result Date: 1/29/2025  Interpreted By:  Miguel Simpson, STUDY: XR KNEE RIGHT 1-2 VIEWS; ;  1/29/2025 5:35 pm   INDICATION: Signs/Symptoms:pain, fall.   COMPARISON: None.   ACCESSION NUMBER(S): [id number]   ORDERING CLINICIAN: JACLYN SAGASTUME   FINDINGS: No acute fracture or dislocation. No significant soft tissue swelling. No joint effusion.       No acute osseous abnormality.   Signed by: Miguel Simpson 1/29/2025 6:13 PM Dictation workstation:   [id number]    CT cervical spine wo IV contrast    Result Date: 1/29/2025  Interpreted By:  Miguel Simpson, STUDY: CT CERVICAL SPINE WO IV CONTRAST;  1/29/2025 5:13 pm   INDICATION: Signs/Symptoms:fell, hit head, complains of neck pain.   COMPARISON: 01/26/2025   ACCESSION NUMBER(S): [id number]   ORDERING CLINICIAN: JACLYN SAGASTUME   TECHNIQUE: Axial CT images of the cervical spine are obtained. Axial, coronal and sagittal reconstructions are provided for review.   FINDINGS: No acute fracture or subluxation. No vertebral body or disc height loss. Small marginal osteophytes lower cervical spine. Moderate right-sided facet arthropathy C3-C4 and C4-C5. Mild multilevel facet arthropathy on the left. No prevertebral hematoma.       No evidence for an acute  fracture or subluxation of the cervical spine.   Signed by: Miguel Simpson 1/29/2025 6:12 PM Dictation workstation:   [id number]    CT angio chest abdomen pelvis    Result Date: 1/29/2025  Interpreted By:  Miguel Simpson, STUDY: CT ANGIO CHEST ABDOMEN PELVIS; CT LUMBAR SPINE WO IV CONTRAST; CT THORACIC SPINE WO IV CONTRAST;  1/29/2025 5:37 pm   INDICATION: Signs/Symptoms:back pain, syncope, r/o dissection; Signs/Symptoms:upper lumbar pain after fall; Signs/Symptoms:low thoracic pain.   COMPARISON: CT chest dated 01/26/2025, CT urogram dated 12/26/2024   ACCESSION NUMBER(S): [id number]; [id number]; [id number]   ORDERING CLINICIAN: JACLYN SAGASTUME   TECHNIQUE: CT of the chest, abdomen, and pelvis was performed with and without contrast. Contiguous axial images were obtained at 3 mm slice thickness through the chest, abdomen and pelvis. Coronal and sagittal reconstructions at 3 mm slice thickness were performed. 90 ml of contrast Omnipaque 350 were administered intravenously without immediate complication. 3D reconstructions.   Noncontrast axial images through the thoracic spine. Sagittal and coronal reconstructions. Noncontrast axial images through the lumbar spine. Sagittal and coronal reconstructions.   FINDINGS: Lungs and Pleura: Streaky opacities are seen in the lungs dependently and bilaterally. Right middle lobe 4 mm pulmonary nodule. No pulmonary consolidation.  No pleural effusion. No pneumothorax.   Mediastinum: No adenopathy by CT size criteria. No cardiomegaly or pericardial effusion. Small hiatal hernia.   Liver: Focal fatty infiltrate along the falciform ligament.   Gallbladder and Biliary: Unremarkable.   Pancreas: No abnormality identified in the pancreas.   Spleen: No abnormality identified in the spleen.   Adrenals: No abnormality identified in either adrenal gland.   Urinary: Multiple small 1-3 mm nonobstructive calculi in the kidneys bilaterally, greater in number on the right than the  left. Upper pole cyst in the right kidney. Probable small parapelvic cyst in the right kidney lower pole. No hydronephrosis.   Gastrointestinal/Peritoneum: No small or large bowel obstruction in the visualized abdomen. In the abdomen, there is no extraluminal air. No significant free fluid. No evidence of acute appendicitis.   Vascular: No evidence of aortic aneurysm, dissection, or acute intramural hematoma. Mild atherosclerosis.   Lymphatics: No enlarged lymph nodes by size criteria.   MSK/Body Wall/Chest Wall: No aggressive bony lesion identified. Bilateral hip arthroplasties with associated streak artifact limiting evaluation for pelvic structures. Small fat containing umbilical hernia.   Thoracic spine: No acute fracture or subluxation. No vertebral body or disc height loss. No significant osteophyte formation. No facet arthropathy. No prevertebral hematoma.   Lumbar spine: No acute fracture or subluxation. No vertebral body or disc height loss. No significant osteophyte formation. Mild facet arthropathy in the lower lumbar spine. No prevertebral hematoma.       No evidence of aortic aneurysm, dissection, or acute intramural hematoma. Atherosclerosis.   Right middle lobe 4 mm pulmonary nodule, stable from prior chest CT.   Nonobstructive bilateral renal calculi.   No acute traumatic abnormality in the thoracic or lumbar spine.   Signed by: Miguel Simpson 1/29/2025 6:07 PM Dictation workstation:   [id number]    CT thoracic spine wo IV contrast    Result Date: 1/29/2025  Interpreted By:  Miguel Simpson, STUDY: CT ANGIO CHEST ABDOMEN PELVIS; CT LUMBAR SPINE WO IV CONTRAST; CT THORACIC SPINE WO IV CONTRAST;  1/29/2025 5:37 pm   INDICATION: Signs/Symptoms:back pain, syncope, r/o dissection; Signs/Symptoms:upper lumbar pain after fall; Signs/Symptoms:low thoracic pain.   COMPARISON: CT chest dated 01/26/2025, CT urogram dated 12/26/2024   ACCESSION NUMBER(S): [id number]; [id number]; [id number]   ORDERING  CLINICIAN: JACLYN SAGASTUME   TECHNIQUE: CT of the chest, abdomen, and pelvis was performed with and without contrast. Contiguous axial images were obtained at 3 mm slice thickness through the chest, abdomen and pelvis. Coronal and sagittal reconstructions at 3 mm slice thickness were performed. 90 ml of contrast Omnipaque 350 were administered intravenously without immediate complication. 3D reconstructions.   Noncontrast axial images through the thoracic spine. Sagittal and coronal reconstructions. Noncontrast axial images through the lumbar spine. Sagittal and coronal reconstructions.   FINDINGS: Lungs and Pleura: Streaky opacities are seen in the lungs dependently and bilaterally. Right middle lobe 4 mm pulmonary nodule. No pulmonary consolidation.  No pleural effusion. No pneumothorax.   Mediastinum: No adenopathy by CT size criteria. No cardiomegaly or pericardial effusion. Small hiatal hernia.   Liver: Focal fatty infiltrate along the falciform ligament.   Gallbladder and Biliary: Unremarkable.   Pancreas: No abnormality identified in the pancreas.   Spleen: No abnormality identified in the spleen.   Adrenals: No abnormality identified in either adrenal gland.   Urinary: Multiple small 1-3 mm nonobstructive calculi in the kidneys bilaterally, greater in number on the right than the left. Upper pole cyst in the right kidney. Probable small parapelvic cyst in the right kidney lower pole. No hydronephrosis.   Gastrointestinal/Peritoneum: No small or large bowel obstruction in the visualized abdomen. In the abdomen, there is no extraluminal air. No significant free fluid. No evidence of acute appendicitis.   Vascular: No evidence of aortic aneurysm, dissection, or acute intramural hematoma. Mild atherosclerosis.   Lymphatics: No enlarged lymph nodes by size criteria.   MSK/Body Wall/Chest Wall: No aggressive bony lesion identified. Bilateral hip arthroplasties with associated streak artifact limiting evaluation for  pelvic structures. Small fat containing umbilical hernia.   Thoracic spine: No acute fracture or subluxation. No vertebral body or disc height loss. No significant osteophyte formation. No facet arthropathy. No prevertebral hematoma.   Lumbar spine: No acute fracture or subluxation. No vertebral body or disc height loss. No significant osteophyte formation. Mild facet arthropathy in the lower lumbar spine. No prevertebral hematoma.       No evidence of aortic aneurysm, dissection, or acute intramural hematoma. Atherosclerosis.   Right middle lobe 4 mm pulmonary nodule, stable from prior chest CT.   Nonobstructive bilateral renal calculi.   No acute traumatic abnormality in the thoracic or lumbar spine.   Signed by: Miguel Simpson 1/29/2025 6:07 PM Dictation workstation:   [id number]    CT lumbar spine wo IV contrast    Result Date: 1/29/2025  Interpreted By:  Miguel Simpson, STUDY: CT ANGIO CHEST ABDOMEN PELVIS; CT LUMBAR SPINE WO IV CONTRAST; CT THORACIC SPINE WO IV CONTRAST;  1/29/2025 5:37 pm   INDICATION: Signs/Symptoms:back pain, syncope, r/o dissection; Signs/Symptoms:upper lumbar pain after fall; Signs/Symptoms:low thoracic pain.   COMPARISON: CT chest dated 01/26/2025, CT urogram dated 12/26/2024   ACCESSION NUMBER(S): [id number]; [id number]; [id number]   ORDERING CLINICIAN: JACLYN SAGASTUME   TECHNIQUE: CT of the chest, abdomen, and pelvis was performed with and without contrast. Contiguous axial images were obtained at 3 mm slice thickness through the chest, abdomen and pelvis. Coronal and sagittal reconstructions at 3 mm slice thickness were performed. 90 ml of contrast Omnipaque 350 were administered intravenously without immediate complication. 3D reconstructions.   Noncontrast axial images through the thoracic spine. Sagittal and coronal reconstructions. Noncontrast axial images through the lumbar spine. Sagittal and coronal reconstructions.   FINDINGS: Lungs and Pleura: Streaky opacities are  seen in the lungs dependently and bilaterally. Right middle lobe 4 mm pulmonary nodule. No pulmonary consolidation.  No pleural effusion. No pneumothorax.   Mediastinum: No adenopathy by CT size criteria. No cardiomegaly or pericardial effusion. Small hiatal hernia.   Liver: Focal fatty infiltrate along the falciform ligament.   Gallbladder and Biliary: Unremarkable.   Pancreas: No abnormality identified in the pancreas.   Spleen: No abnormality identified in the spleen.   Adrenals: No abnormality identified in either adrenal gland.   Urinary: Multiple small 1-3 mm nonobstructive calculi in the kidneys bilaterally, greater in number on the right than the left. Upper pole cyst in the right kidney. Probable small parapelvic cyst in the right kidney lower pole. No hydronephrosis.   Gastrointestinal/Peritoneum: No small or large bowel obstruction in the visualized abdomen. In the abdomen, there is no extraluminal air. No significant free fluid. No evidence of acute appendicitis.   Vascular: No evidence of aortic aneurysm, dissection, or acute intramural hematoma. Mild atherosclerosis.   Lymphatics: No enlarged lymph nodes by size criteria.   MSK/Body Wall/Chest Wall: No aggressive bony lesion identified. Bilateral hip arthroplasties with associated streak artifact limiting evaluation for pelvic structures. Small fat containing umbilical hernia.   Thoracic spine: No acute fracture or subluxation. No vertebral body or disc height loss. No significant osteophyte formation. No facet arthropathy. No prevertebral hematoma.   Lumbar spine: No acute fracture or subluxation. No vertebral body or disc height loss. No significant osteophyte formation. Mild facet arthropathy in the lower lumbar spine. No prevertebral hematoma.       No evidence of aortic aneurysm, dissection, or acute intramural hematoma. Atherosclerosis.   Right middle lobe 4 mm pulmonary nodule, stable from prior chest CT.   Nonobstructive bilateral renal  calculi.   No acute traumatic abnormality in the thoracic or lumbar spine.   Signed by: Miguel Simpson 1/29/2025 6:07 PM Dictation workstation:   [id number]    CT head wo IV contrast    Result Date: 1/29/2025  Interpreted By:  Miguel Simpson, STUDY: CT HEAD WO IV CONTRAST;  1/29/2025 5:13 pm   INDICATION: Signs/Symptoms:fell and hit head.   COMPARISON: 01/26/2025   ACCESSION NUMBER(S): [id number]   ORDERING CLINICIAN: JACLYN SAGASTUME   TECHNIQUE: Noncontrast axial CT scan of head was performed. Multiplanar reconstructions   FINDINGS: No definite acute intracranial hemorrhage, mass effect, midline shift, or herniation. Small foci of speckled high attenuation along left frontal sulci seen on series 202, image 58 suggestive of calcifications rather than subarachnoid hemorrhage. No evidence of hydrocephalus. The ventricles and sulci are unremarkable for age. The visualized paranasal sinuses and mastoid air cells are clear. No acute osseous abnormality of the calvarium. No destructive bone lesion.       No definite acute intracranial abnormality. Consider follow-up with MRI as warranted.     Signed by: Miguel Simpson 1/29/2025 5:45 PM Dictation workstation:   [id number]    ECG 12 lead    Result Date: 1/27/2025  Normal sinus rhythm Incomplete right bundle branch block Borderline ECG When compared with ECG of 10-NOV-2024 09:07, Incomplete right bundle branch block is now Present    CT angio chest for pulmonary embolism    Result Date: 1/26/2025  Interpreted By:  Uche Munson, STUDY: CT ANGIO CHEST FOR PULMONARY EMBOLISM;  1/26/2025 9:04 pm   INDICATION: Signs/Symptoms:Chest pain/ elevated DDimer/ rulte out PE.     COMPARISON: 11/07/2024 CT coronary artery calcium   ACCESSION NUMBER(S): [id number]   ORDERING CLINICIAN: ARDEN GUZMAN   TECHNIQUE: Contiguous axial images of the chest were obtained after the intravenous administration of iodinated contrast using angiographic PE protocol. Coronal and  sagittal reformatted images were reconstructed from the axial data. MIP images were created on an independent workstation and reviewed.   FINDINGS:   MEDIASTINUM AND LYMPH NODES:  The esophageal wall appears within normal limits.  No enlarged intrathoracic or axillary lymph nodes by imaging criteria. No pneumomediastinum.   VESSELS:  Normal caliber thoracic aorta without dissection. No significant aortic atherosclerosis.  No acute pulmonary embolism.   HEART: Normal in size.  Minimal coronary artery calcifications. No significant pericardial effusion.   LUNG, AIRWAYS, AND PLEURA: There is mild bronchial wall thickening in all lobes of both lungs most pronounced in the right and left lower lobe (right > left) with some minimal mucous plugging in the distal subsegmental bronchi. There is mild bibasilar atelectasis. No consolidation, pulmonary edema, pleural effusion or pneumothorax.   OSSEOUS STRUCTURES: No acute osseous abnormality.   CHEST WALL SOFT TISSUES: No discernible abnormality.   UPPER ABDOMEN/OTHER: No acute abnormality.       Mild bronchial wall thickening in all lobes of both lungs most pronounced in the right and left lower lobe (right > left) with some minimal mucous plugging in the distal subsegmental bronchi. Correlate for bronchitis/asthma. No evidence of pneumonia.   No acute pulmonary embolism.   MACRO: None.   Signed by: Uche Munson 1/26/2025 9:42 PM Dictation workstation:   [id number]    XR chest 1 view    Result Date: 1/26/2025  Interpreted By:  Uche Munson, STUDY: XR CHEST 1 VIEW;  1/26/2025 8:29 pm   INDICATION: Signs/Symptoms:Chest pain and syncope..     COMPARISON: 01/10/2024   ACCESSION NUMBER(S): [id number]   ORDERING CLINICIAN: ARDEN GUZMAN   FINDINGS:     The cardiomediastinal silhouette and pulmonary vasculature are within normal limits.   No consolidation, pleural effusion or pneumothorax.         No acute cardiopulmonary process.     MACRO: None.   Signed by: Uche  Todronakzafar 1/26/2025 8:44 PM Dictation workstation:   [id number]    CT head wo IV contrast    Result Date: 1/26/2025  Interpreted By:  Evelina Bunch, STUDY: CT HEAD WO IV CONTRAST; CT CERVICAL SPINE WO IV CONTRAST;  1/26/2025 7:36 pm   INDICATION: Signs/Symptoms:Chest pain and syncope..   COMPARISON: CT head 07/08/2024   ACCESSION NUMBER(S): [id number]; [id number]   ORDERING CLINICIAN: ARDEN GUZMAN   TECHNIQUE: CT scan of the head and cervical spine was performed without intravenous contrast.   FINDINGS: CT HEAD:   Parenchyma: No intracranial hemorrhage. The grey-white differentiation is intact. No mass effect or midline shift.   CSF Spaces: The ventricles, sulci and basal cisterns are normal for age.   Extra-Axial Fluid: None.   Calvarium: No acute depressed fracture.   Paranasal sinuses: Clear.   Mastoids: Clear.   Orbits: No acute abnormality.   Soft tissues: No scalp hematoma.     CT CERVICAL SPINE:   Prevertebral/Paraspinal Soft Tissues: No significant abnormalities.   Hardware: None. Fracture: None. Vertebral Body Heights: Normal. Alignment: No traumatic listhesis. Spinal canal and neural foramina: No high-grade canal stenosis or neural foraminal narrowing.       No acute intracranial hemorrhage or depressed calvarial fracture.   No acute fracture or traumatic malalignment of the cervical spine.   MACRO None     Signed by: Evelina Bunch 1/26/2025 8:02 PM Dictation workstation:   [id number]    CT cervical spine wo IV contrast    Result Date: 1/26/2025  Interpreted By:  Evelina Bunch, STUDY: CT HEAD WO IV CONTRAST; CT CERVICAL SPINE WO IV CONTRAST;  1/26/2025 7:36 pm   INDICATION: Signs/Symptoms:Chest pain and syncope..   COMPARISON: CT head 07/08/2024   ACCESSION NUMBER(S): [id number]; [id number]   ORDERING CLINICIAN: ARDEN GUZMAN   TECHNIQUE: CT scan of the head and cervical spine was performed without intravenous contrast.   FINDINGS: CT HEAD:   Parenchyma: No intracranial hemorrhage.  The grey-white differentiation is intact. No mass effect or midline shift.   CSF Spaces: The ventricles, sulci and basal cisterns are normal for age.   Extra-Axial Fluid: None.   Calvarium: No acute depressed fracture.   Paranasal sinuses: Clear.   Mastoids: Clear.   Orbits: No acute abnormality.   Soft tissues: No scalp hematoma.     CT CERVICAL SPINE:   Prevertebral/Paraspinal Soft Tissues: No significant abnormalities.   Hardware: None. Fracture: None. Vertebral Body Heights: Normal. Alignment: No traumatic listhesis. Spinal canal and neural foramina: No high-grade canal stenosis or neural foraminal narrowing.       No acute intracranial hemorrhage or depressed calvarial fracture.   No acute fracture or traumatic malalignment of the cervical spine.   MACRO None     Signed by: Evelina Bunch 1/26/2025 8:02 PM Dictation workstation:   [id number]    [place] pain management    Result Date: 1/14/2025  These images are not reportable by radiology and will not be interpreted by  Radiologists.  .      Assessment/Plan   Assessment & Plan  Syncope and collapse    Chronic back pain    Elevated troponin I level    Chronic left-sided low back pain with left-sided sciatica    Syncopal episodes- highly suspect neurocardiogenic (vasovagal) syncope 2/2 to severe back pain. Ddx: cardiac arrhythmia, medication- related    Recommend:    Increase Cymbalta to 90 mg daily  Increase oxycodone to 10mg every 6 hours PRN  Medrol dose pack  Cardiac telemetry (Cardiology is on consult)  Fall precautions  PT/OT when able.    Case/plan discussed and pt seen with Dr. Del Castillo.       Allie Gonsalez, MIKAELA-CNP

## 2025-01-30 NOTE — SIGNIFICANT EVENT
CODE SPOT Note:    Rapid response called by bedside RN for Fall out of bed.  Patient fell out of bed when getting up to go to the bathroom onto his right side.  He did not hit his head.  He did not have any neck pain after the incident.  No C-Spine tenderness on palpation and actively rotating neck without pain.  No obvious deformity crepitus or bruising seen on the patient head or neck.  Patient was communicating freely, did not lose consciousness, moving all 4 extremities spontaneously.  He did note numbness and pain radiating down his lower extremities to his knees worse than prior to the fall..  He does have chronic neck pain and low back pain and previous CT scanning yesterday on admission showed no acute traumatic abnormalities of the thoracic or lumbar spine.  He has significant back pain when trying to move.  He is also complaining of right elbow and shoulder pain after the fall.    Backboard retrieved from the emergency department and patient placed on backboard for transfer to bed.  He continued to have paresthesias into the legs but was able to move all extremities spontaneously without any gross deformity noted on exam of head, C-spine, T-spine, L-spine, or hips.  No gross deformity noted in elbow or shoulder but patient does note tenderness at the elbow and L-spine.     Nurse practitioner Madalyn Valenzuela also present. Recommended to perform CT lumbar spine,  and pelvis as well as x-ray right elbow and right shoulder.  Pain medication to be given.    Dr. Uriarte present for entirety of rapid response.    This assessment and plan has been discussed with attending physician.    Jens Tran, DO  Family Medicine, PGY-3        Agree with above documentation and my additions made directly to above note.   Total critical care time: Approximately 30 minutes  Due to a high probability of clinically significant, life threatening deterioration, the patient required my highest level of preparedness to intervene  emergently and I personally spent this critical care time directly and personally managing the patient. This critical care time included obtaining a history; examining the patient; pulse oximetry; ordering and review of studies; arranging urgent treatment with development of a management plan; evaluation of patient's response to treatment; frequent reassessment; and, discussions with other providers.  This critical care time was performed to assess and manage the high probability of imminent, life-threatening deterioration that could result in multi-organ failure. It was exclusive of separately billable procedures and treating other patients and teaching time.    Juvencio Uriarte, DO
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
## 2025-01-30 NOTE — PROGRESS NOTES
Attempt to meet with patient at bedside. Patient currently out of room at this time. Will attempt again at a later time.
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
## 2025-01-30 NOTE — H&P
History Of Present Illness  Akin Lynn is a 54 y.o. male with past medical history significant for HTN, HLD, incomplete RBBB, NSVT on metoprolol, GERD, gastroparesis, lumbar and cervical spondylosis, chronic neck and back pain for which he sees pain mgmt, complex regional pain syndrome [on ketamine infusions], multiple orthopedic surgeries, known right lung nodule, anxiety, and osteoarthritis presents to [facility] on 1/29/2025 from home for syncopal episode.     Patient states this afternoon he bent over to take care of his cats when he felt sudden sharp pains in his mid-lower back. He then went to the bathroom and on his way back to the cough he felt dizzy/lightheaded. He states he only remembers opening his eyes to his girlfriend attempting to wake him up. The fall was witnessed, patient did hit the back of his head, and he believes he lost consciousness for approximately 2 minutes. He denies confusion or headache after regaining consciousness and also denies loss of bladder or bowel control. He mentions that he may have had a similar episode this past Sunday where he felt like he was going to pass out, but at that time he was not dizzy or lightheaded. He believes syncopal episodes are pain related. He denies recent fever, chills, headache, chest pain, palpitations, shortness of breath, cough, abdominal pain, nausea, vomiting, diarrhea, dysuria, or hematuria.    Of note, patient had a ketamine infusion on 1/28/2025 and is scheduled for a RFA medial nerve branch blocks bilateral L4- L5, L5- S1 under fluoroscopic guidance in the near future.    Nuclear Stress Test 10/9/2024:  Normal Lexiscan Myoview cardiac perfusion stress test.  No evidence of ischemia or myocardial infarction by perfusion imaging.  Normal left ventricular systolic function, ejection fraction 65%.    Holter Event Monitor 10/9-10/15/2024:  Abnormal Zio cardiac monitor.  Frequent ventricular ectopy with isolated PVCs, ventricular bigeminy and 1 5  beat run of nonsustained VT.  Rare isolated APCs.  No atrial fibrillation.  No high-grade AV blocks or pauses.  Triggered events during ventricular ectopy noted.    Left Heart Cardiac Catheterization 12/20/2024   1. Left Main Coronary Artery: This artery is ectatic and normal.   2. Left Anterior Descending Artery: is mildly diseased and is ectatic.   3. Mid LAD Lesion: The percent stenosis is 40%.   4. Circumflex Coronary Artery: mildly diseased.   5. Right Coronary Artery: is diffusely diseased and mildly diseased.   6. Mid RCA Lesion: The percent stenosis is 50%.   7. The Left Ventricular Ejection Fraction is 55%.   8. Left Ventricular End Diastolic Pressure: 21 mm Hg.   9. Aortic Stenosis: None.  10. Left Ventricular End Diastolic Pressure Dysfunction: Mild.  11. LV: No RWMA.  12. LV: normal left ventricular systolic function.    ED Course:  Vital signs: Temp. 97.9 F, HR 89 bpm, RR 16, /89, SPO2 97% on room air   CMP: Glucose 110, calcium 8.5 otherwise no significant electrolyte abnormalities noted  Troponin: 46, 40  CBC w/diff: WBC 8.4, H&H 12.6/39.7, MCHC 31.7, RDW 14.7  CT head and C-spine: No acute intracranial abnormality.  No evidence of acute fracture or subluxation of the cervical spine  XR right knee: No acute osseous abnormality  CT lumbar/thoracic spine: No acute traumatic abnormality in the thoracic or lumbar spine  CT angio chest abdomen pelvis: No evidence of aortic aneurysm, dissection or acute intramural hematoma.  Right middle lobe pulmonary nodule stable from prior CT.  Nonobstructive bilateral renal calculi  EKG: Normal sinus rhythm, ventricular rate 72 bpm,  ms,  ms, QTc 451 ms. No ST elevation or depression noted.   Medications Given: Dilaudid 0.2 mg IV x 1  Disposition: Patient admitted for two recent episodes of syncope with collapse.    Past Medical History  Past Medical History:   Diagnosis Date    Anesthesia of skin     Numbness and tingling    Anxiety 09/21/2023     Bilateral myopia 09/21/2023    Cervical radicular pain 09/21/2023    Chronic patellofemoral pain of left knee 09/21/2023    Complex regional pain syndrome type 1 of left lower extremity 09/21/2023    COVID-19     VACCINATED    Cubital tunnel syndrome on left 09/21/2023    Cubital tunnel syndrome on right 09/21/2023    Disease of intestine, unspecified     Bowel trouble    Erectile dysfunction 09/21/2023    Foot joint pain 09/21/2023    Fractures     Gastroparesis     GERD (gastroesophageal reflux disease)     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    History of psychological trauma 09/21/2023    Hyperlipidemia     Hypertension     Joint pain 09/21/2023    Kidney cysts 09/21/2023    Low back pain, unspecified 12/07/2022    Chronic low back pain without sciatica, unspecified back pain laterality    Low back pain, unspecified 08/09/2022    Lumbago    Lumbar spondylosis 09/21/2023    Lung nodule 09/21/2023    RIGHT    Metatarsalgia of left foot 09/21/2023    Myopia, bilateral 02/19/2019    Myopia of both eyes with astigmatism and presbyopia    Osteoarthritis     Osteochondral defect of patella 09/21/2023    Osteoporosis     Other spondylosis, cervical region 09/21/2023    Patellofemoral arthritis 09/21/2023    Personal history of other diseases of the musculoskeletal system and connective tissue     History of arthritis    Personal history of other specified conditions     History of seizure    Reflex sympathetic dystrophy 09/21/2023    Scapular dyskinesis 09/21/2023    Seizure disorder (Multi)     D/T TRAMADOL REACTION - 25 YEARS AGO    Shoulder impingement 09/21/2023    Stable angina (CMS-HCC)     Suspected sleep apnea 09/21/2023    Trauma and stressor-related disorder 09/21/2023    Trochanteric bursitis of both hips 09/22/2023     Surgical History  He has a past surgical history that includes Shoulder surgery (Bilateral, 06/13/2017); Knee Arthroplasty (Bilateral, 06/13/2017); Other surgical history (01/06/2022); Other surgical  history (Bilateral, 07/26/2019); CT angio abdomen pelvis w and or wo IV IV contrast (03/18/2020); CT angio head w and wo IV contrast (12/16/2022); CT angio neck (12/16/2022); Achilles tendon surgery (1978 2016); Plantar fascia release; Hip surgery (Left); Joint replacement; Other surgical history (Bilateral); Other surgical history; Cardiac catheterization (N/A, 12/20/2024); and Cardiovascular stress test.     Social History  He reports that he has never smoked. He has never used smokeless tobacco. He reports that he does not currently use alcohol. He reports that he does not currently use drugs.    Family History  Family History   Problem Relation Name Age of Onset    Lung cancer Mother      Glaucoma Father      Skin cancer Father      Hypertension Other mul fam mem     Heart disease Other mul fam mem         Allergies  Morphine, Ultram [tramadol], Alprazolam, Ketorolac, Fish containing products, and Opioids - morphine analogues    Review of Systems   Constitutional:  Negative for chills, fatigue and fever.   HENT:  Positive for congestion. Negative for hearing loss, sore throat and trouble swallowing.    Eyes:  Negative for visual disturbance.   Respiratory:  Negative for cough, chest tightness and shortness of breath.    Cardiovascular:  Negative for chest pain and palpitations.   Gastrointestinal:  Negative for abdominal pain, constipation, diarrhea, nausea and vomiting.   Genitourinary:  Negative for difficulty urinating, dysuria, flank pain, frequency, hematuria and urgency.   Musculoskeletal:  Positive for back pain and neck pain. Negative for gait problem.   Skin:  Negative for color change, rash and wound.   Neurological:  Positive for dizziness. Negative for syncope, weakness and light-headedness.   Psychiatric/Behavioral:  Negative for agitation, confusion and dysphoric mood. The patient is not nervous/anxious.           Physical Exam  Vitals reviewed.   Constitutional:       General: He is not in acute  "distress.  HENT:      Head: Normocephalic.      Right Ear: External ear normal.      Left Ear: External ear normal.      Nose: Nose normal.      Mouth/Throat:      Mouth: Mucous membranes are moist.   Eyes:      General: Lids are normal. Vision grossly intact.      Pupils: Pupils are equal, round, and reactive to light.   Cardiovascular:      Rate and Rhythm: Normal rate and regular rhythm.      Pulses:           Radial pulses are 2+ on the right side and 2+ on the left side.        Dorsalis pedis pulses are 2+ on the right side and 2+ on the left side.      Heart sounds: Normal heart sounds, S1 normal and S2 normal.   Pulmonary:      Effort: Pulmonary effort is normal. No respiratory distress.      Breath sounds: Normal breath sounds. No wheezing, rhonchi or rales.   Abdominal:      General: Bowel sounds are normal. There is no distension.      Palpations: Abdomen is soft.      Tenderness: There is no abdominal tenderness.   Musculoskeletal:      Cervical back: Tenderness present. Muscular tenderness present.      Thoracic back: Tenderness present. No deformity.      Lumbar back: No deformity.   Skin:     General: Skin is warm and dry.      Capillary Refill: Capillary refill takes less than 2 seconds.   Neurological:      General: No focal deficit present.      Mental Status: He is alert and oriented to person, place, and time. Mental status is at baseline.      Sensory: Sensation is intact.      Motor: Motor function is intact.   Psychiatric:         Attention and Perception: Attention normal.         Mood and Affect: Mood normal.         Speech: Speech normal.         Behavior: Behavior normal. Behavior is cooperative.         Last Recorded Vitals  Blood pressure 102/56, pulse 64, temperature 36 °C (96.8 °F), resp. rate 18, height 1.74 m (5' 8.5\"), weight 74.4 kg (164 lb), SpO2 95%.    Pain Assessment: 0-10  0-10 (Numeric) Pain Score: 0 - No pain  Pain Type: Chronic pain; Acute pain  Pain Location: Back  Pain " Orientation: Left  Pain Descriptors: Aching; Burning; Throbbing  Pain Frequency: Intermittent      Relevant Results  Results for orders placed or performed during the hospital encounter of 01/29/25 (from the past 24 hours)   Troponin I, High Sensitivity, Initial   Result Value Ref Range    Troponin I, High Sensitivity 46 (H) 0 - 20 ng/L   CBC and Auto Differential   Result Value Ref Range    WBC 8.4 4.4 - 11.3 x10*3/uL    nRBC 0.0 0.0 - 0.0 /100 WBCs    RBC 4.50 4.50 - 5.90 x10*6/uL    Hemoglobin 12.6 (L) 13.5 - 17.5 g/dL    Hematocrit 39.7 (L) 41.0 - 52.0 %    MCV 88 80 - 100 fL    MCH 28.0 26.0 - 34.0 pg    MCHC 31.7 (L) 32.0 - 36.0 g/dL    RDW 14.7 (H) 11.5 - 14.5 %    Platelets 227 150 - 450 x10*3/uL    Neutrophils % 64.1 40.0 - 80.0 %    Immature Granulocytes %, Automated 0.6 0.0 - 0.9 %    Lymphocytes % 23.7 13.0 - 44.0 %    Monocytes % 5.9 2.0 - 10.0 %    Eosinophils % 4.7 0.0 - 6.0 %    Basophils % 1.0 0.0 - 2.0 %    Neutrophils Absolute 5.38 1.20 - 7.70 x10*3/uL    Immature Granulocytes Absolute, Automated 0.05 0.00 - 0.70 x10*3/uL    Lymphocytes Absolute 1.98 1.20 - 4.80 x10*3/uL    Monocytes Absolute 0.49 0.10 - 1.00 x10*3/uL    Eosinophils Absolute 0.39 0.00 - 0.70 x10*3/uL    Basophils Absolute 0.08 0.00 - 0.10 x10*3/uL   Comprehensive Metabolic Panel   Result Value Ref Range    Glucose 110 (H) 74 - 99 mg/dL    Sodium 141 136 - 145 mmol/L    Potassium 3.6 3.5 - 5.3 mmol/L    Chloride 106 98 - 107 mmol/L    Bicarbonate 27 21 - 32 mmol/L    Anion Gap 12 10 - 20 mmol/L    Urea Nitrogen 14 6 - 23 mg/dL    Creatinine 0.64 0.50 - 1.30 mg/dL    eGFR >90 >60 mL/min/1.73m*2    Calcium 8.5 (L) 8.6 - 10.3 mg/dL    Albumin 3.9 3.4 - 5.0 g/dL    Alkaline Phosphatase 64 33 - 120 U/L    Total Protein 6.4 6.4 - 8.2 g/dL    AST 18 9 - 39 U/L    Bilirubin, Total 0.7 0.0 - 1.2 mg/dL    ALT 19 10 - 52 U/L   Magnesium   Result Value Ref Range    Magnesium 1.77 1.60 - 2.40 mg/dL   Troponin, High Sensitivity, 1 Hour   Result  Value Ref Range    Troponin I, High Sensitivity 40 (H) 0 - 20 ng/L     *Note: Due to a large number of results and/or encounters for the requested time period, some results have not been displayed. A complete set of results can be found in Results Review.      XR knee right 1-2 views    Result Date: 1/29/2025  Interpreted By:  Miguel Simpson, STUDY: XR KNEE RIGHT 1-2 VIEWS; ;  1/29/2025 5:35 pm   INDICATION: Signs/Symptoms:pain, fall.   COMPARISON: None.   ACCESSION NUMBER(S): [id number]   ORDERING CLINICIAN: JACLYN SAGASTUME   FINDINGS: No acute fracture or dislocation. No significant soft tissue swelling. No joint effusion.       No acute osseous abnormality.   Signed by: Miguel Simpson 1/29/2025 6:13 PM Dictation workstation:   [id number]    CT cervical spine wo IV contrast    Result Date: 1/29/2025  Interpreted By:  Miguel Simpson, STUDY: CT CERVICAL SPINE WO IV CONTRAST;  1/29/2025 5:13 pm   INDICATION: Signs/Symptoms:fell, hit head, complains of neck pain.   COMPARISON: 01/26/2025   ACCESSION NUMBER(S): [id number]   ORDERING CLINICIAN: JACLYN SAGASTUME   TECHNIQUE: Axial CT images of the cervical spine are obtained. Axial, coronal and sagittal reconstructions are provided for review.   FINDINGS: No acute fracture or subluxation. No vertebral body or disc height loss. Small marginal osteophytes lower cervical spine. Moderate right-sided facet arthropathy C3-C4 and C4-C5. Mild multilevel facet arthropathy on the left. No prevertebral hematoma.       No evidence for an acute fracture or subluxation of the cervical spine.   Signed by: Miguel Simpson 1/29/2025 6:12 PM Dictation workstation:   [id number]    CT angio chest abdomen pelvis    Result Date: 1/29/2025  Interpreted By:  Miguel Simpson, STUDY: CT ANGIO CHEST ABDOMEN PELVIS; CT LUMBAR SPINE WO IV CONTRAST; CT THORACIC SPINE WO IV CONTRAST;  1/29/2025 5:37 pm   INDICATION: Signs/Symptoms:back pain, syncope, r/o dissection; Signs/Symptoms:upper  lumbar pain after fall; Signs/Symptoms:low thoracic pain.   COMPARISON: CT chest dated 01/26/2025, CT urogram dated 12/26/2024   ACCESSION NUMBER(S): [id number]; [id number]; [id number]   ORDERING CLINICIAN: JACLYN SAGASTUME   TECHNIQUE: CT of the chest, abdomen, and pelvis was performed with and without contrast. Contiguous axial images were obtained at 3 mm slice thickness through the chest, abdomen and pelvis. Coronal and sagittal reconstructions at 3 mm slice thickness were performed. 90 ml of contrast Omnipaque 350 were administered intravenously without immediate complication. 3D reconstructions.   Noncontrast axial images through the thoracic spine. Sagittal and coronal reconstructions. Noncontrast axial images through the lumbar spine. Sagittal and coronal reconstructions.   FINDINGS: Lungs and Pleura: Streaky opacities are seen in the lungs dependently and bilaterally. Right middle lobe 4 mm pulmonary nodule. No pulmonary consolidation.  No pleural effusion. No pneumothorax.   Mediastinum: No adenopathy by CT size criteria. No cardiomegaly or pericardial effusion. Small hiatal hernia.   Liver: Focal fatty infiltrate along the falciform ligament.   Gallbladder and Biliary: Unremarkable.   Pancreas: No abnormality identified in the pancreas.   Spleen: No abnormality identified in the spleen.   Adrenals: No abnormality identified in either adrenal gland.   Urinary: Multiple small 1-3 mm nonobstructive calculi in the kidneys bilaterally, greater in number on the right than the left. Upper pole cyst in the right kidney. Probable small parapelvic cyst in the right kidney lower pole. No hydronephrosis.   Gastrointestinal/Peritoneum: No small or large bowel obstruction in the visualized abdomen. In the abdomen, there is no extraluminal air. No significant free fluid. No evidence of acute appendicitis.   Vascular: No evidence of aortic aneurysm, dissection, or acute intramural hematoma. Mild atherosclerosis.    Lymphatics: No enlarged lymph nodes by size criteria.   MSK/Body Wall/Chest Wall: No aggressive bony lesion identified. Bilateral hip arthroplasties with associated streak artifact limiting evaluation for pelvic structures. Small fat containing umbilical hernia.   Thoracic spine: No acute fracture or subluxation. No vertebral body or disc height loss. No significant osteophyte formation. No facet arthropathy. No prevertebral hematoma.   Lumbar spine: No acute fracture or subluxation. No vertebral body or disc height loss. No significant osteophyte formation. Mild facet arthropathy in the lower lumbar spine. No prevertebral hematoma.       No evidence of aortic aneurysm, dissection, or acute intramural hematoma. Atherosclerosis.   Right middle lobe 4 mm pulmonary nodule, stable from prior chest CT.   Nonobstructive bilateral renal calculi.   No acute traumatic abnormality in the thoracic or lumbar spine.   Signed by: Miguel Simpson 1/29/2025 6:07 PM Dictation workstation:   [id number]    CT thoracic spine wo IV contrast    Result Date: 1/29/2025  Interpreted By:  Miguel Simpson, STUDY: CT ANGIO CHEST ABDOMEN PELVIS; CT LUMBAR SPINE WO IV CONTRAST; CT THORACIC SPINE WO IV CONTRAST;  1/29/2025 5:37 pm   INDICATION: Signs/Symptoms:back pain, syncope, r/o dissection; Signs/Symptoms:upper lumbar pain after fall; Signs/Symptoms:low thoracic pain.   COMPARISON: CT chest dated 01/26/2025, CT urogram dated 12/26/2024   ACCESSION NUMBER(S): [id number]; [id number]; [id number]   ORDERING CLINICIAN: JACLYN SAGASTUME   TECHNIQUE: CT of the chest, abdomen, and pelvis was performed with and without contrast. Contiguous axial images were obtained at 3 mm slice thickness through the chest, abdomen and pelvis. Coronal and sagittal reconstructions at 3 mm slice thickness were performed. 90 ml of contrast Omnipaque 350 were administered intravenously without immediate complication. 3D reconstructions.   Noncontrast axial images  through the thoracic spine. Sagittal and coronal reconstructions. Noncontrast axial images through the lumbar spine. Sagittal and coronal reconstructions.   FINDINGS: Lungs and Pleura: Streaky opacities are seen in the lungs dependently and bilaterally. Right middle lobe 4 mm pulmonary nodule. No pulmonary consolidation.  No pleural effusion. No pneumothorax.   Mediastinum: No adenopathy by CT size criteria. No cardiomegaly or pericardial effusion. Small hiatal hernia.   Liver: Focal fatty infiltrate along the falciform ligament.   Gallbladder and Biliary: Unremarkable.   Pancreas: No abnormality identified in the pancreas.   Spleen: No abnormality identified in the spleen.   Adrenals: No abnormality identified in either adrenal gland.   Urinary: Multiple small 1-3 mm nonobstructive calculi in the kidneys bilaterally, greater in number on the right than the left. Upper pole cyst in the right kidney. Probable small parapelvic cyst in the right kidney lower pole. No hydronephrosis.   Gastrointestinal/Peritoneum: No small or large bowel obstruction in the visualized abdomen. In the abdomen, there is no extraluminal air. No significant free fluid. No evidence of acute appendicitis.   Vascular: No evidence of aortic aneurysm, dissection, or acute intramural hematoma. Mild atherosclerosis.   Lymphatics: No enlarged lymph nodes by size criteria.   MSK/Body Wall/Chest Wall: No aggressive bony lesion identified. Bilateral hip arthroplasties with associated streak artifact limiting evaluation for pelvic structures. Small fat containing umbilical hernia.   Thoracic spine: No acute fracture or subluxation. No vertebral body or disc height loss. No significant osteophyte formation. No facet arthropathy. No prevertebral hematoma.   Lumbar spine: No acute fracture or subluxation. No vertebral body or disc height loss. No significant osteophyte formation. Mild facet arthropathy in the lower lumbar spine. No prevertebral hematoma.        No evidence of aortic aneurysm, dissection, or acute intramural hematoma. Atherosclerosis.   Right middle lobe 4 mm pulmonary nodule, stable from prior chest CT.   Nonobstructive bilateral renal calculi.   No acute traumatic abnormality in the thoracic or lumbar spine.   Signed by: Miguel Simpson 1/29/2025 6:07 PM Dictation workstation:   [id number]    CT lumbar spine wo IV contrast    Result Date: 1/29/2025  Interpreted By:  Miguel Simpson, STUDY: CT ANGIO CHEST ABDOMEN PELVIS; CT LUMBAR SPINE WO IV CONTRAST; CT THORACIC SPINE WO IV CONTRAST;  1/29/2025 5:37 pm   INDICATION: Signs/Symptoms:back pain, syncope, r/o dissection; Signs/Symptoms:upper lumbar pain after fall; Signs/Symptoms:low thoracic pain.   COMPARISON: CT chest dated 01/26/2025, CT urogram dated 12/26/2024   ACCESSION NUMBER(S): [id number]; [id number]; [id number]   ORDERING CLINICIAN: JACLYN SAGASTUME   TECHNIQUE: CT of the chest, abdomen, and pelvis was performed with and without contrast. Contiguous axial images were obtained at 3 mm slice thickness through the chest, abdomen and pelvis. Coronal and sagittal reconstructions at 3 mm slice thickness were performed. 90 ml of contrast Omnipaque 350 were administered intravenously without immediate complication. 3D reconstructions.   Noncontrast axial images through the thoracic spine. Sagittal and coronal reconstructions. Noncontrast axial images through the lumbar spine. Sagittal and coronal reconstructions.   FINDINGS: Lungs and Pleura: Streaky opacities are seen in the lungs dependently and bilaterally. Right middle lobe 4 mm pulmonary nodule. No pulmonary consolidation.  No pleural effusion. No pneumothorax.   Mediastinum: No adenopathy by CT size criteria. No cardiomegaly or pericardial effusion. Small hiatal hernia.   Liver: Focal fatty infiltrate along the falciform ligament.   Gallbladder and Biliary: Unremarkable.   Pancreas: No abnormality identified in the pancreas.   Spleen: No  abnormality identified in the spleen.   Adrenals: No abnormality identified in either adrenal gland.   Urinary: Multiple small 1-3 mm nonobstructive calculi in the kidneys bilaterally, greater in number on the right than the left. Upper pole cyst in the right kidney. Probable small parapelvic cyst in the right kidney lower pole. No hydronephrosis.   Gastrointestinal/Peritoneum: No small or large bowel obstruction in the visualized abdomen. In the abdomen, there is no extraluminal air. No significant free fluid. No evidence of acute appendicitis.   Vascular: No evidence of aortic aneurysm, dissection, or acute intramural hematoma. Mild atherosclerosis.   Lymphatics: No enlarged lymph nodes by size criteria.   MSK/Body Wall/Chest Wall: No aggressive bony lesion identified. Bilateral hip arthroplasties with associated streak artifact limiting evaluation for pelvic structures. Small fat containing umbilical hernia.   Thoracic spine: No acute fracture or subluxation. No vertebral body or disc height loss. No significant osteophyte formation. No facet arthropathy. No prevertebral hematoma.   Lumbar spine: No acute fracture or subluxation. No vertebral body or disc height loss. No significant osteophyte formation. Mild facet arthropathy in the lower lumbar spine. No prevertebral hematoma.       No evidence of aortic aneurysm, dissection, or acute intramural hematoma. Atherosclerosis.   Right middle lobe 4 mm pulmonary nodule, stable from prior chest CT.   Nonobstructive bilateral renal calculi.   No acute traumatic abnormality in the thoracic or lumbar spine.   Signed by: Miguel Simpson 1/29/2025 6:07 PM Dictation workstation:   [id number]    CT head wo IV contrast    Result Date: 1/29/2025  Interpreted By:  Miguel Simpson, STUDY: CT HEAD WO IV CONTRAST;  1/29/2025 5:13 pm   INDICATION: Signs/Symptoms:fell and hit head.   COMPARISON: 01/26/2025   ACCESSION NUMBER(S): [id number]   ORDERING CLINICIAN: JACLYN SAGASTUME    TECHNIQUE: Noncontrast axial CT scan of head was performed. Multiplanar reconstructions   FINDINGS: No definite acute intracranial hemorrhage, mass effect, midline shift, or herniation. Small foci of speckled high attenuation along left frontal sulci seen on series 202, image 58 suggestive of calcifications rather than subarachnoid hemorrhage. No evidence of hydrocephalus. The ventricles and sulci are unremarkable for age. The visualized paranasal sinuses and mastoid air cells are clear. No acute osseous abnormality of the calvarium. No destructive bone lesion.       No definite acute intracranial abnormality. Consider follow-up with MRI as warranted.     Signed by: Miguel Simpson 1/29/2025 5:45 PM Dictation workstation:   [id number]        Home Medications  Prior to Admission medications    Medication Sig Start Date End Date Taking? Authorizing Provider   acetaminophen (Tylenol) 500 mg tablet Take 2 tablets (1,000 mg) by mouth 3 times a day.   Yes Historical Provider, MD   albuterol (Ventolin HFA) 90 mcg/actuation inhaler Inhale 2 puffs every 4 hours if needed for wheezing.   Yes Historical Provider, MD   atorvastatin (Lipitor) 80 mg tablet Take 1 tablet (80 mg) by mouth once daily.   Yes Historical Provider, MD   baclofen (Lioresal) 20 mg tablet Take 1 tablet (20 mg) by mouth 3 times a day. 2/1/24 1/29/25 Yes Olivia Alonzo MD   celecoxib (CeleBREX) 200 mg capsule Take 1 capsule (200 mg) by mouth 2 times a day.   Yes Historical Provider, MD   diazePAM (Valium) 5 mg tablet Take 1 tablet (5 mg) by mouth 2 times a day as needed for anxiety.   Yes Historical Provider, MD   docusate sodium (Colace) 100 mg capsule Take 1 capsule (100 mg) by mouth 2 times a day.   Yes Historical Provider, MD   DULoxetine (Cymbalta) 60 mg DR capsule Take 1 capsule (60 mg) by mouth once daily. 8/2/23  Yes Historical Provider, MD   ezetimibe (Zetia) 10 mg tablet Take 1 tablet (10 mg) by mouth once daily. 12/5/24 12/5/25 Yes Ronni MCKEON  MD Tiburcio   fluticasone (Flonase) 50 mcg/actuation nasal spray Administer 1 spray into each nostril once daily as needed.   Yes Historical Provider, MD   lidocaine (Lidoderm) 5 % patch apply 2 patches to affected area daily as needed. 12/9/24  Yes Olivia Alonzo MD   magnesium oxide (Mag-Ox) 400 mg (241.3 mg magnesium) tablet Take 1 tablet (400 mg) by mouth once daily. 12/5/24 12/5/25 Yes Ronni Dey MD   metoprolol succinate XL (Toprol-XL) 25 mg 24 hr tablet Take 1 tablet (25 mg) by mouth once daily. Do not crush or chew. 12/5/24 12/5/25 Yes Ronni Dey MD   omeprazole (PriLOSEC) 40 mg DR capsule Take 1 capsule (40 mg) by mouth 2 times a day. For 90 days   Yes Historical Provider, MD   oxyCODONE (Roxicodone) 5 mg immediate release tablet Take 1 tablet (5 mg) by mouth 2 times a day as needed for severe pain (7 - 10).   Yes Historical Provider, MD   pregabalin (Lyrica) 300 mg capsule Take 1 capsule (300 mg) by mouth 2 times a day. 10/6/23  Yes Vish Barboza PA-C   ketamine HCl (ketamine, bulk,) 100 % powder Ketamine 100 mg/mL + lidocaine 40 mg/mL 1 puff 4 times daily as needed, DSP#20 mL x 5 refills  Patient not taking: Reported on 1/29/2025 12/6/24   Olivia Alonzo MD   sildenafil (Viagra) 100 mg tablet Take 1 tablet (100 mg) by mouth once daily as needed for erectile dysfunction.    Historical Provider, MD   diazePAM (Valium) 5 mg tablet Take 1 tablet (5 mg) by mouth every 12 hours if needed for anxiety. 3/14/24 1/29/25  Historical Provider, MD       Medications  Scheduled medications  acetaminophen, 975 mg, oral, TID  aspirin, 81 mg, oral, Daily  atorvastatin, 80 mg, oral, Daily  baclofen, 20 mg, oral, TID  docusate sodium, 100 mg, oral, BID  DULoxetine, 60 mg, oral, Daily  ezetimibe, 10 mg, oral, Daily  magnesium oxide, 400 mg, oral, Daily  metoprolol succinate XL, 25 mg, oral, Daily  orphenadrine, 100 mg, oral, Once  pantoprazole, 40 mg, oral, Daily before breakfast  pregabalin, 300  mg, oral, BID      Continuous medications     PRN medications  PRN medications: diazePAM, melatonin, oxyCODONE, polyethylene glycol        Assessment/Plan   Assessment & Plan  Syncope and collapse    Elevated troponin I level    Chronic back pain      Plan:  Code Status: Full Code     Consult: Cardiology    Meds: Continuing home medications as appropriate.   Aspirin 81 mg PO daily  Dilaudid 0.2 mg IV x 1 for thoracic and cervical neck pain. Patient is aware that additional doses will likely not be ordered  Melatonin 3 mg p.o. nightly as needed for sleep. MiraLAX 17 gms p.o daily as needed for constipation.  - Patient has chronic cervical and lumbar back pain for which he follows up with pain management for, states he is having thoracic back pain since December 2024. Recommended following up with pain management for this as well.    Diet: Cardiac    Labs ordered: CBC, BMP, Mg, troponin.  Monitor / trend labs while inpatient.  Monitor and replace electrolytes as needed.  Transfuse with PRBC for hemoglobin<7.0     Test ordered: TTE    Additional Orders:   Telemetry monitoring   Vital signs with BP, HR, & RR Q 4 hours.  Pulse ox Q 4 hours.   Orthostatic vital signs x 3 occurrences  Admission height and weight.  Fall precautions   Out of bed with assistance   Call physician for temperature < 36.5 or >38.0 degrees celsius.  Call physician for pulse <50 or >120.  Call physician for respiratory rate <10 or >22.  Call physician for systolic blood pressure <90 or >170.  Call physician for diastolic blood pressure < 50 or >100.  Call physician for pulse ox <92%.    VTE prophylaxis: BLE SCDs.    See additional orders for further plan of care. Any further evaluation and management per attending and consulting physicians.      This note has been transcribed using Dragon voice recognition system and there is a possibility of unintentional typing misprints.  Any information found to be copied from previous providers is done in the  best interest of the patient to provide accurate, quality, and continuity of care.     I spent 45 minutes in the professional and overall care of this patient.      MIKAELA Pompa-CNP  [facility]

## 2025-01-31 ENCOUNTER — HOSPITAL ENCOUNTER (OUTPATIENT)
Facility: HOSPITAL | Age: 55
Setting detail: OUTPATIENT SURGERY
End: 2025-01-31
Attending: STUDENT IN AN ORGANIZED HEALTH CARE EDUCATION/TRAINING PROGRAM | Admitting: STUDENT IN AN ORGANIZED HEALTH CARE EDUCATION/TRAINING PROGRAM
Payer: COMMERCIAL

## 2025-01-31 ENCOUNTER — APPOINTMENT (OUTPATIENT)
Dept: RADIOLOGY | Facility: HOSPITAL | Age: 55
End: 2025-01-31
Payer: COMMERCIAL

## 2025-01-31 DIAGNOSIS — R55 SYNCOPE, UNSPECIFIED SYNCOPE TYPE: ICD-10-CM
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
## 2025-01-31 LAB
ANION GAP SERPL CALC-SCNC: 12 MMOL/L (ref 10–20)
ATRIAL RATE: 88 BPM
BUN SERPL-MCNC: 14 MG/DL (ref 6–23)
CALCIUM SERPL-MCNC: 9.2 MG/DL (ref 8.6–10.3)
CHLORIDE SERPL-SCNC: 103 MMOL/L (ref 98–107)
CO2 SERPL-SCNC: 24 MMOL/L (ref 21–32)
CREAT SERPL-MCNC: 0.64 MG/DL (ref 0.5–1.3)
EGFRCR SERPLBLD CKD-EPI 2021: >90 ML/MIN/1.73M*2
ERYTHROCYTE [DISTWIDTH] IN BLOOD BY AUTOMATED COUNT: 14.5 % (ref 11.5–14.5)
GLUCOSE SERPL-MCNC: 128 MG/DL (ref 74–99)
HCT VFR BLD AUTO: 42.5 % (ref 41–52)
HGB BLD-MCNC: 13.4 G/DL (ref 13.5–17.5)
MAGNESIUM SERPL-MCNC: 1.84 MG/DL (ref 1.6–2.4)
MCH RBC QN AUTO: 28 PG (ref 26–34)
MCHC RBC AUTO-ENTMCNC: 31.5 G/DL (ref 32–36)
MCV RBC AUTO: 89 FL (ref 80–100)
NRBC BLD-RTO: 0 /100 WBCS (ref 0–0)
P AXIS: 65 DEGREES
P OFFSET: 197 MS
P ONSET: 134 MS
PLATELET # BLD AUTO: 264 X10*3/UL (ref 150–450)
POTASSIUM SERPL-SCNC: 4.1 MMOL/L (ref 3.5–5.3)
PR INTERVAL: 168 MS
Q ONSET: 218 MS
QRS COUNT: 15 BEATS
QRS DURATION: 104 MS
QT INTERVAL: 354 MS
QTC CALCULATION(BAZETT): 428 MS
QTC FREDERICIA: 402 MS
R AXIS: 50 DEGREES
RBC # BLD AUTO: 4.79 X10*6/UL (ref 4.5–5.9)
SODIUM SERPL-SCNC: 135 MMOL/L (ref 136–145)
T AXIS: 38 DEGREES
T OFFSET: 395 MS
VENTRICULAR RATE: 88 BPM
WBC # BLD AUTO: 8.4 X10*3/UL (ref 4.4–11.3)

## 2025-01-31 PROCEDURE — 2780000003 HC OR 278 NO HCPCS: Performed by: STUDENT IN AN ORGANIZED HEALTH CARE EDUCATION/TRAINING PROGRAM

## 2025-01-31 PROCEDURE — 7100000009 HC PHASE TWO TIME - INITIAL BASE CHARGE: Performed by: STUDENT IN AN ORGANIZED HEALTH CARE EDUCATION/TRAINING PROGRAM

## 2025-01-31 PROCEDURE — 70450 CT HEAD/BRAIN W/O DYE: CPT | Performed by: RADIOLOGY

## 2025-01-31 PROCEDURE — 83735 ASSAY OF MAGNESIUM: CPT

## 2025-01-31 PROCEDURE — 72128 CT CHEST SPINE W/O DYE: CPT | Performed by: RADIOLOGY

## 2025-01-31 PROCEDURE — 2500000001 HC RX 250 WO HCPCS SELF ADMINISTERED DRUGS (ALT 637 FOR MEDICARE OP): Performed by: NURSE PRACTITIONER

## 2025-01-31 PROCEDURE — 96376 TX/PRO/DX INJ SAME DRUG ADON: CPT | Mod: 59

## 2025-01-31 PROCEDURE — 2500000004 HC RX 250 GENERAL PHARMACY W/ HCPCS (ALT 636 FOR OP/ED): Performed by: NURSE PRACTITIONER

## 2025-01-31 PROCEDURE — 2500000002 HC RX 250 W HCPCS SELF ADMINISTERED DRUGS (ALT 637 FOR MEDICARE OP, ALT 636 FOR OP/ED)

## 2025-01-31 PROCEDURE — 33285 INSJ SUBQ CAR RHYTHM MNTR: CPT | Performed by: STUDENT IN AN ORGANIZED HEALTH CARE EDUCATION/TRAINING PROGRAM

## 2025-01-31 PROCEDURE — 36415 COLL VENOUS BLD VENIPUNCTURE: CPT

## 2025-01-31 PROCEDURE — 2500000001 HC RX 250 WO HCPCS SELF ADMINISTERED DRUGS (ALT 637 FOR MEDICARE OP)

## 2025-01-31 PROCEDURE — 2500000005 HC RX 250 GENERAL PHARMACY W/O HCPCS: Performed by: NURSE PRACTITIONER

## 2025-01-31 PROCEDURE — G0378 HOSPITAL OBSERVATION PER HR: HCPCS

## 2025-01-31 PROCEDURE — 2500000004 HC RX 250 GENERAL PHARMACY W/ HCPCS (ALT 636 FOR OP/ED): Performed by: STUDENT IN AN ORGANIZED HEALTH CARE EDUCATION/TRAINING PROGRAM

## 2025-01-31 PROCEDURE — 2500000004 HC RX 250 GENERAL PHARMACY W/ HCPCS (ALT 636 FOR OP/ED)

## 2025-01-31 PROCEDURE — C1764 EVENT RECORDER, CARDIAC: HCPCS | Performed by: STUDENT IN AN ORGANIZED HEALTH CARE EDUCATION/TRAINING PROGRAM

## 2025-01-31 PROCEDURE — 70450 CT HEAD/BRAIN W/O DYE: CPT

## 2025-01-31 PROCEDURE — 72125 CT NECK SPINE W/O DYE: CPT | Performed by: RADIOLOGY

## 2025-01-31 PROCEDURE — 72125 CT NECK SPINE W/O DYE: CPT

## 2025-01-31 PROCEDURE — 99152 MOD SED SAME PHYS/QHP 5/>YRS: CPT | Performed by: STUDENT IN AN ORGANIZED HEALTH CARE EDUCATION/TRAINING PROGRAM

## 2025-01-31 PROCEDURE — 72131 CT LUMBAR SPINE W/O DYE: CPT | Performed by: RADIOLOGY

## 2025-01-31 PROCEDURE — 85027 COMPLETE CBC AUTOMATED: CPT

## 2025-01-31 PROCEDURE — 2500000002 HC RX 250 W HCPCS SELF ADMINISTERED DRUGS (ALT 637 FOR MEDICARE OP, ALT 636 FOR OP/ED): Performed by: NURSE PRACTITIONER

## 2025-01-31 PROCEDURE — 94760 N-INVAS EAR/PLS OXIMETRY 1: CPT

## 2025-01-31 PROCEDURE — 72131 CT LUMBAR SPINE W/O DYE: CPT

## 2025-01-31 PROCEDURE — 7100000010 HC PHASE TWO TIME - EACH INCREMENTAL 1 MINUTE: Performed by: STUDENT IN AN ORGANIZED HEALTH CARE EDUCATION/TRAINING PROGRAM

## 2025-01-31 PROCEDURE — 72128 CT CHEST SPINE W/O DYE: CPT

## 2025-01-31 PROCEDURE — 80048 BASIC METABOLIC PNL TOTAL CA: CPT

## 2025-01-31 PROCEDURE — 2720000007 HC OR 272 NO HCPCS: Performed by: STUDENT IN AN ORGANIZED HEALTH CARE EDUCATION/TRAINING PROGRAM

## 2025-01-31 DEVICE — ICM LNQ22 LINQ II USA
Type: IMPLANTABLE DEVICE | Site: CHEST | Status: FUNCTIONAL
Brand: LINQ II™

## 2025-01-31 RX ORDER — LIDOCAINE HYDROCHLORIDE 20 MG/ML
INJECTION, SOLUTION INFILTRATION; PERINEURAL AS NEEDED
Status: DISCONTINUED | OUTPATIENT
Start: 2025-01-31 | End: 2025-01-31 | Stop reason: HOSPADM

## 2025-01-31 RX ORDER — HYDROMORPHONE HYDROCHLORIDE 0.2 MG/ML
0.2 INJECTION INTRAMUSCULAR; INTRAVENOUS; SUBCUTANEOUS ONCE
Status: DISCONTINUED | OUTPATIENT
Start: 2025-01-31 | End: 2025-01-31

## 2025-01-31 RX ORDER — ORPHENADRINE CITRATE 100 MG/1
100 TABLET, EXTENDED RELEASE ORAL ONCE
Status: COMPLETED | OUTPATIENT
Start: 2025-01-31 | End: 2025-01-31

## 2025-01-31 RX ORDER — MIDAZOLAM HYDROCHLORIDE 1 MG/ML
INJECTION, SOLUTION INTRAMUSCULAR; INTRAVENOUS AS NEEDED
Status: DISCONTINUED | OUTPATIENT
Start: 2025-01-31 | End: 2025-01-31 | Stop reason: HOSPADM

## 2025-01-31 RX ORDER — FENTANYL CITRATE 50 UG/ML
INJECTION, SOLUTION INTRAMUSCULAR; INTRAVENOUS AS NEEDED
Status: DISCONTINUED | OUTPATIENT
Start: 2025-01-31 | End: 2025-01-31 | Stop reason: HOSPADM

## 2025-01-31 RX ADMIN — EZETIMIBE 10 MG: 10 TABLET ORAL at 08:53

## 2025-01-31 RX ADMIN — ACETAMINOPHEN 325MG 975 MG: 325 TABLET ORAL at 08:53

## 2025-01-31 RX ADMIN — BACLOFEN 20 MG: 10 TABLET ORAL at 16:57

## 2025-01-31 RX ADMIN — DULOXETINE HYDROCHLORIDE 90 MG: 60 CAPSULE, DELAYED RELEASE ORAL at 08:53

## 2025-01-31 RX ADMIN — PANTOPRAZOLE SODIUM 40 MG: 40 TABLET, DELAYED RELEASE ORAL at 06:10

## 2025-01-31 RX ADMIN — ORPHENADRINE CITRATE 100 MG: 100 TABLET, EXTENDED RELEASE ORAL at 16:56

## 2025-01-31 RX ADMIN — DIAZEPAM 5 MG: 5 TABLET ORAL at 20:00

## 2025-01-31 RX ADMIN — METOPROLOL SUCCINATE 25 MG: 25 TABLET, EXTENDED RELEASE ORAL at 08:53

## 2025-01-31 RX ADMIN — METHYLPREDNISOLONE 20 MG: 4 TABLET ORAL at 08:52

## 2025-01-31 RX ADMIN — OXYCODONE HYDROCHLORIDE 10 MG: 10 TABLET ORAL at 10:56

## 2025-01-31 RX ADMIN — PREGABALIN 300 MG: 150 CAPSULE ORAL at 20:48

## 2025-01-31 RX ADMIN — ORPHENADRINE CITRATE 100 MG: 100 TABLET, EXTENDED RELEASE ORAL at 01:37

## 2025-01-31 RX ADMIN — BACLOFEN 20 MG: 10 TABLET ORAL at 20:49

## 2025-01-31 RX ADMIN — OXYCODONE HYDROCHLORIDE 10 MG: 10 TABLET ORAL at 05:58

## 2025-01-31 RX ADMIN — OXYCODONE HYDROCHLORIDE 10 MG: 10 TABLET ORAL at 16:57

## 2025-01-31 RX ADMIN — DOCUSATE SODIUM 100 MG: 100 CAPSULE, LIQUID FILLED ORAL at 08:53

## 2025-01-31 RX ADMIN — HYDROMORPHONE HYDROCHLORIDE 0.4 MG: 1 INJECTION, SOLUTION INTRAMUSCULAR; INTRAVENOUS; SUBCUTANEOUS at 19:10

## 2025-01-31 RX ADMIN — BACLOFEN 20 MG: 10 TABLET ORAL at 08:53

## 2025-01-31 RX ADMIN — ACETAMINOPHEN 325MG 975 MG: 325 TABLET ORAL at 20:49

## 2025-01-31 RX ADMIN — ATORVASTATIN CALCIUM 80 MG: 80 TABLET, FILM COATED ORAL at 08:53

## 2025-01-31 RX ADMIN — PREGABALIN 300 MG: 150 CAPSULE ORAL at 08:53

## 2025-01-31 RX ADMIN — ACETAMINOPHEN 325MG 975 MG: 325 TABLET ORAL at 16:57

## 2025-01-31 RX ADMIN — LIDOCAINE 4% 2 PATCH: 40 PATCH TOPICAL at 08:51

## 2025-01-31 RX ADMIN — Medication 400 MG: at 08:53

## 2025-01-31 RX ADMIN — ASPIRIN 81 MG CHEWABLE TABLET 81 MG: 81 TABLET CHEWABLE at 08:53

## 2025-01-31 RX ADMIN — DIAZEPAM 5 MG: 5 TABLET ORAL at 05:58

## 2025-01-31 RX ADMIN — DOCUSATE SODIUM 100 MG: 100 CAPSULE, LIQUID FILLED ORAL at 20:49

## 2025-01-31 ASSESSMENT — PAIN DESCRIPTION - LOCATION
LOCATION: BACK
LOCATION: GENERALIZED
LOCATION: CHEST

## 2025-01-31 ASSESSMENT — COGNITIVE AND FUNCTIONAL STATUS - GENERAL
CLIMB 3 TO 5 STEPS WITH RAILING: A LITTLE
WALKING IN HOSPITAL ROOM: A LITTLE
DAILY ACTIVITIY SCORE: 24
STANDING UP FROM CHAIR USING ARMS: A LITTLE
MOVING TO AND FROM BED TO CHAIR: A LITTLE
MOBILITY SCORE: 20

## 2025-01-31 ASSESSMENT — PAIN - FUNCTIONAL ASSESSMENT
PAIN_FUNCTIONAL_ASSESSMENT: WONG-BAKER FACES
PAIN_FUNCTIONAL_ASSESSMENT: 0-10
PAIN_FUNCTIONAL_ASSESSMENT: WONG-BAKER FACES
PAIN_FUNCTIONAL_ASSESSMENT: 0-10

## 2025-01-31 ASSESSMENT — PAIN SCALES - GENERAL
PAINLEVEL_OUTOF10: 5 - MODERATE PAIN
PAINLEVEL_OUTOF10: 7
PAINLEVEL_OUTOF10: 8
PAINLEVEL_OUTOF10: 7
PAINLEVEL_OUTOF10: 7
PAINLEVEL_OUTOF10: 3

## 2025-01-31 ASSESSMENT — PAIN SCALES - WONG BAKER
WONGBAKER_NUMERICALRESPONSE: HURTS LITTLE BIT
WONGBAKER_NUMERICALRESPONSE: HURTS EVEN MORE

## 2025-01-31 ASSESSMENT — PAIN DESCRIPTION - DESCRIPTORS
DESCRIPTORS: ACHING
DESCRIPTORS: ACHING

## 2025-01-31 NOTE — ASSESSMENT & PLAN NOTE
2D echo was done, normal ejection fraction  Patient had loop recorder placement  Continue monitoring

## 2025-01-31 NOTE — SIGNIFICANT EVENT
"Fall  Mr. Lynn is a 53yo male seen in ED for syncope after he bent over at home and had loss of consciousness secondary to pain. He has been evaluated by neuro and cards and he received a loop recorder today to monitor these syncopal episodes. He has a significant history of chronic back pain/chronic regional pain syndrome that sees several pain management physicians for and receives ketamine infusions monthly and is supposed to have a RFA with [facility] soon. He is on several meds and has had increases in his home meds with minimal relief. Currently on scheduled tylenol, baclofen, max dose lyrica, increased cymbalta to max dose, got IV decadron yesterday followed by medrol dosepak, lidocaine patches, home oxy 5 BID PRN increased to 10 Q6 PRN, has valium BID PRN anxiety and got a dose of noreflex.     Code spot was called since patient had an unwitnessed fall in his bathroom.  Patient reports he was on the commode and was having pain and when he got up to get back to his bed his \"legs gave out on me\".  Patient states he landed on his bottom hitting his posterior head and back on the threshold of the door.  He needed assist x 2 up to the chair.  He reports increased back pain, headache and right groin pain.  This is the patient's second fall since admission.  Patient had no loss of consciousness, is not on anticoagulation.  He denies chest pain, shortness of breath, abdominal pain, nausea, vomiting.  He states he was lightheaded when he was sitting on the floor that has currently resolved.    Physical exam:  Awake, alert, oriented, moaning in pain  Heart rate and rhythm regular, no loss of consciousness  Lungs clear, nonlabored respirations  Abdomen soft with positive bowel sounds.   Thoracic and lumbar spine with tenderness to palpation with no deformities noted  Sensation present to extremities  Moves all extremities equally     Plan:   CT head, c-spine, t-spine, l-spine  Placed call to Dr. Tan Vargas's " office, patient's pain management physician, to help guide pain management. Awaiting return call  Fall precautions  OOB with assistance only  PT/OT eval and treat      Time spent in the overall evaluation, assessment and coordination of care of patient 45 minutes

## 2025-01-31 NOTE — ASSESSMENT & PLAN NOTE
Patient is still complaining of back pain  Patient was evaluated by orthopedic surgery, he requested second opinion with neurosurgery  Patient states he wants Dilaudid to help with his pain, and he expressed not being happy with the care he is getting, he gave an explanation about how he is feeling palliative he show he watched with the patient was complaining of pain and the doctor did not treat her pain and she was in acute sickle cell crisis another doctor came and gave her pain medications and helped the requested to be treated like the patient he saw in the TV show, then he requested to be transferred to a different service

## 2025-01-31 NOTE — PROGRESS NOTES
Spiritual Care Visit  Spiritual Care Request    Reason for Visit:  Routine Visit: Introduction     Request Received From:       Focus of Care:  Visited With: Patient         Refer to :          Spiritual Care Assessment    Spiritual Assessment:                      Care Provided:  Intended Effects: Promote sense of peace, Preserve dignity and respect, Meaning-making  Methods: Offer support  Interventions: Share words of hope and inspiration    Sense of Community and or Presybeterian Affiliation:  Atheist         Addressed Needs/Concerns and/or Maik Through:          Outcome:        Advance Directives:         Spiritual Care Annotation    Annotation:  Patient shared he has been an atheist since he was [age] and said no to his [facility].  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   listened and was a supportive presence.

## 2025-01-31 NOTE — ASSESSMENT & PLAN NOTE
Admit patient to medical floor with telemetry   2D echo normal ejection fraction  Cardiology consult  Neurology consult

## 2025-01-31 NOTE — CONSULTS
Reason For Consult  Back pain    History Of Present Illness  Akin Lynn is a 54 y.o. male presenting with syncopal episode and chronic back pain. He has a long history of back pain and has been seeing a pain management provider, most recently just a few days ago. He had a lumbar MRI in 10/2024  after which, he was seen and assessed by Dr. Chen 11/19/2024. It was determined that no surgery was indicated and he was advised to restart physical therapy and continue with pain management. He continues to have lumbar pain and complains of new thoracic pain since 12/2024 for which he has been receiving treatment from pain management. Today, he is interested in speaking to neurosurgery for second opinion about his back pain.      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), History of psychological trauma (09/21/2023), Hyperlipidemia, Hypertension, Incomplete RBBB (09/19/2024), Joint pain (09/21/2023), Kidney cysts (09/21/2023), Low back pain, unspecified (12/07/2022), Low back pain, unspecified (08/09/2022), Lumbar spondylosis (09/21/2023), Lung nodule (09/21/2023), Metatarsalgia of left foot (09/21/2023), Myopia, bilateral (02/19/2019), Non-sustained ventricular tachycardia (Multi), Osteoarthritis, Osteochondral defect of patella (09/21/2023), Osteoporosis, Other spondylosis, cervical region (09/21/2023), Patellofemoral arthritis (09/21/2023), Personal history of other  "diseases of the musculoskeletal system and connective tissue, Personal history of other specified conditions, Reflex sympathetic dystrophy (09/21/2023), Scapular dyskinesis (09/21/2023), Seizure disorder (Multi), Shoulder impingement (09/21/2023), Stable angina (CMS-HCC), Suspected sleep apnea (09/21/2023), Trauma and stressor-related disorder (09/21/2023), and Trochanteric bursitis of both hips (09/22/2023).    Surgical History  He has a past surgical history that includes Shoulder surgery (Bilateral, 06/13/2017); Knee Arthroplasty (Bilateral, 06/13/2017); Other surgical history (01/06/2022); Other surgical history (Bilateral, 07/26/2019); CT angio abdomen pelvis w and or wo IV IV contrast (03/18/2020); CT angio head w and wo IV contrast (12/16/2022); CT angio neck (12/16/2022); Achilles tendon surgery (1978 2016); Plantar fascia release; Hip surgery (Left); Joint replacement; Other surgical history (Bilateral); Other surgical history; Cardiac catheterization (N/A, 12/20/2024); Cardiovascular stress test; and Cardiac electrophysiology procedure (N/A, 1/31/2025).     Social History  He reports that he has never smoked. He has never used smokeless tobacco. He reports that he does not currently use alcohol. He reports that he does not currently use drugs.    Family History  Family History   Problem Relation Name Age of Onset    Lung cancer Mother      Glaucoma Father      Skin cancer Father      Hypertension Other mul fam mem     Heart disease Other mul fam mem         Allergies  Morphine, Ultram [tramadol], Alprazolam, Ketorolac, Fish containing products, and Opioids - morphine analogues    Review of Systems         Physical Exam       Last Recorded Vitals  Blood pressure 111/68, pulse 78, temperature 36.7 °C (98.1 °F), resp. rate 18, height 1.74 m (5' 8.5\"), weight 74.4 kg (164 lb), SpO2 95%.    Relevant Results      Scheduled medications  acetaminophen, 975 mg, oral, TID  aspirin, 81 mg, oral, Daily  atorvastatin, " 80 mg, oral, Daily  baclofen, 20 mg, oral, TID  docusate sodium, 100 mg, oral, BID  DULoxetine, 90 mg, oral, Daily  ezetimibe, 10 mg, oral, Daily  HYDROmorphone, 0.4 mg, intravenous, Once  lidocaine, 2 patch, transdermal, Daily  magnesium oxide, 400 mg, oral, Daily  [START ON 2/1/2025] methylPREDNISolone, 16 mg, oral, Once   Followed by  [START ON 2/2/2025] methylPREDNISolone, 12 mg, oral, Once   Followed by  [START ON 2/3/2025] methylPREDNISolone, 8 mg, oral, Once   Followed by  [START ON 2/4/2025] methylPREDNISolone, 4 mg, oral, Once  metoprolol succinate XL, 25 mg, oral, Daily  orphenadrine, 100 mg, oral, Once  pantoprazole, 40 mg, oral, Daily before breakfast  pregabalin, 300 mg, oral, BID      Continuous medications     PRN medications  PRN medications: diazePAM, melatonin, oxyCODONE, polyethylene glycol  Results for orders placed or performed during the hospital encounter of 01/29/25 (from the past 24 hours)   Basic metabolic panel   Result Value Ref Range    Glucose 128 (H) 74 - 99 mg/dL    Sodium 135 (L) 136 - 145 mmol/L    Potassium 4.1 3.5 - 5.3 mmol/L    Chloride 103 98 - 107 mmol/L    Bicarbonate 24 21 - 32 mmol/L    Anion Gap 12 10 - 20 mmol/L    Urea Nitrogen 14 6 - 23 mg/dL    Creatinine 0.64 0.50 - 1.30 mg/dL    eGFR >90 >60 mL/min/1.73m*2    Calcium 9.2 8.6 - 10.3 mg/dL   CBC   Result Value Ref Range    WBC 8.4 4.4 - 11.3 x10*3/uL    nRBC 0.0 0.0 - 0.0 /100 WBCs    RBC 4.79 4.50 - 5.90 x10*6/uL    Hemoglobin 13.4 (L) 13.5 - 17.5 g/dL    Hematocrit 42.5 41.0 - 52.0 %    MCV 89 80 - 100 fL    MCH 28.0 26.0 - 34.0 pg    MCHC 31.5 (L) 32.0 - 36.0 g/dL    RDW 14.5 11.5 - 14.5 %    Platelets 264 150 - 450 x10*3/uL   Magnesium   Result Value Ref Range    Magnesium 1.84 1.60 - 2.40 mg/dL     *Note: Due to a large number of results and/or encounters for the requested time period, some results have not been displayed. A complete set of results can be found in Results Review.        Assessment/Plan     Pt  requests second opinion of back pain, would like to neurosurgery to be consulted.     Seen on rounds with Dr. Rachel Gray PA-C

## 2025-01-31 NOTE — SIGNIFICANT EVENT
"Notified by RN that the patient would like to speak with the provider on call regarding the current pain medication regimen which has been ordered for him. Upon arrival to the bedside, patient is visibly upset.  He states to this provider that he is unhappy with the current pain medication which have been ordered for him and says \"I feel like my pain was minimized by every doctor who had seen me yesterday, no one is taking my pain seriously, and they were all treating me like I am a criminal for asking for more pain medications\". He is specifically requesting IV Dilaudid for breakthrough pain as he reports this is the only medication that has helped with pain relief recently. I attempted to educate him on the current pain medication regimen and that unfortunately, no additional IV dilaudid would be ordered at this time due to discussions which were had with the dayshift team. Patient then mentioned that he would like to leave AMA and visit another hospital instead as they would be better able to manage his care. I discussed the risks and benefits of him leaving against medical advice up to and including another fall with inability to get up independently, another syncopal episode with collapse, and risk of death.  We then further discussed his current treatment plan and I advised him that EP was consulted for possible loop recorder placement, and ultimately patient was agreeable to stay for further treatment and consultation     Plan:  - Dayshift team to attempt to contact patient's pain management physician Dr. Tan Vargas ([phone]) to possibly obtain further recommendations on managing his back pain. Patient is aware that this is not guaranteed, but attempt will be made depending on attending physician decision.  - Norflex 100 mg PO x 1   - No changes made to current pain medication regimen   "

## 2025-01-31 NOTE — PROGRESS NOTES
Attempt to meet with patient at bedside, patient currently off the floor. Will attempt again at a later time.

## 2025-01-31 NOTE — PROGRESS NOTES
Internal Medicine Progress Note    Patient Name: Akin Lynn          MRN: [id number]  Today's Date: January 31, 2025          Attending: Denis Martinez MD    Subjective:  Patient was seen and examined at bedside, patient is still complaining of back pain, and he is upset about not getting dilaudid, after I evaluated the patient he had a fall.    Review Of Systems:  GENERAL: No malaise or fevers.  CARDIOVASCULAR: Negative for chest pain, leg swelling  RESPIRATORY: Negative for shortness of breath, cough, wheezing  GI: No nausea, vomiting, or diarrhea  MUSCULOSKELETAL: Positive for back pain      Objective:  Vitals:    01/31/25 1041 01/31/25 1048 01/31/25 1056 01/31/25 1111   BP: 133/84 131/80 131/80 (!) 150/93   BP Location:       Patient Position:       Pulse: 81 87 79 78   Resp: 14 16 18 16   Temp:       TempSrc:       SpO2: 92% 96% 95% 95%   Weight:       Height:               Physical Exam:   General appearance: Alert, in no acute distress and well-hydrated  Neuro: Alert oriented x3, no focal deficit.    Labs:  Results for orders placed or performed during the hospital encounter of 01/29/25 (from the past 24 hours)   Basic metabolic panel   Result Value Ref Range    Glucose 128 (H) 74 - 99 mg/dL    Sodium 135 (L) 136 - 145 mmol/L    Potassium 4.1 3.5 - 5.3 mmol/L    Chloride 103 98 - 107 mmol/L    Bicarbonate 24 21 - 32 mmol/L    Anion Gap 12 10 - 20 mmol/L    Urea Nitrogen 14 6 - 23 mg/dL    Creatinine 0.64 0.50 - 1.30 mg/dL    eGFR >90 >60 mL/min/1.73m*2    Calcium 9.2 8.6 - 10.3 mg/dL   CBC   Result Value Ref Range    WBC 8.4 4.4 - 11.3 x10*3/uL    nRBC 0.0 0.0 - 0.0 /100 WBCs    RBC 4.79 4.50 - 5.90 x10*6/uL    Hemoglobin 13.4 (L) 13.5 - 17.5 g/dL    Hematocrit 42.5 41.0 - 52.0 %    MCV 89 80 - 100 fL    MCH 28.0 26.0 - 34.0 pg    MCHC 31.5 (L) 32.0 - 36.0 g/dL    RDW 14.5 11.5 - 14.5 %    Platelets 264 150 - 450 x10*3/uL   Magnesium   Result Value Ref Range    Magnesium 1.84 1.60 - 2.40 mg/dL     *Note:  Due to a large number of results and/or encounters for the requested time period, some results have not been displayed. A complete set of results can be found in Results Review.       Medications:  Scheduled medications  acetaminophen, 975 mg, oral, TID  aspirin, 81 mg, oral, Daily  atorvastatin, 80 mg, oral, Daily  baclofen, 20 mg, oral, TID  docusate sodium, 100 mg, oral, BID  DULoxetine, 90 mg, oral, Daily  ezetimibe, 10 mg, oral, Daily  lidocaine, 2 patch, transdermal, Daily  magnesium oxide, 400 mg, oral, Daily  [START ON 2/1/2025] methylPREDNISolone, 16 mg, oral, Once   Followed by  [START ON 2/2/2025] methylPREDNISolone, 12 mg, oral, Once   Followed by  [START ON 2/3/2025] methylPREDNISolone, 8 mg, oral, Once   Followed by  [START ON 2/4/2025] methylPREDNISolone, 4 mg, oral, Once  metoprolol succinate XL, 25 mg, oral, Daily  pantoprazole, 40 mg, oral, Daily before breakfast  pregabalin, 300 mg, oral, BID      Continuous medications     PRN medications  PRN medications: diazePAM, melatonin, oxyCODONE, polyethylene glycol      Assessment/Plan:  Assessment & Plan  Syncope and collapse  2D echo was done, normal ejection fraction  Patient had loop recorder placement  Continue monitoring  Chronic back pain  Patient is still complaining of back pain  Patient was evaluated by orthopedic surgery, he requested second opinion with neurosurgery  Patient states he wants Dilaudid to help with his pain, and he expressed not being happy with the care he is getting, he gave an explanation about how he is feeling palliative he show he watched with the patient was complaining of pain and the doctor did not treat her pain and she was in acute sickle cell crisis another doctor came and gave her pain medications and helped the requested to be treated like the patient he saw in the TV show, then he requested to be transferred to a different service  Pneumothorax, traumatic  Patient was evaluated by pulmonology, continue  "monitoring  Fall  Patient had a fall in the hospital today, he had CT scan of head, C-spine, T-spine and L-spine did not show acute process or fracture    Patient will be transferred to Dr. Sales service  Discussed with patient, ANUEL Martinez MD   Date: 01/31/25  Time: 1:24 PM    This note was partially created using voice recognition software and is inherently subject to errors including those of syntax and \"sound-alike\" substitutions which may escape proofreading. In such instances, original meaning may be extrapolated by contextual derivation    "

## 2025-02-01 VITALS
RESPIRATION RATE: 17 BRPM | HEIGHT: 69 IN | SYSTOLIC BLOOD PRESSURE: 153 MMHG | HEART RATE: 65 BPM | TEMPERATURE: 97 F | WEIGHT: 164 LBS | OXYGEN SATURATION: 94 % | DIASTOLIC BLOOD PRESSURE: 92 MMHG | BODY MASS INDEX: 24.29 KG/M2

## 2025-02-01 LAB
ANION GAP SERPL CALC-SCNC: 11 MMOL/L (ref 10–20)
BUN SERPL-MCNC: 22 MG/DL (ref 6–23)
CALCIUM SERPL-MCNC: 8.9 MG/DL (ref 8.6–10.3)
CHLORIDE SERPL-SCNC: 104 MMOL/L (ref 98–107)
CO2 SERPL-SCNC: 28 MMOL/L (ref 21–32)
CREAT SERPL-MCNC: 0.79 MG/DL (ref 0.5–1.3)
EGFRCR SERPLBLD CKD-EPI 2021: >90 ML/MIN/1.73M*2
ERYTHROCYTE [DISTWIDTH] IN BLOOD BY AUTOMATED COUNT: 15.3 % (ref 11.5–14.5)
GLUCOSE SERPL-MCNC: 91 MG/DL (ref 74–99)
HCT VFR BLD AUTO: 40.2 % (ref 41–52)
HGB BLD-MCNC: 12.4 G/DL (ref 13.5–17.5)
MAGNESIUM SERPL-MCNC: 2.09 MG/DL (ref 1.6–2.4)
MCH RBC QN AUTO: 27.1 PG (ref 26–34)
MCHC RBC AUTO-ENTMCNC: 30.8 G/DL (ref 32–36)
MCV RBC AUTO: 88 FL (ref 80–100)
NRBC BLD-RTO: 0 /100 WBCS (ref 0–0)
PLATELET # BLD AUTO: 267 X10*3/UL (ref 150–450)
POTASSIUM SERPL-SCNC: 3.9 MMOL/L (ref 3.5–5.3)
RBC # BLD AUTO: 4.57 X10*6/UL (ref 4.5–5.9)
SODIUM SERPL-SCNC: 139 MMOL/L (ref 136–145)
WBC # BLD AUTO: 9.7 X10*3/UL (ref 4.4–11.3)

## 2025-02-01 PROCEDURE — 99253 IP/OBS CNSLTJ NEW/EST LOW 45: CPT | Performed by: NEUROLOGICAL SURGERY

## 2025-02-01 PROCEDURE — 2500000001 HC RX 250 WO HCPCS SELF ADMINISTERED DRUGS (ALT 637 FOR MEDICARE OP)

## 2025-02-01 PROCEDURE — 2500000004 HC RX 250 GENERAL PHARMACY W/ HCPCS (ALT 636 FOR OP/ED): Performed by: NURSE PRACTITIONER

## 2025-02-01 PROCEDURE — 82565 ASSAY OF CREATININE: CPT

## 2025-02-01 PROCEDURE — 2500000002 HC RX 250 W HCPCS SELF ADMINISTERED DRUGS (ALT 637 FOR MEDICARE OP, ALT 636 FOR OP/ED)

## 2025-02-01 PROCEDURE — 2500000005 HC RX 250 GENERAL PHARMACY W/O HCPCS: Performed by: NURSE PRACTITIONER

## 2025-02-01 PROCEDURE — 99239 HOSP IP/OBS DSCHRG MGMT >30: CPT | Performed by: INTERNAL MEDICINE

## 2025-02-01 PROCEDURE — 85027 COMPLETE CBC AUTOMATED: CPT

## 2025-02-01 PROCEDURE — 2500000001 HC RX 250 WO HCPCS SELF ADMINISTERED DRUGS (ALT 637 FOR MEDICARE OP): Performed by: NURSE PRACTITIONER

## 2025-02-01 PROCEDURE — 2500000002 HC RX 250 W HCPCS SELF ADMINISTERED DRUGS (ALT 637 FOR MEDICARE OP, ALT 636 FOR OP/ED): Performed by: NURSE PRACTITIONER

## 2025-02-01 PROCEDURE — G0378 HOSPITAL OBSERVATION PER HR: HCPCS

## 2025-02-01 PROCEDURE — 2500000004 HC RX 250 GENERAL PHARMACY W/ HCPCS (ALT 636 FOR OP/ED)

## 2025-02-01 PROCEDURE — 36415 COLL VENOUS BLD VENIPUNCTURE: CPT

## 2025-02-01 PROCEDURE — 83735 ASSAY OF MAGNESIUM: CPT

## 2025-02-01 RX ORDER — DULOXETIN HYDROCHLORIDE 30 MG/1
90 CAPSULE, DELAYED RELEASE ORAL DAILY
Qty: 90 CAPSULE | Refills: 0 | Status: SHIPPED | OUTPATIENT
Start: 2025-02-02 | End: 2025-03-04

## 2025-02-01 RX ORDER — METHYLPREDNISOLONE 4 MG/1
TABLET ORAL
Qty: 21 TABLET | Refills: 0 | Status: SHIPPED | OUTPATIENT
Start: 2025-02-01 | End: 2025-02-07

## 2025-02-01 RX ADMIN — OXYCODONE HYDROCHLORIDE 10 MG: 10 TABLET ORAL at 10:36

## 2025-02-01 RX ADMIN — PREGABALIN 300 MG: 150 CAPSULE ORAL at 09:42

## 2025-02-01 RX ADMIN — ACETAMINOPHEN 325MG 975 MG: 325 TABLET ORAL at 09:43

## 2025-02-01 RX ADMIN — PANTOPRAZOLE SODIUM 40 MG: 40 TABLET, DELAYED RELEASE ORAL at 06:06

## 2025-02-01 RX ADMIN — EZETIMIBE 10 MG: 10 TABLET ORAL at 09:42

## 2025-02-01 RX ADMIN — ACETAMINOPHEN 325MG 975 MG: 325 TABLET ORAL at 14:14

## 2025-02-01 RX ADMIN — LIDOCAINE 4% 2 PATCH: 40 PATCH TOPICAL at 09:47

## 2025-02-01 RX ADMIN — DULOXETINE HYDROCHLORIDE 90 MG: 60 CAPSULE, DELAYED RELEASE ORAL at 09:42

## 2025-02-01 RX ADMIN — ASPIRIN 81 MG CHEWABLE TABLET 81 MG: 81 TABLET CHEWABLE at 09:42

## 2025-02-01 RX ADMIN — Medication 400 MG: at 09:42

## 2025-02-01 RX ADMIN — BACLOFEN 20 MG: 10 TABLET ORAL at 14:14

## 2025-02-01 RX ADMIN — OXYCODONE HYDROCHLORIDE 10 MG: 10 TABLET ORAL at 04:32

## 2025-02-01 RX ADMIN — METOPROLOL SUCCINATE 25 MG: 25 TABLET, EXTENDED RELEASE ORAL at 09:42

## 2025-02-01 RX ADMIN — METHYLPREDNISOLONE 16 MG: 4 TABLET ORAL at 09:43

## 2025-02-01 RX ADMIN — DIAZEPAM 5 MG: 5 TABLET ORAL at 14:13

## 2025-02-01 RX ADMIN — DIAZEPAM 5 MG: 5 TABLET ORAL at 06:07

## 2025-02-01 RX ADMIN — DOCUSATE SODIUM 100 MG: 100 CAPSULE, LIQUID FILLED ORAL at 09:42

## 2025-02-01 RX ADMIN — BACLOFEN 20 MG: 10 TABLET ORAL at 09:42

## 2025-02-01 RX ADMIN — ATORVASTATIN CALCIUM 80 MG: 80 TABLET, FILM COATED ORAL at 09:42

## 2025-02-01 ASSESSMENT — COGNITIVE AND FUNCTIONAL STATUS - GENERAL
TOILETING: A LITTLE
STANDING UP FROM CHAIR USING ARMS: A LITTLE
CLIMB 3 TO 5 STEPS WITH RAILING: A LITTLE
PERSONAL GROOMING: A LITTLE
DAILY ACTIVITIY SCORE: 18
MOVING FROM LYING ON BACK TO SITTING ON SIDE OF FLAT BED WITH BEDRAILS: A LITTLE
TURNING FROM BACK TO SIDE WHILE IN FLAT BAD: A LITTLE
MOBILITY SCORE: 18
HELP NEEDED FOR BATHING: A LITTLE
MOVING TO AND FROM BED TO CHAIR: A LITTLE
WALKING IN HOSPITAL ROOM: A LITTLE
EATING MEALS: A LITTLE
DRESSING REGULAR UPPER BODY CLOTHING: A LITTLE
DRESSING REGULAR LOWER BODY CLOTHING: A LITTLE

## 2025-02-01 ASSESSMENT — PAIN SCALES - GENERAL
PAINLEVEL_OUTOF10: 5 - MODERATE PAIN
PAINLEVEL_OUTOF10: 8
PAINLEVEL_OUTOF10: 7

## 2025-02-01 ASSESSMENT — PAIN - FUNCTIONAL ASSESSMENT
PAIN_FUNCTIONAL_ASSESSMENT: 0-10
PAIN_FUNCTIONAL_ASSESSMENT: 0-10

## 2025-02-01 ASSESSMENT — PAIN DESCRIPTION - LOCATION: LOCATION: BACK

## 2025-02-01 NOTE — PROGRESS NOTES
Physical Therapy                 Therapy Communication Note    Patient Name: Akin Lynn  MRN: [id number]  Department: [facility] [address] [facility]  Room: 3102/3102-A  Today's Date: 2/1/2025     Discipline: Physical Therapy    PT Missed Visit: Yes   Missed Time: Attempt    Comment: Pt chart reviewed and spoke to pt's nurse on floor. Pt not appropriate for PT eval at this time 2/2 pain. Will attempt again as appropriate and able.

## 2025-02-01 NOTE — CONSULTS
Inpatient consult to Neurosurgery  Consult performed by: Alejandro Barlow MD  Consult ordered by: Herrera Ramos MD  Reason for consult: back pain      Date of Service:  2/1/2025 Attending Provider:  Janette Sales DO     Reason for Consultation:  Akin Lynn is being seen today for a consult requested by Janette Sales DO for back pain.      Subjective   History of Present Illness:  Akin is a 54 y.o. male with Syncope and collapse    54-year-old male with history of CAD, recurrent syncopal episodes status post loop recorder placement, chronic low back pain.  .  Patient is admitted after a syncopal fall..  Is neurologically at his baseline.  He has history of chronic regional pain syndrome and sees multiple physicians and receives ketamine infusions monthly.  He has received 2 diagnostic medial branch blocks with 90% pain relief for 24 hours.  He says be scheduled for a lumbar radiofrequency ablation.    Patient also had an unwitnessed fall in his bathroom.  Also complained of headache and right groin pain.        Review of Systems   10 point ROS is obtained and negative except the ones mentioned in the HPI    Objective     Vitals:  Vitals:    02/01/25 0000   BP: 116/63   Pulse: 73   Resp: 18   Temp: 36.5 °C (97.7 °F)   SpO2: 94%         Exam:  Constitutional: Diffuse pain  Resp: breathing comfortably  Cardio: well perfused  GI: nondistended  MSK: Decreased range of motion 0 most of the joints  Neuro: No acute distress, A&Ox3  Cranial Nerves II-XII: PERRL, EOMI, Face symmetric, Facial SILT, Palate/Tongue midline and symmetric, shoulder shrugs symmetric, hearing intact to finger rubs bilaterally  Motor: Bilateral upper extremity diffusely 4 out of 5, bilateral lower extremity 3 out of 5, diffuse pain with palpation throughout the left lower and thoracic back, left hoffmans  Sensation: SILT throughout all extremities  DTRS: 2+ Throughout, No Hoffmans or Clonus  Psych: Agitated  Skin: no obvious  lesions    Medical History  Past Medical History:   Diagnosis Date    Anesthesia of skin     Numbness and tingling    Anxiety 09/21/2023    Bilateral myopia 09/21/2023    Cervical radicular pain 09/21/2023    Chronic patellofemoral pain of left knee 09/21/2023    Complex regional pain syndrome type 1 of left lower extremity 09/21/2023    COVID-19     VACCINATED    Cubital tunnel syndrome on left 09/21/2023    Cubital tunnel syndrome on right 09/21/2023    Disease of intestine, unspecified     Bowel trouble    Erectile dysfunction 09/21/2023    Foot joint pain 09/21/2023    Fractures     Gastroparesis     GERD (gastroesophageal reflux disease)     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    History of psychological trauma 09/21/2023    Hyperlipidemia     Hypertension     Incomplete RBBB 09/19/2024    Joint pain 09/21/2023    Kidney cysts 09/21/2023    Low back pain, unspecified 12/07/2022    Chronic low back pain without sciatica, unspecified back pain laterality    Low back pain, unspecified 08/09/2022    Lumbago    Lumbar spondylosis 09/21/2023    Lung nodule 09/21/2023    RIGHT    Metatarsalgia of left foot 09/21/2023    Myopia, bilateral 02/19/2019    Myopia of both eyes with astigmatism and presbyopia    Non-sustained ventricular tachycardia (Multi)     Osteoarthritis     Osteochondral defect of patella 09/21/2023    Osteoporosis     Other spondylosis, cervical region 09/21/2023    Patellofemoral arthritis 09/21/2023    Personal history of other diseases of the musculoskeletal system and connective tissue     History of arthritis    Personal history of other specified conditions     History of seizure    Reflex sympathetic dystrophy 09/21/2023    Scapular dyskinesis 09/21/2023    Seizure disorder (Multi)     D/T TRAMADOL REACTION - 25 YEARS AGO    Shoulder impingement 09/21/2023    Stable angina (CMS-HCC)     Suspected sleep apnea 09/21/2023    Trauma and stressor-related disorder 09/21/2023    Trochanteric bursitis of both  hips 09/22/2023       Surgical History  Past Surgical History:   Procedure Laterality Date    ACHILLES TENDON SURGERY  1978 2016    CARDIAC CATHETERIZATION N/A 12/20/2024    Procedure: Left Heart Cath;  Surgeon: Kale Funk MD;  Location: [facility] Cardiac Cath Lab;  Service: Cardiovascular;  Laterality: N/A;    CARDIAC ELECTROPHYSIOLOGY PROCEDURE N/A 1/31/2025    Procedure: Loop Insertion (09648);  Surgeon: Hai Ga MD;  Location: [facility] Cardiac Cath Lab;  Service: Electrophysiology;  Laterality: N/A;    CARDIOVASCULAR STRESS TEST      CT ABDOMEN PELVIS ANGIOGRAM W AND/OR WO IV CONTRAST  03/18/2020    CT ABDOMEN PELVIS ANGIOGRAM W AND/OR WO IV CONTRAST 3/18/2020 [facility] EMERGENCY LEGACY    CT ANGIO NECK  12/16/2022    CT NECK ANGIO W AND WO IV CONTRAST 12/16/2022 DOCTOR OFFICE LEGACY    CT HEAD ANGIO W AND WO IV CONTRAST  12/16/2022    CT HEAD ANGIO W AND WO IV CONTRAST 12/16/2022 DOCTOR OFFICE LEGACY    HIP SURGERY Left     REPLACEMENT    JOINT REPLACEMENT      LEFT KNEE    KNEE ARTHROPLASTY Bilateral 06/13/2017    Knee Arthroplasty    OTHER SURGICAL HISTORY  01/06/2022    Ulnar nerve neuroplasty    OTHER SURGICAL HISTORY Bilateral 07/26/2019    Foot surgery  -- HARDWARE    OTHER SURGICAL HISTORY Bilateral     BILATERAL ANKLE SCOPE    OTHER SURGICAL HISTORY      POP ABDOMINAL PROCEDURE    PLANTAR FASCIA RELEASE      SHOULDER SURGERY Bilateral 06/13/2017    Shoulder Surgery        Medications  Current Outpatient Medications   Medication Instructions    acetaminophen (TYLENOL) 1,000 mg, oral, 3 times daily    albuterol (Ventolin HFA) 90 mcg/actuation inhaler 2 puffs, inhalation, Every 4 hours PRN    atorvastatin (Lipitor) 80 mg tablet 1 tablet, Daily    baclofen (LIORESAL) 20 mg, oral, 3 times daily    celecoxib (CELEBREX) 200 mg, oral, 2 times daily    diazePAM (VALIUM) 5 mg, oral, 2 times daily PRN    docusate sodium (COLACE) 100 mg, oral, 2 times daily    DULoxetine (CYMBALTA) 60 mg, Daily    ezetimibe  (ZETIA) 10 mg, oral, Daily    fluticasone (Flonase) 50 mcg/actuation nasal spray 1 spray, Daily PRN    ketamine HCl (ketamine, bulk,) 100 % powder Ketamine 100 mg/mL + lidocaine 40 mg/mL 1 puff 4 times daily as needed, DSP#20 mL x 5 refills    lidocaine (Lidoderm) 5 % patch apply 2 patches to affected area daily as needed.    magnesium oxide (MAG-OX) 400 mg, oral, Daily    metoprolol succinate XL (TOPROL-XL) 25 mg, oral, Daily, Do not crush or chew.    omeprazole (PriLOSEC) 40 mg DR capsule 1 capsule, 2 times daily    oxyCODONE (ROXICODONE) 5 mg, oral, 2 times daily PRN    pregabalin (LYRICA) 300 mg, oral, 2 times daily    sildenafil (VIAGRA) 100 mg, oral, Daily PRN        Diagnostic Results:    Lab Results   Component Value Date    WBC 8.4 01/31/2025    HGB 13.4 (L) 01/31/2025    HCT 42.5 01/31/2025    MCV 89 01/31/2025     01/31/2025     Lab Results   Component Value Date    CREATININE 0.64 01/31/2025    BUN 14 01/31/2025     (L) 01/31/2025    K 4.1 01/31/2025     01/31/2025    CO2 24 01/31/2025     Lab Results   Component Value Date    INR 0.9 10/13/2024    INR 1.1 09/07/2024    INR 1.0 06/12/2024    PROTIME 10.6 10/13/2024    PROTIME 12.3 09/07/2024    PROTIME 10.8 06/12/2024       === 01/29/25 ===    CT THORACIC SPINE WO IV CONTRAST    - Impression -  1. No acute fracture or traumatic malalignment of the thoracic or  lumbar spine.    MACRO:  None    Signed by: Compa Rios 1/31/2025 9:47 PM  Dictation workstation:   [id number]  === 12/24/24 ===    MR SHOULDER LEFT WO IV CONTRAST    - Impression -  1. Mild tendinosis of the intra-articular long head biceps with mild  tenosynovitis.  2. Mild supraspinatus tendinosis without tear.  3. Mild degenerative changes of the acromioclavicular joint.      I personally reviewed the images/study and I agree with the findings  as stated by resident physician Dr. Jill Mccartney. This  study was interpreted at [facility]  [place], [place].    MACRO:  None    Signed by: Gayle Barrett 12/26/2024 10:22 AM  Dictation workstation:   [id number]      Assessment/Plan   Assessment:    Patient is a 54-year-old male with history of CAD, chronic back pain and compositional pain syndrome who is presenting with chronic low back pain.  CT lumbar spine thoracic spine showing no evidence of fracture or instability.  MRI lumbar spine with no disc herniation and minimal spondylosis throughout with intact disc height.  There is some fatty filtration of the paraspinous muscle    No surgical pathology on MRI or CT of thoracic or lumbar spine.  Though he did have some relief with medial branch blocks with pain management and is scheduled for a radiofrequency ablation.    Patient said that he had an accident in December which was after his MRI no CTs since have no new pathology or fracture.  Can consider new MRI cervical, thoracic, lumbar lumbar spine as an outpatient though no clear indication as an inpatient right now.  Has diffuse pain throughout palpation of the paraspinous muscles throughout as well as the joints.  He also is a history of a spinal cord stimulator placement that was likely infected and then removed.    Plan:  No surgical indication at this point  Would recommend continue with his radiofrequency ablation as scheduled  Consider repeat MRI cervical, thoracic, lumbar spine as an outpatient  No indication for neurosurgical follow-up at this time in the setting of no structural pathology

## 2025-02-01 NOTE — PROGRESS NOTES
"  Fall  Mr. Lynn is a 53yo male seen in ED for syncope after he bent over at home and had loss of consciousness secondary to pain. He has been evaluated by neuro and cards and he received a loop recorder today to monitor these syncopal episodes. He has a significant history of chronic back pain/chronic regional pain syndrome that sees several pain management physicians for and receives ketamine infusions monthly and is supposed to have a RFA with [facility] soon. He is on several meds and has had increases in his home meds with minimal relief. Currently on scheduled tylenol, baclofen, max dose lyrica, increased cymbalta to max dose, got IV decadron yesterday followed by medrol dosepak, lidocaine patches, home oxy 5 BID PRN increased to 10 Q6 PRN, has valium BID PRN anxiety and got a dose of noreflex.      Code spot was called since patient had an unwitnessed fall in his bathroom.  Patient reports he was on the commode and was having pain and when he got up to get back to his bed his \"legs gave out on me\".  Patient states he landed on his bottom hitting his posterior head and back on the threshold of the door.  He needed assist x 2 up to the chair.  He reports increased back pain, headache and right groin pain.  This is the patient's second fall since admission.  Patient had no loss of consciousness, is not on anticoagulation.  He denies chest pain, shortness of breath, abdominal pain, nausea, vomiting.  He states he was lightheaded when he was sitting on the floor that has currently resolved.     Physical exam:  Awake, alert, oriented, moaning in pain  Heart rate and rhythm regular, no loss of consciousness  Lungs clear, nonlabored respirations  Abdomen soft with positive bowel sounds.   Thoracic and lumbar spine with tenderness to palpation with no deformities noted  Sensation present to extremities  Moves all extremities equally      Plan:   CT head, c-spine, t-spine, l-spine  Placed call to Dr. Tan Vargas's " office, patient's pain management physician, to help guide pain management. Awaiting return call  Fall precautions  OOB with assistance only  PT/OT eval and treat        Time spent in the overall evaluation, assessment and coordination of care of patient 45 minutes               Veronica Mckinley, APRN-CNP

## 2025-02-01 NOTE — ASSESSMENT & PLAN NOTE
Patient had a fall in the hospital today, he had CT scan of head, C-spine, T-spine and L-spine did not show acute process or fracture

## 2025-02-01 NOTE — DISCHARGE INSTRUCTIONS
Take medrol dose pack as prescribed/recommended by neurology  Cymbalta increased from 60 to 90mg daily by neurology  Follow up with PCP, pain management and cardiology Dr Harmeet BURGER for loop recorder

## 2025-02-01 NOTE — NURSING NOTE
Patient A/O X3, ambulated to the bathroom with 1 assist, continent of bladder and bowel, educated on safety, administered medications per provider's order.

## 2025-02-01 NOTE — PROGRESS NOTES
"Spiritual Care Visit  Spiritual Care Request    Reason for Visit:  Routine Visit: Introduction     Request Received From:       Focus of Care:  Visited With: Patient         Refer to :          Spiritual Care Assessment    Spiritual Assessment:                      Care Provided:       Sense of Community and or Cheondoism Affiliation:  Atheist         Addressed Needs/Concerns and/or Maik Through:          Outcome:        Advance Directives:         Spiritual Care Annotation    Annotation:  Jason called the Pastoral Care office and requested a visit because he \"needs to vent.\"  I sat with patient for a considerable length of time as he shared his life story, decisions of illegal drug use since [age] until his 30's, left home at 16, numerous sexual partners, participating in rock n roll groups, 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  Patient was very upset about being discharged today, he was upset about prescriptions that were and were not written.  We talked briefly about Baptism and atheism.   Patient stated his reshma was in TV, Science, and what he could touch.  This  used active listening, motivational interviewing, and empathy.             "

## 2025-02-01 NOTE — DISCHARGE SUMMARY
Discharge Diagnosis  Syncope and collapse    Issues Requiring Follow-Up  Take medrol dose pack as prescribed/recommended by neurology  Cymbalta increased from 60 to 90mg daily by neurology  Follow up with PCP, pain management and cardiology Dr Harmeet BURGER for loop recorder    Discharge Meds     Medication List      START taking these medications     methylPREDNISolone 4 mg tablets; Commonly known as: Medrol Dospak; Take   as directed on package.     CHANGE how you take these medications     DULoxetine 30 mg DR capsule; Commonly known as: Cymbalta; Take 3   capsules (90 mg) by mouth once daily. Do not crush or chew.; Start taking   on: February 2, 2025; What changed: medication strength, how much to take,   additional instructions     CONTINUE taking these medications     acetaminophen 500 mg tablet; Commonly known as: Tylenol   atorvastatin 80 mg tablet; Commonly known as: Lipitor   baclofen 20 mg tablet; Commonly known as: Lioresal; Take 1 tablet (20   mg) by mouth 3 times a day.   celecoxib 200 mg capsule; Commonly known as: CeleBREX   diazePAM 5 mg tablet; Commonly known as: Valium   docusate sodium 100 mg capsule; Commonly known as: Colace   ezetimibe 10 mg tablet; Commonly known as: Zetia; Take 1 tablet (10 mg)   by mouth once daily.   fluticasone 50 mcg/actuation nasal spray; Commonly known as: Flonase   lidocaine 5 % patch; Commonly known as: Lidoderm; apply 2 patches to   affected area daily as needed.   magnesium oxide 400 mg (241.3 mg magnesium) tablet; Commonly known as:   Mag-Ox; Take 1 tablet (400 mg) by mouth once daily.   metoprolol succinate XL 25 mg 24 hr tablet; Commonly known as:   Toprol-XL; Take 1 tablet (25 mg) by mouth once daily. Do not crush or   chew.   omeprazole 40 mg DR capsule; Commonly known as: PriLOSEC   oxyCODONE 5 mg immediate release tablet; Commonly known as: Roxicodone   pregabalin 300 mg capsule; Commonly known as: Lyrica; Take 1 capsule   (300 mg) by mouth 2 times a day.    sildenafil 100 mg tablet; Commonly known as: Viagra   Ventolin HFA 90 mcg/actuation inhaler; Generic drug: albuterol     STOP taking these medications     ketamine (bulk) 100 % powder       Test Results Pending At Discharge  Pending Labs       No current pending labs.            Hospital Course   Akin Lynn is a 54 y.o. male with past medical history significant for HTN, HLD, incomplete RBBB, NSVT on metoprolol, GERD, gastroparesis, lumbar and cervical spondylosis, chronic neck and back pain for which he sees pain mgmt, complex regional pain syndrome [on ketamine infusions], multiple orthopedic surgeries, known right lung nodule, anxiety, and osteoarthritis presents to [facility] on 1/29/2025 from home for syncopal episode.      Patient states this afternoon he bent over to take care of his cats when he felt sudden sharp pains in his mid-lower back. He then went to the bathroom and on his way back to the cough he felt dizzy/lightheaded. He states he only remembers opening his eyes to his girlfriend attempting to wake him up. The fall was witnessed, patient did hit the back of his head, and he believes he lost consciousness for approximately 2 minutes. He denies confusion or headache after regaining consciousness and also denies loss of bladder or bowel control. He mentions that he may have had a similar episode this past Sunday where he felt like he was going to pass out, but at that time he was not dizzy or lightheaded. He believes syncopal episodes are pain related. He denies recent fever, chills, headache, chest pain, palpitations, shortness of breath, cough, abdominal pain, nausea, vomiting, diarrhea, dysuria, or hematuria.     Of note, patient had a ketamine infusion on 1/28/2025 and is scheduled for a RFA medial nerve branch blocks bilateral L4- L5, L5- S1 under fluoroscopic guidance in the near future.     Nuclear Stress Test 10/9/2024:  Normal Lexiscan Myoview cardiac perfusion stress test.  No evidence of  ischemia or myocardial infarction by perfusion imaging.  Normal left ventricular systolic function, ejection fraction 65%.     Holter Event Monitor 10/9-10/15/2024:  Abnormal Zio cardiac monitor.  Frequent ventricular ectopy with isolated PVCs, ventricular bigeminy and 1 5 beat run of nonsustained VT.  Rare isolated APCs.  No atrial fibrillation.  No high-grade AV blocks or pauses.  Triggered events during ventricular ectopy noted.     Left Heart Cardiac Catheterization 12/20/2024   1. Left Main Coronary Artery: This artery is ectatic and normal.   2. Left Anterior Descending Artery: is mildly diseased and is ectatic.   3. Mid LAD Lesion: The percent stenosis is 40%.   4. Circumflex Coronary Artery: mildly diseased.   5. Right Coronary Artery: is diffusely diseased and mildly diseased.   6. Mid RCA Lesion: The percent stenosis is 50%.   7. The Left Ventricular Ejection Fraction is 55%.   8. Left Ventricular End Diastolic Pressure: 21 mm Hg.   9. Aortic Stenosis: None.  10. Left Ventricular End Diastolic Pressure Dysfunction: Mild.  11. LV: No RWMA.  12. LV: normal left ventricular systolic function.     ED Course:  Vital signs: Temp. 97.9 F, HR 89 bpm, RR 16, /89, SPO2 97% on room air   CMP: Glucose 110, calcium 8.5 otherwise no significant electrolyte abnormalities noted  Troponin: 46, 40  CBC w/diff: WBC 8.4, H&H 12.6/39.7, MCHC 31.7, RDW 14.7  CT head and C-spine: No acute intracranial abnormality.  No evidence of acute fracture or subluxation of the cervical spine  XR right knee: No acute osseous abnormality  CT lumbar/thoracic spine: No acute traumatic abnormality in the thoracic or lumbar spine  CT angio chest abdomen pelvis: No evidence of aortic aneurysm, dissection or acute intramural hematoma.  Right middle lobe pulmonary nodule stable from prior CT.  Nonobstructive bilateral renal calculi  EKG: Normal sinus rhythm, ventricular rate 72 bpm,  ms,  ms, QTc 451 ms. No ST elevation or  depression noted.   Medications Given: Dilaudid 0.2 mg IV x 1  Disposition: Patient admitted for two recent episodes of syncope with collapse.    Hospital course:  Patient was admitted for syncope, cardiology and EP were consulted and he had a loop recorder placed.  He complains of chronic pain, he had a fall while in the hospital.  CT imaging of his spine was negative for any fracture.  Neurosurgery evaluated patient and recommended outpatient follow-up.  Patient follows with pain management and receives ketamine infusions as an outpatient.  Patient was seen by cardiology, EP, neurology, neurosurgery, orthopedic surgery and pulmonology during his stay.  He was transferred to my care yesterday evening after being upset with private attending regarding his pain regimen.  I did sit down with the patient today for approximately 30 to 40 minutes and reviewed his CT imaging results and plan moving forward.  He did not appear in any acute distress during my conversation however on exam he did complain of back pain.  His labs and vitals are stable.  Neurosurgery evaluated him and recommended outpatient follow-up.  Patient is medically stable for discharge.  When asked if he would like to go to a rehab center due to his frequent falls and difficulty ambulating, he says he would not want to go.  Patient is upset that he is not receiving IV Dilaudid however I do not see a clear indication at this time. He can follow up with his outpatient pain management doctor next week.   Neurology did recommend increase home cymbalta to 90mg daily from 60mg and medrol dose pack which have both been Rx at DC.     Spent>35 minutes DC patient       Pertinent Physical Exam At Time of Discharge  Physical Exam  Vitals reviewed.   Constitutional:       Appearance: Normal appearance.   HENT:      Head: Normocephalic and atraumatic.      Nose: Nose normal.   Cardiovascular:      Rate and Rhythm: Normal rate and regular rhythm.      Pulses: Normal  pulses.      Heart sounds: Normal heart sounds.   Pulmonary:      Effort: Pulmonary effort is normal.      Breath sounds: Normal breath sounds. No rales.   Abdominal:      General: Abdomen is flat. Bowel sounds are normal.      Palpations: Abdomen is soft.      Tenderness: There is no abdominal tenderness.   Musculoskeletal:      Comments: + chronic back pain   Skin:     General: Skin is warm and dry.   Neurological:      Mental Status: He is alert and oriented to person, place, and time. Mental status is at baseline.   Psychiatric:         Mood and Affect: Mood normal.         Outpatient Follow-Up  Future Appointments   Date Time Provider Department Center   2/6/2025  3:15 PM Ronni Dey MD [id number] [place]   2/27/2025  8:15 AM [facility] [id number] PAIN MGMT PROCEDURE ROOM [id number] [place]   3/4/2025 12:30 PM INF 02 PARM OPCTR PAROPCINF [place]   3/18/2025 12:30 PM INF 02 PARM OPCTR PAROPCINF [place]   3/20/2025 12:55 PM Royce Louis MD [id number] [place]   6/13/2025  1:00 PM Yobani Torres, [facility] [id number] [place]         Janette Sales DO

## 2025-02-01 NOTE — SIGNIFICANT EVENT
Patient does not want to be discharged  He wants to be transferred to [facility]  I have called transfer center, awaiting a call back

## 2025-02-02 LAB
ATRIAL RATE: 72 BPM
P AXIS: 51 DEGREES
P OFFSET: 196 MS
P ONSET: 132 MS
PR INTERVAL: 176 MS
Q ONSET: 220 MS
QRS COUNT: 12 BEATS
QRS DURATION: 106 MS
QT INTERVAL: 412 MS
QTC CALCULATION(BAZETT): 451 MS
QTC FREDERICIA: 438 MS
R AXIS: 44 DEGREES
T AXIS: 37 DEGREES
T OFFSET: 426 MS
VENTRICULAR RATE: 72 BPM

## 2025-02-03 ENCOUNTER — PATIENT OUTREACH (OUTPATIENT)
Dept: CARDIOLOGY | Facility: CLINIC | Age: 55
End: 2025-02-03
Payer: COMMERCIAL

## 2025-02-06 ENCOUNTER — APPOINTMENT (OUTPATIENT)
Dept: CARDIOLOGY | Facility: CLINIC | Age: 55
End: 2025-02-06
Payer: COMMERCIAL

## 2025-02-13 ENCOUNTER — APPOINTMENT (OUTPATIENT)
Dept: CARDIOLOGY | Facility: CLINIC | Age: 55
End: 2025-02-13
Payer: COMMERCIAL

## 2025-02-20 ENCOUNTER — APPOINTMENT (OUTPATIENT)
Dept: CARDIOLOGY | Facility: CLINIC | Age: 55
End: 2025-02-20
Payer: COMMERCIAL

## 2025-02-27 ENCOUNTER — HOSPITAL ENCOUNTER (OUTPATIENT)
Dept: PAIN MEDICINE | Facility: CLINIC | Age: 55
Discharge: HOME | End: 2025-02-27
Payer: COMMERCIAL

## 2025-02-27 ENCOUNTER — APPOINTMENT (OUTPATIENT)
Dept: CARDIOLOGY | Facility: CLINIC | Age: 55
End: 2025-02-27
Payer: COMMERCIAL

## 2025-02-27 VITALS
RESPIRATION RATE: 18 BRPM | DIASTOLIC BLOOD PRESSURE: 76 MMHG | TEMPERATURE: 97.3 F | OXYGEN SATURATION: 97 % | HEART RATE: 82 BPM | SYSTOLIC BLOOD PRESSURE: 109 MMHG

## 2025-02-27 DIAGNOSIS — M47.816 LUMBAR SPONDYLOSIS: ICD-10-CM

## 2025-02-27 PROCEDURE — 64636 DESTROY L/S FACET JNT ADDL: CPT | Mod: 50 | Performed by: PAIN MEDICINE

## 2025-02-27 PROCEDURE — 2720000007 HC OR 272 NO HCPCS: Performed by: PAIN MEDICINE

## 2025-02-27 PROCEDURE — 64635 DESTROY LUMB/SAC FACET JNT: CPT | Performed by: PAIN MEDICINE

## 2025-02-27 PROCEDURE — 99152 MOD SED SAME PHYS/QHP 5/>YRS: CPT | Performed by: PAIN MEDICINE

## 2025-02-27 PROCEDURE — 64635 DESTROY LUMB/SAC FACET JNT: CPT | Mod: 50 | Performed by: PAIN MEDICINE

## 2025-02-27 PROCEDURE — 3700000012 HC SEDATION LEVEL 5+ TIME - INITIAL 15 MINUTES 5/> YEARS: Performed by: PAIN MEDICINE

## 2025-02-27 PROCEDURE — 2500000004 HC RX 250 GENERAL PHARMACY W/ HCPCS (ALT 636 FOR OP/ED): Performed by: PAIN MEDICINE

## 2025-02-27 PROCEDURE — 64636 DESTROY L/S FACET JNT ADDL: CPT | Performed by: PAIN MEDICINE

## 2025-02-27 RX ORDER — MIDAZOLAM HYDROCHLORIDE 1 MG/ML
INJECTION, SOLUTION INTRAMUSCULAR; INTRAVENOUS AS NEEDED
Status: DISCONTINUED | OUTPATIENT
Start: 2025-02-27 | End: 2025-02-28 | Stop reason: HOSPADM

## 2025-02-27 RX ORDER — LIDOCAINE HYDROCHLORIDE 10 MG/ML
INJECTION, SOLUTION EPIDURAL; INFILTRATION; INTRACAUDAL; PERINEURAL AS NEEDED
Status: DISCONTINUED | OUTPATIENT
Start: 2025-02-27 | End: 2025-02-28 | Stop reason: HOSPADM

## 2025-02-27 RX ADMIN — MIDAZOLAM 2 MG: 1 INJECTION INTRAMUSCULAR; INTRAVENOUS at 08:19

## 2025-02-27 RX ADMIN — LIDOCAINE HYDROCHLORIDE 10 ML: 10 INJECTION, SOLUTION EPIDURAL; INFILTRATION; INTRACAUDAL; PERINEURAL at 08:19

## 2025-02-27 ASSESSMENT — PAIN - FUNCTIONAL ASSESSMENT
PAIN_FUNCTIONAL_ASSESSMENT: 0-10
PAIN_FUNCTIONAL_ASSESSMENT: 0-10

## 2025-02-27 ASSESSMENT — PAIN DESCRIPTION - DESCRIPTORS: DESCRIPTORS: STABBING

## 2025-02-27 ASSESSMENT — PAIN SCALES - GENERAL: PAINLEVEL_OUTOF10: 3

## 2025-02-27 NOTE — DISCHARGE INSTRUCTIONS
Post-injection instructions FOR Radiofrequency Ablation    Your radiofrequency ablation was completed today is not unusual to have some exacerbation of the pain before you get better.  Radiofrequency ablation takes an average of 2 to 3 weeks before it completely starts showing full results      Activity:  RETURN TO NORMAL ACTIVITY once the sedation is completely worn off do not sign any legal document for the first 24 hours do not drive or operate machinery for the first 24 hours until the sedation effect is completely worn off    Bandages: Remove after 24 hours     Showering/Bathing: You may shower after bandage is removed     May use ice at the site of the injection for the first 24 hours      Call the OFFICE immediately: if you notice:     Excessive bleeding from procedure site (brisk bright red bleeding from the site or bleeding that soaks the bandages or does not stop)   Severe headache  Inability to walk, leg or arm weakness or numbness that is worse after the procedure   Uncontrolled pain   New urinary or fecal incontinence   Signs of infection: Fever above 101.5F, redness, swelling, pus or drainage from the site    Please contact the pain clinic at [phone]  in 3 weeks to report results or with any further questions or concerns

## 2025-02-28 DIAGNOSIS — G90.522 COMPLEX REGIONAL PAIN SYNDROME TYPE 1 OF LEFT LOWER EXTREMITY: Primary | ICD-10-CM

## 2025-03-01 ENCOUNTER — APPOINTMENT (OUTPATIENT)
Dept: RADIOLOGY | Facility: HOSPITAL | Age: 55
End: 2025-03-01
Payer: COMMERCIAL

## 2025-03-01 ENCOUNTER — APPOINTMENT (OUTPATIENT)
Dept: CARDIOLOGY | Facility: HOSPITAL | Age: 55
End: 2025-03-01
Payer: COMMERCIAL

## 2025-03-01 ENCOUNTER — HOSPITAL ENCOUNTER (EMERGENCY)
Facility: HOSPITAL | Age: 55
Discharge: HOME | End: 2025-03-01
Attending: EMERGENCY MEDICINE
Payer: COMMERCIAL

## 2025-03-01 VITALS
OXYGEN SATURATION: 98 % | BODY MASS INDEX: 24.86 KG/M2 | RESPIRATION RATE: 17 BRPM | TEMPERATURE: 97.2 F | HEIGHT: 68 IN | WEIGHT: 164 LBS | HEART RATE: 78 BPM | SYSTOLIC BLOOD PRESSURE: 117 MMHG | DIASTOLIC BLOOD PRESSURE: 76 MMHG

## 2025-03-01 DIAGNOSIS — R55 SYNCOPE, UNSPECIFIED SYNCOPE TYPE: ICD-10-CM

## 2025-03-01 DIAGNOSIS — M54.50 CHRONIC LEFT-SIDED LOW BACK PAIN, UNSPECIFIED WHETHER SCIATICA PRESENT: Primary | ICD-10-CM

## 2025-03-01 DIAGNOSIS — M25.512 ACUTE PAIN OF LEFT SHOULDER: ICD-10-CM

## 2025-03-01 DIAGNOSIS — G89.29 CHRONIC LEFT-SIDED LOW BACK PAIN, UNSPECIFIED WHETHER SCIATICA PRESENT: Primary | ICD-10-CM

## 2025-03-01 LAB
ALBUMIN SERPL BCP-MCNC: 4.3 G/DL (ref 3.4–5)
ALP SERPL-CCNC: 60 U/L (ref 33–120)
ALT SERPL W P-5'-P-CCNC: 18 U/L (ref 10–52)
ANION GAP SERPL CALC-SCNC: 10 MMOL/L (ref 10–20)
AST SERPL W P-5'-P-CCNC: 17 U/L (ref 9–39)
BASOPHILS # BLD AUTO: 0.08 X10*3/UL (ref 0–0.1)
BASOPHILS NFR BLD AUTO: 1.1 %
BILIRUB SERPL-MCNC: 0.4 MG/DL (ref 0–1.2)
BUN SERPL-MCNC: 17 MG/DL (ref 6–23)
CALCIUM SERPL-MCNC: 8.9 MG/DL (ref 8.6–10.3)
CARDIAC TROPONIN I PNL SERPL HS: 23 NG/L (ref 0–20)
CARDIAC TROPONIN I PNL SERPL HS: 24 NG/L (ref 0–20)
CHLORIDE SERPL-SCNC: 105 MMOL/L (ref 98–107)
CO2 SERPL-SCNC: 29 MMOL/L (ref 21–32)
CREAT SERPL-MCNC: 0.77 MG/DL (ref 0.5–1.3)
EGFRCR SERPLBLD CKD-EPI 2021: >90 ML/MIN/1.73M*2
EOSINOPHIL # BLD AUTO: 0.44 X10*3/UL (ref 0–0.7)
EOSINOPHIL NFR BLD AUTO: 5.8 %
ERYTHROCYTE [DISTWIDTH] IN BLOOD BY AUTOMATED COUNT: 14.7 % (ref 11.5–14.5)
GLUCOSE SERPL-MCNC: 85 MG/DL (ref 74–99)
HCT VFR BLD AUTO: 42.4 % (ref 41–52)
HGB BLD-MCNC: 13.7 G/DL (ref 13.5–17.5)
IMM GRANULOCYTES # BLD AUTO: 0.03 X10*3/UL (ref 0–0.7)
IMM GRANULOCYTES NFR BLD AUTO: 0.4 % (ref 0–0.9)
INR PPP: 1 (ref 0.9–1.1)
LYMPHOCYTES # BLD AUTO: 2.66 X10*3/UL (ref 1.2–4.8)
LYMPHOCYTES NFR BLD AUTO: 35 %
MCH RBC QN AUTO: 28.7 PG (ref 26–34)
MCHC RBC AUTO-ENTMCNC: 32.3 G/DL (ref 32–36)
MCV RBC AUTO: 89 FL (ref 80–100)
MONOCYTES # BLD AUTO: 0.71 X10*3/UL (ref 0.1–1)
MONOCYTES NFR BLD AUTO: 9.3 %
NEUTROPHILS # BLD AUTO: 3.69 X10*3/UL (ref 1.2–7.7)
NEUTROPHILS NFR BLD AUTO: 48.4 %
NRBC BLD-RTO: 0 /100 WBCS (ref 0–0)
PLATELET # BLD AUTO: 226 X10*3/UL (ref 150–450)
POTASSIUM SERPL-SCNC: 3.8 MMOL/L (ref 3.5–5.3)
PROT SERPL-MCNC: 6.7 G/DL (ref 6.4–8.2)
PROTHROMBIN TIME: 11 SECONDS (ref 9.8–12.4)
RBC # BLD AUTO: 4.78 X10*6/UL (ref 4.5–5.9)
SODIUM SERPL-SCNC: 140 MMOL/L (ref 136–145)
WBC # BLD AUTO: 7.6 X10*3/UL (ref 4.4–11.3)

## 2025-03-01 PROCEDURE — 93005 ELECTROCARDIOGRAM TRACING: CPT

## 2025-03-01 PROCEDURE — 99285 EMERGENCY DEPT VISIT HI MDM: CPT | Performed by: EMERGENCY MEDICINE

## 2025-03-01 PROCEDURE — 73030 X-RAY EXAM OF SHOULDER: CPT | Mod: LEFT SIDE | Performed by: RADIOLOGY

## 2025-03-01 PROCEDURE — 36415 COLL VENOUS BLD VENIPUNCTURE: CPT | Performed by: EMERGENCY MEDICINE

## 2025-03-01 PROCEDURE — 84484 ASSAY OF TROPONIN QUANT: CPT | Performed by: EMERGENCY MEDICINE

## 2025-03-01 PROCEDURE — 85025 COMPLETE CBC W/AUTO DIFF WBC: CPT | Performed by: EMERGENCY MEDICINE

## 2025-03-01 PROCEDURE — 99285 EMERGENCY DEPT VISIT HI MDM: CPT | Mod: 25 | Performed by: EMERGENCY MEDICINE

## 2025-03-01 PROCEDURE — 2500000001 HC RX 250 WO HCPCS SELF ADMINISTERED DRUGS (ALT 637 FOR MEDICARE OP): Performed by: EMERGENCY MEDICINE

## 2025-03-01 PROCEDURE — 73030 X-RAY EXAM OF SHOULDER: CPT | Mod: LT

## 2025-03-01 PROCEDURE — 80053 COMPREHEN METABOLIC PANEL: CPT | Performed by: EMERGENCY MEDICINE

## 2025-03-01 PROCEDURE — 71045 X-RAY EXAM CHEST 1 VIEW: CPT

## 2025-03-01 PROCEDURE — 85610 PROTHROMBIN TIME: CPT | Performed by: EMERGENCY MEDICINE

## 2025-03-01 PROCEDURE — 71045 X-RAY EXAM CHEST 1 VIEW: CPT | Performed by: RADIOLOGY

## 2025-03-01 PROCEDURE — 93005 ELECTROCARDIOGRAM TRACING: CPT | Mod: 59

## 2025-03-01 RX ORDER — ACETAMINOPHEN 325 MG/1
650 TABLET ORAL ONCE
Status: COMPLETED | OUTPATIENT
Start: 2025-03-01 | End: 2025-03-01

## 2025-03-01 RX ORDER — LIDOCAINE 560 MG/1
2 PATCH PERCUTANEOUS; TOPICAL; TRANSDERMAL DAILY
Status: DISCONTINUED | OUTPATIENT
Start: 2025-03-01 | End: 2025-03-01 | Stop reason: HOSPADM

## 2025-03-01 RX ADMIN — ACETAMINOPHEN 650 MG: 325 TABLET ORAL at 05:13

## 2025-03-01 ASSESSMENT — PAIN DESCRIPTION - LOCATION
LOCATION: SHOULDER
LOCATION: SHOULDER
LOCATION: BACK

## 2025-03-01 ASSESSMENT — PAIN - FUNCTIONAL ASSESSMENT
PAIN_FUNCTIONAL_ASSESSMENT: 0-10
PAIN_FUNCTIONAL_ASSESSMENT: 0-10

## 2025-03-01 ASSESSMENT — PAIN SCALES - GENERAL
PAINLEVEL_OUTOF10: 8
PAINLEVEL_OUTOF10: 6

## 2025-03-01 ASSESSMENT — PAIN DESCRIPTION - ORIENTATION
ORIENTATION: LEFT
ORIENTATION: LEFT

## 2025-03-01 ASSESSMENT — LIFESTYLE VARIABLES
HAVE YOU EVER FELT YOU SHOULD CUT DOWN ON YOUR DRINKING: NO
EVER HAD A DRINK FIRST THING IN THE MORNING TO STEADY YOUR NERVES TO GET RID OF A HANGOVER: NO
TOTAL SCORE: 0
HAVE PEOPLE ANNOYED YOU BY CRITICIZING YOUR DRINKING: NO
EVER FELT BAD OR GUILTY ABOUT YOUR DRINKING: NO

## 2025-03-01 ASSESSMENT — PAIN DESCRIPTION - DESCRIPTORS
DESCRIPTORS: ACHING
DESCRIPTORS: ACHING

## 2025-03-01 ASSESSMENT — PAIN DESCRIPTION - PAIN TYPE
TYPE: ACUTE PAIN
TYPE: ACUTE PAIN

## 2025-03-01 ASSESSMENT — PAIN DESCRIPTION - PROGRESSION: CLINICAL_PROGRESSION: OTHER (COMMENT)

## 2025-03-01 NOTE — DISCHARGE INSTRUCTIONS
Seek immediate medical attention if you develop: increasing pain, numbness, tingling, weakness, loss of motion in your arms or legs, loss of control of your urine or stool, fever, abdominal pain, chest pain, shortness of breath, or any new or worsening symptoms.    Seek immediate medical attention if you develop: additional episodes of passing out, chest pain, nausea, vomiting, weakness, numbness, tingling, excessive sweating, shortness of breath, difficulty breathing, loss of motion in your arms or legs, or any new or worsening symptoms.    Continue your current pain medication routine for your chronic back issues.    Follow-up with your cardiologist on Monday.  Please discuss your episode of passing out with them.  They will want to look at the results of your loop recorder.

## 2025-03-01 NOTE — ED PROVIDER NOTES
Emergency Department Provider Note        History of Present Illness     History provided by: Patient  Limitations to History: None  External Records Reviewed with Brief Summary: None    HPI:  Akin Lynn is a 54 y.o. male with hyperlipidemia, regional pain syndrome, chronic back pain, syncope presenting for fall and chest pain.  Patient had an exacerbation of his chronic left lower back pain yesterday.  The pain was intense and he experienced lightheadedness with this pain.  He passed out and fell down.  His girlfriend was home and he remembers her waking him up and calling his name.  Patient was able to get up and continue the rest of his day.  However, he had another flare of this pain and felt lightheaded again.  He fell onto his left side with his arm stretched above his head.  He also struck the left side of his chest where his loop recorder was implanted.  He is having pain at that site of his chest as well as his left shoulder.  The pain in the shoulder and chest are reproducible with palpation.  He did not lose consciousness the second time.  He presents for evaluation.  He denies any new pains, including headache and neck pain.  He has been able to ambulate.    Physical Exam   Triage vitals:  T 36.2 °C (97.2 °F)  HR 83  BP (!) 130/94  RR 18  O2 99 % None (Room air)    Physical Exam  Vitals and nursing note reviewed.   Constitutional:       General: He is not in acute distress.     Appearance: He is well-developed.   HENT:      Head: Normocephalic and atraumatic.      Right Ear: External ear normal.      Left Ear: External ear normal.      Nose: Nose normal.   Eyes:      General: No scleral icterus.     Conjunctiva/sclera: Conjunctivae normal.      Pupils: Pupils are equal, round, and reactive to light.   Cardiovascular:      Rate and Rhythm: Normal rate and regular rhythm.      Heart sounds: No murmur heard.  Pulmonary:      Effort: Pulmonary effort is normal. No respiratory distress.      Breath  sounds: Normal breath sounds.   Chest:      Chest wall: Tenderness present.   Abdominal:      Palpations: Abdomen is soft.      Tenderness: There is no abdominal tenderness.   Musculoskeletal:         General: No swelling.      Cervical back: Neck supple. No rigidity.      Comments: Tenderness to palpation of the anterior shoulder.  Worsened with range of motion.  Range of motion limited due to the pain.  No deformity.   Skin:     General: Skin is warm and dry.   Neurological:      General: No focal deficit present.      Mental Status: He is alert.   Psychiatric:         Mood and Affect: Mood normal.          Medical Decision Making & ED Course   Medical Decision Makin y.o. male presenting with syncope, lightheadedness, chest pain.  He is afebrile hemodynamic stable on arrival.  Based on the history obtained, the patient's lightheadedness and syncopal episode occurred after a intense flare of his lower back pain.  Appears to be vasovagal in nature.  He did injure his chest and left shoulder after falling so we will obtain x-rays.  Will also obtain lab work to evaluate for cardiac or metabolic causes of his dizziness and syncopal episode.  Patient's is requesting pain medications and he has already taken muscle relaxers, Tylenol, ibuprofen at home.  He has a care plan given his multiple ER visits for pain and narcotics.  Narcotics are only to be administered based on clinical discretion dependent on the patient's condition.  At this time, he does not have any obvious injury or dislocation that would warrant narcotics.    CBC, CMP unremarkable.  Troponin levels mildly elevated 24, 23.  These are actually lower than they typically are, which are usually in the 40s.  Chest x-ray and left shoulder x-ray unremarkable.    These findings are discussed with the patient.  We can suspect that his symptoms are likely vasovagal in nature from his flare of pain.  He is instructed follow-up with his cardiologist for further  continued workup and data from his loop recorder.  He has an appointment with cardiology this following week.  Home care and return instructions discussed. Patient expressed understanding and agreement. Patient discharged in stable condition.    Uche Avila DO, PGY-4  Emergency Medicine Resident  ----       Social Determinants of Health which Significantly Impact Care: None identified     EKG Independent Interpretation: EKG interpreted by myself. Please see ED Course for full interpretation.  EKG at 0140 on 3/1/2025 as interpreted by myself: Ventricular rate 79, , QRS 96, QTc 433.  Normal sinus rhythm.  No acute injury pattern.    EKG at 0321 on 3/1/2025 as interpreted by myself: Ventricular rate 86, , , QTc 437.  Normal sinus rhythm.  No acute injury pattern.    Independent Result Review and Interpretation: Relevant laboratory and radiographic results were reviewed and independently interpreted by myself.  As necessary, they are commented on in the ED Course.    Chronic conditions affecting the patient's care: As documented above in [facility]    The patient was discussed with the following consultants/services: None    Care Considerations: As documented above in [facility]    ED Course:  Diagnoses as of 03/01/25 1234   Chronic left-sided low back pain, unspecified whether sciatica present   Syncope, unspecified syncope type   Acute pain of left shoulder     Disposition   As a result of the work-up, the patient was discharged home.  he was informed of his diagnosis and instructed to come back with any concerns or worsening of condition.  he and was agreeable to the plan as discussed above.  he was given the opportunity to ask questions.  All of the patient's questions were answered.    Procedures   Procedures    Patient seen and discussed with ED attending physician.    Uche Avila DO  Emergency Medicine     Uche Avila DO  Resident  03/01/25 1234

## 2025-03-02 ENCOUNTER — HOSPITAL ENCOUNTER (EMERGENCY)
Facility: HOSPITAL | Age: 55
Discharge: LEFT BEFORE DISCHARGE | End: 2025-03-02
Attending: EMERGENCY MEDICINE
Payer: COMMERCIAL

## 2025-03-02 ENCOUNTER — APPOINTMENT (OUTPATIENT)
Dept: CARDIOLOGY | Facility: HOSPITAL | Age: 55
End: 2025-03-02
Payer: COMMERCIAL

## 2025-03-02 ENCOUNTER — APPOINTMENT (OUTPATIENT)
Dept: RADIOLOGY | Facility: HOSPITAL | Age: 55
End: 2025-03-02
Payer: COMMERCIAL

## 2025-03-02 VITALS
HEART RATE: 79 BPM | HEIGHT: 68 IN | SYSTOLIC BLOOD PRESSURE: 147 MMHG | BODY MASS INDEX: 24.86 KG/M2 | OXYGEN SATURATION: 100 % | DIASTOLIC BLOOD PRESSURE: 89 MMHG | WEIGHT: 164 LBS | RESPIRATION RATE: 18 BRPM | TEMPERATURE: 97.9 F

## 2025-03-02 DIAGNOSIS — G89.29 OTHER CHRONIC PAIN: ICD-10-CM

## 2025-03-02 DIAGNOSIS — R55 SYNCOPE AND COLLAPSE: Primary | ICD-10-CM

## 2025-03-02 LAB
ALBUMIN SERPL BCP-MCNC: 4.2 G/DL (ref 3.4–5)
ALP SERPL-CCNC: 66 U/L (ref 33–120)
ALT SERPL W P-5'-P-CCNC: 27 U/L (ref 10–52)
ANION GAP SERPL CALC-SCNC: 12 MMOL/L (ref 10–20)
AST SERPL W P-5'-P-CCNC: 27 U/L (ref 9–39)
ATRIAL RATE: 86 BPM
BASOPHILS # BLD AUTO: 0.06 X10*3/UL (ref 0–0.1)
BASOPHILS NFR BLD AUTO: 0.9 %
BILIRUB SERPL-MCNC: 0.4 MG/DL (ref 0–1.2)
BUN SERPL-MCNC: 14 MG/DL (ref 6–23)
CALCIUM SERPL-MCNC: 9 MG/DL (ref 8.6–10.3)
CARDIAC TROPONIN I PNL SERPL HS: 26 NG/L (ref 0–20)
CHLORIDE SERPL-SCNC: 104 MMOL/L (ref 98–107)
CO2 SERPL-SCNC: 25 MMOL/L (ref 21–32)
CREAT SERPL-MCNC: 0.76 MG/DL (ref 0.5–1.3)
EGFRCR SERPLBLD CKD-EPI 2021: >90 ML/MIN/1.73M*2
EOSINOPHIL # BLD AUTO: 0.34 X10*3/UL (ref 0–0.7)
EOSINOPHIL NFR BLD AUTO: 5.3 %
ERYTHROCYTE [DISTWIDTH] IN BLOOD BY AUTOMATED COUNT: 14.6 % (ref 11.5–14.5)
GLUCOSE SERPL-MCNC: 87 MG/DL (ref 74–99)
HCT VFR BLD AUTO: 41.9 % (ref 41–52)
HGB BLD-MCNC: 13.6 G/DL (ref 13.5–17.5)
HOLD SPECIMEN: NORMAL
IMM GRANULOCYTES # BLD AUTO: 0.03 X10*3/UL (ref 0–0.7)
IMM GRANULOCYTES NFR BLD AUTO: 0.5 % (ref 0–0.9)
LYMPHOCYTES # BLD AUTO: 1.33 X10*3/UL (ref 1.2–4.8)
LYMPHOCYTES NFR BLD AUTO: 20.7 %
MCH RBC QN AUTO: 28.6 PG (ref 26–34)
MCHC RBC AUTO-ENTMCNC: 32.5 G/DL (ref 32–36)
MCV RBC AUTO: 88 FL (ref 80–100)
MONOCYTES # BLD AUTO: 0.5 X10*3/UL (ref 0.1–1)
MONOCYTES NFR BLD AUTO: 7.8 %
NEUTROPHILS # BLD AUTO: 4.17 X10*3/UL (ref 1.2–7.7)
NEUTROPHILS NFR BLD AUTO: 64.8 %
NRBC BLD-RTO: 0 /100 WBCS (ref 0–0)
P AXIS: 64 DEGREES
P OFFSET: 205 MS
P ONSET: 143 MS
PLATELET # BLD AUTO: 190 X10*3/UL (ref 150–450)
POTASSIUM SERPL-SCNC: 4.7 MMOL/L (ref 3.5–5.3)
PR INTERVAL: 160 MS
PROT SERPL-MCNC: 6.3 G/DL (ref 6.4–8.2)
Q ONSET: 223 MS
QRS COUNT: 14 BEATS
QRS DURATION: 100 MS
QT INTERVAL: 366 MS
QTC CALCULATION(BAZETT): 437 MS
QTC FREDERICIA: 412 MS
R AXIS: 70 DEGREES
RBC # BLD AUTO: 4.75 X10*6/UL (ref 4.5–5.9)
SODIUM SERPL-SCNC: 136 MMOL/L (ref 136–145)
T AXIS: 68 DEGREES
T OFFSET: 406 MS
VENTRICULAR RATE: 86 BPM
WBC # BLD AUTO: 6.4 X10*3/UL (ref 4.4–11.3)

## 2025-03-02 PROCEDURE — 71045 X-RAY EXAM CHEST 1 VIEW: CPT

## 2025-03-02 PROCEDURE — 99285 EMERGENCY DEPT VISIT HI MDM: CPT | Mod: 25 | Performed by: EMERGENCY MEDICINE

## 2025-03-02 PROCEDURE — 36415 COLL VENOUS BLD VENIPUNCTURE: CPT

## 2025-03-02 PROCEDURE — 85025 COMPLETE CBC W/AUTO DIFF WBC: CPT

## 2025-03-02 PROCEDURE — 84484 ASSAY OF TROPONIN QUANT: CPT

## 2025-03-02 PROCEDURE — 73030 X-RAY EXAM OF SHOULDER: CPT | Mod: LEFT SIDE | Performed by: RADIOLOGY

## 2025-03-02 PROCEDURE — 84075 ASSAY ALKALINE PHOSPHATASE: CPT

## 2025-03-02 PROCEDURE — 93005 ELECTROCARDIOGRAM TRACING: CPT

## 2025-03-02 PROCEDURE — 73502 X-RAY EXAM HIP UNI 2-3 VIEWS: CPT | Mod: LEFT SIDE | Performed by: RADIOLOGY

## 2025-03-02 PROCEDURE — 99285 EMERGENCY DEPT VISIT HI MDM: CPT | Performed by: EMERGENCY MEDICINE

## 2025-03-02 PROCEDURE — 73030 X-RAY EXAM OF SHOULDER: CPT | Mod: LT

## 2025-03-02 PROCEDURE — 71045 X-RAY EXAM CHEST 1 VIEW: CPT | Performed by: RADIOLOGY

## 2025-03-02 PROCEDURE — 73502 X-RAY EXAM HIP UNI 2-3 VIEWS: CPT | Mod: LT

## 2025-03-02 RX ORDER — METHOCARBAMOL 500 MG/1
750 TABLET, FILM COATED ORAL ONCE
Status: DISCONTINUED | OUTPATIENT
Start: 2025-03-02 | End: 2025-03-02 | Stop reason: HOSPADM

## 2025-03-02 RX ORDER — ACETAMINOPHEN 325 MG/1
650 TABLET ORAL ONCE
Status: DISCONTINUED | OUTPATIENT
Start: 2025-03-02 | End: 2025-03-02 | Stop reason: HOSPADM

## 2025-03-02 RX ORDER — LIDOCAINE 560 MG/1
1 PATCH PERCUTANEOUS; TOPICAL; TRANSDERMAL ONCE
Status: DISCONTINUED | OUTPATIENT
Start: 2025-03-02 | End: 2025-03-02 | Stop reason: HOSPADM

## 2025-03-02 ASSESSMENT — LIFESTYLE VARIABLES
TOTAL SCORE: 0
HAVE PEOPLE ANNOYED YOU BY CRITICIZING YOUR DRINKING: NO
HAVE YOU EVER FELT YOU SHOULD CUT DOWN ON YOUR DRINKING: NO
EVER HAD A DRINK FIRST THING IN THE MORNING TO STEADY YOUR NERVES TO GET RID OF A HANGOVER: NO
EVER FELT BAD OR GUILTY ABOUT YOUR DRINKING: NO

## 2025-03-02 ASSESSMENT — PAIN DESCRIPTION - LOCATION: LOCATION: CHEST

## 2025-03-02 ASSESSMENT — PAIN DESCRIPTION - PAIN TYPE: TYPE: ACUTE PAIN

## 2025-03-02 ASSESSMENT — PAIN - FUNCTIONAL ASSESSMENT: PAIN_FUNCTIONAL_ASSESSMENT: 0-10

## 2025-03-02 ASSESSMENT — PAIN SCALES - GENERAL: PAINLEVEL_OUTOF10: 9

## 2025-03-02 ASSESSMENT — PAIN DESCRIPTION - DESCRIPTORS: DESCRIPTORS: ACHING

## 2025-03-02 NOTE — ED PROVIDER NOTES
"EMERGENCY DEPARTMENT ENCOUNTER      Pt Name: Akin Lynn  MRN: [id number]  Birthdate 1970  Date of evaluation: 3/2/2025  Provider: Hay Sauceda DO    CHIEF COMPLAINT       Chief Complaint   Patient presents with    Syncope    Fall     Pt presents to the ED today for a syncopal episode and frequent falls d/t uncontrolled chronic pain issues that pt has an appointment for on Thursday. Pt c/o chest and all over upper body chronic pain exacerbation.          HISTORY OF PRESENT ILLNESS    54-year-old male, well-known to this ER, history of hypertension, hyperlipidemia, regional pain syndrome, chronic back pain, syncope, comes emergency room for a fall and syncopal episode.  Patient states he was getting out of the shower, was walking, was petting his cats, and the next he knew he had a sudden pain in his left hip and lower back that shot up his back and then he fell face first on the ground.  He does not have any pain in his head or face currently.  He states the next he knows EMS personnel are \"sternal rubbing him\" and he woke up.  EMS personnel do report that he was able to protect his face on drop arm test.  Patient has a history having a loop recorder as well, he is currently having some pain in his left hip and left shoulder.  He is having some pain in his lower chest wall as well from the fall.  Denies any head pain, neck pain, does not have any new back pain.  No other symptoms today.       History provided by:  Patient      Nursing Notes were reviewed.    PAST MEDICAL HISTORY     Past Medical History:   Diagnosis Date    Anesthesia of skin     Numbness and tingling    Anxiety 09/21/2023    Bilateral myopia 09/21/2023    Cervical radicular pain 09/21/2023    Chronic patellofemoral pain of left knee 09/21/2023    Complex regional pain syndrome type 1 of left lower extremity 09/21/2023    COVID-19     VACCINATED    Cubital tunnel syndrome on left 09/21/2023    Cubital tunnel syndrome on right 09/21/2023    " Disease of intestine, unspecified     Bowel trouble    Erectile dysfunction 09/21/2023    Foot joint pain 09/21/2023    Fractures     Gastroparesis     GERD (gastroesophageal reflux disease)     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    History of psychological trauma 09/21/2023    Hyperlipidemia     Hypertension     Incomplete RBBB 09/19/2024    Joint pain 09/21/2023    Kidney cysts 09/21/2023    Low back pain, unspecified 12/07/2022    Chronic low back pain without sciatica, unspecified back pain laterality    Low back pain, unspecified 08/09/2022    Lumbago    Lumbar spondylosis 09/21/2023    Lung nodule 09/21/2023    RIGHT    Metatarsalgia of left foot 09/21/2023    Myopia, bilateral 02/19/2019    Myopia of both eyes with astigmatism and presbyopia    Non-sustained ventricular tachycardia (Multi)     Osteoarthritis     Osteochondral defect of patella 09/21/2023    Osteoporosis     Other spondylosis, cervical region 09/21/2023    Patellofemoral arthritis 09/21/2023    Personal history of other diseases of the musculoskeletal system and connective tissue     History of arthritis    Personal history of other specified conditions     History of seizure    Reflex sympathetic dystrophy 09/21/2023    Scapular dyskinesis 09/21/2023    Seizure disorder (Multi)     D/T TRAMADOL REACTION - 25 YEARS AGO    Shoulder impingement 09/21/2023    Stable angina (CMS-[facility])     Suspected sleep apnea 09/21/2023    Trauma and stressor-related disorder 09/21/2023    Trochanteric bursitis of both hips 09/22/2023         SURGICAL HISTORY       Past Surgical History:   Procedure Laterality Date    ACHILLES TENDON SURGERY  1978 2016    CARDIAC CATHETERIZATION N/A 12/20/2024    Procedure: Left Heart Cath;  Surgeon: Kale Funk MD;  Location: [facility] Cardiac Cath Lab;  Service: Cardiovascular;  Laterality: N/A;    CARDIAC ELECTROPHYSIOLOGY PROCEDURE N/A 1/31/2025    Procedure: Loop Insertion (07307);  Surgeon: Hai Ga MD;  Location: [facility]  Cardiac Cath Lab;  Service: Electrophysiology;  Laterality: N/A;    CARDIOVASCULAR STRESS TEST      CT ABDOMEN PELVIS ANGIOGRAM W AND/OR WO IV CONTRAST  03/18/2020    CT ABDOMEN PELVIS ANGIOGRAM W AND/OR WO IV CONTRAST [facility]    CT ANGIO NECK  12/16/2022    CT NECK ANGIO W AND WO IV CONTRAST [facility]    [facility] HEAD ANGIO W AND WO IV CONTRAST  12/16/2022    CT HEAD ANGIO W AND WO IV CONTRAST [facility]    HIP SURGERY Left     REPLACEMENT    JOINT REPLACEMENT      LEFT KNEE    KNEE ARTHROPLASTY Bilateral 06/13/2017    Knee Arthroplasty    OTHER SURGICAL HISTORY  01/06/2022    Ulnar nerve neuroplasty    OTHER SURGICAL HISTORY Bilateral 07/26/2019    Foot surgery  -- HARDWARE    OTHER SURGICAL HISTORY Bilateral     BILATERAL ANKLE SCOPE    OTHER SURGICAL HISTORY      POP ABDOMINAL PROCEDURE    PLANTAR FASCIA RELEASE      SHOULDER SURGERY Bilateral 06/13/2017    Shoulder Surgery         CURRENT MEDICATIONS       Discharge Medication List as of 3/2/2025  2:55 PM        CONTINUE these medications which have NOT CHANGED    Details   acetaminophen (Tylenol) 500 mg tablet Take 2 tablets (1,000 mg) by mouth 3 times a day., Historical Med      albuterol (Ventolin HFA) 90 mcg/actuation inhaler Inhale 2 puffs every 4 hours if needed for wheezing., Historical Med      atorvastatin (Lipitor) 80 mg tablet Take 1 tablet (80 mg) by mouth once daily., Historical Med      baclofen (Lioresal) 20 mg tablet Take 1 tablet (20 mg) by mouth 3 times a day., Starting Thu 2/1/2024, Until Wed 1/29/2025, Normal      celecoxib (CeleBREX) 200 mg capsule Take 1 capsule (200 mg) by mouth 2 times a day., Historical Med      diazePAM (Valium) 5 mg tablet Take 1 tablet (5 mg) by mouth 2 times a day as needed for anxiety., Historical Med      docusate sodium (Colace) 100 mg capsule Take 1 capsule (100 mg) by mouth 2 times a day., Historical Med      DULoxetine (Cymbalta) 30 mg DR capsule Take  3 capsules (90 mg) by mouth once daily. Do not crush or chew., Starting Sun 2/2/2025, Until Tue 3/4/2025, Normal      ezetimibe (Zetia) 10 mg tablet Take 1 tablet (10 mg) by mouth once daily., Starting Thu 12/5/2024, Until Fri 12/5/2025, Normal      fluticasone (Flonase) 50 mcg/actuation nasal spray Administer 1 spray into each nostril once daily as needed., Historical Med      lidocaine (Lidoderm) 5 % patch apply 2 patches to affected area daily as needed., Normal      magnesium oxide (Mag-Ox) 400 mg (241.3 mg magnesium) tablet Take 1 tablet (400 mg) by mouth once daily., Starting Thu 12/5/2024, Until Fri 12/5/2025, Normal      metoprolol succinate XL (Toprol-XL) 25 mg 24 hr tablet Take 1 tablet (25 mg) by mouth once daily. Do not crush or chew., Starting Thu 12/5/2024, Until Fri 12/5/2025, Normal      omeprazole (PriLOSEC) 40 mg DR capsule Take 1 capsule (40 mg) by mouth 2 times a day. For 90 days, Historical Med      oxyCODONE (Roxicodone) 5 mg immediate release tablet Take 1 tablet (5 mg) by mouth 2 times a day as needed for severe pain (7 - 10)., Historical Med      pregabalin (Lyrica) 300 mg capsule Take 1 capsule (300 mg) by mouth 2 times a day., Starting Fri 10/6/2023, Normal      sildenafil (Viagra) 100 mg tablet Take 1 tablet (100 mg) by mouth once daily as needed for erectile dysfunction., Historical Med             ALLERGIES     Morphine, Ultram [tramadol], Alprazolam, Ketorolac, Fish containing products, and Opioids - morphine analogues    FAMILY HISTORY       Family History   Problem Relation Name Age of Onset    Lung cancer Mother      Glaucoma Father      Skin cancer Father      Hypertension Other mul fam mem     Heart disease Other mul fam mem           SOCIAL HISTORY       Social History     Socioeconomic History    Marital status: Single   Tobacco Use    Smoking status: Never    Smokeless tobacco: Never    Tobacco comments:     It killed my mother smoking and she left me with a gift a nodule on my  right lung   Vaping Use    Vaping status: Never Used   Substance and Sexual Activity    Alcohol use: Not Currently    Drug use: Not Currently     Comment: Ketamine treatments for pain,eatable marijuana    Sexual activity: Not Currently     Partners: Female     Birth control/protection: None     Social Drivers of Health     Financial Resource Strain: Patient Declined (1/29/2025)    Overall Financial Resource Strain (CARDIA)     Difficulty of Paying Living Expenses: Patient declined   Food Insecurity: No Food Insecurity (2/3/2025)    Received from [facility]    Hunger Vital Sign     Worried About Running Out of Food in the Last Year: Never true     Ran Out of Food in the Last Year: Never true   Transportation Needs: No Transportation Needs (2/3/2025)    Received from [facility]    PRAPARE - Transportation     Lack of Transportation (Medical): No     Lack of Transportation (Non-Medical): No   Physical Activity: Insufficiently Active (6/28/2024)    Exercise Vital Sign     Days of Exercise per Week: 7 days     Minutes of Exercise per Session: 10 min   Stress: Stress Concern Present (6/28/2024)    [employer] - Occupational Stress Questionnaire     Feeling of Stress : Rather much   Social Connections: Feeling Socially Integrated (7/17/2024)    OASIS : Social Isolation     Frequency of experiencing loneliness or isolation: Never   Recent Concern: Social Connections - Socially Isolated (5/16/2024)    [facility]    Social Connection and Isolation Panel [NHANES]     Frequency of Communication with Friends and Family: Once a week     Frequency of Social Gatherings with Friends and Family: Never     Attends Scientologist Services: Never     Active Member of Clubs or Organizations: No     Attends Club or Organization Meetings: Never     Marital Status: Living with partner   Intimate Partner Violence: Not At Risk (1/29/2025)    Humiliation, Afraid, Rape, and  Kick questionnaire     Fear of Current or Ex-Partner: No     Emotionally Abused: No     Physically Abused: No     Sexually Abused: No   Housing Stability: Unknown (2/3/2025)    Received from [facility]    Housing Stability Vital Sign     Unable to Pay for Housing in the Last Year: No       SCREENINGS                        PHYSICAL EXAM    (up to 7 for level 4, 8 or more for level 5)     ED Triage Vitals [03/02/25 1304]   Temperature Heart Rate Respirations BP   36.6 °C (97.9 °F) 79 (!) 118 147/89      Pulse Ox Temp Source Heart Rate Source Patient Position   100 % Temporal Monitor Sitting      BP Location FiO2 (%)     Right arm --       Physical Exam  Vitals and nursing note reviewed.   Constitutional:       General: He is not in acute distress.  HENT:      Head: Normocephalic and atraumatic.      Comments: No cephalhematoma, no depressed call fractures, no Cagle sign, no periorbital ecchymosis.  Eyes:      General: No scleral icterus.        Right eye: No discharge.         Left eye: No discharge.      Conjunctiva/sclera: Conjunctivae normal.      Pupils: Pupils are equal, round, and reactive to light.   Cardiovascular:      Rate and Rhythm: Normal rate and regular rhythm.      Pulses: Normal pulses.   Pulmonary:      Effort: Pulmonary effort is normal.   Abdominal:      General: Abdomen is flat.      Palpations: Abdomen is soft.      Tenderness: There is no abdominal tenderness. There is no guarding or rebound.   Musculoskeletal:         General: No deformity.      Right lower leg: No edema.      Left lower leg: No edema.      Comments: Patient has pain on internal, external rotation of his left hip however the range of motion is intact.  No pain on internal/external rotation of his right hip.  Knee flexion and extension intact, no tenderness or deformity to the lower extremities.  No midline tenderness, to the C, T or L-spine, no deformities or step-offs.  He has full range of motion of his neck.  He is  tender in his lower sternal border.  Abdomen is soft and nontender.  Pelvis is stable.  He has some pain on range of motion of his left shoulder but does have full range of motion of the left shoulder.  No pain or range of motion of the right shoulder.  Shoulder flexion and extension are fully intact, no pain on his hands or wrists or forearms bilaterally on palpation.   Skin:     General: Skin is warm and dry.   Neurological:      Mental Status: He is alert and oriented to person, place, and time. Mental status is at baseline.   Psychiatric:         Mood and Affect: Mood normal.         Behavior: Behavior normal.          DIAGNOSTIC RESULTS     LABS:  Labs Reviewed   CBC WITH AUTO DIFFERENTIAL - Abnormal       Result Value    WBC 6.4      nRBC 0.0      RBC 4.75      Hemoglobin 13.6      Hematocrit 41.9      MCV 88      MCH 28.6      MCHC 32.5      RDW 14.6 (*)     Platelets 190      Neutrophils % 64.8      Immature Granulocytes %, Automated 0.5      Lymphocytes % 20.7      Monocytes % 7.8      Eosinophils % 5.3      Basophils % 0.9      Neutrophils Absolute 4.17      Immature Granulocytes Absolute, Automated 0.03      Lymphocytes Absolute 1.33      Monocytes Absolute 0.50      Eosinophils Absolute 0.34      Basophils Absolute 0.06     COMPREHENSIVE METABOLIC PANEL - Abnormal    Glucose 87      Sodium 136      Potassium 4.7      Chloride 104      Bicarbonate 25      Anion Gap 12      Urea Nitrogen 14      Creatinine 0.76      eGFR >90      Calcium 9.0      Albumin 4.2      Alkaline Phosphatase 66      Total Protein 6.3 (*)     AST 27      Bilirubin, Total 0.4      ALT 27     SERIAL TROPONIN-INITIAL - Abnormal    Troponin I, High Sensitivity 26 (*)     Narrative:     Less than 99th percentile of normal range cutoff-  Female and children under 18 years old <14 ng/L; Male <21 ng/L: Negative  Repeat testing should be performed if clinically indicated.     Female and children under 18 years old 14-50 ng/L; Male 21-50  ng/L:  Consistent with possible cardiac damage and possible increased clinical   risk. Serial measurements may help to assess extent of myocardial damage.     >50 ng/L: Consistent with cardiac damage, increased clinical risk and  myocardial infarction. Serial measurements may help assess extent of   myocardial damage.      NOTE: Children less than 1 year old may have higher baseline troponin   levels and results should be interpreted in conjunction with the overall   clinical context.     NOTE: Troponin I testing is performed using a different   testing methodology at [facility] than at other   [facility]. Direct result comparisons should only   be made within the same method.   TROPONIN SERIES- (INITIAL, 1 HR)    Narrative:     The following orders were created for panel order Troponin I Series, High Sensitivity (0, 1 HR).  Procedure                               Abnormality         Status                     ---------                               -----------         ------                     Troponin I, High Sensiti...[[id number]]  Abnormal            Final result               Troponin, High Sensitivi...[[id number]]                                                   Please view results for these tests on the individual orders.   SERIAL TROPONIN, 1 HOUR       All other labs were within normal range or not returned as of this dictation.    Imaging  XR chest 1 view   Final Result   Allowing for the aforementioned limitation, no acute cardiopulmonary   disease.        Signed by: Luis Mayorga 3/2/2025 2:08 PM   Dictation workstation:   [id number]      XR shoulder left 2+ views   Final Result   No acute fracture or dislocation.        MACRO:   None        Signed by: Uche Kim 3/2/2025 2:06 PM   Dictation workstation:   [id number]      XR hip left with pelvis when performed 2 or 3 views   Final Result   Stable and intact bilateral total hip arthroplasties.        No acute fracture or  dislocation.        MACRO:   None        Signed by: Uche Kim 3/2/2025 2:07 PM   Dictation workstation:   [id number]           Procedures  Procedures     EMERGENCY DEPARTMENT COURSE/MDM:     ED Course as of 03/02/25 1505   Sun Mar 02, 2025   1442 EKG independently interpreted shows 81 bpm sinus rhythm, QTc 429, no acute injury pattern. [RD]      ED Course User Index  [RD] Haycarmenza SaucedaDO         Diagnoses as of 03/02/25 1505   Syncope and collapse   Other chronic pain        Medical Decision Making  54-year-old male, comes emergency room after a syncopal episode, patient reports that he was having so much pain due to his chronic pain, he passed out and fell.  Was seen here yesterday for similar complaint, diagnosed with vasovagal reaction, discharged.  Today he is having new pain in his left hip secondary to the fall.  On exam he has no external signs of head trauma, no cephalhematomas or depressed call fractures, no Cagle sign, no periorbital ecchymosis, has no midline tenderness of his neck, no focal neurological deficits, he has full range of motion of his neck, do not believe given the fact that he is not on any anticoagulation, that CT head and C-spine is indicated at this time.  Did order x-ray of his left shoulder as he had some pain on passive range of motion, x-ray of his left hip as well as he had pain on passive internal and external rotation of his left hip.  X-ray was ordered of his chest along with a cardiac workup.    X-ray imaging was unremarkable for any acute bony process such as fracture, no pneumonia or pneumothorax on chest x-ray, no rib fractures.    Did obtain CBC, chemistry, troponin.    Did order 750 mg oral Robaxin for the patient's pain, he did specifically request narcotic medication to nursing multiple times for his pain.  Did review patient's OARRS report thoroughly, reviewed ED chart from yesterday, he does follow-up with pain management on an outpatient basis as well.  Did  "inform him that given he has no acute bony process going on today and this is likely just his chronic pain that narcotics are not indicated in the ER setting.  Did order a lidocaine patch, 650 mg oral Tylenol which the patient did refuse.    I did inform the patient that we were getting blood work to assess for possible cardiac abnormalities that would cause his syncopal episode and in addition to possible anemia, KANDY and severe dehydration or electrolyte derangements.  His EKG showed no signs of WPW, HOCM, Brugada.    At this time, patient did inform the attending and nursing staff that he would wish to sign out AGAINST MEDICAL ADVICE.  I did speak with the patient he did sign an AMA form, I did inform him that his lab workup was not resulted yet and we are checking for cardiac abnormalities, elevated troponins, possible arrhythmia causing a syncopal episode, were concerned about dehydration, electrolyte derangements as well.  Patient was amenable to this plan, but did not want to stay anymore and said he was going to \"go to the [facility]\".  I informed the patient that he is of sound mind, alert and oriented fully, does have full decision-making capacity and thus he is free to make that decision but that he would be putting himself at risk for death, permanent disability, loss of quality of life.  Patient was amenable to these risks, and ultimately was adamant that he wanted to be discharged to go to the [facility].  Given he had full capacity, I was unable to force him to stay.  He was escorted out of the department in a wheelchair by nursing staff at this time.        Patient and or family in agreement and understanding of treatment plan.  All questions answered.      I reviewed the case with the attending ED physician. The attending ED physician agrees with the plan. Patient and/or patient´s representative was counseled regarding labs, imaging, likely diagnosis, and plan. All questions were " answered.    ED Medications administered this visit:    Medications   methocarbamol (Robaxin) tablet 750 mg (750 mg oral Not Given 3/2/25 1451)   acetaminophen (Tylenol) tablet 650 mg (650 mg oral Not Given 3/2/25 1451)   lidocaine 4 % patch 1 patch (has no administration in time range)       New Prescriptions from this visit:    Discharge Medication List as of 3/2/2025  2:55 PM          Follow-up:  Otoniel Lane MD  [id number] [facility]  [address]  [place] [id number] [phone]          Carlyle Abel MD  [id number] [facility]  [place] [id number]  [phone]              Final Impression:   1. Syncope and collapse    2. Other chronic pain          (Please note that portions of this note were completed with a voice recognition program.  Efforts were made to edit the dictations but occasionally words are mis-transcribed.)     Hay Sauceda,   Resident  03/02/25 9499     08-24    139  |  105  |  14  ----------------------------<  185<H>  3.7   |  24  |  0.72    Ca    9.1      24 Aug 2022 05:29    POCT Blood Glucose.: 185 mg/dL (08-24-22 @ 11:33)  A1C with Estimated Average Glucose Result: 6.2 % (08-18-22 @ 05:30)

## 2025-03-02 NOTE — DISCHARGE INSTRUCTIONS
Seek immediate medical attention if you develop: increasing pain, numbness, tingling, weakness, loss of motion in your arms or legs, loss of control of your urine or stool, fever, abdominal pain, chest pain, shortness of breath, or any new or worsening symptoms.    Seek immediate medical attention if you develop: worsening chest pain, new chest pain, nausea, vomiting, weakness, numbness, tingling, excessive sweating, shortness of breath, difficulty breathing, loss of motion in your arms or legs, or any new or worsening symptoms.

## 2025-03-03 ENCOUNTER — TELEPHONE (OUTPATIENT)
Dept: PAIN MEDICINE | Facility: CLINIC | Age: 55
End: 2025-03-03
Payer: COMMERCIAL

## 2025-03-04 ENCOUNTER — INFUSION (OUTPATIENT)
Dept: INFUSION THERAPY | Facility: CLINIC | Age: 55
End: 2025-03-04
Payer: COMMERCIAL

## 2025-03-04 VITALS
OXYGEN SATURATION: 99 % | DIASTOLIC BLOOD PRESSURE: 88 MMHG | TEMPERATURE: 97.5 F | RESPIRATION RATE: 14 BRPM | HEART RATE: 81 BPM | SYSTOLIC BLOOD PRESSURE: 144 MMHG

## 2025-03-04 DIAGNOSIS — K59.09 OTHER CONSTIPATION: Primary | ICD-10-CM

## 2025-03-04 DIAGNOSIS — G90.522 COMPLEX REGIONAL PAIN SYNDROME TYPE 1 OF LEFT LOWER EXTREMITY: ICD-10-CM

## 2025-03-04 LAB
ATRIAL RATE: 81 BPM
P AXIS: 53 DEGREES
P OFFSET: 201 MS
P ONSET: 143 MS
PR INTERVAL: 156 MS
Q ONSET: 221 MS
QRS COUNT: 13 BEATS
QRS DURATION: 100 MS
QT INTERVAL: 370 MS
QTC CALCULATION(BAZETT): 429 MS
QTC FREDERICIA: 408 MS
R AXIS: 56 DEGREES
T AXIS: 67 DEGREES
T OFFSET: 406 MS
VENTRICULAR RATE: 81 BPM

## 2025-03-04 PROCEDURE — 2500000004 HC RX 250 GENERAL PHARMACY W/ HCPCS (ALT 636 FOR OP/ED): Performed by: NURSE PRACTITIONER

## 2025-03-04 PROCEDURE — 96365 THER/PROPH/DIAG IV INF INIT: CPT | Mod: INF

## 2025-03-04 PROCEDURE — 96368 THER/DIAG CONCURRENT INF: CPT | Mod: INF

## 2025-03-04 RX ORDER — EPINEPHRINE 0.3 MG/.3ML
0.3 INJECTION SUBCUTANEOUS EVERY 5 MIN PRN
Status: CANCELLED | OUTPATIENT
Start: 2025-03-18

## 2025-03-04 RX ORDER — DOCUSATE SODIUM 100 MG/1
100 CAPSULE, LIQUID FILLED ORAL 2 TIMES DAILY
Qty: 60 CAPSULE | Refills: 0 | Status: SHIPPED | OUTPATIENT
Start: 2025-03-04 | End: 2025-04-03

## 2025-03-04 RX ORDER — DIPHENHYDRAMINE HYDROCHLORIDE 50 MG/ML
50 INJECTION INTRAMUSCULAR; INTRAVENOUS AS NEEDED
Status: CANCELLED | OUTPATIENT
Start: 2025-03-18

## 2025-03-04 RX ORDER — DIPHENHYDRAMINE HYDROCHLORIDE 50 MG/ML
25 INJECTION INTRAMUSCULAR; INTRAVENOUS ONCE
OUTPATIENT
Start: 2025-03-18 | End: 2025-03-18

## 2025-03-04 RX ORDER — NITROGLYCERIN 0.4 MG/1
0.4 TABLET SUBLINGUAL ONCE
OUTPATIENT
Start: 2025-03-18 | End: 2025-03-18

## 2025-03-04 RX ORDER — METOCLOPRAMIDE HYDROCHLORIDE 5 MG/ML
10 INJECTION INTRAMUSCULAR; INTRAVENOUS ONCE
OUTPATIENT
Start: 2025-03-18 | End: 2025-03-18

## 2025-03-04 RX ORDER — METOPROLOL TARTRATE 25 MG/1
25 TABLET, FILM COATED ORAL ONCE
OUTPATIENT
Start: 2025-03-18 | End: 2025-03-18

## 2025-03-04 RX ORDER — ALBUTEROL SULFATE 0.83 MG/ML
3 SOLUTION RESPIRATORY (INHALATION) AS NEEDED
Status: CANCELLED | OUTPATIENT
Start: 2025-03-18

## 2025-03-04 RX ORDER — KETAMINE HCL IN NACL, ISO-OSM 100MG/10ML
SYRINGE (ML) INJECTION ONCE
Status: COMPLETED | OUTPATIENT
Start: 2025-03-04 | End: 2025-03-04

## 2025-03-04 RX ORDER — FAMOTIDINE 10 MG/ML
20 INJECTION, SOLUTION INTRAVENOUS ONCE AS NEEDED
Status: CANCELLED | OUTPATIENT
Start: 2025-03-18

## 2025-03-04 RX ORDER — ONDANSETRON HYDROCHLORIDE 2 MG/ML
4 INJECTION, SOLUTION INTRAVENOUS ONCE
OUTPATIENT
Start: 2025-03-18 | End: 2025-03-18

## 2025-03-04 RX ADMIN — PROPOFOL 100 MG: 10 INJECTION, EMULSION INTRAVENOUS at 12:58

## 2025-03-04 RX ADMIN — Medication: at 12:57

## 2025-03-04 ASSESSMENT — ENCOUNTER SYMPTOMS
LOSS OF SENSATION IN FEET: 1
OCCASIONAL FEELINGS OF UNSTEADINESS: 1
DEPRESSION: 1

## 2025-03-04 ASSESSMENT — PAIN SCALES - GENERAL: PAINLEVEL_OUTOF10: 5 - MODERATE PAIN

## 2025-03-04 NOTE — PROGRESS NOTES
S: Patient here for 22nd opioid sparing pain infusion. Patient reports 50% reduction in pain after last infusion that lasted 1-2 week.    Purpose of pain infusion meds explained along with potential side effects.  Patient verbalized understanding.    B: Pain Issues left lower back/buttock area, 8/10      A: Patient currently has pain described on flow sheet documentation. Designated  is [place] with [place] [facility]. Patient last ate solid food >4 hours ago, and had liquid 1 hours ago.    R: Plan; Obtain IV access, do patient risk assessment, and start opioid sparing infusion as ordered. Monitoring for S/S of adverse reactions.    1340- Post infusion teaching provided. Patient verbalized understanding. VSS, Patient states pain is 5. Will assist patient to waiting car via wheelchair.

## 2025-03-04 NOTE — PROGRESS NOTES
Virtual or Telephone Consent    An interactive audio and video telecommunication system which permits real time communications between the patient (at the originating site) and provider (at the distant site) was utilized to provide this telehealth service.   Verbal consent was requested and obtained from Akin Lynn on this date, 03/04/25 for a telehealth visit and the patient's location was confirmed at the time of the visit.    Patient is going from doctor to doctor for his treatments he had medial branch block on 2/5/2025 by Dr. Vargas and then 2/27/2025 he had bilateral radiofrequency ablation by Dr. Abel    SUBJECTIVE:  This is 54 y.o.  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  He is not doing any more of the ketamine infusion at the [facility].  And he would like to continue with these infusions.  New order was placed for him today.  The patient is not really getting any much resolved with his Percocet from Dr. Vargas and he is asking if he we can put him on the Suboxone like with Dr. Solis recommended I told him I will see if I can find him someone to provide him with the Suboxone.      Prior office visit:  11/14/2024: This is 54 y.o.  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  The patient had in 2022 MRI of his cervical to lumbar spine showed principally normal MRI minimal degenerative changes.  The patient has been to the ED multiple visit and have seen Dr. Chen as well for pain since his neck back etc. who is here for follow-up stating that he has  now a lot of pain in his right foot that did not response to multiple surgeries and now it feels the same thing like his left leg which has [facility] case of CRPS.  I examined the foot today does not look like typical CRPS diagnosis has some tenderness over the scar and there is some pseudomotor changes good pulses.  The patient stated that he had an EMG done which was normal I explained to him that does not mean that he has CRPS or not CRPS is a disease that diagnosed clinically he may have a PM&R I suggested lumbar sympathetic block as differential diagnosis to see if that sympathetically mediated pain or not but he is not interested in that.  The patient is worried about his marijuana usage and if that will cause him any problem getting his infusion therapy I told him no he can continue his infusion therapy and he can continue with his ketamine/lidocaine nasal spray.     70% improvement 1-few weeks      Procedures:  Opioid sparing infusion the patient gets 70% improvement in pain function last between 10 days to few weeks    Multiple procedures from different providers bilateral medial branch block of the cervical spine by Dr. Vargas and Dr. Abel did radiofrequency recently      Portions of record reviewed for pertinent issues: active problem list, medication list, allergies, family history, social history, notes from last encounter, encounters, lab results, imaging and other available records.      I have personally reviewed the OARRS report for this patient. This report is scanned into the electronic medical record. I have considered the risks of abuse, dependence, addiction and diversion. It showed: Diazepam 5 mg twice daily, pregabalin 300 mg twice daily and some oxycodone 5 mg twice daily  OPIOID RISK ASSESSMENT SCORE 0/26  Aberrant behavior: none  My patient has no underlying substance abuse or alcohol abuse and there's no mental health conditions contributing to the patient's pain.      Diagnostic  studies:  11/17/2022 MRI of the cervical thoracic and lumbar spine showed some degenerative changes but no significant canal or foraminal stenosis, no bone marrow edema or endplate changes:         Review of Systems   HENT: Negative.     Eyes: Negative.    Respiratory: Negative.     Cardiovascular: Negative.    Gastrointestinal: Negative.    Endocrine: Negative.    Genitourinary: Negative.    Musculoskeletal:  Positive for arthralgias, back pain, myalgias and neck pain.   Skin: Negative.    Neurological: Negative.    Hematological: Negative.    Psychiatric/Behavioral: Negative.          Physical Exam  Constitutional:       Appearance: Normal appearance.   Neurological:      Mental Status: He is alert and oriented to person, place, and time.                      Plan  At least 50% of the visit was involved in the discussion of the options for treatment. We discussed exercises, medication, interventional therapies and surgery. Healthy life style is essential with patient hard work to achieve the wellness. In addition; discussion with the patient and/or family about any of the diagnostic results, impressions and/or recommended diagnostic studies, prognosis, risks and benefits of treatment options, instructions for treatment and/or follow-up, importance of compliance with chosen treatment options, risk-factor reduction, and patient/family education.         Recommended Pool therapy, walking in the pool, at least 3x per week for 30 minutes  Continue self-directed physical therapy with at least daily exercises for minimum of 20-minute, brochure was handed to the patient  Continue opioid sparing infusion once every 3 to 4 weeks  Will try to find him a place for Suboxone treatment  Healthy lifestyle and anti-inflammatory diet in addition to weight control discussed with the patient  Alternative chronic pain therapies was discussed, encouraged and information was handed  Return to Clinic 3 months     *Please note this report  has been produced using speech recognition software and may contain errors related to that system including grammar, punctuation and spelling as well as words and phrases that may be inappropriate. If there are questions or concerns, please feel free to contact me to clarify.    Olivia Alonzo MD

## 2025-03-04 NOTE — PATIENT INSTRUCTIONS
Today :We administered ketamine 30 mg-lidocaine 300 mg and propofol (Diprivan).     For:   1. Complex regional pain syndrome type 1 of left lower extremity            (Tell all doctors including dentists that you are taking this medication)     Go to the emergency room or call 911 if:  -You have signs of allergic reaction:   -Rash, hives, itching.   -Swollen, blistered, peeling skin.   -Swelling of face, lips, mouth, tongue or throat.   -Tightness of chest, trouble breathing, swallowing or talking     Call your doctor:  - If IV / injection site gets red, warm, swollen, itchy or leaks fluid or pus.     (Leave dressing on your IV site for at least 2 hours and keep area clean and dry  - If you get sick or have symptoms of infection or are not feeling well for any reason.    (Wash your hands often, stay away from people who are sick)  - If you have side effects from your medication that do not go away or are bothersome.     (Refer to the teaching your nurse gave you for side effects to call your doctor about)    - Common side effects may include:  stuffy nose, headache, feeling tired, muscle aches, upset stomach  - Before receiving any vaccines     - Call the [facility] at   If:  - You get sick, are on antibiotics, have had a recent vaccine, have surgery or dental work and your doctor wants your visit rescheduled.  - You need to cancel and reschedule your visit for any reason. Call at least 2 days before your visit if you need to cancel.   - Your insurance changes before your next visit.    (We will need to get approval from your new insurance. This can take up to two weeks.)     The [facility] is opened Monday thru Friday. We are closed on weekends and holidays.   Voice mail will take your call if the center is closed. If you leave a message please allow 24 hours for a call back during weekdays. If you leave a message on a weekend/holiday, we will call you back the next business day.    A  pharmacist is available Monday - Friday from 8:30AM to 3:30PM to help answer any questions you may have about your prescriptions(s). Please call pharmacy at:    [facility]: [phone]  [facility]: [phone]  [facility]: [phone]              [facility]      Pain Infusion Aftercare Instructions      1. It is normal to feel sedated, tired and low in energy after a pain infusion. DO NOT DRIVE, OPERATE ANY MACHINERY, OR MAKE ANY IMPORTANT DECISIONS FOR AT LEAST 24 HOURS AFTER THE INFUSION.     2. Call the pain center at [phone] with any problems, questions, or concerns.     3. Eat light after the infusion. If you feel queasy or sick to your stomach, laying down with your eyes closed may help. When you resume eating start with something mild like clear liquids, yogurt, applesauce, crackers, etc… Gradually advance to a regular diet.     4. Do not leave your house alone the evening of your pain infusion.     5. No alcohol or sedative medications, such as sleeping pills, for 24 hours after your pain infusion.     6. Resume all other prescribed medications unless directed otherwise by you physician.     7. If you have any medical emergencies, call 911 or go directly to the closest emergency room.    Patient Instructions  IV Infusion  [facility]  1. A responsible adult must stay with you during your infusion and drive you home. If you do not have a responsible adult with transportation home, your appointment will be rescheduled. Patients must still have a responsible adult if they use Rideshare, Uber, or Lyft. Uber, Rideshare, or Lyft drivers do not qualify.   2. On the day of your infusion we ask that you please drink clear liquids until your appointment.  You should NOT eat food 4 hours before your infusion.    3. Take all medications as prescribed before your infusion. Do not withhold blood pressure medication.   4. Patients who  use a CPAP or BIPAP should bring it to the infusion appointment.   5. Children under 16 will not be permitted in the infusion clinic. Please do not bring young children or pets. For patients who must bring a service animal, paperwork will be required. NO EXCEPTIONS.  6.  All Women will be required to take a pregnancy test prior to the infusion unless they are above 55 years of age or have had a hysterectomy.   7. Patients who cancel their infusion should call the Infusion line at [phone] as soon as possible. We would appreciate a 48-hour notice. Please be on time for the appt. Patients who are more than 15 mins late will have to cancel and reschedule. Infusion appt times are minimal. Patients who do not show, cancel, or reschedule an appointment may have a long wait until we can get them back on the schedule.   8. We make every effort to schedule your infusions based on your physician's recommendations. However, factors will affect our infusion schedule and availability. We appreciate your understanding.  9. Appointments will only be scheduled for two appointments in the future.   10. No eating, drinking, or chewing gum during the infusion.   11. If you miss or cancel your infusion appointment it is YOUR responsibility to call us and reschedule.  Please call [phone] or [phone] to reschedule or cancel appointment

## 2025-03-05 DIAGNOSIS — M54.12 CERVICAL RADICULAR PAIN: Primary | ICD-10-CM

## 2025-03-05 NOTE — PROGRESS NOTES
Virtual or Telephone Consent    An interactive audio and video telecommunication system which permits real time communications between the patient (at the originating site) and provider (at the distant site) was utilized to provide this telehealth service.   Verbal consent was requested and obtained from Akin Lynn on this date, 03/06/25 for a telehealth visit and the patient's location was confirmed at the time of the visit.  This is 54 y.o.  male with who has been treated for Complex regional pain syndrome type 1 of left lower extremity . Pain is 80 % better for two weeks , but he was also doing the every three months for five days . Now is insurance pay for it , nasal ketamine  85 % helpful.The pain is described as achiness, constant, sharp, and stabbing Here for follow-up No chief complaint on file.      Pain Therapies: Cymbalta IV infusion nasal ketamine Lyrica        Patient had a syncopal episode and fell on his back and injured his back acute injury.  Currently under the evaluation of a cardiologist.

## 2025-03-06 ENCOUNTER — APPOINTMENT (OUTPATIENT)
Dept: CARDIOLOGY | Facility: CLINIC | Age: 55
End: 2025-03-06
Payer: COMMERCIAL

## 2025-03-06 ENCOUNTER — TELEMEDICINE (OUTPATIENT)
Dept: PAIN MEDICINE | Facility: CLINIC | Age: 55
End: 2025-03-06
Payer: COMMERCIAL

## 2025-03-06 VITALS
DIASTOLIC BLOOD PRESSURE: 88 MMHG | HEART RATE: 84 BPM | WEIGHT: 171 LBS | BODY MASS INDEX: 25.33 KG/M2 | OXYGEN SATURATION: 96 % | HEIGHT: 69 IN | SYSTOLIC BLOOD PRESSURE: 132 MMHG

## 2025-03-06 DIAGNOSIS — R94.31 ABNORMAL EKG: ICD-10-CM

## 2025-03-06 DIAGNOSIS — M54.12 CERVICAL RADICULAR PAIN: ICD-10-CM

## 2025-03-06 DIAGNOSIS — G89.29 CHRONIC LOW BACK PAIN WITHOUT SCIATICA, UNSPECIFIED BACK PAIN LATERALITY: ICD-10-CM

## 2025-03-06 DIAGNOSIS — M79.602 LEFT ARM PAIN: ICD-10-CM

## 2025-03-06 DIAGNOSIS — M19.90 ARTHRITIS: ICD-10-CM

## 2025-03-06 DIAGNOSIS — M54.6 THORACIC BACK PAIN, UNSPECIFIED BACK PAIN LATERALITY, UNSPECIFIED CHRONICITY: ICD-10-CM

## 2025-03-06 DIAGNOSIS — K21.9 GASTROESOPHAGEAL REFLUX DISEASE WITHOUT ESOPHAGITIS: ICD-10-CM

## 2025-03-06 DIAGNOSIS — R40.0 DAYTIME SLEEPINESS: ICD-10-CM

## 2025-03-06 DIAGNOSIS — M54.50 CHRONIC LOW BACK PAIN WITHOUT SCIATICA, UNSPECIFIED BACK PAIN LATERALITY: ICD-10-CM

## 2025-03-06 DIAGNOSIS — K31.84 GASTROPARESIS: ICD-10-CM

## 2025-03-06 DIAGNOSIS — I49.3 PVC (PREMATURE VENTRICULAR CONTRACTION): ICD-10-CM

## 2025-03-06 DIAGNOSIS — F31.9 BIPOLAR AFFECTIVE DISORDER, REMISSION STATUS UNSPECIFIED (MULTI): ICD-10-CM

## 2025-03-06 DIAGNOSIS — R91.1 LUNG NODULE: ICD-10-CM

## 2025-03-06 DIAGNOSIS — M47.816 LUMBAR SPONDYLOSIS: ICD-10-CM

## 2025-03-06 DIAGNOSIS — R00.2 PALPITATIONS: ICD-10-CM

## 2025-03-06 DIAGNOSIS — R29.818 SUSPECTED SLEEP APNEA: ICD-10-CM

## 2025-03-06 DIAGNOSIS — R55 SYNCOPE, UNSPECIFIED SYNCOPE TYPE: Primary | ICD-10-CM

## 2025-03-06 DIAGNOSIS — R53.83 FATIGUE, UNSPECIFIED TYPE: ICD-10-CM

## 2025-03-06 DIAGNOSIS — R79.89 ELEVATED TROPONIN I LEVEL: ICD-10-CM

## 2025-03-06 DIAGNOSIS — I45.10 INCOMPLETE RBBB: ICD-10-CM

## 2025-03-06 DIAGNOSIS — R07.9 CHEST PAIN, UNSPECIFIED TYPE: ICD-10-CM

## 2025-03-06 DIAGNOSIS — I25.10 CORONARY ARTERY DISEASE INVOLVING NATIVE CORONARY ARTERY OF NATIVE HEART WITHOUT ANGINA PECTORIS: ICD-10-CM

## 2025-03-06 DIAGNOSIS — Z96.641 S/P TOTAL RIGHT HIP ARTHROPLASTY: ICD-10-CM

## 2025-03-06 DIAGNOSIS — G90.522 COMPLEX REGIONAL PAIN SYNDROME TYPE 1 OF LEFT LOWER EXTREMITY: Primary | ICD-10-CM

## 2025-03-06 DIAGNOSIS — Z82.49 FAMILY HISTORY OF ISCHEMIC HEART DISEASE: ICD-10-CM

## 2025-03-06 DIAGNOSIS — E78.2 MIXED HYPERLIPIDEMIA: ICD-10-CM

## 2025-03-06 DIAGNOSIS — Z95.818 IMPLANTABLE LOOP RECORDER PRESENT: ICD-10-CM

## 2025-03-06 LAB
ATRIAL RATE: 79 BPM
P AXIS: 61 DEGREES
P OFFSET: 200 MS
P ONSET: 142 MS
PR INTERVAL: 160 MS
Q ONSET: 222 MS
QRS COUNT: 13 BEATS
QRS DURATION: 96 MS
QT INTERVAL: 378 MS
QTC CALCULATION(BAZETT): 433 MS
QTC FREDERICIA: 414 MS
R AXIS: 54 DEGREES
T AXIS: 60 DEGREES
T OFFSET: 411 MS
VENTRICULAR RATE: 79 BPM

## 2025-03-06 PROCEDURE — 99496 TRANSJ CARE MGMT HIGH F2F 7D: CPT | Performed by: INTERNAL MEDICINE

## 2025-03-06 PROCEDURE — 1036F TOBACCO NON-USER: CPT | Performed by: INTERNAL MEDICINE

## 2025-03-06 PROCEDURE — 3008F BODY MASS INDEX DOCD: CPT | Performed by: INTERNAL MEDICINE

## 2025-03-06 RX ORDER — METOPROLOL SUCCINATE 25 MG/1
25 TABLET, EXTENDED RELEASE ORAL DAILY
Qty: 90 TABLET | Refills: 3 | Status: SHIPPED | OUTPATIENT
Start: 2025-03-06 | End: 2026-03-06

## 2025-03-06 RX ORDER — ALBUTEROL SULFATE 0.83 MG/ML
3 SOLUTION RESPIRATORY (INHALATION) AS NEEDED
OUTPATIENT
Start: 2025-03-18

## 2025-03-06 RX ORDER — EZETIMIBE 10 MG/1
10 TABLET ORAL DAILY
Qty: 30 TABLET | Refills: 11 | Status: SHIPPED | OUTPATIENT
Start: 2025-03-06 | End: 2026-03-06

## 2025-03-06 RX ORDER — FAMOTIDINE 10 MG/ML
20 INJECTION, SOLUTION INTRAVENOUS ONCE AS NEEDED
OUTPATIENT
Start: 2025-03-18

## 2025-03-06 RX ORDER — ACETAMINOPHEN 500 MG
1000 TABLET ORAL 3 TIMES DAILY
Qty: 180 TABLET | Refills: 11 | Status: SHIPPED | OUTPATIENT
Start: 2025-03-06

## 2025-03-06 RX ORDER — EPINEPHRINE 0.3 MG/.3ML
0.3 INJECTION SUBCUTANEOUS EVERY 5 MIN PRN
OUTPATIENT
Start: 2025-03-18

## 2025-03-06 RX ORDER — LANOLIN ALCOHOL/MO/W.PET/CERES
400 CREAM (GRAM) TOPICAL DAILY
Qty: 90 TABLET | Refills: 3 | Status: SHIPPED | OUTPATIENT
Start: 2025-03-06 | End: 2026-03-06

## 2025-03-06 RX ORDER — TIZANIDINE HYDROCHLORIDE 4 MG/1
4 CAPSULE, GELATIN COATED ORAL 3 TIMES DAILY
COMMUNITY

## 2025-03-06 RX ORDER — DIPHENHYDRAMINE HYDROCHLORIDE 50 MG/ML
50 INJECTION INTRAMUSCULAR; INTRAVENOUS AS NEEDED
OUTPATIENT
Start: 2025-03-18

## 2025-03-06 RX ORDER — ATORVASTATIN CALCIUM 80 MG/1
80 TABLET, FILM COATED ORAL DAILY
Qty: 90 TABLET | Refills: 3 | Status: SHIPPED | OUTPATIENT
Start: 2025-03-06 | End: 2026-03-06

## 2025-03-06 ASSESSMENT — PAIN SCALES - GENERAL
PAINLEVEL_OUTOF10: 8

## 2025-03-06 ASSESSMENT — ENCOUNTER SYMPTOMS
RESPIRATORY NEGATIVE: 1
CARDIOVASCULAR NEGATIVE: 1
ENDOCRINE NEGATIVE: 1
EYES NEGATIVE: 1
DEPRESSION: 0
LOSS OF SENSATION IN FEET: 0
OCCASIONAL FEELINGS OF UNSTEADINESS: 0
ARTHRALGIAS: 1
NEUROLOGICAL NEGATIVE: 1
MYALGIAS: 1
HEMATOLOGIC/LYMPHATIC NEGATIVE: 1
NECK PAIN: 1
GASTROINTESTINAL NEGATIVE: 1
BACK PAIN: 1
PSYCHIATRIC NEGATIVE: 1

## 2025-03-06 ASSESSMENT — PAIN DESCRIPTION - DESCRIPTORS: DESCRIPTORS: ACHING;STABBING;SHARP

## 2025-03-06 ASSESSMENT — PAIN - FUNCTIONAL ASSESSMENT: PAIN_FUNCTIONAL_ASSESSMENT: 0-10

## 2025-03-06 NOTE — PROGRESS NOTES
CARDIOLOGY OFFICE NOTE     Date:   3/6/2025    Patient:    Akin Lynn    YOB: 1970    Primary Physician: Otoniel Lane MD       Reason for Visit: Recurrent syncope, TCM post hospitalization.       HPI:     Akin Lynn was seen in cardiac evaluation at the  Cardiology office March 6, 2025.      The patients problems are listed as in the impression below.    Electronic medical records reviewed.    Patient returns.  He has a history of syncope.  He saw Dr. Ga with loop recorder placed January 2025.  He has had 4 syncopal episodes since.  He had a cardiac catheterization recently in December 2024 noting 40% LAD only with normal LV function.  Echocardiogram January 2025 noted ejection fraction 55 to 60%.    He states that he overall he feels well.  He does have chronic pain and is taking over-the-counter pain medications.  He has periods of lightheadedness and palpitations.    His last episode this last week prompted evaluation in the emergency room at [facility] and then same day to [facility] emergency room.  He was not admitted.  All records are not available.  Troponins were negative.  Laboratory studies were negative.  CT scan of the chest was negative.    He states that he was in the bathroom.  He did take a shower.  He got dressed.  He  Do that he does on the floor and his girlfriend does try to get into the bathroom and bumped his head.  Paramedics came and took him to the hospital.  He did not lose any stool or urine.  He had no prodromal posterolateral symptomatology.    Here in the office he states that he feels well overall.  He is anxious somewhat.  Wants to know what is happening.  Had    Dr. Ga was kind enough to see him and tried to interrogate his event recorder.  Unfortunately, interrogation here in the office this was not properly connected to his cell phone and he may not be transmitting.  Dr. Ga was not able to be performed due to inability  to transfer data.    Patient denies Chest Pain, SOB, TIA or CVA symptoms.  No CHF or Edema.   No GI,  or Bleeding Issues. No Recent Fever or Chills.     Cardiovascular and general review of systems is otherwise negative.    A 14-system review is otherwise negative, other than noted.     PHYSICAL EXAMINATION:      Vitals:    03/06/25 1120   BP: 132/88   Pulse: 84   SpO2: 96%     General: No acute distress.  Alert and oriented.  Head And Neck Examination: No jugular venous distention, no carotid bruits, no mass. Carotid upstrokes preserved. Oral mucosa moist.  No xanthelasma. Head and neck examination otherwise unremarkable.  Lungs: Clear to auscultation and percussion. No wheezes, no rales,  and no rhonchi.  Chest: Excursion appeared to be normal. No chest wall tenderness on palpation.  Heart: Normal S1 and S2. No S3. No S4. No rub. Grade 1/6 systolic murmur, best heard at the left sternal border. Point of maximal impulse was within normal limits.  Abdomen: Soft. Nontender. No organomegaly. No bruits. No masses.   Extremities: No bipedal edema. No clubbing. No cyanosis.  Pulses are strong throughout. No bruits.  Musculoskeletal Exam: No ulcers, otherwise unremarkable.  Neuro: Neurologically appeared grossly intact.  .  IMPRESSION:    Patient  Syncope, recurrent  Lightheadedness.  Palpitations  Dyspnea on exertion  Fatigue  Hyperlipidemia  Abnormal resting electrocardiogram  Loop recorder implant 1/2025.  CAD with 40% LAD only 12/2024.  CT calcium score of 5, 11/2024.  Negative Lexiscan perfusion study for ischemia or MI, 11/2024.  Nonsustained ventricular tachycardia by cardiac monitor 10/2024  Incomplete right bundle branch block  Negative cardiac catheterization, [facility], 2016.  Normal LV systolic function, LVEF 60%, echocardiogram 4/2024 and 1/2025.  Aortic valve sclerosis without stenosis, echocardiogram 4/2024.  Chronic pain syndrome  Degenerative joint disease with diffuse arthralgias  Cervical and lumbar disc  "disease with probable radiculopathy.  GERD  History of aspiration pneumonia  Manic depression  Prior bilateral hip replacements  Prior bilateral rotator cuff surgery  Family history of coronary artery disease  Otherwise as per assessment below.    RECOMMENDATIONS:      Patient is above-noted history.  He does have recurrent syncope.  He has a loop recorder that is currently not transmitting.  Have advised him not to drive until cleared by electrophysiology and per his primary care physician.    He will continue his current medications.  Refills were provided.    Exercise dietary program.    Hydration.    EP follow-up with loop recorder interrogation as scheduled.    GetNotes portal use was encouraged.    We will plan to see back in 4 months with Laboratory Studies and ECG as ordered.     Patient will follow up with their primary physician for general care.    The patient knows to contact medical care earlier if need be.      ALLERGIES:     Allergies   Allergen Reactions    Morphine Hives, Rash and Shortness of breath     Tolerates hydromorphone    Tylenol 3      Tolerates hydromorphone    Ultram [Tramadol] Seizure    Alprazolam Psychosis     \"GOES CRAZY\", psychosis    Ketorolac Seizure    Fish Containing Products Hives and Itching     IT WAS A PROCESSED FISH MEAL, BUT STATES EATS FRESH AND FROZEN FISH ALL THE TIME.    Opioids - Morphine Analogues Rash        MEDICATIONS:     Current Outpatient Medications   Medication Instructions    acetaminophen (TYLENOL) 1,000 mg, oral, 3 times daily    albuterol (Ventolin HFA) 90 mcg/actuation inhaler 2 puffs, Every 4 hours PRN    atorvastatin (Lipitor) 80 mg tablet 1 tablet, Daily    baclofen (LIORESAL) 20 mg, oral, 3 times daily    celecoxib (CELEBREX) 200 mg, 2 times daily    diazePAM (VALIUM) 5 mg, 2 times daily PRN    docusate sodium (COLACE) 100 mg, oral, 2 times daily    DULoxetine (CYMBALTA) 90 mg, oral, Daily, Do not crush or chew.    ezetimibe (ZETIA) 10 mg, oral, Daily "    fluticasone (Flonase) 50 mcg/actuation nasal spray 1 spray, Daily PRN    lidocaine (Lidoderm) 5 % patch apply 2 patches to affected area daily as needed.    magnesium oxide (MAG-OX) 400 mg, oral, Daily    metoprolol succinate XL (TOPROL-XL) 25 mg, oral, Daily, Do not crush or chew.    omeprazole (PriLOSEC) 40 mg DR capsule 1 capsule, 2 times daily    oxyCODONE (ROXICODONE) 5 mg, 2 times daily PRN    pregabalin (LYRICA) 300 mg, oral, 2 times daily    sildenafil (VIAGRA) 100 mg, Daily PRN    tiZANidine (ZANAFLEX) 4 mg, 3 times daily       ELECTROCARDIOGRAM:      3/2/2025:  SR, icrbbb, NSTTs, rate 81    CARDIAC TESTING:      Loop Recorder, 3/6/2025:  Unable to interrogate in the office, per Dr. Ga.  System malfunction.    LABORATORY DATA:      CBC:   Lab Results   Component Value Date    WBC 6.4 03/02/2025    RBC 4.75 03/02/2025    HGB 13.6 03/02/2025    HCT 41.9 03/02/2025     03/02/2025        CMP:    Lab Results   Component Value Date     03/02/2025    K 4.7 03/02/2025     03/02/2025    CO2 25 03/02/2025    BUN 14 03/02/2025    CREATININE 0.76 03/02/2025    GLUCOSE 87 03/02/2025    CALCIUM 9.0 03/02/2025       Magnesium:    Lab Results   Component Value Date    MG 2.09 02/01/2025       Lipid Profile:    Lab Results   Component Value Date    CHOL 192 09/19/2024    TRIG 161 (H) 09/19/2024    HDL 33.7 09/19/2024    LDLCALC 126 (H) 09/19/2024       Hepatic Function Panel:    Lab Results   Component Value Date    ALKPHOS 66 03/02/2025    ALT 27 03/02/2025    AST 27 03/02/2025    PROT 6.3 (L) 03/02/2025    BILITOT 0.4 03/02/2025    BILIDIR 0.1 09/19/2024       TSH:    Lab Results   Component Value Date    TSH 0.61 09/19/2024       HgBA1c:    Lab Results   Component Value Date    HGBA1C 5.1 02/02/2025       BNP:   Lab Results   Component Value Date    BNP 82 01/26/2025        PT/INR:    Lab Results   Component Value Date    PROTIME 11.0 03/01/2025    INR 1.0 03/01/2025       Cardiac Enzymes:    Lab  Results   Component Value Date    TROPHS 26 (H) 03/02/2025    TROPHS 23 (H) 03/01/2025    TROPHS 24 (H) 03/01/2025                 PROBLEM LIST:     Patient Active Problem List   Diagnosis    Anxiety    Astigmatism, bilateral    Myopia of both eyes with astigmatism and presbyopia    Bilateral presbyopia    Cervical radicular pain    Chronic left-sided low back pain with left-sided sciatica    Chronic patellofemoral pain of left knee    Patellofemoral arthritis    Cubital tunnel syndrome on left    Cubital tunnel syndrome on right    Daytime sleepiness    Foot joint pain    History of psychological trauma    Joint pain    Myofascial pain    Lumbar spondylosis    Other spondylosis, cervical region    Metatarsalgia of left foot    Muscle spasm    Narcotic drug use    Reflex sympathetic dystrophy    Osteochondral defect of patella    Complex regional pain syndrome type 1 of left lower extremity    Scapular dyskinesis    Shoulder impingement    Suspected sleep apnea    Thoracic back pain    Arthritis    Atrophy of quadriceps femoris muscle    Erectile dysfunction    Guyon syndrome    Kidney cysts    Lung nodule    Primary osteoarthritis of left hip    Trauma and stressor-related disorder    Trochanteric bursitis of both hips    Status post left hip replacement    Chest pain, unspecified type    Troponin level elevated    Neck pain    Unilateral primary osteoarthritis, right hip    S/P total right hip arthroplasty    PVC (premature ventricular contraction)    Palpitations    Left arm pain    Incomplete RBBB    Family history of ischemic heart disease    Fatigue    Gastroesophageal reflux disease without esophagitis    Gastroparesis    Bipolar disorder    Abnormal EKG    Mixed hyperlipidemia    Elevated troponin I level    Syncope and collapse    Chronic back pain    Pneumothorax, traumatic    Fall             Ronni Dey MD, FACC   [facility] / NO /  Cardiology      Of Note:  [employer] voice recognition dictation  software was utilized partially in the preparation of this note, therefore, inaccuracies in spelling, word choice and punctuation may have occurred which were not recognized the time of signing.    Patient was seen and examined with total time of visit including chart preparation, rooming, and chart completion exceeding 40 minutes.      ----

## 2025-03-06 NOTE — PATIENT INSTRUCTIONS
Exercise diet weight loss program.    Hydrate    Use My Chart portal for reviewing records, testing and contacting office.     Follow up with TAO Vargas,  regarding Syncope and Loop recorder interrogations.  
rolling walker

## 2025-03-10 ENCOUNTER — HOSPITAL ENCOUNTER (OUTPATIENT)
Dept: CARDIOLOGY | Facility: CLINIC | Age: 55
Discharge: HOME | End: 2025-03-10
Payer: COMMERCIAL

## 2025-03-10 DIAGNOSIS — Z95.818 IMPLANTABLE LOOP RECORDER PRESENT: ICD-10-CM

## 2025-03-10 DIAGNOSIS — R55 SYNCOPE, UNSPECIFIED SYNCOPE TYPE: ICD-10-CM

## 2025-03-10 DIAGNOSIS — R00.2 PALPITATIONS: ICD-10-CM

## 2025-03-10 PROCEDURE — 93298 REM INTERROG DEV EVAL SCRMS: CPT

## 2025-03-11 ENCOUNTER — HOSPITAL ENCOUNTER (OUTPATIENT)
Dept: CARDIOLOGY | Facility: CLINIC | Age: 55
Discharge: HOME | End: 2025-03-11
Payer: COMMERCIAL

## 2025-03-11 DIAGNOSIS — R55 SYNCOPE, UNSPECIFIED SYNCOPE TYPE: ICD-10-CM

## 2025-03-11 DIAGNOSIS — Z95.818 IMPLANTABLE LOOP RECORDER PRESENT: ICD-10-CM

## 2025-03-11 DIAGNOSIS — R00.2 PALPITATIONS: ICD-10-CM

## 2025-03-12 ENCOUNTER — OFFICE VISIT (OUTPATIENT)
Dept: PAIN MEDICINE | Facility: CLINIC | Age: 55
End: 2025-03-12
Payer: COMMERCIAL

## 2025-03-12 VITALS — HEART RATE: 88 BPM | TEMPERATURE: 97.9 F | SYSTOLIC BLOOD PRESSURE: 175 MMHG | DIASTOLIC BLOOD PRESSURE: 94 MMHG

## 2025-03-12 DIAGNOSIS — G90.522 COMPLEX REGIONAL PAIN SYNDROME TYPE 1 OF LEFT LOWER EXTREMITY: Primary | ICD-10-CM

## 2025-03-12 DIAGNOSIS — M54.16 LUMBAR RADICULOPATHY: Primary | ICD-10-CM

## 2025-03-12 PROCEDURE — 99213 OFFICE O/P EST LOW 20 MIN: CPT

## 2025-03-12 RX ORDER — ALBUTEROL SULFATE 0.83 MG/ML
3 SOLUTION RESPIRATORY (INHALATION) AS NEEDED
OUTPATIENT
Start: 2025-03-18

## 2025-03-12 RX ORDER — FAMOTIDINE 10 MG/ML
20 INJECTION, SOLUTION INTRAVENOUS ONCE AS NEEDED
OUTPATIENT
Start: 2025-03-18

## 2025-03-12 RX ORDER — EPINEPHRINE 0.3 MG/.3ML
0.3 INJECTION SUBCUTANEOUS EVERY 5 MIN PRN
OUTPATIENT
Start: 2025-03-18

## 2025-03-12 RX ORDER — DIPHENHYDRAMINE HYDROCHLORIDE 50 MG/ML
50 INJECTION INTRAMUSCULAR; INTRAVENOUS AS NEEDED
OUTPATIENT
Start: 2025-03-18

## 2025-03-12 ASSESSMENT — PAIN SCALES - GENERAL: PAINLEVEL_OUTOF10: 6

## 2025-03-12 ASSESSMENT — PAIN DESCRIPTION - DESCRIPTORS: DESCRIPTORS: STABBING;SHOOTING;THROBBING

## 2025-03-12 ASSESSMENT — PAIN - FUNCTIONAL ASSESSMENT: PAIN_FUNCTIONAL_ASSESSMENT: 0-10

## 2025-03-12 NOTE — PROGRESS NOTES
Subjective   Patient ID: Akin Lynn is a 54 y.o. male who presents for Back Pain (Patient here to discuss pain options, having left sided back pain, states back pain is 6/10).  HPI    53 yo male presents today to discuss treatment options for low back pain. Noted that he is seeing multiple pain management providers for treatment. States that he only sees Dr. Alonzo for ketamine treatment and Dr. Vargas for cervical injections. He would like to continue with Dr. Abel for management of problems with his lumbar spine. Today he is reporting low back pain that radiates up the spine and down the spine through the hips. Reports numbness and tingling in the bilateral lower extremities. Characterizes as throbbing and stabbing pain. Rates it as 6/10. He has multiple cats and is reporting pain with crouching when cleaning the litter box. Using lidocaine patches. States that he has recently started taking kratom for pain. He is prescribed pregabalin 300mg twice daily by Dr. Macias.     Review of Systems  All 13 systems were reviewed and are within normal levels except as noted below or per HPI. Positive and pertinent negative responses are noted below or in the HPI   Denied any fever or chills. No weight loss and no night sweats. No cough or sputum production. No diarrhea   Reports occasional constipation with Kratom.    No bladder and bowel incontinence and no other changes in bladder and bowel. No skin changes. Reports tiredness and fatigability only if the pain is not controlled.   Denied opioids diversion and abuse and denies alcoholism. Denies overuse of the pain medications.      Objective   Physical Exam  General   Alert and oriented x4, pleasant and cooperative.      HEENT  Pupils are equal and normal in size. Ears, nose, mouth, and throat appear to be WNL.  Head atraumatic, symmetric.      No signs of sedation or signs of withdrawal apparent.     Psychiatric   No signs of depression apparent. Appropriate mood  and affect.     Neuro   No focal neurological deficit apparent. Ambulation at baseline. Normal visual inspection of lumbar spine. Lumbar paraspinals tender to palpation. Bilateral SI joints nontender to palpation.      Respiratory  No respiratory distress, respirations equal and unlabored.     Abdomen  Soft and nontender, no distention noted.     Skin  Warm, dry and intact. No skin markings supportive of recent IV drug usage .     Study Result    Narrative & Impression   Interpreted By:  Fritz Pearson,   STUDY:  MR LUMBAR SPINE WO IV CONTRAST; ;  11/11/2024 3:44 pm      INDICATION:  Signs/Symptoms:low back pain.      COMPARISON:  11/16/2022      ACCESSION NUMBER(S):  [id number]      ORDERING CLINICIAN:  VICKY PEREZ      TECHNIQUE:  Multiplanar multisequential MR images of the lumbar spine are  performed.      FINDINGS:  The lumbar vertebral body AP alignment is within normal limits. There  is slight dextro convexity of the lumbar spine. There is mild disc  desiccation L4-5 and L5-S1 and minimal disc space narrowing at L4-5.  This appearance is similar to the previous study. The lumbar  vertebral body heights are maintained. There is no acute bone marrow  edema.      The conus medullaris is of normal morphology and signal. The tip of  the conus descends to the L1 level.      Axial images are performed through the lumbar disc spaces from the  mid T12 level through S1. Exophytic cyst is incidentally noted at the  medial aspect of the mid upper right kidney. The cyst is increased in  size in the interval and now measures 4.5 x 4.1 cm in cross-section  with a thin peripheral septation.      The T12-L1 disc space level is unremarkable.      The L1-2 disc space level is unremarkable.      The L2-3 disc space level demonstrates minimal bilateral facet  arthrosis. There is no central canal or neural foraminal stenosis.      The L3-4 disc space level demonstrates minimal bilateral facet  arthrosis. There is no  central canal or neural foraminal stenosis.      The L4-5 disc space level demonstrates mild bilateral facet  arthrosis. There is mild bulging disc with prominent left lateral  component. There is no significant central canal narrowing. There is  slight disc encroachment on the caudal left neural foramen. There is  no significant neural foraminal stenosis.      The L5-S1 disc space level demonstrates mild bilateral facet  arthrosis. There is mild bulging disc with posterior annular tear to  the right of midline. There is mild disc encroachment on the caudal  neural foramen. There is no significant neural foraminal stenosis.      IMPRESSION:  Mild discogenic degenerative changes in the lower lumbar spine. There  is mild bulging disc at L4-5 and L5-S1 with posterior annular tear at  L5-S1 to the right of midline. There is no significant central canal  or neural foraminal stenosis of the lumbar spine.      No acute osseous abnormality.          MACRO:  None      Signed by: Fritz Pearson 11/12/2024 1:22 PM  Dictation workstation:   [id number]          Assessment/Plan     55 yo male with chronic low back pain associated with lumbar spondylosis and lumbar radiculopathy.      Advised patient that Kratom is not FDA approved and that I would not advise him to continue taking it, explained to patient that this medication can increase risk for overdose when combined with other controlled substances, I recommend stopping the Kratom. We did discuss consolidating pain management providers, he would like to continue with Dr. Abel treating his lumbar spine.     Schedule midline L4- L5 epidural steroid injection under fluoroscopic guidance.     Follow up as needed.     Explained plan to this patient, and patient verbalized understanding and agreement with the plan.     Please do not hesitate to contact the pain clinic after your visit with any questions or concerns at  M-F 8-4 pm     Patient was reminded not  to share medications, not to take prescription medications that were not prescribed to the patient, and not to increase or change dose without consulting the pain clinic. I advised the patient to always take the least amount of medication needed to keep symptoms under control.       Kayla Corona, MIKAELA-CNP 03/12/25 10:30 AM

## 2025-03-17 ENCOUNTER — HOSPITAL ENCOUNTER (OUTPATIENT)
Dept: CARDIOLOGY | Facility: CLINIC | Age: 55
Discharge: HOME | End: 2025-03-17
Payer: COMMERCIAL

## 2025-03-17 DIAGNOSIS — Z95.818 IMPLANTABLE LOOP RECORDER PRESENT: ICD-10-CM

## 2025-03-17 DIAGNOSIS — R00.2 PALPITATIONS: ICD-10-CM

## 2025-03-17 DIAGNOSIS — R55 SYNCOPE, UNSPECIFIED SYNCOPE TYPE: ICD-10-CM

## 2025-03-18 ENCOUNTER — INFUSION (OUTPATIENT)
Dept: INFUSION THERAPY | Facility: CLINIC | Age: 55
End: 2025-03-18
Payer: COMMERCIAL

## 2025-03-18 VITALS
HEART RATE: 67 BPM | RESPIRATION RATE: 16 BRPM | TEMPERATURE: 97.9 F | DIASTOLIC BLOOD PRESSURE: 84 MMHG | SYSTOLIC BLOOD PRESSURE: 128 MMHG | OXYGEN SATURATION: 98 %

## 2025-03-18 DIAGNOSIS — G90.522 COMPLEX REGIONAL PAIN SYNDROME TYPE 1 OF LEFT LOWER EXTREMITY: ICD-10-CM

## 2025-03-18 PROCEDURE — 2500000004 HC RX 250 GENERAL PHARMACY W/ HCPCS (ALT 636 FOR OP/ED): Performed by: NURSE PRACTITIONER

## 2025-03-18 PROCEDURE — 96365 THER/PROPH/DIAG IV INF INIT: CPT | Mod: INF

## 2025-03-18 PROCEDURE — 96368 THER/DIAG CONCURRENT INF: CPT | Mod: INF

## 2025-03-18 PROCEDURE — 96375 TX/PRO/DX INJ NEW DRUG ADDON: CPT | Mod: INF

## 2025-03-18 RX ORDER — METOCLOPRAMIDE HYDROCHLORIDE 5 MG/ML
10 INJECTION INTRAMUSCULAR; INTRAVENOUS ONCE
OUTPATIENT
Start: 2025-04-01 | End: 2025-04-01

## 2025-03-18 RX ORDER — EPINEPHRINE 0.3 MG/.3ML
0.3 INJECTION SUBCUTANEOUS EVERY 5 MIN PRN
OUTPATIENT
Start: 2025-04-01

## 2025-03-18 RX ORDER — ALBUTEROL SULFATE 0.83 MG/ML
3 SOLUTION RESPIRATORY (INHALATION) AS NEEDED
OUTPATIENT
Start: 2025-04-01

## 2025-03-18 RX ORDER — FAMOTIDINE 10 MG/ML
20 INJECTION, SOLUTION INTRAVENOUS ONCE AS NEEDED
OUTPATIENT
Start: 2025-04-01

## 2025-03-18 RX ORDER — DIPHENHYDRAMINE HYDROCHLORIDE 50 MG/ML
25 INJECTION, SOLUTION INTRAMUSCULAR; INTRAVENOUS ONCE
OUTPATIENT
Start: 2025-04-01 | End: 2025-04-01

## 2025-03-18 RX ORDER — METOPROLOL TARTRATE 25 MG/1
25 TABLET, FILM COATED ORAL ONCE
OUTPATIENT
Start: 2025-04-01 | End: 2025-04-01

## 2025-03-18 RX ORDER — KETAMINE HCL IN NACL, ISO-OSM 100MG/10ML
SYRINGE (ML) INJECTION ONCE
Status: COMPLETED | OUTPATIENT
Start: 2025-03-18 | End: 2025-03-18

## 2025-03-18 RX ORDER — ONDANSETRON HYDROCHLORIDE 2 MG/ML
4 INJECTION, SOLUTION INTRAVENOUS ONCE
Status: COMPLETED | OUTPATIENT
Start: 2025-03-18 | End: 2025-03-18

## 2025-03-18 RX ORDER — ONDANSETRON HYDROCHLORIDE 2 MG/ML
4 INJECTION, SOLUTION INTRAVENOUS ONCE
OUTPATIENT
Start: 2025-04-01 | End: 2025-04-01

## 2025-03-18 RX ORDER — NITROGLYCERIN 0.4 MG/1
0.4 TABLET SUBLINGUAL ONCE
OUTPATIENT
Start: 2025-04-01 | End: 2025-04-01

## 2025-03-18 RX ORDER — DIPHENHYDRAMINE HYDROCHLORIDE 50 MG/ML
50 INJECTION, SOLUTION INTRAMUSCULAR; INTRAVENOUS AS NEEDED
OUTPATIENT
Start: 2025-04-01

## 2025-03-18 RX ADMIN — PROPOFOL 100 MG: 10 INJECTION, EMULSION INTRAVENOUS at 13:06

## 2025-03-18 RX ADMIN — ONDANSETRON 4 MG: 2 INJECTION INTRAMUSCULAR; INTRAVENOUS at 13:30

## 2025-03-18 RX ADMIN — Medication: at 13:06

## 2025-03-18 ASSESSMENT — PAIN DESCRIPTION - DESCRIPTORS
DESCRIPTORS: BURNING;SHOOTING;STABBING;THROBBING
DESCRIPTORS: ACHING;BURNING;SHOOTING;STABBING

## 2025-03-18 ASSESSMENT — ENCOUNTER SYMPTOMS
OCCASIONAL FEELINGS OF UNSTEADINESS: 1
DEPRESSION: 0
LOSS OF SENSATION IN FEET: 1

## 2025-03-18 ASSESSMENT — PAIN - FUNCTIONAL ASSESSMENT
PAIN_FUNCTIONAL_ASSESSMENT: 0-10
PAIN_FUNCTIONAL_ASSESSMENT: 0-10

## 2025-03-18 ASSESSMENT — PAIN SCALES - GENERAL
PAINLEVEL_OUTOF10: 6
PAINLEVEL_OUTOF10: 6

## 2025-03-18 NOTE — PROGRESS NOTES
S: Patient here for #23 opioid sparing pain infusion. Patient reports 80% reduction in pain after last infusion that lasted 10 days.    Purpose of pain infusion meds explained along with potential side effects.  Patient verbalized understanding.    B: Pain Issues. Is Patient breast feeding: N/A    A: Patient currently has pain described on flow sheet documentation. Designated  is Charmayne. Patient last ate solid food >12 hours ago, and had liquid >4 hours ago.    R: Plan; Obtain IV access, do patient risk assessment, and start opioid sparing infusion as ordered. Monitoring for S/S of adverse reactions.

## 2025-03-18 NOTE — PROGRESS NOTES
"Post infusion teaching provided. Patient verbalized understanding. VSS, Patient states pain is 6/10 unchanged pain \"iliac crest\" left side pt pointing to upper hip area.     Patient tolerated pain infusion well without difficulty except for mild nausea relieved with PRN Zofran.  "

## 2025-03-18 NOTE — PATIENT INSTRUCTIONS
[facility]      Pain Infusion Aftercare Instructions      1. It is normal to feel sedated, tired and low in energy after a pain infusion. DO NOT DRIVE, OPERATE ANY MACHINERY, OR MAKE ANY IMPORTANT DECISIONS FOR AT LEAST 24 HOURS AFTER THE INFUSION.     2. Call the pain center at [phone] with any problems, questions, or concerns.     3. Eat light after the infusion. If you feel queasy or sick to your stomach, laying down with your eyes closed may help. When you resume eating start with something mild like clear liquids, yogurt, applesauce, crackers, etc… Gradually advance to a regular diet.     4. Do not leave your house alone the evening of your pain infusion.     5. No alcohol or sedative medications, such as sleeping pills, for 24 hours after your pain infusion.     6. Resume all other prescribed medications unless directed otherwise by you physician.     7. If you have any medical emergencies, call 911 or go directly to the closest emergency room.Today :We administered ketamine 30 mg-lidocaine 300 mg and propofol (Diprivan).     For:   1. Complex regional pain syndrome type 1 of left lower extremity         Your next appointment is due in:  pain infusion        Please read the  Medication Guide that was given to you and reviewed during todays visit.     (Tell all doctors including dentists that you are taking this medication)     Go to the emergency room or call 911 if:  -You have signs of allergic reaction:   -Rash, hives, itching.   -Swollen, blistered, peeling skin.   -Swelling of face, lips, mouth, tongue or throat.   -Tightness of chest, trouble breathing, swallowing or talking     Call your doctor:  - If IV / injection site gets red, warm, swollen, itchy or leaks fluid or pus.     (Leave dressing on your IV site for at least 2 hours and keep area clean and dry  - If you get sick or have symptoms of infection or are not feeling well for any reason.    (Wash your hands  often, stay away from people who are sick)  - If you have side effects from your medication that do not go away or are bothersome.     (Refer to the teaching your nurse gave you for side effects to call your doctor about)    - Common side effects may include:  stuffy nose, headache, feeling tired, muscle aches, upset stomach  - Before receiving any vaccines     - Call the [facility] at   If:  - You get sick, are on antibiotics, have had a recent vaccine, have surgery or dental work and your doctor wants your visit rescheduled.  - You need to cancel and reschedule your visit for any reason. Call at least 2 days before your visit if you need to cancel.   - Your insurance changes before your next visit.    (We will need to get approval from your new insurance. This can take up to two weeks.)     The [facility] is opened Monday thru Friday. We are closed on weekends and holidays.   Voice mail will take your call if the center is closed. If you leave a message please allow 24 hours for a call back during weekdays. If you leave a message on a weekend/holiday, we will call you back the next business day.    A pharmacist is available Monday - Friday from 8:30AM to 3:30PM to help answer any questions you may have about your prescriptions(s). Please call pharmacy at:    [facility]: [phone]  [facility]: [phone]  [facility]: [phone]        Patient Instructions  IV Infusion  [facility]  1. A responsible adult must stay with you during your infusion and drive you home. If you do not have a responsible adult with transportation home, your appointment will be rescheduled. Patients must still have a responsible adult if they use Rideshare, Uber, or Lyft. Uber, Rideshare, or Lyft drivers do not qualify.   2. On the day of your infusion we ask that you please drink clear liquids until your appointment.  You should NOT eat food 4 hours  before your infusion.    3. Take all medications as prescribed before your infusion. Do not withhold blood pressure medication.   4. Patients who use a CPAP or BIPAP should bring it to the infusion appointment.   5. Children under 16 will not be permitted in the infusion clinic. Please do not bring young children or pets. For patients who must bring a service animal, paperwork will be required. NO EXCEPTIONS.  6.  All Women will be required to take a pregnancy test prior to the infusion unless they are above 55 years of age or have had a hysterectomy.   7. Patients who cancel their infusion should call the Infusion line at [phone] as soon as possible. We would appreciate a 48-hour notice. Please be on time for the appt. Patients who are more than 15 mins late will have to cancel and reschedule. Infusion appt times are minimal. Patients who do not show, cancel, or reschedule an appointment may have a long wait until we can get them back on the schedule.   8. We make every effort to schedule your infusions based on your physician's recommendations. However, factors will affect our infusion schedule and availability. We appreciate your understanding.  9. Appointments will only be scheduled for two appointments in the future.   10. No eating, drinking, or chewing gum during the infusion.   11. If you miss or cancel your infusion appointment it is YOUR responsibility to call us and reschedule.  Please call [phone] or [phone] to reschedule or cancel appointment

## 2025-03-20 ENCOUNTER — APPOINTMENT (OUTPATIENT)
Dept: ORTHOPEDIC SURGERY | Facility: CLINIC | Age: 55
End: 2025-03-20
Payer: COMMERCIAL

## 2025-03-24 ENCOUNTER — HOSPITAL ENCOUNTER (OUTPATIENT)
Dept: CARDIOLOGY | Facility: CLINIC | Age: 55
Discharge: HOME | End: 2025-03-24
Payer: COMMERCIAL

## 2025-03-24 DIAGNOSIS — R00.2 PALPITATIONS: ICD-10-CM

## 2025-03-24 DIAGNOSIS — Z95.818 IMPLANTABLE LOOP RECORDER PRESENT: ICD-10-CM

## 2025-03-24 DIAGNOSIS — R55 SYNCOPE, UNSPECIFIED SYNCOPE TYPE: ICD-10-CM

## 2025-03-25 ENCOUNTER — HOSPITAL ENCOUNTER (OUTPATIENT)
Dept: CARDIOLOGY | Facility: CLINIC | Age: 55
Discharge: HOME | End: 2025-03-25
Payer: COMMERCIAL

## 2025-03-25 DIAGNOSIS — R55 SYNCOPE, UNSPECIFIED SYNCOPE TYPE: ICD-10-CM

## 2025-03-25 DIAGNOSIS — R00.2 PALPITATIONS: ICD-10-CM

## 2025-03-25 DIAGNOSIS — Z95.818 IMPLANTABLE LOOP RECORDER PRESENT: ICD-10-CM

## 2025-03-26 ENCOUNTER — HOSPITAL ENCOUNTER (OUTPATIENT)
Dept: CARDIOLOGY | Facility: CLINIC | Age: 55
Discharge: HOME | End: 2025-03-26
Payer: COMMERCIAL

## 2025-03-26 ENCOUNTER — APPOINTMENT (OUTPATIENT)
Dept: RADIOLOGY | Facility: HOSPITAL | Age: 55
End: 2025-03-26
Payer: COMMERCIAL

## 2025-03-26 ENCOUNTER — APPOINTMENT (OUTPATIENT)
Dept: CARDIOLOGY | Facility: HOSPITAL | Age: 55
End: 2025-03-26
Payer: COMMERCIAL

## 2025-03-26 ENCOUNTER — HOSPITAL ENCOUNTER (OUTPATIENT)
Facility: HOSPITAL | Age: 55
Setting detail: OBSERVATION
Discharge: LEFT BEFORE DISCHARGE | End: 2025-03-27
Attending: EMERGENCY MEDICINE | Admitting: INTERNAL MEDICINE
Payer: COMMERCIAL

## 2025-03-26 DIAGNOSIS — R55 SYNCOPE, UNSPECIFIED SYNCOPE TYPE: ICD-10-CM

## 2025-03-26 DIAGNOSIS — Z95.818 IMPLANTABLE LOOP RECORDER PRESENT: ICD-10-CM

## 2025-03-26 DIAGNOSIS — R00.2 PALPITATIONS: ICD-10-CM

## 2025-03-26 DIAGNOSIS — G89.29 CHRONIC LOW BACK PAIN, UNSPECIFIED BACK PAIN LATERALITY, UNSPECIFIED WHETHER SCIATICA PRESENT: Primary | ICD-10-CM

## 2025-03-26 DIAGNOSIS — M54.50 CHRONIC LOW BACK PAIN, UNSPECIFIED BACK PAIN LATERALITY, UNSPECIFIED WHETHER SCIATICA PRESENT: Primary | ICD-10-CM

## 2025-03-26 PROBLEM — R26.2 AMBULATORY DYSFUNCTION: Status: ACTIVE | Noted: 2025-03-26

## 2025-03-26 LAB
ALBUMIN SERPL BCP-MCNC: 4.6 G/DL (ref 3.4–5)
ALP SERPL-CCNC: 66 U/L (ref 33–120)
ALT SERPL W P-5'-P-CCNC: 16 U/L (ref 10–52)
ANION GAP SERPL CALC-SCNC: 15 MMOL/L (ref 10–20)
AST SERPL W P-5'-P-CCNC: 17 U/L (ref 9–39)
BASOPHILS # BLD AUTO: 0.1 X10*3/UL (ref 0–0.1)
BASOPHILS NFR BLD AUTO: 1.3 %
BILIRUB SERPL-MCNC: 0.6 MG/DL (ref 0–1.2)
BNP SERPL-MCNC: 6 PG/ML (ref 0–99)
BUN SERPL-MCNC: 22 MG/DL (ref 6–23)
CALCIUM SERPL-MCNC: 8.9 MG/DL (ref 8.6–10.3)
CARDIAC TROPONIN I PNL SERPL HS: 20 NG/L (ref 0–20)
CARDIAC TROPONIN I PNL SERPL HS: 21 NG/L (ref 0–20)
CHLORIDE SERPL-SCNC: 105 MMOL/L (ref 98–107)
CO2 SERPL-SCNC: 22 MMOL/L (ref 21–32)
CREAT SERPL-MCNC: 0.77 MG/DL (ref 0.5–1.3)
EGFRCR SERPLBLD CKD-EPI 2021: >90 ML/MIN/1.73M*2
EOSINOPHIL # BLD AUTO: 0.74 X10*3/UL (ref 0–0.7)
EOSINOPHIL NFR BLD AUTO: 9.7 %
ERYTHROCYTE [DISTWIDTH] IN BLOOD BY AUTOMATED COUNT: 14 % (ref 11.5–14.5)
GLUCOSE SERPL-MCNC: 76 MG/DL (ref 74–99)
HCT VFR BLD AUTO: 48.6 % (ref 41–52)
HGB BLD-MCNC: 15.4 G/DL (ref 13.5–17.5)
IMM GRANULOCYTES # BLD AUTO: 0.02 X10*3/UL (ref 0–0.7)
IMM GRANULOCYTES NFR BLD AUTO: 0.3 % (ref 0–0.9)
LYMPHOCYTES # BLD AUTO: 1.72 X10*3/UL (ref 1.2–4.8)
LYMPHOCYTES NFR BLD AUTO: 22.5 %
MAGNESIUM SERPL-MCNC: 2.13 MG/DL (ref 1.6–2.4)
MCH RBC QN AUTO: 28.6 PG (ref 26–34)
MCHC RBC AUTO-ENTMCNC: 31.7 G/DL (ref 32–36)
MCV RBC AUTO: 90 FL (ref 80–100)
MONOCYTES # BLD AUTO: 0.89 X10*3/UL (ref 0.1–1)
MONOCYTES NFR BLD AUTO: 11.6 %
NEUTROPHILS # BLD AUTO: 4.19 X10*3/UL (ref 1.2–7.7)
NEUTROPHILS NFR BLD AUTO: 54.6 %
NRBC BLD-RTO: 0 /100 WBCS (ref 0–0)
PLATELET # BLD AUTO: 261 X10*3/UL (ref 150–450)
POTASSIUM SERPL-SCNC: 4.1 MMOL/L (ref 3.5–5.3)
PROT SERPL-MCNC: 7.4 G/DL (ref 6.4–8.2)
RBC # BLD AUTO: 5.39 X10*6/UL (ref 4.5–5.9)
SODIUM SERPL-SCNC: 138 MMOL/L (ref 136–145)
WBC # BLD AUTO: 7.7 X10*3/UL (ref 4.4–11.3)

## 2025-03-26 PROCEDURE — 80053 COMPREHEN METABOLIC PANEL: CPT | Performed by: STUDENT IN AN ORGANIZED HEALTH CARE EDUCATION/TRAINING PROGRAM

## 2025-03-26 PROCEDURE — 2500000004 HC RX 250 GENERAL PHARMACY W/ HCPCS (ALT 636 FOR OP/ED)

## 2025-03-26 PROCEDURE — 85025 COMPLETE CBC W/AUTO DIFF WBC: CPT | Performed by: STUDENT IN AN ORGANIZED HEALTH CARE EDUCATION/TRAINING PROGRAM

## 2025-03-26 PROCEDURE — 96374 THER/PROPH/DIAG INJ IV PUSH: CPT

## 2025-03-26 PROCEDURE — 84484 ASSAY OF TROPONIN QUANT: CPT

## 2025-03-26 PROCEDURE — 36415 COLL VENOUS BLD VENIPUNCTURE: CPT

## 2025-03-26 PROCEDURE — 83735 ASSAY OF MAGNESIUM: CPT

## 2025-03-26 PROCEDURE — 36415 COLL VENOUS BLD VENIPUNCTURE: CPT | Performed by: STUDENT IN AN ORGANIZED HEALTH CARE EDUCATION/TRAINING PROGRAM

## 2025-03-26 PROCEDURE — 2500000001 HC RX 250 WO HCPCS SELF ADMINISTERED DRUGS (ALT 637 FOR MEDICARE OP)

## 2025-03-26 PROCEDURE — 2500000005 HC RX 250 GENERAL PHARMACY W/O HCPCS

## 2025-03-26 PROCEDURE — 72100 X-RAY EXAM L-S SPINE 2/3 VWS: CPT | Performed by: RADIOLOGY

## 2025-03-26 PROCEDURE — 99285 EMERGENCY DEPT VISIT HI MDM: CPT | Performed by: EMERGENCY MEDICINE

## 2025-03-26 PROCEDURE — 99285 EMERGENCY DEPT VISIT HI MDM: CPT | Mod: 25 | Performed by: EMERGENCY MEDICINE

## 2025-03-26 PROCEDURE — 72100 X-RAY EXAM L-S SPINE 2/3 VWS: CPT

## 2025-03-26 PROCEDURE — 93005 ELECTROCARDIOGRAM TRACING: CPT

## 2025-03-26 PROCEDURE — G0378 HOSPITAL OBSERVATION PER HR: HCPCS

## 2025-03-26 PROCEDURE — 83880 ASSAY OF NATRIURETIC PEPTIDE: CPT

## 2025-03-26 RX ORDER — LIDOCAINE 560 MG/1
2 PATCH PERCUTANEOUS; TOPICAL; TRANSDERMAL DAILY
Status: DISCONTINUED | OUTPATIENT
Start: 2025-03-27 | End: 2025-03-27 | Stop reason: HOSPADM

## 2025-03-26 RX ORDER — ACETAMINOPHEN 325 MG/1
650 TABLET ORAL EVERY 4 HOURS PRN
Status: DISCONTINUED | OUTPATIENT
Start: 2025-03-26 | End: 2025-03-27

## 2025-03-26 RX ORDER — METHOCARBAMOL 100 MG/ML
1000 INJECTION, SOLUTION INTRAMUSCULAR; INTRAVENOUS EVERY 8 HOURS SCHEDULED
Status: DISCONTINUED | OUTPATIENT
Start: 2025-03-27 | End: 2025-03-27

## 2025-03-26 RX ORDER — OXYCODONE HYDROCHLORIDE 5 MG/1
5 TABLET ORAL 2 TIMES DAILY PRN
Status: DISCONTINUED | OUTPATIENT
Start: 2025-03-26 | End: 2025-03-27

## 2025-03-26 RX ORDER — OXYCODONE HYDROCHLORIDE 5 MG/1
5 TABLET ORAL ONCE
Status: COMPLETED | OUTPATIENT
Start: 2025-03-26 | End: 2025-03-26

## 2025-03-26 RX ORDER — POLYETHYLENE GLYCOL 3350 17 G/17G
17 POWDER, FOR SOLUTION ORAL DAILY PRN
Status: DISCONTINUED | OUTPATIENT
Start: 2025-03-26 | End: 2025-03-27 | Stop reason: HOSPADM

## 2025-03-26 RX ORDER — LIDOCAINE 560 MG/1
1 PATCH PERCUTANEOUS; TOPICAL; TRANSDERMAL ONCE
Status: COMPLETED | OUTPATIENT
Start: 2025-03-26 | End: 2025-03-27

## 2025-03-26 RX ORDER — METHOCARBAMOL 100 MG/ML
1500 INJECTION, SOLUTION INTRAMUSCULAR; INTRAVENOUS ONCE
Status: COMPLETED | OUTPATIENT
Start: 2025-03-26 | End: 2025-03-26

## 2025-03-26 RX ORDER — ACETAMINOPHEN 325 MG/1
975 TABLET ORAL ONCE
Status: COMPLETED | OUTPATIENT
Start: 2025-03-26 | End: 2025-03-26

## 2025-03-26 RX ORDER — TALC
3 POWDER (GRAM) TOPICAL NIGHTLY PRN
Status: DISCONTINUED | OUTPATIENT
Start: 2025-03-26 | End: 2025-03-27 | Stop reason: HOSPADM

## 2025-03-26 RX ADMIN — OXYCODONE HYDROCHLORIDE 5 MG: 5 TABLET ORAL at 18:14

## 2025-03-26 RX ADMIN — METHOCARBAMOL 1500 MG: 1000 INJECTION, SOLUTION INTRAMUSCULAR; INTRAVENOUS at 19:49

## 2025-03-26 RX ADMIN — OXYCODONE HYDROCHLORIDE 5 MG: 5 TABLET ORAL at 23:03

## 2025-03-26 RX ADMIN — ACETAMINOPHEN 975 MG: 325 TABLET ORAL at 18:14

## 2025-03-26 RX ADMIN — LIDOCAINE 4% 1 PATCH: 40 PATCH TOPICAL at 18:14

## 2025-03-26 SDOH — SOCIAL STABILITY: SOCIAL INSECURITY: HAVE YOU HAD ANY THOUGHTS OF HARMING ANYONE ELSE?: NO

## 2025-03-26 SDOH — ECONOMIC STABILITY: FOOD INSECURITY: WITHIN THE PAST 12 MONTHS, THE FOOD YOU BOUGHT JUST DIDN'T LAST AND YOU DIDN'T HAVE MONEY TO GET MORE.: NEVER TRUE

## 2025-03-26 SDOH — SOCIAL STABILITY: SOCIAL NETWORK: HOW OFTEN DO YOU GET TOGETHER WITH FRIENDS OR RELATIVES?: ONCE A WEEK

## 2025-03-26 SDOH — SOCIAL STABILITY: SOCIAL INSECURITY: WITHIN THE LAST YEAR, HAVE YOU BEEN AFRAID OF YOUR PARTNER OR EX-PARTNER?: NO

## 2025-03-26 SDOH — ECONOMIC STABILITY: HOUSING INSECURITY: AT ANY TIME IN THE PAST 12 MONTHS, WERE YOU HOMELESS OR LIVING IN A SHELTER (INCLUDING NOW)?: NO

## 2025-03-26 SDOH — SOCIAL STABILITY: SOCIAL NETWORK
DO YOU BELONG TO ANY CLUBS OR ORGANIZATIONS SUCH AS CHURCH GROUPS, UNIONS, FRATERNAL OR ATHLETIC GROUPS, OR SCHOOL GROUPS?: NO

## 2025-03-26 SDOH — HEALTH STABILITY: PHYSICAL HEALTH: ON AVERAGE, HOW MANY MINUTES DO YOU ENGAGE IN EXERCISE AT THIS LEVEL?: 30 MIN

## 2025-03-26 SDOH — ECONOMIC STABILITY: TRANSPORTATION INSECURITY: IN THE PAST 12 MONTHS, HAS LACK OF TRANSPORTATION KEPT YOU FROM MEDICAL APPOINTMENTS OR FROM GETTING MEDICATIONS?: NO

## 2025-03-26 SDOH — HEALTH STABILITY: MENTAL HEALTH
DO YOU FEEL STRESS - TENSE, RESTLESS, NERVOUS, OR ANXIOUS, OR UNABLE TO SLEEP AT NIGHT BECAUSE YOUR MIND IS TROUBLED ALL THE TIME - THESE DAYS?: ONLY A LITTLE

## 2025-03-26 SDOH — SOCIAL STABILITY: SOCIAL INSECURITY: HAVE YOU HAD THOUGHTS OF HARMING ANYONE ELSE?: NO

## 2025-03-26 SDOH — SOCIAL STABILITY: SOCIAL INSECURITY: HAS ANYONE EVER THREATENED TO HURT YOUR FAMILY OR YOUR PETS?: NO

## 2025-03-26 SDOH — ECONOMIC STABILITY: FOOD INSECURITY: HOW HARD IS IT FOR YOU TO PAY FOR THE VERY BASICS LIKE FOOD, HOUSING, MEDICAL CARE, AND HEATING?: NOT HARD AT ALL

## 2025-03-26 SDOH — SOCIAL STABILITY: SOCIAL INSECURITY: DO YOU FEEL UNSAFE GOING BACK TO THE PLACE WHERE YOU ARE LIVING?: NO

## 2025-03-26 SDOH — ECONOMIC STABILITY: INCOME INSECURITY: IN THE PAST 12 MONTHS HAS THE ELECTRIC, GAS, OIL, OR WATER COMPANY THREATENED TO SHUT OFF SERVICES IN YOUR HOME?: NO

## 2025-03-26 SDOH — SOCIAL STABILITY: SOCIAL INSECURITY: ABUSE: ADULT

## 2025-03-26 SDOH — ECONOMIC STABILITY: HOUSING INSECURITY: IN THE LAST 12 MONTHS, WAS THERE A TIME WHEN YOU WERE NOT ABLE TO PAY THE MORTGAGE OR RENT ON TIME?: NO

## 2025-03-26 SDOH — SOCIAL STABILITY: SOCIAL INSECURITY: WITHIN THE LAST YEAR, HAVE YOU BEEN HUMILIATED OR EMOTIONALLY ABUSED IN OTHER WAYS BY YOUR PARTNER OR EX-PARTNER?: NO

## 2025-03-26 SDOH — SOCIAL STABILITY: SOCIAL INSECURITY: DO YOU FEEL ANYONE HAS EXPLOITED OR TAKEN ADVANTAGE OF YOU FINANCIALLY OR OF YOUR PERSONAL PROPERTY?: NO

## 2025-03-26 SDOH — HEALTH STABILITY: PHYSICAL HEALTH: ON AVERAGE, HOW MANY DAYS PER WEEK DO YOU ENGAGE IN MODERATE TO STRENUOUS EXERCISE (LIKE A BRISK WALK)?: 5 DAYS

## 2025-03-26 SDOH — SOCIAL STABILITY: SOCIAL NETWORK: HOW OFTEN DO YOU ATTEND MEETINGS OF THE CLUBS OR ORGANIZATIONS YOU BELONG TO?: NEVER

## 2025-03-26 SDOH — SOCIAL STABILITY: SOCIAL INSECURITY: ARE YOU MARRIED, WIDOWED, DIVORCED, SEPARATED, NEVER MARRIED, OR LIVING WITH A PARTNER?: LIVING WITH PARTNER

## 2025-03-26 SDOH — ECONOMIC STABILITY: FOOD INSECURITY: WITHIN THE PAST 12 MONTHS, YOU WORRIED THAT YOUR FOOD WOULD RUN OUT BEFORE YOU GOT THE MONEY TO BUY MORE.: NEVER TRUE

## 2025-03-26 SDOH — SOCIAL STABILITY: SOCIAL INSECURITY: DOES ANYONE TRY TO KEEP YOU FROM HAVING/CONTACTING OTHER FRIENDS OR DOING THINGS OUTSIDE YOUR HOME?: NO

## 2025-03-26 SDOH — SOCIAL STABILITY: SOCIAL INSECURITY: ARE YOU OR HAVE YOU BEEN THREATENED OR ABUSED PHYSICALLY, EMOTIONALLY, OR SEXUALLY BY ANYONE?: NO

## 2025-03-26 SDOH — ECONOMIC STABILITY: HOUSING INSECURITY: IN THE PAST 12 MONTHS, HOW MANY TIMES HAVE YOU MOVED WHERE YOU WERE LIVING?: 0

## 2025-03-26 SDOH — SOCIAL STABILITY: SOCIAL INSECURITY: WERE YOU ABLE TO COMPLETE ALL THE BEHAVIORAL HEALTH SCREENINGS?: YES

## 2025-03-26 SDOH — SOCIAL STABILITY: SOCIAL NETWORK: HOW OFTEN DO YOU ATTEND CHURCH OR RELIGIOUS SERVICES?: NEVER

## 2025-03-26 SDOH — SOCIAL STABILITY: SOCIAL INSECURITY: ARE THERE ANY APPARENT SIGNS OF INJURIES/BEHAVIORS THAT COULD BE RELATED TO ABUSE/NEGLECT?: NO

## 2025-03-26 ASSESSMENT — LIFESTYLE VARIABLES
SUBSTANCE_ABUSE_PAST_12_MONTHS: NO
PRESCIPTION_ABUSE_PAST_12_MONTHS: NO
SKIP TO QUESTIONS 9-10: 1
HAVE PEOPLE ANNOYED YOU BY CRITICIZING YOUR DRINKING: NO
HOW OFTEN DO YOU HAVE 6 OR MORE DRINKS ON ONE OCCASION: NEVER
AUDIT-C TOTAL SCORE: 0
TOTAL SCORE: 0
HOW OFTEN DO YOU HAVE A DRINK CONTAINING ALCOHOL: NEVER
EVER FELT BAD OR GUILTY ABOUT YOUR DRINKING: NO
HOW MANY STANDARD DRINKS CONTAINING ALCOHOL DO YOU HAVE ON A TYPICAL DAY: PATIENT DOES NOT DRINK
EVER HAD A DRINK FIRST THING IN THE MORNING TO STEADY YOUR NERVES TO GET RID OF A HANGOVER: NO
HAVE YOU EVER FELT YOU SHOULD CUT DOWN ON YOUR DRINKING: NO
AUDIT-C TOTAL SCORE: 0

## 2025-03-26 ASSESSMENT — PAIN DESCRIPTION - PAIN TYPE: TYPE: CHRONIC PAIN

## 2025-03-26 ASSESSMENT — COGNITIVE AND FUNCTIONAL STATUS - GENERAL
EATING MEALS: A LITTLE
MOBILITY SCORE: 12
TURNING FROM BACK TO SIDE WHILE IN FLAT BAD: A LOT
DRESSING REGULAR LOWER BODY CLOTHING: A LOT
CLIMB 3 TO 5 STEPS WITH RAILING: A LOT
MOVING TO AND FROM BED TO CHAIR: A LOT
DRESSING REGULAR UPPER BODY CLOTHING: A LOT
PERSONAL GROOMING: A LOT
MOVING FROM LYING ON BACK TO SITTING ON SIDE OF FLAT BED WITH BEDRAILS: A LOT
DAILY ACTIVITIY SCORE: 13
STANDING UP FROM CHAIR USING ARMS: A LOT
TOILETING: A LOT
WALKING IN HOSPITAL ROOM: A LOT
PATIENT BASELINE BEDBOUND: NO
HELP NEEDED FOR BATHING: A LOT

## 2025-03-26 ASSESSMENT — ACTIVITIES OF DAILY LIVING (ADL)
HEARING - LEFT EAR: FUNCTIONAL
ADEQUATE_TO_COMPLETE_ADL: YES
GROOMING: INDEPENDENT
FEEDING YOURSELF: INDEPENDENT
HEARING - RIGHT EAR: FUNCTIONAL
BATHING: INDEPENDENT
PATIENT'S MEMORY ADEQUATE TO SAFELY COMPLETE DAILY ACTIVITIES?: YES
ASSISTIVE_DEVICE: EYEGLASSES
DRESSING YOURSELF: INDEPENDENT
JUDGMENT_ADEQUATE_SAFELY_COMPLETE_DAILY_ACTIVITIES: YES
TOILETING: INDEPENDENT
LACK_OF_TRANSPORTATION: NO
WALKS IN HOME: INDEPENDENT

## 2025-03-26 ASSESSMENT — PAIN - FUNCTIONAL ASSESSMENT
PAIN_FUNCTIONAL_ASSESSMENT: 0-10
PAIN_FUNCTIONAL_ASSESSMENT: 0-10

## 2025-03-26 ASSESSMENT — PAIN DESCRIPTION - ORIENTATION
ORIENTATION: LOWER
ORIENTATION: LOWER

## 2025-03-26 ASSESSMENT — PAIN DESCRIPTION - LOCATION
LOCATION: BACK

## 2025-03-26 ASSESSMENT — PAIN SCALES - GENERAL
PAINLEVEL_OUTOF10: 10 - WORST POSSIBLE PAIN
PAINLEVEL_OUTOF10: 10 - WORST POSSIBLE PAIN
PAINLEVEL_OUTOF10: 0 - NO PAIN
PAINLEVEL_OUTOF10: 7

## 2025-03-26 NOTE — ED PROVIDER NOTES
Emergency Department Provider Note        History of Present Illness     History provided by: Patient  Limitations to History: None  External Records Reviewed with Brief Summary: None    HPI:  Akin Lynn is a 55 y.o. male with history of syncopal episode, bipolar, GERD, mixed hyperlipidemia, incomplete right bundle branch block with loop recorder implanted 2025, who presents to the ED after 3 syncopal episode.  Patient reports that saw and chiropractor yesterday for lower back pain and felt well after treatment.  Stated that during the day, he had 3 separate episodes of severe back pain led to syncope each time.  Stated that he did one episode while in the waiting room.  Stated that each episode lasted for about 5 minutes.  Denies headache, redness of breath, nausea, vomiting, chest pain, or abdominal pain.    Physical Exam   Triage vitals:  T 36.5 °C (97.7 °F)  HR (!) 112  /90  RR 16  O2 97 % None (Room air)    General: Awake, alert, in no acute distress  Eyes: Gaze conjugate.  No scleral icterus or injection  HENT: Normo-cephalic, atraumatic. No stridor  CV: Regular rate, regular rhythm. Radial pulses 2+ bilaterally  Resp: Breathing non-labored, speaking in full sentences.  Clear to auscultation bilaterally  GI: Soft, non-distended, non-tender. No rebound or guarding.  : Not examined.  MSK/Extremities: No gross bony deformities. Moving all extremities  Skin: Warm. Appropriate color  Neuro: Alert. Oriented. Face symmetric. Speech is fluent.  Gross strength and sensation intact in b/l UE and LEs  Psych: Appropriate mood and affect    Medical Decision Making & ED Course   Medical Decision Makin y.o. male with history of syncopal episode, bipolar, GERD, mixed hyperlipidemia, incomplete right bundle branch block with loop recorder implanted 2025, who presents to the ED after 3 syncopal episode.  On assessment, patient is well-appearing, nontoxic, not in apparent distress.  Given his medical  history with recurrent syncope, abdominal for possible cardiac event. Will order cardiac workup labs including CBC, CMP, and cardiac enzymes.  Given the patient reported severe back pain, will order x-ray of the chest and lumbar spine to rule out any acute pathological finding.    ED EKG shows normal sinus rhythm ventricular rate of 99 bpm with normal axis, AL interval 166 ms, QRS of 86 ms, QTc 418, PRT axis 50/47/55.  No ischemic changes.    In the meantime with try to interrogate his loop recording device.  Following successful interrogation the device, I spoke with Amilcar at [facility] [employer], who confirmed that the device has captured intermittent sinus tachycardia and working appropriately within its default setting.    On my assessment, the patient is endorsing severe lower back pain.  He was due to take his home dose of oxycodone.  Will administer oxycodone and Tylenol for his pain control.  Patient did get 5 mg of oxycodone and 975 mg of Tylenol.  Was administered 4% lidocaine patch.    The case was discussed with the ED attending, Dr. Hebert, who saw and evaluated patient at the bedside.  Considering the patient presents with chronic back pain, we will plan to discharge patient if pain is under control to follow-up with his outpatient pain management doctor.  Advised patient is unable to ambulate, will consider admitting the patient for further evaluation, PT OT, and rehab placement.    At 1538, CBC result released is unremarkable.  At 1556, chemistry panel results unremarkable.  At 1851, magnesium is normal.  At 1900, BNP was normal is within normal limits.  At 1932, troponin release was mildly elevated at 21.  At 2048, delta troponin is downtrending at 23 which is within normal limits.  At 1846, x-ray lumbar spine released shows mild multilevel spondylosis without acute osseous abnormality of the lumbar spine.  The patient did refuse checks x-ray.    Patient was updated with lab and imaging result. On  reassessment, patient endorses difficulty ambulating due to back pain.  He was administered 1500 mg of Robaxin for additional pain control.  Patient expressed concern of safety going home given that he cannot ambulate.  We did consider that he was able to bring the patient in for further workup and inpatient management.  Medicine was consulted, the patient was admitted to the service of Dr. Martinez.        Differential diagnoses considered include but are not limited to: Syncope, arrhythmia, and chronic lower back pain     Social Determinants of Health which Significantly Impact Care: None identified     EKG Independent Interpretation: EKG interpreted by myself. Please see ED Course for full interpretation.    Independent Result Review and Interpretation: Relevant laboratory and radiographic results were reviewed and independently interpreted by myself.  As necessary, they are commented on in the ED Course.    Chronic conditions affecting the patient's care: As documented above in [facility]    The patient was discussed with the following consultants/services: Dr. Martinez, who accepted the patient to his service.    Care Considerations: As documented above in [facility]    ED Course:  Diagnoses as of 03/28/25 0906   Chronic low back pain, unspecified back pain laterality, unspecified whether sciatica present   Syncope, unspecified syncope type     Disposition   As a result of their workup, the patient will require admission to the hospital.  The patient was informed of his diagnosis.  The patient was given the opportunity to ask questions and I answered them. The patient agreed to be admitted to the hospital.    Procedures   Procedures    This was a shared visit with an ED attending.  The patient was seen and discussed with the ED attending Dr. Hebert.    Ida Duarte MD  Emergency Medicine, PGY-2     Ida Duarte MD  Resident  03/28/25 0964

## 2025-03-27 ENCOUNTER — APPOINTMENT (OUTPATIENT)
Dept: RADIOLOGY | Facility: HOSPITAL | Age: 55
End: 2025-03-27
Payer: COMMERCIAL

## 2025-03-27 ENCOUNTER — TELEPHONE (OUTPATIENT)
Dept: PAIN MEDICINE | Facility: CLINIC | Age: 55
End: 2025-03-27
Payer: COMMERCIAL

## 2025-03-27 VITALS
OXYGEN SATURATION: 95 % | TEMPERATURE: 97.5 F | HEIGHT: 69 IN | RESPIRATION RATE: 19 BRPM | WEIGHT: 170 LBS | SYSTOLIC BLOOD PRESSURE: 140 MMHG | DIASTOLIC BLOOD PRESSURE: 79 MMHG | BODY MASS INDEX: 25.18 KG/M2 | HEART RATE: 97 BPM

## 2025-03-27 PROBLEM — S00.83XA FACIAL CONTUSION, INITIAL ENCOUNTER: Status: ACTIVE | Noted: 2025-03-27

## 2025-03-27 LAB
ANION GAP SERPL CALC-SCNC: 10 MMOL/L (ref 10–20)
ATRIAL RATE: 99 BPM
BUN SERPL-MCNC: 21 MG/DL (ref 6–23)
CALCIUM SERPL-MCNC: 9 MG/DL (ref 8.6–10.3)
CHLORIDE SERPL-SCNC: 106 MMOL/L (ref 98–107)
CO2 SERPL-SCNC: 26 MMOL/L (ref 21–32)
CREAT SERPL-MCNC: 0.73 MG/DL (ref 0.5–1.3)
EGFRCR SERPLBLD CKD-EPI 2021: >90 ML/MIN/1.73M*2
ERYTHROCYTE [DISTWIDTH] IN BLOOD BY AUTOMATED COUNT: 13.8 % (ref 11.5–14.5)
GLUCOSE SERPL-MCNC: 87 MG/DL (ref 74–99)
HCT VFR BLD AUTO: 44.7 % (ref 41–52)
HGB BLD-MCNC: 14.4 G/DL (ref 13.5–17.5)
MAGNESIUM SERPL-MCNC: 2.1 MG/DL (ref 1.6–2.4)
MCH RBC QN AUTO: 28.7 PG (ref 26–34)
MCHC RBC AUTO-ENTMCNC: 32.2 G/DL (ref 32–36)
MCV RBC AUTO: 89 FL (ref 80–100)
NRBC BLD-RTO: 0 /100 WBCS (ref 0–0)
P AXIS: 50 DEGREES
P OFFSET: 198 MS
P ONSET: 142 MS
PLATELET # BLD AUTO: 238 X10*3/UL (ref 150–450)
POTASSIUM SERPL-SCNC: 4 MMOL/L (ref 3.5–5.3)
PR INTERVAL: 166 MS
Q ONSET: 225 MS
QRS COUNT: 16 BEATS
QRS DURATION: 86 MS
QT INTERVAL: 326 MS
QTC CALCULATION(BAZETT): 418 MS
QTC FREDERICIA: 385 MS
R AXIS: 47 DEGREES
RBC # BLD AUTO: 5.01 X10*6/UL (ref 4.5–5.9)
SODIUM SERPL-SCNC: 138 MMOL/L (ref 136–145)
T AXIS: 55 DEGREES
T OFFSET: 388 MS
VENTRICULAR RATE: 99 BPM
WBC # BLD AUTO: 7.1 X10*3/UL (ref 4.4–11.3)

## 2025-03-27 PROCEDURE — 96376 TX/PRO/DX INJ SAME DRUG ADON: CPT

## 2025-03-27 PROCEDURE — 73502 X-RAY EXAM HIP UNI 2-3 VIEWS: CPT | Mod: RIGHT SIDE

## 2025-03-27 PROCEDURE — 2500000004 HC RX 250 GENERAL PHARMACY W/ HCPCS (ALT 636 FOR OP/ED): Performed by: NURSE PRACTITIONER

## 2025-03-27 PROCEDURE — 73502 X-RAY EXAM HIP UNI 2-3 VIEWS: CPT | Mod: RT

## 2025-03-27 PROCEDURE — 2500000001 HC RX 250 WO HCPCS SELF ADMINISTERED DRUGS (ALT 637 FOR MEDICARE OP)

## 2025-03-27 PROCEDURE — 2500000004 HC RX 250 GENERAL PHARMACY W/ HCPCS (ALT 636 FOR OP/ED)

## 2025-03-27 PROCEDURE — 80048 BASIC METABOLIC PNL TOTAL CA: CPT

## 2025-03-27 PROCEDURE — 70450 CT HEAD/BRAIN W/O DYE: CPT

## 2025-03-27 PROCEDURE — 85027 COMPLETE CBC AUTOMATED: CPT

## 2025-03-27 PROCEDURE — 36415 COLL VENOUS BLD VENIPUNCTURE: CPT

## 2025-03-27 PROCEDURE — 76377 3D RENDER W/INTRP POSTPROCES: CPT

## 2025-03-27 PROCEDURE — 70486 CT MAXILLOFACIAL W/O DYE: CPT

## 2025-03-27 PROCEDURE — 83735 ASSAY OF MAGNESIUM: CPT

## 2025-03-27 PROCEDURE — 70450 CT HEAD/BRAIN W/O DYE: CPT | Performed by: RADIOLOGY

## 2025-03-27 PROCEDURE — 2500000002 HC RX 250 W HCPCS SELF ADMINISTERED DRUGS (ALT 637 FOR MEDICARE OP, ALT 636 FOR OP/ED): Performed by: NURSE PRACTITIONER

## 2025-03-27 PROCEDURE — 2500000002 HC RX 250 W HCPCS SELF ADMINISTERED DRUGS (ALT 637 FOR MEDICARE OP, ALT 636 FOR OP/ED)

## 2025-03-27 PROCEDURE — 2500000005 HC RX 250 GENERAL PHARMACY W/O HCPCS

## 2025-03-27 PROCEDURE — G0378 HOSPITAL OBSERVATION PER HR: HCPCS

## 2025-03-27 PROCEDURE — 2500000001 HC RX 250 WO HCPCS SELF ADMINISTERED DRUGS (ALT 637 FOR MEDICARE OP): Performed by: NURSE PRACTITIONER

## 2025-03-27 RX ORDER — FLUTICASONE PROPIONATE 50 MCG
1 SPRAY, SUSPENSION (ML) NASAL DAILY PRN
Status: DISCONTINUED | OUTPATIENT
Start: 2025-03-27 | End: 2025-03-27 | Stop reason: HOSPADM

## 2025-03-27 RX ORDER — AMMONIUM LACTATE 12 G/100G
1 LOTION TOPICAL AS NEEDED
COMMUNITY

## 2025-03-27 RX ORDER — OXYCODONE HYDROCHLORIDE 10 MG/1
10 TABLET ORAL 2 TIMES DAILY PRN
Status: DISCONTINUED | OUTPATIENT
Start: 2025-03-27 | End: 2025-03-27 | Stop reason: HOSPADM

## 2025-03-27 RX ORDER — PANTOPRAZOLE SODIUM 40 MG/1
40 TABLET, DELAYED RELEASE ORAL
Status: DISCONTINUED | OUTPATIENT
Start: 2025-03-27 | End: 2025-03-27 | Stop reason: SDUPTHER

## 2025-03-27 RX ORDER — LANOLIN ALCOHOL/MO/W.PET/CERES
400 CREAM (GRAM) TOPICAL DAILY
Status: DISCONTINUED | OUTPATIENT
Start: 2025-03-27 | End: 2025-03-27 | Stop reason: HOSPADM

## 2025-03-27 RX ORDER — OXYCODONE HYDROCHLORIDE 5 MG/1
5 TABLET ORAL ONCE
Status: COMPLETED | OUTPATIENT
Start: 2025-03-27 | End: 2025-03-27

## 2025-03-27 RX ORDER — CELECOXIB 200 MG/1
200 CAPSULE ORAL ONCE
Status: COMPLETED | OUTPATIENT
Start: 2025-03-27 | End: 2025-03-27

## 2025-03-27 RX ORDER — PANTOPRAZOLE SODIUM 40 MG/1
40 TABLET, DELAYED RELEASE ORAL 2 TIMES DAILY
Status: DISCONTINUED | OUTPATIENT
Start: 2025-03-27 | End: 2025-03-27 | Stop reason: HOSPADM

## 2025-03-27 RX ORDER — PREGABALIN 150 MG/1
300 CAPSULE ORAL 2 TIMES DAILY
Status: DISCONTINUED | OUTPATIENT
Start: 2025-03-27 | End: 2025-03-27 | Stop reason: HOSPADM

## 2025-03-27 RX ORDER — DOCUSATE SODIUM 100 MG/1
100 CAPSULE, LIQUID FILLED ORAL 2 TIMES DAILY
Status: DISCONTINUED | OUTPATIENT
Start: 2025-03-27 | End: 2025-03-27 | Stop reason: HOSPADM

## 2025-03-27 RX ORDER — ATORVASTATIN CALCIUM 80 MG/1
80 TABLET, FILM COATED ORAL DAILY
Status: DISCONTINUED | OUTPATIENT
Start: 2025-03-27 | End: 2025-03-27 | Stop reason: HOSPADM

## 2025-03-27 RX ORDER — CELECOXIB 200 MG/1
200 CAPSULE ORAL 2 TIMES DAILY
Status: DISCONTINUED | OUTPATIENT
Start: 2025-03-27 | End: 2025-03-27 | Stop reason: HOSPADM

## 2025-03-27 RX ORDER — ALBUTEROL SULFATE 0.83 MG/ML
2.5 SOLUTION RESPIRATORY (INHALATION) EVERY 4 HOURS PRN
Status: DISCONTINUED | OUTPATIENT
Start: 2025-03-27 | End: 2025-03-27 | Stop reason: HOSPADM

## 2025-03-27 RX ORDER — DIAZEPAM 5 MG/1
5 TABLET ORAL ONCE
Status: COMPLETED | OUTPATIENT
Start: 2025-03-27 | End: 2025-03-27

## 2025-03-27 RX ORDER — EZETIMIBE 10 MG/1
10 TABLET ORAL DAILY
Status: DISCONTINUED | OUTPATIENT
Start: 2025-03-27 | End: 2025-03-27 | Stop reason: HOSPADM

## 2025-03-27 RX ORDER — OXYCODONE HYDROCHLORIDE 10 MG/1
10 TABLET ORAL 2 TIMES DAILY PRN
Status: DISCONTINUED | OUTPATIENT
Start: 2025-03-27 | End: 2025-03-27

## 2025-03-27 RX ORDER — LIDOCAINE 560 MG/1
2 PATCH PERCUTANEOUS; TOPICAL; TRANSDERMAL DAILY
Status: DISCONTINUED | OUTPATIENT
Start: 2025-03-27 | End: 2025-03-27 | Stop reason: SDUPTHER

## 2025-03-27 RX ORDER — DIAZEPAM 5 MG/1
5 TABLET ORAL 2 TIMES DAILY PRN
Status: DISCONTINUED | OUTPATIENT
Start: 2025-03-27 | End: 2025-03-27 | Stop reason: HOSPADM

## 2025-03-27 RX ORDER — ECONAZOLE NITRATE 10 MG/G
1 CREAM TOPICAL 2 TIMES DAILY
COMMUNITY

## 2025-03-27 RX ORDER — PREGABALIN 150 MG/1
300 CAPSULE ORAL ONCE
Status: COMPLETED | OUTPATIENT
Start: 2025-03-27 | End: 2025-03-27

## 2025-03-27 RX ORDER — ACETAMINOPHEN 325 MG/1
1000 TABLET ORAL 3 TIMES DAILY
Status: DISCONTINUED | OUTPATIENT
Start: 2025-03-27 | End: 2025-03-27 | Stop reason: HOSPADM

## 2025-03-27 RX ORDER — METHOCARBAMOL 500 MG/1
1000 TABLET, FILM COATED ORAL EVERY 8 HOURS
Status: DISCONTINUED | OUTPATIENT
Start: 2025-03-27 | End: 2025-03-27 | Stop reason: HOSPADM

## 2025-03-27 RX ORDER — ALBUTEROL SULFATE 90 UG/1
2 INHALANT RESPIRATORY (INHALATION) EVERY 4 HOURS PRN
Status: DISCONTINUED | OUTPATIENT
Start: 2025-03-27 | End: 2025-03-27

## 2025-03-27 RX ORDER — METOPROLOL SUCCINATE 25 MG/1
25 TABLET, EXTENDED RELEASE ORAL DAILY
Status: DISCONTINUED | OUTPATIENT
Start: 2025-03-27 | End: 2025-03-27 | Stop reason: HOSPADM

## 2025-03-27 RX ORDER — ECONAZOLE NITRATE 10 MG/G
1 CREAM TOPICAL 2 TIMES DAILY
Status: DISCONTINUED | OUTPATIENT
Start: 2025-03-27 | End: 2025-03-27 | Stop reason: HOSPADM

## 2025-03-27 RX ADMIN — METHOCARBAMOL 1000 MG: 1000 INJECTION, SOLUTION INTRAMUSCULAR; INTRAVENOUS at 11:35

## 2025-03-27 RX ADMIN — OXYCODONE HYDROCHLORIDE 10 MG: 10 TABLET ORAL at 10:01

## 2025-03-27 RX ADMIN — LIDOCAINE 4% 2 PATCH: 40 PATCH TOPICAL at 11:06

## 2025-03-27 RX ADMIN — DULOXETINE HYDROCHLORIDE 90 MG: 60 CAPSULE, DELAYED RELEASE ORAL at 12:49

## 2025-03-27 RX ADMIN — METHOCARBAMOL 1000 MG: 1000 INJECTION, SOLUTION INTRAMUSCULAR; INTRAVENOUS at 03:09

## 2025-03-27 RX ADMIN — METOPROLOL SUCCINATE 25 MG: 25 TABLET, EXTENDED RELEASE ORAL at 12:50

## 2025-03-27 RX ADMIN — DULOXETINE HYDROCHLORIDE 90 MG: 60 CAPSULE, DELAYED RELEASE ORAL at 00:13

## 2025-03-27 RX ADMIN — DIAZEPAM 5 MG: 5 TABLET ORAL at 00:13

## 2025-03-27 RX ADMIN — PREGABALIN 300 MG: 150 CAPSULE ORAL at 00:13

## 2025-03-27 RX ADMIN — DIAZEPAM 5 MG: 5 TABLET ORAL at 12:49

## 2025-03-27 RX ADMIN — ATORVASTATIN CALCIUM 80 MG: 80 TABLET, FILM COATED ORAL at 12:49

## 2025-03-27 RX ADMIN — EZETIMIBE 10 MG: 10 TABLET ORAL at 12:51

## 2025-03-27 RX ADMIN — CELECOXIB 200 MG: 100 CAPSULE ORAL at 00:24

## 2025-03-27 RX ADMIN — Medication 400 MG: at 12:50

## 2025-03-27 RX ADMIN — PANTOPRAZOLE SODIUM 40 MG: 40 TABLET, DELAYED RELEASE ORAL at 00:13

## 2025-03-27 RX ADMIN — OXYCODONE HYDROCHLORIDE 5 MG: 5 TABLET ORAL at 00:13

## 2025-03-27 RX ADMIN — PANTOPRAZOLE SODIUM 40 MG: 40 TABLET, DELAYED RELEASE ORAL at 09:58

## 2025-03-27 RX ADMIN — DOCUSATE SODIUM 100 MG: 100 CAPSULE, LIQUID FILLED ORAL at 12:53

## 2025-03-27 SDOH — ECONOMIC STABILITY: FOOD INSECURITY

## 2025-03-27 SDOH — ECONOMIC STABILITY: TRANSPORTATION INSECURITY

## 2025-03-27 SDOH — ECONOMIC STABILITY: HOUSING INSECURITY

## 2025-03-27 SDOH — ECONOMIC STABILITY: GENERAL

## 2025-03-27 ASSESSMENT — PAIN DESCRIPTION - LOCATION
LOCATION: BACK
LOCATION: GENERALIZED

## 2025-03-27 ASSESSMENT — COGNITIVE AND FUNCTIONAL STATUS - GENERAL
CLIMB 3 TO 5 STEPS WITH RAILING: A LOT
STANDING UP FROM CHAIR USING ARMS: A LITTLE
EATING MEALS: A LITTLE
TOILETING: A LITTLE
DRESSING REGULAR LOWER BODY CLOTHING: A LITTLE
HELP NEEDED FOR BATHING: A LITTLE
WALKING IN HOSPITAL ROOM: A LITTLE
MOVING TO AND FROM BED TO CHAIR: A LITTLE
PERSONAL GROOMING: A LITTLE
DRESSING REGULAR UPPER BODY CLOTHING: A LITTLE
TURNING FROM BACK TO SIDE WHILE IN FLAT BAD: A LITTLE
MOBILITY SCORE: 18
DAILY ACTIVITIY SCORE: 18

## 2025-03-27 ASSESSMENT — ENCOUNTER SYMPTOMS
FLANK PAIN: 0
SHORTNESS OF BREATH: 0
AGITATION: 0
NERVOUS/ANXIOUS: 0
BACK PAIN: 1
DYSURIA: 0
COUGH: 0
WOUND: 0
HEMATURIA: 0
CONSTIPATION: 0
FATIGUE: 0
WEAKNESS: 0
FEVER: 0
NECK PAIN: 1
FREQUENCY: 0
PALPITATIONS: 0
VOMITING: 0
NAUSEA: 0
TROUBLE SWALLOWING: 0
CHEST TIGHTNESS: 0
CONFUSION: 0
LIGHT-HEADEDNESS: 0
COLOR CHANGE: 0
CHILLS: 0
DIZZINESS: 0
SORE THROAT: 0
ABDOMINAL PAIN: 0
DIARRHEA: 0

## 2025-03-27 ASSESSMENT — ACTIVITIES OF DAILY LIVING (ADL): LACK_OF_TRANSPORTATION: NO

## 2025-03-27 ASSESSMENT — VISUAL ACUITY: OU: 1

## 2025-03-27 ASSESSMENT — PAIN SCALES - GENERAL
PAINLEVEL_OUTOF10: 9
PAINLEVEL_OUTOF10: 7
PAINLEVEL_OUTOF10: 8

## 2025-03-27 ASSESSMENT — PAIN - FUNCTIONAL ASSESSMENT: PAIN_FUNCTIONAL_ASSESSMENT: 0-10

## 2025-03-27 NOTE — CARE PLAN
The patient's goals for the shift include remain free of falls and injury    The clinical goals for the shift include pain control    Problem: Pain - Adult  Goal: Verbalizes/displays adequate comfort level or baseline comfort level  Outcome: Progressing     Problem: Safety - Adult  Goal: Free from fall injury  Outcome: Progressing     Problem: Discharge Planning  Goal: Discharge to home or other facility with appropriate resources  Outcome: Progressing     Problem: Chronic Conditions and Co-morbidities  Goal: Patient's chronic conditions and co-morbidity symptoms are monitored and maintained or improved  Outcome: Progressing     Problem: Nutrition  Goal: Nutrient intake appropriate for maintaining nutritional needs  Outcome: Progressing

## 2025-03-27 NOTE — ASSESSMENT & PLAN NOTE
- Per patient, fell and hit left side of face earlier today  - Evidence of superficial contusion on the left zygomatic region. However, patient reports worsening pain, therefore will obtain imaging to rule out fracture  - Apply ice as needed

## 2025-03-27 NOTE — NURSING NOTE
Bed  alarm  going  off. Pt.   Found on  floor.  Dr. Forbes to  see pt.  Ct  scan  of head  and  hip  x-ray  ordered. Pt.  Stated  he  rolled out  of  bed.

## 2025-03-27 NOTE — SIGNIFICANT EVENT
CODE SPOT called this AM after patient rolled out of bed.  He states he was texting on his phone and just rolled out.  He bumped his head on the rolling table and landed on his right side.  He states the only thing that is bothering him is his tailbone area.  He is moving everything.  Primary team ALIYA at bedside.  Recommend getting CT head and CT hip and pelvis.  Images are reviewed.

## 2025-03-27 NOTE — ASSESSMENT & PLAN NOTE
- Pain mediated per patient report. States he had 3 separate episodes of severe back pain led to syncope each time  - Loop recorder interrogation completed in the ER, awaiting final results

## 2025-03-27 NOTE — NURSING NOTE
Pt.  Upset  and  angry this morning.  He  said  he wanted  an ortho doctor  and  mri  for his back.  Also  said  he  wanted  more  pain medication  because  of his  back pain. He  ambulated from his   room  into  the [facility] and said he  was  leaving  ama.   He  was seen by  physical therapy.  He  called  for  an  uber  ride  and left  ama walking  to the  outpatient  exit.

## 2025-03-27 NOTE — PROGRESS NOTES
Occupational Therapy                 Therapy Communication Note    Patient Name: Akin Lynn  MRN: [id number]  Department: [facility] 3 S OBS  Room: [facility]5/3105-A  Today's Date: 3/27/2025     Discipline: Occupational Therapy    OT Missed Visit: Yes     Missed Time: Attempt    Comment:  Attempted OT eval this am. Nursing reports that patient fell this am and will be going for xrays. Will hold OT eval at this time and will reattempt as appropriate, once results of xrays are known.

## 2025-03-27 NOTE — CARE PLAN
"Code spot paged by nursing staff for patient fall. According to patient he was laying in bed and did not realize how close he was to the edge of the bed and when he went to change positions, he rolled out of bed.  Patient reports he did bump his head on the bedside table as well as landed on his right hip/tailbone which is now \"slightly\" tender per patient.  Patient continues to complain of lower back pain which is not new. Patient is not on blood thinner.  The bed alarm was on and did go off prompting staff to come to the bedside.  During assessment it is noted that right pupil is slightly larger than left which I am not clear if this is a new finding.  Due to this pupil discrepancy we will obtain a stat CT of the head as well as x-ray of the right hip and pelvis to rule out any injury.     ROS  Denies chest pain, palpitations, lightheadedness/dizziness, shortness of breath, abdominal pain, nausea/vomiting.     Physical exam  Neuro: Alert and oriented x 4  Respiratory: Lungs clear  Cardiovascular: Regular rhythm  Musculoskeletal: Reports chronic back pain that is unchanged. There is some tenderness to right hip, tailbone since fall ROM intact to all extremities.  Denies headache or neck pain there are no obvious bone deformities.   Skin: There is a small abrasion noted to the left cheek which was present on admission hence why CT of the face was done showing no fracture.     Vitals:    03/27/25 0930   BP: 138/85   Pulse: 83   Resp: 18   Temp: 36 °C (96.8 °F)   SpO2: 98%       Plan:  Maintain fall precautions  Bed/chair alarm  Room close to nurses desk  Encourage family to stay with patient if necessary  Notify [facility] if there is a change in patient's condition  Imaging: CT of the head stat as well as x-ray of right hip and pelvis  "

## 2025-03-27 NOTE — PROGRESS NOTES
Physical Therapy                 Therapy Communication Note    Patient Name: Akin Lynn  MRN: [id number]  Department: [facility] 3 S OBS  Room: [facility]5/[facility]-A  Today's Date: 3/27/2025     Discipline: Physical Therapy    PT Missed Visit: Yes     Missed Visit Reason: Missed Visit Reason: Patient placed on medical hold (RN reports pt fell in room and is to go for x-rays. Will hold PT eval until results of x-rays known.)    Missed Time: Attempt    Comment:

## 2025-03-27 NOTE — TELEPHONE ENCOUNTER
Pt called and was wondering if you have found a doctor that will prescribe buprenorphine that was recommended by dr. Sahu.

## 2025-03-27 NOTE — PROGRESS NOTES
Physical Therapy                 Therapy Communication Note    Patient Name: Akin Lynn  MRN: [id number]  Department: [facility] 3 S OBS  Room: 3105/3105-A  Today's Date: 3/27/2025     Discipline: Physical Therapy      Comment: Received message from OT and RN that pt's imaging was negative for acute fx. RN noted that pt AMB IND in room without device. Upon arrival, pt was was agitated. Removed telemetry and refused gait belt placement. AMB approx 30 ft IND without device and transferred sit<> stand IND. Upon return to room pt attempted to remove IV line. RN notified immediately before line was removed and RN arrived to assume care of pt.    No acute PT needs identified at this time. Will DC PT order.

## 2025-03-27 NOTE — PROGRESS NOTES
"Occupational Therapy                 Therapy Communication Note    Patient Name: Akin Lynn  MRN: [id number]  Department: [facility] 3 S OBS  Room: 3105/3105-A  Today's Date: 3/27/2025     Discipline: Occupational Therapy    OT Missed Visit:  (Screen and discharge)     Comment: Met with patient in room this date. Patient appears agitated and desires to go home as soon as possible. \"I want to go to the [facility] and get real care.\"  Patient stating he is in a high amount of pain, did not rate. Patient observed completing supine to sit and sit to stand independently but in a rapid manner. Independent ambulating without device in room, into adams and back into room. Patient stating, \"I'm done, goodbye.\" While standing at sink in room, patient attempting to remove IV. Patient encouraged not to do so and to wait for nursing assistance. 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 Patient has no acute OT needs at this time and will be discharged from OT services at this time. Patient's nurse and charge nurse aware.  "

## 2025-03-27 NOTE — PROGRESS NOTES
Advanced Practice Admission Note    History Of Present Illness  Akin Lynn is a 55 y.o. male with history significant for hypertension, hyperlipidemia, incomplete right bundle branch block, history of NSVT on metoprolol, GERD, gastroparesis, lumbar and cervical spondylolysis, chronic neck and back pain, complex regional pain syndrome for which he is established with pain management for [completing ketamine infusions and receiving cervical medial branch block], multiple orthopedic surgeries, osteoarthritis, known right lung nodule, anxiety, pain mediated syncopal episodes s/p recent loop recorder placement in January 2025 who is presenting to [facility] on 3/26/2025 from home with complaints of pain mediated syncopal episode.    Patient has an extensive history of chronic lower back pain.  Saw his chiropractor yesterday and felt well after the treatment.  However today he presents to the ED for having 3 syncopal episodes which are reportedly pain mediated.  He mentioned that during the day he had 3 episodes of severe back pain each leading to a syncopal episode that lasted approximately 5 minutes.  States his lower back pain is 10 out of 10 and does not feel that it is being adequately managed.  He denies any recent fever, chills, dizziness, lightheadedness, headache, visual disturbances, chest pain, shortness of breath, palpitations, abdominal pain, nausea, vomiting, diarrhea, dysuria, or hematuria.    Of note, patient was admitted to [facility] between 2/2 to 2/4/2025 for acute on chronic lower back pain with gait impairment.  Patient was seen by pain management and neurology, extensive imaging was done and exacerbation of pain was deemed to be musculoskeletal in nature.  Patient was offered a K-pad but refused.  Neurology completed EEG and MRI which were both unremarkable.  Ultimately patient chose to leave [place] as he believed his pain was not being managed appropriately.    Recent CT thoracic and lumbar  spine done at [facility] on February 4th 2025: normal thoracic limb.  There is no acute fracture, dislocation or paraspinous soft tissue swelling vertebral body heights and intervertebral to spaces are maintained normal lumbar lordosis.  There is no acute fracture, dislocation or paraspinous soft tissue swelling. The vertebral body heights and intervertebral disc spaces are maintained     ED Course:  Vital Signs: Temp 97.7 F,  bpm, RR 16, /90, SpO2 97% on RA  CMP: Grossly unremarkable. No electrolyte abnormalities noted. Renal and hepatic function is within normal range.  Troponin: 21, 20 (mildly elevated initially, possibly reactive in the setting of fall)  CBC w/diff: Grossly unremarkable. no leukocytosis. H&H stable at 15.4/48.6  XR lumbar spine: Mild multilevel spondylosis without acute osseous abnormality of the lumbar spine  EKG: Normal sinus rhythm, ventricular rate 99 bpm,  ms, QRS 86 ms, QTc 418 ms.  No acute ST elevation or depression noted.  Medications given: Acetaminophen 975 mg p.o. x 1, oxycodone 5 mg p.o. x 1, Robaxin 1500 mg IV x 1  Disposition: Patient being admitted for acute on chronic lumbar back pain with ambulatory dysfunction requiring PT/OT consult for possible acute rehab placement    Past Medical History  Past Medical History:   Diagnosis Date    Anesthesia of skin     Numbness and tingling    Anxiety 09/21/2023    Bilateral myopia 09/21/2023    Cervical radicular pain 09/21/2023    Chronic patellofemoral pain of left knee 09/21/2023    Complex regional pain syndrome type 1 of left lower extremity 09/21/2023    COVID-19     VACCINATED    Cubital tunnel syndrome on left 09/21/2023    Cubital tunnel syndrome on right 09/21/2023    Disease of intestine, unspecified     Bowel trouble    Erectile dysfunction 09/21/2023    Foot joint pain 09/21/2023    Fractures     Gastroparesis     GERD (gastroesophageal reflux disease)     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    History of  psychological trauma 09/21/2023    Hyperlipidemia     Hypertension     Incomplete RBBB 09/19/2024    Joint pain 09/21/2023    Kidney cysts 09/21/2023    Low back pain, unspecified 12/07/2022    Chronic low back pain without sciatica, unspecified back pain laterality    Low back pain, unspecified 08/09/2022    Lumbago    Lumbar spondylosis 09/21/2023    Lung nodule 09/21/2023    RIGHT    Metatarsalgia of left foot 09/21/2023    Myopia, bilateral 02/19/2019    Myopia of both eyes with astigmatism and presbyopia    Non-sustained ventricular tachycardia (Multi)     Osteoarthritis     Osteochondral defect of patella 09/21/2023    Osteoporosis     Other spondylosis, cervical region 09/21/2023    Patellofemoral arthritis 09/21/2023    Personal history of other diseases of the musculoskeletal system and connective tissue     History of arthritis    Personal history of other specified conditions     History of seizure    Reflex sympathetic dystrophy 09/21/2023    Scapular dyskinesis 09/21/2023    Seizure disorder (Multi)     D/T TRAMADOL REACTION - 25 YEARS AGO    Shoulder impingement 09/21/2023    Stable angina (CMS-[facility])     Suspected sleep apnea 09/21/2023    Trauma and stressor-related disorder 09/21/2023    Trochanteric bursitis of both hips 09/22/2023       Surgical History  Past Surgical History:   Procedure Laterality Date    ACHILLES TENDON SURGERY  1978 2016    CARDIAC CATHETERIZATION N/A 12/20/2024    Procedure: Left Heart Cath;  Surgeon: Kale Funk MD;  Location: [facility] Cardiac Cath Lab;  Service: Cardiovascular;  Laterality: N/A;    CARDIAC ELECTROPHYSIOLOGY PROCEDURE N/A 1/31/2025    Procedure: Loop Insertion (43049);  Surgeon: Hai Ga MD;  Location: [facility] Cardiac Cath Lab;  Service: Electrophysiology;  Laterality: N/A;    CARDIOVASCULAR STRESS TEST      CT ABDOMEN PELVIS ANGIOGRAM W AND/OR WO IV CONTRAST  03/18/2020    CT ABDOMEN PELVIS ANGIOGRAM W AND/OR WO IV CONTRAST 3/18/2020 [facility] EMERGENCY  [facility]    CT ANGIO NECK  12/16/2022    CT NECK ANGIO W AND WO IV CONTRAST 12/16/2022 DOCTOR OFFICE LEGAC[facility]    CT HEAD ANGIO W AND WO IV CONTRAST  12/16/2022    CT HEAD [facility] W AND WO [facility]    HIP SURGERY Left     REPLACEMENT    JOINT REPLACEMENT      LEFT KNEE    KNEE ARTHROPLASTY Bilateral 06/13/2017    Knee Arthroplasty    OTHER SURGICAL HISTORY  01/06/2022    Ulnar nerve neuroplasty    OTHER SURGICAL HISTORY Bilateral 07/26/2019    Foot surgery  -- HARDWARE    OTHER SURGICAL HISTORY Bilateral     BILATERAL ANKLE SCOPE    OTHER SURGICAL HISTORY      POP ABDOMINAL PROCEDURE    PLANTAR FASCIA RELEASE      SHOULDER SURGERY Bilateral 06/13/2017    Shoulder Surgery        Social History  He reports that he has never smoked. He has never used smokeless tobacco. He reports that he does not currently use alcohol. He reports that he does not currently use drugs.    Family History  Family History   Problem Relation Name Age of Onset    Lung cancer Mother      Glaucoma Father      Skin cancer Father      Hypertension Other mul fam mem     Heart disease Other mul fam mem         Allergies  Morphine, Ultram [tramadol], Alprazolam, Ketorolac, Fish containing products, and Opioids - morphine analogues    Review of Systems   Constitutional:  Negative for chills, fatigue and fever.   HENT:  Negative for hearing loss, sore throat and trouble swallowing.         Left cheek pain related to fall   Eyes:  Negative for visual disturbance.   Respiratory:  Negative for cough, chest tightness and shortness of breath.    Cardiovascular:  Negative for chest pain and palpitations.   Gastrointestinal:  Negative for abdominal pain, constipation, diarrhea, nausea and vomiting.   Genitourinary:  Negative for dysuria, flank pain, frequency, hematuria and urgency.   Musculoskeletal:  Positive for back pain and neck pain. Negative for gait problem.        Difficulty ambulating   Skin:  Negative for color change,  rash and wound.   Neurological:  Positive for syncope (pain related). Negative for dizziness, weakness and light-headedness.   Psychiatric/Behavioral:  Negative for agitation and confusion. The patient is not nervous/anxious.         Physical Exam  Vitals reviewed.   Constitutional:       Appearance: He is not ill-appearing.   HENT:      Head: Normocephalic.        Comments: Left sided contusion, red, skin intact. Mild swelling and painful to touch     Right Ear: External ear normal.      Left Ear: External ear normal.      Nose: Nose normal.      Mouth/Throat:      Mouth: Mucous membranes are moist.   Eyes:      General: Lids are normal. Vision grossly intact.      Pupils: Pupils are equal, round, and reactive to light.   Cardiovascular:      Rate and Rhythm: Normal rate and regular rhythm.      Pulses:           Radial pulses are 2+ on the right side and 2+ on the left side.      Heart sounds: S1 normal and S2 normal.   Pulmonary:      Effort: Pulmonary effort is normal. No respiratory distress.      Breath sounds: Normal breath sounds. No wheezing, rhonchi or rales.   Abdominal:      General: Bowel sounds are normal. There is no distension.      Palpations: Abdomen is soft.      Tenderness: There is no abdominal tenderness.   Musculoskeletal:      Right lower leg: No edema.      Left lower leg: No edema.   Skin:     General: Skin is warm and dry.      Capillary Refill: Capillary refill takes less than 2 seconds.      Findings: Abrasion (left cheek bone) present.   Neurological:      Mental Status: He is alert and oriented to person, place, and time. Mental status is at baseline.      Sensory: Sensation is intact.      Motor: Weakness (Bilateral lower extremities) present.   Psychiatric:         Attention and Perception: Attention normal.         Mood and Affect: Mood is anxious.         Speech: Speech normal.         Behavior: Behavior is cooperative.       Last Recorded Vitals  Visit Vitals  /70 (BP  "Location: Left arm, Patient Position: Lying)   Pulse 76   Temp 36.3 °C (97.3 °F) (Temporal)   Resp 16   Ht 1.753 m (5' 9\")   Wt 77.1 kg (170 lb)   SpO2 95%   BMI 25.10 kg/m²   Smoking Status Never   BSA 1.94 m²      Relevant Results  Results for orders placed or performed during the hospital encounter of 03/26/25 (from the past 24 hours)   CBC with Differential   Result Value Ref Range    WBC 7.7 4.4 - 11.3 x10*3/uL    nRBC 0.0 0.0 - 0.0 /100 WBCs    RBC 5.39 4.50 - 5.90 x10*6/uL    Hemoglobin 15.4 13.5 - 17.5 g/dL    Hematocrit 48.6 41.0 - 52.0 %    MCV 90 80 - 100 fL    MCH 28.6 26.0 - 34.0 pg    MCHC 31.7 (L) 32.0 - 36.0 g/dL    RDW 14.0 11.5 - 14.5 %    Platelets 261 150 - 450 x10*3/uL    Neutrophils % 54.6 40.0 - 80.0 %    Immature Granulocytes %, Automated 0.3 0.0 - 0.9 %    Lymphocytes % 22.5 13.0 - 44.0 %    Monocytes % 11.6 2.0 - 10.0 %    Eosinophils % 9.7 0.0 - 6.0 %    Basophils % 1.3 0.0 - 2.0 %    Neutrophils Absolute 4.19 1.20 - 7.70 x10*3/uL    Immature Granulocytes Absolute, Automated 0.02 0.00 - 0.70 x10*3/uL    Lymphocytes Absolute 1.72 1.20 - 4.80 x10*3/uL    Monocytes Absolute 0.89 0.10 - 1.00 x10*3/uL    Eosinophils Absolute 0.74 (H) 0.00 - 0.70 x10*3/uL    Basophils Absolute 0.10 0.00 - 0.10 x10*3/uL   Comprehensive Metabolic Panel   Result Value Ref Range    Glucose 76 74 - 99 mg/dL    Sodium 138 136 - 145 mmol/L    Potassium 4.1 3.5 - 5.3 mmol/L    Chloride 105 98 - 107 mmol/L    Bicarbonate 22 21 - 32 mmol/L    Anion Gap 15 10 - 20 mmol/L    Urea Nitrogen 22 6 - 23 mg/dL    Creatinine 0.77 0.50 - 1.30 mg/dL    eGFR >90 >60 mL/min/1.73m*2    Calcium 8.9 8.6 - 10.3 mg/dL    Albumin 4.6 3.4 - 5.0 g/dL    Alkaline Phosphatase 66 33 - 120 U/L    Total Protein 7.4 6.4 - 8.2 g/dL    AST 17 9 - 39 U/L    Bilirubin, Total 0.6 0.0 - 1.2 mg/dL    ALT 16 10 - 52 U/L   B-Type Natriuretic Peptide   Result Value Ref Range    BNP 6 0 - 99 pg/mL   Magnesium   Result Value Ref Range    Magnesium 2.13 1.60 - " 2.40 mg/dL   Troponin I, High Sensitivity, Initial   Result Value Ref Range    Troponin I, High Sensitivity 21 (H) 0 - 20 ng/L   Troponin, High Sensitivity, 1 Hour   Result Value Ref Range    Troponin I, High Sensitivity 20 0 - 20 ng/L     *Note: Due to a large number of results and/or encounters for the requested time period, some results have not been displayed. A complete set of results can be found in Results Review.        Home Medications  Prior to Admission medications    Medication Sig Start Date End Date Taking? Authorizing Provider   acetaminophen (Tylenol) 500 mg tablet Take 2 tablets (1,000 mg) by mouth 3 times a day. 3/6/25   Olivia Alonzo MD   albuterol (Ventolin HFA) 90 mcg/actuation inhaler Inhale 2 puffs every 4 hours if needed for wheezing.    Historical Provider, MD   atorvastatin (Lipitor) 80 mg tablet Take 1 tablet (80 mg) by mouth once daily. 3/6/25 3/6/26  Ronni Dey MD   celecoxib (CeleBREX) 200 mg capsule Take 1 capsule (200 mg) by mouth 2 times a day.    Historical Provider, MD   diazePAM (Valium) 5 mg tablet Take 1 tablet (5 mg) by mouth 2 times a day as needed for anxiety.    Historical Provider, MD   docusate sodium (Colace) 100 mg capsule Take 1 capsule (100 mg) by mouth 2 times a day. 3/4/25 4/3/25  Chelle Bonilal, APRN-CNP   DULoxetine (Cymbalta) 30 mg DR capsule Take 3 capsules (90 mg) by mouth once daily. Do not crush or chew. 2/2/25 3/6/25  Janette Sales,    ezetimibe (Zetia) 10 mg tablet Take 1 tablet (10 mg) by mouth once daily. 3/6/25 3/6/26  Ronni Dey MD   fluticasone (Flonase) 50 mcg/actuation nasal spray Administer 1 spray into each nostril once daily as needed.    Historical Provider, MD   lidocaine (Lidoderm) 5 % patch apply 2 patches to affected area daily as needed. 12/9/24   Olivia Alonzo MD   magnesium oxide (Mag-Ox) 400 mg (241.3 mg magnesium) tablet Take 1 tablet (400 mg) by mouth once daily. 3/6/25 3/6/26  Ronni Dey MD    metoprolol succinate XL (Toprol-XL) 25 mg 24 hr tablet Take 1 tablet (25 mg) by mouth once daily. Do not crush or chew. 3/6/25 3/6/26  Ronni Dey MD   omeprazole (PriLOSEC) 40 mg DR capsule Take 1 capsule (40 mg) by mouth 2 times a day. For 90 days    Historical Provider, MD   oxyCODONE (Roxicodone) 5 mg immediate release tablet Take 1 tablet (5 mg) by mouth 2 times a day as needed for severe pain (7 - 10).    Historical Provider, MD   pregabalin (Lyrica) 300 mg capsule Take 1 capsule (300 mg) by mouth 2 times a day. 10/6/23   Vish Barboza PA-C   sildenafil (Viagra) 100 mg tablet Take 1 tablet (100 mg) by mouth once daily as needed for erectile dysfunction.    Historical Provider, MD   tiZANidine (Zanaflex) 4 mg capsule Take 1 capsule (4 mg) by mouth 3 times a day.    Historical Provider, MD       Medications  Scheduled medications  lidocaine, 1 patch, transdermal, Once  lidocaine, 2 patch, transdermal, Daily  methocarbamol, 1,000 mg, intravenous, q8h GEENA  pantoprazole, 40 mg, oral, BID      Continuous medications     PRN medications  acetaminophen, 650 mg, q4h PRN  melatonin, 3 mg, Nightly PRN  oxyCODONE, 10 mg, BID PRN  polyethylene glycol, 17 g, Daily PRN        Imaging  XR lumbar spine 2-3 views    Result Date: 3/26/2025  Interpreted By:  Cornelius Lozano, STUDY: XR LUMBAR SPINE 2-3 VIEWS; ;  3/26/2025 6:36 pm   INDICATION: Signs/Symptoms:Lower back pain.     COMPARISON: None.   ACCESSION NUMBER(S): [id number]   ORDERING CLINICIAN: ARDEN GUZMAN   FINDINGS: No acute fracture or posttraumatic malalignment. Mild multilevel facet arthropathy and minimal multilevel disc space narrowing.       Mild multilevel spondylosis without acute osseous abnormality of the lumbar spine.     MACRO: None   Signed by: Cornelius Lozano 3/26/2025 6:45 PM Dictation workstation:   [id number]    ECG 12 lead    Result Date: 3/6/2025  Normal sinus rhythm Normal ECG When compared with ECG of 29-JAN-2025 18:30, No  significant change was found Confirmed by Eddy Cabrera ([id number]) on 3/6/2025 3:06:22 PM    ECG 12 lead    Result Date: 3/4/2025  Normal sinus rhythm Incomplete right bundle branch block Borderline ECG When compared with ECG of 01-MAR-2025 03:21, No significant change was found Confirmed by Eddy Cabrera ([id number]) on 3/4/2025 2:46:21 PM    ECG 12 lead    Result Date: 3/2/2025  Normal sinus rhythm Incomplete right bundle branch block Borderline ECG Confirmed by Modesto Watson ([id number]) on 3/2/2025 9:04:29 PM    XR chest 2 views    Result Date: 3/2/2025  * * *Final Report* * * DATE OF EXAM: Mar  2 2025  5:50PM   VHX   [id number]  -  XR CHEST 2V FRONTAL/LAT  / ACCESSION #  [id number] PROCEDURE REASON: Chest Pain      * * * * Physician Interpretation * * * *  EXAMINATION:  CHEST RADIOGRAPH (2 VIEW FRONTAL & LATERAL) CLINICAL HISTORY: Chest Pain MQ:  [id number]_6 EXAM DATE/TIME:  3/2/2025 5:50 PM COMPARISON:  02/01/2025. RESULT: Lines, tubes, and devices:  A leadless pacemaker remains in the left lower chest. Lungs and pleura:  No consolidation. No lung mass. No pleural effusion. No pneumothorax. Cardiomediastinal silhouette:  Stable cardiomediastinal silhouette. Bones and soft tissues:  Unremarkable.    IMPRESSION: Stable exam with no evidence of acute radiographic abnormality. : PSCB   Transcribe Date/Time: Mar  2 2025  6:19P Dictated by : LEELEE HERBERT MD This examination was interpreted and the report reviewed and electronically signed by: LEELEE HERBERT MD on Mar  2 2025  6:20PM  EST    XR chest 1 view    Result Date: 3/2/2025  Interpreted By:  Luis Mayorga, STUDY: XR CHEST 1 VIEW; 3/2/2025 2:01 pm   INDICATION: Signs/Symptoms:Chest Pain   COMPARISON: 03/01/2025   ACCESSION NUMBER(S): [id number]   ORDERING CLINICIAN: DARRICK GLASGOW   FINDINGS: The study is limited due to  rotation and overlying artifacts obscuring some detail. The cardiomediastinal silhouette is within normal limits for the technique. There is no  pneumothorax, confluent infiltrates or significant effusion. Previously seen small left basilar atelectasis has resolved. The osseous structures are unremarkable.       Allowing for the aforementioned limitation, no acute cardiopulmonary disease.   Signed by: Luis Mayorga 3/2/2025 2:08 PM Dictation workstation:   [id number]    XR hip left with pelvis when performed 2 or 3 views    Result Date: 3/2/2025  Interpreted By:  Uche Kim, STUDY: XR HIP LEFT WITH PELVIS WHEN PERFORMED 2 OR 3 VIEWS;  3/2/2025 2:01 pm   INDICATION: Signs/Symptoms:Fall, left hip pain.   COMPARISON: Prior exam from 08/23/2024   ACCESSION NUMBER(S): [id number]   ORDERING CLINICIAN: DARRICK GLASGOW   TECHNIQUE: AP view pelvis and 2 views  of the  left hip were obtained.   FINDINGS: Previous bilateral total hip arthroplasties. Bilateral femoral and acetabular components remain well seated and in good alignment. Moderate sclerotic arthritic changes in both SI joints. No lytic or blastic destructive bone lesion. No acute fracture or dislocation. No opaque soft tissue foreign body. No periosteal reaction or erosion.       Stable and intact bilateral total hip arthroplasties.   No acute fracture or dislocation.   MACRO: None   Signed by: Uche Kim 3/2/2025 2:07 PM Dictation workstation:   [id number]    XR shoulder left 2+ views    Result Date: 3/2/2025  Interpreted By:  Uche Kim, STUDY: XR SHOULDER LEFT 2+ VIEWS;  3/2/2025 2:01 pm   INDICATION: Signs/Symptoms:Left shoulder pain after a fall.   COMPARISON: Prior exam from 03/01/2025.   ACCESSION NUMBER(S): [id number]   ORDERING CLINICIAN: DARRICK GLASGOW   TECHNIQUE: 3 views  of the  left shoulder were obtained.   FINDINGS: No significant osteophytic change. No lytic or blastic destructive bone lesion. No acute fracture or dislocation. Space between the acromion and humeral head was preserved. No opaque soft tissue foreign body. No periosteal reaction or erosion. Incidental note of a  wireless defibrillator overlying the mid to lower left chest.       No acute fracture or dislocation.   MACRO: None   Signed by: Uche Kim 3/2/2025 2:06 PM Dictation workstation:   [id number]    XR shoulder left 2+ views    Result Date: 3/1/2025  Interpreted By:  Esa Jara, STUDY: XR SHOULDER LEFT 2+ VIEWS; ;  3/1/2025 3:18 am   INDICATION: Signs/Symptoms:Fall, pain.     COMPARISON: 3/20/2024   ACCESSION NUMBER(S): [id number]   ORDERING CLINICIAN: UCHE SIDDIQUI   FINDINGS: No acute fracture or dislocation of the left shoulder. There is acromioclavicular osteoarthrosis with joint space narrowing.       No acute fracture or dislocation of the left shoulder.     MACRO: None   Signed by: Esa Jara 3/1/2025 3:32 AM Dictation workstation:   [id number]    XR chest 1 view    Result Date: 3/1/2025  Interpreted By:  Esa Jara, STUDY: XR CHEST 1 VIEW;  3/1/2025 2:09 am   INDICATION: Signs/Symptoms:Chest Pain.   COMPARISON: 1/30/2025   ACCESSION NUMBER(S): [id number]   ORDERING CLINICIAN: JACLYN SAGASTUME   FINDINGS: The cardiac silhouette is normal in size. Mild left basilar atelectasis. No focal airspace consolidation or pleural effusion. No pneumothorax.       Mild left basilar atelectasis.   MACRO: None   Signed by: Esa Jara 3/1/2025 2:42 AM Dictation workstation:   [id number]    [place] pain management    Result Date: 2/27/2025  These images are not reportable by radiology and will not be interpreted by  Radiologists.    Radiofrequency Ablation    Result Date: 2/27/2025  Table formatting from the original result was not included. Procedure Radiofrequency Ablation bilateral L4-L5, L5-S1 Indication Lumbar spondylosis Medications Totals unavailable because the procedure time range is not set Preprocedure A history and physical has been performed, and patient medication allergies have been reviewed. The patient's tolerance of previous anesthesia has been reviewed. The risks and benefits of the procedure and  the sedation options and risks were discussed with the patient. All questions were answered and informed consent obtained. Details of the Procedure Surgical Indications The patient is here today to receive a radiofrequency ablation of the above-mentioned nerve to assist with the pain condition. Operative Report The patient was taken to the procedure room, was placed into a prone positioning. The skin was prepped and draped sterilely in the normal fashion. Under fluoroscopic guidance the medial nerve root branches were identified and the patient was throughout the procedure monitored by the nursing staff. The skin and subcutaneous tissues were anesthetized with 1% lidocaine. Then 20-gauge RF needles were introduced into the approximation of the medial nerve branches at the above mentioned level and confirmed in AP and oblique view, with negative aspiration of heme and CSF, with positive sensory stimulation and negative motor stimulation. Then the patient received 1 mL of 0.5% bupivacaine per site and radiofrequency ablation at temperature of 80°C for 90 seconds was achieved. Patient tolerated the procedure well, was taken to the recovery room, allowed to recover for a sufficient amount of time and then was discharged home in stable condition. The patient was advised to followup with the [facility] to reassess the improvement on as-needed basis. Take you for allowing me to participate in the care of this patient. Procedure Provider Carlyle Abel MD Procedure Location [address] [place] [address] Referring Provider Carlyle Abel MD [address] [place] [address], [address] [place] [address]          Assessment/Plan   Assessment & Plan  Acute on chronic low back pain  - XR lumbar spine: mild multilevel spondylosis without acute osseous abnormality of the lumbar spine   - IV robaxin 1000mg Q8H scheduled x 3  days   - Lidocaine 4% patch x 2 Q12H to the lumbar back region  - Oxycodone increased from 5mg BID to 10mg BID for three days given patient is experiencing acute on chronic back pain.    -Patient takes Celebrex, diazepam, duloxetine, lyrica at home for chronic pain - one time doses ordered   - PT/OT  - Additional imaging not warranted at this time  Syncopal episodes  - Pain mediated per patient report. States he had 3 separate episodes of severe back pain led to syncope each time  - Loop recorder interrogation completed in the ER, awaiting final results  Ambulatory dysfunction  - Fall precautions  - PT/OT eval and treat   Facial contusion, initial encounter  - Per patient, fell and hit left side of face earlier today  - Evidence of superficial contusion on the left zygomatic region. However, patient reports worsening pain, therefore will obtain imaging to rule out fracture  - Apply ice as needed    VTE prophylaxis:   DVT prophylaxis: SCDs    Code Status  Full code    I spent 55 minutes in the professional and overall care of this patient.      Admission history and physical to follow by attending physician. Plan to resume home medications as appropriate when list is available from pharmacy, see orders for additional plan elements, management deferred to attending and consult physicians.      MIKAELA [facility]  Medical [place] [phone]  Attending physician Denis Martinez MD

## 2025-03-27 NOTE — ASSESSMENT & PLAN NOTE
- XR lumbar spine: mild multilevel spondylosis without acute osseous abnormality of the lumbar spine   - IV robaxin 1000mg Q8H scheduled x 3 days   - Lidocaine 4% patch x 2 Q12H to the lumbar back region  - Oxycodone increased from 5mg BID to 10mg BID for three days given patient is experiencing acute on chronic back pain.    -Patient takes Celebrex, diazepam, duloxetine, lyrica at home for chronic pain - one time doses ordered   - PT/OT  - Additional imaging not warranted at this time

## 2025-03-27 NOTE — CARE PLAN
The patient's goals for the shift include remain free of falls and injury    The clinical goals for the shift include pain control

## 2025-03-27 NOTE — DISCHARGE INSTRUCTIONS
"\"You were seen in the Emergency Department (ED) for back pain. Your work-up and exam was unimpressive for life or limb-threatening cause at this time requiring admission.  Plan    Continue pain management at home as scheduled.  Follow-up with your pain management doctor, your cardiologist, your primary care, or your neurologist within 48 hours regarding your ED visit.    Seek immediate medical attention should you have:  Numbness, tingling, weakness, fevers of 38C (100.4) or higher, chills, nausea, palpitations, confusion, vomiting, dizziness, painful urination, inability to control your urine or bowel movements, or any other concerning symptoms.\"  "

## 2025-03-27 NOTE — PROGRESS NOTES
03/27/25 1037   Discharge Planning   Living Arrangements Spouse/significant other   Support Systems Spouse/significant other   Assistance Needed none   Type of Residence Private residence   Home or Post Acute Services None   Expected Discharge Disposition Home   Does the patient need discharge transport arranged? No   Financial Resource Strain   How hard is it for you to pay for the very basics like food, housing, medical care, and heating? Not hard   Housing Stability   In the last 12 months, was there a time when you were not able to pay the mortgage or rent on time? N   At any time in the past 12 months, were you homeless or living in a shelter (including now)? N   Transportation Needs   In the past 12 months, has lack of transportation kept you from medical appointments or from getting medications? no   In the past 12 months, has lack of transportation kept you from meetings, work, or from getting things needed for daily living? No   Intensity of Service   Intensity of Service >30 min     Spoke to patient at bedside to explain my role in discharge planning. Patient states he lives at home with his girlfriend. PCP is Dr Lane. Patient states he feels he effectively manages his health at home and plans to return home when medically ready. Therapy evals pending at this time. CT team to follow.

## 2025-04-01 ENCOUNTER — HOSPITAL ENCOUNTER (OUTPATIENT)
Dept: RADIOLOGY | Facility: EXTERNAL LOCATION | Age: 55
Discharge: HOME | End: 2025-04-01

## 2025-04-01 ENCOUNTER — APPOINTMENT (OUTPATIENT)
Dept: ORTHOPEDIC SURGERY | Facility: CLINIC | Age: 55
End: 2025-04-01
Payer: COMMERCIAL

## 2025-04-01 ENCOUNTER — HOSPITAL ENCOUNTER (OUTPATIENT)
Dept: RADIOLOGY | Facility: HOSPITAL | Age: 55
Discharge: HOME | End: 2025-04-01
Payer: COMMERCIAL

## 2025-04-01 ENCOUNTER — HOSPITAL ENCOUNTER (OUTPATIENT)
Dept: CARDIOLOGY | Facility: CLINIC | Age: 55
Discharge: HOME | End: 2025-04-01
Payer: COMMERCIAL

## 2025-04-01 DIAGNOSIS — Z95.818 IMPLANTABLE LOOP RECORDER PRESENT: ICD-10-CM

## 2025-04-01 DIAGNOSIS — R55 SYNCOPE, UNSPECIFIED SYNCOPE TYPE: ICD-10-CM

## 2025-04-01 DIAGNOSIS — S93.409A MODERATE ANKLE SPRAIN, UNSPECIFIED LATERALITY, INITIAL ENCOUNTER: ICD-10-CM

## 2025-04-01 DIAGNOSIS — S93.401A SPRAIN OF RIGHT ANKLE, INITIAL ENCOUNTER: Primary | ICD-10-CM

## 2025-04-01 DIAGNOSIS — R00.2 PALPITATIONS: ICD-10-CM

## 2025-04-01 DIAGNOSIS — M75.22 BICEPS TENDINITIS OF LEFT SHOULDER: ICD-10-CM

## 2025-04-01 PROCEDURE — 73610 X-RAY EXAM OF ANKLE: CPT | Mod: RT

## 2025-04-01 PROCEDURE — 76942 ECHO GUIDE FOR BIOPSY: CPT | Performed by: STUDENT IN AN ORGANIZED HEALTH CARE EDUCATION/TRAINING PROGRAM

## 2025-04-01 PROCEDURE — 99214 OFFICE O/P EST MOD 30 MIN: CPT | Performed by: STUDENT IN AN ORGANIZED HEALTH CARE EDUCATION/TRAINING PROGRAM

## 2025-04-01 PROCEDURE — 73610 X-RAY EXAM OF ANKLE: CPT | Mod: RIGHT SIDE | Performed by: RADIOLOGY

## 2025-04-01 PROCEDURE — 20550 NJX 1 TENDON SHEATH/LIGAMENT: CPT | Performed by: STUDENT IN AN ORGANIZED HEALTH CARE EDUCATION/TRAINING PROGRAM

## 2025-04-01 PROCEDURE — L1902 AFO ANKLE GAUNTLET PRE OTS: HCPCS | Performed by: STUDENT IN AN ORGANIZED HEALTH CARE EDUCATION/TRAINING PROGRAM

## 2025-04-01 RX ORDER — BETAMETHASONE SODIUM PHOSPHATE AND BETAMETHASONE ACETATE 3; 3 MG/ML; MG/ML
6 INJECTION, SUSPENSION INTRA-ARTICULAR; INTRALESIONAL; INTRAMUSCULAR; SOFT TISSUE
Status: COMPLETED | OUTPATIENT
Start: 2025-04-01 | End: 2025-04-01

## 2025-04-01 RX ORDER — LIDOCAINE HYDROCHLORIDE 10 MG/ML
1 INJECTION, SOLUTION INFILTRATION; PERINEURAL
Status: COMPLETED | OUTPATIENT
Start: 2025-04-01 | End: 2025-04-01

## 2025-04-01 RX ADMIN — LIDOCAINE HYDROCHLORIDE 1 ML: 10 INJECTION, SOLUTION INFILTRATION; PERINEURAL at 12:30

## 2025-04-01 RX ADMIN — BETAMETHASONE SODIUM PHOSPHATE AND BETAMETHASONE ACETATE 6 MG: 3; 3 INJECTION, SUSPENSION INTRA-ARTICULAR; INTRALESIONAL; INTRAMUSCULAR; SOFT TISSUE at 12:30

## 2025-04-01 NOTE — PROGRESS NOTES
Patient ID: Akin Lynn is a 55 y.o. male.  Here with Referral of Dr. Royce Louis for further evaluation of his left shoulder issues, concerning for biceps tendinitis, and here for possible US Guided injection of the long head of the biceps.  States that he has been having pain into the anterior arm since he had last seen Dr. Louis in January.  Denies any injuries to the left shoulder since last visit.  Denies any new numbness and tingling.  He has consistent numbness and tingling into all of his extremities, stating he has a unknown cause of neuropathy.  He denies any new neck pain.  He is also complaining of some right ankle pain.  He woke up this morning and had some pain into the lateral aspect of the ankle.  He has some mild swelling laterally.  He does not recall any obvious injury, but he does have a history of osteoarthritis into the ankle, for which his podiatrist usually gives him corticosteroid injections.  He takes Celebrex daily.    Left shoulder exam shows:  Skin is intact;  No erythema or warmth;  No edema or ecchymosis;  No clinical signs of infection;  Can forward flex to 180 degrees;  Abduction to 180 degrees;  External rotation from neutral at 75 degrees;  Internal rotation at the level of T10;  No signs of impingement with Haq or Neer's test;  Negative empty can test;  Negative crossarm test;  No pain over the AC joint;  Negative Hidalgo's test;  Negative sulcus sign;  Negative anterior apprehension's test; and  Neurovascularly intact.     Right ankle exam shows:  Skin is intact;  No erythema or warmth;  No clinical signs of infection;  No pain over the lateral malleolus;  No pain over the medial malleolus;  TENDER and mildly swollen over the ATF, but not the CF or PTF ligaments;  No pain over the deltoid ligament;  No pain over the Achilles tendon;  Negative Uriarte's test;  Negative squeeze test;  Negative anterior drawer test;  Negative talar tilt test;  No pain over the anterior  process of the talus;  No pain over the talar dome;  No pain over the base of the fifth metatarsal bone;  No pain over the calcaneus;  No pain over the plantar aponeurosis;  No pain of the midfoot; and  Neurovascularly intact.    For this left biceps tendinitis, provided him with a corticosteroid injection of the long head of the biceps tendon sheath under ultrasound guidance, which was tolerated well without complications.  Postinjection instructions given.  Will follow-up with Dr. Louis in 3 to 4 weeks to see how this went.  For his right ankle sprain and possible exacerbation of his arthritis, we will fit him with a lace up ankle brace and he could continue with Celebrex as needed.  Follow-up with his foot doctor for possible injection if needed.      Tendon Sheath Injection: left long head of biceps tendon sheath on 4/1/2025 12:30 PM  Indications: pain and therapeutic benefit  Details: 22 G needle, ultrasound-guided anterior approach  Medications: 6 mg betamethasone acet,sod phos 6 mg/mL; 1 mL lidocaine 10 mg/mL (1 %)  Outcome: tolerated well, no immediate complications  Procedure, treatment alternatives, risks and benefits explained, specific risks discussed. Consent was given by the patient. Immediately prior to procedure a time out was called to verify the correct patient, procedure, equipment, support staff and site/side marked as required. Patient was prepped and draped in the usual sterile fashion.

## 2025-04-02 ENCOUNTER — HOSPITAL ENCOUNTER (OUTPATIENT)
Dept: CARDIOLOGY | Facility: CLINIC | Age: 55
Discharge: HOME | End: 2025-04-02
Payer: COMMERCIAL

## 2025-04-02 ENCOUNTER — TELEPHONE (OUTPATIENT)
Dept: INFUSION THERAPY | Facility: CLINIC | Age: 55
End: 2025-04-02
Payer: COMMERCIAL

## 2025-04-02 DIAGNOSIS — R00.2 PALPITATIONS: ICD-10-CM

## 2025-04-02 DIAGNOSIS — R55 SYNCOPE, UNSPECIFIED SYNCOPE TYPE: ICD-10-CM

## 2025-04-02 DIAGNOSIS — Z95.818 IMPLANTABLE LOOP RECORDER PRESENT: ICD-10-CM

## 2025-04-02 LAB
ATRIAL RATE: 99 BPM
P AXIS: 50 DEGREES
P OFFSET: 198 MS
P ONSET: 142 MS
PR INTERVAL: 166 MS
Q ONSET: 225 MS
QRS COUNT: 16 BEATS
QRS DURATION: 86 MS
QT INTERVAL: 326 MS
QTC CALCULATION(BAZETT): 418 MS
QTC FREDERICIA: 385 MS
R AXIS: 47 DEGREES
T AXIS: 55 DEGREES
T OFFSET: 388 MS
VENTRICULAR RATE: 99 BPM

## 2025-04-02 NOTE — TELEPHONE ENCOUNTER
He is requesting a call back from you personally not a nurse (something about the infusion)  [phone]

## 2025-04-03 ENCOUNTER — HOSPITAL ENCOUNTER (OUTPATIENT)
Dept: PAIN MEDICINE | Facility: CLINIC | Age: 55
Discharge: HOME | End: 2025-04-03
Payer: COMMERCIAL

## 2025-04-03 ENCOUNTER — HOSPITAL ENCOUNTER (OUTPATIENT)
Dept: CARDIOLOGY | Facility: CLINIC | Age: 55
Discharge: HOME | End: 2025-04-03
Payer: COMMERCIAL

## 2025-04-03 VITALS
DIASTOLIC BLOOD PRESSURE: 86 MMHG | RESPIRATION RATE: 18 BRPM | HEART RATE: 85 BPM | OXYGEN SATURATION: 97 % | SYSTOLIC BLOOD PRESSURE: 132 MMHG | TEMPERATURE: 97 F

## 2025-04-03 DIAGNOSIS — R00.2 PALPITATIONS: ICD-10-CM

## 2025-04-03 DIAGNOSIS — M54.16 LUMBAR RADICULOPATHY: ICD-10-CM

## 2025-04-03 DIAGNOSIS — Z95.818 IMPLANTABLE LOOP RECORDER PRESENT: ICD-10-CM

## 2025-04-03 DIAGNOSIS — R55 SYNCOPE, UNSPECIFIED SYNCOPE TYPE: ICD-10-CM

## 2025-04-03 PROCEDURE — 62323 NJX INTERLAMINAR LMBR/SAC: CPT | Performed by: PAIN MEDICINE

## 2025-04-03 PROCEDURE — 2500000004 HC RX 250 GENERAL PHARMACY W/ HCPCS (ALT 636 FOR OP/ED): Mod: JZ | Performed by: PAIN MEDICINE

## 2025-04-03 PROCEDURE — 2550000001 HC RX 255 CONTRASTS: Performed by: PAIN MEDICINE

## 2025-04-03 RX ORDER — METHYLPREDNISOLONE ACETATE 80 MG/ML
INJECTION, SUSPENSION INTRA-ARTICULAR; INTRALESIONAL; INTRAMUSCULAR; SOFT TISSUE AS NEEDED
Status: DISCONTINUED | OUTPATIENT
Start: 2025-04-03 | End: 2025-04-04 | Stop reason: HOSPADM

## 2025-04-03 RX ORDER — BUPIVACAINE HYDROCHLORIDE 2.5 MG/ML
INJECTION, SOLUTION EPIDURAL; INFILTRATION; INTRACAUDAL; PERINEURAL AS NEEDED
Status: DISCONTINUED | OUTPATIENT
Start: 2025-04-03 | End: 2025-04-04 | Stop reason: HOSPADM

## 2025-04-03 RX ORDER — LIDOCAINE HYDROCHLORIDE 10 MG/ML
INJECTION, SOLUTION EPIDURAL; INFILTRATION; INTRACAUDAL; PERINEURAL AS NEEDED
Status: DISCONTINUED | OUTPATIENT
Start: 2025-04-03 | End: 2025-04-04 | Stop reason: HOSPADM

## 2025-04-03 RX ADMIN — BUPIVACAINE HYDROCHLORIDE 10 ML: 2.5 INJECTION, SOLUTION EPIDURAL; INFILTRATION; INTRACAUDAL; PERINEURAL at 11:41

## 2025-04-03 RX ADMIN — LIDOCAINE HYDROCHLORIDE 2 ML: 10 INJECTION, SOLUTION EPIDURAL; INFILTRATION; INTRACAUDAL; PERINEURAL at 11:40

## 2025-04-03 RX ADMIN — IOHEXOL 10 ML: 300 INJECTION, SOLUTION INTRAVENOUS at 11:41

## 2025-04-03 RX ADMIN — METHYLPREDNISOLONE ACETATE 80 MG: 80 INJECTION, SUSPENSION INTRA-ARTICULAR; INTRALESIONAL; INTRAMUSCULAR; SOFT TISSUE at 11:41

## 2025-04-03 ASSESSMENT — PAIN - FUNCTIONAL ASSESSMENT: PAIN_FUNCTIONAL_ASSESSMENT: 0-10

## 2025-04-03 ASSESSMENT — PAIN SCALES - GENERAL: PAINLEVEL_OUTOF10: 7

## 2025-04-03 NOTE — DISCHARGE INSTRUCTIONS
Post-injection instructions:    Your pain may not be gone immediately after the procedure--it usually takes the steroid 3-5 days to start working.   It may take several weeks for the medicine to reach its' full effect.   Pay attention to how much pain relief (what percentage compared to before the procedure) you get and for how long it lasts.     Activity: Avoid strenuous activity for 24 hours. After that return to your normal activity level.     Bandages: Remove after 24 hours     Showering/Bathing: You may shower after bandage is removed     Follow up: CALL OFFICE IN 7 DAYS [phone] LEAVE MESSAGE ABOUT THE RELIEF THAT WAS OBTAINED      Call the doctor immediately: if you notice:     Excessive bleeding from procedure site (brisk bright red bleeding from the site or bleeding that soaks the bandages or does not stop)   Severe headache  Inability to walk, leg or arm weakness or numbness that is worse after the procedure   Uncontrolled pain   New urinary or fecal incontinence   Signs of infection: Fever above 101.5F, redness, swelling, pus or drainage from the site    Epidural Injection    Why is this procedure done?  With an epidural injection, the doctor injects drugs deep into the area around your spinal cord. This is different than epidural anesthesia that is used for surgery or when a woman has a baby. Your spine is a group of bones in your back that protect the nerves in your spinal cord. Problems with your spine can cause swollen nerves in the spinal cord. This swelling leads to pain and can limit movement. In an epidural injection, the doctor may give you a drug to help with swelling and pain.  You may have an epidural injection in different parts of your back, based on where your pain is. For pain in your head or arms, you may have a cervical epidural injection. If your pain is in your upper or middle back, you may get a thoracic epidural injection. For pain in your lower back or legs, you may get a  lumbar epidural injection.    What will the results be?  The treatment may:  Lower pain  Reduce swelling in the nerves  Improve movement  What happens before the procedure?  Your doctor will take your history. Talk to the doctor about:  All the drugs you are taking. Be sure to include all prescription and over-the-counter (OTC) drugs, and herbal supplements. Tell the doctor about any drug allergy. Bring a list of drugs you take with you.  If you have high blood sugar or diabetes. Your drugs may need to be changed.  Any bleeding problems. Be sure to tell your doctor if you are taking any drugs that may cause bleeding. Some of these are warfarin, rivaroxaban, apixaban, ticagrelor, clopidogrel, ketorolac, ibuprofen, naproxen, or aspirin. Certain vitamins and herbs, such as garlic and fish oil, may also add to the risk for bleeding. You may need to stop these drugs as well. Talk to your doctor about them.  Tell the doctor if you are pregnant.  You will not be allowed to drive right away after the procedure. Ask a family member or a friend to drive you home.  What happens during the procedure?  To help the doctor make sure the drugs are being injected in the right place, your doctor may do an x-ray of your spine. Other times your doctor may do a continuous x-ray during the procedure. This is a fluoroscopy. The doctor may also use a colored dye or a contrast dye to check where to inject the drug.  You may be given a drug to help you relax. You may be given a drug to make the area of the injection numb.  The doctor will clean the skin on your back or neck. This helps prevent infection.  The doctor will put a needle through the skin toward your spine. The drug will be injected into a space near the spine.  The needle will be taken out and a bandage will be placed over the injection site.  What happens after the procedure?  Staff will check on you to make sure you are doing well. They will tell you when you can go  home.  What care is needed at home?  Relax on the day of the injection.  Do not drive or run machines for at least 12 hours afterwards.  Apply ice to the injection site. Place an ice pack or a bag of frozen peas wrapped in a towel over the painful part. Never put ice right on the skin. Do not leave the ice on more than 10 to 15 minutes at a time.  It may take a few days before you will feel the effects of the injection.  What follow-up care is needed?  Your doctor may ask you to make visits to the office to check on your progress. Be sure to keep these visits. You may also need to see a physical therapist (PT). The PT will teach you exercises to help you get back your strength and motion. Ask your doctor when you can exercise.  What problems could happen?  Bleeding  Infection (rare)  Headache  Nerve injury  If you have diabetes, your blood sugar can go up after the injection. Check with your doctor if you need more treatment for this.  Last Reviewed Date

## 2025-04-06 NOTE — PROGRESS NOTES
Virtual or Telephone Consent    An interactive audio and video telecommunication system which permits real time communications between the patient (at the originating site) and provider (at the distant site) was utilized to provide this telehealth service.   Verbal consent was requested and obtained from Akin Lynn on this date, 04/06/25 for a telehealth visit and the patient's location was confirmed at the time of the visit.        SUBJECTIVE:  This is 55 y.o.  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  He is not doing any more of the ketamine infusion at the [facility].  And he would like to continue with these infusions.  New order was placed for him today.  The patient is not really getting any much resolved with his Percocet from Dr. Vargas and he is asking if he we can put him on the Suboxone like with Dr. Solis recommended I told him I will see if I can find him someone to provide him with the Suboxone. who is here for follow-up requesting permission to come with the transportation for his infusion.  I explained to him that I really cannot do that decision this is administrative decision that is done through the hospital administration and the legal department for the necessity of having a  for him.  He is going to keep his appointment for tomorrow and he is going to find another  to help him.    Patient is going from doctor to doctor for his treatments he had second bilateral C2-4 medial branch block on 3/19/2025 by Dr. Vargas and then 2/27/2025 he had bilateral radiofrequency ablation by Dr. Abel    Prior office visit:     SUBJECTIVE:  This is 54 y.o.  male with PMH of CRPS type one of the left  lower extremity hip arthritis, cervical spondylosis failed multiple injections and he gets good results with IV infusion therapy who is here for follow-up stating that the infusion is really helping him significantly and he gets 70% improvement between 1 week to multiple weeks sometimes.  He is not doing any more of the ketamine infusion at the [facility].  And he would like to continue with these infusions.  New order was placed for him today.  The patient is not really getting any much resolved with his Percocet from Dr. Vargas and he is asking if he we can put him on the Suboxone like with Dr. Solis recommended I told him I will see if I can find him someone to provide him with the Suboxone.        Prior office visit:  11/14/2024: This is 54 y.o.  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  The patient had in 2022 MRI of his cervical to lumbar spine showed principally normal MRI minimal degenerative changes.  The patient has been to the ED multiple visit and have seen Dr. Chen as well for pain since his neck back etc. who is here for follow-up stating that he has now a lot of pain in his right foot that did not response to multiple surgeries and now it feels the same thing like his left leg which has [facility] case of CRPS.  I examined the foot today does not look like typical CRPS diagnosis has some tenderness over the scar and there is some pseudomotor changes good pulses.  The patient stated that he had an EMG done which was normal I explained to him that does not mean that he has CRPS or not CRPS is a disease that diagnosed clinically he may have a PM&R I suggested lumbar sympathetic block as differential diagnosis to see if that sympathetically mediated pain or not but he is not interested in that.  The patient is worried about his marijuana usage  and if that will cause him any problem getting his infusion therapy I told him no he can continue his infusion therapy and he can continue with his ketamine/lidocaine nasal spray.     70% improvement 1-few weeks        Procedures:  Opioid sparing infusion the patient gets 70% improvement in pain function last between 10 days to few weeks     Multiple procedures from different providers bilateral medial branch block of the cervical spine by Dr. Vargas and Dr. Abel did radiofrequency recently        Portions of record reviewed for pertinent issues: active problem list, medication list, allergies, family history, social history, notes from last encounter, encounters, lab results, imaging and other available records.        I have personally reviewed the OARRS report for this patient. This report is scanned into the electronic medical record. I have considered the risks of abuse, dependence, addiction and diversion. It showed: Diazepam 5 mg twice daily, pregabalin 300 mg twice daily and some oxycodone 5 mg twice daily  OPIOID RISK ASSESSMENT SCORE 0/26  Aberrant behavior: none  My patient has no underlying substance abuse or alcohol abuse and there's no mental health conditions contributing to the patient's pain.        Diagnostic studies:  11/17/2022 MRI of the cervical thoracic and lumbar spine showed some degenerative changes but no significant canal or foraminal stenosis, no bone marrow edema or endplate changes:            Review of Systems   HENT: Negative.     Eyes: Negative.    Respiratory: Negative.     Cardiovascular: Negative.    Gastrointestinal: Negative.    Endocrine: Negative.    Genitourinary: Negative.    Musculoskeletal:  Positive for arthralgias, back pain, myalgias and neck pain.   Skin: Negative.    Neurological: Negative.    Hematological: Negative.    Psychiatric/Behavioral: Negative.           Physical Exam  Constitutional:       Appearance: Normal appearance.   Neurological:      Mental  Status: He is alert and oriented to person, place, and time.                     Plan  At least 50% of the visit was involved in the discussion of the options for treatment. We discussed exercises, medication, interventional therapies and surgery. Healthy life style is essential with patient hard work to achieve the wellness. In addition; discussion with the patient and/or family about any of the diagnostic results, impressions and/or recommended diagnostic studies, prognosis, risks and benefits of treatment options, instructions for treatment and/or follow-up, importance of compliance with chosen treatment options, risk-factor reduction, and patient/family education.         Recommended Pool therapy, walking in the pool, at least 3x per week for 30 minutes  Continue self-directed physical therapy with at least daily exercises for minimum of 20-minute, brochure was handed to the patient  The patient has to bring a  with him and friend to be with him during the infusion I cannot change that administrative order from our organization  Healthy lifestyle and anti-inflammatory diet in addition to weight control discussed with the patient  Alternative chronic pain therapies was discussed, encouraged and information was handed  Return to Clinic as needed     *Please note this report has been produced using speech recognition software and may contain errors related to that system including grammar, punctuation and spelling as well as words and phrases that may be inappropriate. If there are questions or concerns, please feel free to contact me to clarify.    Olivia Alonzo MD

## 2025-04-07 ENCOUNTER — TELEMEDICINE (OUTPATIENT)
Dept: PAIN MEDICINE | Facility: CLINIC | Age: 55
End: 2025-04-07
Payer: COMMERCIAL

## 2025-04-07 ENCOUNTER — HOSPITAL ENCOUNTER (OUTPATIENT)
Dept: CARDIOLOGY | Facility: CLINIC | Age: 55
Discharge: HOME | End: 2025-04-07
Payer: COMMERCIAL

## 2025-04-07 DIAGNOSIS — G90.522 COMPLEX REGIONAL PAIN SYNDROME TYPE 1 OF LEFT LOWER EXTREMITY: Primary | ICD-10-CM

## 2025-04-07 DIAGNOSIS — R55 SYNCOPE, UNSPECIFIED SYNCOPE TYPE: ICD-10-CM

## 2025-04-07 DIAGNOSIS — Z95.818 IMPLANTABLE LOOP RECORDER PRESENT: ICD-10-CM

## 2025-04-07 DIAGNOSIS — R00.2 PALPITATIONS: ICD-10-CM

## 2025-04-07 DIAGNOSIS — G89.4 CHRONIC PAIN SYNDROME: Primary | ICD-10-CM

## 2025-04-07 PROCEDURE — 99214 OFFICE O/P EST MOD 30 MIN: CPT | Performed by: ANESTHESIOLOGY

## 2025-04-07 RX ORDER — KETAMINE HCL IN NACL, ISO-OSM 100MG/10ML
SYRINGE (ML) INJECTION ONCE
Status: CANCELLED | OUTPATIENT
Start: 2025-04-08

## 2025-04-07 ASSESSMENT — PAIN - FUNCTIONAL ASSESSMENT: PAIN_FUNCTIONAL_ASSESSMENT: 0-10

## 2025-04-07 ASSESSMENT — ENCOUNTER SYMPTOMS
DEPRESSION: 0
LOSS OF SENSATION IN FEET: 0
OCCASIONAL FEELINGS OF UNSTEADINESS: 0

## 2025-04-07 ASSESSMENT — PAIN SCALES - GENERAL
PAINLEVEL_OUTOF10: 5
PAINLEVEL_OUTOF10: 5 - MODERATE PAIN

## 2025-04-07 ASSESSMENT — PAIN DESCRIPTION - DESCRIPTORS: DESCRIPTORS: SORE

## 2025-04-07 NOTE — PROGRESS NOTES
Virtual or Telephone Consent    An interactive audio and video telecommunication system which permits real time communications between the patient (at the originating site) and provider (at the distant site) was utilized to provide this telehealth service.   Verbal consent was requested and obtained from Akin Lynn on this date, 04/07/25 for a telehealth visit and the patient's location was confirmed at the time of the visit.      This is 55 y.o.  male with who has been treated for  Neck Pain and back pain . Pain is  managed  , currently having right ankle .The pain is described as achiness and soreness and is relieved by Sitting and Lying down.Here for follow-up.    Patient would like to ensure that he can have his infusion tomorrow, he does not have someone to sit with him , however he has [place] [facility] that is willing to bring him to his visit . He wants to confirm if that is ok , given that rules and regulations to have an infusions .    Chief Complaint   Patient presents with    Follow-up     To discuss possible having infusions        Pain Therapies:  IV infusions

## 2025-04-08 ENCOUNTER — INFUSION (OUTPATIENT)
Dept: INFUSION THERAPY | Facility: CLINIC | Age: 55
End: 2025-04-08
Payer: COMMERCIAL

## 2025-04-08 VITALS
TEMPERATURE: 97.3 F | SYSTOLIC BLOOD PRESSURE: 147 MMHG | OXYGEN SATURATION: 99 % | DIASTOLIC BLOOD PRESSURE: 90 MMHG | HEART RATE: 79 BPM | RESPIRATION RATE: 12 BRPM

## 2025-04-08 DIAGNOSIS — G90.522 COMPLEX REGIONAL PAIN SYNDROME TYPE 1 OF LEFT LOWER EXTREMITY: ICD-10-CM

## 2025-04-08 PROCEDURE — 96368 THER/DIAG CONCURRENT INF: CPT | Mod: INF

## 2025-04-08 PROCEDURE — 96365 THER/PROPH/DIAG IV INF INIT: CPT | Mod: INF

## 2025-04-08 PROCEDURE — 2500000004 HC RX 250 GENERAL PHARMACY W/ HCPCS (ALT 636 FOR OP/ED): Performed by: ANESTHESIOLOGY

## 2025-04-08 RX ORDER — EPINEPHRINE 0.3 MG/.3ML
0.3 INJECTION SUBCUTANEOUS EVERY 5 MIN PRN
OUTPATIENT
Start: 2025-04-22

## 2025-04-08 RX ORDER — NITROGLYCERIN 0.4 MG/1
0.4 TABLET SUBLINGUAL ONCE
OUTPATIENT
Start: 2025-04-22 | End: 2025-04-22

## 2025-04-08 RX ORDER — ALBUTEROL SULFATE 0.83 MG/ML
3 SOLUTION RESPIRATORY (INHALATION) AS NEEDED
OUTPATIENT
Start: 2025-04-22

## 2025-04-08 RX ORDER — METOPROLOL TARTRATE 25 MG/1
25 TABLET, FILM COATED ORAL ONCE
OUTPATIENT
Start: 2025-04-22 | End: 2025-04-22

## 2025-04-08 RX ORDER — METOCLOPRAMIDE HYDROCHLORIDE 5 MG/ML
10 INJECTION INTRAMUSCULAR; INTRAVENOUS ONCE
OUTPATIENT
Start: 2025-04-22 | End: 2025-04-22

## 2025-04-08 RX ORDER — DIPHENHYDRAMINE HYDROCHLORIDE 50 MG/ML
50 INJECTION, SOLUTION INTRAMUSCULAR; INTRAVENOUS AS NEEDED
OUTPATIENT
Start: 2025-04-22

## 2025-04-08 RX ORDER — FAMOTIDINE 10 MG/ML
20 INJECTION, SOLUTION INTRAVENOUS ONCE AS NEEDED
OUTPATIENT
Start: 2025-04-22

## 2025-04-08 RX ORDER — ONDANSETRON HYDROCHLORIDE 2 MG/ML
4 INJECTION, SOLUTION INTRAVENOUS ONCE
OUTPATIENT
Start: 2025-04-22 | End: 2025-04-22

## 2025-04-08 RX ORDER — KETAMINE HCL IN NACL, ISO-OSM 100MG/10ML
SYRINGE (ML) INJECTION ONCE
Status: COMPLETED | OUTPATIENT
Start: 2025-04-08 | End: 2025-04-08

## 2025-04-08 RX ORDER — DIPHENHYDRAMINE HYDROCHLORIDE 50 MG/ML
25 INJECTION, SOLUTION INTRAMUSCULAR; INTRAVENOUS ONCE
OUTPATIENT
Start: 2025-04-22 | End: 2025-04-22

## 2025-04-08 RX ADMIN — Medication: at 13:05

## 2025-04-08 RX ADMIN — PROPOFOL 100 MG: 10 INJECTION, EMULSION INTRAVENOUS at 13:06

## 2025-04-08 ASSESSMENT — PAIN DESCRIPTION - DESCRIPTORS: DESCRIPTORS: SORE;ACHING

## 2025-04-08 ASSESSMENT — ENCOUNTER SYMPTOMS
LOSS OF SENSATION IN FEET: 1
DEPRESSION: 0
OCCASIONAL FEELINGS OF UNSTEADINESS: 1

## 2025-04-08 ASSESSMENT — PAIN - FUNCTIONAL ASSESSMENT
PAIN_FUNCTIONAL_ASSESSMENT: 0-10
PAIN_FUNCTIONAL_ASSESSMENT: 0-10

## 2025-04-08 ASSESSMENT — PAIN SCALES - GENERAL
PAINLEVEL_OUTOF10: 6
PAINLEVEL_OUTOF10: 5 - MODERATE PAIN

## 2025-04-08 NOTE — PATIENT INSTRUCTIONS
Today :Akin SANFORDCitlali Sagastumejay had no medications administered during this visit.     For:   1. Complex regional pain syndrome type 1 of left lower extremity         Your next appointment is due in:  See AVS          Please read the  Medication Guide that was given to you and reviewed during todays visit.     (Tell all doctors including dentists that you are taking this medication)     Go to the emergency room or call 911 if:  -You have signs of allergic reaction:   -Rash, hives, itching.   -Swollen, blistered, peeling skin.   -Swelling of face, lips, mouth, tongue or throat.   -Tightness of chest, trouble breathing, swallowing or talking     Call your doctor:  - If IV / injection site gets red, warm, swollen, itchy or leaks fluid or pus.     (Leave dressing on your IV site for at least 2 hours and keep area clean and dry  - If you get sick or have symptoms of infection or are not feeling well for any reason.    (Wash your hands often, stay away from people who are sick)  - If you have side effects from your medication that do not go away or are bothersome.     (Refer to the teaching your nurse gave you for side effects to call your doctor about)    - Common side effects may include:  stuffy nose, headache, feeling tired, muscle aches, upset stomach  - Before receiving any vaccines     - Call [facility] at   If:  - You get sick, are on antibiotics, have had a recent vaccine, have surgery or dental work and your doctor wants your visit rescheduled.  - You need to cancel and reschedule your visit for any reason. Call at least 2 days before your visit if you need to cancel.   - Your insurance changes before your next visit.    (We will need to get approval from your new insurance. This can take up to two weeks.)     [facility] is opened Monday thru Friday. We are closed on weekends and holidays.   Voice mail will take your call if the center is closed. If you leave a message please  allow 24 hours for a call back during weekdays. If you leave a message on a weekend/holiday, we will call you back the next business day.    A pharmacist is available Monday - Friday from 8:30AM to 3:30PM to help answer any questions you may have about your prescriptions(s). Please call pharmacy at:    [facility]: [phone]  [facility]: [phone]  [facility]: [phone]              [facility] [place]      Pain Infusion Aftercare Instructions      1. It is normal to feel sedated, tired and low in energy after a pain infusion. DO NOT DRIVE, OPERATE ANY MACHINERY, OR MAKE ANY IMPORTANT DECISIONS FOR AT LEAST 24 HOURS AFTER THE INFUSION.     2. Call the pain center at [phone] with any problems, questions, or concerns.     3. Eat light after the infusion. If you feel queasy or sick to your stomach, laying down with your eyes closed may help. When you resume eating start with something mild like clear liquids, yogurt, applesauce, crackers, etc… Gradually advance to a regular diet.     4. Do not leave your house alone the evening of your pain infusion.     5. No alcohol or sedative medications, such as sleeping pills, for 24 hours after your pain infusion.     6. Resume all other prescribed medications unless directed otherwise by you physician.     7. If you have any medical emergencies, call 911 or go directly to the closest emergency room.

## 2025-04-08 NOTE — PROGRESS NOTES
S: Patient here for 24 th opioid sparing pain infusion. Patient reports 80% reduction in pain after last infusion that lasted 11 days.    Purpose of pain infusion meds explained along with potential side effects.  Patient verbalized understanding.    B: Pain Issues. Is Patient breast feeding: N/A    A: Patient currently has pain described on flow sheet documentation. Designated  is [facility] [phone] with Charm[place]. Patient last ate solid food >8 hours ago, and had liquid >1 hours ago.    R: Plan; Obtain IV access, do patient risk assessment, and start opioid sparing infusion as ordered. Monitoring for S/S of adverse reactions.    1315: Patient is restful as of this time, tolerating pain infusion well. Continuing to monitor.    1340:Post infusion teaching provided. Patient verbalized understanding. VSS, Patient states pain is less. Will assist patient to waiting car via wheelchair.

## 2025-04-10 ENCOUNTER — HOSPITAL ENCOUNTER (OUTPATIENT)
Dept: CARDIOLOGY | Facility: CLINIC | Age: 55
Discharge: HOME | End: 2025-04-10
Payer: COMMERCIAL

## 2025-04-10 DIAGNOSIS — R00.2 PALPITATIONS: ICD-10-CM

## 2025-04-10 DIAGNOSIS — R55 SYNCOPE, UNSPECIFIED SYNCOPE TYPE: ICD-10-CM

## 2025-04-10 DIAGNOSIS — Z95.818 IMPLANTABLE LOOP RECORDER PRESENT: ICD-10-CM

## 2025-04-21 ENCOUNTER — HOSPITAL ENCOUNTER (OUTPATIENT)
Dept: CARDIOLOGY | Facility: CLINIC | Age: 55
Discharge: HOME | End: 2025-04-21
Payer: COMMERCIAL

## 2025-04-21 DIAGNOSIS — R55 SYNCOPE, UNSPECIFIED SYNCOPE TYPE: ICD-10-CM

## 2025-04-21 DIAGNOSIS — R00.2 PALPITATIONS: ICD-10-CM

## 2025-04-21 DIAGNOSIS — Z95.818 IMPLANTABLE LOOP RECORDER PRESENT: ICD-10-CM

## 2025-04-21 PROCEDURE — 93298 REM INTERROG DEV EVAL SCRMS: CPT

## 2025-04-22 ENCOUNTER — INFUSION (OUTPATIENT)
Dept: INFUSION THERAPY | Facility: CLINIC | Age: 55
End: 2025-04-22
Payer: COMMERCIAL

## 2025-04-22 VITALS
RESPIRATION RATE: 14 BRPM | DIASTOLIC BLOOD PRESSURE: 96 MMHG | TEMPERATURE: 97.3 F | OXYGEN SATURATION: 97 % | SYSTOLIC BLOOD PRESSURE: 150 MMHG | HEART RATE: 74 BPM

## 2025-04-22 DIAGNOSIS — G90.522 COMPLEX REGIONAL PAIN SYNDROME TYPE 1 OF LEFT LOWER EXTREMITY: ICD-10-CM

## 2025-04-22 PROCEDURE — 96368 THER/DIAG CONCURRENT INF: CPT | Mod: INF

## 2025-04-22 PROCEDURE — 2500000004 HC RX 250 GENERAL PHARMACY W/ HCPCS (ALT 636 FOR OP/ED): Performed by: ANESTHESIOLOGY

## 2025-04-22 PROCEDURE — 96365 THER/PROPH/DIAG IV INF INIT: CPT | Mod: INF

## 2025-04-22 RX ORDER — ALBUTEROL SULFATE 0.83 MG/ML
3 SOLUTION RESPIRATORY (INHALATION) AS NEEDED
OUTPATIENT
Start: 2025-05-06

## 2025-04-22 RX ORDER — DIPHENHYDRAMINE HYDROCHLORIDE 50 MG/ML
50 INJECTION, SOLUTION INTRAMUSCULAR; INTRAVENOUS AS NEEDED
OUTPATIENT
Start: 2025-05-06

## 2025-04-22 RX ORDER — EPINEPHRINE 0.3 MG/.3ML
0.3 INJECTION SUBCUTANEOUS EVERY 5 MIN PRN
OUTPATIENT
Start: 2025-05-06

## 2025-04-22 RX ORDER — KETAMINE HCL IN NACL, ISO-OSM 100MG/10ML
30-300 SYRINGE (ML) INJECTION ONCE
Status: COMPLETED | OUTPATIENT
Start: 2025-04-22 | End: 2025-04-22

## 2025-04-22 RX ORDER — FAMOTIDINE 10 MG/ML
20 INJECTION, SOLUTION INTRAVENOUS ONCE AS NEEDED
OUTPATIENT
Start: 2025-05-06

## 2025-04-22 RX ORDER — ONDANSETRON HYDROCHLORIDE 2 MG/ML
4 INJECTION, SOLUTION INTRAVENOUS ONCE
OUTPATIENT
Start: 2025-05-06 | End: 2025-05-06

## 2025-04-22 RX ORDER — METOCLOPRAMIDE HYDROCHLORIDE 5 MG/ML
10 INJECTION INTRAMUSCULAR; INTRAVENOUS ONCE
OUTPATIENT
Start: 2025-05-06 | End: 2025-05-06

## 2025-04-22 RX ORDER — NITROGLYCERIN 0.4 MG/1
0.4 TABLET SUBLINGUAL ONCE
OUTPATIENT
Start: 2025-05-06 | End: 2025-05-06

## 2025-04-22 RX ORDER — DIPHENHYDRAMINE HYDROCHLORIDE 50 MG/ML
25 INJECTION, SOLUTION INTRAMUSCULAR; INTRAVENOUS ONCE
OUTPATIENT
Start: 2025-05-06 | End: 2025-05-06

## 2025-04-22 RX ORDER — METOPROLOL TARTRATE 25 MG/1
25 TABLET, FILM COATED ORAL ONCE
OUTPATIENT
Start: 2025-05-06 | End: 2025-05-06

## 2025-04-22 RX ADMIN — Medication 300 MG: at 12:53

## 2025-04-22 RX ADMIN — PROPOFOL 100 MG: 10 INJECTION, EMULSION INTRAVENOUS at 12:53

## 2025-04-22 ASSESSMENT — PAIN SCALES - GENERAL
PAINLEVEL_OUTOF10: 6
PAINLEVEL_OUTOF10: 6
PAINLEVEL_OUTOF10: 4

## 2025-04-22 ASSESSMENT — ENCOUNTER SYMPTOMS
DEPRESSION: 0
LOSS OF SENSATION IN FEET: 1
OCCASIONAL FEELINGS OF UNSTEADINESS: 0

## 2025-04-22 ASSESSMENT — PAIN - FUNCTIONAL ASSESSMENT
PAIN_FUNCTIONAL_ASSESSMENT: 0-10
PAIN_FUNCTIONAL_ASSESSMENT: 0-10

## 2025-04-22 ASSESSMENT — PAIN DESCRIPTION - DESCRIPTORS: DESCRIPTORS: BURNING;SHOOTING;SHARP

## 2025-04-22 NOTE — PROGRESS NOTES
S: Patient here for 25 opioid sparing pain infusion. Patient reports 80% reduction in pain after last infusion that lasted 10 days.    Purpose of pain infusion meds explained along with potential side effects.  Patient verbalized understanding.    B: Pain Issues 6/10 back, arm, knee Is Patient breast feeding: N/A    A: Patient currently has pain described on flow sheet documentation. Designated  is [facility]. Patient last ate solid food 4 hours ago, and had liquid 4 hours ago.    R: Plan; Obtain IV access, do patient risk assessment, and start opioid sparing infusion as ordered. Monitoring for S/S of adverse reactions.    Post infusion teaching provided. Patient verbalized understanding. VSS, Patient states pain is 4/10. Will assist patient to waiting car via wheelchair.

## 2025-04-22 NOTE — PATIENT INSTRUCTIONS
Today :We administered ketamine 30 mg-lidocaine 300 mg and propofol (Diprivan).     For:   1. Complex regional pain syndrome type 1 of left lower extremity              Please read the  Medication Guide that was given to you and reviewed during todays visit.     (Tell all doctors including dentists that you are taking this medication)     Go to the emergency room or call 911 if:  -You have signs of allergic reaction:   -Rash, hives, itching.   -Swollen, blistered, peeling skin.   -Swelling of face, lips, mouth, tongue or throat.   -Tightness of chest, trouble breathing, swallowing or talking     Call your doctor:  - If IV / injection site gets red, warm, swollen, itchy or leaks fluid or pus.     (Leave dressing on your IV site for at least 2 hours and keep area clean and dry  - If you get sick or have symptoms of infection or are not feeling well for any reason.    (Wash your hands often, stay away from people who are sick)  - If you have side effects from your medication that do not go away or are bothersome.     (Refer to the teaching your nurse gave you for side effects to call your doctor about)    - Common side effects may include:  stuffy nose, headache, feeling tired, muscle aches, upset stomach  - Before receiving any vaccines     - Call [facility] at   If:  - You get sick, are on antibiotics, have had a recent vaccine, have surgery or dental work and your doctor wants your visit rescheduled.  - You need to cancel and reschedule your visit for any reason. Call at least 2 days before your visit if you need to cancel.   - Your insurance changes before your next visit.    (We will need to get approval from your new insurance. This can take up to two weeks.)     [facility] is opened Monday thru Friday. We are closed on weekends and holidays.   Voice mail will take your call if the center is closed. If you leave a message please allow 24 hours for a call back during  weekdays. If you leave a message on a weekend/holiday, we will call you back the next business day.    A pharmacist is available Monday - Friday from 8:30AM to 3:30PM to help answer any questions you may have about your prescriptions(s). Please call pharmacy at:    [facility]: [phone]  [facility]: [phone]  [facility]: [phone]              [facility]      Pain Infusion Aftercare Instructions      1. It is normal to feel sedated, tired and low in energy after a pain infusion. DO NOT DRIVE, OPERATE ANY MACHINERY, OR MAKE ANY IMPORTANT DECISIONS FOR AT LEAST 24 HOURS AFTER THE INFUSION.     2. Call the pain center at [phone] with any problems, questions, or concerns.     3. Eat light after the infusion. If you feel queasy or sick to your stomach, laying down with your eyes closed may help. When you resume eating start with something mild like clear liquids, yogurt, applesauce, crackers, etc… Gradually advance to a regular diet.     4. Do not leave your house alone the evening of your pain infusion.     5. No alcohol or sedative medications, such as sleeping pills, for 24 hours after your pain infusion.     6. Resume all other prescribed medications unless directed otherwise by you physician.     7. If you have any medical emergencies, call 911 or go directly to the closest emergency room.

## 2025-04-23 ENCOUNTER — APPOINTMENT (OUTPATIENT)
Dept: PAIN MEDICINE | Facility: CLINIC | Age: 55
End: 2025-04-23
Payer: COMMERCIAL

## 2025-04-28 ENCOUNTER — HOSPITAL ENCOUNTER (OUTPATIENT)
Dept: CARDIOLOGY | Facility: CLINIC | Age: 55
Discharge: HOME | End: 2025-04-28
Payer: COMMERCIAL

## 2025-04-28 DIAGNOSIS — R55 SYNCOPE, UNSPECIFIED SYNCOPE TYPE: ICD-10-CM

## 2025-04-28 DIAGNOSIS — Z95.818 IMPLANTABLE LOOP RECORDER PRESENT: ICD-10-CM

## 2025-04-28 DIAGNOSIS — R00.2 PALPITATIONS: ICD-10-CM

## 2025-04-28 NOTE — PROGRESS NOTES
Chief Complaint   Patient presents with    Left Shoulder - Follow-up     Biceps tendinitis, s/p USG CSI 4/1/2025 with RW.       History of Present Illness:  4/29/25: He was given a USG left tendon sheath cortisone injection by Dr. Holbrook on 4/1/25. His shoulder continues to bother him. He feels he did not get much relief with the injection. He has several things going in his personal life at this time that he is worried about.     01/28/25: He is here today to review the results of his recent MRI of the left  shoulder. He continues to have pain in the anterior shoulder and into the biceps.     12/05/24: The MRI of his left shoulder was denied.  His arm is becoming unusable even with Celebrex and Lidocaine patches. He completed a longer than 6 week course of formal therapy in August, including exercises for his neck and shoulder in which he feels he injured his shoulder doing.  He has been doing home based exercise under physician direction with no improvement in his pain beyond his PT.    11/14/24: He had a left shoulder injection on 09/26/24. The injection helped for a little while but didn't give him much long term relief. He has a history of osteoporosis. He is in significant pain. He is seeing pain management on Monday.     09/26/24: Patient is here today for his left shoulder pain for the past 10 days. States he was at physical therapy for his neck working on the arm bike and the following morning noted increased pain and inflammation of the left shoulder.    Imaging:  X-rays left shoulder: No acute fractures no dislocations of the left shoulder.  No arthritic change  MRI left shoulder: No obvious tear, some fraying in the biceps.      Assessment:   Left shoulder biceps tendonitis    Plan:  Continue ice and Celebrex as needed.   We will set him up for a repeat USG glenohumeral injection with Dr. Holbrook in 4 weeks. We will have him follow-up 3 weeks after the injection.      Physical Exam:  Well-nourished,  well-developed. No acute distress. Alert and oriented x3. Responds appropriately to questioning. Good mood. Normal affect.  Left shoulder:  ROM flexion to 135 degrees, externally rotates 45  5/5 rotator cuff strength  Pain in the biceps  Neurovascular exam normal distally  Palpable pedal pulse and good cap refill     Review of Systems:  GENERAL: Negative for malaise, significant weight loss, fever  MUSCULOSKELETAL: See HPI  NEURO:  Negative     Past Medical History:   Diagnosis Date    Anesthesia of skin     Numbness and tingling    Anxiety 09/21/2023    Bilateral myopia 09/21/2023    Cervical radicular pain 09/21/2023    Chronic patellofemoral pain of left knee 09/21/2023    Complex regional pain syndrome type 1 of left lower extremity 09/21/2023    COVID-19     VACCINATED    Cubital tunnel syndrome on left 09/21/2023    Cubital tunnel syndrome on right 09/21/2023    Disease of intestine, unspecified     Bowel trouble    Erectile dysfunction 09/21/2023    Foot joint pain 09/21/2023    Fractures     Gastroparesis     GERD (gastroesophageal reflux disease)     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    History of psychological trauma 09/21/2023    Hyperlipidemia     Hypertension     Incomplete RBBB 09/19/2024    Joint pain 09/21/2023    Kidney cysts 09/21/2023    Low back pain, unspecified 12/07/2022    Chronic low back pain without sciatica, unspecified back pain laterality    Low back pain, unspecified 08/09/2022    Lumbago    Lumbar spondylosis 09/21/2023    Lung nodule 09/21/2023    RIGHT    Metatarsalgia of left foot 09/21/2023    Myopia, bilateral 02/19/2019    Myopia of both eyes with astigmatism and presbyopia    Non-sustained ventricular tachycardia (Multi)     Osteoarthritis     Osteochondral defect of patella 09/21/2023    Osteoporosis     Other spondylosis, cervical region 09/21/2023    Patellofemoral arthritis 09/21/2023    Personal history of other diseases of the musculoskeletal system and connective tissue      History of arthritis    Personal history of other specified conditions     History of seizure    Reflex sympathetic dystrophy 2023    Scapular dyskinesis 2023    Seizure disorder (Multi)     D/T TRAMADOL REACTION - 25 YEARS AGO    Shoulder impingement 2023    Stable angina ([facility])     Suspected sleep apnea 2023    Trauma and stressor-related disorder 2023    Trochanteric bursitis of both hips 2023       Medication Documentation Review Audit       Reviewed by Taina Ahuja RN (Registered Nurse) on 25 at 1229      Medication Order Taking? Sig Documenting Provider Last Dose Status   acetaminophen (Tylenol) 500 mg tablet [id number] No Take 2 tablets (1,000 mg) by mouth 3 times a day. Olivia Alonzo MD 3/26/2025 Active   albuterol (Ventolin HFA) 90 mcg/actuation inhaler [id number] No Inhale 2 puffs every 4 hours if needed for wheezing. Historical MD Isaac Past Week Active   ammonium lactate (Lac-Hydrin) 12 % lotion [id number] No Apply 1 Application topically if needed for dry skin (apply to feet). Bre Gray MD 3/26/2025 Active   atorvastatin (Lipitor) 80 mg tablet [id number] No Take 1 tablet (80 mg) by mouth once daily. Ronni Dey MD 3/26/2025 Active   celecoxib (CeleBREX) 200 mg capsule [id number] No Take 1 capsule (200 mg) by mouth 2 times a day. Bre Gray MD 3/26/2025 Active   diazePAM (Valium) 5 mg tablet [id number] No Take 1 tablet (5 mg) by mouth 2 times a day as needed for anxiety. Bre Gray MD 3/26/2025 Active   DULoxetine (Cymbalta) 30 mg DR capsule [id number] No Take 3 capsules (90 mg) by mouth once daily. Do not crush or chew. Janette Sales,  3/26/2025  [id number]   econazole nitrate 1 % cream [id number] No Apply 1 Application topically 2 times a day. Applies to feet Bre Gray MD 3/26/2025 Active   ezetimibe (Zetia) 10 mg tablet [id number] No Take 1 tablet (10 mg) by mouth once daily. Ronni MCKEON  "MD Tiburcio 3/26/2025 Active   fluticasone (Flonase) 50 mcg/actuation nasal spray [id number] No Administer 1 spray into each nostril once daily as needed. Historical Provider, MD 3/26/2025 Active   lidocaine (Lidoderm) 5 % patch [id number] No apply 2 patches to affected area daily as needed. Olivia Alonzo MD 3/26/2025 Active   magnesium oxide (Mag-Ox) 400 mg (241.3 mg magnesium) tablet [id number] No Take 1 tablet (400 mg) by mouth once daily. Ronni Dey MD 3/26/2025 Active   metoprolol succinate XL (Toprol-XL) 25 mg 24 hr tablet [id number] No Take 1 tablet (25 mg) by mouth once daily. Do not crush or chew. Ronni Dey MD 3/26/2025 Active   omeprazole (PriLOSEC) 40 mg DR capsule [id number] No Take 1 capsule (40 mg) by mouth 2 times a day. For 90 days Historical MD Isaac 3/26/2025 Active   oxyCODONE (Roxicodone) 5 mg immediate release tablet [id number] No Take 1 tablet (5 mg) by mouth 2 times a day as needed for severe pain (7 - 10). Historical Provider, MD 3/26/2025 Active   pregabalin (Lyrica) 300 mg capsule [id number] No Take 1 capsule (300 mg) by mouth 2 times a day. Vish Barboza PA-C 3/26/2025 Active   sildenafil (Viagra) 100 mg tablet [id number] No Take 1 tablet (100 mg) by mouth once daily as needed for erectile dysfunction. Historical Provider, MD Unknown Active   tiZANidine (Zanaflex) 4 mg capsule [id number] No Take 1 capsule (4 mg) by mouth 3 times a day as needed for muscle spasms. Historical Provider, MD 3/26/2025 Active                    Allergies   Allergen Reactions    Morphine Hives, Rash and Shortness of breath     Tolerates hydromorphone    Tylenol 3      Tolerates hydromorphone    Ultram [Tramadol] Seizure    Alprazolam Psychosis     \"GOES CRAZY\", psychosis    Ketorolac Seizure    Fish Containing Products Hives and Itching     IT WAS A PROCESSED FISH MEAL, BUT STATES EATS FRESH AND FROZEN FISH ALL THE TIME.    Opioids - Morphine Analogues Rash       Social History "     Socioeconomic History    Marital status: Significant Other     Spouse name: Not on file    Number of children: Not on file    Years of education: Not on file    Highest education level: Not on file   Occupational History    Not on file   Tobacco Use    Smoking status: Never    Smokeless tobacco: Never    Tobacco comments:     It killed my mother smoking and she left me with a gift a nodule on my right lung   Vaping Use    Vaping status: Never Used   Substance and Sexual Activity    Alcohol use: Not Currently    Drug use: Not Currently     Comment: Ketamine treatments for pain,eatable marijuana    Sexual activity: Not Currently     Partners: Female     Birth control/protection: None   Other Topics Concern    Not on file   Social History Narrative    Not on file     Social Drivers of Health     Financial Resource Strain: Low Risk  (3/27/2025)    Overall Financial Resource Strain (CARDIA)     Difficulty of Paying Living Expenses: Not hard at all   Food Insecurity: No Food Insecurity (3/26/2025)    Hunger Vital Sign     Worried About Running Out of Food in the Last Year: Never true     Ran Out of Food in the Last Year: Never true   Transportation Needs: No Transportation Needs (3/27/2025)    PRAPARE - Transportation     Lack of Transportation (Medical): No     Lack of Transportation (Non-Medical): No   Physical Activity: Sufficiently Active (3/26/2025)    Exercise Vital Sign     Days of Exercise per Week: 5 days     Minutes of Exercise per Session: 30 min   Stress: No Stress Concern Present (3/26/2025)    [employer] - Occupational Stress Questionnaire     Feeling of Stress : Only a little   Social Connections: Moderately Isolated (3/26/2025)    Social Connection and Isolation Panel [NHANES]     Frequency of Communication with Friends and Family: More than three times a week     Frequency of Social Gatherings with Friends and Family: Once a week     Attends Yarsani Services: Never      Active Member of Clubs or Organizations: No     Attends Club or Organization Meetings: Never     Marital Status: Living with partner   Intimate Partner Violence: Not At Risk (3/26/2025)    Humiliation, Afraid, Rape, and Kick questionnaire     Fear of Current or Ex-Partner: No     Emotionally Abused: No     Physically Abused: No     Sexually Abused: No   Housing Stability: Low Risk  (3/27/2025)    Housing Stability Vital Sign     Unable to Pay for Housing in the Last Year: No     Number of Times Moved in the Last Year: 0     Homeless in the Last Year: No       Past Surgical History:   Procedure Laterality Date    ACHILLES TENDON SURGERY  1978 2016    CARDIAC CATHETERIZATION N/A 12/20/2024    Procedure: Left Heart Cath;  Surgeon: Kale Funk MD;  Location: [facility] Cardiac Cath Lab;  Service: Cardiovascular;  Laterality: N/A;    CARDIAC ELECTROPHYSIOLOGY PROCEDURE N/A 1/31/2025    Procedure: Loop Insertion (71772);  Surgeon: Hai Ga MD;  Location: [facility] Cardiac Cath Lab;  Service: Electrophysiology;  Laterality: N/A;    CARDIOVASCULAR STRESS TEST      CT ABDOMEN PELVIS ANGIOGRAM W AND/OR WO IV CONTRAST  03/18/2020    CT ABDOMEN PELVIS ANGIOGRAM W AND/OR WO IV CONTRAST 3/18/2020 [facility] EMERGENCY LEGACY    CT ANGIO NECK  12/16/2022    CT NECK ANGIO W AND WO IV CONTRAST 12/16/2022 DOCTOR OFFICE LEGACY    CT HEAD ANGIO W AND WO IV CONTRAST  12/16/2022    CT HEAD ANGIO W AND WO IV CONTRAST 12/16/2022 DOCTOR OFFICE LEGACY    HIP SURGERY Left     REPLACEMENT    JOINT REPLACEMENT      LEFT KNEE    KNEE ARTHROPLASTY Bilateral 06/13/2017    Knee Arthroplasty    OTHER SURGICAL HISTORY  01/06/2022    Ulnar nerve neuroplasty    OTHER SURGICAL HISTORY Bilateral 07/26/2019    Foot surgery  -- HARDWARE    OTHER SURGICAL HISTORY Bilateral     BILATERAL ANKLE SCOPE    OTHER SURGICAL HISTORY      POP ABDOMINAL PROCEDURE    PLANTAR FASCIA RELEASE      SHOULDER SURGERY Bilateral 06/13/2017    Shoulder Surgery       CT ABDOMEN  PELVIS W IV CONTRAST    Result Date: 1/22/2024  * * *Final Report* * * DATE OF EXAM: Jan 22 2024  8:50AM   [facility]   0530  -  CT ABD/PEL W IVCON  / ACCESSION #  [id number] PROCEDURE REASON: Abdominal pain, acute, nonlocalized      * * * * Physician Interpretation * * * *  EXAMINATION:  CT ABDOMEN AND PELVIS WITH IV CONTRAST CLINICAL HISTORY: Acute abdominal pain TECHNIQUE: CT of the abdomen and pelvis was performed using standard technique, scanning from just above the dome of the diaphragm to the symphysis pubis. MQ:  CTAP_3 Contrast: IV:  100 ml of Omnipaque 350 CT Radiation dose: Integrated Dose-length product (DLP) for this visit =   269 mGy*cm. CT Dose Reduction Employed: Automated exposure control(AEC) and iterative recon COMPARISON: 12/26/2023 RESULT: Liver: No mass. Biliary: No bile duct dilation.  The gallbladder is within normal limits for the modality. Spleen: No mass. No splenomegaly. Pancreas: No mass or duct dilation. Adrenals: No mass. Kidneys: There are symmetric bilateral nephrograms.  In the medial right upper renal pole there is a 4.3 cm exophytic hypodensity consistent with a cyst. GI tract: No dilation or wall thickening. There is a small sliding hiatal hernia.  There is colonic residue with a moderate to large stool burden.   There is no acute appendicitis or diverticulitis. Lymph nodes: No abdominal or pelvic lymphadenopathy. Mesentery/Peritoneum: No ascites or mass. Retroperitoneum: No mass. Vasculature:  - Abdominal aorta and iliac arteries: Atherosclerotic calcifications without aneurysm.  - Celiac and SMA: Questionable mild narrowing at the origin of the celiac axis.  - Portal venous system (SMV, splenic vein, portal vein and branches): Patent.  - Hepatic veins: Patent. Pelvis: No mass, ascites or fluid collection. There is artifact from a left hip arthroplasty. Bones/Soft Tissues: There are no aggressive osseous lesions identified. Lower thorax: Bibasilar scarring.  (topogram) images: No  additional findings.    IMPRESSION: Stable appearance.  There is no acute intra-abdominal process identified. : [facility]   Transcribe Date/Time: Jan 22 2024  8:50A Dictated by : INES ROLON MD This examination was interpreted and the report reviewed and electronically signed by: INES ROLON MD on Jan 22 2024  9:05AM  EST    US GALLBLADDER    Result Date: 1/22/2024  * * *Final Report* * * DATE OF EXAM: Jan 22 2024  6:48AM   [facility]   1032  -  US ABD RIGHT UPPER QUADRANT  / ACCESSION #  [id number] PROCEDURE REASON: RUQ pain, no fever, no elev WBC      * * * * Physician Interpretation * * * *  EXAMINATION:   RIGHT UPPER QUADRANT ULTRASOUND CLINICAL HISTORY: RIGHT upper quadrant pain TECHNIQUE:  Sonography of the right upper quadrant  was performed.   Images were obtained and stored in a permanent archive. MQ:  URUQ_2 COMPARISON: CT abdomen pelvis 12/26/2023 RESULT: Pancreas: Not well visualized Liver:      Echotexture:  Homogeneous      Echogenicity:  Increased      Surface contour:  Smooth      Lesions:  None. Biliary: No intrahepatic biliary duct dilation.      CBD: 0.3 cm at the hilum.      Gallbladder: Normal caliber           -Contents:  No cholelithiasis           -Wall:  Normal           -Other:  No pericholecystic fluid.  Negative sonographic Bautista sign. Right Kidney: No hydronephrosis.  Anechoic upper pole cyst with lobular borders measures 3.1 x 2.6 x 3.8 cm.  This appears unchanged from the prior CT. Ascites: None.    IMPRESSION: Hepatic steatosis.  Otherwise, no acute sonographic abnormality in the RIGHT upper quadrant. : [facility]   Transcribe Date/Time: Jan 22 2024  6:55A Dictated by : DEE MCKEON MD This examination was interpreted and the report reviewed and electronically signed by: DEE MCKEON MD on Jan 22 2024  6:59AM  EST    XR CHEST 1 VIEW    Result Date: 1/22/2024  * * *Final Report* * * DATE OF EXAM: Jan 22 2024  6:22AM   V[facility]   [id number]  -  XR CHEST 1V FRONTAL  PORT  / ACCESSION #  [id number] PROCEDURE REASON: Tachycardia      * * * * Physician Interpretation * * * *  EXAMINATION:  CHEST RADIOGRAPH (SINGLE VIEW AP OR PA) CLINICAL INFORMATION (AS PROVIDED BY ORDERING CLINICIAN) :  Tachycardia Comparison:  9/3/2023 RESULT: Lines, tubes, and devices:  N/A Lungs and pleura:  No confluent infiltrate, large pleural effusion or pneumothorax.   Cardiomediastinal silhouette:  Within normal limits. Other:  No acute bony abnormality identified.    IMPRESSION: No significant acute radiographic abnormality. : [facility]   Transcribe Date/Time: Jan 22 2024  6:28A Dictated by : MIRNA CHAO MD This examination was interpreted and the report reviewed and electronically signed by: MIRNA CHAO MD on Jan 22 2024  6:30AM  EST        Scribe Attestation  By signing my name below, IArgelia, Scribrivera   attest that this documentation has been prepared under the direction and in the presence of Royce Louis MD.

## 2025-04-29 ENCOUNTER — OFFICE VISIT (OUTPATIENT)
Dept: ORTHOPEDIC SURGERY | Facility: CLINIC | Age: 55
End: 2025-04-29
Payer: COMMERCIAL

## 2025-04-29 DIAGNOSIS — S93.401A SPRAIN OF RIGHT ANKLE, INITIAL ENCOUNTER: ICD-10-CM

## 2025-04-29 PROCEDURE — 99212 OFFICE O/P EST SF 10 MIN: CPT | Performed by: ORTHOPAEDIC SURGERY

## 2025-04-29 PROCEDURE — 99213 OFFICE O/P EST LOW 20 MIN: CPT | Performed by: ORTHOPAEDIC SURGERY

## 2025-04-29 PROCEDURE — E0114 CRUTCH UNDERARM PAIR NO WOOD: HCPCS | Performed by: ORTHOPAEDIC SURGERY

## 2025-04-30 ENCOUNTER — APPOINTMENT (OUTPATIENT)
Dept: PAIN MEDICINE | Facility: CLINIC | Age: 55
End: 2025-04-30
Payer: COMMERCIAL

## 2025-04-30 ENCOUNTER — HOSPITAL ENCOUNTER (OUTPATIENT)
Dept: CARDIOLOGY | Facility: CLINIC | Age: 55
Discharge: HOME | End: 2025-04-30
Payer: COMMERCIAL

## 2025-04-30 DIAGNOSIS — R00.2 PALPITATIONS: ICD-10-CM

## 2025-04-30 DIAGNOSIS — Z95.818 IMPLANTABLE LOOP RECORDER PRESENT: ICD-10-CM

## 2025-04-30 DIAGNOSIS — R55 SYNCOPE, UNSPECIFIED SYNCOPE TYPE: ICD-10-CM

## 2025-05-03 ENCOUNTER — HOSPITAL ENCOUNTER (EMERGENCY)
Facility: HOSPITAL | Age: 55
Discharge: HOME | End: 2025-05-03
Attending: STUDENT IN AN ORGANIZED HEALTH CARE EDUCATION/TRAINING PROGRAM
Payer: COMMERCIAL

## 2025-05-03 ENCOUNTER — APPOINTMENT (OUTPATIENT)
Dept: CARDIOLOGY | Facility: HOSPITAL | Age: 55
End: 2025-05-03
Payer: COMMERCIAL

## 2025-05-03 ENCOUNTER — APPOINTMENT (OUTPATIENT)
Dept: RADIOLOGY | Facility: HOSPITAL | Age: 55
End: 2025-05-03
Payer: COMMERCIAL

## 2025-05-03 VITALS
OXYGEN SATURATION: 96 % | WEIGHT: 175 LBS | HEIGHT: 70 IN | SYSTOLIC BLOOD PRESSURE: 126 MMHG | HEART RATE: 68 BPM | DIASTOLIC BLOOD PRESSURE: 83 MMHG | RESPIRATION RATE: 16 BRPM | BODY MASS INDEX: 25.05 KG/M2 | TEMPERATURE: 97.9 F

## 2025-05-03 DIAGNOSIS — S93.401A SPRAIN OF RIGHT ANKLE, INITIAL ENCOUNTER: Primary | ICD-10-CM

## 2025-05-03 LAB — GLUCOSE BLD MANUAL STRIP-MCNC: 94 MG/DL (ref 74–99)

## 2025-05-03 PROCEDURE — 99284 EMERGENCY DEPT VISIT MOD MDM: CPT | Performed by: STUDENT IN AN ORGANIZED HEALTH CARE EDUCATION/TRAINING PROGRAM

## 2025-05-03 PROCEDURE — 93005 ELECTROCARDIOGRAM TRACING: CPT

## 2025-05-03 PROCEDURE — 82947 ASSAY GLUCOSE BLOOD QUANT: CPT

## 2025-05-03 PROCEDURE — 73630 X-RAY EXAM OF FOOT: CPT | Mod: RIGHT SIDE | Performed by: RADIOLOGY

## 2025-05-03 PROCEDURE — 2500000001 HC RX 250 WO HCPCS SELF ADMINISTERED DRUGS (ALT 637 FOR MEDICARE OP): Performed by: STUDENT IN AN ORGANIZED HEALTH CARE EDUCATION/TRAINING PROGRAM

## 2025-05-03 PROCEDURE — 73630 X-RAY EXAM OF FOOT: CPT | Mod: RT

## 2025-05-03 PROCEDURE — 73610 X-RAY EXAM OF ANKLE: CPT | Mod: RIGHT SIDE | Performed by: RADIOLOGY

## 2025-05-03 PROCEDURE — 73610 X-RAY EXAM OF ANKLE: CPT | Mod: RT

## 2025-05-03 RX ORDER — ACETAMINOPHEN 325 MG/1
650 TABLET ORAL ONCE
Status: COMPLETED | OUTPATIENT
Start: 2025-05-03 | End: 2025-05-03

## 2025-05-03 RX ADMIN — ACETAMINOPHEN 650 MG: 325 TABLET ORAL at 18:40

## 2025-05-03 ASSESSMENT — PAIN SCALES - GENERAL
PAINLEVEL_OUTOF10: 10 - WORST POSSIBLE PAIN

## 2025-05-03 ASSESSMENT — PAIN DESCRIPTION - LOCATION: LOCATION: ANKLE

## 2025-05-03 ASSESSMENT — PAIN - FUNCTIONAL ASSESSMENT
PAIN_FUNCTIONAL_ASSESSMENT: 0-10

## 2025-05-03 ASSESSMENT — PAIN DESCRIPTION - PAIN TYPE: TYPE: CHRONIC PAIN

## 2025-05-03 NOTE — ED PROVIDER NOTES
EMERGENCY DEPARTMENT ENCOUNTER      Pt Name: Akin Lynn  MRN: [id number]  Birthdate 1970  Date of evaluation: 5/3/2025    HISTORY OF PRESENT ILLNESS    Akin Lynn is an 55 y.o. male with history including *** presenting to the emergency department for ***      PAST MEDICAL HISTORY   Medical History[1]    SURGICAL HISTORY     Surgical History[2]    CURRENT MEDICATIONS       Previous Medications    ACETAMINOPHEN (TYLENOL) 500 MG TABLET    Take 2 tablets (1,000 mg) by mouth 3 times a day.    ALBUTEROL (VENTOLIN HFA) 90 MCG/ACTUATION INHALER    Inhale 2 puffs every 4 hours if needed for wheezing.    AMMONIUM LACTATE (LAC-HYDRIN) 12 % LOTION    Apply 1 Application topically if needed for dry skin (apply to feet).    ATORVASTATIN (LIPITOR) 80 MG TABLET    Take 1 tablet (80 mg) by mouth once daily.    CELECOXIB (CELEBREX) 200 MG CAPSULE    Take 1 capsule (200 mg) by mouth 2 times a day.    DIAZEPAM (VALIUM) 5 MG TABLET    Take 1 tablet (5 mg) by mouth 2 times a day as needed for anxiety.    DULOXETINE (CYMBALTA) 30 MG DR CAPSULE    Take 3 capsules (90 mg) by mouth once daily. Do not crush or chew.    ECONAZOLE NITRATE 1 % CREAM    Apply 1 Application topically 2 times a day. Applies to feet    EZETIMIBE (ZETIA) 10 MG TABLET    Take 1 tablet (10 mg) by mouth once daily.    FLUTICASONE (FLONASE) 50 MCG/ACTUATION NASAL SPRAY    Administer 1 spray into each nostril once daily as needed.    LIDOCAINE (LIDODERM) 5 % PATCH    apply 2 patches to affected area daily as needed.    MAGNESIUM OXIDE (MAG-OX) 400 MG (241.3 MG MAGNESIUM) TABLET    Take 1 tablet (400 mg) by mouth once daily.    METOPROLOL SUCCINATE XL (TOPROL-XL) 25 MG 24 HR TABLET    Take 1 tablet (25 mg) by mouth once daily. Do not crush or chew.    OMEPRAZOLE (PRILOSEC) 40 MG DR CAPSULE    Take 1 capsule (40 mg) by mouth 2 times a day. For 90 days    OXYCODONE (ROXICODONE) 5 MG IMMEDIATE RELEASE TABLET    Take 1 tablet (5 mg) by mouth 2 times a day as  needed for severe pain (7 - 10).    PREGABALIN (LYRICA) 300 MG CAPSULE    Take 1 capsule (300 mg) by mouth 2 times a day.    SILDENAFIL (VIAGRA) 100 MG TABLET    Take 1 tablet (100 mg) by mouth once daily as needed for erectile dysfunction.    TIZANIDINE (ZANAFLEX) 4 MG CAPSULE    Take 1 capsule (4 mg) by mouth 3 times a day as needed for muscle spasms.       ALLERGIES     Morphine, Ultram [tramadol], Alprazolam, Ketorolac, Fish containing products, and Opioids - morphine analogues    FAMILY HISTORY     Family History[3]     SOCIAL HISTORY     Social History[4]    PHYSICAL EXAM       ED Triage Vitals [05/03/25 1811]   Temperature Heart Rate Respirations BP   36.6 °C (97.9 °F) 93 18 (!) 144/95      Pulse Ox Temp Source Heart Rate Source Patient Position   96 % Temporal Monitor Sitting      BP Location FiO2 (%)     Right arm --       Physical Exam     DIAGNOSTIC RESULTS     LABS:  Labs Reviewed   POCT GLUCOSE - Normal       Result Value    POCT Glucose 94     POCT GLUCOSE METER       All other labs were within normal range or not returned as of this dictation.    Imaging  XR ankle right 3+ views    (Results Pending)        Procedures  Procedures     EMERGENCY DEPARTMENT COURSE/MDM:   Medical Decision Making            External records reviewed: recent inpatient, clinic, and prior ED notes  Labs and Diagnostic imaging independently reviewed/interpreted by me.    Patient plan, care, lab results and imaging were all discussed with attending.    ED Medications administered this visit:    Medications   acetaminophen (Tylenol) tablet 650 mg (650 mg oral Given 5/3/25 1840)     New Prescriptions from this visit:    New Prescriptions    No medications on file       (Please note that portions of this note were completed with a voice recognition program.  Efforts were made to edit the dictations but occasionally words are mis-transcribed.)       [1]   Past Medical History:  Diagnosis Date    Anesthesia of skin     Numbness and  tingling    Anxiety 09/21/2023    Bilateral myopia 09/21/2023    Cervical radicular pain 09/21/2023    Chronic patellofemoral pain of left knee 09/21/2023    Complex regional pain syndrome type 1 of left lower extremity 09/21/2023    COVID-19     VACCINATED    Cubital tunnel syndrome on left 09/21/2023    Cubital tunnel syndrome on right 09/21/2023    Disease of intestine, unspecified     Bowel trouble    Erectile dysfunction 09/21/2023    Foot joint pain 09/21/2023    Fractures     Gastroparesis     GERD (gastroesophageal reflux disease)     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    History of psychological trauma 09/21/2023    Hyperlipidemia     Hypertension     Incomplete RBBB 09/19/2024    Joint pain 09/21/2023    Kidney cysts 09/21/2023    Low back pain, unspecified 12/07/2022    Chronic low back pain without sciatica, unspecified back pain laterality    Low back pain, unspecified 08/09/2022    Lumbago    Lumbar spondylosis 09/21/2023    Lung nodule 09/21/2023    RIGHT    Metatarsalgia of left foot 09/21/2023    Myopia, bilateral 02/19/2019    Myopia of both eyes with astigmatism and presbyopia    Non-sustained ventricular tachycardia (Multi)     Osteoarthritis     Osteochondral defect of patella 09/21/2023    Osteoporosis     Other spondylosis, cervical region 09/21/2023    Patellofemoral arthritis 09/21/2023    Personal history of other diseases of the musculoskeletal system and connective tissue     History of arthritis    Personal history of other specified conditions     History of seizure    Reflex sympathetic dystrophy 09/21/2023    Scapular dyskinesis 09/21/2023    Seizure disorder (Multi)     D/T TRAMADOL REACTION - 25 YEARS AGO    Shoulder impingement 09/21/2023    Stable angina (CMS-[facility])     Suspected sleep apnea 09/21/2023    Trauma and stressor-related disorder 09/21/2023    Trochanteric bursitis of both hips 09/22/2023   [2]   Past Surgical History:  Procedure Laterality Date    ACHILLES TENDON SURGERY  1978  2016    CARDIAC CATHETERIZATION N/A 12/20/2024    Procedure: Left Heart Cath;  Surgeon: Kale Funk MD;  Location: [facility] Cardiac Cath Lab;  Service: Cardiovascular;  Laterality: N/A;    CARDIAC ELECTROPHYSIOLOGY PROCEDURE N/A 1/31/2025    Procedure: Loop Insertion (21385);  Surgeon: Hai Ga MD;  Location: [facility] Cardiac Cath Lab;  Service: Electrophysiology;  Laterality: N/A;    CARDIOVASCULAR STRESS TEST      CT ABDOMEN PELVIS ANGIOGRAM W AND/OR WO IV CONTRAST  03/18/2020    CT ABDOMEN PELVIS ANGIOGRAM W AND/OR WO IV CONTRAST 3/18/2020 [facility] EMERGENCY LEGACY    CT ANGIO NECK  12/16/2022    CT NECK ANGIO W AND WO IV CONTRAST 12/16/2022 DOCTOR OFFICE LEGACY    CT HEAD ANGIO W AND WO IV CONTRAST  12/16/2022    [facility] HEAD [facility] AND [facility]    HIP SURGERY Left     REPLACEMENT    JOINT REPLACEMENT      LEFT KNEE    KNEE ARTHROPLASTY Bilateral 06/13/2017    Knee Arthroplasty    OTHER SURGICAL HISTORY  01/06/2022    Ulnar nerve neuroplasty    OTHER SURGICAL HISTORY Bilateral 07/26/2019    Foot surgery  -- HARDWARE    OTHER SURGICAL HISTORY Bilateral     BILATERAL ANKLE SCOPE    OTHER SURGICAL HISTORY      POP ABDOMINAL PROCEDURE    PLANTAR FASCIA RELEASE      SHOULDER SURGERY Bilateral 06/13/2017    Shoulder Surgery   [3]   Family History  Problem Relation Name Age of Onset    Lung cancer Mother      Glaucoma Father      Skin cancer Father      Hypertension Other mul fam mem     Heart disease Other mul fam mem    [4]   Social History  Socioeconomic History    Marital status: Significant Other   Tobacco Use    Smoking status: Never    Smokeless tobacco: Never    Tobacco comments:     It killed my mother smoking and she left me with a gift a nodule on my right lung   Vaping Use    Vaping status: Never Used   Substance and Sexual Activity    Alcohol use: Not Currently    Drug use: Not Currently     Comment: Ketamine treatments for pain,eatable marijuana    Sexual activity: Not  Currently     Partners: Female     Birth control/protection: None     Social Drivers of Health     Financial Resource Strain: Low Risk  (3/27/2025)    Overall Financial Resource Strain (CARDIA)     Difficulty of Paying Living Expenses: Not hard at all   Food Insecurity: No Food Insecurity (3/26/2025)    Hunger Vital Sign     Worried About Running Out of Food in the Last Year: Never true     Ran Out of Food in the Last Year: Never true   Transportation Needs: No Transportation Needs (3/27/2025)    PRAPARE - Transportation     Lack of Transportation (Medical): No     Lack of Transportation (Non-Medical): No   Physical Activity: Sufficiently Active (3/26/2025)    Exercise Vital Sign     Days of Exercise per Week: 5 days     Minutes of Exercise per Session: 30 min   Stress: No Stress Concern Present (3/26/2025)    [place] [employer] - Occupational Stress Questionnaire     Feeling of Stress : Only a little   Social Connections: Moderately Isolated (3/26/2025)    Social Connection and Isolation Panel [NHANES]     Frequency of Communication with Friends and Family: More than three times a week     Frequency of Social Gatherings with Friends and Family: Once a week     Attends Congregational Services: Never     Active Member of Clubs or Organizations: No     Attends Club or Organization Meetings: Never     Marital Status: Living with partner   Intimate Partner Violence: Not At Risk (3/26/2025)    Humiliation, Afraid, Rape, and Kick questionnaire     Fear of Current or Ex-Partner: No     Emotionally Abused: No     Physically Abused: No     Sexually Abused: No   Housing Stability: Low Risk  (3/27/2025)    Housing Stability Vital Sign     Unable to Pay for Housing in the Last Year: No     Number of Times Moved in the Last Year: 0     Homeless in the Last Year: No      REPLACEMENT      LEFT KNEE    KNEE ARTHROPLASTY Bilateral 06/13/2017    Knee Arthroplasty    OTHER SURGICAL HISTORY  01/06/2022    Ulnar nerve neuroplasty    OTHER SURGICAL HISTORY Bilateral 07/26/2019    Foot surgery  -- HARDWARE    OTHER SURGICAL HISTORY Bilateral     BILATERAL ANKLE SCOPE    OTHER SURGICAL HISTORY      POP ABDOMINAL PROCEDURE    PLANTAR FASCIA RELEASE      SHOULDER SURGERY Bilateral 06/13/2017    Shoulder Surgery   [3]   Family History  Problem Relation Name Age of Onset    Lung cancer Mother      Glaucoma Father      Skin cancer Father      Hypertension Other mul fam mem     Heart disease Other mul fam mem    [4]   Social History  Socioeconomic History    Marital status: Significant Other   Tobacco Use    Smoking status: Never    Smokeless tobacco: Never    Tobacco comments:     It killed my mother smoking and she left me with a gift a nodule on my right lung   Vaping Use    Vaping status: Never Used   Substance and Sexual Activity    Alcohol use: Not Currently    Drug use: Not Currently     Comment: Ketamine treatments for pain,eatable marijuana    Sexual activity: Not Currently     Partners: Female     Birth control/protection: None     Social Drivers of Health     Financial Resource Strain: Low Risk  (3/27/2025)    Overall Financial Resource Strain (CARDIA)     Difficulty of Paying Living Expenses: Not hard at all   Food Insecurity: No Food Insecurity (3/26/2025)    Hunger Vital Sign     Worried About Running Out of Food in the Last Year: Never true     Ran Out of Food in the Last Year: Never true   Transportation Needs: No Transportation Needs (3/27/2025)    PRAPARE - Transportation     Lack of Transportation (Medical): No     Lack of Transportation (Non-Medical): No   Physical Activity: Sufficiently Active (3/26/2025)    Exercise Vital Sign     Days of Exercise per Week: 5 days     Minutes of Exercise per Session: 30 min   Stress: No Stress Concern Present (3/26/2025)    [place]  [employer]ational [employer] - Occupational Stress Questionnaire     Feeling of Stress : Only a little   Social Connections: Moderately Isolated (3/26/2025)    Social Connection and Isolation Panel [NHANES]     Frequency of Communication with Friends and Family: More than three times a week     Frequency of Social Gatherings with Friends and Family: Once a week     Attends Moravian Services: Never     Active Member of Clubs or Organizations: No     Attends Club or Organization Meetings: Never     Marital Status: Living with partner   Intimate Partner Violence: Not At Risk (3/26/2025)    Humiliation, Afraid, Rape, and Kick questionnaire     Fear of Current or Ex-Partner: No     Emotionally Abused: No     Physically Abused: No     Sexually Abused: No   Housing Stability: Low Risk  (3/27/2025)    Housing Stability Vital Sign     Unable to Pay for Housing in the Last Year: No     Number of Times Moved in the Last Year: 0     Homeless in the Last Year: No        Tiffany Fisher DO  Resident  05/06/25 2020

## 2025-05-04 LAB
ATRIAL RATE: 82 BPM
P AXIS: 39 DEGREES
P OFFSET: 195 MS
P ONSET: 137 MS
PR INTERVAL: 174 MS
Q ONSET: 224 MS
QRS COUNT: 14 BEATS
QRS DURATION: 100 MS
QT INTERVAL: 380 MS
QTC CALCULATION(BAZETT): 443 MS
QTC FREDERICIA: 421 MS
R AXIS: 52 DEGREES
T AXIS: 37 DEGREES
T OFFSET: 414 MS
VENTRICULAR RATE: 82 BPM

## 2025-05-04 NOTE — DISCHARGE INSTRUCTIONS
Seek immediate medical attention if you develop: increasing pain, numbness, tingling, weakness, loss of motion in your ankle, your foot becomes pale or cold, or you develop any new or worsening symptoms.  I recommend elevation, Tylenol, compression.  You may utilize the crutches that you have at home for weightbearing as tolerated.

## 2025-05-05 ENCOUNTER — HOSPITAL ENCOUNTER (OUTPATIENT)
Dept: CARDIOLOGY | Facility: CLINIC | Age: 55
Discharge: HOME | End: 2025-05-05
Payer: COMMERCIAL

## 2025-05-05 DIAGNOSIS — R55 SYNCOPE, UNSPECIFIED SYNCOPE TYPE: ICD-10-CM

## 2025-05-05 DIAGNOSIS — Z95.818 IMPLANTABLE LOOP RECORDER PRESENT: ICD-10-CM

## 2025-05-05 DIAGNOSIS — R00.2 PALPITATIONS: ICD-10-CM

## 2025-05-06 ENCOUNTER — APPOINTMENT (OUTPATIENT)
Dept: ORTHOPEDIC SURGERY | Facility: CLINIC | Age: 55
End: 2025-05-06
Payer: COMMERCIAL

## 2025-05-07 ENCOUNTER — APPOINTMENT (OUTPATIENT)
Dept: PAIN MEDICINE | Facility: CLINIC | Age: 55
End: 2025-05-07
Payer: COMMERCIAL

## 2025-05-13 ENCOUNTER — APPOINTMENT (OUTPATIENT)
Dept: ORTHOPEDIC SURGERY | Facility: CLINIC | Age: 55
End: 2025-05-13
Payer: COMMERCIAL

## 2025-05-14 ENCOUNTER — APPOINTMENT (OUTPATIENT)
Dept: ORTHOPEDIC SURGERY | Facility: CLINIC | Age: 55
End: 2025-05-14
Payer: COMMERCIAL

## 2025-05-15 ENCOUNTER — HOSPITAL ENCOUNTER (OUTPATIENT)
Dept: CARDIOLOGY | Facility: CLINIC | Age: 55
Discharge: HOME | End: 2025-05-15
Payer: COMMERCIAL

## 2025-05-15 DIAGNOSIS — R55 SYNCOPE, UNSPECIFIED SYNCOPE TYPE: ICD-10-CM

## 2025-05-15 DIAGNOSIS — R00.2 PALPITATIONS: ICD-10-CM

## 2025-05-15 DIAGNOSIS — Z95.818 IMPLANTABLE LOOP RECORDER PRESENT: ICD-10-CM

## 2025-05-19 ENCOUNTER — HOSPITAL ENCOUNTER (OUTPATIENT)
Dept: CARDIOLOGY | Facility: CLINIC | Age: 55
Discharge: HOME | End: 2025-05-19
Payer: COMMERCIAL

## 2025-05-19 DIAGNOSIS — R55 SYNCOPE, UNSPECIFIED SYNCOPE TYPE: ICD-10-CM

## 2025-05-19 DIAGNOSIS — R00.2 PALPITATIONS: ICD-10-CM

## 2025-05-19 DIAGNOSIS — Z95.818 IMPLANTABLE LOOP RECORDER PRESENT: ICD-10-CM

## 2025-05-20 ENCOUNTER — OFFICE VISIT (OUTPATIENT)
Dept: PAIN MEDICINE | Facility: CLINIC | Age: 55
End: 2025-05-20
Payer: COMMERCIAL

## 2025-05-20 DIAGNOSIS — M47.816 LUMBAR SPONDYLOSIS: Primary | ICD-10-CM

## 2025-05-20 PROCEDURE — 99214 OFFICE O/P EST MOD 30 MIN: CPT | Performed by: PAIN MEDICINE

## 2025-05-20 ASSESSMENT — PAIN SCALES - GENERAL: PAINLEVEL_OUTOF10: 5 - MODERATE PAIN

## 2025-05-20 ASSESSMENT — PAIN - FUNCTIONAL ASSESSMENT: PAIN_FUNCTIONAL_ASSESSMENT: 0-10

## 2025-05-20 NOTE — PROGRESS NOTES
Patient here for follow up after lumbar epidural at the beginning of April. Patient reports no relief from the injection

## 2025-05-20 NOTE — H&P
History Of Present Illness  Akin Lynn is a 55 y.o. male      Patient here for follow up after lumbar epidural at the beginning of April. Patient reports no relief from the injection      Patient is here today complaining of the back pain that he believes was not relieved with the epidural steroid injection pointing out to the area around his scar from the surgery extending from the lower thoracic spine area to the upper lumbar spine area around L1-L2 L2-L3 and L3-L4 bilaterally.  Pain is aggravated with bending extending the spine and rotation and rating it at a level of 8 out of 10.  Describing pain in any activity including sitting laying down for too long or standing for too long.  Described as a throbbing aching sensation he is trying to establish himself through the [facility] to continue the ketamine infusions.  He continues to be on Celebrex Tylenol Cymbalta and Lyrica he is currently off the Percocet.  [facility] to [facility] a right ankle fusion     Past Medical History  Medical History[1]  Surgical History  Surgical History[2]  Social History  He reports that he has never smoked. He has never used smokeless tobacco. He reports that he does not currently use alcohol. He reports that he does not currently use drugs.    Family History  Family History[3]     Allergies  Allergies[4]  Review of Systems   All 13 systems were reviewed and are within normal levels except as noted below or per HPI. Positive and pertinent negative responses are noted below or in the HPI   Denied any fever or chills. No weight loss and no night sweats. No cough or sputum production. No diarrhea   No constipation  No bladder and bowel incontinence and no other changes in bladder and bowel. No skin changes.   Denied opioids diversion and abuse and denies alcoholism. Denies overuse of  pain medications.    Physical Exam       Past medical history no interval changes has been  noted    On physical examination    General   Alert, oriented x3 pleasant and cooperative. Does not look in any major distress.    HEENT  Pupils normal in size. Ears, nose, mouth, and throat appear to be in normal condition.  Head atraumatic      No signs of sedation or signs of withdrawal apparent.    Psychiatric   No signs of depression apparent.    Neuro   No focal neurological deficit apparent. Ambulation at baseline.  Tenderness upon the palpation of the facet joints around the L1-L2 L2-L3 bilaterally loading of the facet created tenderness with the flexion extension of the joint and with the rotation and extension    Respiratory  No respiratory distress     Abdomen  no distention     Skin  No skin markings supportive of recent IV drug usage .    Cardiovascular  Regular rate and rhythm    Last Recorded Vitals  There were no vitals taken for this visit.    Assessment/Plan   55 years old with history and physical examination supportive of lumbago lumbar spondylosis tried and failed conservative management in the past with a prior positive response to radiofrequency ablation to the lower facet joints    Plan  Knowing that the patient pain has persisted despite conservative management and currently the pain is localized in the upper part of the lumbar spine area I would recommend a diagnostic medial nerve branch block targeting the L1-L2 L2-L3 medial nerve branches bilaterally under fluoroscopic guidance if the patient described positive response at the time could proceed with the denervation with a radiofrequency ablation of the medial nerve branches bilaterally at the level of the L1-L2, L2-L3 under fluoroscopic guidance benefits and risk were discussed with the patient and he would like to proceed      The above clinical summary has been dictated with voice recognition software. It has not been proofread for grammatical errors, typographical mistakes, or other semantic inconsistencies.    Thank you for visiting  our office today. It was our pleasure to take part in your healthcare.     Please do not hesitate to contact the pain clinic after your visit with any questions or concerns at  M-F 8-4 pm       Carlyle Abel M.D.  Medical Director , Division of Pain Medicine [facility]   of Anesthesiology and Pain Medicine  [facility] [employer] [facility]     [place] [address] [address] [place] [address]     Office: [phone]  Fax: [phone]      Carlyle Abel MD       [1]   Past Medical History:  Diagnosis Date    Anesthesia of skin     Numbness and tingling    Anxiety 09/21/2023    Bilateral myopia 09/21/2023    Cervical radicular pain 09/21/2023    Chronic patellofemoral pain of left knee 09/21/2023    Complex regional pain syndrome type 1 of left lower extremity 09/21/2023    COVID-19     VACCINATED    Cubital tunnel syndrome on left 09/21/2023    Cubital tunnel syndrome on right 09/21/2023    Disease of intestine, unspecified     Bowel trouble    Erectile dysfunction 09/21/2023    Foot joint pain 09/21/2023    Fractures     Gastroparesis     GERD (gastroesophageal reflux disease)     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    History of psychological trauma 09/21/2023    Hyperlipidemia     Hypertension     Incomplete RBBB 09/19/2024    Joint pain 09/21/2023    Kidney cysts 09/21/2023    Low back pain, unspecified 12/07/2022    Chronic low back pain without sciatica, unspecified back pain laterality    Low back pain, unspecified 08/09/2022    Lumbago    Lumbar spondylosis 09/21/2023    Lung nodule 09/21/2023    RIGHT    Metatarsalgia of left foot 09/21/2023    Myopia, bilateral 02/19/2019    Myopia of both eyes with astigmatism and presbyopia    Non-sustained ventricular tachycardia (Multi)     Osteoarthritis     Osteochondral defect of patella  09/21/2023    Osteoporosis     Other spondylosis, cervical region 09/21/2023    Patellofemoral arthritis 09/21/2023    Personal history of other diseases of the musculoskeletal system and connective tissue     History of arthritis    Personal history of other specified conditions     History of seizure    Reflex sympathetic dystrophy 09/21/2023    Scapular dyskinesis 09/21/2023    Seizure disorder (Multi)     D/T TRAMADOL REACTION - 25 YEARS AGO    Shoulder impingement 09/21/2023    Stable angina (CMS-HCC)     Suspected sleep apnea 09/21/2023    Trauma and stressor-related disorder 09/21/2023    Trochanteric bursitis of both hips 09/22/2023   [2]   Past Surgical History:  Procedure Laterality Date    ACHILLES TENDON SURGERY  1978 2016    CARDIAC CATHETERIZATION N/A 12/20/2024    Procedure: Left Heart Cath;  Surgeon: Kale Funk MD;  Location: [facility] Cardiac Cath Lab;  Service: Cardiovascular;  Laterality: N/A;    CARDIAC ELECTROPHYSIOLOGY PROCEDURE N/A 1/31/2025    Procedure: Loop Insertion (05151);  Surgeon: Hai Ga MD;  Location: [facility] Cardiac Cath Lab;  Service: Electrophysiology;  Laterality: N/A;    CARDIOVASCULAR STRESS TEST      CT ABDOMEN PELVIS ANGIOGRAM W AND/OR WO IV CONTRAST  03/18/2020    CT ABDOMEN PELVIS ANGIOGRAM W AND/OR WO IV CONTRAST 3/18/2020 [facility] EMERGENCY LEGACY    CT ANGIO NECK  12/16/2022    CT NECK ANGIO W AND WO IV CONTRAST 12/16/2022 DOCTOR OFFICE LEGACY    CT HEAD ANGIO W AND WO IV CONTRAST  12/16/2022    CT HEAD ANGIO W AND WO IV [facility]    HIP SURGERY Left     REPLACEMENT    JOINT REPLACEMENT      LEFT KNEE    KNEE ARTHROPLASTY Bilateral 06/13/2017    Knee Arthroplasty    OTHER SURGICAL HISTORY  01/06/2022    Ulnar nerve neuroplasty    OTHER SURGICAL HISTORY Bilateral 07/26/2019    Foot surgery  -- HARDWARE    OTHER SURGICAL HISTORY Bilateral     BILATERAL ANKLE SCOPE    OTHER SURGICAL HISTORY      POP ABDOMINAL PROCEDURE    PLANTAR FASCIA  "RELEASE      SHOULDER SURGERY Bilateral 06/13/2017    Shoulder Surgery   [3]   Family History  Problem Relation Name Age of Onset    Lung cancer Mother      Glaucoma Father      Skin cancer Father      Hypertension Other mul fam mem     Heart disease Other mul fam mem    [4]   Allergies  Allergen Reactions    Morphine Hives, Rash and Shortness of breath     Tolerates hydromorphone    Tylenol 3      Tolerates hydromorphone    Ultram [Tramadol] Seizure    Alprazolam Psychosis     \"GOES CRAZY\", psychosis    Ketorolac Seizure    Fish Containing Products Hives and Itching     IT WAS A PROCESSED FISH MEAL, BUT STATES EATS FRESH AND FROZEN FISH ALL THE TIME.    Opioids - Morphine Analogues Rash     "

## 2025-05-22 ENCOUNTER — HOSPITAL ENCOUNTER (OUTPATIENT)
Dept: CARDIOLOGY | Facility: CLINIC | Age: 55
Discharge: HOME | End: 2025-05-22
Payer: COMMERCIAL

## 2025-05-22 ENCOUNTER — APPOINTMENT (OUTPATIENT)
Dept: INFUSION THERAPY | Facility: CLINIC | Age: 55
End: 2025-05-22
Payer: COMMERCIAL

## 2025-05-22 DIAGNOSIS — R00.2 PALPITATIONS: ICD-10-CM

## 2025-05-22 DIAGNOSIS — R55 SYNCOPE, UNSPECIFIED SYNCOPE TYPE: ICD-10-CM

## 2025-05-22 DIAGNOSIS — Z95.818 IMPLANTABLE LOOP RECORDER PRESENT: ICD-10-CM

## 2025-05-22 PROCEDURE — 93298 REM INTERROG DEV EVAL SCRMS: CPT

## 2025-05-27 ENCOUNTER — HOSPITAL ENCOUNTER (OUTPATIENT)
Dept: CARDIOLOGY | Facility: CLINIC | Age: 55
Discharge: HOME | End: 2025-05-27
Payer: COMMERCIAL

## 2025-05-27 DIAGNOSIS — Z95.818 IMPLANTABLE LOOP RECORDER PRESENT: ICD-10-CM

## 2025-05-27 DIAGNOSIS — R00.2 PALPITATIONS: ICD-10-CM

## 2025-05-27 DIAGNOSIS — R55 SYNCOPE, UNSPECIFIED SYNCOPE TYPE: ICD-10-CM

## 2025-05-28 ENCOUNTER — APPOINTMENT (OUTPATIENT)
Dept: ORTHOPEDIC SURGERY | Facility: CLINIC | Age: 55
End: 2025-05-28
Payer: COMMERCIAL

## 2025-05-28 ENCOUNTER — HOSPITAL ENCOUNTER (OUTPATIENT)
Dept: RADIOLOGY | Facility: EXTERNAL LOCATION | Age: 55
Discharge: HOME | End: 2025-05-28

## 2025-05-28 DIAGNOSIS — M75.22 BICEPS TENDINITIS OF LEFT SHOULDER: ICD-10-CM

## 2025-05-28 DIAGNOSIS — M75.42 SHOULDER IMPINGEMENT SYNDROME, LEFT: Primary | ICD-10-CM

## 2025-05-28 PROCEDURE — 20611 DRAIN/INJ JOINT/BURSA W/US: CPT | Performed by: STUDENT IN AN ORGANIZED HEALTH CARE EDUCATION/TRAINING PROGRAM

## 2025-05-28 RX ORDER — BETAMETHASONE SODIUM PHOSPHATE AND BETAMETHASONE ACETATE 3; 3 MG/ML; MG/ML
18 INJECTION, SUSPENSION INTRA-ARTICULAR; INTRALESIONAL; INTRAMUSCULAR; SOFT TISSUE
Status: COMPLETED | OUTPATIENT
Start: 2025-05-28 | End: 2025-05-28

## 2025-05-28 RX ORDER — LIDOCAINE HYDROCHLORIDE 10 MG/ML
6 INJECTION, SOLUTION INFILTRATION; PERINEURAL
Status: COMPLETED | OUTPATIENT
Start: 2025-05-28 | End: 2025-05-28

## 2025-05-28 RX ADMIN — LIDOCAINE HYDROCHLORIDE 6 ML: 10 INJECTION, SOLUTION INFILTRATION; PERINEURAL at 10:09

## 2025-05-28 RX ADMIN — BETAMETHASONE SODIUM PHOSPHATE AND BETAMETHASONE ACETATE 18 MG: 3; 3 INJECTION, SUSPENSION INTRA-ARTICULAR; INTRALESIONAL; INTRAMUSCULAR; SOFT TISSUE at 10:09

## 2025-05-28 NOTE — PROGRESS NOTES
Acute Injury Established Patient Visit    Patient ID: Akin Lynn is a 55 y.o. male  History of Present Illness  The patient is a 55-year-old male referred to Dr. Royce Louis for left shoulder issues.    Left Shoulder Issues  - Previous ultrasound-guided biceps tendon injection on 04/01/2025 was ineffective.  - Consulted Dr. Louis, decided on glenohumeral injection.  - Interested in proceeding with injection today.    Supplemental information: No recent falls, injuries, or signs of infection.       Assessment & Plan  1. Left shoulder impingement  - Administer glenohumeral injection today with ultrasound guidance, which was tolerated well without complications.  Postinjection instructions given.  - Educated patient on procedure, risks, and benefits  - Goals: improve ROM, reduce pain  - Monitor for infection  - Avoid strenuous activities for a few days  - Follow-up with Dr. Louis in 3 weeks    PROCEDURE  Ultrasound-guided glenohumeral injection performed for shoulder impingement.       Assessment:   Problem List Items Addressed This Visit    None  Visit Diagnoses         Shoulder impingement syndrome, left    -  Primary    Relevant Orders    Point of Care Ultrasound (Completed)      Biceps tendinitis of left shoulder                Diagnostics: Reviewed all relevant imaging including x-ray, MRI, CT, and US.    Results         Procedure:  L Inj/Asp: L glenohumeral on 5/28/2025 10:09 AM  Indications: pain  Details: 22 G needle, ultrasound-guided posterior approach  Medications: 6 mL lidocaine 10 mg/mL (1 %); 18 mg betamethasone acet,sod phos 6 mg/mL  Outcome: tolerated well, no immediate complications  Procedure, treatment alternatives, risks and benefits explained, specific risks discussed. Consent was given by the patient. Immediately prior to procedure a time out was called to verify the correct patient, procedure, equipment, support staff and site/side marked as required. Patient was prepped and draped in the  usual sterile fashion.           Physical Exam  - Musculoskeletal: Left shoulder limited ROM, abduction and forward flexion restricted to 135 degrees       Orders Placed This Encounter    L Inj/Asp    Point of Care Ultrasound      At the conclusion of the visit there were no further questions by the patient/family regarding their plan of care.  Patient was instructed to call or return with any issues, questions, or concerns regarding their injury and/or treatment plan described above.     05/28/25 at 10:10 AM - Davis Holbrook, DO    Office: [phone]    This note was prepared using voice recognition software.  The details of this note are correct and have been reviewed, and corrected to the best of my ability.  Some grammatical errors may persist related to the Dragon software.  This medical note was created with the assistance of artificial intelligence (AI) for documentation purposes. The content has been reviewed and confirmed by the healthcare provider for accuracy and completeness. Patient consented to the use of audio recording and use of AI during their visit.

## 2025-05-29 ENCOUNTER — HOSPITAL ENCOUNTER (OUTPATIENT)
Dept: CARDIOLOGY | Facility: CLINIC | Age: 55
Discharge: HOME | End: 2025-05-29
Payer: COMMERCIAL

## 2025-05-29 DIAGNOSIS — R55 SYNCOPE, UNSPECIFIED SYNCOPE TYPE: ICD-10-CM

## 2025-05-29 DIAGNOSIS — Z95.818 IMPLANTABLE LOOP RECORDER PRESENT: ICD-10-CM

## 2025-05-29 DIAGNOSIS — R00.2 PALPITATIONS: ICD-10-CM

## 2025-06-02 ENCOUNTER — HOSPITAL ENCOUNTER (OUTPATIENT)
Dept: CARDIOLOGY | Facility: CLINIC | Age: 55
Discharge: HOME | End: 2025-06-02
Payer: COMMERCIAL

## 2025-06-02 ENCOUNTER — APPOINTMENT (OUTPATIENT)
Dept: CARDIOLOGY | Facility: HOSPITAL | Age: 55
End: 2025-06-02
Payer: COMMERCIAL

## 2025-06-02 ENCOUNTER — APPOINTMENT (OUTPATIENT)
Dept: RADIOLOGY | Facility: HOSPITAL | Age: 55
End: 2025-06-02
Payer: COMMERCIAL

## 2025-06-02 ENCOUNTER — HOSPITAL ENCOUNTER (EMERGENCY)
Facility: HOSPITAL | Age: 55
Discharge: HOME | End: 2025-06-03
Attending: EMERGENCY MEDICINE
Payer: COMMERCIAL

## 2025-06-02 DIAGNOSIS — M54.50 CHRONIC MIDLINE LOW BACK PAIN WITHOUT SCIATICA: Primary | ICD-10-CM

## 2025-06-02 DIAGNOSIS — R00.2 PALPITATIONS: ICD-10-CM

## 2025-06-02 DIAGNOSIS — R55 SYNCOPE, UNSPECIFIED SYNCOPE TYPE: ICD-10-CM

## 2025-06-02 DIAGNOSIS — Z95.818 IMPLANTABLE LOOP RECORDER PRESENT: ICD-10-CM

## 2025-06-02 DIAGNOSIS — G89.29 CHRONIC MIDLINE LOW BACK PAIN WITHOUT SCIATICA: Primary | ICD-10-CM

## 2025-06-02 PROCEDURE — 99285 EMERGENCY DEPT VISIT HI MDM: CPT | Performed by: EMERGENCY MEDICINE

## 2025-06-02 PROCEDURE — 2500000001 HC RX 250 WO HCPCS SELF ADMINISTERED DRUGS (ALT 637 FOR MEDICARE OP): Performed by: EMERGENCY MEDICINE

## 2025-06-02 PROCEDURE — 99285 EMERGENCY DEPT VISIT HI MDM: CPT

## 2025-06-02 PROCEDURE — 99284 EMERGENCY DEPT VISIT MOD MDM: CPT | Performed by: EMERGENCY MEDICINE

## 2025-06-02 PROCEDURE — 72148 MRI LUMBAR SPINE W/O DYE: CPT

## 2025-06-02 PROCEDURE — 2500000001 HC RX 250 WO HCPCS SELF ADMINISTERED DRUGS (ALT 637 FOR MEDICARE OP)

## 2025-06-02 PROCEDURE — 96372 THER/PROPH/DIAG INJ SC/IM: CPT | Performed by: EMERGENCY MEDICINE

## 2025-06-02 PROCEDURE — 2500000005 HC RX 250 GENERAL PHARMACY W/O HCPCS

## 2025-06-02 PROCEDURE — 2500000004 HC RX 250 GENERAL PHARMACY W/ HCPCS (ALT 636 FOR OP/ED): Performed by: EMERGENCY MEDICINE

## 2025-06-02 PROCEDURE — 93005 ELECTROCARDIOGRAM TRACING: CPT

## 2025-06-02 RX ORDER — KETOROLAC TROMETHAMINE 30 MG/ML
30 INJECTION, SOLUTION INTRAMUSCULAR; INTRAVENOUS ONCE
Status: DISCONTINUED | OUTPATIENT
Start: 2025-06-02 | End: 2025-06-02

## 2025-06-02 RX ORDER — ACETAMINOPHEN 325 MG/1
975 TABLET ORAL ONCE
Status: COMPLETED | OUTPATIENT
Start: 2025-06-02 | End: 2025-06-02

## 2025-06-02 RX ORDER — OXYCODONE HYDROCHLORIDE 5 MG/1
5 TABLET ORAL ONCE
Refills: 0 | Status: COMPLETED | OUTPATIENT
Start: 2025-06-02 | End: 2025-06-02

## 2025-06-02 RX ORDER — LIDOCAINE 560 MG/1
1 PATCH PERCUTANEOUS; TOPICAL; TRANSDERMAL ONCE
Status: DISCONTINUED | OUTPATIENT
Start: 2025-06-02 | End: 2025-06-03 | Stop reason: HOSPADM

## 2025-06-02 RX ADMIN — DEXAMETHASONE SODIUM PHOSPHATE 10 MG: 4 INJECTION, SOLUTION INTRAMUSCULAR; INTRAVENOUS at 20:01

## 2025-06-02 RX ADMIN — LIDOCAINE 4% 1 PATCH: 40 PATCH TOPICAL at 19:48

## 2025-06-02 RX ADMIN — OXYCODONE HYDROCHLORIDE 5 MG: 5 TABLET ORAL at 20:01

## 2025-06-02 RX ADMIN — ACETAMINOPHEN 975 MG: 325 TABLET ORAL at 19:48

## 2025-06-02 ASSESSMENT — PAIN DESCRIPTION - ORIENTATION
ORIENTATION: MID;LOWER
ORIENTATION: MID;LOWER

## 2025-06-02 ASSESSMENT — LIFESTYLE VARIABLES
EVER HAD A DRINK FIRST THING IN THE MORNING TO STEADY YOUR NERVES TO GET RID OF A HANGOVER: NO
EVER FELT BAD OR GUILTY ABOUT YOUR DRINKING: NO
TOTAL SCORE: 0
HAVE YOU EVER FELT YOU SHOULD CUT DOWN ON YOUR DRINKING: NO
HAVE PEOPLE ANNOYED YOU BY CRITICIZING YOUR DRINKING: NO

## 2025-06-02 ASSESSMENT — PAIN SCALES - GENERAL
PAINLEVEL_OUTOF10: 10 - WORST POSSIBLE PAIN

## 2025-06-02 ASSESSMENT — PAIN DESCRIPTION - LOCATION
LOCATION: BACK
LOCATION: BACK

## 2025-06-02 ASSESSMENT — PAIN - FUNCTIONAL ASSESSMENT
PAIN_FUNCTIONAL_ASSESSMENT: 0-10
PAIN_FUNCTIONAL_ASSESSMENT: 0-10

## 2025-06-02 ASSESSMENT — PAIN DESCRIPTION - PAIN TYPE: TYPE: CHRONIC PAIN

## 2025-06-02 NOTE — ED PROVIDER NOTES
EMERGENCY DEPARTMENT ENCOUNTER      Pt Name: Akin Lynn  MRN: [id number]  Birthdate 1970  Date of evaluation: 6/2/2025  Provider: MAGI SOW MD    CHIEF COMPLAINT       Chief Complaint   Patient presents with    Back Pain     Pt presents tot he ED with chronic back pain - pt states he felt his legs giving out and set himself down on a couch. Pt had an episode of urinary incontinence today which he states has never happened before. Pt states the pain is 10/10.          HISTORY OF PRESENT ILLNESS    Akin Lynn 35-year-old male with a PMHx of CAD, RBBB, complex regional pain syndrome, arthritis who presented to the ED following bilateral lower extremity weakness and inability to walk after experiencing sharp stabbing lower back pain.  This was also associated with an episode of urinary incontinence that the patient denied having any similar episode before.  He reported that the day before he started feeling the back pain will some lower extremity heaviness which he medicated with lidocaine patch and Tylenol in addition to Lyrica.  However, this afternoon he started feeling the sharp back pain.  He denied having any trauma to the back.  He is following up with pain management and was on ketamine infusion however that was discontinued and he in the process of restarting with a new physician (Dr. Mora) at [facility].          Nursing Notes were reviewed.    PAST MEDICAL HISTORY   Medical History[1]      SURGICAL HISTORY     Surgical History[2]      CURRENT MEDICATIONS       Discharge Medication List as of 6/3/2025  1:53 AM        CONTINUE these medications which have NOT CHANGED    Details   acetaminophen (Tylenol) 500 mg tablet Take 2 tablets (1,000 mg) by mouth 3 times a day., Starting Thu 3/6/2025, Normal      albuterol (Ventolin HFA) 90 mcg/actuation inhaler Inhale 2 puffs every 4 hours if needed for wheezing., Historical Med      ammonium lactate (Lac-Hydrin) 12 % lotion Apply 1  Application topically if needed for dry skin (apply to feet)., Historical Med      atorvastatin (Lipitor) 80 mg tablet Take 1 tablet (80 mg) by mouth once daily., Starting Thu 3/6/2025, Until Fri 3/6/2026, Normal      celecoxib (CeleBREX) 200 mg capsule Take 1 capsule (200 mg) by mouth 2 times a day., Historical Med      diazePAM (Valium) 5 mg tablet Take 1 tablet (5 mg) by mouth 2 times a day as needed for anxiety., Historical Med      DULoxetine (Cymbalta) 30 mg DR capsule Take 3 capsules (90 mg) by mouth once daily. Do not crush or chew., Starting Sun 2/2/2025, Until Thu 3/27/2025, Normal      econazole nitrate 1 % cream Apply 1 Application topically 2 times a day. Applies to feet, Historical Med      ezetimibe (Zetia) 10 mg tablet Take 1 tablet (10 mg) by mouth once daily., Starting Thu 3/6/2025, Until Fri 3/6/2026, Normal      fluticasone (Flonase) 50 mcg/actuation nasal spray Administer 1 spray into each nostril once daily as needed., Historical Med      lidocaine (Lidoderm) 5 % patch apply 2 patches to affected area daily as needed., Normal      magnesium oxide (Mag-Ox) 400 mg (241.3 mg magnesium) tablet Take 1 tablet (400 mg) by mouth once daily., Starting Thu 3/6/2025, Until Fri 3/6/2026, Normal      metoprolol succinate XL (Toprol-XL) 25 mg 24 hr tablet Take 1 tablet (25 mg) by mouth once daily. Do not crush or chew., Starting Thu 3/6/2025, Until Fri 3/6/2026, Normal      omeprazole (PriLOSEC) 40 mg DR capsule Take 1 capsule (40 mg) by mouth 2 times a day. For 90 days, Historical Med      oxyCODONE (Roxicodone) 5 mg immediate release tablet Take 1 tablet (5 mg) by mouth 2 times a day as needed for severe pain (7 - 10)., Historical Med      pregabalin (Lyrica) 300 mg capsule Take 1 capsule (300 mg) by mouth 2 times a day., Starting Fri 10/6/2023, Normal      sildenafil (Viagra) 100 mg tablet Take 1 tablet (100 mg) by mouth once daily as needed for erectile dysfunction., Historical Med      tiZANidine  (Zanaflex) 4 mg capsule Take 1 capsule (4 mg) by mouth 3 times a day as needed for muscle spasms., Historical Med             ALLERGIES     Morphine, Ultram [tramadol], Alprazolam, Toradol [ketorolac], Fish containing products, and Opioids - morphine analogues    FAMILY HISTORY     Family History[3]       SOCIAL HISTORY     Social History[4]    SCREENINGS                        PHYSICAL EXAM    (up to 7 for level 4, 8 or more for level 5)     ED Triage Vitals [06/02/25 1614]   Temperature Heart Rate Respirations BP   36.7 °C (98.1 °F) (!) 125 18 (!) 136/96      Pulse Ox Temp Source Heart Rate Source Patient Position   96 % Temporal Monitor Sitting      BP Location FiO2 (%)     Right arm --       Physical Exam  Constitutional:       General: He is awake. He is not in acute distress.     Appearance: Normal appearance. He is well-developed, well-groomed and normal weight. He is not ill-appearing.   HENT:      Head: Normocephalic and atraumatic.      Right Ear: External ear normal.      Left Ear: External ear normal.      Nose: Nose normal. No rhinorrhea.      Mouth/Throat:      Mouth: Mucous membranes are moist.      Pharynx: Oropharynx is clear. No oropharyngeal exudate or posterior oropharyngeal erythema.   Eyes:      General: No scleral icterus.        Right eye: No discharge.         Left eye: No discharge.      Extraocular Movements: Extraocular movements intact.      Conjunctiva/sclera: Conjunctivae normal.      Pupils: Pupils are equal, round, and reactive to light.   Cardiovascular:      Rate and Rhythm: Normal rate and regular rhythm.      Pulses: Normal pulses.      Heart sounds: Normal heart sounds. No murmur heard.  Pulmonary:      Effort: Pulmonary effort is normal. No respiratory distress.      Breath sounds: Normal breath sounds. No wheezing, rhonchi or rales.   Chest:      Chest wall: No tenderness.   Abdominal:      General: Abdomen is flat. Bowel sounds are normal. There is no distension.       Palpations: Abdomen is soft.      Tenderness: There is no abdominal tenderness. There is no guarding or rebound.      Comments: Digital rectal examination showed normal anal sphincter tone   Musculoskeletal:         General: Tenderness (Thoracolumbar vertebral tenderness) present. No swelling. Normal range of motion.      Cervical back: Normal range of motion. No rigidity or tenderness.      Right lower leg: No edema.      Left lower leg: No edema.   Skin:     General: Skin is warm and dry.      Coloration: Skin is not jaundiced or pale.      Findings: No erythema or lesion.   Neurological:      General: No focal deficit present.      Mental Status: He is alert and oriented to person, place, and time. Mental status is at baseline.      Motor: Weakness (Bilateral lower extremity weakness) present.   Psychiatric:         Mood and Affect: Mood normal.         Behavior: Behavior normal. Behavior is cooperative.          DIAGNOSTIC RESULTS     LABS:  Labs Reviewed - No data to display    All other labs were within normal range or not returned as of this dictation.    Imaging  MR lumbar spine wo IV contrast   Final Result   Mild discogenic degenerative changes are again noted in the lower   lumbar spine with slight posterior disc bulge at L4-L5 and L5-S1. A   small posterior annular tear is again noted L5-S1 just to the right   of midline. No significant spinal canal stenosis or neural foraminal   narrowing identified.                                 Signed by: Compa Rios 6/3/2025 12:26 AM   Dictation workstation:   [id number]           Procedures  Procedures     EMERGENCY DEPARTMENT COURSE/MDM:     ED Course as of 06/04/25 0005   Mon Jun 02, 2025   1900 EKG performed 6/2/2025 at 1742 reveals sinus tachycardia with frequent PVCs with ventricular rate of 101 bpm.  Normal axis.  No acute ischemic changes or injury pattern is present. [JJ]   1950 Patient was seen and evaluated.  He has got a history of chronic back  pain.  He states yesterday his pain started worsening.  Today he states that he had an episode of urinary continence where he did not have the urge to urinate.  He states that he has been having normal bowel movements and has been having the urge to defecate.  He denies any numbness or tingling in the groin or saddle anesthesia.  He states that his legs have been feeling heavier over the last 2 days.  He denies any new trauma or injury.    On exam he is hypertensive, initially tachycardic this improved on repeat vitals without intervention.  He does not appear to be in any acute distress.  He does have a care plan which was reviewed.  Per the care plan we will not administer any parental narcotics.  Patient has decreased sensation in the left foot compared to the right, but sensation still present.  He does have good strength with dorsi flexion and plantarflexion.  He has got brisk patellar reflex on the left.  Rectal exam was performed, he is got normal rectal tone.  Will do postvoid residual.  As long as his he is not retaining significant urine we will try to set up an expedited outpatient MRI for him.  If he does have significant urinary retention then we will get an emergent lumbar spine MRI without contrast. [JJ]   2026 Patient unable to urinate, has greater than 300 cc of urine in the bladder on bladder scan.  Will perform emergent MRI to evaluate for spinal cord compression given his acute urinary retention, worsening pain, difficulty ambulating, and bladder incontinence at home. [JJ]      ED Course User Index  [JJ] Rosas Barraza DO         Diagnoses as of 06/04/25 0005   Chronic midline low back pain without sciatica        Medical Decision Making  Akin Lynn is a 55-year-old male who presents to the ED due to back pain.  He has PMHx of CAD, RBBB, complex regional pain syndrome, arthritis.  Given the patient history and complaint a digital rectal exam was done which showed normal anal sphincter tone  however the patient was not able to void.  Bladder scan showed 301 mL.  An urgent MRI was ordered for evaluation of the spinal pathology.  Patient have multiple implants and a cardiac loop before which the author of this note reached out to MRI technician and confirmed the compatibility of the device with the MRI machine.    MRI showed mild discogenic degenerative changes are again noted in the lower lumbar spine with slight posterior disc bulge at L4-L5 and L5-S1. A small posterior annular tear is again noted L5-S1 just to the right of midline. No significant spinal canal stenosis or neural foraminal  narrowing identified.    Patient was updated regarding the MRI results and was counseled regarding following up with his pain management specialist as an outpatient which he was agreeable to.  He was also instructed to continue his home pain regimen as needed until he sees his specialist.  He demonstrated understanding of the results and an instruction to follow-up with the pain management and he only requested and over for transportation to home.          Patient and or family in agreement and understanding of treatment plan.  All questions answered.      I reviewed the case with the attending ED physician. The attending ED physician agrees with the plan. Patient and/or patient´s representative was counseled regarding labs, imaging, likely diagnosis, and plan. All questions were answered.    ED Medications administered this visit:    Medications   acetaminophen (Tylenol) tablet 975 mg (975 mg oral Given 6/2/25 1948)   oxyCODONE (Roxicodone) immediate release tablet 5 mg (5 mg oral Given 6/2/25 2001)   dexAMETHasone (Decadron) injection 10 mg (10 mg intramuscular Given 6/2/25 2001)       New Prescriptions from this visit:    Discharge Medication List as of 6/3/2025  1:53 AM          Follow-up:  Otoniel Lane MD  [id number] [facility] [address]  [facility] [address] [phone]    In 1 week      Rohith Mora DO  [id number]  [facility] [address]  [phone]    In 1 week          Final Impression:   1. Chronic midline low back pain without sciatica          (Please note that portions of this note were completed with a voice recognition program.  Efforts were made to edit the dictations but occasionally words are mis-transcribed.)       Vladimir Gilbert MD  Resident  06/03/25 0126         Vladimir Gilbert MD  Resident  06/03/25 0135       [1]   Past Medical History:  Diagnosis Date    Anesthesia of skin     Numbness and tingling    Anxiety 09/21/2023    Bilateral myopia 09/21/2023    Cervical radicular pain 09/21/2023    Chronic patellofemoral pain of left knee 09/21/2023    Complex regional pain syndrome type 1 of left lower extremity 09/21/2023    COVID-19     VACCINATED    Cubital tunnel syndrome on left 09/21/2023    Cubital tunnel syndrome on right 09/21/2023    Disease of intestine, unspecified     Bowel trouble    Erectile dysfunction 09/21/2023    Foot joint pain 09/21/2023    Fractures     Gastroparesis     GERD (gastroesophageal reflux disease)     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    History of psychological trauma 09/21/2023    Hyperlipidemia     Hypertension     Incomplete RBBB 09/19/2024    Joint pain 09/21/2023    Kidney cysts 09/21/2023    Low back pain, unspecified 12/07/2022    Chronic low back pain without sciatica, unspecified back pain laterality    Low back pain, unspecified 08/09/2022    Lumbago    Lumbar spondylosis 09/21/2023    Lung nodule 09/21/2023    RIGHT    Metatarsalgia of left foot 09/21/2023    Myopia, bilateral 02/19/2019    Myopia of both eyes with astigmatism and presbyopia    Non-sustained ventricular tachycardia (Multi)     Osteoarthritis     Osteochondral defect of patella 09/21/2023    Osteoporosis     Other spondylosis, cervical region 09/21/2023    Patellofemoral arthritis 09/21/2023    Personal history of other diseases of the musculoskeletal  system and connective tissue     History of arthritis    Personal history of other specified conditions     History of seizure    Reflex sympathetic dystrophy 09/21/2023    Scapular dyskinesis 09/21/2023    Seizure disorder (Multi)     D/T TRAMADOL REACTION - 25 YEARS AGO    Shoulder impingement 09/21/2023    Stable angina     Suspected sleep apnea 09/21/2023    Trauma and stressor-related disorder 09/21/2023    Trochanteric bursitis of both hips 09/22/2023   [2]   Past Surgical History:  Procedure Laterality Date    ACHILLES TENDON SURGERY  1978 2016    CARDIAC CATHETERIZATION N/A 12/20/2024    Procedure: Left Heart Cath;  Surgeon: Kale Funk MD;  Location: [facility] Cardiac Cath Lab;  Service: Cardiovascular;  Laterality: N/A;    CARDIAC ELECTROPHYSIOLOGY PROCEDURE N/A 1/31/2025    Procedure: Loop Insertion (88411);  Surgeon: Hai Ga MD;  Location: [facility] Cardiac Cath Lab;  Service: Electrophysiology;  Laterality: N/A;    CARDIOVASCULAR STRESS TEST      CT ABDOMEN PELVIS ANGIOGRAM W AND/OR WO IV CONTRAST  03/18/2020    CT ABDOMEN PELVIS ANGIOGRAM W AND/OR WO IV CONTRAST 3/18/2020 [facility] EMERGENCY LEGACY    CT ANGIO NECK  12/16/2022    CT NECK ANGIO W AND WO IV CONTRAST 12/16/2022 DOCTOR OFFICE LEGACY    CT HEAD ANGIO W AND WO IV CONTRAST  12/16/2022    CT HEAD ANGIO W AND WO IV CONTRAST 12/16/2022 DOCTOR OFFICE LEGACY    HIP SURGERY Left     REPLACEMENT    JOINT REPLACEMENT      LEFT KNEE    KNEE ARTHROPLASTY Bilateral 06/13/2017    Knee Arthroplasty    OTHER SURGICAL HISTORY  01/06/2022    Ulnar nerve neuroplasty    OTHER SURGICAL HISTORY Bilateral 07/26/2019    Foot surgery  -- HARDWARE    OTHER SURGICAL HISTORY Bilateral     BILATERAL ANKLE SCOPE    OTHER SURGICAL HISTORY      POP ABDOMINAL PROCEDURE    PLANTAR FASCIA RELEASE      SHOULDER SURGERY Bilateral 06/13/2017    Shoulder Surgery   [3]   Family History  Problem Relation Name Age of Onset    Lung cancer Mother      Glaucoma Father      Skin cancer  Father      Hypertension Other [facility] mem     Heart disease Other [facility] mem    [4]   Social History  Socioeconomic History    Marital status: Significant Other   Tobacco Use    Smoking status: Never    Smokeless tobacco: Never    Tobacco comments:     It killed my mother smoking and she left me with a gift a nodule on my right lung   Vaping Use    Vaping status: Never Used   Substance and Sexual Activity    Alcohol use: Not Currently    Drug use: Not Currently     Comment: Ketamine treatments for pain,eatable marijuana    Sexual activity: Not Currently     Partners: Female     Birth control/protection: None     Social Drivers of Health     Financial Resource Strain: Low Risk  (3/27/2025)    Overall Financial Resource Strain (CARDIA)     Difficulty of Paying Living Expenses: Not hard at all   Food Insecurity: No Food Insecurity (3/26/2025)    Hunger Vital Sign     Worried About Running Out of Food in the Last Year: Never true     Ran Out of Food in the Last Year: Never true   Transportation Needs: No Transportation Needs (3/27/2025)    PRAPARE - Transportation     Lack of Transportation (Medical): No     Lack of Transportation (Non-Medical): No   Physical Activity: Sufficiently Active (3/26/2025)    Exercise Vital Sign     Days of Exercise per Week: 5 days     Minutes of Exercise per Session: 30 min   Stress: No Stress Concern Present (3/26/2025)    [place] [employer] - Occupational Stress Questionnaire     Feeling of Stress : Only a little   Social Connections: Moderately Isolated (3/26/2025)    Social Connection and Isolation Panel [NHANES]     Frequency of Communication with Friends and Family: More than three times a week     Frequency of Social Gatherings with Friends and Family: Once a week     Attends Sikh Services: Never     Active Member of Clubs or Organizations: No     Attends Club or Organization Meetings: Never     Marital Status: Living with partner   Intimate Partner  Violence: Not At Risk (3/26/2025)    Humiliation, Afraid, Rape, and Kick questionnaire     Fear of Current or Ex-Partner: No     Emotionally Abused: No     Physically Abused: No     Sexually Abused: No   Housing Stability: Low Risk  (3/27/2025)    Housing Stability Vital Sign     Unable to Pay for Housing in the Last Year: No     Number of Times Moved in the Last Year: 0     Homeless in the Last Year: No        Vladimir Gilbert MD  Resident  06/04/25 0005

## 2025-06-03 ENCOUNTER — HOSPITAL ENCOUNTER (OUTPATIENT)
Dept: CARDIOLOGY | Facility: CLINIC | Age: 55
Discharge: HOME | End: 2025-06-03
Payer: COMMERCIAL

## 2025-06-03 VITALS
OXYGEN SATURATION: 95 % | HEART RATE: 91 BPM | TEMPERATURE: 98.1 F | DIASTOLIC BLOOD PRESSURE: 74 MMHG | SYSTOLIC BLOOD PRESSURE: 114 MMHG | BODY MASS INDEX: 25.05 KG/M2 | RESPIRATION RATE: 16 BRPM | HEIGHT: 70 IN | WEIGHT: 175 LBS

## 2025-06-03 DIAGNOSIS — Z95.818 IMPLANTABLE LOOP RECORDER PRESENT: ICD-10-CM

## 2025-06-03 DIAGNOSIS — R00.2 PALPITATIONS: ICD-10-CM

## 2025-06-03 DIAGNOSIS — R55 SYNCOPE, UNSPECIFIED SYNCOPE TYPE: ICD-10-CM

## 2025-06-03 LAB
ATRIAL RATE: 101 BPM
P AXIS: 33 DEGREES
P OFFSET: 206 MS
P ONSET: 157 MS
PR INTERVAL: 132 MS
Q ONSET: 223 MS
QRS COUNT: 16 BEATS
QRS DURATION: 96 MS
QT INTERVAL: 346 MS
QTC CALCULATION(BAZETT): 448 MS
QTC FREDERICIA: 411 MS
R AXIS: 30 DEGREES
T AXIS: 35 DEGREES
T OFFSET: 396 MS
VENTRICULAR RATE: 101 BPM

## 2025-06-03 PROCEDURE — 72148 MRI LUMBAR SPINE W/O DYE: CPT | Performed by: RADIOLOGY

## 2025-06-03 NOTE — DISCHARGE INSTRUCTIONS
You are seen in the ED for evaluation of back pain.  An MRI was done which showed mild discogenic degenerative changes are again noted in the lower lumbar spine with slight posterior disc bulge at L4-L5 and L5-S1. A small posterior annular tear is again noted L5-S1 just to the right  of midline. No significant spinal canal stenosis or neural foraminal narrowing identified.    Please follow-up with your pain management specialist for further planning of your pain control regimen.    Please come back to the ED if you noticed worsening of symptoms and failure of response to pain medication or any other concerns.

## 2025-06-04 ENCOUNTER — HOSPITAL ENCOUNTER (OUTPATIENT)
Dept: CARDIOLOGY | Facility: CLINIC | Age: 55
Discharge: HOME | End: 2025-06-04
Payer: COMMERCIAL

## 2025-06-04 DIAGNOSIS — Z95.818 IMPLANTABLE LOOP RECORDER PRESENT: ICD-10-CM

## 2025-06-04 DIAGNOSIS — R55 SYNCOPE, UNSPECIFIED SYNCOPE TYPE: ICD-10-CM

## 2025-06-04 DIAGNOSIS — R00.2 PALPITATIONS: ICD-10-CM

## 2025-06-10 ENCOUNTER — APPOINTMENT (OUTPATIENT)
Dept: ORTHOPEDIC SURGERY | Facility: CLINIC | Age: 55
End: 2025-06-10
Payer: COMMERCIAL

## 2025-06-10 VITALS — HEIGHT: 70 IN | WEIGHT: 180 LBS | BODY MASS INDEX: 25.77 KG/M2

## 2025-06-10 DIAGNOSIS — M54.16 LUMBAR RADICULOPATHY: Primary | ICD-10-CM

## 2025-06-10 PROCEDURE — 99213 OFFICE O/P EST LOW 20 MIN: CPT | Performed by: PHYSICIAN ASSISTANT

## 2025-06-10 PROCEDURE — 1036F TOBACCO NON-USER: CPT | Performed by: PHYSICIAN ASSISTANT

## 2025-06-10 PROCEDURE — 3008F BODY MASS INDEX DOCD: CPT | Performed by: PHYSICIAN ASSISTANT

## 2025-06-10 NOTE — PROGRESS NOTES
New patient established to the practice comes the office complaining of some back pain.  He cannot stand or walk for long distances but legs feel heavy.  He gets shooting pains down the legs.  Numbness and tingling got neuropathy in his left leg.  Patient states he is not a diabetic.  He has had no spine surgeries.  He is getting pain management with the [facility] doctor.  Scheduled to have an ablation.  Denies any gait or balance changes.  Denies any recent trauma accidents or falls.  Denies any spine surgery.    Looked at patient's recent blood work.  Checked a metabolic panel glucose 76 sodium 138 potassium 4.1 we looked at primary doctors notes we checked her medication list see if he is on a statin that can cause muscular aches and pain he is taking Lipitor we also looked for any major blood thinner.  I really do not see any.    Physical exam: Well-nourished, well kept.  No lymphangitis or lymphadenopathy in the examined extremities.  Good perfusion to the extremities ×4.  Radial and dorsalis pedis pulses 2+.  Capillary refill to all 4 digits brisk.  No distal edema x 4.  Ambulating with no major difficulty.  Full range of motion cervical spine and observation no instability.  Mild decreased range of motion flexion-extension rotation lumbar spine good strength no instability.  Moving upper extremities by difficulty motor strength intact mild weakness in the lower extremities.  But nothing significant.  No redness, abrasions, or lesions on all 4 extremities, head and neck, or trunk.  Patient neurologically intact    X-ray: X-rays that were taken prior reviewed shows no abnormalities very mild degenerative changes.  No fractures.  He had an MRI which is pretty much unremarkable of the lumbar spine that report is reviewed as well he is got a very small bulge at L5-S1 with a very small annular tear off to the right at L4-5 there may be a little bit of a lateral bulge noted on slice 40 of 58 T2 imaging.   Causing a little left-sided narrowing.  Mild narrowing of the foramen bilaterally at L5-S1.  But no really nerve impingement.    Assessment: I talked to the patient at the landing did not see anything really I would recommend any type of surgical indication.  In continuing with pain management is the best.    Plan: Patient will continue with pain management.  I will put in a referral for physical therapy [facility] in [place] and he will follow-up as needed

## 2025-06-11 ENCOUNTER — APPOINTMENT (OUTPATIENT)
Dept: PAIN MEDICINE | Facility: CLINIC | Age: 55
End: 2025-06-11
Payer: COMMERCIAL

## 2025-06-11 ENCOUNTER — HOSPITAL ENCOUNTER (OUTPATIENT)
Dept: CARDIOLOGY | Facility: CLINIC | Age: 55
Discharge: HOME | End: 2025-06-11
Payer: COMMERCIAL

## 2025-06-11 DIAGNOSIS — Z95.818 IMPLANTABLE LOOP RECORDER PRESENT: ICD-10-CM

## 2025-06-11 DIAGNOSIS — R55 SYNCOPE, UNSPECIFIED SYNCOPE TYPE: ICD-10-CM

## 2025-06-11 DIAGNOSIS — R00.2 PALPITATIONS: ICD-10-CM

## 2025-06-12 ENCOUNTER — APPOINTMENT (OUTPATIENT)
Dept: CARDIOLOGY | Facility: CLINIC | Age: 55
End: 2025-06-12
Payer: COMMERCIAL

## 2025-06-13 ENCOUNTER — APPOINTMENT (OUTPATIENT)
Dept: OPHTHALMOLOGY | Facility: CLINIC | Age: 55
End: 2025-06-13
Payer: COMMERCIAL

## 2025-06-13 DIAGNOSIS — H52.203 MYOPIA OF BOTH EYES WITH ASTIGMATISM AND PRESBYOPIA: Primary | ICD-10-CM

## 2025-06-13 DIAGNOSIS — H52.4 MYOPIA OF BOTH EYES WITH ASTIGMATISM AND PRESBYOPIA: Primary | ICD-10-CM

## 2025-06-13 DIAGNOSIS — H52.13 MYOPIA OF BOTH EYES WITH ASTIGMATISM AND PRESBYOPIA: Primary | ICD-10-CM

## 2025-06-13 PROCEDURE — 92310 CONTACT LENS FITTING OU: CPT | Performed by: OPTOMETRIST

## 2025-06-13 PROCEDURE — 92012 INTRM OPH EXAM EST PATIENT: CPT | Performed by: OPTOMETRIST

## 2025-06-13 PROCEDURE — 92015 DETERMINE REFRACTIVE STATE: CPT | Performed by: OPTOMETRIST

## 2025-06-13 RX ORDER — LIDOCAINE HCL 100 %
POWDER (GRAM) MISCELLANEOUS
COMMUNITY
Start: 2025-06-02

## 2025-06-13 ASSESSMENT — REFRACTION_MANIFEST
OD_AXIS: 040
OS_ADD: +2.00
METHOD_AUTOREFRACTION: 1
OD_SPHERE: -2.25
OS_AXIS: 015
OS_CYLINDER: -1.50
OD_CYLINDER: -0.25
OS_CYLINDER: -1.50
OD_AXIS: 003
OS_SPHERE: -2.00
OD_CYLINDER: -0.75
OS_SPHERE: -2.25
OD_SPHERE: -2.50
OS_AXIS: 001
OD_ADD: +2.00

## 2025-06-13 ASSESSMENT — REFRACTION_CURRENTRX
OS_AXIS: 010
OS_DIAMETER: 14.5
OD_SPHERE: -2.50
OS_CYLINDER: -1.25
OS_SPHERE: -2.00
OD_BASECURVE: 8.5
OD_BRAND: ULTRA FOR PRESBYOPIA
OS_BASECURVE: 8.6
OD_ADD: HI
OS_BRAND: ULTRA MF FOR ASTIGMATISM
OD_DIAMETER: 14.2
OS_ADD: HI
OD_CYLINDER: SPHERE

## 2025-06-13 ASSESSMENT — VISUAL ACUITY
OD_CC: 20/30
CORRECTION_TYPE: GLASSES
OS_CC: 20/25
METHOD: SNELLEN - LINEAR

## 2025-06-13 ASSESSMENT — CUP TO DISC RATIO
OS_RATIO: 0.25
OD_RATIO: 0.25

## 2025-06-13 ASSESSMENT — CONF VISUAL FIELD
METHOD: COUNTING FINGERS
OS_SUPERIOR_TEMPORAL_RESTRICTION: 0
OS_NORMAL: 1
OS_SUPERIOR_NASAL_RESTRICTION: 0
OD_SUPERIOR_TEMPORAL_RESTRICTION: 0
OD_INFERIOR_TEMPORAL_RESTRICTION: 0
OD_SUPERIOR_NASAL_RESTRICTION: 0
OS_INFERIOR_TEMPORAL_RESTRICTION: 0
OD_NORMAL: 1
OS_INFERIOR_NASAL_RESTRICTION: 0
OD_INFERIOR_NASAL_RESTRICTION: 0

## 2025-06-13 ASSESSMENT — ENCOUNTER SYMPTOMS: EYES NEGATIVE: 1

## 2025-06-13 ASSESSMENT — SLIT LAMP EXAM - LIDS
COMMENTS: GOOD POSITION
COMMENTS: GOOD POSITION

## 2025-06-13 ASSESSMENT — REFRACTION_WEARINGRX
SPECS_TYPE: SVL
OS_SPHERE: -2.00
OD_SPHERE: -3.00
OS_AXIS: 005
OS_CYLINDER: -1.00
OD_CYLINDER: SPHERE

## 2025-06-13 ASSESSMENT — TONOMETRY
OS_IOP_MMHG: 14
OD_IOP_MMHG: 15
IOP_METHOD: GOLDMANN APPLANATION

## 2025-06-13 ASSESSMENT — EXTERNAL EXAM - LEFT EYE: OS_EXAM: NORMAL

## 2025-06-13 ASSESSMENT — EXTERNAL EXAM - RIGHT EYE: OD_EXAM: NORMAL

## 2025-06-13 NOTE — PROGRESS NOTES
A spectacle prescription was dispensed to be used as needed.     A contact lens prescription was dispensed at the patient's request. New Ultra multifocal lenses Rx and trials ordered.     The patient was asked to return to our clinic in one year or sooner if ocular or vision changes occur

## 2025-06-15 ENCOUNTER — HOSPITAL ENCOUNTER (EMERGENCY)
Facility: HOSPITAL | Age: 55
Discharge: HOME | End: 2025-06-15
Attending: STUDENT IN AN ORGANIZED HEALTH CARE EDUCATION/TRAINING PROGRAM
Payer: COMMERCIAL

## 2025-06-15 ENCOUNTER — APPOINTMENT (OUTPATIENT)
Dept: RADIOLOGY | Facility: HOSPITAL | Age: 55
End: 2025-06-15
Payer: COMMERCIAL

## 2025-06-15 VITALS
HEART RATE: 94 BPM | TEMPERATURE: 97.5 F | DIASTOLIC BLOOD PRESSURE: 68 MMHG | BODY MASS INDEX: 24.92 KG/M2 | RESPIRATION RATE: 20 BRPM | HEIGHT: 71 IN | SYSTOLIC BLOOD PRESSURE: 113 MMHG | WEIGHT: 178 LBS | OXYGEN SATURATION: 99 %

## 2025-06-15 DIAGNOSIS — S61.412A LACERATION OF LEFT HAND WITHOUT FOREIGN BODY, INITIAL ENCOUNTER: ICD-10-CM

## 2025-06-15 DIAGNOSIS — S61.412A STAB WOUND OF LEFT HAND, INITIAL ENCOUNTER: Primary | ICD-10-CM

## 2025-06-15 PROCEDURE — 12001 RPR S/N/AX/GEN/TRNK 2.5CM/<: CPT

## 2025-06-15 PROCEDURE — 12001 RPR S/N/AX/GEN/TRNK 2.5CM/<: CPT | Performed by: STUDENT IN AN ORGANIZED HEALTH CARE EDUCATION/TRAINING PROGRAM

## 2025-06-15 PROCEDURE — 73130 X-RAY EXAM OF HAND: CPT | Mod: LT

## 2025-06-15 PROCEDURE — 99284 EMERGENCY DEPT VISIT MOD MDM: CPT | Performed by: STUDENT IN AN ORGANIZED HEALTH CARE EDUCATION/TRAINING PROGRAM

## 2025-06-15 PROCEDURE — 99283 EMERGENCY DEPT VISIT LOW MDM: CPT | Mod: 25 | Performed by: STUDENT IN AN ORGANIZED HEALTH CARE EDUCATION/TRAINING PROGRAM

## 2025-06-15 PROCEDURE — 73130 X-RAY EXAM OF HAND: CPT | Mod: LEFT SIDE | Performed by: INTERNAL MEDICINE

## 2025-06-15 PROCEDURE — 2500000004 HC RX 250 GENERAL PHARMACY W/ HCPCS (ALT 636 FOR OP/ED): Performed by: STUDENT IN AN ORGANIZED HEALTH CARE EDUCATION/TRAINING PROGRAM

## 2025-06-15 RX ORDER — LIDOCAINE HYDROCHLORIDE 10 MG/ML
10 INJECTION, SOLUTION INFILTRATION; PERINEURAL ONCE
Status: COMPLETED | OUTPATIENT
Start: 2025-06-15 | End: 2025-06-15

## 2025-06-15 RX ADMIN — LIDOCAINE HYDROCHLORIDE 10 ML: 10 INJECTION, SOLUTION INFILTRATION; PERINEURAL at 06:43

## 2025-06-15 ASSESSMENT — PAIN SCALES - GENERAL: PAINLEVEL_OUTOF10: 10 - WORST POSSIBLE PAIN

## 2025-06-15 ASSESSMENT — LIFESTYLE VARIABLES
TOTAL SCORE: 0
HAVE YOU EVER FELT YOU SHOULD CUT DOWN ON YOUR DRINKING: NO
EVER FELT BAD OR GUILTY ABOUT YOUR DRINKING: NO
EVER HAD A DRINK FIRST THING IN THE MORNING TO STEADY YOUR NERVES TO GET RID OF A HANGOVER: NO
HAVE PEOPLE ANNOYED YOU BY CRITICIZING YOUR DRINKING: NO

## 2025-06-15 ASSESSMENT — PAIN - FUNCTIONAL ASSESSMENT: PAIN_FUNCTIONAL_ASSESSMENT: 0-10
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
## 2025-06-15 NOTE — ED PROCEDURE NOTE
Procedure  Laceration Repair    Performed by: Tiffany Fisher DO  Authorized by: Robin Iglesias MD    Consent:     Consent obtained:  Verbal    Consent given by:  Patient  Universal protocol:     Patient identity confirmed:  Verbally with patient  Anesthesia:     Anesthesia method:  Local infiltration    Local anesthetic:  Lidocaine 1% w/o epi  Laceration details:     Location:  Shoulder/arm    Shoulder/arm location:  L lower arm    Length (cm):  1    Depth (mm):  5  Skin repair:     Repair method:  Sutures    Suture size:  4-0    Suture material:  Prolene    Suture technique:  Simple interrupted    Number of sutures:  1  Repair type:     Repair type:  Simple  Post-procedure details:     Dressing:  Non-adherent dressing    Procedure completion:  Tolerated well, no immediate complications               Tiffany Fisher DO  Resident  06/15/25 0852
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
## 2025-06-15 NOTE — DISCHARGE INSTRUCTIONS
Follow-up with a hand specialist in regards to this ED visit.  The stitch will need to be removed in 10 days.  Referral was placed for you to see the hand specialist.  Please allow them to be the ones to remove the stitch.  If you have any new or concerning symptoms indicating infection please return to the ED for reevaluation.

## 2025-06-16 ENCOUNTER — HOSPITAL ENCOUNTER (EMERGENCY)
Facility: HOSPITAL | Age: 55
Discharge: HOME | End: 2025-06-16
Attending: STUDENT IN AN ORGANIZED HEALTH CARE EDUCATION/TRAINING PROGRAM
Payer: COMMERCIAL

## 2025-06-16 VITALS
WEIGHT: 178 LBS | SYSTOLIC BLOOD PRESSURE: 131 MMHG | BODY MASS INDEX: 25.48 KG/M2 | HEART RATE: 81 BPM | HEIGHT: 70 IN | OXYGEN SATURATION: 97 % | DIASTOLIC BLOOD PRESSURE: 64 MMHG | TEMPERATURE: 98.2 F | RESPIRATION RATE: 16 BRPM

## 2025-06-16 DIAGNOSIS — S61.412D LACERATION OF LEFT HAND WITHOUT FOREIGN BODY, SUBSEQUENT ENCOUNTER: Primary | ICD-10-CM

## 2025-06-16 PROCEDURE — 99284 EMERGENCY DEPT VISIT MOD MDM: CPT | Performed by: STUDENT IN AN ORGANIZED HEALTH CARE EDUCATION/TRAINING PROGRAM

## 2025-06-16 PROCEDURE — 99283 EMERGENCY DEPT VISIT LOW MDM: CPT

## 2025-06-16 ASSESSMENT — PAIN DESCRIPTION - ORIENTATION: ORIENTATION: LEFT

## 2025-06-16 ASSESSMENT — PAIN DESCRIPTION - LOCATION: LOCATION: HAND

## 2025-06-16 ASSESSMENT — PAIN SCALES - GENERAL: PAINLEVEL_OUTOF10: 10 - WORST POSSIBLE PAIN

## 2025-06-16 ASSESSMENT — PAIN - FUNCTIONAL ASSESSMENT: PAIN_FUNCTIONAL_ASSESSMENT: 0-10
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
## 2025-06-16 NOTE — PROGRESS NOTES
No chief complaint on file.     History of Present Illness:  6/17/2025: He was given a USG glenohumeral injection by Dr. Holbrook on 5/28/25.     4/29/2025: He was given a USG left tendon sheath cortisone injection by Dr. Holbrook on 4/1/25. His shoulder continues to bother him. He feels he did not get much relief with the injection. He has several things going in his personal life at this time that he is worried about.     01/28/2025: He is here today to review the results of his recent MRI of the left  shoulder. He continues to have pain in the anterior shoulder and into the biceps.     12/05/2024: The MRI of his left shoulder was denied.  His arm is becoming unusable even with Celebrex and Lidocaine patches. He completed a longer than 6 week course of formal therapy in August, including exercises for his neck and shoulder in which he feels he injured his shoulder doing.  He has been doing home based exercise under physician direction with no improvement in his pain beyond his PT.    11/14/2024: He had a left shoulder injection on 09/26/24. The injection helped for a little while but didn't give him much long term relief. He has a history of osteoporosis. He is in significant pain. He is seeing pain management on Monday.     09/26/2024: Patient is here today for his left shoulder pain for the past 10 days. States he was at physical therapy for his neck working on the arm bike and the following morning noted increased pain and inflammation of the left shoulder.    Imaging:  X-rays left shoulder: No acute fractures no dislocations of the left shoulder.  No arthritic change  MRI left shoulder: No obvious tear, some fraying in the biceps.      Assessment:   Left shoulder biceps tendonitis    Plan:  Continue ice and Celebrex as needed.   We will set him up for a repeat USG glenohumeral injection with Dr. Holbrook in 4 weeks. We will have him follow-up 3 weeks after the injection.      Physical Exam:  Well-nourished,  well-developed. No acute distress. Alert and oriented x3. Responds appropriately to questioning. Good mood. Normal affect.  Left shoulder:  ROM flexion to 135 degrees, externally rotates 45  5/5 rotator cuff strength  Pain in the biceps  Neurovascular exam normal distally  Palpable pedal pulse and good cap refill     Review of Systems:  GENERAL: Negative for malaise, significant weight loss, fever  MUSCULOSKELETAL: See HPI  NEURO:  Negative     Past Medical History:   Diagnosis Date    Anesthesia of skin     Numbness and tingling    Anxiety 09/21/2023    Bilateral myopia 09/21/2023    Cervical radicular pain 09/21/2023    Chronic patellofemoral pain of left knee 09/21/2023    Complex regional pain syndrome type 1 of left lower extremity 09/21/2023    COVID-19     VACCINATED    Cubital tunnel syndrome on left 09/21/2023    Cubital tunnel syndrome on right 09/21/2023    Disease of intestine, unspecified     Bowel trouble    Erectile dysfunction 09/21/2023    Foot joint pain 09/21/2023    Fractures     Gastroparesis     GERD (gastroesophageal reflux disease)     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    History of psychological trauma 09/21/2023    Hyperlipidemia     Hypertension     Incomplete RBBB 09/19/2024    Joint pain 09/21/2023    Kidney cysts 09/21/2023    Low back pain, unspecified 12/07/2022    Chronic low back pain without sciatica, unspecified back pain laterality    Low back pain, unspecified 08/09/2022    Lumbago    Lumbar spondylosis 09/21/2023    Lung nodule 09/21/2023    RIGHT    Metatarsalgia of left foot 09/21/2023    Myopia, bilateral 02/19/2019    Myopia of both eyes with astigmatism and presbyopia    Non-sustained ventricular tachycardia (Multi)     Osteoarthritis     Osteochondral defect of patella 09/21/2023    Osteoporosis     Other spondylosis, cervical region 09/21/2023    Patellofemoral arthritis 09/21/2023    Personal history of other diseases of the musculoskeletal system and connective tissue      History of arthritis    Personal history of other specified conditions     History of seizure    Reflex sympathetic dystrophy 2023    Scapular dyskinesis 2023    Seizure disorder (Multi)     D/T TRAMADOL REACTION - 25 YEARS AGO    Shoulder impingement 2023    Stable angina     Suspected sleep apnea 2023    Trauma and stressor-related disorder 2023    Trochanteric bursitis of both hips 2023       Medication Documentation Review Audit       Reviewed by Marian Tellez RN (Registered Nurse) on 06/15/25 at 0647      Medication Order Taking? Sig Documenting Provider Last Dose Status   acetaminophen (Tylenol) 500 mg tablet [id number]  Take 2 tablets (1,000 mg) by mouth 3 times a day. Olivia Alonzo MD  Active   albuterol (Ventolin HFA) 90 mcg/actuation inhaler [id number]  Inhale 2 puffs every 4 hours if needed for wheezing. Historical Provider, MD  Active   ammonium lactate (Lac-Hydrin) 12 % lotion [id number]  Apply 1 Application topically if needed for dry skin (apply to feet). Historical Provider, MD  Active   atorvastatin (Lipitor) 80 mg tablet [id number]  Take 1 tablet (80 mg) by mouth once daily. Ronni Dey MD  Active   celecoxib (CeleBREX) 200 mg capsule [id number]  Take 1 capsule (200 mg) by mouth 2 times a day. Historical Provider, MD  Active   diazePAM (Valium) 5 mg tablet [id number]  Take 1 tablet (5 mg) by mouth 2 times a day as needed for anxiety. Historical Provider, MD  Active   DULoxetine (Cymbalta) 30 mg DR capsule [id number]  Take 3 capsules (90 mg) by mouth once daily. Do not crush or chew. Janette Sales,    25 2359   econazole nitrate 1 % cream [id number]  Apply 1 Application topically 2 times a day. Applies to feet Historical Provider, MD  Active   ezetimibe (Zetia) 10 mg tablet [id number]  Take 1 tablet (10 mg) by mouth once daily. Ronni Dey MD  Active   fluticasone (Flonase) 50 mcg/actuation nasal spray [id number]  Administer 1  "spray into each nostril once daily as needed. Historical Provider, MD  Active   lidocaine (Lidoderm) 5 % patch [id number]  apply 2 patches to affected area daily as needed. Olivia Alonzo MD  Active   lidocaine HCl, bulk, 100 % powder powder [id number]   Historical Provider, MD  Active   magnesium oxide (Mag-Ox) 400 mg (241.3 mg magnesium) tablet [id number]  Take 1 tablet (400 mg) by mouth once daily. Ronni Dey MD  Active   metoprolol succinate XL (Toprol-XL) 25 mg 24 hr tablet [id number]  Take 1 tablet (25 mg) by mouth once daily. Do not crush or chew. Ronni Dey MD  Active   omeprazole (PriLOSEC) 40 mg DR capsule [id number]  Take 1 capsule (40 mg) by mouth 2 times a day. For 90 days Historical Provider, MD  Active   oxyCODONE (Roxicodone) 5 mg immediate release tablet [id number]  Take 1 tablet (5 mg) by mouth 2 times a day as needed for severe pain (7 - 10). Historical Provider, MD  Active   pregabalin (Lyrica) 300 mg capsule [id number]  Take 1 capsule (300 mg) by mouth 2 times a day. Vish Barboza PA-C  Active   sildenafil (Viagra) 100 mg tablet [id number]  Take 1 tablet (100 mg) by mouth once daily as needed for erectile dysfunction. Historical Provider, MD  Active   tiZANidine (Zanaflex) 4 mg capsule [id number]  Take 1 capsule (4 mg) by mouth 3 times a day as needed for muscle spasms. Historical Provider, MD  Active                    Allergies   Allergen Reactions    Morphine Hives, Rash and Shortness of breath     Tolerates hydromorphone    Tylenol 3      Tolerates hydromorphone    Ultram [Tramadol] Seizure    Alprazolam Psychosis     \"GOES CRAZY\", psychosis    Toradol [Ketorolac] Seizure    Fish Containing Products Hives and Itching     IT WAS A PROCESSED FISH MEAL, BUT STATES EATS FRESH AND FROZEN FISH ALL THE TIME.    Opioids - Morphine Analogues Rash       Social History     Socioeconomic History    Marital status: Significant Other     Spouse name: Not on file    Number of children: Not on " file    Years of education: Not on file    Highest education level: Not on file   Occupational History    Not on file   Tobacco Use    Smoking status: Never    Smokeless tobacco: Never    Tobacco comments:     It killed my mother smoking and she left me with a gift a nodule on my right lung   Vaping Use    Vaping status: Never Used   Substance and Sexual Activity    Alcohol use: Not Currently    Drug use: Not Currently     Comment: Ketamine treatments for pain,eatable marijuana    Sexual activity: Not Currently     Partners: Female     Birth control/protection: None   Other Topics Concern    Not on file   Social History Narrative    Not on file     Social Drivers of Health     Financial Resource Strain: Low Risk  (3/27/2025)    Overall Financial Resource Strain (CARDIA)     Difficulty of Paying Living Expenses: Not hard at all   Food Insecurity: No Food Insecurity (3/26/2025)    Hunger Vital Sign     Worried About Running Out of Food in the Last Year: Never true     Ran Out of Food in the Last Year: Never true   Transportation Needs: No Transportation Needs (3/27/2025)    PRAPARE - Transportation     Lack of Transportation (Medical): No     Lack of Transportation (Non-Medical): No   Physical Activity: Sufficiently Active (3/26/2025)    Exercise Vital Sign     Days of Exercise per Week: 5 days     Minutes of Exercise per Session: 30 min   Stress: No Stress Concern Present (3/26/2025)    [employer] [place] [employer] - Occupational Stress Questionnaire     Feeling of Stress : Only a little   Social Connections: Moderately Isolated (3/26/2025)    Social Connection and Isolation Panel [NHANES]     Frequency of Communication with Friends and Family: More than three times a week     Frequency of Social Gatherings with Friends and Family: Once a week     Attends [employer]ology Services: Never     Active Member of Clubs or Organizations: No     Attends Club or Organization Meetings: Never     Marital Status: Living  with partner   Intimate Partner Violence: Not At Risk (3/26/2025)    Humiliation, Afraid, Rape, and Kick questionnaire     Fear of Current or Ex-Partner: No     Emotionally Abused: No     Physically Abused: No     Sexually Abused: No   Housing Stability: Low Risk  (3/27/2025)    Housing Stability Vital Sign     Unable to Pay for Housing in the Last Year: No     Number of Times Moved in the Last Year: 0     Homeless in the Last Year: No       Past Surgical History:   Procedure Laterality Date    ACHILLES TENDON SURGERY  1978 2016    CARDIAC CATHETERIZATION N/A 12/20/2024    Procedure: Left Heart Cath;  Surgeon: Kale Funk MD;  Location: [facility] Cardiac Cath Lab;  Service: Cardiovascular;  Laterality: N/A;    CARDIAC ELECTROPHYSIOLOGY PROCEDURE N/A 1/31/2025    Procedure: Loop Insertion (85223);  Surgeon: Hai Ga MD;  Location: [facility] Cardiac Cath Lab;  Service: Electrophysiology;  Laterality: N/A;    CARDIOVASCULAR STRESS TEST      CT ABDOMEN PELVIS ANGIOGRAM W AND/OR WO IV CONTRAST  03/18/2020    CT ABDOMEN PELVIS ANGIOGRAM W AND/OR WO IV CONTRAST 3/18/2020 [facility] EMERGENCY LEGACY    CT ANGIO NECK  12/16/2022    CT NECK ANGIO W AND WO IV CONTRAST 12/16/2022 DOCTOR OFFICE LEGACY    CT HEAD ANGIO W AND WO IV CONTRAST  12/16/2022    CT HEAD ANGIO W AND WO IV CONTRAST 12/16/2022 DOCTOR OFFICE LEGACY    HIP SURGERY Left     REPLACEMENT    JOINT REPLACEMENT      LEFT KNEE    KNEE ARTHROPLASTY Bilateral 06/13/2017    Knee Arthroplasty    OTHER SURGICAL HISTORY  01/06/2022    Ulnar nerve neuroplasty    OTHER SURGICAL HISTORY Bilateral 07/26/2019    Foot surgery  -- HARDWARE    OTHER SURGICAL HISTORY Bilateral     BILATERAL ANKLE SCOPE    OTHER SURGICAL HISTORY      POP ABDOMINAL PROCEDURE    PLANTAR FASCIA RELEASE      SHOULDER SURGERY Bilateral 06/13/2017    Shoulder Surgery       CT ABDOMEN PELVIS W IV CONTRAST    Result Date: 1/22/2024  * * *Final Report* * * DATE OF EXAM: Jan 22 2024  8:50AM   [facility]   0530  -  CT  ABD/PEL W IVCON  / ACCESSION #  [id number] PROCEDURE REASON: Abdominal pain, acute, nonlocalized      * * * * Physician Interpretation * * * *  EXAMINATION:  CT ABDOMEN AND PELVIS WITH IV CONTRAST CLINICAL HISTORY: Acute abdominal pain TECHNIQUE: CT of the abdomen and pelvis was performed using standard technique, scanning from just above the dome of the diaphragm to the symphysis pubis. MQ:  CTAP_3 Contrast: IV:  100 ml of Omnipaque 350 CT Radiation dose: Integrated Dose-length product (DLP) for this visit =   269 mGy*cm. CT Dose Reduction Employed: Automated exposure control(AEC) and iterative recon COMPARISON: 12/26/2023 RESULT: Liver: No mass. Biliary: No bile duct dilation.  The gallbladder is within normal limits for the modality. Spleen: No mass. No splenomegaly. Pancreas: No mass or duct dilation. Adrenals: No mass. Kidneys: There are symmetric bilateral nephrograms.  In the medial right upper renal pole there is a 4.3 cm exophytic hypodensity consistent with a cyst. GI tract: No dilation or wall thickening. There is a small sliding hiatal hernia.  There is colonic residue with a moderate to large stool burden.   There is no acute appendicitis or diverticulitis. Lymph nodes: No abdominal or pelvic lymphadenopathy. Mesentery/Peritoneum: No ascites or mass. Retroperitoneum: No mass. Vasculature:  - Abdominal aorta and iliac arteries: Atherosclerotic calcifications without aneurysm.  - Celiac and SMA: Questionable mild narrowing at the origin of the celiac axis.  - Portal venous system (SMV, splenic vein, portal vein and branches): Patent.  - Hepatic veins: Patent. Pelvis: No mass, ascites or fluid collection. There is artifact from a left hip arthroplasty. Bones/Soft Tissues: There are no aggressive osseous lesions identified. Lower thorax: Bibasilar scarring.  (topogram) images: No additional findings.    IMPRESSION: Stable appearance.  There is no acute intra-abdominal process identified.  : [facility]   Transcribe Date/Time: Jan 22 2024  8:50A Dictated by : INES ROLON MD This examination was interpreted and the report reviewed and electronically signed by: INES ROLON MD on Jan 22 2024  9:05AM  EST    US GALLBLADDER    Result Date: 1/22/2024  * * *Final Report* * * DATE OF EXAM: Jan 22 2024  6:48AM   [facility]   1032  -  US ABD RIGHT UPPER QUADRANT  / ACCESSION #  [id number] PROCEDURE REASON: RUQ pain, no fever, no elev WBC      * * * * Physician Interpretation * * * *  EXAMINATION:   RIGHT UPPER QUADRANT ULTRASOUND CLINICAL HISTORY: RIGHT upper quadrant pain TECHNIQUE:  Sonography of the right upper quadrant  was performed.   Images were obtained and stored in a permanent archive. MQ:  URUQ_2 COMPARISON: CT abdomen pelvis 12/26/2023 RESULT: Pancreas: Not well visualized Liver:      Echotexture:  Homogeneous      Echogenicity:  Increased      Surface contour:  Smooth      Lesions:  None. Biliary: No intrahepatic biliary duct dilation.      CBD: 0.3 cm at the hilum.      Gallbladder: Normal caliber           -Contents:  No cholelithiasis           -Wall:  Normal           -Other:  No pericholecystic fluid.  Negative sonographic Bautista sign. Right Kidney: No hydronephrosis.  Anechoic upper pole cyst with lobular borders measures 3.1 x 2.6 x 3.8 cm.  This appears unchanged from the prior CT. Ascites: None.    IMPRESSION: Hepatic steatosis.  Otherwise, no acute sonographic abnormality in the RIGHT upper quadrant. : [facility]   Transcribe Date/Time: Jan 22 2024  6:55A Dictated by : DEE MCKEON MD This examination was interpreted and the report reviewed and electronically signed by: DEE MCKEON MD on Jan 22 2024  6:59AM  EST    XR CHEST 1 VIEW    Result Date: 1/22/2024  * * *Final Report* * * DATE OF EXAM: Jan 22 2024  6:22AM   [facility]   5376  -  XR CHEST 1V FRONTAL PORT  / ACCESSION #  [id number] PROCEDURE REASON: Tachycardia      * * * * Physician Interpretation * * * *   EXAMINATION:  CHEST RADIOGRAPH (SINGLE VIEW AP OR PA) CLINICAL INFORMATION (AS PROVIDED BY ORDERING CLINICIAN) :  Tachycardia Comparison:  9/3/2023 RESULT: Lines, tubes, and devices:  N/A Lungs and pleura:  No confluent infiltrate, large pleural effusion or pneumothorax.   Cardiomediastinal silhouette:  Within normal limits. Other:  No acute bony abnormality identified.    IMPRESSION: No significant acute radiographic abnormality. : [facility]   Transcribe Date/Time: Jan 22 2024  6:28A Dictated by : MIRNA CHAO MD This examination was interpreted and the report reviewed and electronically signed by: MIRNA CHAO MD on Jan 22 2024  6:30AM  EST       Scribe Attestation:  By signing my name below, I, Amador Copeland attest that this documentation has been prepared under the direction and in the presence of Royce Louis MD.    Provider Attestation - Scribe documentation:  All medical record entries made by Argelia Donaldson were at my direction and personally dictated by me, Royce Louis MD. I have reviewed the chart and agree that the record is accurate and I confirmed that it reflects my personal performance of the history, physical exam, discussion, and plan.

## 2025-06-17 ENCOUNTER — HOSPITAL ENCOUNTER (OUTPATIENT)
Dept: PAIN MEDICINE | Facility: CLINIC | Age: 55
Discharge: HOME | End: 2025-06-17
Payer: COMMERCIAL

## 2025-06-17 ENCOUNTER — APPOINTMENT (OUTPATIENT)
Dept: ORTHOPEDIC SURGERY | Facility: CLINIC | Age: 55
End: 2025-06-17
Payer: COMMERCIAL

## 2025-06-17 VITALS
HEART RATE: 79 BPM | DIASTOLIC BLOOD PRESSURE: 71 MMHG | TEMPERATURE: 97.1 F | SYSTOLIC BLOOD PRESSURE: 122 MMHG | RESPIRATION RATE: 16 BRPM | OXYGEN SATURATION: 96 %

## 2025-06-17 DIAGNOSIS — M47.816 LUMBAR SPONDYLOSIS: ICD-10-CM

## 2025-06-17 PROCEDURE — 64494 INJ PARAVERT F JNT L/S 2 LEV: CPT | Performed by: PAIN MEDICINE

## 2025-06-17 PROCEDURE — 2500000004 HC RX 250 GENERAL PHARMACY W/ HCPCS (ALT 636 FOR OP/ED): Mod: JZ | Performed by: PAIN MEDICINE

## 2025-06-17 PROCEDURE — 64493 INJ PARAVERT F JNT L/S 1 LEV: CPT | Mod: 50 | Performed by: PAIN MEDICINE

## 2025-06-17 PROCEDURE — 64493 INJ PARAVERT F JNT L/S 1 LEV: CPT | Performed by: PAIN MEDICINE

## 2025-06-17 PROCEDURE — 64494 INJ PARAVERT F JNT L/S 2 LEV: CPT | Mod: 50 | Performed by: PAIN MEDICINE

## 2025-06-17 RX ORDER — LIDOCAINE HYDROCHLORIDE 20 MG/ML
INJECTION, SOLUTION EPIDURAL; INFILTRATION; INTRACAUDAL; PERINEURAL AS NEEDED
Status: COMPLETED | OUTPATIENT
Start: 2025-06-17 | End: 2025-06-17

## 2025-06-17 RX ORDER — LIDOCAINE HYDROCHLORIDE 10 MG/ML
INJECTION, SOLUTION EPIDURAL; INFILTRATION; INTRACAUDAL; PERINEURAL AS NEEDED
Status: COMPLETED | OUTPATIENT
Start: 2025-06-17 | End: 2025-06-17

## 2025-06-17 RX ADMIN — LIDOCAINE HYDROCHLORIDE 10 ML: 10 INJECTION, SOLUTION EPIDURAL; INFILTRATION; INTRACAUDAL; PERINEURAL at 13:08

## 2025-06-17 RX ADMIN — LIDOCAINE HYDROCHLORIDE 10 ML: 20 INJECTION, SOLUTION EPIDURAL; INFILTRATION; INTRACAUDAL at 13:10

## 2025-06-17 ASSESSMENT — PAIN DESCRIPTION - DESCRIPTORS: DESCRIPTORS: ACHING;BURNING;SHARP

## 2025-06-17 ASSESSMENT — PAIN - FUNCTIONAL ASSESSMENT: PAIN_FUNCTIONAL_ASSESSMENT: 0-10

## 2025-06-17 ASSESSMENT — PAIN SCALES - GENERAL: PAINLEVEL_OUTOF10: 6

## 2025-06-17 NOTE — DISCHARGE INSTRUCTIONS
Post-injection instructions FOR FACET BLOCK      Pay attention to how much pain relief (what percentage compared to before the procedure) you get and for how long it lasts.     THIS IS A TEMPORARY NUMBING OF PAIN THIS IS BEING USED AS A DIAGNOSTIC INDICATOR IF THIS IS THE SOURCE OF YOUR PAIN    Activity:  RETURN TO NORMAL ACTIVITY  SEE IF YOU CAN APPRECIATE AN IMPROVEMENT IN THE PAIN    Bandages: Remove after 24 hours     Showering/Bathing: You may shower after bandage is removed     Follow up: CALL OFFICE NEXT BUSINESS [phone] LEAVE MESSAGE ABOUT THE % OF RELIEF THAT WAS OBTAINED AND FOR HOW MANY HOURS      Call the OFFICE immediately: if you notice:     Excessive bleeding from procedure site (brisk bright red bleeding from the site or bleeding that soaks the bandages or does not stop)   Severe headache  Inability to walk, leg or arm weakness or numbness that is worse after the procedure   Uncontrolled pain   New urinary or fecal incontinence   Signs of infection: Fever above 101.5F, redness, swelling, pus or drainage from the site

## 2025-06-18 ENCOUNTER — APPOINTMENT (OUTPATIENT)
Dept: URGENT CARE | Age: 55
End: 2025-06-18
Payer: COMMERCIAL

## 2025-06-18 ENCOUNTER — APPOINTMENT (OUTPATIENT)
Dept: ORTHOPEDIC SURGERY | Facility: CLINIC | Age: 55
End: 2025-06-18
Payer: COMMERCIAL

## 2025-06-19 ENCOUNTER — TELEPHONE (OUTPATIENT)
Dept: CARDIOLOGY | Facility: CLINIC | Age: 55
End: 2025-06-19

## 2025-06-19 ENCOUNTER — OFFICE VISIT (OUTPATIENT)
Dept: ORTHOPEDIC SURGERY | Facility: CLINIC | Age: 55
End: 2025-06-19
Payer: COMMERCIAL

## 2025-06-19 ENCOUNTER — APPOINTMENT (OUTPATIENT)
Dept: ORTHOPEDIC SURGERY | Facility: CLINIC | Age: 55
End: 2025-06-19
Payer: COMMERCIAL

## 2025-06-19 ENCOUNTER — HOSPITAL ENCOUNTER (OUTPATIENT)
Dept: CARDIOLOGY | Facility: CLINIC | Age: 55
Discharge: HOME | End: 2025-06-19
Payer: COMMERCIAL

## 2025-06-19 DIAGNOSIS — Z95.818 IMPLANTABLE LOOP RECORDER PRESENT: ICD-10-CM

## 2025-06-19 DIAGNOSIS — R55 SYNCOPE, UNSPECIFIED SYNCOPE TYPE: ICD-10-CM

## 2025-06-19 DIAGNOSIS — M75.22 BICEPS TENDINITIS OF LEFT SHOULDER: Primary | ICD-10-CM

## 2025-06-19 DIAGNOSIS — R00.2 PALPITATIONS: ICD-10-CM

## 2025-06-19 PROCEDURE — 99212 OFFICE O/P EST SF 10 MIN: CPT | Performed by: ORTHOPAEDIC SURGERY

## 2025-06-19 NOTE — TELEPHONE ENCOUNTER
Received faxed cardiac clearance request from [place] [facility].   Patient to have surgery on 7/7/25.  Form placed in your basket to review and sign.  Last OV 3/6/25  Next OV 9/18/25

## 2025-06-19 NOTE — H&P (VIEW-ONLY)
Chief Complaint   Patient presents with    Left Shoulder - Follow-up     Biceps tendinitis, s/p USG CSI 5/28/2025 with RW.       History of Present Illness:  6/19/2025: He was given a USG glenohumeral injection by Dr. Holbrook on 5/28/25. He has had limited improvement with diagnostic injections. He recently had RFA in his neck which gave him significant relief. He denies any current pain in his neck. He describes constant pain in the biceps as well as mechanical symptoms. He also has cramping in the biceps. It is impacting his daily activities. He injured his hand last Monday which required stitches. He asks about getting that stitch removed today.     4/29/2025: He was given a USG left tendon sheath cortisone injection by Dr. Holbrook on 4/1/25. His shoulder continues to bother him. He feels he did not get much relief with the injection. He has several things going in his personal life at this time that he is worried about.     01/28/2025: He is here today to review the results of his recent MRI of the left  shoulder. He continues to have pain in the anterior shoulder and into the biceps.     12/05/2024: The MRI of his left shoulder was denied.  His arm is becoming unusable even with Celebrex and Lidocaine patches. He completed a longer than 6 week course of formal therapy in August, including exercises for his neck and shoulder in which he feels he injured his shoulder doing.  He has been doing home based exercise under physician direction with no improvement in his pain beyond his PT.    11/14/2024: He had a left shoulder injection on 09/26/24. The injection helped for a little while but didn't give him much long term relief. He has a history of osteoporosis. He is in significant pain. He is seeing pain management on Monday.     09/26/2024: Patient is here today for his left shoulder pain for the past 10 days. States he was at physical therapy for his neck working on the arm bike and the following morning noted  increased pain and inflammation of the left shoulder.    Imaging:  X-rays left shoulder: No acute fractures no dislocations of the left shoulder.  No arthritic change  MRI left shoulder: No obvious tear, some fraying in the biceps.      Assessment:   Left shoulder biceps tendonitis    Plan:  Continue with activities as tolerated.  Ice and NSAIDs as needed.   Given his limited improvement with diagnostic injections we discussed the possibility that this could be cervical related. I offered him a left shoulder arthroscopy biceps tenodesis.     Left shoulder arthroscopy biceps tenodesis   Risks benefits and alternatives to surgery were discussed including but not limited to Infection, bleeding, neurovascular injury, pain and dysfunction, hardware related complications including cutout failure breakage, loss of function, motion, and permanent disability as well as the cardiovascular and pulmonary complications from anesthesia including death and DVT. Patient and family accept these risks. We discussed specifically hardware related complications hardware cut out, failure, breakage, heterotopic ossification, lateral antebrachial cutaneous neuroma, radial nerve injury, loss in strength, function or motion and the need for revision surgeries  Plan for outpatient surgery:  1 week postop follow up with 3view x-rays.  Percocet for postop pain relief. OARRS has been reviewed and is consistent with prescribed medications. This report is scanned into the electronic medical record. The risks of abuse, dependence, addiction and diversion were considered. The medication is felt to be clinically appropriate based on documented diagnosis.  Pre-CERT for removal T scope elbow brace to be placed in the first postoperative visit.     Physical Exam:  Well-nourished, well-developed. No acute distress. Alert and oriented x3. Responds appropriately to questioning. Good mood. Normal affect.  Left shoulder:  ROM flexion to 135 degrees,  externally rotates 45  5/5 rotator cuff strength  Pain in the biceps  Neurovascular exam normal distally  Palpable pedal pulse and good cap refill  Head/neck: Atraumatic, normocephalic  Cardiovascular: Palpable pulse regular to extremities  Pulmonary: Respirations regular, no audible wheeze  Abdomen: Soft, nondistended    Left hand: He does have a small laceration dorsally. No signs of infection. Full passive motion to the digits.      Review of Systems:  GENERAL: Negative for malaise, significant weight loss, fever  MUSCULOSKELETAL: See HPI  NEURO:  Negative     Past Medical History:   Diagnosis Date    Anesthesia of skin     Numbness and tingling    Anxiety 09/21/2023    Bilateral myopia 09/21/2023    Cervical radicular pain 09/21/2023    Chronic patellofemoral pain of left knee 09/21/2023    Complex regional pain syndrome type 1 of left lower extremity 09/21/2023    COVID-19     VACCINATED    Cubital tunnel syndrome on left 09/21/2023    Cubital tunnel syndrome on right 09/21/2023    Disease of intestine, unspecified     Bowel trouble    Erectile dysfunction 09/21/2023    Foot joint pain 09/21/2023    Fractures     Gastroparesis     GERD (gastroesophageal reflux disease)     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    History of psychological trauma 09/21/2023    Hyperlipidemia     Hypertension     Incomplete RBBB 09/19/2024    Joint pain 09/21/2023    Kidney cysts 09/21/2023    Low back pain, unspecified 12/07/2022    Chronic low back pain without sciatica, unspecified back pain laterality    Low back pain, unspecified 08/09/2022    Lumbago    Lumbar spondylosis 09/21/2023    Lung nodule 09/21/2023    RIGHT    Metatarsalgia of left foot 09/21/2023    Myopia, bilateral 02/19/2019    Myopia of both eyes with astigmatism and presbyopia    Non-sustained ventricular tachycardia (Multi)     Osteoarthritis     Osteochondral defect of patella 09/21/2023    Osteoporosis     Other spondylosis, cervical region 09/21/2023     Patellofemoral arthritis 2023    Personal history of other diseases of the musculoskeletal system and connective tissue     History of arthritis    Personal history of other specified conditions     History of seizure    Reflex sympathetic dystrophy 2023    Scapular dyskinesis 2023    Seizure disorder (Multi)     D/T TRAMADOL REACTION - 25 YEARS AGO    Shoulder impingement 2023    Stable angina     Suspected sleep apnea 2023    Trauma and stressor-related disorder 2023    Trochanteric bursitis of both hips 2023       Medication Documentation Review Audit       Reviewed by Allie Cox RN (Registered Nurse) on 25 at 1254      Medication Order Taking? Sig Documenting Provider Last Dose Status   acetaminophen (Tylenol) 500 mg tablet [id number]  Take 2 tablets (1,000 mg) by mouth 3 times a day. Olivia Alonzo MD  Active   albuterol (Ventolin HFA) 90 mcg/actuation inhaler [id number]  Inhale 2 puffs every 4 hours if needed for wheezing. Historical Provider, MD  Active   ammonium lactate (Lac-Hydrin) 12 % lotion [id number]  Apply 1 Application topically if needed for dry skin (apply to feet). Historical Provider, MD  Active   atorvastatin (Lipitor) 80 mg tablet [id number]  Take 1 tablet (80 mg) by mouth once daily. Ronni Dey MD  Active   celecoxib (CeleBREX) 200 mg capsule [id number]  Take 1 capsule (200 mg) by mouth 2 times a day. Historical Provider, MD  Active   diazePAM (Valium) 5 mg tablet [id number]  Take 1 tablet (5 mg) by mouth 2 times a day as needed for anxiety. Historical Provider, MD  Active   DULoxetine (Cymbalta) 30 mg DR capsule [id number]  Take 3 capsules (90 mg) by mouth once daily. Do not crush or chew. Janette Sales, DO   25 [phone]   econazole nitrate 1 % cream [id number]  Apply 1 Application topically 2 times a day. Applies to feet Historical Provider, MD  Active   ezetimibe (Zetia) 10 mg tablet [id number]  Take 1 tablet (10 mg) by  "mouth once daily. Ronni Dey MD  Active   fluticasone (Flonase) 50 mcg/actuation nasal spray [id number]  Administer 1 spray into each nostril once daily as needed. Historical Provider, MD  Active   lidocaine (Lidoderm) 5 % patch [id number]  apply 2 patches to affected area daily as needed. Olivia Alonzo MD  Active   lidocaine HCl, bulk, 100 % powder powder [id number]   Historical Provider, MD  Active   magnesium oxide (Mag-Ox) 400 mg (241.3 mg magnesium) tablet [id number]  Take 1 tablet (400 mg) by mouth once daily. oRnni Dey MD  Active   metoprolol succinate XL (Toprol-XL) 25 mg 24 hr tablet [id number]  Take 1 tablet (25 mg) by mouth once daily. Do not crush or chew. Ronni Dey MD  Active   omeprazole (PriLOSEC) 40 mg DR capsule [id number]  Take 1 capsule (40 mg) by mouth 2 times a day. For 90 days Historical Provider, MD  Active   oxyCODONE (Roxicodone) 5 mg immediate release tablet [id number]  Take 1 tablet (5 mg) by mouth 2 times a day as needed for severe pain (7 - 10). Historical Provider, MD  Active   pregabalin (Lyrica) 300 mg capsule [id number]  Take 1 capsule (300 mg) by mouth 2 times a day. Vish Barboza PA-C  Active   sildenafil (Viagra) 100 mg tablet [id number]  Take 1 tablet (100 mg) by mouth once daily as needed for erectile dysfunction. Historical Provider, MD  Active   tiZANidine (Zanaflex) 4 mg capsule [id number]  Take 1 capsule (4 mg) by mouth 3 times a day as needed for muscle spasms. Historical Provider, MD  Active                    Allergies   Allergen Reactions    Morphine Hives, Rash and Shortness of breath     Tolerates hydromorphone    Tylenol 3      Tolerates hydromorphone    Ultram [Tramadol] Seizure    Alprazolam Psychosis     \"GOES CRAZY\", psychosis    Toradol [Ketorolac] Seizure    Fish Containing Products Hives and Itching     IT WAS A PROCESSED FISH MEAL, BUT STATES EATS FRESH AND FROZEN FISH ALL THE TIME.    Opioids - Morphine Analogues Rash       Social " History     Socioeconomic History    Marital status: Significant Other     Spouse name: Not on file    Number of children: Not on file    Years of education: Not on file    Highest education level: Not on file   Occupational History    Not on file   Tobacco Use    Smoking status: Never    Smokeless tobacco: Never    Tobacco comments:     It killed my mother smoking and she left me with a gift a nodule on my right lung   Vaping Use    Vaping status: Never Used   Substance and Sexual Activity    Alcohol use: Not Currently    Drug use: Not Currently     Comment: Ketamine treatments for pain,eatable marijuana    Sexual activity: Not Currently     Partners: Female     Birth control/protection: None   Other Topics Concern    Not on file   Social History Narrative    Not on file     Social Drivers of Health     Financial Resource Strain: Low Risk  (3/27/2025)    Overall Financial Resource Strain (CARDIA)     Difficulty of Paying Living Expenses: Not hard at all   Food Insecurity: No Food Insecurity (3/26/2025)    Hunger Vital Sign     Worried About Running Out of Food in the Last Year: Never true     Ran Out of Food in the Last Year: Never true   Transportation Needs: No Transportation Needs (3/27/2025)    PRAPARE - Transportation     Lack of Transportation (Medical): No     Lack of Transportation (Non-Medical): No   Physical Activity: Sufficiently Active (3/26/2025)    Exercise Vital Sign     Days of Exercise per Week: 5 days     Minutes of Exercise per Session: 30 min   Stress: No Stress Concern Present (3/26/2025)    [place] [employer] - Occupational Stress Questionnaire     Feeling of Stress : Only a little   Social Connections: Moderately Isolated (3/26/2025)    Social Connection and Isolation Panel [NHANES]     Frequency of Communication with Friends and Family: More than three times a week     Frequency of Social Gatherings with Friends and Family: Once a week     Attends Jehovah's witness Services:  Never     Active Member of Clubs or Organizations: No     Attends Club or Organization Meetings: Never     Marital Status: Living with partner   Intimate Partner Violence: Not At Risk (3/26/2025)    Humiliation, Afraid, Rape, and Kick questionnaire     Fear of Current or Ex-Partner: No     Emotionally Abused: No     Physically Abused: No     Sexually Abused: No   Housing Stability: Low Risk  (3/27/2025)    Housing Stability Vital Sign     Unable to Pay for Housing in the Last Year: No     Number of Times Moved in the Last Year: 0     Homeless in the Last Year: No       Past Surgical History:   Procedure Laterality Date    ACHILLES TENDON SURGERY  1978 2016    CARDIAC CATHETERIZATION N/A 12/20/2024    Procedure: Left Heart Cath;  Surgeon: Kale Funk MD;  Location: [facility] Cardiac Cath Lab;  Service: Cardiovascular;  Laterality: N/A;    CARDIAC ELECTROPHYSIOLOGY PROCEDURE N/A 1/31/2025    Procedure: Loop Insertion (05137);  Surgeon: Hai Ga MD;  Location: [facility] Cardiac Cath Lab;  Service: Electrophysiology;  Laterality: N/A;    CARDIOVASCULAR STRESS TEST      CT ABDOMEN PELVIS ANGIOGRAM W AND/OR WO IV CONTRAST  03/18/2020    CT ABDOMEN PELVIS ANGIOGRAM W AND/OR WO IV CONTRAST 3/18/2020 [facility] EMERGENCY LEGACY    CT ANGIO NECK  12/16/2022    CT NECK ANGIO W AND WO IV CONTRAST 12/16/2022 DOCTOR OFFICE LEGACY    CT HEAD ANGIO W AND WO IV CONTRAST  12/16/2022    CT HEAD ANGIO W AND WO IV CONTRAST 12/16/2022 DOCTOR OFFICE LEGACY    HIP SURGERY Left     REPLACEMENT    JOINT REPLACEMENT      LEFT KNEE    KNEE ARTHROPLASTY Bilateral 06/13/2017    Knee Arthroplasty    OTHER SURGICAL HISTORY  01/06/2022    Ulnar nerve neuroplasty    OTHER SURGICAL HISTORY Bilateral 07/26/2019    Foot surgery  -- HARDWARE    OTHER SURGICAL HISTORY Bilateral     BILATERAL ANKLE SCOPE    OTHER SURGICAL HISTORY      POP ABDOMINAL PROCEDURE    PLANTAR FASCIA RELEASE      SHOULDER SURGERY Bilateral 06/13/2017    Shoulder Surgery       CT  ABDOMEN PELVIS W IV CONTRAST    Result Date: 1/22/2024  * * *Final Report* * * DATE OF EXAM: Jan 22 2024  8:50AM   [facility]   0530  -  CT ABD/PEL W IVCON  / ACCESSION #  [id number] PROCEDURE REASON: Abdominal pain, acute, nonlocalized      * * * * Physician Interpretation * * * *  EXAMINATION:  CT ABDOMEN AND PELVIS WITH IV CONTRAST CLINICAL HISTORY: Acute abdominal pain TECHNIQUE: CT of the abdomen and pelvis was performed using standard technique, scanning from just above the dome of the diaphragm to the symphysis pubis. MQ:  CTAP_3 Contrast: IV:  100 ml of Omnipaque 350 CT Radiation dose: Integrated Dose-length product (DLP) for this visit =   269 mGy*cm. CT Dose Reduction Employed: Automated exposure control(AEC) and iterative recon COMPARISON: 12/26/2023 RESULT: Liver: No mass. Biliary: No bile duct dilation.  The gallbladder is within normal limits for the modality. Spleen: No mass. No splenomegaly. Pancreas: No mass or duct dilation. Adrenals: No mass. Kidneys: There are symmetric bilateral nephrograms.  In the medial right upper renal pole there is a 4.3 cm exophytic hypodensity consistent with a cyst. GI tract: No dilation or wall thickening. There is a small sliding hiatal hernia.  There is colonic residue with a moderate to large stool burden.   There is no acute appendicitis or diverticulitis. Lymph nodes: No abdominal or pelvic lymphadenopathy. Mesentery/Peritoneum: No ascites or mass. Retroperitoneum: No mass. Vasculature:  - Abdominal aorta and iliac arteries: Atherosclerotic calcifications without aneurysm.  - Celiac and SMA: Questionable mild narrowing at the origin of the celiac axis.  - Portal venous system (SMV, splenic vein, portal vein and branches): Patent.  - Hepatic veins: Patent. Pelvis: No mass, ascites or fluid collection. There is artifact from a left hip arthroplasty. Bones/Soft Tissues: There are no aggressive osseous lesions identified. Lower thorax: Bibasilar scarring.  (topogram)  images: No additional findings.    IMPRESSION: Stable appearance.  There is no acute intra-abdominal process identified. : [facility]   Transcribe Date/Time: Jan 22 2024  8:50A Dictated by : INES ROLON MD This examination was interpreted and the report reviewed and electronically signed by: INES ROLON MD on Jan 22 2024  9:05AM  EST    US GALLBLADDER    Result Date: 1/22/2024  * * *Final Report* * * DATE OF EXAM: Jan 22 2024  6:48AM   [facility]   1032  -  US ABD RIGHT UPPER QUADRANT  / ACCESSION #  [id number] PROCEDURE REASON: RUQ pain, no fever, no elev WBC      * * * * Physician Interpretation * * * *  EXAMINATION:   RIGHT UPPER QUADRANT ULTRASOUND CLINICAL HISTORY: RIGHT upper quadrant pain TECHNIQUE:  Sonography of the right upper quadrant  was performed.   Images were obtained and stored in a permanent archive. MQ:  URUQ_2 COMPARISON: CT abdomen pelvis 12/26/2023 RESULT: Pancreas: Not well visualized Liver:      Echotexture:  Homogeneous      Echogenicity:  Increased      Surface contour:  Smooth      Lesions:  None. Biliary: No intrahepatic biliary duct dilation.      CBD: 0.3 cm at the hilum.      Gallbladder: Normal caliber           -Contents:  No cholelithiasis           -Wall:  Normal           -Other:  No pericholecystic fluid.  Negative sonographic Bautista sign. Right Kidney: No hydronephrosis.  Anechoic upper pole cyst with lobular borders measures 3.1 x 2.6 x 3.8 cm.  This appears unchanged from the prior CT. Ascites: None.    IMPRESSION: Hepatic steatosis.  Otherwise, no acute sonographic abnormality in the RIGHT upper quadrant. : [facility]   Transcribe Date/Time: Jan 22 2024  6:55A Dictated by : DEE MCKEON MD This examination was interpreted and the report reviewed and electronically signed by: DEE MCKEON MD on Jan 22 2024  6:59AM  EST    XR CHEST 1 VIEW    Result Date: 1/22/2024  * * *Final Report* * * DATE OF EXAM: Jan 22 2024  6:22AM   VHX   5376  -  XR CHEST  1V FRONTAL PORT  / ACCESSION #  [id number] PROCEDURE REASON: Tachycardia      * * * * Physician Interpretation * * * *  EXAMINATION:  CHEST RADIOGRAPH (SINGLE VIEW AP OR PA) CLINICAL INFORMATION (AS PROVIDED BY ORDERING CLINICIAN) :  Tachycardia Comparison:  9/3/2023 RESULT: Lines, tubes, and devices:  N/A Lungs and pleura:  No confluent infiltrate, large pleural effusion or pneumothorax.   Cardiomediastinal silhouette:  Within normal limits. Other:  No acute bony abnormality identified.    IMPRESSION: No significant acute radiographic abnormality. : [facility]   Transcribe Date/Time: Jan 22 2024  6:28A Dictated by : MIRNA CHAO MD This examination was interpreted and the report reviewed and electronically signed by: MIRNA CHAO MD on Jan 22 2024  6:30AM  EST       Scribe Attestation:  By signing my name below, IArgelia Scribe attest that this documentation has been prepared under the direction and in the presence of Royce Louis MD.    Provider Attestation - Scribe documentation:  All medical record entries made by Argelia Donaldson were at my direction and personally dictated by me, Royce Louis MD. I have reviewed the chart and agree that the record is accurate and I confirmed that it reflects my personal performance of the history, physical exam, discussion, and plan.

## 2025-06-19 NOTE — PROGRESS NOTES
Chief Complaint   Patient presents with    Left Shoulder - Follow-up     Biceps tendinitis, s/p USG CSI 5/28/2025 with RW.       History of Present Illness:  6/19/2025: He was given a USG glenohumeral injection by Dr. Holbrook on 5/28/25. He has had limited improvement with diagnostic injections. He recently had RFA in his neck which gave him significant relief. He denies any current pain in his neck. He describes constant pain in the biceps as well as mechanical symptoms. He also has cramping in the biceps. It is impacting his daily activities. He injured his hand last Monday which required stitches. He asks about getting that stitch removed today.     4/29/2025: He was given a USG left tendon sheath cortisone injection by Dr. Holbrook on 4/1/25. His shoulder continues to bother him. He feels he did not get much relief with the injection. He has several things going in his personal life at this time that he is worried about.     01/28/2025: He is here today to review the results of his recent MRI of the left  shoulder. He continues to have pain in the anterior shoulder and into the biceps.     12/05/2024: The MRI of his left shoulder was denied.  His arm is becoming unusable even with Celebrex and Lidocaine patches. He completed a longer than 6 week course of formal therapy in August, including exercises for his neck and shoulder in which he feels he injured his shoulder doing.  He has been doing home based exercise under physician direction with no improvement in his pain beyond his PT.    11/14/2024: He had a left shoulder injection on 09/26/24. The injection helped for a little while but didn't give him much long term relief. He has a history of osteoporosis. He is in significant pain. He is seeing pain management on Monday.     09/26/2024: Patient is here today for his left shoulder pain for the past 10 days. States he was at physical therapy for his neck working on the arm bike and the following morning noted  increased pain and inflammation of the left shoulder.    Imaging:  X-rays left shoulder: No acute fractures no dislocations of the left shoulder.  No arthritic change  MRI left shoulder: No obvious tear, some fraying in the biceps.      Assessment:   Left shoulder biceps tendonitis    Plan:  Continue with activities as tolerated.  Ice and NSAIDs as needed.   Given his limited improvement with diagnostic injections we discussed the possibility that this could be cervical related. I offered him a left shoulder arthroscopy biceps tenodesis.     Left shoulder arthroscopy biceps tenodesis   Risks benefits and alternatives to surgery were discussed including but not limited to Infection, bleeding, neurovascular injury, pain and dysfunction, hardware related complications including cutout failure breakage, loss of function, motion, and permanent disability as well as the cardiovascular and pulmonary complications from anesthesia including death and DVT. Patient and family accept these risks. We discussed specifically hardware related complications hardware cut out, failure, breakage, heterotopic ossification, lateral antebrachial cutaneous neuroma, radial nerve injury, loss in strength, function or motion and the need for revision surgeries  Plan for outpatient surgery:  1 week postop follow up with 3view x-rays.  Percocet for postop pain relief. OARRS has been reviewed and is consistent with prescribed medications. This report is scanned into the electronic medical record. The risks of abuse, dependence, addiction and diversion were considered. The medication is felt to be clinically appropriate based on documented diagnosis.  Pre-CERT for removal T scope elbow brace to be placed in the first postoperative visit.     Physical Exam:  Well-nourished, well-developed. No acute distress. Alert and oriented x3. Responds appropriately to questioning. Good mood. Normal affect.  Left shoulder:  ROM flexion to 135 degrees,  externally rotates 45  5/5 rotator cuff strength  Pain in the biceps  Neurovascular exam normal distally  Palpable pedal pulse and good cap refill  Head/neck: Atraumatic, normocephalic  Cardiovascular: Palpable pulse regular to extremities  Pulmonary: Respirations regular, no audible wheeze  Abdomen: Soft, nondistended    Left hand: He does have a small laceration dorsally. No signs of infection. Full passive motion to the digits.      Review of Systems:  GENERAL: Negative for malaise, significant weight loss, fever  MUSCULOSKELETAL: See HPI  NEURO:  Negative     Past Medical History:   Diagnosis Date    Anesthesia of skin     Numbness and tingling    Anxiety 09/21/2023    Bilateral myopia 09/21/2023    Cervical radicular pain 09/21/2023    Chronic patellofemoral pain of left knee 09/21/2023    Complex regional pain syndrome type 1 of left lower extremity 09/21/2023    COVID-19     VACCINATED    Cubital tunnel syndrome on left 09/21/2023    Cubital tunnel syndrome on right 09/21/2023    Disease of intestine, unspecified     Bowel trouble    Erectile dysfunction 09/21/2023    Foot joint pain 09/21/2023    Fractures     Gastroparesis     GERD (gastroesophageal reflux disease)     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    History of psychological trauma 09/21/2023    Hyperlipidemia     Hypertension     Incomplete RBBB 09/19/2024    Joint pain 09/21/2023    Kidney cysts 09/21/2023    Low back pain, unspecified 12/07/2022    Chronic low back pain without sciatica, unspecified back pain laterality    Low back pain, unspecified 08/09/2022    Lumbago    Lumbar spondylosis 09/21/2023    Lung nodule 09/21/2023    RIGHT    Metatarsalgia of left foot 09/21/2023    Myopia, bilateral 02/19/2019    Myopia of both eyes with astigmatism and presbyopia    Non-sustained ventricular tachycardia (Multi)     Osteoarthritis     Osteochondral defect of patella 09/21/2023    Osteoporosis     Other spondylosis, cervical region 09/21/2023     Patellofemoral arthritis 2023    Personal history of other diseases of the musculoskeletal system and connective tissue     History of arthritis    Personal history of other specified conditions     History of seizure    Reflex sympathetic dystrophy 2023    Scapular dyskinesis 2023    Seizure disorder (Multi)     D/T TRAMADOL REACTION - 25 YEARS AGO    Shoulder impingement 2023    Stable angina     Suspected sleep apnea 2023    Trauma and stressor-related disorder 2023    Trochanteric bursitis of both hips 2023       Medication Documentation Review Audit       Reviewed by Allie Cox RN (Registered Nurse) on 25 at 1254      Medication Order Taking? Sig Documenting Provider Last Dose Status   acetaminophen (Tylenol) 500 mg tablet [id number]  Take 2 tablets (1,000 mg) by mouth 3 times a day. Olivia Alonzo MD  Active   albuterol (Ventolin HFA) 90 mcg/actuation inhaler [id number]  Inhale 2 puffs every 4 hours if needed for wheezing. Historical Provider, MD  Active   ammonium lactate (Lac-Hydrin) 12 % lotion [id number]  Apply 1 Application topically if needed for dry skin (apply to feet). Historical Provider, MD  Active   atorvastatin (Lipitor) 80 mg tablet [id number]  Take 1 tablet (80 mg) by mouth once daily. Ronni Dey MD  Active   celecoxib (CeleBREX) 200 mg capsule [id number]  Take 1 capsule (200 mg) by mouth 2 times a day. Historical Provider, MD  Active   diazePAM (Valium) 5 mg tablet [id number]  Take 1 tablet (5 mg) by mouth 2 times a day as needed for anxiety. Historical Provider, MD  Active   DULoxetine (Cymbalta) 30 mg DR capsule [id number]  Take 3 capsules (90 mg) by mouth once daily. Do not crush or chew. Janette Sales, DO   25 [phone]   econazole nitrate 1 % cream [id number]  Apply 1 Application topically 2 times a day. Applies to feet Historical Provider, MD  Active   ezetimibe (Zetia) 10 mg tablet [id number]  Take 1 tablet (10 mg) by  "mouth once daily. Ronni Dey MD  Active   fluticasone (Flonase) 50 mcg/actuation nasal spray [id number]  Administer 1 spray into each nostril once daily as needed. Historical Provider, MD  Active   lidocaine (Lidoderm) 5 % patch [id number]  apply 2 patches to affected area daily as needed. Olivia Alonzo MD  Active   lidocaine HCl, bulk, 100 % powder powder [id number]   Historical Provider, MD  Active   magnesium oxide (Mag-Ox) 400 mg (241.3 mg magnesium) tablet [id number]  Take 1 tablet (400 mg) by mouth once daily. Ronni Dey MD  Active   metoprolol succinate XL (Toprol-XL) 25 mg 24 hr tablet [id number]  Take 1 tablet (25 mg) by mouth once daily. Do not crush or chew. Ronni Dey MD  Active   omeprazole (PriLOSEC) 40 mg DR capsule [id number]  Take 1 capsule (40 mg) by mouth 2 times a day. For 90 days Historical Provider, MD  Active   oxyCODONE (Roxicodone) 5 mg immediate release tablet [id number]  Take 1 tablet (5 mg) by mouth 2 times a day as needed for severe pain (7 - 10). Historical Provider, MD  Active   pregabalin (Lyrica) 300 mg capsule [id number]  Take 1 capsule (300 mg) by mouth 2 times a day. Vish Barboza PA-C  Active   sildenafil (Viagra) 100 mg tablet [id number]  Take 1 tablet (100 mg) by mouth once daily as needed for erectile dysfunction. Historical Provider, MD  Active   tiZANidine (Zanaflex) 4 mg capsule [id number]  Take 1 capsule (4 mg) by mouth 3 times a day as needed for muscle spasms. Historical Provider, MD  Active                    Allergies   Allergen Reactions    Morphine Hives, Rash and Shortness of breath     Tolerates hydromorphone    Tylenol 3      Tolerates hydromorphone    Ultram [Tramadol] Seizure    Alprazolam Psychosis     \"GOES CRAZY\", psychosis    Toradol [Ketorolac] Seizure    Fish Containing Products Hives and Itching     IT WAS A PROCESSED FISH MEAL, BUT STATES EATS FRESH AND FROZEN FISH ALL THE TIME.    Opioids - Morphine Analogues Rash       Social " History     Socioeconomic History    Marital status: Significant Other     Spouse name: Not on file    Number of children: Not on file    Years of education: Not on file    Highest education level: Not on file   Occupational History    Not on file   Tobacco Use    Smoking status: Never    Smokeless tobacco: Never    Tobacco comments:     It killed my mother smoking and she left me with a gift a nodule on my right lung   Vaping Use    Vaping status: Never Used   Substance and Sexual Activity    Alcohol use: Not Currently    Drug use: Not Currently     Comment: Ketamine treatments for pain,eatable marijuana    Sexual activity: Not Currently     Partners: Female     Birth control/protection: None   Other Topics Concern    Not on file   Social History Narrative    Not on file     Social Drivers of Health     Financial Resource Strain: Low Risk  (3/27/2025)    Overall Financial Resource Strain (CARDIA)     Difficulty of Paying Living Expenses: Not hard at all   Food Insecurity: No Food Insecurity (3/26/2025)    Hunger Vital Sign     Worried About Running Out of Food in the Last Year: Never true     Ran Out of Food in the Last Year: Never true   Transportation Needs: No Transportation Needs (3/27/2025)    PRAPARE - Transportation     Lack of Transportation (Medical): No     Lack of Transportation (Non-Medical): No   Physical Activity: Sufficiently Active (3/26/2025)    Exercise Vital Sign     Days of Exercise per Week: 5 days     Minutes of Exercise per Session: 30 min   Stress: No Stress Concern Present (3/26/2025)    [employer] [place] [employer] - Occupational Stress Questionnaire     Feeling of Stress : Only a little   Social Connections: Moderately Isolated (3/26/2025)    Social Connection and Isolation Panel [NHANES]     Frequency of Communication with Friends and Family: More than three times a week     Frequency of Social Gatherings with Friends and Family: Once a week     Attends Synagogue Services:  Never     Active Member of Clubs or Organizations: No     Attends Club or Organization Meetings: Never     Marital Status: Living with partner   Intimate Partner Violence: Not At Risk (3/26/2025)    Humiliation, Afraid, Rape, and Kick questionnaire     Fear of Current or Ex-Partner: No     Emotionally Abused: No     Physically Abused: No     Sexually Abused: No   Housing Stability: Low Risk  (3/27/2025)    Housing Stability Vital Sign     Unable to Pay for Housing in the Last Year: No     Number of Times Moved in the Last Year: 0     Homeless in the Last Year: No       Past Surgical History:   Procedure Laterality Date    ACHILLES TENDON SURGERY  1978 2016    CARDIAC CATHETERIZATION N/A 12/20/2024    Procedure: Left Heart Cath;  Surgeon: Kale Funk MD;  Location: [facility] Cardiac Cath Lab;  Service: Cardiovascular;  Laterality: N/A;    CARDIAC ELECTROPHYSIOLOGY PROCEDURE N/A 1/31/2025    Procedure: Loop Insertion (10307);  Surgeon: Hai Ga MD;  Location: [facility] Cardiac Cath Lab;  Service: Electrophysiology;  Laterality: N/A;    CARDIOVASCULAR STRESS TEST      CT ABDOMEN PELVIS ANGIOGRAM W AND/OR WO IV CONTRAST  03/18/2020    CT ABDOMEN PELVIS ANGIOGRAM W AND/OR WO IV CONTRAST 3/18/2020 [facility] EMERGENCY LEGACY    CT ANGIO NECK  12/16/2022    CT NECK ANGIO W AND WO IV CONTRAST 12/16/2022 DOCTOR OFFICE LEGACY    CT HEAD ANGIO W AND WO IV CONTRAST  12/16/2022    CT HEAD ANGIO W AND WO IV CONTRAST 12/16/2022 DOCTOR OFFICE LEGACY    HIP SURGERY Left     REPLACEMENT    JOINT REPLACEMENT      LEFT KNEE    KNEE ARTHROPLASTY Bilateral 06/13/2017    Knee Arthroplasty    OTHER SURGICAL HISTORY  01/06/2022    Ulnar nerve neuroplasty    OTHER SURGICAL HISTORY Bilateral 07/26/2019    Foot surgery  -- HARDWARE    OTHER SURGICAL HISTORY Bilateral     BILATERAL ANKLE SCOPE    OTHER SURGICAL HISTORY      POP ABDOMINAL PROCEDURE    PLANTAR FASCIA RELEASE      SHOULDER SURGERY Bilateral 06/13/2017    Shoulder Surgery       CT  ABDOMEN PELVIS W IV CONTRAST    Result Date: 1/22/2024  * * *Final Report* * * DATE OF EXAM: Jan 22 2024  8:50AM   [facility]   0530  -  CT ABD/PEL W IVCON  / ACCESSION #  [id number] PROCEDURE REASON: Abdominal pain, acute, nonlocalized      * * * * Physician Interpretation * * * *  EXAMINATION:  CT ABDOMEN AND PELVIS WITH IV CONTRAST CLINICAL HISTORY: Acute abdominal pain TECHNIQUE: CT of the abdomen and pelvis was performed using standard technique, scanning from just above the dome of the diaphragm to the symphysis pubis. MQ:  CTAP_3 Contrast: IV:  100 ml of Omnipaque 350 CT Radiation dose: Integrated Dose-length product (DLP) for this visit =   269 mGy*cm. CT Dose Reduction Employed: Automated exposure control(AEC) and iterative recon COMPARISON: 12/26/2023 RESULT: Liver: No mass. Biliary: No bile duct dilation.  The gallbladder is within normal limits for the modality. Spleen: No mass. No splenomegaly. Pancreas: No mass or duct dilation. Adrenals: No mass. Kidneys: There are symmetric bilateral nephrograms.  In the medial right upper renal pole there is a 4.3 cm exophytic hypodensity consistent with a cyst. GI tract: No dilation or wall thickening. There is a small sliding hiatal hernia.  There is colonic residue with a moderate to large stool burden.   There is no acute appendicitis or diverticulitis. Lymph nodes: No abdominal or pelvic lymphadenopathy. Mesentery/Peritoneum: No ascites or mass. Retroperitoneum: No mass. Vasculature:  - Abdominal aorta and iliac arteries: Atherosclerotic calcifications without aneurysm.  - Celiac and SMA: Questionable mild narrowing at the origin of the celiac axis.  - Portal venous system (SMV, splenic vein, portal vein and branches): Patent.  - Hepatic veins: Patent. Pelvis: No mass, ascites or fluid collection. There is artifact from a left hip arthroplasty. Bones/Soft Tissues: There are no aggressive osseous lesions identified. Lower thorax: Bibasilar scarring.  (topogram)  images: No additional findings.    IMPRESSION: Stable appearance.  There is no acute intra-abdominal process identified. : [facility]   Transcribe Date/Time: Jan 22 2024  8:50A Dictated by : INES ROLON MD This examination was interpreted and the report reviewed and electronically signed by: INES ROLON MD on Jan 22 2024  9:05AM  EST    US GALLBLADDER    Result Date: 1/22/2024  * * *Final Report* * * DATE OF EXAM: Jan 22 2024  6:48AM   [facility]   1032  -  US ABD RIGHT UPPER QUADRANT  / ACCESSION #  [id number] PROCEDURE REASON: RUQ pain, no fever, no elev WBC      * * * * Physician Interpretation * * * *  EXAMINATION:   RIGHT UPPER QUADRANT ULTRASOUND CLINICAL HISTORY: RIGHT upper quadrant pain TECHNIQUE:  Sonography of the right upper quadrant  was performed.   Images were obtained and stored in a permanent archive. MQ:  URUQ_2 COMPARISON: CT abdomen pelvis 12/26/2023 RESULT: Pancreas: Not well visualized Liver:      Echotexture:  Homogeneous      Echogenicity:  Increased      Surface contour:  Smooth      Lesions:  None. Biliary: No intrahepatic biliary duct dilation.      CBD: 0.3 cm at the hilum.      Gallbladder: Normal caliber           -Contents:  No cholelithiasis           -Wall:  Normal           -Other:  No pericholecystic fluid.  Negative sonographic Bautista sign. Right Kidney: No hydronephrosis.  Anechoic upper pole cyst with lobular borders measures 3.1 x 2.6 x 3.8 cm.  This appears unchanged from the prior CT. Ascites: None.    IMPRESSION: Hepatic steatosis.  Otherwise, no acute sonographic abnormality in the RIGHT upper quadrant. : [facility]   Transcribe Date/Time: Jan 22 2024  6:55A Dictated by : DEE MCKEON MD This examination was interpreted and the report reviewed and electronically signed by: DEE MCKEON MD on Jan 22 2024  6:59AM  EST    XR CHEST 1 VIEW    Result Date: 1/22/2024  * * *Final Report* * * DATE OF EXAM: Jan 22 2024  6:22AM   VHX   5376  -  XR CHEST  1V FRONTAL PORT  / ACCESSION #  [id number] PROCEDURE REASON: Tachycardia      * * * * Physician Interpretation * * * *  EXAMINATION:  CHEST RADIOGRAPH (SINGLE VIEW AP OR PA) CLINICAL INFORMATION (AS PROVIDED BY ORDERING CLINICIAN) :  Tachycardia Comparison:  9/3/2023 RESULT: Lines, tubes, and devices:  N/A Lungs and pleura:  No confluent infiltrate, large pleural effusion or pneumothorax.   Cardiomediastinal silhouette:  Within normal limits. Other:  No acute bony abnormality identified.    IMPRESSION: No significant acute radiographic abnormality. : [facility]   Transcribe Date/Time: Jan 22 2024  6:28A Dictated by : MIRNA CHAO MD This examination was interpreted and the report reviewed and electronically signed by: MIRNA CHAO MD on Jan 22 2024  6:30AM  EST       Scribe Attestation:  By signing my name below, IArgelia Scribe attest that this documentation has been prepared under the direction and in the presence of Royce Louis MD.    Provider Attestation - Scribe documentation:  All medical record entries made by Argelia Donaldson were at my direction and personally dictated by me, Royce Louis MD. I have reviewed the chart and agree that the record is accurate and I confirmed that it reflects my personal performance of the history, physical exam, discussion, and plan.

## 2025-06-22 ENCOUNTER — APPOINTMENT (OUTPATIENT)
Dept: GENERAL RADIOLOGY | Age: 55
End: 2025-06-22
Payer: COMMERCIAL

## 2025-06-22 ENCOUNTER — APPOINTMENT (OUTPATIENT)
Dept: CT IMAGING | Age: 55
End: 2025-06-22
Payer: COMMERCIAL

## 2025-06-22 ENCOUNTER — HOSPITAL ENCOUNTER (EMERGENCY)
Age: 55
Discharge: HOME | End: 2025-06-22
Attending: EMERGENCY MEDICINE
Payer: COMMERCIAL

## 2025-06-22 VITALS
BODY MASS INDEX: 25.48 KG/M2 | HEIGHT: 70 IN | HEART RATE: 81 BPM | SYSTOLIC BLOOD PRESSURE: 142 MMHG | TEMPERATURE: 97.9 F | DIASTOLIC BLOOD PRESSURE: 83 MMHG | OXYGEN SATURATION: 97 % | WEIGHT: 178 LBS | RESPIRATION RATE: 14 BRPM

## 2025-06-22 DIAGNOSIS — R07.9 CHEST PAIN, UNSPECIFIED TYPE: Primary | ICD-10-CM

## 2025-06-22 LAB
ANION GAP SERPL CALCULATED.3IONS-SCNC: 12 MMOL/L (ref 7–16)
BASOPHILS # BLD: 0.05 K/UL (ref 0–0.2)
BASOPHILS NFR BLD: 1 % (ref 0–2)
BUN SERPL-MCNC: 19 MG/DL (ref 6–20)
CALCIUM SERPL-MCNC: 9.6 MG/DL (ref 8.6–10.2)
CHLORIDE SERPL-SCNC: 104 MMOL/L (ref 98–107)
CO2 SERPL-SCNC: 25 MMOL/L (ref 22–29)
CREAT SERPL-MCNC: 0.8 MG/DL (ref 0.7–1.2)
D-DIMER QUANTITATIVE: 345 NG/ML DDU (ref 0–230)
EOSINOPHIL # BLD: 0.28 K/UL (ref 0.05–0.5)
EOSINOPHILS RELATIVE PERCENT: 4 % (ref 0–6)
ERYTHROCYTE [DISTWIDTH] IN BLOOD BY AUTOMATED COUNT: 14.5 % (ref 11.5–15)
GFR, ESTIMATED: >90 ML/MIN/1.73M2
GLUCOSE SERPL-MCNC: 88 MG/DL (ref 74–99)
HCT VFR BLD AUTO: 43.6 % (ref 37–54)
HGB BLD-MCNC: 14.3 G/DL (ref 12.5–16.5)
IMM GRANULOCYTES # BLD AUTO: <0.03 K/UL (ref 0–0.58)
IMM GRANULOCYTES NFR BLD: 0 % (ref 0–5)
LYMPHOCYTES NFR BLD: 1.74 K/UL (ref 1.5–4)
LYMPHOCYTES RELATIVE PERCENT: 24 % (ref 20–42)
MCH RBC QN AUTO: 28.8 PG (ref 26–35)
MCHC RBC AUTO-ENTMCNC: 32.8 G/DL (ref 32–34.5)
MCV RBC AUTO: 87.7 FL (ref 80–99.9)
MONOCYTES NFR BLD: 0.6 K/UL (ref 0.1–0.95)
MONOCYTES NFR BLD: 8 % (ref 2–12)
NEUTROPHILS NFR BLD: 63 % (ref 43–80)
NEUTS SEG NFR BLD: 4.52 K/UL (ref 1.8–7.3)
PLATELET # BLD AUTO: 227 K/UL (ref 130–450)
PMV BLD AUTO: 9.4 FL (ref 7–12)
POTASSIUM SERPL-SCNC: 4 MMOL/L (ref 3.5–5)
RBC # BLD AUTO: 4.97 M/UL (ref 3.8–5.8)
SODIUM SERPL-SCNC: 141 MMOL/L (ref 132–146)
TROPONIN I SERPL HS-MCNC: 14 NG/L (ref 0–22)
TROPONIN I SERPL HS-MCNC: 19 NG/L (ref 0–22)
WBC OTHER # BLD: 7.2 K/UL (ref 4.5–11.5)

## 2025-06-22 PROCEDURE — 71275 CT ANGIOGRAPHY CHEST: CPT

## 2025-06-22 PROCEDURE — 6360000004 HC RX CONTRAST MEDICATION: Performed by: RADIOLOGY

## 2025-06-22 PROCEDURE — 71046 X-RAY EXAM CHEST 2 VIEWS: CPT

## 2025-06-22 PROCEDURE — 73030 X-RAY EXAM OF SHOULDER: CPT

## 2025-06-22 PROCEDURE — 80048 BASIC METABOLIC PNL TOTAL CA: CPT

## 2025-06-22 PROCEDURE — 93005 ELECTROCARDIOGRAM TRACING: CPT | Performed by: EMERGENCY MEDICINE

## 2025-06-22 PROCEDURE — 96374 THER/PROPH/DIAG INJ IV PUSH: CPT

## 2025-06-22 PROCEDURE — 99285 EMERGENCY DEPT VISIT HI MDM: CPT

## 2025-06-22 PROCEDURE — 85025 COMPLETE CBC W/AUTO DIFF WBC: CPT

## 2025-06-22 PROCEDURE — 6370000000 HC RX 637 (ALT 250 FOR IP): Performed by: EMERGENCY MEDICINE

## 2025-06-22 PROCEDURE — 85379 FIBRIN DEGRADATION QUANT: CPT

## 2025-06-22 PROCEDURE — 84484 ASSAY OF TROPONIN QUANT: CPT

## 2025-06-22 PROCEDURE — 6360000002 HC RX W HCPCS: Performed by: EMERGENCY MEDICINE

## 2025-06-22 RX ORDER — NITROGLYCERIN 0.4 MG/1
0.4 TABLET SUBLINGUAL ONCE
Status: COMPLETED | OUTPATIENT
Start: 2025-06-22 | End: 2025-06-22

## 2025-06-22 RX ORDER — IOPAMIDOL 755 MG/ML
75 INJECTION, SOLUTION INTRAVASCULAR
Status: COMPLETED | OUTPATIENT
Start: 2025-06-22 | End: 2025-06-22

## 2025-06-22 RX ORDER — LORAZEPAM 1 MG/1
1 TABLET ORAL ONCE
Status: COMPLETED | OUTPATIENT
Start: 2025-06-22 | End: 2025-06-22

## 2025-06-22 RX ORDER — FENTANYL CITRATE 50 UG/ML
25 INJECTION, SOLUTION INTRAMUSCULAR; INTRAVENOUS ONCE
Refills: 0 | Status: COMPLETED | OUTPATIENT
Start: 2025-06-22 | End: 2025-06-22

## 2025-06-22 RX ADMIN — LORAZEPAM 1 MG: 1 TABLET ORAL at 14:23

## 2025-06-22 RX ADMIN — IOPAMIDOL 75 ML: 755 INJECTION, SOLUTION INTRAVENOUS at 18:08

## 2025-06-22 RX ADMIN — NITROGLYCERIN 0.4 MG: 0.4 TABLET SUBLINGUAL at 14:23

## 2025-06-22 RX ADMIN — FENTANYL CITRATE 25 MCG: 50 INJECTION INTRAMUSCULAR; INTRAVENOUS at 17:12

## 2025-06-22 ASSESSMENT — PAIN SCALES - GENERAL
PAINLEVEL_OUTOF10: 10
PAINLEVEL_OUTOF10: 8
PAINLEVEL_OUTOF10: 8

## 2025-06-22 ASSESSMENT — PAIN - FUNCTIONAL ASSESSMENT: PAIN_FUNCTIONAL_ASSESSMENT: PREVENTS OR INTERFERES SOME ACTIVE ACTIVITIES AND ADLS

## 2025-06-22 ASSESSMENT — PAIN DESCRIPTION - ORIENTATION
ORIENTATION: RIGHT
ORIENTATION: RIGHT;LEFT

## 2025-06-22 ASSESSMENT — LIFESTYLE VARIABLES
HOW MANY STANDARD DRINKS CONTAINING ALCOHOL DO YOU HAVE ON A TYPICAL DAY: PATIENT DOES NOT DRINK
HOW OFTEN DO YOU HAVE A DRINK CONTAINING ALCOHOL: NEVER

## 2025-06-22 ASSESSMENT — PAIN DESCRIPTION - LOCATION
LOCATION: SHOULDER
LOCATION: ARM

## 2025-06-22 NOTE — DISCHARGE INSTR - COC
Continuity of Care Form    Patient Name: Kain Hutchins   :  1970  MRN:  [id number]    Admit date:  2025  Discharge date:  ***    Code Status Order: No Order   Advance Directives:     Admitting Physician:  No admitting provider for patient encounter.  PCP: Eric Azar MD    Discharging Nurse: ***  Discharging Hospital Unit/Room#:   Discharging Unit Phone Number: ***    Emergency Contact:   Extended Emergency Contact Information  Primary Emergency Contact: BENJAMÍN HUTCHINS  Home Phone: [phone]  Relation: Spouse    Past Surgical History:  Past Surgical History:   Procedure Laterality Date    JOINT REPLACEMENT      KNEE SURGERY      SHOULDER SURGERY         Immunization History:   Immunization History   Administered Date(s) Administered    COVID-19, MODERNA BLUE border, Primary or Immunocompromised, (age 12y+), IM, 100 mcg/0.5mL 2021, 2021, 2021, 04/10/2022    COVID-19, MODERNA Bivalent, (age 12y+), IM, 50 mcg/0.5 mL 2022    COVID-19, MODERNA, , (age 12y+), IM, 50mcg/0.5mL 2023, 2024       Active Problems:  There is no problem list on file for this patient.      Isolation/Infection:   Isolation            No Isolation          Patient Infection Status    None to display         Nurse Assessment:  Last Vital Signs: BP (!) 142/83   Pulse 81   Temp 97.9 °F (36.6 °C) (Oral)   Resp 14   Ht 1.778 m (5' 10\")   Wt 80.7 kg (178 lb)   SpO2 97%   BMI 25.54 kg/m²     Last documented pain score (0-10 scale): Pain Level: 8  Last Weight:   Wt Readings from Last 1 Encounters:   25 80.7 kg (178 lb)     Mental Status:  {IP PT MENTAL STATUS:}    IV Access:  { CHRIS IV ACCESS:996345155}    Nursing Mobility/ADLs:  Walking   {CHP DME ADLs:821085063}  Transfer  {CHP DME ADLs:142458511}  Bathing  {CHP DME ADLs:949245131}  Dressing  {CHP DME ADLs:223736644}  Toileting  {CHP DME ADLs:817604495}  Feeding  {CHP DME ADLs:677589152}  Med Admin  {CHP DME
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
## 2025-06-23 RX ORDER — PEN NEEDLE, DIABETIC 31 GX5/16"
NEEDLE, DISPOSABLE MISCELLANEOUS DAILY PRN
COMMUNITY

## 2025-06-24 ENCOUNTER — APPOINTMENT (OUTPATIENT)
Dept: ORTHOPEDIC SURGERY | Facility: CLINIC | Age: 55
End: 2025-06-24
Payer: COMMERCIAL

## 2025-06-24 LAB
EKG ATRIAL RATE: 62 BPM
EKG P AXIS: 61 DEGREES
EKG P-R INTERVAL: 138 MS
EKG Q-T INTERVAL: 386 MS
EKG QRS DURATION: 90 MS
EKG QTC CALCULATION (BAZETT): 391 MS
EKG R AXIS: 48 DEGREES
EKG T AXIS: 56 DEGREES
EKG VENTRICULAR RATE: 62 BPM

## 2025-06-24 PROCEDURE — 93010 ELECTROCARDIOGRAM REPORT: CPT | Performed by: INTERNAL MEDICINE

## 2025-06-25 ENCOUNTER — HOSPITAL ENCOUNTER (OUTPATIENT)
Dept: CARDIOLOGY | Facility: CLINIC | Age: 55
Discharge: HOME | End: 2025-06-25
Payer: COMMERCIAL

## 2025-06-25 ENCOUNTER — TELEPHONE (OUTPATIENT)
Dept: CARDIOLOGY | Facility: CLINIC | Age: 55
End: 2025-06-25
Payer: COMMERCIAL

## 2025-06-25 DIAGNOSIS — Z95.818 IMPLANTABLE LOOP RECORDER PRESENT: ICD-10-CM

## 2025-06-25 DIAGNOSIS — R55 SYNCOPE, UNSPECIFIED SYNCOPE TYPE: ICD-10-CM

## 2025-06-25 DIAGNOSIS — R00.2 PALPITATIONS: ICD-10-CM

## 2025-06-25 PROCEDURE — 93298 REM INTERROG DEV EVAL SCRMS: CPT
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
## 2025-06-26 ENCOUNTER — HOSPITAL ENCOUNTER (OUTPATIENT)
Dept: RADIOLOGY | Facility: CLINIC | Age: 55
Discharge: HOME | End: 2025-06-26
Payer: COMMERCIAL

## 2025-06-26 ENCOUNTER — TELEPHONE (OUTPATIENT)
Dept: PAIN MEDICINE | Facility: CLINIC | Age: 55
End: 2025-06-26

## 2025-06-26 ENCOUNTER — OFFICE VISIT (OUTPATIENT)
Dept: CARDIOLOGY | Facility: CLINIC | Age: 55
End: 2025-06-26
Payer: COMMERCIAL

## 2025-06-26 ENCOUNTER — OFFICE VISIT (OUTPATIENT)
Dept: ORTHOPEDIC SURGERY | Facility: CLINIC | Age: 55
End: 2025-06-26
Payer: COMMERCIAL

## 2025-06-26 ENCOUNTER — APPOINTMENT (OUTPATIENT)
Dept: ORTHOPEDIC SURGERY | Facility: CLINIC | Age: 55
End: 2025-06-26
Payer: COMMERCIAL

## 2025-06-26 VITALS
HEIGHT: 70 IN | HEART RATE: 50 BPM | BODY MASS INDEX: 24.34 KG/M2 | WEIGHT: 170 LBS | DIASTOLIC BLOOD PRESSURE: 62 MMHG | OXYGEN SATURATION: 97 % | SYSTOLIC BLOOD PRESSURE: 110 MMHG

## 2025-06-26 DIAGNOSIS — E78.2 MIXED HYPERLIPIDEMIA: ICD-10-CM

## 2025-06-26 DIAGNOSIS — R07.9 CHEST PAIN, UNSPECIFIED TYPE: Primary | ICD-10-CM

## 2025-06-26 DIAGNOSIS — S61.412A LACERATION OF LEFT HAND WITH COMPLICATION, INITIAL ENCOUNTER: ICD-10-CM

## 2025-06-26 DIAGNOSIS — I45.10 INCOMPLETE RBBB: ICD-10-CM

## 2025-06-26 DIAGNOSIS — M75.22 BICIPITAL TENDINITIS, LEFT SHOULDER: ICD-10-CM

## 2025-06-26 DIAGNOSIS — R00.2 PALPITATIONS: ICD-10-CM

## 2025-06-26 DIAGNOSIS — R94.31 ABNORMAL EKG: ICD-10-CM

## 2025-06-26 DIAGNOSIS — I49.3 PVC (PREMATURE VENTRICULAR CONTRACTION): ICD-10-CM

## 2025-06-26 DIAGNOSIS — M47.816 LUMBAR SPONDYLOSIS: Primary | ICD-10-CM

## 2025-06-26 DIAGNOSIS — Z01.810 PREOPERATIVE CARDIOVASCULAR EXAMINATION: ICD-10-CM

## 2025-06-26 PROCEDURE — 3008F BODY MASS INDEX DOCD: CPT | Performed by: INTERNAL MEDICINE

## 2025-06-26 PROCEDURE — 99214 OFFICE O/P EST MOD 30 MIN: CPT | Performed by: INTERNAL MEDICINE

## 2025-06-26 PROCEDURE — 73130 X-RAY EXAM OF HAND: CPT | Mod: LT

## 2025-06-26 PROCEDURE — 1036F TOBACCO NON-USER: CPT | Performed by: INTERNAL MEDICINE

## 2025-06-26 PROCEDURE — 99212 OFFICE O/P EST SF 10 MIN: CPT | Performed by: ORTHOPAEDIC SURGERY
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
## 2025-06-26 NOTE — PATIENT INSTRUCTIONS
Patient should be an acceptable cardiovascular risk for planned surgery as previously indicated.    Exercise diet weight loss program.    Hydrate    Use My Chart portal for reviewing records, testing and contacting office.
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
## 2025-06-26 NOTE — TELEPHONE ENCOUNTER
States he had 100% after the injection lasting 4 hours fter the injection greg like to proceed with the next step

## 2025-06-30 ENCOUNTER — OFFICE VISIT (OUTPATIENT)
Dept: ORTHOPEDIC SURGERY | Facility: CLINIC | Age: 55
End: 2025-06-30
Payer: COMMERCIAL

## 2025-06-30 ENCOUNTER — HOSPITAL ENCOUNTER (OUTPATIENT)
Dept: CARDIOLOGY | Facility: CLINIC | Age: 55
Discharge: HOME | End: 2025-06-30
Payer: COMMERCIAL

## 2025-06-30 ENCOUNTER — HOSPITAL ENCOUNTER (OUTPATIENT)
Dept: RADIOLOGY | Facility: CLINIC | Age: 55
Discharge: HOME | End: 2025-06-30
Payer: COMMERCIAL

## 2025-06-30 DIAGNOSIS — M25.552 LEFT HIP PAIN: ICD-10-CM

## 2025-06-30 DIAGNOSIS — S70.02XA CONTUSION OF LEFT HIP AND THIGH, INITIAL ENCOUNTER: Primary | ICD-10-CM

## 2025-06-30 DIAGNOSIS — M54.50 LUMBAR PAIN: ICD-10-CM

## 2025-06-30 DIAGNOSIS — R55 SYNCOPE, UNSPECIFIED SYNCOPE TYPE: ICD-10-CM

## 2025-06-30 DIAGNOSIS — R00.2 PALPITATIONS: ICD-10-CM

## 2025-06-30 DIAGNOSIS — S39.012A LUMBAR STRAIN, INITIAL ENCOUNTER: ICD-10-CM

## 2025-06-30 DIAGNOSIS — S70.12XA CONTUSION OF LEFT HIP AND THIGH, INITIAL ENCOUNTER: Primary | ICD-10-CM

## 2025-06-30 DIAGNOSIS — Z95.818 IMPLANTABLE LOOP RECORDER PRESENT: ICD-10-CM

## 2025-06-30 PROCEDURE — 73502 X-RAY EXAM HIP UNI 2-3 VIEWS: CPT | Mod: LEFT SIDE | Performed by: STUDENT IN AN ORGANIZED HEALTH CARE EDUCATION/TRAINING PROGRAM

## 2025-06-30 PROCEDURE — 73502 X-RAY EXAM HIP UNI 2-3 VIEWS: CPT | Mod: LT

## 2025-06-30 PROCEDURE — 99212 OFFICE O/P EST SF 10 MIN: CPT | Performed by: STUDENT IN AN ORGANIZED HEALTH CARE EDUCATION/TRAINING PROGRAM

## 2025-06-30 PROCEDURE — 99214 OFFICE O/P EST MOD 30 MIN: CPT | Performed by: STUDENT IN AN ORGANIZED HEALTH CARE EDUCATION/TRAINING PROGRAM

## 2025-06-30 PROCEDURE — 72100 X-RAY EXAM L-S SPINE 2/3 VWS: CPT

## 2025-06-30 PROCEDURE — 72100 X-RAY EXAM L-S SPINE 2/3 VWS: CPT | Performed by: STUDENT IN AN ORGANIZED HEALTH CARE EDUCATION/TRAINING PROGRAM

## 2025-06-30 NOTE — PROGRESS NOTES
Acute Injury Established Patient Visit    Patient ID: Akin Lynn is a 55 y.o. male  History of Present Illness  The patient is a 55-year-old male who presents for evaluation of a left hip injury. He is also experiencing mild low back pain.    He reports experiencing pain on the lateral aspect of his left hip, which he describes as a drilling sensation. The pain radiates towards the back. He recalls an incident where he was seated on a bar stool when it collapsed, causing him to fall and injure his buttock muscle. He reports no swelling or bruising. He has been using a walker and a cane for mobility. He is currently undergoing physical therapy for his lower back and left leg, a regimen he initiated himself. His spine team had previously recommended therapy.     He is scheduled for shoulder surgery next Monday and has been advised against taking steroids. He has been managing his pain with Tylenol, taking 3000 mg daily, and tizanidine 4 mg. 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    PAST SURGICAL HISTORY:  Total hip replacement by Dr. Gagandeep Matamoros in 10/2023.    SOCIAL HISTORY  Occupations: Professional       Assessment & Plan  1. Left hip injury:  The x-ray results do not indicate any significant abnormalities. There is a low suspicion for a fracture around the prosthesis in the left hip, but there is no major concern. The symptoms suggest a contusion of the hip.     Follow-up consultation with Dr. Matamoros, who performed the hip replacement, is recommended to rule out any potential issues. Continue physical therapy sessions and maintain the current Tylenol dosage of 1000 mg every 8 hours. The use of a steroid pack is not advisable due to the upcoming shoulder surgery. Apply ice and rest the affected area.    2. Low back pain:  The x-ray results of the lumbar spine do not show any obvious acute fracture or subluxation. The back sometimes tightens up significantly, affecting  walking. Currently undergoing physical therapy for the lower back and left leg, which should be continued. Taking tizanidine 4 mg for muscle relaxation.    Follow-up: Schedule an appointment with Dr. Matamoros within 2 to 3 weeks.       Assessment:   Problem List Items Addressed This Visit    None  Visit Diagnoses         Contusion of left hip and thigh, initial encounter    -  Primary      Lumbar pain        Relevant Orders    XR lumbar spine 2-3 views      Left hip pain        Relevant Orders    XR hip left with pelvis when performed 2 or 3 views      Lumbar strain, initial encounter                Diagnostics: Reviewed all relevant imaging including x-ray, MRI, CT, and US.    Results  Imaging   - X-ray of the lumbar spine: 06/30/2025, No obvious acute fracture or subluxation.   - X-ray of the left hip: 06/30/2025, Status post total hip replacement done by Dr. Gagandeep Matamoros in October 2023 with no obvious acute displaced fractures or dislocations. There is a possible subtle lucency into the acetabulum.       Procedure:  Procedures    Physical Exam  Musculoskeletal:  Left hip: Good strength in hip flexion. Tenderness in piriformis and gluteus muscles. No pain in the groin during internal and external rotation.       Orders Placed This Encounter    XR hip left with pelvis when performed 2 or 3 views    XR lumbar spine 2-3 views      At the conclusion of the visit there were no further questions by the patient/family regarding their plan of care.  Patient was instructed to call or return with any issues, questions, or concerns regarding their injury and/or treatment plan described above.     06/30/25 at 11:58 AM - Davis Holbrook DO    Office: [phone]    This note was prepared using voice recognition software.  The details of this note are correct and have been reviewed, and corrected to the best of my ability.  Some grammatical errors may persist related to the Dragon software.  This medical note was  created with the assistance of artificial intelligence (AI) for documentation purposes. The content has been reviewed and confirmed by the healthcare provider for accuracy and completeness. Patient consented to the use of audio recording and use of AI during their visit.

## 2025-07-01 ENCOUNTER — APPOINTMENT (OUTPATIENT)
Dept: ORTHOPEDIC SURGERY | Facility: CLINIC | Age: 55
End: 2025-07-01
Payer: COMMERCIAL

## 2025-07-01 NOTE — PROGRESS NOTES
No chief complaint on file.    History of Present Illness:  Akin Lynn is a 55 y.o. male presenting to clinic as a established patient for his left  knee.    Imaging:  X-rays left  knee: ***     Assessment:   ***    Plan:  We reviewed the role of imaging, physical therapy, injections and the time frame to healing and correlation with outcome.  ***  NSAID: *** consistently for one week then as needed. GI side effects and medical risks discussed.  Ice: 60 minutes on and off  Exercise home program: Medically directed {joint:45513} therapy / Handout given.   Follow-up in ***.      Physical Exam:  Left Knee:  ROM: Flexion to ***, full extension without pain  ***   Positive Segundo´s test/PositiveApley Grind  Neurovascular exam normal distally  Palpable pedal pulse and good cap refill      Review of Systems:  GENERAL: Negative for malaise, significant weight loss, fever  MUSCULOSKELETAL: See HPI  NEURO:  Negative     Medical History[1]    Medication Documentation Review Audit       Reviewed by Jeane Iglesias MA (Medical Assistant) on 06/26/25 at 1443      Medication Order Taking? Sig Documenting Provider Last Dose Status   acetaminophen (Tylenol) 500 mg tablet [id number] No Take 2 tablets (1,000 mg) by mouth 3 times a day. Olivia Alonzo MD 3/26/2025 Active   albuterol (Ventolin HFA) 90 mcg/actuation inhaler [id number] No Inhale 2 puffs every 4 hours if needed for wheezing. Historical Provider, MD Past Week Active   ammonium lactate (Lac-Hydrin) 12 % lotion [id number] No Apply 1 Application topically if needed for dry skin (apply to feet). Bre Provider, MD 3/26/2025 Active   atorvastatin (Lipitor) 80 mg tablet [id number] No Take 1 tablet (80 mg) by mouth once daily. Ronni Dey MD 3/26/2025 Active   celecoxib (CeleBREX) 200 mg capsule [id number] No Take 1 capsule (200 mg) by mouth 2 times a day. Bre Gray MD 3/26/2025 Active   diazePAM (Valium) 5 mg tablet [id number] No Take 1 tablet (5  mg) by mouth 2 times a day as needed for anxiety. Bre Gray MD 3/26/2025 Active   DULoxetine (Cymbalta) 30 mg DR capsule [id number] No Take 3 capsules (90 mg) by mouth once daily. Do not crush or chew. Janette Sales,  3/26/2025 Active   econazole nitrate 1 % cream [id number] No Apply 1 Application topically 2 times a day. Applies to feet Bre Gray MD 3/26/2025 Active   ezetimibe (Zetia) 10 mg tablet [id number] No Take 1 tablet (10 mg) by mouth once daily. Ronni Dey MD 3/26/2025 Active   fluticasone (Flonase) 50 mcg/actuation nasal spray [id number] No Administer 1 spray into each nostril once daily as needed. Bre Gray MD 3/26/2025 Active   lidocaine (Lidoderm) 5 % patch [id number] No apply 2 patches to affected area daily as needed. Olivia Alonzo MD 3/26/2025 Active   lidocaine HCl, bulk, 100 % powder powder [id number]   Historical Provider, MD  Active   magnesium oxide (Mag-Ox) 400 mg (241.3 mg magnesium) tablet [id number] No Take 1 tablet (400 mg) by mouth once daily. Ronni Dey MD 3/26/2025 Active   metoprolol succinate XL (Toprol-XL) 25 mg 24 hr tablet [id number] No Take 1 tablet (25 mg) by mouth once daily. Do not crush or chew. Ronni Dey MD 3/26/2025 Active   nebulizers misc [id number]  once daily as needed. States he uses a nebulizer PRN Bre Gray MD  Active   omeprazole (PriLOSEC) 40 mg DR capsule [id number] No Take 1 capsule (40 mg) by mouth 2 times a day. For 90 days Bre Gray MD 3/26/2025 Active   oxyCODONE (Roxicodone) 5 mg immediate release tablet [id number] No Take 1 tablet (5 mg) by mouth 2 times a day as needed for severe pain (7 - 10). Bre Gray MD 3/26/2025 Active   pregabalin (Lyrica) 300 mg capsule [id number] No Take 1 capsule (300 mg) by mouth 2 times a day. Vish Barboza PA-C 3/26/2025 Active   sildenafil (Viagra) 100 mg tablet [id number] No Take 1 tablet (100 mg) by mouth once daily as needed for  erectile dysfunction. Historical Provider, MD Unknown Active   tiZANidine (Zanaflex) 4 mg capsule [id number] No Take 1 capsule (4 mg) by mouth 3 times a day as needed for muscle spasms. Historical Provider, MD 3/26/2025 Active                    RX Allergies[2]    Social History     Socioeconomic History    Marital status: Significant Other     Spouse name: Not on file    Number of children: Not on file    Years of education: Not on file    Highest education level: Not on file   Occupational History    Not on file   Tobacco Use    Smoking status: Never    Smokeless tobacco: Never    Tobacco comments:     It killed my mother smoking and she left me with a gift a nodule on my right lung   Vaping Use    Vaping status: Never Used   Substance and Sexual Activity    Alcohol use: Never    Drug use: Never     Comment: Ketamine treatments for pain,eatable marijuana    Sexual activity: Not Currently     Partners: Female     Birth control/protection: None   Other Topics Concern    Not on file   Social History Narrative    Not on file     Social Drivers of Health     Financial Resource Strain: Low Risk  (3/27/2025)    Overall Financial Resource Strain (CARDIA)     Difficulty of Paying Living Expenses: Not hard at all   Food Insecurity: No Food Insecurity (3/26/2025)    Hunger Vital Sign     Worried About Running Out of Food in the Last Year: Never true     Ran Out of Food in the Last Year: Never true   Transportation Needs: No Transportation Needs (3/27/2025)    PRAPARE - Transportation     Lack of Transportation (Medical): No     Lack of Transportation (Non-Medical): No   Physical Activity: Sufficiently Active (3/26/2025)    Exercise Vital Sign     Days of Exercise per Week: 5 days     Minutes of Exercise per Session: 30 min   Stress: No Stress Concern Present (3/26/2025)    [place] - Occupational Stress Questionnaire     Feeling of Stress : Only a little   Social Connections: Moderately  Isolated (3/26/2025)    Social Connection and Isolation Panel [NHANES]     Frequency of Communication with Friends and Family: More than three times a week     Frequency of Social Gatherings with Friends and Family: Once a week     Attends Mu-ism Services: Never     Active Member of Clubs or Organizations: No     Attends Club or Organization Meetings: Never     Marital Status: Living with partner   Intimate Partner Violence: Not At Risk (3/26/2025)    Humiliation, Afraid, Rape, and Kick questionnaire     Fear of Current or Ex-Partner: No     Emotionally Abused: No     Physically Abused: No     Sexually Abused: No   Housing Stability: Low Risk  (3/27/2025)    Housing Stability Vital Sign     Unable to Pay for Housing in the Last Year: No     Number of Times Moved in the Last Year: 0     Homeless in the Last Year: No       Surgical History[3]    Imaging  XR hip left with pelvis when performed 2 or 3 views  Result Date: 6/30/2025  As above.     MACRO: None   Signed by: Davis Holbrook 6/30/2025 12:28 PM Dictation workstation:   [id number]    XR lumbar spine 2-3 views  Result Date: 6/30/2025  As above.     MACRO: None   Signed by: Davis Holbrook 6/30/2025 12:27 PM Dictation workstation:   [id number]    XR chest 1 view  Result Date: 6/25/2025  IMPRESSION: No acute radiographic cardiopulmonary process identified. : KERLINE   Transcribe Date/Time: Jun 25 2025  5:41A Dictated by : DEE FRYE MD This examination was interpreted and the report reviewed and electronically signed by: DEE FRYE MD on Jun 25 2025  5:43AM  EST      Cardiology, Vascular, and Other Imaging  XR hand left 3+ views  Result Date: 6/26/2025  Interpreted By:  Ronni Luna, STUDY: XR HAND LEFT 3+ VIEWS; 6/26/2025 2:49 pm   INDICATION: Signs/Symptoms:laceration.   ACCESSION NUMBER(S): [id number]   ORDERING CLINICIAN: RONNI LUNA   FINDINGS: X-rays of the left hand to include the wrist show no acute fracture or dislocation.      Signed by: Ronni Luna 6/26/2025 4:15 PM Dictation workstation:   [id number]    XR chest 1 view  Result Date: 6/25/2025  * * *Final Report* * * DATE OF EXAM: Jun 25 2025  3:49AM   VHX   [id number]  -  XR CHEST 1V FRONTAL PORT  / ACCESSION #  [id number] PROCEDURE REASON: Chest pain      * * * * Physician Interpretation * * * *  EXAMINATION:  CHEST RADIOGRAPH (PORTABLE SINGLE VIEW AP) Exam Date/Time:  6/25/2025 3:49 AM CLINICAL HISTORY: Chest pain MQ:  XCPR_5 Comparison:  06/15/2025 RESULT: Lines, tubes, and devices:  Left-sided leadless pacemaker again noted. Lungs and pleura:  No focal consolidation.  No pneumothorax.  No sizable pleural effusion. Cardiomediastinal silhouette:  Stable cardiomediastinal silhouette.    IMPRESSION: No acute radiographic cardiopulmonary process identified. : PSCB   Transcribe Date/Time: Jun 25 2025  5:41A Dictated by : DEE FRYE MD This examination was interpreted and the report reviewed and electronically signed by: DEE FRYE MD on Jun 25 2025  5:43AM  EST         Scribe Attestation:  By signing my name below, I, Amador Copeland attest that this documentation has been prepared under the direction and in the presence of Royce Louis MD.    Provider Attestation - Scribe documentation:  All medical record entries made by Argelia Donaldson were at my direction and personally dictated by me, Royce Louis MD. I have reviewed the chart and agree that the record is accurate and I confirmed that it reflects my personal performance of the history, physical exam, discussion, and plan.            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    Arthritis Unk    Bilateral myopia 09/21/2023    Cervical  disc disease     Cervical radicular pain 09/21/2023    Chronic bronchitis (Multi) Unk    Chronic patellofemoral pain of left knee 09/21/2023    Complex regional pain syndrome type 1 of left lower extremity 09/21/2023    COVID-19     VACCINATED    Cubital tunnel syndrome on left 09/21/2023    Cubital tunnel syndrome on right 09/21/2023    Disease of intestine, unspecified     Bowel trouble    Erectile dysfunction 09/21/2023    Extremity pain     Foot joint pain 09/21/2023    Fractures     Gastroparesis     Gastroparesis     GERD (gastroesophageal reflux disease)     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    Hiatal hernia 2009    History of psychological trauma 09/21/2023    Hyperlipidemia     Hypertension     Incomplete RBBB 09/19/2024    has a loop recorder- left chest    Irritable bowel syndrome 2006    Joint pain 09/21/2023    Kidney cysts 09/21/2023    Low back pain, unspecified 12/07/2022    Chronic low back pain without sciatica, unspecified back pain laterality    Low back pain, unspecified 08/09/2022    Lumbago    Lumbar disc disease Unk    Lumbar spondylosis 09/21/2023    Lung nodule 09/21/2023    RIGHT    Lung nodule     Memory loss     Metatarsalgia of left foot 09/21/2023    Myopia, bilateral 02/19/2019    Myopia of both eyes with astigmatism and presbyopia    Neuromuscular disorder (Multi) 2009    Non-sustained ventricular tachycardia (Multi)     Osteoarthritis     Osteochondral defect of patella 09/21/2023    Osteoporosis     Other spondylosis, cervical region 09/21/2023    Panic attack unk    Panic disorder Unk    Patellofemoral arthritis 09/21/2023    Personal history of other diseases of the musculoskeletal system and connective tissue     History of arthritis    Personal history of other specified conditions     History of seizure    PONV (postoperative nausea and vomiting)     PTSD (post-traumatic stress disorder)     Reflex sympathetic dystrophy 09/21/2023    Scapular dyskinesis 09/21/2023    Seizure disorder  "(Multi)     D/T TRAMADOL REACTION - 25 YEARS AGO    Shoulder impingement 09/21/2023    Stable angina     Suspected sleep apnea 09/21/2023    Trauma and stressor-related disorder 09/21/2023    Trochanteric bursitis of both hips 09/22/2023    Wears glasses    [2]   Allergies  Allergen Reactions    Morphine Hives, Rash and Shortness of breath     Tolerates hydromorphone    Tylenol 3      Tolerates hydromorphone    Ultram [Tramadol] Seizure    Alprazolam Psychosis     \"GOES CRAZY\", psychosis    Toradol [Ketorolac] Seizure    Fish Containing Products Hives and Itching     IT WAS A PROCESSED FISH MEAL, BUT STATES EATS FRESH AND FROZEN FISH ALL THE TIME.    Opioids - Morphine Analogues Rash   [3]   Past Surgical History:  Procedure Laterality Date    ACHILLES TENDON SURGERY  1978 2016    ANKLE SURGERY Bilateral     CARDIAC CATHETERIZATION N/A 12/20/2024    Procedure: Left Heart Cath;  Surgeon: Kale Funk MD;  Location: [facility] Cardiac Cath Lab;  Service: Cardiovascular;  Laterality: N/A;    CARDIAC ELECTROPHYSIOLOGY PROCEDURE N/A 01/31/2025    Procedure: Loop Insertion (12268);  Surgeon: Hai Ga MD;  Location: [facility] Cardiac Cath Lab;  Service: Electrophysiology;  Laterality: N/A;    CARDIOVASCULAR STRESS TEST      COLONOSCOPY      CORRECTION HAMMER TOE      CT ABDOMEN PELVIS ANGIOGRAM W AND/OR WO IV CONTRAST  03/18/2020    CT ABDOMEN PELVIS ANGIOGRAM W AND/OR WO IV CONTRAST 3/18/2020 [facility] EMERGENCY LEGACY    CT ANGIO NECK  12/16/2022    CT NECK ANGIO W AND WO IV CONTRAST 12/16/2022 DOCTOR OFFICE LEGACY    CT HEAD ANGIO W AND WO IV CONTRAST  12/16/2022    CT HEAD ANGIO W AND WO IV CONTRAST 12/16/2022 DOCTOR OFFICE LEGACY    CYSTOSCOPY      EPIDURAL BLOCK INJECTION  Multiple    EXPLORATORY LAPAROTOMY  2005    FOOT SURGERY Bilateral     FRACTURE SURGERY  2010    HIP SURGERY Left     REPLACEMENT    JOINT REPLACEMENT      LEFT KNEE    KIDNEY STONE SURGERY      KNEE ARTHROPLASTY Bilateral 06/13/2017    Knee " Arthroplasty    LITHOTRIPSY      ORTHOPEDIC SURGERY  Multiple    OTHER SURGICAL HISTORY      POP ABDOMINAL PROCEDURE    PLANTAR FASCIA RELEASE      SEPTOPLASTY      SHOULDER SURGERY Bilateral 06/13/2017    Shoulder Surgery    SPINAL CORD STIMULATOR IMPLANT  24    STOMACH SURGERY  November 15 2023    TOENAIL EXCISION      TRIGGER POINT INJECTION  Several    ULNAR NERVE REPAIR Bilateral     UPPER GASTROINTESTINAL ENDOSCOPY

## 2025-07-02 ENCOUNTER — HOSPITAL ENCOUNTER (OUTPATIENT)
Dept: CARDIOLOGY | Facility: CLINIC | Age: 55
Discharge: HOME | End: 2025-07-02
Payer: COMMERCIAL

## 2025-07-02 DIAGNOSIS — Z95.818 IMPLANTABLE LOOP RECORDER PRESENT: ICD-10-CM

## 2025-07-02 DIAGNOSIS — R00.2 PALPITATIONS: ICD-10-CM

## 2025-07-02 DIAGNOSIS — R55 SYNCOPE, UNSPECIFIED SYNCOPE TYPE: ICD-10-CM

## 2025-07-03 ENCOUNTER — TELEPHONE (OUTPATIENT)
Dept: PAIN MEDICINE | Facility: CLINIC | Age: 55
End: 2025-07-03
Payer: COMMERCIAL

## 2025-07-03 DIAGNOSIS — G89.4 CHRONIC PAIN SYNDROME: Primary | ICD-10-CM

## 2025-07-03 RX ORDER — KETAMINE HCL 100 %
POWDER (GRAM) MISCELLANEOUS
Qty: 1 G | Refills: 5 | Status: SHIPPED | OUTPATIENT
Start: 2025-07-03

## 2025-07-06 ENCOUNTER — ANESTHESIA EVENT (OUTPATIENT)
Dept: OPERATING ROOM | Facility: HOSPITAL | Age: 55
End: 2025-07-06
Payer: COMMERCIAL

## 2025-07-06 DIAGNOSIS — R29.818 SUSPECTED SLEEP APNEA: ICD-10-CM

## 2025-07-06 DIAGNOSIS — G89.29 CHRONIC LOW BACK PAIN WITHOUT SCIATICA, UNSPECIFIED BACK PAIN LATERALITY: ICD-10-CM

## 2025-07-06 DIAGNOSIS — R53.83 FATIGUE, UNSPECIFIED TYPE: ICD-10-CM

## 2025-07-06 DIAGNOSIS — Z95.818 IMPLANTABLE LOOP RECORDER PRESENT: ICD-10-CM

## 2025-07-06 DIAGNOSIS — R40.0 DAYTIME SLEEPINESS: ICD-10-CM

## 2025-07-06 DIAGNOSIS — M79.602 LEFT ARM PAIN: ICD-10-CM

## 2025-07-06 DIAGNOSIS — R94.31 ABNORMAL EKG: ICD-10-CM

## 2025-07-06 DIAGNOSIS — M54.50 CHRONIC LOW BACK PAIN WITHOUT SCIATICA, UNSPECIFIED BACK PAIN LATERALITY: ICD-10-CM

## 2025-07-06 DIAGNOSIS — K31.84 GASTROPARESIS: ICD-10-CM

## 2025-07-06 DIAGNOSIS — R91.1 LUNG NODULE: ICD-10-CM

## 2025-07-06 DIAGNOSIS — F31.9 BIPOLAR AFFECTIVE DISORDER, REMISSION STATUS UNSPECIFIED (MULTI): ICD-10-CM

## 2025-07-06 DIAGNOSIS — I49.3 PVC (PREMATURE VENTRICULAR CONTRACTION): ICD-10-CM

## 2025-07-06 DIAGNOSIS — M19.90 ARTHRITIS: ICD-10-CM

## 2025-07-06 DIAGNOSIS — K21.9 GASTROESOPHAGEAL REFLUX DISEASE WITHOUT ESOPHAGITIS: ICD-10-CM

## 2025-07-06 DIAGNOSIS — M54.12 CERVICAL RADICULAR PAIN: ICD-10-CM

## 2025-07-06 DIAGNOSIS — R79.89 ELEVATED TROPONIN I LEVEL: ICD-10-CM

## 2025-07-06 DIAGNOSIS — R55 SYNCOPE, UNSPECIFIED SYNCOPE TYPE: ICD-10-CM

## 2025-07-06 DIAGNOSIS — Z82.49 FAMILY HISTORY OF ISCHEMIC HEART DISEASE: ICD-10-CM

## 2025-07-06 DIAGNOSIS — R07.9 CHEST PAIN, UNSPECIFIED TYPE: ICD-10-CM

## 2025-07-06 DIAGNOSIS — M47.816 LUMBAR SPONDYLOSIS: ICD-10-CM

## 2025-07-06 DIAGNOSIS — Z96.641 S/P TOTAL RIGHT HIP ARTHROPLASTY: ICD-10-CM

## 2025-07-06 DIAGNOSIS — E78.2 MIXED HYPERLIPIDEMIA: ICD-10-CM

## 2025-07-06 DIAGNOSIS — R00.2 PALPITATIONS: ICD-10-CM

## 2025-07-06 DIAGNOSIS — I25.10 CORONARY ARTERY DISEASE INVOLVING NATIVE CORONARY ARTERY OF NATIVE HEART WITHOUT ANGINA PECTORIS: ICD-10-CM

## 2025-07-06 DIAGNOSIS — I45.10 INCOMPLETE RBBB: ICD-10-CM

## 2025-07-06 DIAGNOSIS — M54.6 THORACIC BACK PAIN, UNSPECIFIED BACK PAIN LATERALITY, UNSPECIFIED CHRONICITY: ICD-10-CM

## 2025-07-07 ENCOUNTER — ANESTHESIA (OUTPATIENT)
Dept: OPERATING ROOM | Facility: HOSPITAL | Age: 55
End: 2025-07-07
Payer: COMMERCIAL

## 2025-07-07 ENCOUNTER — HOSPITAL ENCOUNTER (OUTPATIENT)
Facility: HOSPITAL | Age: 55
Setting detail: OUTPATIENT SURGERY
Discharge: HOME | End: 2025-07-07
Attending: ORTHOPAEDIC SURGERY | Admitting: ORTHOPAEDIC SURGERY
Payer: COMMERCIAL

## 2025-07-07 ENCOUNTER — HOSPITAL ENCOUNTER (OUTPATIENT)
Dept: CARDIOLOGY | Facility: CLINIC | Age: 55
Discharge: HOME | End: 2025-07-07
Payer: COMMERCIAL

## 2025-07-07 VITALS
OXYGEN SATURATION: 95 % | RESPIRATION RATE: 19 BRPM | SYSTOLIC BLOOD PRESSURE: 128 MMHG | HEIGHT: 70 IN | WEIGHT: 164.24 LBS | HEART RATE: 73 BPM | DIASTOLIC BLOOD PRESSURE: 81 MMHG | TEMPERATURE: 96.8 F | BODY MASS INDEX: 23.51 KG/M2

## 2025-07-07 DIAGNOSIS — Z95.818 IMPLANTABLE LOOP RECORDER PRESENT: ICD-10-CM

## 2025-07-07 DIAGNOSIS — M75.22 TENDONITIS OF UPPER BICEPS TENDON OF LEFT SHOULDER: Primary | ICD-10-CM

## 2025-07-07 DIAGNOSIS — M75.22 BICIPITAL TENDINITIS, LEFT SHOULDER: ICD-10-CM

## 2025-07-07 DIAGNOSIS — R00.2 PALPITATIONS: ICD-10-CM

## 2025-07-07 DIAGNOSIS — R55 SYNCOPE, UNSPECIFIED SYNCOPE TYPE: ICD-10-CM

## 2025-07-07 PROBLEM — R11.2 PONV (POSTOPERATIVE NAUSEA AND VOMITING): Status: ACTIVE | Noted: 2025-07-07

## 2025-07-07 PROBLEM — R91.8 PULMONARY NODULES: Status: ACTIVE | Noted: 2023-09-21

## 2025-07-07 PROBLEM — I10 HTN (HYPERTENSION): Status: ACTIVE | Noted: 2025-07-07

## 2025-07-07 PROBLEM — Z98.890 PONV (POSTOPERATIVE NAUSEA AND VOMITING): Status: ACTIVE | Noted: 2025-07-07

## 2025-07-07 LAB — BODY SURFACE AREA: 1.92 M2

## 2025-07-07 PROCEDURE — C1713 ANCHOR/SCREW BN/BN,TIS/BN: HCPCS | Performed by: ORTHOPAEDIC SURGERY

## 2025-07-07 PROCEDURE — 29822 SHO ARTHRS SRG LMTD DBRDMT: CPT | Performed by: PHYSICIAN ASSISTANT

## 2025-07-07 PROCEDURE — 3700000001 HC GENERAL ANESTHESIA TIME - INITIAL BASE CHARGE: Performed by: ORTHOPAEDIC SURGERY

## 2025-07-07 PROCEDURE — 2500000004 HC RX 250 GENERAL PHARMACY W/ HCPCS (ALT 636 FOR OP/ED): Performed by: ORTHOPAEDIC SURGERY

## 2025-07-07 PROCEDURE — 3600000008 HC OR TIME - EACH INCREMENTAL 1 MINUTE - PROCEDURE LEVEL THREE: Performed by: ORTHOPAEDIC SURGERY

## 2025-07-07 PROCEDURE — 29822 SHO ARTHRS SRG LMTD DBRDMT: CPT | Performed by: ORTHOPAEDIC SURGERY

## 2025-07-07 PROCEDURE — 3700000002 HC GENERAL ANESTHESIA TIME - EACH INCREMENTAL 1 MINUTE: Performed by: ORTHOPAEDIC SURGERY

## 2025-07-07 PROCEDURE — 2500000005 HC RX 250 GENERAL PHARMACY W/O HCPCS: Performed by: ORTHOPAEDIC SURGERY

## 2025-07-07 PROCEDURE — 7100000010 HC PHASE TWO TIME - EACH INCREMENTAL 1 MINUTE: Performed by: ORTHOPAEDIC SURGERY

## 2025-07-07 PROCEDURE — 3600000003 HC OR TIME - INITIAL BASE CHARGE - PROCEDURE LEVEL THREE: Performed by: ORTHOPAEDIC SURGERY

## 2025-07-07 PROCEDURE — 2500000001 HC RX 250 WO HCPCS SELF ADMINISTERED DRUGS (ALT 637 FOR MEDICARE OP): Performed by: STUDENT IN AN ORGANIZED HEALTH CARE EDUCATION/TRAINING PROGRAM

## 2025-07-07 PROCEDURE — 2500000004 HC RX 250 GENERAL PHARMACY W/ HCPCS (ALT 636 FOR OP/ED): Mod: JZ

## 2025-07-07 PROCEDURE — 2780000003 HC OR 278 NO HCPCS: Performed by: ORTHOPAEDIC SURGERY

## 2025-07-07 PROCEDURE — 2720000007 HC OR 272 NO HCPCS: Performed by: ORTHOPAEDIC SURGERY

## 2025-07-07 PROCEDURE — 7100000002 HC RECOVERY ROOM TIME - EACH INCREMENTAL 1 MINUTE: Performed by: ORTHOPAEDIC SURGERY

## 2025-07-07 PROCEDURE — 2500000004 HC RX 250 GENERAL PHARMACY W/ HCPCS (ALT 636 FOR OP/ED): Mod: JZ | Performed by: PHYSICIAN ASSISTANT

## 2025-07-07 PROCEDURE — 7100000001 HC RECOVERY ROOM TIME - INITIAL BASE CHARGE: Performed by: ORTHOPAEDIC SURGERY

## 2025-07-07 PROCEDURE — 29826 SHO ARTHRS SRG DECOMPRESSION: CPT | Performed by: PHYSICIAN ASSISTANT

## 2025-07-07 PROCEDURE — 2500000004 HC RX 250 GENERAL PHARMACY W/ HCPCS (ALT 636 FOR OP/ED): Mod: JZ | Performed by: STUDENT IN AN ORGANIZED HEALTH CARE EDUCATION/TRAINING PROGRAM

## 2025-07-07 PROCEDURE — 2500000004 HC RX 250 GENERAL PHARMACY W/ HCPCS (ALT 636 FOR OP/ED): Mod: JZ | Performed by: ANESTHESIOLOGY

## 2025-07-07 PROCEDURE — 23430 REPAIR BICEPS TENDON: CPT | Performed by: ORTHOPAEDIC SURGERY

## 2025-07-07 PROCEDURE — 29826 SHO ARTHRS SRG DECOMPRESSION: CPT | Performed by: ORTHOPAEDIC SURGERY

## 2025-07-07 PROCEDURE — 7100000009 HC PHASE TWO TIME - INITIAL BASE CHARGE: Performed by: ORTHOPAEDIC SURGERY

## 2025-07-07 PROCEDURE — 23430 REPAIR BICEPS TENDON: CPT | Performed by: PHYSICIAN ASSISTANT

## 2025-07-07 DEVICE — PROXIMAL TENODESIS IMPLANT SYSTEM REV: 0
Type: IMPLANTABLE DEVICE | Site: ARM | Status: FUNCTIONAL
Brand: ARTHREX®

## 2025-07-07 RX ORDER — OXYCODONE HYDROCHLORIDE 5 MG/1
10 TABLET ORAL EVERY 4 HOURS PRN
Refills: 0 | Status: DISCONTINUED | OUTPATIENT
Start: 2025-07-07 | End: 2025-07-07 | Stop reason: HOSPADM

## 2025-07-07 RX ORDER — FENTANYL CITRATE 50 UG/ML
25 INJECTION, SOLUTION INTRAMUSCULAR; INTRAVENOUS EVERY 5 MIN PRN
Refills: 0 | Status: DISCONTINUED | OUTPATIENT
Start: 2025-07-07 | End: 2025-07-07 | Stop reason: HOSPADM

## 2025-07-07 RX ORDER — LIDOCAINE HYDROCHLORIDE 20 MG/ML
INJECTION, SOLUTION INFILTRATION; PERINEURAL AS NEEDED
Status: DISCONTINUED | OUTPATIENT
Start: 2025-07-07 | End: 2025-07-07

## 2025-07-07 RX ORDER — ALBUTEROL SULFATE 0.83 MG/ML
2.5 SOLUTION RESPIRATORY (INHALATION) ONCE AS NEEDED
Status: DISCONTINUED | OUTPATIENT
Start: 2025-07-07 | End: 2025-07-07 | Stop reason: HOSPADM

## 2025-07-07 RX ORDER — SODIUM CHLORIDE, SODIUM LACTATE, POTASSIUM CHLORIDE, CALCIUM CHLORIDE 600; 310; 30; 20 MG/100ML; MG/100ML; MG/100ML; MG/100ML
50 INJECTION, SOLUTION INTRAVENOUS CONTINUOUS
Status: DISCONTINUED | OUTPATIENT
Start: 2025-07-07 | End: 2025-07-07 | Stop reason: HOSPADM

## 2025-07-07 RX ORDER — METOCLOPRAMIDE HYDROCHLORIDE 5 MG/ML
10 INJECTION INTRAMUSCULAR; INTRAVENOUS ONCE AS NEEDED
Status: DISCONTINUED | OUTPATIENT
Start: 2025-07-07 | End: 2025-07-07 | Stop reason: HOSPADM

## 2025-07-07 RX ORDER — ACETAMINOPHEN 325 MG/1
650 TABLET ORAL EVERY 4 HOURS PRN
Status: DISCONTINUED | OUTPATIENT
Start: 2025-07-07 | End: 2025-07-07 | Stop reason: HOSPADM

## 2025-07-07 RX ORDER — SODIUM CHLORIDE 0.9 G/100ML
INJECTION, SOLUTION IRRIGATION AS NEEDED
Status: DISCONTINUED | OUTPATIENT
Start: 2025-07-07 | End: 2025-07-07 | Stop reason: HOSPADM

## 2025-07-07 RX ORDER — PHENYLEPHRINE HCL IN 0.9% NACL 1 MG/10 ML
SYRINGE (ML) INTRAVENOUS AS NEEDED
Status: DISCONTINUED | OUTPATIENT
Start: 2025-07-07 | End: 2025-07-07

## 2025-07-07 RX ORDER — LIDOCAINE HYDROCHLORIDE 10 MG/ML
0.1 INJECTION, SOLUTION EPIDURAL; INFILTRATION; INTRACAUDAL; PERINEURAL ONCE
Status: DISCONTINUED | OUTPATIENT
Start: 2025-07-07 | End: 2025-07-07 | Stop reason: HOSPADM

## 2025-07-07 RX ORDER — FAMOTIDINE 10 MG/ML
INJECTION INTRAVENOUS AS NEEDED
Status: DISCONTINUED | OUTPATIENT
Start: 2025-07-07 | End: 2025-07-07

## 2025-07-07 RX ORDER — DOCUSATE SODIUM 100 MG/1
100 CAPSULE, LIQUID FILLED ORAL 2 TIMES DAILY
COMMUNITY

## 2025-07-07 RX ORDER — OXYCODONE AND ACETAMINOPHEN 5; 325 MG/1; MG/1
1 TABLET ORAL EVERY 6 HOURS PRN
Qty: 20 TABLET | Refills: 0 | Status: SHIPPED | OUTPATIENT
Start: 2025-07-07 | End: 2025-07-11 | Stop reason: SDUPTHER

## 2025-07-07 RX ORDER — CEFAZOLIN SODIUM 2 G/50ML
2 SOLUTION INTRAVENOUS ONCE
Status: COMPLETED | OUTPATIENT
Start: 2025-07-07 | End: 2025-07-07

## 2025-07-07 RX ORDER — FENTANYL CITRATE 50 UG/ML
INJECTION, SOLUTION INTRAMUSCULAR; INTRAVENOUS AS NEEDED
Status: DISCONTINUED | OUTPATIENT
Start: 2025-07-07 | End: 2025-07-07

## 2025-07-07 RX ORDER — PROPOFOL 10 MG/ML
INJECTION, EMULSION INTRAVENOUS AS NEEDED
Status: DISCONTINUED | OUTPATIENT
Start: 2025-07-07 | End: 2025-07-07

## 2025-07-07 RX ORDER — ONDANSETRON HYDROCHLORIDE 2 MG/ML
4 INJECTION, SOLUTION INTRAVENOUS ONCE AS NEEDED
Status: DISCONTINUED | OUTPATIENT
Start: 2025-07-07 | End: 2025-07-07 | Stop reason: HOSPADM

## 2025-07-07 RX ORDER — HYDRALAZINE HYDROCHLORIDE 20 MG/ML
5 INJECTION INTRAMUSCULAR; INTRAVENOUS EVERY 30 MIN PRN
Status: DISCONTINUED | OUTPATIENT
Start: 2025-07-07 | End: 2025-07-07 | Stop reason: HOSPADM

## 2025-07-07 RX ORDER — ONDANSETRON HYDROCHLORIDE 2 MG/ML
INJECTION, SOLUTION INTRAVENOUS AS NEEDED
Status: DISCONTINUED | OUTPATIENT
Start: 2025-07-07 | End: 2025-07-07

## 2025-07-07 RX ORDER — ROCURONIUM BROMIDE 10 MG/ML
INJECTION, SOLUTION INTRAVENOUS AS NEEDED
Status: DISCONTINUED | OUTPATIENT
Start: 2025-07-07 | End: 2025-07-07

## 2025-07-07 RX ORDER — LABETALOL HYDROCHLORIDE 5 MG/ML
5 INJECTION, SOLUTION INTRAVENOUS ONCE AS NEEDED
Status: DISCONTINUED | OUTPATIENT
Start: 2025-07-07 | End: 2025-07-07 | Stop reason: HOSPADM

## 2025-07-07 RX ORDER — GLYCOPYRROLATE 0.2 MG/ML
INJECTION INTRAMUSCULAR; INTRAVENOUS AS NEEDED
Status: DISCONTINUED | OUTPATIENT
Start: 2025-07-07 | End: 2025-07-07

## 2025-07-07 RX ORDER — SODIUM CHLORIDE, SODIUM LACTATE, POTASSIUM CHLORIDE, CALCIUM CHLORIDE 600; 310; 30; 20 MG/100ML; MG/100ML; MG/100ML; MG/100ML
100 INJECTION, SOLUTION INTRAVENOUS CONTINUOUS
Status: DISCONTINUED | OUTPATIENT
Start: 2025-07-07 | End: 2025-07-07 | Stop reason: HOSPADM

## 2025-07-07 RX ORDER — MIDAZOLAM HYDROCHLORIDE 1 MG/ML
INJECTION, SOLUTION INTRAMUSCULAR; INTRAVENOUS AS NEEDED
Status: DISCONTINUED | OUTPATIENT
Start: 2025-07-07 | End: 2025-07-07

## 2025-07-07 RX ADMIN — SODIUM CHLORIDE, POTASSIUM CHLORIDE, SODIUM LACTATE AND CALCIUM CHLORIDE 50 ML/HR: 600; 310; 30; 20 INJECTION, SOLUTION INTRAVENOUS at 13:25

## 2025-07-07 RX ADMIN — OXYCODONE HYDROCHLORIDE 10 MG: 5 TABLET ORAL at 18:04

## 2025-07-07 RX ADMIN — CEFAZOLIN SODIUM 2 G: 2 SOLUTION INTRAVENOUS at 16:08

## 2025-07-07 RX ADMIN — Medication 200 MCG: at 16:35

## 2025-07-07 RX ADMIN — Medication 100 MCG: at 17:07

## 2025-07-07 RX ADMIN — ROCURONIUM BROMIDE 60 MG: 10 SOLUTION INTRAVENOUS at 16:03

## 2025-07-07 RX ADMIN — FENTANYL CITRATE 100 MCG: 50 INJECTION, SOLUTION INTRAMUSCULAR; INTRAVENOUS at 16:03

## 2025-07-07 RX ADMIN — Medication 200 MCG: at 16:15

## 2025-07-07 RX ADMIN — DEXAMETHASONE SODIUM PHOSPHATE: 10 INJECTION, SOLUTION INTRAMUSCULAR; INTRAVENOUS at 14:50

## 2025-07-07 RX ADMIN — PROPOFOL 200 MG: 10 INJECTION, EMULSION INTRAVENOUS at 16:03

## 2025-07-07 RX ADMIN — MIDAZOLAM 4 MG: 1 INJECTION INTRAMUSCULAR; INTRAVENOUS at 15:45

## 2025-07-07 RX ADMIN — SODIUM CHLORIDE, POTASSIUM CHLORIDE, SODIUM LACTATE AND CALCIUM CHLORIDE: 600; 310; 30; 20 INJECTION, SOLUTION INTRAVENOUS at 16:14

## 2025-07-07 RX ADMIN — ONDANSETRON 4 MG: 2 INJECTION INTRAMUSCULAR; INTRAVENOUS at 17:03

## 2025-07-07 RX ADMIN — HYDROMORPHONE HYDROCHLORIDE 0.5 MG: 1 INJECTION, SOLUTION INTRAMUSCULAR; INTRAVENOUS; SUBCUTANEOUS at 17:41

## 2025-07-07 RX ADMIN — LIDOCAINE HYDROCHLORIDE 100 MG: 20 INJECTION, SOLUTION INFILTRATION; PERINEURAL at 16:03

## 2025-07-07 RX ADMIN — GLYCOPYRROLATE 0.2 MG: 0.2 INJECTION, SOLUTION INTRAMUSCULAR; INTRAVENOUS at 16:19

## 2025-07-07 RX ADMIN — FAMOTIDINE 20 MG: 10 INJECTION, SOLUTION INTRAVENOUS at 16:20

## 2025-07-07 RX ADMIN — Medication 100 MCG: at 16:51

## 2025-07-07 RX ADMIN — HYDROMORPHONE HYDROCHLORIDE 0.5 MG: 1 INJECTION, SOLUTION INTRAMUSCULAR; INTRAVENOUS; SUBCUTANEOUS at 17:54

## 2025-07-07 RX ADMIN — DEXAMETHASONE SODIUM PHOSPHATE 8 MG: 4 INJECTION, SOLUTION INTRAMUSCULAR; INTRAVENOUS at 16:19

## 2025-07-07 SDOH — HEALTH STABILITY: MENTAL HEALTH: CURRENT SMOKER: 0

## 2025-07-07 ASSESSMENT — PAIN SCALES - GENERAL
PAINLEVEL_OUTOF10: 8
PAINLEVEL_OUTOF10: 5 - MODERATE PAIN
PAINLEVEL_OUTOF10: 6
PAINLEVEL_OUTOF10: 8
PAIN_LEVEL: 0
PAINLEVEL_OUTOF10: 9
PAINLEVEL_OUTOF10: 6
PAINLEVEL_OUTOF10: 9
PAINLEVEL_OUTOF10: 8

## 2025-07-07 ASSESSMENT — PAIN DESCRIPTION - DESCRIPTORS
DESCRIPTORS: ACHING;BURNING

## 2025-07-07 ASSESSMENT — PAIN DESCRIPTION - ORIENTATION: ORIENTATION: LEFT

## 2025-07-07 ASSESSMENT — PAIN - FUNCTIONAL ASSESSMENT
PAIN_FUNCTIONAL_ASSESSMENT: 0-10

## 2025-07-07 ASSESSMENT — PAIN DESCRIPTION - LOCATION: LOCATION: SHOULDER

## 2025-07-07 NOTE — ANESTHESIA POSTPROCEDURE EVALUATION
Patient: Akin Lynn    Procedure Summary       Date: 07/07/25 Room / Location: [facility] / [facility]    Anesthesia Start: 1559 Anesthesia Stop: 1730    Procedure: ATHROSCOPY BICEPS TENODESIS (Left: Shoulder) Diagnosis:       Bicipital tendinitis, left shoulder      (Bicipital tendinitis, left shoulder [M75.22])    Surgeons: Royce Louis MD Responsible Provider: Chelle Kwon MD    Anesthesia Type: general, regional ASA Status: 3            Anesthesia Type: general, regional    Vitals Value Taken Time   /74 07/07/25 17:31   Temp 36.0 07/07/25 17:31   Pulse 64 07/07/25 17:29   Resp 13 07/07/25 17:29   SpO2 100 % 07/07/25 17:29   Vitals shown include unfiled device data.    Anesthesia Post Evaluation    Patient location during evaluation: PACU  Patient participation: complete - patient participated  Level of consciousness: awake and alert  Pain score: 0  Pain management: adequate  Airway patency: patent  Cardiovascular status: blood pressure returned to baseline and acceptable  Respiratory status: acceptable  Hydration status: acceptable  Postoperative Nausea and Vomiting: none        No notable events documented.

## 2025-07-07 NOTE — ANESTHESIA PROCEDURE NOTES
Airway  Date/Time: 7/7/2025 4:05 PM  Reason: elective    Airway not difficult    Staffing  Performed: CRNA   Authorized by: Chelle Kwon MD    Performed by: MIKAELA Harris-CRNA, DNAP  Patient location during procedure: OR    Patient Condition  Indications for airway management: anesthesia  Patient position: sniffing  Sedation level: minimal     Final Airway Details   Preoxygenated: yes  Final airway type: endotracheal airway  Successful airway: ETT  Cuffed: yes   Successful intubation technique: video laryngoscopy  Adjuncts used in placement: intubating stylet and anterior pressure/BURP  Endotracheal tube insertion site: oral  Blade: River  Blade size: #4  ETT size (mm): 7.5  Cormack-Lehane Classification: grade IIa - partial view of glottis  Placement verified by: capnometry   Measured from: teeth  ETT to teeth (cm): 22  Number of attempts at approach: 1

## 2025-07-07 NOTE — ANESTHESIA PREPROCEDURE EVALUATION
Patient: Akin Lynn    Procedure Information       Date/Time: 07/07/25 1430    Procedure: ATHROSCOPY BICEPS TENODESIS (Left: Shoulder) - ARTHREX RTC REPAIR, BICEP TENODESIS TRAY  Howe CHAIR  -LOOP RECORDER-[employer]    Location: [facility] / [facility]    Surgeons: Royce Louis MD            Relevant Problems   Anesthesia   (+) PONV (postoperative nausea and vomiting)      Cardiac   (+) Abnormal EKG   (+) Chest pain, unspecified type   (+) Coronary artery disease involving native coronary artery of native heart without angina pectoris   (+) HTN (hypertension)   (+) Incomplete RBBB   (+) Mixed hyperlipidemia   (+) PVC (premature ventricular contraction)      Pulmonary   (+) Pulmonary nodules      Neuro   (+) Anxiety   (+) Bipolar disorder   (+) Complex regional pain syndrome type 1 of left lower extremity   (+) Cubital tunnel syndrome on left   (+) Cubital tunnel syndrome on right   (+) Guyon syndrome   (+) Trauma and stressor-related disorder      GI   (+) Gastroesophageal reflux disease without esophagitis      Musculoskeletal   (+) Acute on chronic low back pain   (+) Bicipital tendinitis, left shoulder   (+) Chronic left-sided low back pain with left-sided sciatica   (+) Complex regional pain syndrome type 1 of left lower extremity   (+) Primary osteoarthritis of left hip   (+) Reflex sympathetic dystrophy   (+) S/P total right hip arthroplasty   (+) Unilateral primary osteoarthritis, right hip      Nervous   (+) Cervical radicular pain      Sleep   (+) Suspected sleep apnea       Clinical information reviewed:   Tobacco  Allergies  Meds   Med Hx  Surg Hx   Fam Hx  Soc Hx        NPO Detail:  NPO/Void Status  Date of Last Liquid: 07/07/25  Time of Last Liquid: 1200  Date of Last Solid: 07/06/25  Time of Last Solid: 2100  Last Intake Type: Clear fluids  Time of Last Void: 1200         Physical Exam    Airway  Mallampati: II  TM distance: >3 FB  Neck ROM: full  Mouth opening: 3 or more finger  widths     Cardiovascular   Rhythm: regular  Rate: normal     Dental - normal exam     Pulmonary - normal exam   Abdominal - normal exam           Anesthesia Plan    History of general anesthesia?: yes  History of complications of general anesthesia?: no    ASA 3     general and regional   (GETA + LEFT Interscalene nerve block)  The patient is not a current smoker.  Patient was previously instructed to abstain from smoking on day of procedure.  Patient did not smoke on day of procedure.  Education provided regarding risk of obstructive sleep apnea.  intravenous induction   Postoperative pain plan includes opioids.  Trial extubation is planned.  Anesthetic plan and risks discussed with patient.  Use of blood products discussed with patient who consented to blood products.    Plan discussed with CRNA.

## 2025-07-07 NOTE — OP NOTE
ATHROSCOPY BICEPS TENODESIS (L) Operative Note     Date: 2025  OR Location: [facility]    Name: Akin Lynn, : 1970, Age: 55 y.o., MRN: [id number], Sex: male    Diagnosis  Pre-op Diagnosis      * Bicipital tendinitis, left shoulder [M75.22] Post-op Diagnosis     * Bicipital tendinitis, left shoulder [M75.22]     Procedures  ATHROSCOPY BICEPS TENODESIS  79801 - [facility] SURGICAL ARTHROSCOPY SHOULDER BICEPS TENODESIS  1.  Left shoulder arthroscopic subacromial decompression  2.  Left shoulder arthroscopic debridement glenohumeral joint anterior superior posterior labrum  3.  Left shoulder open subpectoral biceps tenodesis    Surgeons      * Royce Louis - Primary    Resident/Fellow/Other Assistant:  Surgeons and Role:  * No surgeons found with a matching role *Naty Abbott PA-C    Staff:   Circulator: Yudelka  Scralka Person: Darling Edgar Circulator: Compa    Anesthesia Staff: Anesthesiologist: Chelle Kwon MD  CRNA: MIKAELA Harris-PRIYANKA, DNAP    Procedure Summary  Anesthesia: Regional, General  ASA: III  Estimated Blood Loss: 20 mL  Intra-op Medications:   Administrations occurring from 1430 to 1630 on 25:   Medication Name Total Dose   dexAMETHasone (Decadron) 4 mg/mL 8 mg   famotidine (Pepcid) 10 mg/mL 20 mg   fentaNYL PF 0.05 mg/mL 100 mcg   glycopyrrolate (Robinul) injection 0.2 mg   lactated Ringer's infusion 13.33 mL   lidocaine (Xylocaine) injection 2 % 100 mg   midazolam (Versed) 1 mg/1 mL 4 mg   phenylephrine 100 mcg/mL syringe 10 mL (prefilled) 200 mcg   propofol (Diprivan) injection 10 mg/mL 200 mg   rocuronium (ZeMuron) 50 mg/5 mL injection 60 mg   ceFAZolin (Ancef) 2 g in dextrose (iso) IV 50 mL 2 g   dexAMETHasone (PF) (Decadron) 10 mg, EPINEPHrine HCl (PF) (Adrenalin) 0.05 mg, ropivacaine (Naropin) 5 mg/mL (0.5 %) 150 mg injection Cannot be calculated              Anesthesia Record               Intraprocedure I/O Totals          Intake    lactated Ringer's infusion 1000.00 mL     ceFAZolin (Ancef) 2 g in dextrose (iso) IV 50 mL 50.00 mL    Total Intake 1050 mL          Specimen: No specimens collected              Drains and/or Catheters: * None in log *    Tourniquet Times:         Implants:  Implants       Type Name Action Serial No.      Implant KIT, IMPLANT SYSTEM, PROXIMAL TENODESIS - [id number] Implanted            Indications: 55-year-old male with persistent left shoulder pain elected proceed with surgery for left shoulder arthroscopy with biceps tenodesis for suspected SLAP tear    Risks benefits and alternatives to surgery were discussed including but not limited to Infection, bleeding, neurovascular injury, pain and dysfunction, hardware related complications including cutout failure breakage, loss of function, motion, and permanent disability as well as the cardiovascular and pulmonary complications from anesthesia including death and DVT. Patient and family accept these risks.  We discussed specifically hardware complications and cut out, failure, breakage, intraoperative decision regarding biceps and rotator due to cuff pathology.  Incomplete pain related diagnosis to potential need for future revision surgeries.    Patient identified in the preop area.  Left upper extremity marked and confirmed with patient as the operative site.  Brought back to the operating room and anesthetized under general anesthesia.  Left upper extremity was then prepped and draped in standard sterile fashion.  Patient, site and procedure were confirmed with timeout.  Everyone in the room agreed.  Preop antibiotics were given.  Patient was given a preoperative scalene nerve block.  Placed into a beachchair position with bony prominences well-padded    We then made standard glenohumeral working portals beginning posteriorly.  Then created anterior mid glenoid portal.  Cannula was placed.  Glenohumeral findings: Evidence of type II SLAP tear with unstable biceps anchor superiorly including labral  tear.  Debrided the torn edges of the labrum anterior superior and posteriorly back to the patient's stable base.  Then cut the biceps for later tenodesis.  We visualized the rotator cuff which was intact on articular sided viewing with some minor fraying.  The partial-thickness cuff tear was then gently debrided shaver back to stable base.  We then reintroduced the scope and into the subacromial space.  Subacromial findings: Moderate subacromial effusions and thickening the bursa.  Full bursectomy anterior and posterior liters in.  Then then did a subacromial decompression removing about 2 mm anterior bone of the acromion leading back to the clavicle.  The rotator cuff was intact on the bursal sided viewing.  The shoulder taken through full range of motion without any additional evidence of impingement.  This concluded our arthroscopic procedure  We then made a open incision at the inferior border of the pectoralis centered over the long head of the biceps.  Skin and fat were divided.  The inferior border of the pectoral fascia was incised.  Identified the long head of the biceps.  It was removed from the intertubercular groove.  We marked the musculotendinous junction and then ran a running locking stitch 2.5 cm distally.  The cut the remaining distal biceps flush approximately 3/4 cm removed.  We then sized the biceps tendon.  It was a 5.5 mm.  We then created a unicortical socket at the inferior border of the pectoralis.  Provisional Beath pin was bicortically placed.  We then create a unicortical socket.  Placed a biceps a button on the far cortex and suture relayed through the long head of the biceps.  We sequentially then began tightening the biceps down into the socket.  It was tied in a single backup stitch was then placed.  Sutures were cut flush.  Elbow was extended and the construct remained stable.  This concluded the procedure.  Wounds were irrigated with normal saline and closed in standard layered  fashion.  Sterile dressings were applied.  Patient is placed in a sling and sent to the PACU in stable condition.    Naty Abbott PA-C was present throughout the entire case. Given the nature of the disease process and the procedure, a skilled surgical first assistant was necessary during the case. The assistant was necessary to hold retractors and to manipulate the extremity during the procedure. A certified scrub tech was at the back table managing the instruments and supplies for the surgical case.

## 2025-07-08 DIAGNOSIS — G89.4 CHRONIC PAIN SYNDROME: ICD-10-CM

## 2025-07-09 RX ORDER — KETAMINE HCL 100 %
POWDER (GRAM) MISCELLANEOUS
Qty: 1 G | Refills: 5 | Status: SHIPPED | OUTPATIENT
Start: 2025-07-09

## 2025-07-11 ENCOUNTER — TELEPHONE (OUTPATIENT)
Dept: ORTHOPEDIC SURGERY | Facility: CLINIC | Age: 55
End: 2025-07-11
Payer: COMMERCIAL

## 2025-07-11 DIAGNOSIS — M75.22 TENDONITIS OF UPPER BICEPS TENDON OF LEFT SHOULDER: ICD-10-CM

## 2025-07-11 RX ORDER — OXYCODONE AND ACETAMINOPHEN 5; 325 MG/1; MG/1
1 TABLET ORAL EVERY 6 HOURS PRN
Qty: 20 TABLET | Refills: 0 | Status: SHIPPED | OUTPATIENT
Start: 2025-07-11 | End: 2025-07-18

## 2025-07-15 ENCOUNTER — HOSPITAL ENCOUNTER (OUTPATIENT)
Dept: CARDIOLOGY | Facility: CLINIC | Age: 55
Discharge: HOME | End: 2025-07-15
Payer: COMMERCIAL

## 2025-07-15 ENCOUNTER — APPOINTMENT (OUTPATIENT)
Dept: ORTHOPEDIC SURGERY | Facility: CLINIC | Age: 55
End: 2025-07-15
Payer: COMMERCIAL

## 2025-07-15 DIAGNOSIS — R55 SYNCOPE, UNSPECIFIED SYNCOPE TYPE: ICD-10-CM

## 2025-07-15 DIAGNOSIS — R00.2 PALPITATIONS: ICD-10-CM

## 2025-07-15 DIAGNOSIS — Z95.818 IMPLANTABLE LOOP RECORDER PRESENT: ICD-10-CM

## 2025-07-15 NOTE — PROGRESS NOTES
History of Present Illness:  Date of Surgery: 7/7/25. Left shoulder arthroscopy and biceps tenodesis. Overall states the shoulder is doing pretty well, notes some general soreness but is continuing to improve.     Exam:  Mild effusion  Left shoulder Incision is clean, dry, intact, and healing well  ROM intentionally not tested today  No calf tenderness   Negative Evelia's test  Distal neurovascular exam intact    Assessment:  Patient status post left shoulder arthroscopy and biceps tenodesis    Plan:  Remain in sling until 4 weeks post op  Order for PT at [facility] to begin at 4 weeks post op  Refill of percocet for pain  Follow-up 4 weeks    Scribe Attestation:  By signing my name below, I, Amador Copeland attest that this documentation has been prepared under the direction and in the presence of Royce Louis MD.    Provider Attestation - Scribe documentation:  All medical record entries made by Argelia Donaldson were at my direction and personally dictated by me, Royce Louis MD. I have reviewed the chart and agree that the record is accurate and I confirmed that it reflects my personal performance of the history, physical exam, discussion, and plan.

## 2025-07-17 ENCOUNTER — OFFICE VISIT (OUTPATIENT)
Dept: ORTHOPEDIC SURGERY | Facility: CLINIC | Age: 55
End: 2025-07-17
Payer: COMMERCIAL

## 2025-07-17 DIAGNOSIS — M25.812 IMPINGEMENT OF LEFT SHOULDER: Primary | ICD-10-CM

## 2025-07-17 DIAGNOSIS — M75.22 TENDONITIS OF UPPER BICEPS TENDON OF LEFT SHOULDER: ICD-10-CM

## 2025-07-17 PROCEDURE — 99213 OFFICE O/P EST LOW 20 MIN: CPT | Performed by: ORTHOPAEDIC SURGERY

## 2025-07-17 RX ORDER — OXYCODONE AND ACETAMINOPHEN 5; 325 MG/1; MG/1
1 TABLET ORAL EVERY 8 HOURS PRN
Qty: 15 TABLET | Refills: 0 | Status: SHIPPED | OUTPATIENT
Start: 2025-07-18 | End: 2025-07-25

## 2025-07-22 ENCOUNTER — APPOINTMENT (OUTPATIENT)
Dept: PAIN MEDICINE | Facility: CLINIC | Age: 55
End: 2025-07-22
Payer: COMMERCIAL

## 2025-07-22 ENCOUNTER — TELEPHONE (OUTPATIENT)
Dept: ORTHOPEDIC SURGERY | Facility: CLINIC | Age: 55
End: 2025-07-22
Payer: COMMERCIAL

## 2025-07-22 ENCOUNTER — TELEPHONE (OUTPATIENT)
Dept: PRIMARY CARE | Facility: CLINIC | Age: 55
End: 2025-07-22
Payer: COMMERCIAL

## 2025-07-22 NOTE — PROGRESS NOTES
History of Present Illness:  Date of Surgery: 7/7/25. Left shoulder arthroscopy and biceps tenodesis. He was seen in the ED at [place] yesterday after a fall onto the left arm. It is sore and significantly painful today and was doing very well prior to this fall.     Imaging:  X-rays left shoulder: Shows no acute fractures or dislocations. No arthritic change.  Intact biceps tenodesis.    Exam:  Mild effusion  Left shoulder Incision is clean, dry, intact, and healing well  ROM intentionally not tested today  No obvious deformity in the biceps.     Assessment:  Patient status post left shoulder arthroscopy and biceps tenodesis    Plan:  Begin working with therapy   We gave him one more refill of percocet for pain relief. He does understand that this will be his last refill.   Currently we are recommending a release from jury duty as he is in the early postoperative period. I do not think that he would be able to full participate in what is required of him as in the jury, and I would anticipate this for at least 3-6 months after his surgery. He is also going to need postoperative therapy which is schedule dependent.   Follow-up 4 weeks    Scribe Attestation:  By signing my name below, IArgelia Scribe attest that this documentation has been prepared under the direction and in the presence of Royce Louis MD.    Provider Attestation - Scribe documentation:  All medical record entries made by Argelia Donaldson were at my direction and personally dictated by me, Royce Louis MD. I have reviewed the chart and agree that the record is accurate and I confirmed that it reflects my personal performance of the history, physical exam, discussion, and plan.

## 2025-07-22 NOTE — TELEPHONE ENCOUNTER
would like a note placed in MY CHART& printed to . He is starting therapy for his shoulder and was sent jury duty. He would like the note to dismiss him from jury duty

## 2025-07-23 ENCOUNTER — APPOINTMENT (OUTPATIENT)
Dept: RADIOLOGY | Facility: HOSPITAL | Age: 55
End: 2025-07-23
Payer: COMMERCIAL

## 2025-07-23 ENCOUNTER — HOSPITAL ENCOUNTER (EMERGENCY)
Facility: HOSPITAL | Age: 55
Discharge: HOME | End: 2025-07-23
Attending: EMERGENCY MEDICINE
Payer: COMMERCIAL

## 2025-07-23 ENCOUNTER — HOSPITAL ENCOUNTER (OUTPATIENT)
Dept: CARDIOLOGY | Facility: CLINIC | Age: 55
Discharge: HOME | End: 2025-07-23
Payer: COMMERCIAL

## 2025-07-23 VITALS
HEART RATE: 73 BPM | HEIGHT: 70 IN | RESPIRATION RATE: 16 BRPM | DIASTOLIC BLOOD PRESSURE: 80 MMHG | OXYGEN SATURATION: 96 % | SYSTOLIC BLOOD PRESSURE: 116 MMHG | WEIGHT: 160 LBS | TEMPERATURE: 97 F | BODY MASS INDEX: 22.9 KG/M2

## 2025-07-23 DIAGNOSIS — R00.2 PALPITATIONS: ICD-10-CM
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
## 2025-07-23 DIAGNOSIS — M25.512 ACUTE PAIN OF LEFT SHOULDER: ICD-10-CM

## 2025-07-23 DIAGNOSIS — Z95.818 IMPLANTABLE LOOP RECORDER PRESENT: ICD-10-CM

## 2025-07-23 DIAGNOSIS — R55 SYNCOPE, UNSPECIFIED SYNCOPE TYPE: ICD-10-CM

## 2025-07-23 DIAGNOSIS — R20.2 PARESTHESIA: ICD-10-CM

## 2025-07-23 PROCEDURE — 72125 CT NECK SPINE W/O DYE: CPT | Performed by: RADIOLOGY

## 2025-07-23 PROCEDURE — 73060 X-RAY EXAM OF HUMERUS: CPT | Mod: LEFT SIDE | Performed by: RADIOLOGY

## 2025-07-23 PROCEDURE — 73060 X-RAY EXAM OF HUMERUS: CPT | Mod: LT

## 2025-07-23 PROCEDURE — 70450 CT HEAD/BRAIN W/O DYE: CPT

## 2025-07-23 PROCEDURE — 73030 X-RAY EXAM OF SHOULDER: CPT | Mod: LEFT SIDE | Performed by: RADIOLOGY

## 2025-07-23 PROCEDURE — 99284 EMERGENCY DEPT VISIT MOD MDM: CPT | Mod: 25 | Performed by: EMERGENCY MEDICINE

## 2025-07-23 PROCEDURE — 70450 CT HEAD/BRAIN W/O DYE: CPT | Performed by: RADIOLOGY

## 2025-07-23 PROCEDURE — 73030 X-RAY EXAM OF SHOULDER: CPT | Mod: LT

## 2025-07-23 PROCEDURE — 72125 CT NECK SPINE W/O DYE: CPT

## 2025-07-23 PROCEDURE — 2500000001 HC RX 250 WO HCPCS SELF ADMINISTERED DRUGS (ALT 637 FOR MEDICARE OP)

## 2025-07-23 RX ORDER — OXYCODONE AND ACETAMINOPHEN 5; 325 MG/1; MG/1
1 TABLET ORAL ONCE
Refills: 0 | Status: COMPLETED | OUTPATIENT
Start: 2025-07-23 | End: 2025-07-23

## 2025-07-23 RX ADMIN — OXYCODONE HYDROCHLORIDE AND ACETAMINOPHEN 1 TABLET: 5; 325 TABLET ORAL at 14:20

## 2025-07-23 ASSESSMENT — PAIN SCALES - GENERAL
PAINLEVEL_OUTOF10: 6
PAINLEVEL_OUTOF10: 8

## 2025-07-23 ASSESSMENT — PAIN DESCRIPTION - LOCATION
LOCATION: ARM
LOCATION: ARM

## 2025-07-23 ASSESSMENT — PAIN - FUNCTIONAL ASSESSMENT
PAIN_FUNCTIONAL_ASSESSMENT: 0-10
PAIN_FUNCTIONAL_ASSESSMENT: 0-10

## 2025-07-23 ASSESSMENT — PAIN DESCRIPTION - ORIENTATION
ORIENTATION: LEFT
ORIENTATION: LEFT

## 2025-07-23 ASSESSMENT — PAIN DESCRIPTION - DESCRIPTORS: DESCRIPTORS: OTHER (COMMENT)

## 2025-07-23 NOTE — DISCHARGE INSTRUCTIONS
Seek immediate medical attention if you develop: new or worsening headache, nausea, vomiting, confusion, weakness, loss of motion in your arms or legs, loss of control of your urine or stool, difficulty waking from sleep, or any new or worsening symptoms.    Have someone check on you and wake you from sleep every 2 hours for the next 24 hours to make sure that you are doing well.    Seek immediate medical attention if you develop: increasing pain, numbness, tingling, weakness, loss of motion in your fingers, your fingers become pale or cold, or you develop any new or worsening symptoms.

## 2025-07-23 NOTE — ED PROVIDER NOTES
"Emergency Department Provider Note        History of Present Illness     History provided by: Patient and EMS  Limitations to History: None  External Records Reviewed with Brief Summary: \"Chart review    HPI:  Akin Lynn is a 55 y.o. male with medical history significant for hypertension, hyperlipidemia, chronic back pain, stenosis, and anxiety, bipolar disorder with multiple admissions and visits to this emergency department as well as [facility] where he receives ketamine infusions at [facility] for chronic pain syndrome, per patient's plan of care patient is to be referred to [facility] pain management clinic, and no narcotics unless extreme indication.  He arrives via EMS chief complaint of a fall onto his left arm with subsequent arm pain.  Patient states he was sweeping up cat litter when he slipped on the cat litter because it was fine and fell onto his left arm.  He denies head strike, denies loss of consciousness, denies nausea or vomiting, states that the surgery for tendinitis of the bicep was performed on 7/7/2025, 2 weeks ago.  He does arrive with the arm in a hard surgical sling.    Physical Exam   Triage vitals:  T 36.2 °C (97.2 °F)  HR 89  /78  RR 16  O2 95 % None (Room air)    General: Awake, alert, in no acute distress  Eyes: Gaze conjugate.  No scleral icterus or injection  HENT: Normo-cephalic, atraumatic. No stridor  CV: Regular rate, regular rhythm. Radial pulses 2+ bilaterally  Resp: Breathing non-labored, speaking in full sentences.  Clear to auscultation bilaterally  GI: Soft, non-distended, non-tender. No rebound or guarding.  : Deferred  MSK/Extremities: No gross bony deformities.  Left arm in hard surgical sling, otherwise moving all extremities, no ecchymosis, no signs of obvious fracture or deformation  Skin: Warm. Appropriate color  Neuro: Alert. Oriented. Face symmetric. Speech is fluent.  Gross strength and sensation intact in b/l UE and LEs  Psych: Appropriate " mood and affect    Medical Decision Making & ED Course   Medical Decision Makin y.o. male arrives with a chief complaint of left humeral/shoulder pain after a fall from standing without loss of consciousness onto the left shoulder which she describes as a slip and fall on a cat litter.  Patient was given 1 Percocet for pain management as he was prescribed Percocet for pain management for his shoulder at his home.  Sent for x-rays of the left shoulder and left arm.  Please see ED course for any further medical decision making.  ----  Social Determinants of Health which Significantly Impact Care: None identified       Independent Result Review and Interpretation: Relevant laboratory and radiographic results were reviewed and independently interpreted by myself.  As necessary, they are commented on in the ED Course.    Chronic conditions affecting the patient's care: As documented above in [facility]    The patient was discussed with the following consultants/services: None    Care Considerations: As documented above in [facility]    ED Course:  ED Course as of 25   1513 Discussed the finding of the left shoulder and left humerus with Dr. Louis, the patient's orthopedic surgery who performed the last surgery, he states the patient is okay to be discharged and follow-up outpatient within a week with Dr. Louis. [PV]   1514 XR shoulder left 2+ views      IMPRESSION:  No acute findings. Satisfactory appearance status post biceps  tenodesis.   [PV]   1614 CT cervical spine wo IV contrast  CT HEAD:  1. No evidence of acute cortical infarct or intracranial hemorrhage.  2. Mild age-related generalized parenchymal volume loss and sequelae  of chronic small-vessel ischemic change.          CT CERVICAL SPINE:  1. No acute fracture or traumatic spondylolisthesis of the cervical  spine.   [PV]      ED Course User Index  [PV] Keith Hernandez DO         Diagnoses as of 25   Fall, initial encounter    Acute pain of left shoulder     Disposition   As a result of the work-up, the patient was discharged home.  he was informed of his diagnosis and instructed to come back with any concerns or worsening of condition.  he and was agreeable to the plan as discussed above.  he was given the opportunity to ask questions.  All of the patient's questions were answered.    Procedures   Procedures    Patient seen and discussed with ED attending physician.    Keith Hernandez,   Emergency Medicine    =================Attending note===============    The patient was seen by the resident/fellow.  I have personally performed a substantive portion of the encounter.  I have seen and examined the patient; agree with the workup, evaluation, MDM,   management and diagnosis.  The care plan has been discussed with the resident; I have reviewed the resident’s note and agree with the documented findings.          History of Present Illness  The patient presents for evaluation of shoulder pain.    They underwent shoulder surgery on 07/07/2025, performed by Dr. Louis. The procedure involved addressing bicep tendinitis, reattaching the bicep, and cleaning up the shoulder. Post-surgery today, they experienced a fall while cleaning up cat litter, which caused them to slip and land directly on their shoulder. At the time of the fall, they were wearing their brace. Currently, they report pain in their shoulder and a lack of sensation in their arm from mid-humerus down. They state they cannot move their elbow or hand.       Physical Exam  General Appearance: Appears well, alert  Vital signs: Within normal limits  HEENT: Head atraumatic, Eyes normal inspection, Normal ENT inspection  Respiratory: Normal breath sounds, no chest wall tenderness, no respiratory distress  Cardiovascular: Heart rate and rhythm regular, no murmurs  Back, Musculoskeletal: Patient unable to move fingers. No sensation in the arm from the shoulder down. No pain over the  collar bone.  Extremities: Non-tender, Full ROM, Normal appearance  Skin: Warm and dry, no rash  Neurological: No sensation in the arm from the mid humerus. Unable to feel touch in fingers and lower arm.  Not moving fingers  Psychiatric: Normal           ==========================================       Keith Hernandez,   Resident  07/23/25 1542       Keith Hernandez,   Resident  07/23/25 161

## 2025-07-24 ENCOUNTER — OFFICE VISIT (OUTPATIENT)
Dept: ORTHOPEDIC SURGERY | Facility: CLINIC | Age: 55
End: 2025-07-24
Payer: COMMERCIAL

## 2025-07-24 DIAGNOSIS — M77.8 TENDINITIS OF LEFT SHOULDER: ICD-10-CM

## 2025-07-24 DIAGNOSIS — M25.812 IMPINGEMENT OF LEFT SHOULDER: ICD-10-CM

## 2025-07-24 PROCEDURE — 99212 OFFICE O/P EST SF 10 MIN: CPT | Performed by: ORTHOPAEDIC SURGERY

## 2025-07-24 RX ORDER — OXYCODONE AND ACETAMINOPHEN 5; 325 MG/1; MG/1
1 TABLET ORAL EVERY 6 HOURS PRN
Qty: 15 TABLET | Refills: 0 | Status: SHIPPED | OUTPATIENT
Start: 2025-07-24 | End: 2025-07-31

## 2025-07-28 ENCOUNTER — TELEPHONE (OUTPATIENT)
Dept: PAIN MEDICINE | Facility: CLINIC | Age: 55
End: 2025-07-28
Payer: COMMERCIAL

## 2025-08-01 ENCOUNTER — TELEPHONE (OUTPATIENT)
Dept: ORTHOPEDIC SURGERY | Facility: CLINIC | Age: 55
End: 2025-08-01
Payer: COMMERCIAL

## 2025-08-01 NOTE — TELEPHONE ENCOUNTER
Patient called and left a message asking for a refill on the pain medication. He stated he has increased pain after he does his exercises.  Patient was called back and advised Dr. Louis will not refill the pain medication, as discussed in his last appointment.  He was advised he can use OTC NSAID or Tylenol.   Patient stated understanding and had no further questions.

## 2025-08-05 ENCOUNTER — HOSPITAL ENCOUNTER (OUTPATIENT)
Dept: CARDIOLOGY | Facility: CLINIC | Age: 55
Discharge: HOME | End: 2025-08-05
Payer: COMMERCIAL

## 2025-08-05 DIAGNOSIS — R00.2 PALPITATIONS: ICD-10-CM

## 2025-08-05 DIAGNOSIS — Z95.818 IMPLANTABLE LOOP RECORDER PRESENT: ICD-10-CM

## 2025-08-05 DIAGNOSIS — R55 SYNCOPE, UNSPECIFIED SYNCOPE TYPE: ICD-10-CM

## 2025-08-05 PROCEDURE — 93298 REM INTERROG DEV EVAL SCRMS: CPT

## 2025-08-06 ENCOUNTER — HOSPITAL ENCOUNTER (OUTPATIENT)
Dept: CARDIOLOGY | Facility: CLINIC | Age: 55
Discharge: HOME | End: 2025-08-06
Payer: COMMERCIAL

## 2025-08-06 DIAGNOSIS — R55 SYNCOPE, UNSPECIFIED SYNCOPE TYPE: ICD-10-CM

## 2025-08-06 DIAGNOSIS — Z95.818 IMPLANTABLE LOOP RECORDER PRESENT: ICD-10-CM

## 2025-08-06 DIAGNOSIS — R00.2 PALPITATIONS: ICD-10-CM

## 2025-08-07 ENCOUNTER — HOSPITAL ENCOUNTER (OUTPATIENT)
Dept: PAIN MEDICINE | Facility: CLINIC | Age: 55
Discharge: HOME | End: 2025-08-07
Payer: COMMERCIAL

## 2025-08-07 VITALS
TEMPERATURE: 96.8 F | HEART RATE: 87 BPM | DIASTOLIC BLOOD PRESSURE: 85 MMHG | SYSTOLIC BLOOD PRESSURE: 129 MMHG | RESPIRATION RATE: 18 BRPM | OXYGEN SATURATION: 95 %

## 2025-08-07 DIAGNOSIS — M47.816 LUMBAR SPONDYLOSIS: ICD-10-CM

## 2025-08-07 PROCEDURE — 64494 INJ PARAVERT F JNT L/S 2 LEV: CPT | Mod: 50 | Performed by: PAIN MEDICINE

## 2025-08-07 PROCEDURE — 7100000010 HC PHASE TWO TIME - EACH INCREMENTAL 1 MINUTE

## 2025-08-07 PROCEDURE — 64493 INJ PARAVERT F JNT L/S 1 LEV: CPT | Mod: 50 | Performed by: PAIN MEDICINE

## 2025-08-07 PROCEDURE — 7100000009 HC PHASE TWO TIME - INITIAL BASE CHARGE

## 2025-08-07 PROCEDURE — 2500000004 HC RX 250 GENERAL PHARMACY W/ HCPCS (ALT 636 FOR OP/ED): Mod: JZ | Performed by: PAIN MEDICINE

## 2025-08-07 RX ORDER — LIDOCAINE HYDROCHLORIDE 10 MG/ML
INJECTION, SOLUTION EPIDURAL; INFILTRATION; INTRACAUDAL; PERINEURAL AS NEEDED
Status: COMPLETED | OUTPATIENT
Start: 2025-08-07 | End: 2025-08-07

## 2025-08-07 RX ORDER — BUPIVACAINE HYDROCHLORIDE 5 MG/ML
INJECTION, SOLUTION EPIDURAL; INTRACAUDAL; PERINEURAL AS NEEDED
Status: COMPLETED | OUTPATIENT
Start: 2025-08-07 | End: 2025-08-07

## 2025-08-07 RX ADMIN — LIDOCAINE HYDROCHLORIDE 10 ML: 10 INJECTION, SOLUTION EPIDURAL; INFILTRATION; INTRACAUDAL; PERINEURAL at 13:35

## 2025-08-07 RX ADMIN — BUPIVACAINE HYDROCHLORIDE 10 ML: 5 INJECTION, SOLUTION EPIDURAL; INTRACAUDAL; PERINEURAL at 13:35

## 2025-08-07 ASSESSMENT — PAIN DESCRIPTION - DESCRIPTORS: DESCRIPTORS: STABBING

## 2025-08-07 ASSESSMENT — PAIN - FUNCTIONAL ASSESSMENT: PAIN_FUNCTIONAL_ASSESSMENT: 0-10

## 2025-08-07 ASSESSMENT — PAIN SCALES - GENERAL: PAINLEVEL_OUTOF10: 9

## 2025-08-07 NOTE — DISCHARGE INSTRUCTIONS
Post-injection instructions FOR FACET BLOCK      Pay attention to how much pain relief (what percentage compared to before the procedure) you get and for how long it lasts.     THIS IS A TEMPORARY NUMBING OF PAIN THIS IS BEING USED AS A DIAGNOSTIC INDICATOR IF THIS IS THE SOURCE OF YOUR PAIN    Activity:  RETURN TO NORMAL ACTIVITY  SEE IF YOU CAN APPRECIATE AN IMPROVEMENT IN THE PAIN    Bandages: Remove after 24 hours     Showering/Bathing: You may shower after bandage is removed     Follow up: CALL OFFICE NEXT BUSINESS -[phone] LEAVE MESSAGE ABOUT THE % OF RELIEF THAT WAS OBTAINED AND FOR HOW MANY HOURS      Call the OFFICE immediately: if you notice:     Excessive bleeding from procedure site (brisk bright red bleeding from the site or bleeding that soaks the bandages or does not stop)   Severe headache  Inability to walk, leg or arm weakness or numbness that is worse after the procedure   Uncontrolled pain   New urinary or fecal incontinence   Signs of infection: Fever above 101.5F, redness, swelling, pus or drainage from the site

## 2025-08-12 ENCOUNTER — TELEPHONE (OUTPATIENT)
Dept: PAIN MEDICINE | Facility: CLINIC | Age: 55
End: 2025-08-12
Payer: COMMERCIAL

## 2025-08-12 ENCOUNTER — APPOINTMENT (OUTPATIENT)
Dept: ORTHOPEDIC SURGERY | Facility: CLINIC | Age: 55
End: 2025-08-12
Payer: COMMERCIAL

## 2025-08-12 ENCOUNTER — HOSPITAL ENCOUNTER (OUTPATIENT)
Dept: CARDIOLOGY | Facility: CLINIC | Age: 55
Discharge: HOME | End: 2025-08-12
Payer: COMMERCIAL

## 2025-08-12 DIAGNOSIS — R00.2 PALPITATIONS: ICD-10-CM

## 2025-08-12 DIAGNOSIS — R55 SYNCOPE, UNSPECIFIED SYNCOPE TYPE: ICD-10-CM

## 2025-08-12 DIAGNOSIS — Z95.818 IMPLANTABLE LOOP RECORDER PRESENT: ICD-10-CM

## 2025-08-12 DIAGNOSIS — M47.816 LUMBAR SPONDYLOSIS: Primary | ICD-10-CM

## 2025-08-14 ENCOUNTER — APPOINTMENT (OUTPATIENT)
Dept: ORTHOPEDIC SURGERY | Facility: CLINIC | Age: 55
End: 2025-08-14
Payer: COMMERCIAL

## 2025-08-21 ENCOUNTER — APPOINTMENT (OUTPATIENT)
Dept: RADIOLOGY | Facility: HOSPITAL | Age: 55
End: 2025-08-21
Payer: COMMERCIAL

## 2025-08-21 ENCOUNTER — APPOINTMENT (OUTPATIENT)
Dept: ORTHOPEDIC SURGERY | Facility: CLINIC | Age: 55
End: 2025-08-21
Payer: COMMERCIAL

## 2025-08-21 ENCOUNTER — HOSPITAL ENCOUNTER (EMERGENCY)
Facility: HOSPITAL | Age: 55
Discharge: HOME | End: 2025-08-21
Payer: COMMERCIAL

## 2025-08-21 VITALS
OXYGEN SATURATION: 100 % | HEIGHT: 69 IN | WEIGHT: 164 LBS | BODY MASS INDEX: 24.29 KG/M2 | RESPIRATION RATE: 16 BRPM | SYSTOLIC BLOOD PRESSURE: 134 MMHG | HEART RATE: 75 BPM | TEMPERATURE: 97.2 F | DIASTOLIC BLOOD PRESSURE: 89 MMHG

## 2025-08-21 DIAGNOSIS — R31.0 GROSS HEMATURIA: ICD-10-CM

## 2025-08-21 DIAGNOSIS — R10.9 FLANK PAIN: Primary | ICD-10-CM

## 2025-08-21 DIAGNOSIS — Z98.890 STATUS POST ARTHROSCOPY OF LEFT SHOULDER: Primary | ICD-10-CM

## 2025-08-21 LAB
ALBUMIN SERPL BCP-MCNC: 4.4 G/DL (ref 3.4–5)
ALP SERPL-CCNC: 55 U/L (ref 33–120)
ALT SERPL W P-5'-P-CCNC: 17 U/L (ref 10–52)
ANION GAP SERPL CALC-SCNC: 12 MMOL/L (ref 10–20)
APPEARANCE UR: CLEAR
AST SERPL W P-5'-P-CCNC: 20 U/L (ref 9–39)
BASOPHILS # BLD AUTO: 0.1 X10*3/UL (ref 0–0.1)
BASOPHILS NFR BLD AUTO: 1.4 %
BILIRUB SERPL-MCNC: 0.5 MG/DL (ref 0–1.2)
BILIRUB UR STRIP.AUTO-MCNC: NEGATIVE MG/DL
BUN SERPL-MCNC: 25 MG/DL (ref 6–23)
CALCIUM SERPL-MCNC: 9 MG/DL (ref 8.6–10.3)
CHLORIDE SERPL-SCNC: 105 MMOL/L (ref 98–107)
CO2 SERPL-SCNC: 24 MMOL/L (ref 21–32)
COLOR UR: YELLOW
CREAT SERPL-MCNC: 0.77 MG/DL (ref 0.5–1.3)
EGFRCR SERPLBLD CKD-EPI 2021: >90 ML/MIN/1.73M*2
EOSINOPHIL # BLD AUTO: 0.32 X10*3/UL (ref 0–0.7)
EOSINOPHIL NFR BLD AUTO: 4.4 %
ERYTHROCYTE [DISTWIDTH] IN BLOOD BY AUTOMATED COUNT: 14.3 % (ref 11.5–14.5)
GLUCOSE SERPL-MCNC: 91 MG/DL (ref 74–99)
GLUCOSE UR STRIP.AUTO-MCNC: ABNORMAL MG/DL
HCT VFR BLD AUTO: 44.6 % (ref 41–52)
HGB BLD-MCNC: 14.9 G/DL (ref 13.5–17.5)
IMM GRANULOCYTES # BLD AUTO: 0.02 X10*3/UL (ref 0–0.7)
IMM GRANULOCYTES NFR BLD AUTO: 0.3 % (ref 0–0.9)
KETONES UR STRIP.AUTO-MCNC: NEGATIVE MG/DL
LEUKOCYTE ESTERASE UR QL STRIP.AUTO: NEGATIVE
LIPASE SERPL-CCNC: 80 U/L (ref 9–82)
LYMPHOCYTES # BLD AUTO: 1.63 X10*3/UL (ref 1.2–4.8)
LYMPHOCYTES NFR BLD AUTO: 22.2 %
MCH RBC QN AUTO: 29.4 PG (ref 26–34)
MCHC RBC AUTO-ENTMCNC: 33.4 G/DL (ref 32–36)
MCV RBC AUTO: 88 FL (ref 80–100)
MONOCYTES # BLD AUTO: 0.63 X10*3/UL (ref 0.1–1)
MONOCYTES NFR BLD AUTO: 8.6 %
MUCOUS THREADS #/AREA URNS AUTO: ABNORMAL /LPF
NEUTROPHILS # BLD AUTO: 4.63 X10*3/UL (ref 1.2–7.7)
NEUTROPHILS NFR BLD AUTO: 63.1 %
NITRITE UR QL STRIP.AUTO: NEGATIVE
NRBC BLD-RTO: 0 /100 WBCS (ref 0–0)
PH UR STRIP.AUTO: 6.5 [PH]
PLATELET # BLD AUTO: 243 X10*3/UL (ref 150–450)
POTASSIUM SERPL-SCNC: 4.4 MMOL/L (ref 3.5–5.3)
PROT SERPL-MCNC: 7.1 G/DL (ref 6.4–8.2)
PROT UR STRIP.AUTO-MCNC: ABNORMAL MG/DL
RBC # BLD AUTO: 5.06 X10*6/UL (ref 4.5–5.9)
RBC # UR STRIP.AUTO: ABNORMAL MG/DL
RBC #/AREA URNS AUTO: >20 /HPF
SODIUM SERPL-SCNC: 137 MMOL/L (ref 136–145)
SP GR UR STRIP.AUTO: 1.03
UROBILINOGEN UR STRIP.AUTO-MCNC: NORMAL MG/DL
WBC # BLD AUTO: 7.3 X10*3/UL (ref 4.4–11.3)
WBC #/AREA URNS AUTO: ABNORMAL /HPF

## 2025-08-21 PROCEDURE — 96361 HYDRATE IV INFUSION ADD-ON: CPT

## 2025-08-21 PROCEDURE — 99285 EMERGENCY DEPT VISIT HI MDM: CPT

## 2025-08-21 PROCEDURE — 36415 COLL VENOUS BLD VENIPUNCTURE: CPT

## 2025-08-21 PROCEDURE — 99285 EMERGENCY DEPT VISIT HI MDM: CPT | Mod: 25

## 2025-08-21 PROCEDURE — 96374 THER/PROPH/DIAG INJ IV PUSH: CPT

## 2025-08-21 PROCEDURE — 74177 CT ABD & PELVIS W/CONTRAST: CPT | Performed by: STUDENT IN AN ORGANIZED HEALTH CARE EDUCATION/TRAINING PROGRAM

## 2025-08-21 PROCEDURE — 83690 ASSAY OF LIPASE: CPT

## 2025-08-21 PROCEDURE — 2500000004 HC RX 250 GENERAL PHARMACY W/ HCPCS (ALT 636 FOR OP/ED): Mod: JZ

## 2025-08-21 PROCEDURE — 96376 TX/PRO/DX INJ SAME DRUG ADON: CPT

## 2025-08-21 PROCEDURE — 96375 TX/PRO/DX INJ NEW DRUG ADDON: CPT

## 2025-08-21 PROCEDURE — 81001 URINALYSIS AUTO W/SCOPE: CPT

## 2025-08-21 PROCEDURE — 74177 CT ABD & PELVIS W/CONTRAST: CPT

## 2025-08-21 PROCEDURE — 96372 THER/PROPH/DIAG INJ SC/IM: CPT

## 2025-08-21 PROCEDURE — 99212 OFFICE O/P EST SF 10 MIN: CPT | Performed by: ORTHOPAEDIC SURGERY

## 2025-08-21 PROCEDURE — 84075 ASSAY ALKALINE PHOSPHATASE: CPT

## 2025-08-21 PROCEDURE — 2550000001 HC RX 255 CONTRASTS

## 2025-08-21 PROCEDURE — 85025 COMPLETE CBC W/AUTO DIFF WBC: CPT

## 2025-08-21 RX ORDER — HYDROMORPHONE HYDROCHLORIDE 0.2 MG/ML
0.2 INJECTION INTRAMUSCULAR; INTRAVENOUS; SUBCUTANEOUS ONCE
Status: COMPLETED | OUTPATIENT
Start: 2025-08-21 | End: 2025-08-21

## 2025-08-21 RX ORDER — OXYCODONE HYDROCHLORIDE 5 MG/1
5 TABLET ORAL EVERY 12 HOURS PRN
Qty: 4 TABLET | Refills: 0 | Status: SHIPPED | OUTPATIENT
Start: 2025-08-21 | End: 2025-08-24

## 2025-08-21 RX ORDER — CYCLOBENZAPRINE HCL 5 MG
5 TABLET ORAL ONCE
Status: DISCONTINUED | OUTPATIENT
Start: 2025-08-21 | End: 2025-08-21 | Stop reason: HOSPADM

## 2025-08-21 RX ORDER — ONDANSETRON HYDROCHLORIDE 2 MG/ML
4 INJECTION, SOLUTION INTRAVENOUS ONCE
Status: COMPLETED | OUTPATIENT
Start: 2025-08-21 | End: 2025-08-21

## 2025-08-21 RX ORDER — DICYCLOMINE HYDROCHLORIDE 10 MG/ML
20 INJECTION INTRAMUSCULAR ONCE
Status: COMPLETED | OUTPATIENT
Start: 2025-08-21 | End: 2025-08-21

## 2025-08-21 RX ORDER — ONDANSETRON 4 MG/1
4 TABLET, ORALLY DISINTEGRATING ORAL EVERY 8 HOURS PRN
Qty: 20 TABLET | Refills: 0 | Status: SHIPPED | OUTPATIENT
Start: 2025-08-21 | End: 2025-08-28

## 2025-08-21 RX ADMIN — SODIUM CHLORIDE, SODIUM LACTATE, POTASSIUM CHLORIDE, AND CALCIUM CHLORIDE 1000 ML: .6; .31; .03; .02 INJECTION, SOLUTION INTRAVENOUS at 08:02

## 2025-08-21 RX ADMIN — ONDANSETRON 4 MG: 2 INJECTION, SOLUTION INTRAMUSCULAR; INTRAVENOUS at 07:59

## 2025-08-21 RX ADMIN — HYDROMORPHONE HYDROCHLORIDE 0.2 MG: 0.2 INJECTION, SOLUTION INTRAMUSCULAR; INTRAVENOUS; SUBCUTANEOUS at 09:07

## 2025-08-21 RX ADMIN — HYDROMORPHONE HYDROCHLORIDE 0.2 MG: 0.2 INJECTION, SOLUTION INTRAMUSCULAR; INTRAVENOUS; SUBCUTANEOUS at 07:59

## 2025-08-21 RX ADMIN — DICYCLOMINE HYDROCHLORIDE 20 MG: 10 INJECTION, SOLUTION INTRAMUSCULAR at 10:41

## 2025-08-21 RX ADMIN — IOHEXOL 75 ML: 350 INJECTION, SOLUTION INTRAVENOUS at 08:39

## 2025-08-21 ASSESSMENT — PAIN SCALES - GENERAL
PAINLEVEL_OUTOF10: 8
PAINLEVEL_OUTOF10: 10 - WORST POSSIBLE PAIN
PAINLEVEL_OUTOF10: 7

## 2025-08-21 ASSESSMENT — PAIN - FUNCTIONAL ASSESSMENT
PAIN_FUNCTIONAL_ASSESSMENT: 0-10

## 2025-08-21 ASSESSMENT — LIFESTYLE VARIABLES
TOTAL SCORE: 0
HAVE PEOPLE ANNOYED YOU BY CRITICIZING YOUR DRINKING: NO
EVER HAD A DRINK FIRST THING IN THE MORNING TO STEADY YOUR NERVES TO GET RID OF A HANGOVER: NO
HAVE YOU EVER FELT YOU SHOULD CUT DOWN ON YOUR DRINKING: NO
EVER FELT BAD OR GUILTY ABOUT YOUR DRINKING: NO

## 2025-08-21 ASSESSMENT — PAIN DESCRIPTION - PAIN TYPE: TYPE: CHRONIC PAIN

## 2025-08-21 ASSESSMENT — PAIN DESCRIPTION - LOCATION
LOCATION: BACK
LOCATION: ABDOMEN
LOCATION: ABDOMEN

## 2025-08-21 ASSESSMENT — PAIN DESCRIPTION - PROGRESSION: CLINICAL_PROGRESSION: GRADUALLY WORSENING

## 2025-08-21 ASSESSMENT — PAIN DESCRIPTION - ONSET: ONSET: ONGOING

## 2025-08-21 ASSESSMENT — PAIN DESCRIPTION - DESCRIPTORS: DESCRIPTORS: PRESSURE

## 2025-08-21 ASSESSMENT — PAIN DESCRIPTION - FREQUENCY: FREQUENCY: CONSTANT/CONTINUOUS

## 2025-08-22 LAB — HOLD SPECIMEN: NORMAL

## 2025-08-23 ENCOUNTER — APPOINTMENT (OUTPATIENT)
Dept: RADIOLOGY | Facility: HOSPITAL | Age: 55
End: 2025-08-23
Payer: COMMERCIAL

## 2025-08-23 ENCOUNTER — HOSPITAL ENCOUNTER (EMERGENCY)
Facility: HOSPITAL | Age: 55
Discharge: LEFT BEFORE DISCHARGE | End: 2025-08-23
Attending: STUDENT IN AN ORGANIZED HEALTH CARE EDUCATION/TRAINING PROGRAM
Payer: COMMERCIAL

## 2025-08-23 ENCOUNTER — APPOINTMENT (OUTPATIENT)
Dept: CARDIOLOGY | Facility: HOSPITAL | Age: 55
End: 2025-08-23
Payer: COMMERCIAL

## 2025-08-23 VITALS
HEART RATE: 86 BPM | WEIGHT: 164 LBS | RESPIRATION RATE: 16 BRPM | SYSTOLIC BLOOD PRESSURE: 129 MMHG | TEMPERATURE: 97.2 F | BODY MASS INDEX: 24.29 KG/M2 | OXYGEN SATURATION: 98 % | HEIGHT: 69 IN | DIASTOLIC BLOOD PRESSURE: 78 MMHG

## 2025-08-23 DIAGNOSIS — T80.89XA IRRITATION OF INJECTION SITE, INITIAL ENCOUNTER: Primary | ICD-10-CM

## 2025-08-23 LAB
ALBUMIN SERPL BCP-MCNC: 4.3 G/DL (ref 3.4–5)
ALP SERPL-CCNC: 59 U/L (ref 33–120)
ALT SERPL W P-5'-P-CCNC: 17 U/L (ref 10–52)
ANION GAP SERPL CALC-SCNC: 10 MMOL/L (ref 10–20)
AST SERPL W P-5'-P-CCNC: 18 U/L (ref 9–39)
BASOPHILS # BLD AUTO: 0.07 X10*3/UL (ref 0–0.1)
BASOPHILS NFR BLD AUTO: 0.6 %
BILIRUB SERPL-MCNC: 0.7 MG/DL (ref 0–1.2)
BUN SERPL-MCNC: 22 MG/DL (ref 6–23)
CALCIUM SERPL-MCNC: 9.1 MG/DL (ref 8.6–10.3)
CHLORIDE SERPL-SCNC: 106 MMOL/L (ref 98–107)
CO2 SERPL-SCNC: 26 MMOL/L (ref 21–32)
CREAT SERPL-MCNC: 0.81 MG/DL (ref 0.5–1.3)
EGFRCR SERPLBLD CKD-EPI 2021: >90 ML/MIN/1.73M*2
EOSINOPHIL # BLD AUTO: 0.24 X10*3/UL (ref 0–0.7)
EOSINOPHIL NFR BLD AUTO: 2 %
ERYTHROCYTE [DISTWIDTH] IN BLOOD BY AUTOMATED COUNT: 14.2 % (ref 11.5–14.5)
GLUCOSE SERPL-MCNC: 110 MG/DL (ref 74–99)
HCT VFR BLD AUTO: 42.7 % (ref 41–52)
HGB BLD-MCNC: 14.4 G/DL (ref 13.5–17.5)
HOLD SPECIMEN: NORMAL
IMM GRANULOCYTES # BLD AUTO: 0.04 X10*3/UL (ref 0–0.7)
IMM GRANULOCYTES NFR BLD AUTO: 0.3 % (ref 0–0.9)
LYMPHOCYTES # BLD AUTO: 0.89 X10*3/UL (ref 1.2–4.8)
LYMPHOCYTES NFR BLD AUTO: 7.3 %
MAGNESIUM SERPL-MCNC: 1.88 MG/DL (ref 1.6–2.4)
MCH RBC QN AUTO: 29.4 PG (ref 26–34)
MCHC RBC AUTO-ENTMCNC: 33.7 G/DL (ref 32–36)
MCV RBC AUTO: 87 FL (ref 80–100)
MONOCYTES # BLD AUTO: 0.99 X10*3/UL (ref 0.1–1)
MONOCYTES NFR BLD AUTO: 8.1 %
NEUTROPHILS # BLD AUTO: 9.97 X10*3/UL (ref 1.2–7.7)
NEUTROPHILS NFR BLD AUTO: 81.7 %
NRBC BLD-RTO: 0 /100 WBCS (ref 0–0)
PHOSPHATE SERPL-MCNC: 1.6 MG/DL (ref 2.5–4.9)
PLATELET # BLD AUTO: 216 X10*3/UL (ref 150–450)
POTASSIUM SERPL-SCNC: 4.2 MMOL/L (ref 3.5–5.3)
PROT SERPL-MCNC: 6.9 G/DL (ref 6.4–8.2)
RBC # BLD AUTO: 4.9 X10*6/UL (ref 4.5–5.9)
SODIUM SERPL-SCNC: 138 MMOL/L (ref 136–145)
WBC # BLD AUTO: 12.2 X10*3/UL (ref 4.4–11.3)

## 2025-08-23 PROCEDURE — 36415 COLL VENOUS BLD VENIPUNCTURE: CPT

## 2025-08-23 PROCEDURE — 73502 X-RAY EXAM HIP UNI 2-3 VIEWS: CPT | Mod: RIGHT SIDE | Performed by: RADIOLOGY

## 2025-08-23 PROCEDURE — 2500000005 HC RX 250 GENERAL PHARMACY W/O HCPCS

## 2025-08-23 PROCEDURE — 72125 CT NECK SPINE W/O DYE: CPT

## 2025-08-23 PROCEDURE — 2500000004 HC RX 250 GENERAL PHARMACY W/ HCPCS (ALT 636 FOR OP/ED): Mod: JZ

## 2025-08-23 PROCEDURE — 85025 COMPLETE CBC W/AUTO DIFF WBC: CPT

## 2025-08-23 PROCEDURE — 2500000001 HC RX 250 WO HCPCS SELF ADMINISTERED DRUGS (ALT 637 FOR MEDICARE OP)

## 2025-08-23 PROCEDURE — 70450 CT HEAD/BRAIN W/O DYE: CPT

## 2025-08-23 PROCEDURE — 80053 COMPREHEN METABOLIC PANEL: CPT

## 2025-08-23 PROCEDURE — 70450 CT HEAD/BRAIN W/O DYE: CPT | Performed by: STUDENT IN AN ORGANIZED HEALTH CARE EDUCATION/TRAINING PROGRAM

## 2025-08-23 PROCEDURE — 99285 EMERGENCY DEPT VISIT HI MDM: CPT | Mod: 25 | Performed by: STUDENT IN AN ORGANIZED HEALTH CARE EDUCATION/TRAINING PROGRAM

## 2025-08-23 PROCEDURE — 73030 X-RAY EXAM OF SHOULDER: CPT | Mod: LT

## 2025-08-23 PROCEDURE — 93005 ELECTROCARDIOGRAM TRACING: CPT

## 2025-08-23 PROCEDURE — 84100 ASSAY OF PHOSPHORUS: CPT

## 2025-08-23 PROCEDURE — 96365 THER/PROPH/DIAG IV INF INIT: CPT

## 2025-08-23 PROCEDURE — 72125 CT NECK SPINE W/O DYE: CPT | Performed by: STUDENT IN AN ORGANIZED HEALTH CARE EDUCATION/TRAINING PROGRAM

## 2025-08-23 PROCEDURE — 73502 X-RAY EXAM HIP UNI 2-3 VIEWS: CPT | Mod: RT

## 2025-08-23 PROCEDURE — 83735 ASSAY OF MAGNESIUM: CPT

## 2025-08-23 PROCEDURE — 73030 X-RAY EXAM OF SHOULDER: CPT | Mod: LEFT SIDE | Performed by: RADIOLOGY

## 2025-08-23 RX ORDER — MAGNESIUM SULFATE HEPTAHYDRATE 40 MG/ML
2 INJECTION, SOLUTION INTRAVENOUS ONCE
Status: COMPLETED | OUTPATIENT
Start: 2025-08-23 | End: 2025-08-23

## 2025-08-23 RX ORDER — ACETAMINOPHEN 325 MG/1
650 TABLET ORAL ONCE
Status: DISCONTINUED | OUTPATIENT
Start: 2025-08-23 | End: 2025-08-23

## 2025-08-23 RX ORDER — IBUPROFEN 400 MG/1
400 TABLET, FILM COATED ORAL ONCE
Status: COMPLETED | OUTPATIENT
Start: 2025-08-23 | End: 2025-08-23

## 2025-08-23 RX ORDER — HYDROXYZINE HYDROCHLORIDE 25 MG/1
25 TABLET, FILM COATED ORAL ONCE
Status: COMPLETED | OUTPATIENT
Start: 2025-08-23 | End: 2025-08-23

## 2025-08-23 RX ORDER — SODIUM,POTASSIUM PHOSPHATES 280-250MG
2 POWDER IN PACKET (EA) ORAL ONCE
Status: DISCONTINUED | OUTPATIENT
Start: 2025-08-23 | End: 2025-08-23

## 2025-08-23 RX ORDER — LIDOCAINE 560 MG/1
1 PATCH PERCUTANEOUS; TOPICAL; TRANSDERMAL ONCE
Status: DISCONTINUED | OUTPATIENT
Start: 2025-08-23 | End: 2025-08-23 | Stop reason: HOSPADM

## 2025-08-23 RX ADMIN — MAGNESIUM SULFATE HEPTAHYDRATE 2 G: 40 INJECTION, SOLUTION INTRAVENOUS at 12:43

## 2025-08-23 RX ADMIN — Medication 500 MG: at 12:49

## 2025-08-23 RX ADMIN — SODIUM CHLORIDE, SODIUM LACTATE, POTASSIUM CHLORIDE, AND CALCIUM CHLORIDE 1000 ML: .6; .31; .03; .02 INJECTION, SOLUTION INTRAVENOUS at 12:43

## 2025-08-23 RX ADMIN — HYDROXYZINE HYDROCHLORIDE 25 MG: 25 TABLET, FILM COATED ORAL at 12:53

## 2025-08-23 RX ADMIN — LIDOCAINE 4% 1 PATCH: 40 PATCH TOPICAL at 11:59

## 2025-08-23 RX ADMIN — IBUPROFEN 400 MG: 400 TABLET ORAL at 11:59

## 2025-08-23 ASSESSMENT — PAIN - FUNCTIONAL ASSESSMENT
PAIN_FUNCTIONAL_ASSESSMENT: 0-10
PAIN_FUNCTIONAL_ASSESSMENT: 0-10

## 2025-08-23 ASSESSMENT — PAIN DESCRIPTION - LOCATION: LOCATION: ARM

## 2025-08-23 ASSESSMENT — PAIN SCALES - GENERAL: PAINLEVEL_OUTOF10: 10 - WORST POSSIBLE PAIN

## 2025-08-23 ASSESSMENT — PAIN DESCRIPTION - ORIENTATION: ORIENTATION: LEFT

## 2025-08-24 LAB
ATRIAL RATE: 92 BPM
P AXIS: 62 DEGREES
P OFFSET: 204 MS
P ONSET: 149 MS
PR INTERVAL: 148 MS
Q ONSET: 223 MS
QRS COUNT: 15 BEATS
QRS DURATION: 90 MS
QT INTERVAL: 354 MS
QTC CALCULATION(BAZETT): 437 MS
QTC FREDERICIA: 408 MS
R AXIS: 36 DEGREES
T AXIS: 40 DEGREES
T OFFSET: 400 MS
VENTRICULAR RATE: 92 BPM

## 2025-08-25 LAB — HOLD SPECIMEN: NORMAL

## 2025-08-26 ENCOUNTER — APPOINTMENT (OUTPATIENT)
Dept: PAIN MEDICINE | Facility: CLINIC | Age: 55
End: 2025-08-26
Payer: COMMERCIAL

## 2025-08-27 ENCOUNTER — TELEPHONE (OUTPATIENT)
Dept: OPHTHALMOLOGY | Facility: CLINIC | Age: 55
End: 2025-08-27
Payer: COMMERCIAL

## 2025-08-28 ENCOUNTER — APPOINTMENT (OUTPATIENT)
Dept: ORTHOPEDIC SURGERY | Facility: CLINIC | Age: 55
End: 2025-08-28
Payer: COMMERCIAL

## 2025-09-02 ENCOUNTER — APPOINTMENT (OUTPATIENT)
Dept: PAIN MEDICINE | Facility: CLINIC | Age: 55
End: 2025-09-02
Payer: COMMERCIAL

## 2025-09-03 ENCOUNTER — APPOINTMENT (OUTPATIENT)
Dept: PAIN MEDICINE | Facility: CLINIC | Age: 55
End: 2025-09-03
Payer: COMMERCIAL

## 2025-09-04 ENCOUNTER — APPOINTMENT (OUTPATIENT)
Dept: ORTHOPEDIC SURGERY | Facility: CLINIC | Age: 55
End: 2025-09-04
Payer: COMMERCIAL

## 2025-09-11 ENCOUNTER — APPOINTMENT (OUTPATIENT)
Dept: PAIN MEDICINE | Facility: CLINIC | Age: 55
End: 2025-09-11
Payer: COMMERCIAL

## 2025-09-18 ENCOUNTER — APPOINTMENT (OUTPATIENT)
Dept: CARDIOLOGY | Facility: CLINIC | Age: 55
End: 2025-09-18
Payer: COMMERCIAL

## 2025-09-18 ENCOUNTER — APPOINTMENT (OUTPATIENT)
Dept: ORTHOPEDIC SURGERY | Facility: CLINIC | Age: 55
End: 2025-09-18
Payer: COMMERCIAL

## 2026-06-16 ENCOUNTER — APPOINTMENT (OUTPATIENT)
Dept: OPHTHALMOLOGY | Facility: CLINIC | Age: 56
End: 2026-06-16
Payer: COMMERCIAL

## (undated) DEVICE — RETRIEVER, SUTURE, HEWSON

## (undated) DEVICE — SOLUTION, INJECTION, USP, SODIUM CHLORIDE 0.9%, .9, NACL, 1000 ML, BAG

## (undated) DEVICE — PREP, IODOPHOR, W/ALCOHOL, DURAPREP, W/APPLICATOR, 26 CC

## (undated) DEVICE — SUTURE, VICRYL, 1, 36 IN, V-34, VIOLET

## (undated) DEVICE — WOUND SYSTEM, DEBRIDEMENT & CLEANING, O.R DUOPAK

## (undated) DEVICE — GOWN, ASTOUND, XL

## (undated) DEVICE — DRAPE, SHOULDER, W/POUCH, BEACH CHAIR

## (undated) DEVICE — GLOVE, SURGICAL, PROTEXIS PI BLUE W/NEUTHERA, 8.0, PF, LF

## (undated) DEVICE — SYRINGE, 50 CC, LUER LOCK

## (undated) DEVICE — SOLUTION, IRRIGATION, SODIUM CHLORIDE 0.9%, 1000 ML, POUR BOTTLE

## (undated) DEVICE — PILLOW, ABDUCTION, LARGE, 25 X 18 X 6.25 IN

## (undated) DEVICE — POSITIONER, CRADLE, HEAD, MEDC, FOAM SLOTTED

## (undated) DEVICE — Device

## (undated) DEVICE — PROTECTOR, NERVE, ULNAR, PINK

## (undated) DEVICE — BLADE, GEN COATED 2.75, LF

## (undated) DEVICE — TISSUE ADHESIVE, PREMIERPRO EXOFIN, PRECISION PEN HV, 1.0ML

## (undated) DEVICE — MAT, FLOOR, STANDARD FLUID BARRIER, 32X44, GREEN

## (undated) DEVICE — MANIFOLD, 4 PORT NEPTUNE STANDARD

## (undated) DEVICE — HEAD POSITIONER, UNIVERSAL, DISP

## (undated) DEVICE — SUTURE, MONOCRYL, 4-0, 18 IN, PS2, UNDYED

## (undated) DEVICE — BLADE, SAW, RECIPROCATING, SHORT

## (undated) DEVICE — PIN, BONE, 4MM X 140MM, STERILE

## (undated) DEVICE — SUTURE, MONOCRYL, 3-0, 36 IN, CT-1, UNDYED

## (undated) DEVICE — NEEDLE, SPINAL, QUINCKE, 18 G X 3.5 IN, PINK HUB

## (undated) DEVICE — SUTURE, MONOCRYL, 4-0, 27 IN, PS-2, UNDYED

## (undated) DEVICE — BATH BLANKET STERILE

## (undated) DEVICE — STRAP, VELCRO, BODY, 4 X 60IN, NS

## (undated) DEVICE — DRESSING, MEPILEX BORDER, POST-OP AG, 4 X 6 IN

## (undated) DEVICE — SOLUTION, TOPICAL, ALCOHOL, ISOPROPYL 70%, 16 OZ

## (undated) DEVICE — DRESSING, GAUZE, SPONGE, 12 PLY, 4 X 4 IN, PLASTIC POUCH, STRL 10PK

## (undated) DEVICE — FEMORAL CANAL TIP FOR INTERPULSE HANDPIECE SET

## (undated) DEVICE — HOOD, SURGICAL, FLYTE, T7

## (undated) DEVICE — COVER HANDLE LIGHT, STERIS, BLUE, STERILE

## (undated) DEVICE — GLOVE, SURGICAL, PROTEXIS PI , 6.5, PF, LF

## (undated) DEVICE — DRAPE KIT, RIO

## (undated) DEVICE — CATHETER, DIAGNOSTIC, 5FR,  PIG-145, 110CM, 6SH ANGLED

## (undated) DEVICE — SOLUTION, IRRIGATION, STERILE WATER, 1000 ML, POUR BOTTLE

## (undated) DEVICE — SHEATH, GLIDESHEATH, SLENDER, 6FR 10CM

## (undated) DEVICE — GOWN, SURGICAL, ROYAL SILK, LG, STERILE

## (undated) DEVICE — TUBING, PATIENT 8FT STERILE

## (undated) DEVICE — ADHESIVE, SKIN, DERMABOND ADVANCED, 15CM, PEN-STYLE

## (undated) DEVICE — HIGH FLOW TIP FOR INTERPULSE HANDPIECE SET

## (undated) DEVICE — TAPE, SURGICAL, FOAM, MICROFOAM, HYPOALLERGENIC, 4 IN X 5.5 YD

## (undated) DEVICE — HOOD, STERISHIELD T4 SYSTEM

## (undated) DEVICE — DRESSING, AQUACEL AG, HYDROFIBER W/SILVER, 3.5 X 6 IN

## (undated) DEVICE — CHECKPOINT, 3.5 HEX

## (undated) DEVICE — TOWEL PACK 10-PK

## (undated) DEVICE — GLOVE, SURGICAL, ORTHO, PROTEXIS, HYDROGEL, 8.0, PF, LATEX

## (undated) DEVICE — SUTURE, ETHIBOND, P2, V-37, 30 IN, GREEN

## (undated) DEVICE — TR BAND, RADIAL COMPRESSION, STANDARD, 24CM

## (undated) DEVICE — STRAP, ARM BOARD, 32 X 1.5

## (undated) DEVICE — GLOVE, SURGICAL, PROTEXIS PI BLUE W/NEUTHERA, 6.5, PF, LF

## (undated) DEVICE — PILLOW, ABDUCTION, MEDIUM

## (undated) DEVICE — SUTURE, VICRYL, 1, 36 IN, CT-1, UNDYED

## (undated) DEVICE — MANIFOLD KIT, CUSTOM (SJM)

## (undated) DEVICE — STOCKINETTE, IMPERVIOUS, LARGE, 9IN X 48IN

## (undated) DEVICE — BLADE, STRYKER, 4.0MM, RESECTOR, F-SERIES

## (undated) DEVICE — RETRIEVER, SUTURE

## (undated) DEVICE — KIT, BEACH CHAIR TRIMANO

## (undated) DEVICE — GOWN, SURGICAL, IMPLT, BACK, XLARGE, XLONG, STERILE

## (undated) DEVICE — TUBING, IRRIGATION, HIGH FLOW, HAND PIECE SET

## (undated) DEVICE — DRAPE, SHEET, 17 X 23 IN

## (undated) DEVICE — HOLSTER, ELECTROSURGERY ACCESSORY, STERILE

## (undated) DEVICE — PROBE, CRUISE ENERGY, ARTHOSCOPIC SERFAS 90-S 4.0MM

## (undated) DEVICE — DRAPE, SHEET, U, W/ADHESIVE STRIP, IMPERVIOUS, 60 X 70 IN, DISPOSABLE, LF, STERILE

## (undated) DEVICE — DRAPE, SHEET, THREE QUARTER, FAN FOLD, 57 X 77 IN

## (undated) DEVICE — GLOVE, SURGICAL, PROTEXIS PI MICRO, 8.0, PF, LF

## (undated) DEVICE — DRESSING, AQUACEL AG, HYDROFIBER W/SILVER, 3.5 X 3.75 IN

## (undated) DEVICE — COVER, MAYO STAND, W/PAD, 23 IN, DISPOSABLE, PLASTIC, LF, STERILE

## (undated) DEVICE — TUBING, SUCTION, 6MM X 10, CLEAN N-COND

## (undated) DEVICE — SUTURE, VICRYL 2-0, TAPER POINT, CT-1 UNDYED 27 INCH

## (undated) DEVICE — CATHETER, OPTITORQUE, 5FR, JACKY, 3.5/ 2H/110CM, CURVED

## (undated) DEVICE — SUTURE, ETHILON, 4-0, BLK, MONO, PS-2 18

## (undated) DEVICE — ADHESIVE, SKIN, LIQUIBAND EXCEED

## (undated) DEVICE — TRACKING KIT, HIP PROCEDURES, VIZADISC

## (undated) DEVICE — TRAY, MINOR, SINGLE BASIN, STERILE

## (undated) DEVICE — TOWEL PACK, STERILE, 4/PACK, BLUE

## (undated) DEVICE — DRAPE, INCISE, ANTIMICROBIAL, IOBAN 2, LARGE, 17 X 23 IN, DISPOSABLE, STERILE

## (undated) DEVICE — MBRACE, WRIST SUPPORT WITH HOOK

## (undated) DEVICE — DRESSING, AQUACEL AG, HYDROFIBER W/SILVER, 3.5 X 9.75 IN

## (undated) DEVICE — GLOVE, SURGICAL, PROTEXIS PI , 7.5, PF, LF

## (undated) DEVICE — CAUTERY, PENCIL, PUSH BUTTON, SMOKE EVAC, 70MM

## (undated) DEVICE — TIP, SUCTION, YANKAUER, FLEXIBLE

## (undated) DEVICE — SUTURE, MONOCRYL, 3-0, PS-1 27IN, UNDYED

## (undated) DEVICE — DRESSING, MEPILEX BORDER, POST-OP AG, 4 X 10 IN

## (undated) DEVICE — SUTURE, STRATAFIX, 3-0, SPIRAL MONOCRYL PLUS, PS, 70CM, UNDYED

## (undated) DEVICE — COVER, PLASTIC, MAYO STAND, 29.5IN X 55.5IN

## (undated) DEVICE — CANNULA, ARTHROSCOPIC TWIST-IN 7MM